# Patient Record
Sex: FEMALE | Race: WHITE | NOT HISPANIC OR LATINO | Employment: OTHER | URBAN - METROPOLITAN AREA
[De-identification: names, ages, dates, MRNs, and addresses within clinical notes are randomized per-mention and may not be internally consistent; named-entity substitution may affect disease eponyms.]

---

## 2017-01-04 ENCOUNTER — LAB (OUTPATIENT)
Dept: LAB | Facility: HOSPITAL | Age: 57
End: 2017-01-04

## 2017-01-04 DIAGNOSIS — M86.672 CHRONIC OSTEOMYELITIS OF HINDFOOT, LEFT (HCC): Primary | ICD-10-CM

## 2017-01-04 LAB
ALBUMIN SERPL-MCNC: 3.5 G/DL (ref 3.2–4.8)
ALBUMIN/GLOB SERPL: 1 G/DL (ref 1.5–2.5)
ALP SERPL-CCNC: 154 U/L (ref 25–100)
ALT SERPL W P-5'-P-CCNC: 23 U/L (ref 7–40)
ANION GAP SERPL CALCULATED.3IONS-SCNC: 9 MMOL/L (ref 3–11)
AST SERPL-CCNC: 27 U/L (ref 0–33)
BASOPHILS # BLD AUTO: 0.04 10*3/MM3 (ref 0–0.2)
BASOPHILS NFR BLD AUTO: 0.5 % (ref 0–1)
BILIRUB SERPL-MCNC: 0.3 MG/DL (ref 0.3–1.2)
BUN BLD-MCNC: 18 MG/DL (ref 9–23)
BUN/CREAT SERPL: 18 (ref 7–25)
CALCIUM SPEC-SCNC: 9.1 MG/DL (ref 8.7–10.4)
CHLORIDE SERPL-SCNC: 99 MMOL/L (ref 99–109)
CO2 SERPL-SCNC: 29 MMOL/L (ref 20–31)
CREAT BLD-MCNC: 1 MG/DL (ref 0.6–1.3)
DEPRECATED RDW RBC AUTO: 41.3 FL (ref 37–54)
EOSINOPHIL # BLD AUTO: 0.36 10*3/MM3 (ref 0.1–0.3)
EOSINOPHIL NFR BLD AUTO: 4.3 % (ref 0–3)
ERYTHROCYTE [DISTWIDTH] IN BLOOD BY AUTOMATED COUNT: 14.4 % (ref 11.3–14.5)
ERYTHROCYTE [SEDIMENTATION RATE] IN BLOOD: 78 MM/HR (ref 0–30)
GFR SERPL CREATININE-BSD FRML MDRD: 57 ML/MIN/1.73
GLOBULIN UR ELPH-MCNC: 3.5 GM/DL
GLUCOSE BLD-MCNC: 293 MG/DL (ref 70–100)
HCT VFR BLD AUTO: 27.1 % (ref 34.5–44)
HGB BLD-MCNC: 8.8 G/DL (ref 11.5–15.5)
IMM GRANULOCYTES # BLD: 0.03 10*3/MM3 (ref 0–0.03)
IMM GRANULOCYTES NFR BLD: 0.4 % (ref 0–0.6)
LYMPHOCYTES # BLD AUTO: 1.1 10*3/MM3 (ref 0.6–4.8)
LYMPHOCYTES NFR BLD AUTO: 13.1 % (ref 24–44)
MCH RBC QN AUTO: 25.8 PG (ref 27–31)
MCHC RBC AUTO-ENTMCNC: 32.5 G/DL (ref 32–36)
MCV RBC AUTO: 79.5 FL (ref 80–99)
MONOCYTES # BLD AUTO: 0.57 10*3/MM3 (ref 0–1)
MONOCYTES NFR BLD AUTO: 6.8 % (ref 0–12)
NEUTROPHILS # BLD AUTO: 6.28 10*3/MM3 (ref 1.5–8.3)
NEUTROPHILS NFR BLD AUTO: 74.9 % (ref 41–71)
PLATELET # BLD AUTO: 511 10*3/MM3 (ref 150–450)
PMV BLD AUTO: 9.1 FL (ref 6–12)
POTASSIUM BLD-SCNC: 4.8 MMOL/L (ref 3.5–5.5)
PROT SERPL-MCNC: 7 G/DL (ref 5.7–8.2)
RBC # BLD AUTO: 3.41 10*6/MM3 (ref 3.89–5.14)
SODIUM BLD-SCNC: 137 MMOL/L (ref 132–146)
VANCOMYCIN TROUGH SERPL-MCNC: 14.3 MCG/ML (ref 10–20)
WBC NRBC COR # BLD: 8.38 10*3/MM3 (ref 3.5–10.8)

## 2017-01-04 PROCEDURE — 36415 COLL VENOUS BLD VENIPUNCTURE: CPT

## 2017-01-04 PROCEDURE — 85652 RBC SED RATE AUTOMATED: CPT | Performed by: INTERNAL MEDICINE

## 2017-01-04 PROCEDURE — 86140 C-REACTIVE PROTEIN: CPT | Performed by: INTERNAL MEDICINE

## 2017-01-04 PROCEDURE — 85025 COMPLETE CBC W/AUTO DIFF WBC: CPT | Performed by: INTERNAL MEDICINE

## 2017-01-04 PROCEDURE — 80053 COMPREHEN METABOLIC PANEL: CPT | Performed by: INTERNAL MEDICINE

## 2017-01-04 PROCEDURE — 80202 ASSAY OF VANCOMYCIN: CPT | Performed by: INTERNAL MEDICINE

## 2017-01-05 LAB — CRP SERPL-MCNC: 28.3 MG/DL (ref 0–10)

## 2017-01-10 ENCOUNTER — LAB (OUTPATIENT)
Dept: LAB | Facility: HOSPITAL | Age: 57
End: 2017-01-10

## 2017-01-10 DIAGNOSIS — M86.672 CHRONIC OSTEOMYELITIS OF HINDFOOT, LEFT (HCC): Primary | ICD-10-CM

## 2017-01-10 LAB
ALBUMIN SERPL-MCNC: 3.3 G/DL (ref 3.2–4.8)
ALBUMIN/GLOB SERPL: 0.9 G/DL (ref 1.5–2.5)
ALP SERPL-CCNC: 158 U/L (ref 25–100)
ALT SERPL W P-5'-P-CCNC: 24 U/L (ref 7–40)
ANION GAP SERPL CALCULATED.3IONS-SCNC: 1 MMOL/L (ref 3–11)
AST SERPL-CCNC: 44 U/L (ref 0–33)
BASOPHILS # BLD AUTO: 0.02 10*3/MM3 (ref 0–0.2)
BASOPHILS NFR BLD AUTO: 0.2 % (ref 0–1)
BILIRUB SERPL-MCNC: 0.3 MG/DL (ref 0.3–1.2)
BUN BLD-MCNC: 18 MG/DL (ref 9–23)
BUN/CREAT SERPL: 18 (ref 7–25)
CALCIUM SPEC-SCNC: 9.2 MG/DL (ref 8.7–10.4)
CHLORIDE SERPL-SCNC: 101 MMOL/L (ref 99–109)
CO2 SERPL-SCNC: 34 MMOL/L (ref 20–31)
CREAT BLD-MCNC: 1 MG/DL (ref 0.6–1.3)
CRP SERPL-MCNC: 30.5 MG/DL (ref 0–10)
DEPRECATED RDW RBC AUTO: 41.9 FL (ref 37–54)
EOSINOPHIL # BLD AUTO: 0.23 10*3/MM3 (ref 0.1–0.3)
EOSINOPHIL NFR BLD AUTO: 2.7 % (ref 0–3)
ERYTHROCYTE [DISTWIDTH] IN BLOOD BY AUTOMATED COUNT: 14.7 % (ref 11.3–14.5)
ERYTHROCYTE [SEDIMENTATION RATE] IN BLOOD: >100 MM/HR (ref 0–30)
GFR SERPL CREATININE-BSD FRML MDRD: 57 ML/MIN/1.73
GLOBULIN UR ELPH-MCNC: 3.6 GM/DL
GLUCOSE BLD-MCNC: 172 MG/DL (ref 70–100)
HCT VFR BLD AUTO: 27.4 % (ref 34.5–44)
HGB BLD-MCNC: 8.9 G/DL (ref 11.5–15.5)
IMM GRANULOCYTES # BLD: 0.03 10*3/MM3 (ref 0–0.03)
IMM GRANULOCYTES NFR BLD: 0.3 % (ref 0–0.6)
LYMPHOCYTES # BLD AUTO: 1.22 10*3/MM3 (ref 0.6–4.8)
LYMPHOCYTES NFR BLD AUTO: 14.1 % (ref 24–44)
MCH RBC QN AUTO: 25.6 PG (ref 27–31)
MCHC RBC AUTO-ENTMCNC: 32.5 G/DL (ref 32–36)
MCV RBC AUTO: 79 FL (ref 80–99)
MONOCYTES # BLD AUTO: 0.68 10*3/MM3 (ref 0–1)
MONOCYTES NFR BLD AUTO: 7.9 % (ref 0–12)
NEUTROPHILS # BLD AUTO: 6.48 10*3/MM3 (ref 1.5–8.3)
NEUTROPHILS NFR BLD AUTO: 74.8 % (ref 41–71)
NRBC BLD MANUAL-RTO: 0 /100 WBC (ref 0–0)
PLATELET # BLD AUTO: 483 10*3/MM3 (ref 150–450)
PMV BLD AUTO: 9.3 FL (ref 6–12)
POTASSIUM BLD-SCNC: 4 MMOL/L (ref 3.5–5.5)
PROT SERPL-MCNC: 6.9 G/DL (ref 5.7–8.2)
RBC # BLD AUTO: 3.47 10*6/MM3 (ref 3.89–5.14)
SODIUM BLD-SCNC: 136 MMOL/L (ref 132–146)
VANCOMYCIN TROUGH SERPL-MCNC: 14.2 MCG/ML (ref 10–20)
WBC NRBC COR # BLD: 8.66 10*3/MM3 (ref 3.5–10.8)

## 2017-01-10 PROCEDURE — 85025 COMPLETE CBC W/AUTO DIFF WBC: CPT | Performed by: INTERNAL MEDICINE

## 2017-01-10 PROCEDURE — 36415 COLL VENOUS BLD VENIPUNCTURE: CPT

## 2017-01-10 PROCEDURE — 80053 COMPREHEN METABOLIC PANEL: CPT | Performed by: INTERNAL MEDICINE

## 2017-01-10 PROCEDURE — 85652 RBC SED RATE AUTOMATED: CPT | Performed by: INTERNAL MEDICINE

## 2017-01-10 PROCEDURE — 86140 C-REACTIVE PROTEIN: CPT | Performed by: INTERNAL MEDICINE

## 2017-01-10 PROCEDURE — 80202 ASSAY OF VANCOMYCIN: CPT | Performed by: INTERNAL MEDICINE

## 2017-01-18 ENCOUNTER — LAB (OUTPATIENT)
Dept: LAB | Facility: HOSPITAL | Age: 57
End: 2017-01-18

## 2017-01-18 DIAGNOSIS — M86.672 CHRONIC OSTEOMYELITIS OF HINDFOOT, LEFT (HCC): Primary | ICD-10-CM

## 2017-01-18 LAB
ALBUMIN SERPL-MCNC: 3.4 G/DL (ref 3.2–4.8)
ALBUMIN/GLOB SERPL: 0.9 G/DL (ref 1.5–2.5)
ALP SERPL-CCNC: 184 U/L (ref 25–100)
ALT SERPL W P-5'-P-CCNC: 32 U/L (ref 7–40)
ANION GAP SERPL CALCULATED.3IONS-SCNC: 7 MMOL/L (ref 3–11)
AST SERPL-CCNC: 51 U/L (ref 0–33)
BASOPHILS # BLD AUTO: 0.04 10*3/MM3 (ref 0–0.2)
BASOPHILS NFR BLD AUTO: 0.5 % (ref 0–1)
BILIRUB SERPL-MCNC: 0.3 MG/DL (ref 0.3–1.2)
BUN BLD-MCNC: 23 MG/DL (ref 9–23)
BUN/CREAT SERPL: 23 (ref 7–25)
CALCIUM SPEC-SCNC: 9.3 MG/DL (ref 8.7–10.4)
CHLORIDE SERPL-SCNC: 103 MMOL/L (ref 99–109)
CO2 SERPL-SCNC: 30 MMOL/L (ref 20–31)
CREAT BLD-MCNC: 1 MG/DL (ref 0.6–1.3)
CRP SERPL-MCNC: 11.1 MG/DL (ref 0–10)
DEPRECATED RDW RBC AUTO: 43.5 FL (ref 37–54)
EOSINOPHIL # BLD AUTO: 0.31 10*3/MM3 (ref 0.1–0.3)
EOSINOPHIL NFR BLD AUTO: 3.5 % (ref 0–3)
ERYTHROCYTE [DISTWIDTH] IN BLOOD BY AUTOMATED COUNT: 14.9 % (ref 11.3–14.5)
GFR SERPL CREATININE-BSD FRML MDRD: 57 ML/MIN/1.73
GLOBULIN UR ELPH-MCNC: 3.6 GM/DL
GLUCOSE BLD-MCNC: 110 MG/DL (ref 70–100)
HCT VFR BLD AUTO: 26.2 % (ref 34.5–44)
HGB BLD-MCNC: 8.3 G/DL (ref 11.5–15.5)
IMM GRANULOCYTES # BLD: 0.02 10*3/MM3 (ref 0–0.03)
IMM GRANULOCYTES NFR BLD: 0.2 % (ref 0–0.6)
LYMPHOCYTES # BLD AUTO: 1.53 10*3/MM3 (ref 0.6–4.8)
LYMPHOCYTES NFR BLD AUTO: 17.5 % (ref 24–44)
MCH RBC QN AUTO: 25.5 PG (ref 27–31)
MCHC RBC AUTO-ENTMCNC: 31.7 G/DL (ref 32–36)
MCV RBC AUTO: 80.4 FL (ref 80–99)
MONOCYTES # BLD AUTO: 0.59 10*3/MM3 (ref 0–1)
MONOCYTES NFR BLD AUTO: 6.8 % (ref 0–12)
NEUTROPHILS # BLD AUTO: 6.25 10*3/MM3 (ref 1.5–8.3)
NEUTROPHILS NFR BLD AUTO: 71.5 % (ref 41–71)
PLATELET # BLD AUTO: 448 10*3/MM3 (ref 150–450)
PMV BLD AUTO: 8.6 FL (ref 6–12)
POTASSIUM BLD-SCNC: 4.3 MMOL/L (ref 3.5–5.5)
PROT SERPL-MCNC: 7 G/DL (ref 5.7–8.2)
RBC # BLD AUTO: 3.26 10*6/MM3 (ref 3.89–5.14)
SODIUM BLD-SCNC: 140 MMOL/L (ref 132–146)
VANCOMYCIN TROUGH SERPL-MCNC: 16.6 MCG/ML (ref 10–20)
WBC NRBC COR # BLD: 8.74 10*3/MM3 (ref 3.5–10.8)

## 2017-01-18 PROCEDURE — 85025 COMPLETE CBC W/AUTO DIFF WBC: CPT | Performed by: INTERNAL MEDICINE

## 2017-01-18 PROCEDURE — 80202 ASSAY OF VANCOMYCIN: CPT | Performed by: INTERNAL MEDICINE

## 2017-01-18 PROCEDURE — 80053 COMPREHEN METABOLIC PANEL: CPT | Performed by: INTERNAL MEDICINE

## 2017-01-18 PROCEDURE — 36415 COLL VENOUS BLD VENIPUNCTURE: CPT

## 2017-01-18 PROCEDURE — 86140 C-REACTIVE PROTEIN: CPT | Performed by: INTERNAL MEDICINE

## 2017-01-25 ENCOUNTER — APPOINTMENT (OUTPATIENT)
Dept: LAB | Facility: HOSPITAL | Age: 57
End: 2017-01-25

## 2017-01-25 ENCOUNTER — TRANSCRIBE ORDERS (OUTPATIENT)
Dept: LAB | Facility: HOSPITAL | Age: 57
End: 2017-01-25

## 2017-01-25 DIAGNOSIS — M86.672 CHRONIC OSTEOMYELITIS OF HINDFOOT, LEFT (HCC): Primary | ICD-10-CM

## 2017-01-25 LAB
ALBUMIN SERPL-MCNC: 3.5 G/DL (ref 3.2–4.8)
ALBUMIN/GLOB SERPL: 0.9 G/DL (ref 1.5–2.5)
ALP SERPL-CCNC: 217 U/L (ref 25–100)
ALT SERPL W P-5'-P-CCNC: 44 U/L (ref 7–40)
ANION GAP SERPL CALCULATED.3IONS-SCNC: 5 MMOL/L (ref 3–11)
AST SERPL-CCNC: 92 U/L (ref 0–33)
BASOPHILS # BLD AUTO: 0.03 10*3/MM3 (ref 0–0.2)
BASOPHILS NFR BLD AUTO: 0.4 % (ref 0–1)
BILIRUB SERPL-MCNC: 0.3 MG/DL (ref 0.3–1.2)
BUN BLD-MCNC: 22 MG/DL (ref 9–23)
BUN/CREAT SERPL: 22 (ref 7–25)
CALCIUM SPEC-SCNC: 9.1 MG/DL (ref 8.7–10.4)
CHLORIDE SERPL-SCNC: 102 MMOL/L (ref 99–109)
CO2 SERPL-SCNC: 32 MMOL/L (ref 20–31)
CREAT BLD-MCNC: 1 MG/DL (ref 0.6–1.3)
CRP SERPL-MCNC: 11.5 MG/DL (ref 0–10)
DEPRECATED RDW RBC AUTO: 46.6 FL (ref 37–54)
EOSINOPHIL # BLD AUTO: 0.32 10*3/MM3 (ref 0.1–0.3)
EOSINOPHIL NFR BLD AUTO: 4.4 % (ref 0–3)
ERYTHROCYTE [DISTWIDTH] IN BLOOD BY AUTOMATED COUNT: 15.9 % (ref 11.3–14.5)
ERYTHROCYTE [SEDIMENTATION RATE] IN BLOOD: 50 MM/HR (ref 0–30)
GFR SERPL CREATININE-BSD FRML MDRD: 57 ML/MIN/1.73
GLOBULIN UR ELPH-MCNC: 3.7 GM/DL
GLUCOSE BLD-MCNC: 130 MG/DL (ref 70–100)
HCT VFR BLD AUTO: 27.5 % (ref 34.5–44)
HGB BLD-MCNC: 8.8 G/DL (ref 11.5–15.5)
IMM GRANULOCYTES # BLD: 0.02 10*3/MM3 (ref 0–0.03)
IMM GRANULOCYTES NFR BLD: 0.3 % (ref 0–0.6)
LYMPHOCYTES # BLD AUTO: 1.39 10*3/MM3 (ref 0.6–4.8)
LYMPHOCYTES NFR BLD AUTO: 18.9 % (ref 24–44)
MCH RBC QN AUTO: 26 PG (ref 27–31)
MCHC RBC AUTO-ENTMCNC: 32 G/DL (ref 32–36)
MCV RBC AUTO: 81.1 FL (ref 80–99)
MONOCYTES # BLD AUTO: 0.59 10*3/MM3 (ref 0–1)
MONOCYTES NFR BLD AUTO: 8 % (ref 0–12)
NEUTROPHILS # BLD AUTO: 5 10*3/MM3 (ref 1.5–8.3)
NEUTROPHILS NFR BLD AUTO: 68 % (ref 41–71)
PLATELET # BLD AUTO: 425 10*3/MM3 (ref 150–450)
PMV BLD AUTO: 9.1 FL (ref 6–12)
POTASSIUM BLD-SCNC: 4.5 MMOL/L (ref 3.5–5.5)
PROT SERPL-MCNC: 7.2 G/DL (ref 5.7–8.2)
RBC # BLD AUTO: 3.39 10*6/MM3 (ref 3.89–5.14)
SODIUM BLD-SCNC: 139 MMOL/L (ref 132–146)
VANCOMYCIN TROUGH SERPL-MCNC: 16.6 MCG/ML (ref 10–20)
WBC NRBC COR # BLD: 7.35 10*3/MM3 (ref 3.5–10.8)

## 2017-01-25 PROCEDURE — 85652 RBC SED RATE AUTOMATED: CPT | Performed by: INTERNAL MEDICINE

## 2017-01-25 PROCEDURE — 86140 C-REACTIVE PROTEIN: CPT | Performed by: INTERNAL MEDICINE

## 2017-01-25 PROCEDURE — 36415 COLL VENOUS BLD VENIPUNCTURE: CPT | Performed by: INTERNAL MEDICINE

## 2017-01-25 PROCEDURE — 80202 ASSAY OF VANCOMYCIN: CPT | Performed by: INTERNAL MEDICINE

## 2017-01-25 PROCEDURE — 80053 COMPREHEN METABOLIC PANEL: CPT | Performed by: INTERNAL MEDICINE

## 2017-01-25 PROCEDURE — 85025 COMPLETE CBC W/AUTO DIFF WBC: CPT | Performed by: INTERNAL MEDICINE

## 2018-02-19 ENCOUNTER — HOSPITAL ENCOUNTER (OUTPATIENT)
Age: 58
End: 2018-02-19
Payer: COMMERCIAL

## 2018-02-19 VITALS
RESPIRATION RATE: 20 BRPM | OXYGEN SATURATION: 98 % | HEART RATE: 94 BPM | SYSTOLIC BLOOD PRESSURE: 189 MMHG | DIASTOLIC BLOOD PRESSURE: 82 MMHG

## 2018-02-19 VITALS — BODY MASS INDEX: 36.5 KG/M2

## 2018-02-19 DIAGNOSIS — M96.1: Primary | ICD-10-CM

## 2018-02-19 PROCEDURE — 80361 OPIATES 1 OR MORE: CPT

## 2018-02-19 PROCEDURE — G0480 DRUG TEST DEF 1-7 CLASSES: HCPCS

## 2018-02-19 PROCEDURE — 80305 DRUG TEST PRSMV DIR OPT OBS: CPT

## 2018-02-19 PROCEDURE — 80365 DRUG SCREENING OXYCODONE: CPT

## 2018-02-19 PROCEDURE — 99212 OFFICE O/P EST SF 10 MIN: CPT

## 2018-03-29 NOTE — PC.PHONENOTE
pt contacted the office to notify staff that she was released from the ER in [place] this morning after being diagnosed with a kidney stone. states that the ER physician wrote her a prescription for Percocet

## 2018-04-24 ENCOUNTER — HOSPITAL ENCOUNTER (OUTPATIENT)
Age: 58
End: 2018-04-24
Payer: COMMERCIAL

## 2018-04-24 VITALS
DIASTOLIC BLOOD PRESSURE: 85 MMHG | HEART RATE: 87 BPM | SYSTOLIC BLOOD PRESSURE: 132 MMHG | RESPIRATION RATE: 18 BRPM | TEMPERATURE: 98 F | OXYGEN SATURATION: 98 %

## 2018-04-24 VITALS — BODY MASS INDEX: 30.2 KG/M2

## 2018-04-24 DIAGNOSIS — Z79.899: ICD-10-CM

## 2018-04-24 DIAGNOSIS — G89.4: Primary | ICD-10-CM

## 2018-04-24 DIAGNOSIS — M51.36: ICD-10-CM

## 2018-04-24 PROCEDURE — G0480 DRUG TEST DEF 1-7 CLASSES: HCPCS

## 2018-04-24 PROCEDURE — 80365 DRUG SCREENING OXYCODONE: CPT

## 2018-04-24 PROCEDURE — 99212 OFFICE O/P EST SF 10 MIN: CPT

## 2018-04-24 PROCEDURE — 80361 OPIATES 1 OR MORE: CPT

## 2018-04-24 PROCEDURE — 80305 DRUG TEST PRSMV DIR OPT OBS: CPT

## 2018-07-23 ENCOUNTER — HOSPITAL ENCOUNTER (OUTPATIENT)
Age: 58
End: 2018-07-23
Payer: COMMERCIAL

## 2018-07-23 VITALS
HEART RATE: 8 BPM | OXYGEN SATURATION: 98 % | RESPIRATION RATE: 18 BRPM | SYSTOLIC BLOOD PRESSURE: 149 MMHG | DIASTOLIC BLOOD PRESSURE: 82 MMHG

## 2018-07-23 VITALS — BODY MASS INDEX: 34.9 KG/M2

## 2018-07-23 DIAGNOSIS — M96.1: Primary | ICD-10-CM

## 2018-07-23 PROCEDURE — 99212 OFFICE O/P EST SF 10 MIN: CPT

## 2018-10-22 ENCOUNTER — HOSPITAL ENCOUNTER (OUTPATIENT)
Age: 58
End: 2018-10-22
Payer: COMMERCIAL

## 2018-10-22 VITALS
SYSTOLIC BLOOD PRESSURE: 157 MMHG | DIASTOLIC BLOOD PRESSURE: 76 MMHG | OXYGEN SATURATION: 98 % | HEART RATE: 83 BPM | RESPIRATION RATE: 18 BRPM

## 2018-10-22 VITALS — BODY MASS INDEX: 34.9 KG/M2

## 2018-10-22 DIAGNOSIS — Z89.612: ICD-10-CM

## 2018-10-22 DIAGNOSIS — M51.36: ICD-10-CM

## 2018-10-22 DIAGNOSIS — M96.1: Primary | ICD-10-CM

## 2018-10-22 PROCEDURE — 80365 DRUG SCREENING OXYCODONE: CPT

## 2018-10-22 PROCEDURE — 80361 OPIATES 1 OR MORE: CPT

## 2018-10-22 PROCEDURE — G0480 DRUG TEST DEF 1-7 CLASSES: HCPCS

## 2018-10-22 PROCEDURE — 99213 OFFICE O/P EST LOW 20 MIN: CPT

## 2018-10-22 PROCEDURE — 80305 DRUG TEST PRSMV DIR OPT OBS: CPT

## 2018-12-10 ENCOUNTER — HOSPITAL ENCOUNTER (OUTPATIENT)
Age: 58
End: 2018-12-10
Payer: COMMERCIAL

## 2018-12-10 VITALS
RESPIRATION RATE: 18 BRPM | SYSTOLIC BLOOD PRESSURE: 159 MMHG | DIASTOLIC BLOOD PRESSURE: 83 MMHG | OXYGEN SATURATION: 99 % | HEART RATE: 83 BPM

## 2018-12-10 VITALS — BODY MASS INDEX: 34.2 KG/M2

## 2018-12-10 DIAGNOSIS — M51.36: ICD-10-CM

## 2018-12-10 DIAGNOSIS — Z89.612: ICD-10-CM

## 2018-12-10 DIAGNOSIS — M25.519: ICD-10-CM

## 2018-12-10 DIAGNOSIS — M96.1: Primary | ICD-10-CM

## 2018-12-10 PROCEDURE — 99213 OFFICE O/P EST LOW 20 MIN: CPT

## 2019-01-24 ENCOUNTER — OFFICE (OUTPATIENT)
Dept: URBAN - METROPOLITAN AREA CLINIC 4 | Facility: CLINIC | Age: 59
End: 2019-01-24

## 2019-01-24 VITALS — DIASTOLIC BLOOD PRESSURE: 73 MMHG | WEIGHT: 220 LBS | SYSTOLIC BLOOD PRESSURE: 119 MMHG | HEIGHT: 68 IN

## 2019-01-24 DIAGNOSIS — B18.2 CHRONIC VIRAL HEPATITIS C: ICD-10-CM

## 2019-01-24 DIAGNOSIS — R16.0 HEPATOMEGALY, NOT ELSEWHERE CLASSIFIED: ICD-10-CM

## 2019-01-24 DIAGNOSIS — R74.8 ABNORMAL LEVELS OF OTHER SERUM ENZYMES: ICD-10-CM

## 2019-01-24 DIAGNOSIS — E83.110 HEREDITARY HEMOCHROMATOSIS: ICD-10-CM

## 2019-01-24 DIAGNOSIS — K83.1 OBSTRUCTION OF BILE DUCT: ICD-10-CM

## 2019-01-24 DIAGNOSIS — R10.11 RIGHT UPPER QUADRANT PAIN: ICD-10-CM

## 2019-01-24 PROCEDURE — 99204 OFFICE O/P NEW MOD 45 MIN: CPT | Performed by: INTERNAL MEDICINE

## 2019-02-06 ENCOUNTER — ON CAMPUS - OUTPATIENT (OUTPATIENT)
Dept: URBAN - METROPOLITAN AREA HOSPITAL 73 | Facility: HOSPITAL | Age: 59
End: 2019-02-06
Payer: COMMERCIAL

## 2019-02-06 DIAGNOSIS — R93.2 ABNORMAL FINDINGS ON DIAGNOSTIC IMAGING OF LIVER AND BILIARY: ICD-10-CM

## 2019-02-06 DIAGNOSIS — K83.8 OTHER SPECIFIED DISEASES OF BILIARY TRACT: ICD-10-CM

## 2019-02-06 DIAGNOSIS — K80.50 CALCULUS OF BILE DUCT WITHOUT CHOLANGITIS OR CHOLECYSTITIS W: ICD-10-CM

## 2019-02-06 DIAGNOSIS — R74.8 ABNORMAL LEVELS OF OTHER SERUM ENZYMES: ICD-10-CM

## 2019-02-06 DIAGNOSIS — R10.11 RIGHT UPPER QUADRANT PAIN: ICD-10-CM

## 2019-02-06 PROCEDURE — 43264 ERCP REMOVE DUCT CALCULI: CPT | Performed by: INTERNAL MEDICINE

## 2019-02-06 PROCEDURE — 43262 ENDO CHOLANGIOPANCREATOGRAPH: CPT | Performed by: INTERNAL MEDICINE

## 2019-02-13 ENCOUNTER — OFFICE (OUTPATIENT)
Dept: URBAN - METROPOLITAN AREA CLINIC 4 | Facility: CLINIC | Age: 59
End: 2019-02-13

## 2019-02-13 VITALS — WEIGHT: 220 LBS | HEIGHT: 68 IN

## 2019-02-13 DIAGNOSIS — R16.0 HEPATOMEGALY, NOT ELSEWHERE CLASSIFIED: ICD-10-CM

## 2019-02-13 DIAGNOSIS — B18.2 CHRONIC VIRAL HEPATITIS C: ICD-10-CM

## 2019-02-13 DIAGNOSIS — K59.00 CONSTIPATION, UNSPECIFIED: ICD-10-CM

## 2019-02-13 DIAGNOSIS — R19.4 CHANGE IN BOWEL HABIT: ICD-10-CM

## 2019-02-13 PROCEDURE — 99214 OFFICE O/P EST MOD 30 MIN: CPT | Performed by: NURSE PRACTITIONER

## 2019-03-25 ENCOUNTER — HOSPITAL ENCOUNTER (OUTPATIENT)
Age: 59
End: 2019-03-25
Payer: COMMERCIAL

## 2019-03-25 VITALS
RESPIRATION RATE: 18 BRPM | SYSTOLIC BLOOD PRESSURE: 166 MMHG | OXYGEN SATURATION: 98 % | DIASTOLIC BLOOD PRESSURE: 89 MMHG | HEART RATE: 86 BPM

## 2019-03-25 VITALS — BODY MASS INDEX: 35.7 KG/M2

## 2019-03-25 DIAGNOSIS — M96.1: ICD-10-CM

## 2019-03-25 DIAGNOSIS — Z79.899: ICD-10-CM

## 2019-03-25 DIAGNOSIS — M51.36: ICD-10-CM

## 2019-03-25 DIAGNOSIS — C22.8: Primary | ICD-10-CM

## 2019-03-25 PROCEDURE — 80365 DRUG SCREENING OXYCODONE: CPT

## 2019-03-25 PROCEDURE — 80305 DRUG TEST PRSMV DIR OPT OBS: CPT

## 2019-03-25 PROCEDURE — G0480 DRUG TEST DEF 1-7 CLASSES: HCPCS

## 2019-03-25 PROCEDURE — 80361 OPIATES 1 OR MORE: CPT

## 2019-03-25 PROCEDURE — 99213 OFFICE O/P EST LOW 20 MIN: CPT

## 2019-07-01 ENCOUNTER — HOSPITAL ENCOUNTER (OUTPATIENT)
Age: 59
End: 2019-07-01
Payer: COMMERCIAL

## 2019-07-01 VITALS
OXYGEN SATURATION: 99 % | RESPIRATION RATE: 18 BRPM | SYSTOLIC BLOOD PRESSURE: 140 MMHG | DIASTOLIC BLOOD PRESSURE: 72 MMHG | HEART RATE: 84 BPM

## 2019-07-01 VITALS — BODY MASS INDEX: 34.9 KG/M2

## 2019-07-01 DIAGNOSIS — Z89.612: ICD-10-CM

## 2019-07-01 DIAGNOSIS — M96.1: Primary | ICD-10-CM

## 2019-07-01 DIAGNOSIS — M51.36: ICD-10-CM

## 2019-07-01 DIAGNOSIS — C22.8: ICD-10-CM

## 2019-07-01 DIAGNOSIS — Z79.899: ICD-10-CM

## 2019-07-01 PROCEDURE — 80365 DRUG SCREENING OXYCODONE: CPT

## 2019-07-01 PROCEDURE — 99212 OFFICE O/P EST SF 10 MIN: CPT

## 2019-07-01 PROCEDURE — 80361 OPIATES 1 OR MORE: CPT

## 2019-07-01 PROCEDURE — G0480 DRUG TEST DEF 1-7 CLASSES: HCPCS

## 2019-07-01 PROCEDURE — 80305 DRUG TEST PRSMV DIR OPT OBS: CPT

## 2019-10-14 ENCOUNTER — HOSPITAL ENCOUNTER (OUTPATIENT)
Age: 59
End: 2019-10-14
Payer: COMMERCIAL

## 2019-10-14 VITALS
RESPIRATION RATE: 18 BRPM | SYSTOLIC BLOOD PRESSURE: 159 MMHG | DIASTOLIC BLOOD PRESSURE: 85 MMHG | HEART RATE: 83 BPM | OXYGEN SATURATION: 98 %

## 2019-10-14 VITALS — BODY MASS INDEX: 32.6 KG/M2

## 2019-10-14 DIAGNOSIS — Z89.612: ICD-10-CM

## 2019-10-14 DIAGNOSIS — M96.1: ICD-10-CM

## 2019-10-14 DIAGNOSIS — C22.8: Primary | ICD-10-CM

## 2019-10-14 DIAGNOSIS — M51.36: ICD-10-CM

## 2019-10-14 PROCEDURE — G0480 DRUG TEST DEF 1-7 CLASSES: HCPCS

## 2019-10-14 PROCEDURE — 80307 DRUG TEST PRSMV CHEM ANLYZR: CPT

## 2019-10-14 PROCEDURE — 36415 COLL VENOUS BLD VENIPUNCTURE: CPT

## 2019-10-14 PROCEDURE — 99212 OFFICE O/P EST SF 10 MIN: CPT

## 2019-10-14 PROCEDURE — 80356 HEROIN METABOLITE: CPT

## 2019-10-14 PROCEDURE — 80361 OPIATES 1 OR MORE: CPT

## 2019-10-28 ENCOUNTER — HOSPITAL ENCOUNTER (OUTPATIENT)
Age: 59
End: 2019-10-28
Payer: COMMERCIAL

## 2019-10-28 VITALS
RESPIRATION RATE: 18 BRPM | DIASTOLIC BLOOD PRESSURE: 79 MMHG | OXYGEN SATURATION: 98 % | HEART RATE: 91 BPM | SYSTOLIC BLOOD PRESSURE: 137 MMHG

## 2019-10-28 VITALS — BODY MASS INDEX: 32.6 KG/M2

## 2019-10-28 DIAGNOSIS — C22.9: Primary | ICD-10-CM

## 2019-10-28 DIAGNOSIS — M51.36: ICD-10-CM

## 2019-10-28 DIAGNOSIS — M96.1: ICD-10-CM

## 2019-10-28 DIAGNOSIS — Z89.619: ICD-10-CM

## 2019-10-28 PROCEDURE — 99212 OFFICE O/P EST SF 10 MIN: CPT

## 2019-11-11 ENCOUNTER — HOSPITAL ENCOUNTER (OUTPATIENT)
Age: 59
End: 2019-11-11
Payer: COMMERCIAL

## 2019-11-11 VITALS
SYSTOLIC BLOOD PRESSURE: 153 MMHG | DIASTOLIC BLOOD PRESSURE: 84 MMHG | RESPIRATION RATE: 18 BRPM | HEART RATE: 82 BPM | OXYGEN SATURATION: 98 %

## 2019-11-11 VITALS — BODY MASS INDEX: 21.2 KG/M2

## 2019-11-11 DIAGNOSIS — Z89.512: ICD-10-CM

## 2019-11-11 DIAGNOSIS — M96.1: ICD-10-CM

## 2019-11-11 DIAGNOSIS — C22.8: Primary | ICD-10-CM

## 2019-11-11 DIAGNOSIS — M51.36: ICD-10-CM

## 2019-11-11 PROCEDURE — 99212 OFFICE O/P EST SF 10 MIN: CPT

## 2020-01-20 ENCOUNTER — HOSPITAL ENCOUNTER (OUTPATIENT)
Age: 60
End: 2020-01-20
Payer: COMMERCIAL

## 2020-01-20 VITALS
HEART RATE: 81 BPM | SYSTOLIC BLOOD PRESSURE: 166 MMHG | OXYGEN SATURATION: 98 % | RESPIRATION RATE: 18 BRPM | DIASTOLIC BLOOD PRESSURE: 91 MMHG

## 2020-01-20 VITALS — BODY MASS INDEX: 32.2 KG/M2

## 2020-01-20 DIAGNOSIS — Z89.512: ICD-10-CM

## 2020-01-20 DIAGNOSIS — C22.9: ICD-10-CM

## 2020-01-20 DIAGNOSIS — M51.36: Primary | ICD-10-CM

## 2020-01-20 DIAGNOSIS — I10: ICD-10-CM

## 2020-01-20 DIAGNOSIS — M96.1: ICD-10-CM

## 2020-01-20 DIAGNOSIS — E11.9: ICD-10-CM

## 2020-01-20 PROCEDURE — 99212 OFFICE O/P EST SF 10 MIN: CPT

## 2020-04-13 ENCOUNTER — HOSPITAL ENCOUNTER (OUTPATIENT)
Age: 60
End: 2020-04-13
Payer: COMMERCIAL

## 2020-04-13 DIAGNOSIS — C22.9: ICD-10-CM

## 2020-04-13 DIAGNOSIS — M96.1: ICD-10-CM

## 2020-04-13 DIAGNOSIS — I10: ICD-10-CM

## 2020-04-13 DIAGNOSIS — E11.9: ICD-10-CM

## 2020-04-13 DIAGNOSIS — M51.36: Primary | ICD-10-CM

## 2020-04-13 DIAGNOSIS — Z89.512: ICD-10-CM

## 2020-04-13 PROCEDURE — G2012 BRIEF CHECK IN BY MD/QHP: HCPCS

## 2020-04-13 PROCEDURE — 99441: CPT

## 2020-06-09 ENCOUNTER — HOSPITAL ENCOUNTER (OUTPATIENT)
Age: 60
End: 2020-06-09
Payer: COMMERCIAL

## 2020-06-09 DIAGNOSIS — C22.9: ICD-10-CM

## 2020-06-09 DIAGNOSIS — E11.9: ICD-10-CM

## 2020-06-09 DIAGNOSIS — M96.1: ICD-10-CM

## 2020-06-09 DIAGNOSIS — M51.36: Primary | ICD-10-CM

## 2020-06-09 DIAGNOSIS — I10: ICD-10-CM

## 2020-06-09 DIAGNOSIS — D02.20: ICD-10-CM

## 2020-06-09 DIAGNOSIS — Z89.512: ICD-10-CM

## 2020-06-09 PROCEDURE — 99212 OFFICE O/P EST SF 10 MIN: CPT

## 2020-06-12 ENCOUNTER — HOSPITAL ENCOUNTER (OUTPATIENT)
Age: 60
End: 2020-06-12
Payer: COMMERCIAL

## 2020-06-12 VITALS
TEMPERATURE: 97.7 F | OXYGEN SATURATION: 97 % | RESPIRATION RATE: 18 BRPM | HEART RATE: 96 BPM | DIASTOLIC BLOOD PRESSURE: 91 MMHG | SYSTOLIC BLOOD PRESSURE: 165 MMHG

## 2020-06-12 VITALS — BODY MASS INDEX: 32.6 KG/M2

## 2020-06-12 DIAGNOSIS — M51.16: Primary | ICD-10-CM

## 2020-06-12 DIAGNOSIS — C22.9: ICD-10-CM

## 2020-06-12 DIAGNOSIS — D02.20: ICD-10-CM

## 2020-06-12 PROCEDURE — 99212 OFFICE O/P EST SF 10 MIN: CPT

## 2020-07-20 ENCOUNTER — HOSPITAL ENCOUNTER (OUTPATIENT)
Age: 60
End: 2020-07-20
Payer: COMMERCIAL

## 2020-07-20 VITALS
RESPIRATION RATE: 18 BRPM | OXYGEN SATURATION: 98 % | TEMPERATURE: 97.4 F | DIASTOLIC BLOOD PRESSURE: 88 MMHG | HEART RATE: 74 BPM | SYSTOLIC BLOOD PRESSURE: 142 MMHG

## 2020-07-20 VITALS — BODY MASS INDEX: 33.4 KG/M2

## 2020-07-20 DIAGNOSIS — M51.16: Primary | ICD-10-CM

## 2020-07-20 PROCEDURE — 99212 OFFICE O/P EST SF 10 MIN: CPT

## 2020-10-19 ENCOUNTER — HOSPITAL ENCOUNTER (OUTPATIENT)
Age: 60
End: 2020-10-19
Payer: COMMERCIAL

## 2020-10-19 VITALS
DIASTOLIC BLOOD PRESSURE: 85 MMHG | OXYGEN SATURATION: 98 % | SYSTOLIC BLOOD PRESSURE: 125 MMHG | RESPIRATION RATE: 18 BRPM | HEART RATE: 85 BPM

## 2020-10-19 VITALS — BODY MASS INDEX: 33.4 KG/M2

## 2020-10-19 DIAGNOSIS — C78.7: ICD-10-CM

## 2020-10-19 DIAGNOSIS — M51.16: Primary | ICD-10-CM

## 2020-10-19 DIAGNOSIS — C79.51: ICD-10-CM

## 2020-10-19 PROCEDURE — 99212 OFFICE O/P EST SF 10 MIN: CPT

## 2021-01-11 ENCOUNTER — HOSPITAL ENCOUNTER (OUTPATIENT)
Age: 61
End: 2021-01-11
Payer: COMMERCIAL

## 2021-01-11 VITALS
SYSTOLIC BLOOD PRESSURE: 122 MMHG | DIASTOLIC BLOOD PRESSURE: 74 MMHG | OXYGEN SATURATION: 98 % | HEART RATE: 74 BPM | RESPIRATION RATE: 18 BRPM

## 2021-01-11 VITALS — BODY MASS INDEX: 31.4 KG/M2

## 2021-01-11 DIAGNOSIS — C78.7: ICD-10-CM

## 2021-01-11 DIAGNOSIS — M51.16: Primary | ICD-10-CM

## 2021-01-11 DIAGNOSIS — C79.51: ICD-10-CM

## 2021-01-11 PROCEDURE — 99212 OFFICE O/P EST SF 10 MIN: CPT

## 2021-01-11 PROCEDURE — G0463 HOSPITAL OUTPT CLINIC VISIT: HCPCS

## 2021-03-11 ENCOUNTER — HOSPITAL ENCOUNTER (OUTPATIENT)
Age: 61
End: 2021-03-11
Payer: COMMERCIAL

## 2021-03-11 VITALS
SYSTOLIC BLOOD PRESSURE: 134 MMHG | RESPIRATION RATE: 18 BRPM | HEART RATE: 90 BPM | OXYGEN SATURATION: 98 % | DIASTOLIC BLOOD PRESSURE: 77 MMHG

## 2021-03-11 VITALS — BODY MASS INDEX: 30.9 KG/M2

## 2021-03-11 DIAGNOSIS — C22.7: ICD-10-CM

## 2021-03-11 DIAGNOSIS — C79.51: ICD-10-CM

## 2021-03-11 DIAGNOSIS — M51.16: Primary | ICD-10-CM

## 2021-03-11 PROCEDURE — G0463 HOSPITAL OUTPT CLINIC VISIT: HCPCS

## 2021-03-11 PROCEDURE — 99212 OFFICE O/P EST SF 10 MIN: CPT

## 2021-06-17 ENCOUNTER — HOSPITAL ENCOUNTER (OUTPATIENT)
Age: 61
End: 2021-06-17
Payer: COMMERCIAL

## 2021-06-17 VITALS
RESPIRATION RATE: 18 BRPM | HEART RATE: 87 BPM | DIASTOLIC BLOOD PRESSURE: 77 MMHG | SYSTOLIC BLOOD PRESSURE: 139 MMHG | OXYGEN SATURATION: 98 %

## 2021-06-17 VITALS — BODY MASS INDEX: 32.8 KG/M2

## 2021-06-17 DIAGNOSIS — C22.9: ICD-10-CM

## 2021-06-17 DIAGNOSIS — C79.51: ICD-10-CM

## 2021-06-17 DIAGNOSIS — M51.16: Primary | ICD-10-CM

## 2021-06-17 PROCEDURE — G0463 HOSPITAL OUTPT CLINIC VISIT: HCPCS

## 2021-06-17 PROCEDURE — 99212 OFFICE O/P EST SF 10 MIN: CPT

## 2021-08-12 ENCOUNTER — HOSPITAL ENCOUNTER (OUTPATIENT)
Age: 61
End: 2021-08-12
Payer: COMMERCIAL

## 2021-08-12 VITALS
HEART RATE: 85 BPM | OXYGEN SATURATION: 97 % | SYSTOLIC BLOOD PRESSURE: 143 MMHG | DIASTOLIC BLOOD PRESSURE: 81 MMHG | RESPIRATION RATE: 18 BRPM

## 2021-08-12 VITALS — BODY MASS INDEX: 30.1 KG/M2

## 2021-08-12 DIAGNOSIS — C22.9: ICD-10-CM

## 2021-08-12 DIAGNOSIS — M51.16: Primary | ICD-10-CM

## 2021-08-12 DIAGNOSIS — C79.51: ICD-10-CM

## 2021-08-12 PROCEDURE — 99212 OFFICE O/P EST SF 10 MIN: CPT

## 2021-08-12 PROCEDURE — G0463 HOSPITAL OUTPT CLINIC VISIT: HCPCS

## 2021-09-16 ENCOUNTER — HOSPITAL ENCOUNTER (OUTPATIENT)
Age: 61
End: 2021-09-16
Payer: COMMERCIAL

## 2021-09-16 DIAGNOSIS — M51.16: Primary | ICD-10-CM

## 2021-09-16 DIAGNOSIS — C79.51: ICD-10-CM

## 2021-09-16 PROCEDURE — 99212 OFFICE O/P EST SF 10 MIN: CPT

## 2021-09-16 PROCEDURE — G0463 HOSPITAL OUTPT CLINIC VISIT: HCPCS

## 2021-10-05 ENCOUNTER — HOSPITAL ENCOUNTER (INPATIENT)
Facility: HOSPITAL | Age: 61
LOS: 23 days | Discharge: NURSING, REHAB OR LONG-TERM CARE | End: 2021-10-28
Attending: INTERNAL MEDICINE | Admitting: INTERNAL MEDICINE

## 2021-10-05 ENCOUNTER — APPOINTMENT (OUTPATIENT)
Dept: OTHER | Facility: HOSPITAL | Age: 61
End: 2021-10-05

## 2021-10-05 ENCOUNTER — APPOINTMENT (OUTPATIENT)
Dept: GENERAL RADIOLOGY | Facility: HOSPITAL | Age: 61
End: 2021-10-05

## 2021-10-05 ENCOUNTER — APPOINTMENT (OUTPATIENT)
Dept: CT IMAGING | Facility: HOSPITAL | Age: 61
End: 2021-10-05

## 2021-10-05 DIAGNOSIS — F11.90 METHADONE USE: Chronic | ICD-10-CM

## 2021-10-05 DIAGNOSIS — R47.01 APHASIA: ICD-10-CM

## 2021-10-05 DIAGNOSIS — F80.1 EXPRESSIVE LANGUAGE IMPAIRMENT: Primary | ICD-10-CM

## 2021-10-05 DIAGNOSIS — I61.0 NONTRAUMATIC SUBCORTICAL HEMORRHAGE OF LEFT CEREBRAL HEMISPHERE (HCC): ICD-10-CM

## 2021-10-05 PROBLEM — I62.9 INTRACRANIAL HEMORRHAGE: Status: RESOLVED | Noted: 2021-10-05 | Resolved: 2021-10-05

## 2021-10-05 PROBLEM — J18.9 RIGHT LOWER LOBE PNEUMONIA: Status: ACTIVE | Noted: 2021-10-05

## 2021-10-05 PROBLEM — C79.51 HEPATOCELLULAR CARCINOMA METASTATIC TO BONE: Status: ACTIVE | Noted: 2021-10-05

## 2021-10-05 PROBLEM — C22.0 HEPATOCELLULAR CARCINOMA: Status: ACTIVE | Noted: 2021-10-05

## 2021-10-05 PROBLEM — K74.60 CIRRHOSIS OF LIVER: Chronic | Status: ACTIVE | Noted: 2021-10-05

## 2021-10-05 PROBLEM — I10 ESSENTIAL HYPERTENSION: Status: ACTIVE | Noted: 2020-12-09

## 2021-10-05 PROBLEM — I61.9 ICH (INTRACEREBRAL HEMORRHAGE): Status: ACTIVE | Noted: 2021-10-05

## 2021-10-05 PROBLEM — I62.9 INTRACRANIAL HEMORRHAGE: Status: ACTIVE | Noted: 2021-10-05

## 2021-10-05 PROBLEM — E83.119 HEMOCHROMATOSIS: Chronic | Status: ACTIVE | Noted: 2021-10-05

## 2021-10-05 PROBLEM — K74.60 CIRRHOSIS OF LIVER (HCC): Status: ACTIVE | Noted: 2021-10-05

## 2021-10-05 PROBLEM — E11.9 TYPE 2 DIABETES MELLITUS (HCC): Status: ACTIVE | Noted: 2019-07-09

## 2021-10-05 PROBLEM — C22.0 HEPATOCELLULAR CARCINOMA METASTATIC TO BONE: Status: ACTIVE | Noted: 2021-10-05

## 2021-10-05 PROBLEM — E83.119 HEMOCHROMATOSIS: Status: ACTIVE | Noted: 2021-10-05

## 2021-10-05 LAB
ALBUMIN SERPL-MCNC: 2.3 G/DL (ref 3.5–5.2)
ALBUMIN/GLOB SERPL: 0.6 G/DL
ALP SERPL-CCNC: 91 U/L (ref 39–117)
ALT SERPL W P-5'-P-CCNC: 17 U/L (ref 1–33)
ANION GAP SERPL CALCULATED.3IONS-SCNC: 13 MMOL/L (ref 5–15)
AST SERPL-CCNC: 23 U/L (ref 1–32)
BASOPHILS # BLD AUTO: 0.05 10*3/MM3 (ref 0–0.2)
BASOPHILS NFR BLD AUTO: 0.5 % (ref 0–1.5)
BILIRUB SERPL-MCNC: 0.2 MG/DL (ref 0–1.2)
BUN SERPL-MCNC: 62 MG/DL (ref 8–23)
BUN/CREAT SERPL: 13.2 (ref 7–25)
CALCIUM SPEC-SCNC: 7.9 MG/DL (ref 8.6–10.5)
CHLORIDE SERPL-SCNC: 107 MMOL/L (ref 98–107)
CO2 SERPL-SCNC: 17 MMOL/L (ref 22–29)
CREAT SERPL-MCNC: 4.69 MG/DL (ref 0.57–1)
D-LACTATE SERPL-SCNC: 0.6 MMOL/L (ref 0.5–2)
DEPRECATED RDW RBC AUTO: 37.2 FL (ref 37–54)
EOSINOPHIL # BLD AUTO: 0.16 10*3/MM3 (ref 0–0.4)
EOSINOPHIL NFR BLD AUTO: 1.7 % (ref 0.3–6.2)
ERYTHROCYTE [DISTWIDTH] IN BLOOD BY AUTOMATED COUNT: 12.2 % (ref 12.3–15.4)
FLUAV RNA RESP QL NAA+PROBE: NOT DETECTED
FLUBV RNA RESP QL NAA+PROBE: NOT DETECTED
GFR SERPL CREATININE-BSD FRML MDRD: 9 ML/MIN/1.73
GFR SERPL CREATININE-BSD FRML MDRD: ABNORMAL ML/MIN/{1.73_M2}
GLOBULIN UR ELPH-MCNC: 3.6 GM/DL
GLUCOSE BLDC GLUCOMTR-MCNC: 139 MG/DL (ref 70–130)
GLUCOSE BLDC GLUCOMTR-MCNC: 170 MG/DL (ref 70–130)
GLUCOSE SERPL-MCNC: 145 MG/DL (ref 65–99)
HCT VFR BLD AUTO: 34.8 % (ref 34–46.6)
HGB BLD-MCNC: 12.2 G/DL (ref 12–15.9)
IMM GRANULOCYTES # BLD AUTO: 0.05 10*3/MM3 (ref 0–0.05)
IMM GRANULOCYTES NFR BLD AUTO: 0.5 % (ref 0–0.5)
INR PPP: 1.07 (ref 0.85–1.16)
LYMPHOCYTES # BLD AUTO: 0.88 10*3/MM3 (ref 0.7–3.1)
LYMPHOCYTES NFR BLD AUTO: 9.1 % (ref 19.6–45.3)
MCH RBC QN AUTO: 29.7 PG (ref 26.6–33)
MCHC RBC AUTO-ENTMCNC: 35.1 G/DL (ref 31.5–35.7)
MCV RBC AUTO: 84.7 FL (ref 79–97)
MONOCYTES # BLD AUTO: 0.7 10*3/MM3 (ref 0.1–0.9)
MONOCYTES NFR BLD AUTO: 7.2 % (ref 5–12)
NEUTROPHILS NFR BLD AUTO: 7.83 10*3/MM3 (ref 1.7–7)
NEUTROPHILS NFR BLD AUTO: 81 % (ref 42.7–76)
NRBC BLD AUTO-RTO: 0 /100 WBC (ref 0–0.2)
PLATELET # BLD AUTO: 228 10*3/MM3 (ref 140–450)
PMV BLD AUTO: 9.5 FL (ref 6–12)
POTASSIUM SERPL-SCNC: 4.8 MMOL/L (ref 3.5–5.2)
PROCALCITONIN SERPL-MCNC: 0.65 NG/ML (ref 0–0.25)
PROT SERPL-MCNC: 5.9 G/DL (ref 6–8.5)
PROTHROMBIN TIME: 13.6 SECONDS (ref 11.4–14.4)
RBC # BLD AUTO: 4.11 10*6/MM3 (ref 3.77–5.28)
SARS-COV-2 RNA RESP QL NAA+PROBE: NOT DETECTED
SODIUM SERPL-SCNC: 137 MMOL/L (ref 136–145)
T4 FREE SERPL-MCNC: 1.02 NG/DL (ref 0.93–1.7)
TSH SERPL DL<=0.05 MIU/L-ACNC: 2.65 UIU/ML (ref 0.27–4.2)
WBC # BLD AUTO: 9.67 10*3/MM3 (ref 3.4–10.8)

## 2021-10-05 PROCEDURE — 70450 CT HEAD/BRAIN W/O DYE: CPT

## 2021-10-05 PROCEDURE — 84439 ASSAY OF FREE THYROXINE: CPT | Performed by: NURSE PRACTITIONER

## 2021-10-05 PROCEDURE — 84145 PROCALCITONIN (PCT): CPT | Performed by: NURSE PRACTITIONER

## 2021-10-05 PROCEDURE — 99223 1ST HOSP IP/OBS HIGH 75: CPT | Performed by: INTERNAL MEDICINE

## 2021-10-05 PROCEDURE — 99223 1ST HOSP IP/OBS HIGH 75: CPT

## 2021-10-05 PROCEDURE — 63710000001 INSULIN LISPRO (HUMAN) PER 5 UNITS: Performed by: NURSE PRACTITIONER

## 2021-10-05 PROCEDURE — 84443 ASSAY THYROID STIM HORMONE: CPT | Performed by: NURSE PRACTITIONER

## 2021-10-05 PROCEDURE — 82962 GLUCOSE BLOOD TEST: CPT

## 2021-10-05 PROCEDURE — 87636 SARSCOV2 & INF A&B AMP PRB: CPT | Performed by: NURSE PRACTITIONER

## 2021-10-05 PROCEDURE — 25010000002 AZITHROMYCIN PER 500 MG: Performed by: INTERNAL MEDICINE

## 2021-10-05 PROCEDURE — 85610 PROTHROMBIN TIME: CPT | Performed by: NURSE PRACTITIONER

## 2021-10-05 PROCEDURE — 71045 X-RAY EXAM CHEST 1 VIEW: CPT

## 2021-10-05 PROCEDURE — 25010000002 CEFTRIAXONE PER 250 MG: Performed by: INTERNAL MEDICINE

## 2021-10-05 PROCEDURE — 80053 COMPREHEN METABOLIC PANEL: CPT | Performed by: NURSE PRACTITIONER

## 2021-10-05 PROCEDURE — 85025 COMPLETE CBC W/AUTO DIFF WBC: CPT | Performed by: NURSE PRACTITIONER

## 2021-10-05 PROCEDURE — 99222 1ST HOSP IP/OBS MODERATE 55: CPT | Performed by: PHYSICIAN ASSISTANT

## 2021-10-05 PROCEDURE — 83605 ASSAY OF LACTIC ACID: CPT | Performed by: NURSE PRACTITIONER

## 2021-10-05 RX ORDER — SODIUM CHLORIDE 9 MG/ML
75 INJECTION, SOLUTION INTRAVENOUS CONTINUOUS
Status: DISCONTINUED | OUTPATIENT
Start: 2021-10-05 | End: 2021-10-06

## 2021-10-05 RX ORDER — SODIUM CHLORIDE 0.9 % (FLUSH) 0.9 %
10 SYRINGE (ML) INJECTION EVERY 12 HOURS SCHEDULED
Status: DISCONTINUED | OUTPATIENT
Start: 2021-10-05 | End: 2021-10-06

## 2021-10-05 RX ORDER — SODIUM CHLORIDE 0.9 % (FLUSH) 0.9 %
10 SYRINGE (ML) INJECTION AS NEEDED
Status: DISCONTINUED | OUTPATIENT
Start: 2021-10-05 | End: 2021-10-28 | Stop reason: HOSPADM

## 2021-10-05 RX ORDER — DEXTROSE MONOHYDRATE 25 G/50ML
25 INJECTION, SOLUTION INTRAVENOUS
Status: DISCONTINUED | OUTPATIENT
Start: 2021-10-05 | End: 2021-10-28 | Stop reason: HOSPADM

## 2021-10-05 RX ORDER — SODIUM CHLORIDE 0.9 % (FLUSH) 0.9 %
10 SYRINGE (ML) INJECTION AS NEEDED
Status: DISCONTINUED | OUTPATIENT
Start: 2021-10-05 | End: 2021-10-06

## 2021-10-05 RX ORDER — SODIUM CHLORIDE 0.9 % (FLUSH) 0.9 %
10 SYRINGE (ML) INJECTION EVERY 12 HOURS SCHEDULED
Status: DISCONTINUED | OUTPATIENT
Start: 2021-10-05 | End: 2021-10-28 | Stop reason: HOSPADM

## 2021-10-05 RX ORDER — NICOTINE POLACRILEX 4 MG
15 LOZENGE BUCCAL
Status: DISCONTINUED | OUTPATIENT
Start: 2021-10-05 | End: 2021-10-28 | Stop reason: HOSPADM

## 2021-10-05 RX ADMIN — SODIUM CHLORIDE 1 G: 900 INJECTION INTRAVENOUS at 18:17

## 2021-10-05 RX ADMIN — SODIUM CHLORIDE 12.5 MG/HR: 9 INJECTION, SOLUTION INTRAVENOUS at 20:06

## 2021-10-05 RX ADMIN — SODIUM CHLORIDE 5 MG/HR: 9 INJECTION, SOLUTION INTRAVENOUS at 16:38

## 2021-10-05 RX ADMIN — SODIUM CHLORIDE 75 ML/HR: 9 INJECTION, SOLUTION INTRAVENOUS at 18:14

## 2021-10-05 RX ADMIN — INSULIN LISPRO 2 UNITS: 100 INJECTION, SOLUTION INTRAVENOUS; SUBCUTANEOUS at 21:50

## 2021-10-05 RX ADMIN — AZITHROMYCIN 500 MG: 500 INJECTION, POWDER, LYOPHILIZED, FOR SOLUTION INTRAVENOUS at 18:41

## 2021-10-05 RX ADMIN — SODIUM CHLORIDE 12.5 MG/HR: 9 INJECTION, SOLUTION INTRAVENOUS at 22:46

## 2021-10-05 RX ADMIN — SODIUM CHLORIDE, PRESERVATIVE FREE 10 ML: 5 INJECTION INTRAVENOUS at 20:06

## 2021-10-06 ENCOUNTER — APPOINTMENT (OUTPATIENT)
Dept: CARDIOLOGY | Facility: HOSPITAL | Age: 61
End: 2021-10-06

## 2021-10-06 LAB
ANION GAP SERPL CALCULATED.3IONS-SCNC: 13 MMOL/L (ref 5–15)
BASOPHILS # BLD AUTO: 0.1 10*3/MM3 (ref 0–0.2)
BASOPHILS NFR BLD AUTO: 1 % (ref 0–1.5)
BH CV ECHO MEAS - AO MAX PG (FULL): 10 MMHG
BH CV ECHO MEAS - AO MAX PG: 16.4 MMHG
BH CV ECHO MEAS - AO MEAN PG (FULL): 6.1 MMHG
BH CV ECHO MEAS - AO MEAN PG: 9.6 MMHG
BH CV ECHO MEAS - AO ROOT AREA (BSA CORRECTED): 1.6
BH CV ECHO MEAS - AO ROOT AREA: 7.4 CM^2
BH CV ECHO MEAS - AO ROOT DIAM: 3.1 CM
BH CV ECHO MEAS - AO V2 MAX: 202.2 CM/SEC
BH CV ECHO MEAS - AO V2 MEAN: 144 CM/SEC
BH CV ECHO MEAS - AO V2 VTI: 37 CM
BH CV ECHO MEAS - AVA(I,A): 2.1 CM^2
BH CV ECHO MEAS - AVA(I,D): 2.1 CM^2
BH CV ECHO MEAS - AVA(V,A): 1.9 CM^2
BH CV ECHO MEAS - AVA(V,D): 1.9 CM^2
BH CV ECHO MEAS - BSA(HAYCOCK): 2 M^2
BH CV ECHO MEAS - BSA: 1.9 M^2
BH CV ECHO MEAS - BZI_BMI: 28.3 KILOGRAMS/M^2
BH CV ECHO MEAS - BZI_METRIC_HEIGHT: 170.2 CM
BH CV ECHO MEAS - BZI_METRIC_WEIGHT: 82.1 KG
BH CV ECHO MEAS - EDV(CUBED): 127.5 ML
BH CV ECHO MEAS - EDV(MOD-SP2): 114 ML
BH CV ECHO MEAS - EDV(MOD-SP4): 131 ML
BH CV ECHO MEAS - EDV(TEICH): 120.1 ML
BH CV ECHO MEAS - EF(CUBED): 69.1 %
BH CV ECHO MEAS - EF(MOD-BP): 60 %
BH CV ECHO MEAS - EF(MOD-SP2): 59.6 %
BH CV ECHO MEAS - EF(MOD-SP4): 58.8 %
BH CV ECHO MEAS - EF(TEICH): 60.4 %
BH CV ECHO MEAS - ESV(CUBED): 39.4 ML
BH CV ECHO MEAS - ESV(MOD-SP2): 46 ML
BH CV ECHO MEAS - ESV(MOD-SP4): 54 ML
BH CV ECHO MEAS - ESV(TEICH): 47.5 ML
BH CV ECHO MEAS - FS: 32.4 %
BH CV ECHO MEAS - IVS/LVPW: 0.9
BH CV ECHO MEAS - IVSD: 0.69 CM
BH CV ECHO MEAS - LA DIMENSION: 4.2 CM
BH CV ECHO MEAS - LA/AO: 1.4
BH CV ECHO MEAS - LAD MAJOR: 5.4 CM
BH CV ECHO MEAS - LV DIASTOLIC VOL/BSA (35-75): 67.6 ML/M^2
BH CV ECHO MEAS - LV MASS(C)D: 123.2 GRAMS
BH CV ECHO MEAS - LV MASS(C)DI: 63.6 GRAMS/M^2
BH CV ECHO MEAS - LV MAX PG: 6.4 MMHG
BH CV ECHO MEAS - LV MEAN PG: 3.5 MMHG
BH CV ECHO MEAS - LV SYSTOLIC VOL/BSA (12-30): 27.9 ML/M^2
BH CV ECHO MEAS - LV V1 MAX: 126.5 CM/SEC
BH CV ECHO MEAS - LV V1 MEAN: 86.9 CM/SEC
BH CV ECHO MEAS - LV V1 VTI: 24.7 CM
BH CV ECHO MEAS - LVIDD: 5 CM
BH CV ECHO MEAS - LVIDS: 3.4 CM
BH CV ECHO MEAS - LVLD AP2: 9.2 CM
BH CV ECHO MEAS - LVLD AP4: 8.8 CM
BH CV ECHO MEAS - LVLS AP2: 7.5 CM
BH CV ECHO MEAS - LVLS AP4: 7.5 CM
BH CV ECHO MEAS - LVOT AREA (M): 3.1 CM^2
BH CV ECHO MEAS - LVOT AREA: 3.1 CM^2
BH CV ECHO MEAS - LVOT DIAM: 2 CM
BH CV ECHO MEAS - LVPWD: 0.78 CM
BH CV ECHO MEAS - MV DEC SLOPE: 1031 CM/SEC^2
BH CV ECHO MEAS - MV MAX PG: 9.6 MMHG
BH CV ECHO MEAS - MV MEAN PG: 3.6 MMHG
BH CV ECHO MEAS - MV P1/2T MAX VEL: 161.5 CM/SEC
BH CV ECHO MEAS - MV P1/2T: 45.9 MSEC
BH CV ECHO MEAS - MV V2 MAX: 154.7 CM/SEC
BH CV ECHO MEAS - MV V2 MEAN: 84.5 CM/SEC
BH CV ECHO MEAS - MV V2 VTI: 30.4 CM
BH CV ECHO MEAS - MVA P1/2T LCG: 1.4 CM^2
BH CV ECHO MEAS - MVA(P1/2T): 4.8 CM^2
BH CV ECHO MEAS - MVA(VTI): 2.5 CM^2
BH CV ECHO MEAS - SI(AO): 142.1 ML/M^2
BH CV ECHO MEAS - SI(CUBED): 45.5 ML/M^2
BH CV ECHO MEAS - SI(LVOT): 39.2 ML/M^2
BH CV ECHO MEAS - SI(MOD-SP2): 35.1 ML/M^2
BH CV ECHO MEAS - SI(MOD-SP4): 39.7 ML/M^2
BH CV ECHO MEAS - SI(TEICH): 37.4 ML/M^2
BH CV ECHO MEAS - SV(AO): 275.4 ML
BH CV ECHO MEAS - SV(CUBED): 88.1 ML
BH CV ECHO MEAS - SV(LVOT): 75.9 ML
BH CV ECHO MEAS - SV(MOD-SP2): 68 ML
BH CV ECHO MEAS - SV(MOD-SP4): 77 ML
BH CV ECHO MEAS - SV(TEICH): 72.5 ML
BH CV ECHO MEAS - TAPSE (>1.6): 2.7 CM
BH CV XLRA - RV BASE: 4.1 CM
BH CV XLRA - RV LENGTH: 7.5 CM
BH CV XLRA - RV MID: 2.7 CM
BH CV XLRA - TDI S': 13.5 CM/SEC
BUN SERPL-MCNC: 61 MG/DL (ref 8–23)
BUN/CREAT SERPL: 12.4 (ref 7–25)
CALCIUM SPEC-SCNC: 7.9 MG/DL (ref 8.6–10.5)
CHLORIDE SERPL-SCNC: 107 MMOL/L (ref 98–107)
CHOLEST SERPL-MCNC: 218 MG/DL (ref 0–200)
CO2 SERPL-SCNC: 17 MMOL/L (ref 22–29)
CREAT SERPL-MCNC: 4.92 MG/DL (ref 0.57–1)
DEPRECATED RDW RBC AUTO: 37.7 FL (ref 37–54)
EOSINOPHIL # BLD AUTO: 0.23 10*3/MM3 (ref 0–0.4)
EOSINOPHIL NFR BLD AUTO: 2.3 % (ref 0.3–6.2)
ERYTHROCYTE [DISTWIDTH] IN BLOOD BY AUTOMATED COUNT: 12.2 % (ref 12.3–15.4)
GFR SERPL CREATININE-BSD FRML MDRD: 9 ML/MIN/1.73
GFR SERPL CREATININE-BSD FRML MDRD: ABNORMAL ML/MIN/{1.73_M2}
GLUCOSE BLDC GLUCOMTR-MCNC: 135 MG/DL (ref 70–130)
GLUCOSE BLDC GLUCOMTR-MCNC: 136 MG/DL (ref 70–130)
GLUCOSE BLDC GLUCOMTR-MCNC: 143 MG/DL (ref 70–130)
GLUCOSE BLDC GLUCOMTR-MCNC: 172 MG/DL (ref 70–130)
GLUCOSE BLDC GLUCOMTR-MCNC: 183 MG/DL (ref 70–130)
GLUCOSE SERPL-MCNC: 104 MG/DL (ref 65–99)
HBA1C MFR BLD: 5.3 % (ref 4.8–5.6)
HCT VFR BLD AUTO: 35.2 % (ref 34–46.6)
HDLC SERPL-MCNC: 50 MG/DL (ref 40–60)
HGB BLD-MCNC: 12.1 G/DL (ref 12–15.9)
IMM GRANULOCYTES # BLD AUTO: 0.05 10*3/MM3 (ref 0–0.05)
IMM GRANULOCYTES NFR BLD AUTO: 0.5 % (ref 0–0.5)
LDLC SERPL CALC-MCNC: 139 MG/DL (ref 0–100)
LDLC/HDLC SERPL: 2.71 {RATIO}
LEFT ATRIUM VOLUME INDEX: 37.1 ML/M^2
LEFT ATRIUM VOLUME: 72 ML
LYMPHOCYTES # BLD AUTO: 1.08 10*3/MM3 (ref 0.7–3.1)
LYMPHOCYTES NFR BLD AUTO: 10.9 % (ref 19.6–45.3)
MAXIMAL PREDICTED HEART RATE: 158 BPM
MCH RBC QN AUTO: 29.4 PG (ref 26.6–33)
MCHC RBC AUTO-ENTMCNC: 34.4 G/DL (ref 31.5–35.7)
MCV RBC AUTO: 85.6 FL (ref 79–97)
MONOCYTES # BLD AUTO: 0.71 10*3/MM3 (ref 0.1–0.9)
MONOCYTES NFR BLD AUTO: 7.2 % (ref 5–12)
NEUTROPHILS NFR BLD AUTO: 7.75 10*3/MM3 (ref 1.7–7)
NEUTROPHILS NFR BLD AUTO: 78.1 % (ref 42.7–76)
NRBC BLD AUTO-RTO: 0 /100 WBC (ref 0–0.2)
PLATELET # BLD AUTO: 294 10*3/MM3 (ref 140–450)
PMV BLD AUTO: 9.6 FL (ref 6–12)
POTASSIUM SERPL-SCNC: 4.6 MMOL/L (ref 3.5–5.2)
RBC # BLD AUTO: 4.11 10*6/MM3 (ref 3.77–5.28)
SODIUM SERPL-SCNC: 137 MMOL/L (ref 136–145)
STRESS TARGET HR: 134 BPM
TRIGL SERPL-MCNC: 162 MG/DL (ref 0–150)
VLDLC SERPL-MCNC: 29 MG/DL (ref 5–40)
WBC # BLD AUTO: 9.92 10*3/MM3 (ref 3.4–10.8)

## 2021-10-06 PROCEDURE — 25010000002 CEFTRIAXONE PER 250 MG: Performed by: INTERNAL MEDICINE

## 2021-10-06 PROCEDURE — 85025 COMPLETE CBC W/AUTO DIFF WBC: CPT | Performed by: NURSE PRACTITIONER

## 2021-10-06 PROCEDURE — 99233 SBSQ HOSP IP/OBS HIGH 50: CPT | Performed by: STUDENT IN AN ORGANIZED HEALTH CARE EDUCATION/TRAINING PROGRAM

## 2021-10-06 PROCEDURE — 93306 TTE W/DOPPLER COMPLETE: CPT | Performed by: INTERNAL MEDICINE

## 2021-10-06 PROCEDURE — 93306 TTE W/DOPPLER COMPLETE: CPT

## 2021-10-06 PROCEDURE — 80048 BASIC METABOLIC PNL TOTAL CA: CPT | Performed by: NURSE PRACTITIONER

## 2021-10-06 PROCEDURE — 80061 LIPID PANEL: CPT

## 2021-10-06 PROCEDURE — 82962 GLUCOSE BLOOD TEST: CPT

## 2021-10-06 PROCEDURE — 25010000002 ONDANSETRON PER 1 MG: Performed by: NURSE PRACTITIONER

## 2021-10-06 PROCEDURE — 25010000002 AZITHROMYCIN PER 500 MG: Performed by: INTERNAL MEDICINE

## 2021-10-06 PROCEDURE — 83036 HEMOGLOBIN GLYCOSYLATED A1C: CPT

## 2021-10-06 PROCEDURE — 99233 SBSQ HOSP IP/OBS HIGH 50: CPT | Performed by: INTERNAL MEDICINE

## 2021-10-06 PROCEDURE — 92523 SPEECH SOUND LANG COMPREHEN: CPT

## 2021-10-06 PROCEDURE — 63710000001 INSULIN LISPRO (HUMAN) PER 5 UNITS: Performed by: NURSE PRACTITIONER

## 2021-10-06 RX ORDER — ONDANSETRON 2 MG/ML
4 INJECTION INTRAMUSCULAR; INTRAVENOUS EVERY 6 HOURS PRN
Status: DISCONTINUED | OUTPATIENT
Start: 2021-10-06 | End: 2021-10-28 | Stop reason: HOSPADM

## 2021-10-06 RX ORDER — AMLODIPINE BESYLATE 10 MG/1
10 TABLET ORAL
Status: DISCONTINUED | OUTPATIENT
Start: 2021-10-06 | End: 2021-10-28 | Stop reason: HOSPADM

## 2021-10-06 RX ORDER — CLONIDINE HYDROCHLORIDE 0.1 MG/1
0.1 TABLET ORAL 2 TIMES DAILY PRN
COMMUNITY
End: 2021-10-28 | Stop reason: HOSPADM

## 2021-10-06 RX ORDER — HYDRALAZINE HYDROCHLORIDE 25 MG/1
25 TABLET, FILM COATED ORAL 3 TIMES DAILY
COMMUNITY
End: 2021-10-28 | Stop reason: HOSPADM

## 2021-10-06 RX ORDER — ERGOCALCIFEROL 1.25 MG/1
50000 CAPSULE ORAL WEEKLY
COMMUNITY

## 2021-10-06 RX ORDER — FUROSEMIDE 40 MG/1
40 TABLET ORAL DAILY
COMMUNITY
End: 2021-10-28 | Stop reason: HOSPADM

## 2021-10-06 RX ORDER — CARVEDILOL 12.5 MG/1
12.5 TABLET ORAL 2 TIMES DAILY WITH MEALS
Status: DISCONTINUED | OUTPATIENT
Start: 2021-10-06 | End: 2021-10-07

## 2021-10-06 RX ORDER — METOPROLOL TARTRATE 5 MG/5ML
5 INJECTION INTRAVENOUS ONCE
Status: COMPLETED | OUTPATIENT
Start: 2021-10-06 | End: 2021-10-06

## 2021-10-06 RX ORDER — INSULIN ASPART 100 [IU]/ML
50 INJECTION, SUSPENSION SUBCUTANEOUS 2 TIMES DAILY WITH MEALS
COMMUNITY
End: 2021-10-28 | Stop reason: HOSPADM

## 2021-10-06 RX ORDER — NIFEDIPINE 60 MG/1
60 TABLET, EXTENDED RELEASE ORAL DAILY
COMMUNITY
End: 2021-10-28 | Stop reason: HOSPADM

## 2021-10-06 RX ORDER — METHADONE HYDROCHLORIDE 10 MG/1
10 TABLET ORAL EVERY 8 HOURS PRN
Status: DISCONTINUED | OUTPATIENT
Start: 2021-10-06 | End: 2021-10-14

## 2021-10-06 RX ORDER — HYDRALAZINE HYDROCHLORIDE 25 MG/1
25 TABLET, FILM COATED ORAL EVERY 8 HOURS SCHEDULED
Status: DISCONTINUED | OUTPATIENT
Start: 2021-10-06 | End: 2021-10-07

## 2021-10-06 RX ORDER — ESTRADIOL 0.1 MG/G
1 CREAM VAGINAL 3 TIMES WEEKLY
COMMUNITY
End: 2021-10-28 | Stop reason: HOSPADM

## 2021-10-06 RX ORDER — METHADONE HYDROCHLORIDE 10 MG/1
10 TABLET ORAL EVERY 12 HOURS SCHEDULED
Status: DISCONTINUED | OUTPATIENT
Start: 2021-10-06 | End: 2021-10-28 | Stop reason: HOSPADM

## 2021-10-06 RX ORDER — OMEPRAZOLE 40 MG/1
40 CAPSULE, DELAYED RELEASE ORAL DAILY
COMMUNITY
End: 2021-10-28 | Stop reason: HOSPADM

## 2021-10-06 RX ADMIN — SODIUM CHLORIDE, PRESERVATIVE FREE 10 ML: 5 INJECTION INTRAVENOUS at 21:35

## 2021-10-06 RX ADMIN — SODIUM CHLORIDE 1 G: 900 INJECTION INTRAVENOUS at 17:07

## 2021-10-06 RX ADMIN — SODIUM CHLORIDE 15 MG/HR: 9 INJECTION, SOLUTION INTRAVENOUS at 12:06

## 2021-10-06 RX ADMIN — SODIUM CHLORIDE 15 MG/HR: 9 INJECTION, SOLUTION INTRAVENOUS at 17:05

## 2021-10-06 RX ADMIN — SODIUM CHLORIDE, PRESERVATIVE FREE 10 ML: 5 INJECTION INTRAVENOUS at 10:02

## 2021-10-06 RX ADMIN — METOPROLOL TARTRATE 5 MG: 5 INJECTION INTRAVENOUS at 03:41

## 2021-10-06 RX ADMIN — METHADONE HYDROCHLORIDE 10 MG: 10 TABLET ORAL at 17:06

## 2021-10-06 RX ADMIN — SODIUM CHLORIDE 15 MG/HR: 9 INJECTION, SOLUTION INTRAVENOUS at 02:28

## 2021-10-06 RX ADMIN — INSULIN LISPRO 2 UNITS: 100 INJECTION, SOLUTION INTRAVENOUS; SUBCUTANEOUS at 17:06

## 2021-10-06 RX ADMIN — AMLODIPINE BESYLATE 10 MG: 10 TABLET ORAL at 10:01

## 2021-10-06 RX ADMIN — SODIUM CHLORIDE 15 MG/HR: 9 INJECTION, SOLUTION INTRAVENOUS at 06:05

## 2021-10-06 RX ADMIN — SODIUM CHLORIDE 15 MG/HR: 9 INJECTION, SOLUTION INTRAVENOUS at 00:40

## 2021-10-06 RX ADMIN — HYDRALAZINE HYDROCHLORIDE 25 MG: 25 TABLET, FILM COATED ORAL at 14:15

## 2021-10-06 RX ADMIN — HYDRALAZINE HYDROCHLORIDE 25 MG: 25 TABLET, FILM COATED ORAL at 21:35

## 2021-10-06 RX ADMIN — SODIUM CHLORIDE 10 MG/HR: 9 INJECTION, SOLUTION INTRAVENOUS at 20:51

## 2021-10-06 RX ADMIN — SODIUM CHLORIDE 15 MG/HR: 9 INJECTION, SOLUTION INTRAVENOUS at 07:44

## 2021-10-06 RX ADMIN — METHADONE HYDROCHLORIDE 10 MG: 10 TABLET ORAL at 21:35

## 2021-10-06 RX ADMIN — CARVEDILOL 12.5 MG: 12.5 TABLET, FILM COATED ORAL at 17:07

## 2021-10-06 RX ADMIN — METOPROLOL TARTRATE 5 MG: 5 INJECTION INTRAVENOUS at 06:37

## 2021-10-06 RX ADMIN — SODIUM CHLORIDE 15 MG/HR: 9 INJECTION, SOLUTION INTRAVENOUS at 10:01

## 2021-10-06 RX ADMIN — INSULIN LISPRO 2 UNITS: 100 INJECTION, SOLUTION INTRAVENOUS; SUBCUTANEOUS at 08:32

## 2021-10-06 RX ADMIN — AZITHROMYCIN 500 MG: 500 INJECTION, POWDER, LYOPHILIZED, FOR SOLUTION INTRAVENOUS at 17:06

## 2021-10-06 RX ADMIN — SODIUM CHLORIDE 15 MG/HR: 9 INJECTION, SOLUTION INTRAVENOUS at 04:21

## 2021-10-06 RX ADMIN — SODIUM CHLORIDE 15 MG/HR: 9 INJECTION, SOLUTION INTRAVENOUS at 13:20

## 2021-10-06 RX ADMIN — METHADONE HYDROCHLORIDE 10 MG: 10 TABLET ORAL at 10:01

## 2021-10-06 RX ADMIN — SODIUM CHLORIDE 75 ML/HR: 9 INJECTION, SOLUTION INTRAVENOUS at 07:44

## 2021-10-06 RX ADMIN — ONDANSETRON 4 MG: 2 INJECTION INTRAMUSCULAR; INTRAVENOUS at 03:41

## 2021-10-07 LAB
ALBUMIN SERPL-MCNC: 2.3 G/DL (ref 3.5–5.2)
ALBUMIN/GLOB SERPL: 0.7 G/DL
ALP SERPL-CCNC: 94 U/L (ref 39–117)
ALT SERPL W P-5'-P-CCNC: 19 U/L (ref 1–33)
ANION GAP SERPL CALCULATED.3IONS-SCNC: 11 MMOL/L (ref 5–15)
AST SERPL-CCNC: 25 U/L (ref 1–32)
BASOPHILS # BLD AUTO: 0.07 10*3/MM3 (ref 0–0.2)
BASOPHILS NFR BLD AUTO: 0.8 % (ref 0–1.5)
BILIRUB SERPL-MCNC: 0.2 MG/DL (ref 0–1.2)
BUN SERPL-MCNC: 70 MG/DL (ref 8–23)
BUN/CREAT SERPL: 12.9 (ref 7–25)
CALCIUM SPEC-SCNC: 7.9 MG/DL (ref 8.6–10.5)
CHLORIDE SERPL-SCNC: 113 MMOL/L (ref 98–107)
CO2 SERPL-SCNC: 14 MMOL/L (ref 22–29)
CREAT SERPL-MCNC: 5.44 MG/DL (ref 0.57–1)
DEPRECATED RDW RBC AUTO: 40.8 FL (ref 37–54)
EOSINOPHIL # BLD AUTO: 0.13 10*3/MM3 (ref 0–0.4)
EOSINOPHIL NFR BLD AUTO: 1.5 % (ref 0.3–6.2)
ERYTHROCYTE [DISTWIDTH] IN BLOOD BY AUTOMATED COUNT: 12.6 % (ref 12.3–15.4)
GFR SERPL CREATININE-BSD FRML MDRD: 8 ML/MIN/1.73
GFR SERPL CREATININE-BSD FRML MDRD: ABNORMAL ML/MIN/{1.73_M2}
GLOBULIN UR ELPH-MCNC: 3.4 GM/DL
GLUCOSE BLDC GLUCOMTR-MCNC: 115 MG/DL (ref 70–130)
GLUCOSE BLDC GLUCOMTR-MCNC: 134 MG/DL (ref 70–130)
GLUCOSE BLDC GLUCOMTR-MCNC: 142 MG/DL (ref 70–130)
GLUCOSE BLDC GLUCOMTR-MCNC: 145 MG/DL (ref 70–130)
GLUCOSE BLDC GLUCOMTR-MCNC: 169 MG/DL (ref 70–130)
GLUCOSE SERPL-MCNC: 172 MG/DL (ref 65–99)
HCT VFR BLD AUTO: 33.7 % (ref 34–46.6)
HGB BLD-MCNC: 11.2 G/DL (ref 12–15.9)
IMM GRANULOCYTES # BLD AUTO: 0.03 10*3/MM3 (ref 0–0.05)
IMM GRANULOCYTES NFR BLD AUTO: 0.3 % (ref 0–0.5)
LYMPHOCYTES # BLD AUTO: 0.73 10*3/MM3 (ref 0.7–3.1)
LYMPHOCYTES NFR BLD AUTO: 8.5 % (ref 19.6–45.3)
MAGNESIUM SERPL-MCNC: 1.7 MG/DL (ref 1.6–2.4)
MCH RBC QN AUTO: 29.5 PG (ref 26.6–33)
MCHC RBC AUTO-ENTMCNC: 33.2 G/DL (ref 31.5–35.7)
MCV RBC AUTO: 88.7 FL (ref 79–97)
MONOCYTES # BLD AUTO: 0.52 10*3/MM3 (ref 0.1–0.9)
MONOCYTES NFR BLD AUTO: 6 % (ref 5–12)
NEUTROPHILS NFR BLD AUTO: 7.12 10*3/MM3 (ref 1.7–7)
NEUTROPHILS NFR BLD AUTO: 82.9 % (ref 42.7–76)
NRBC BLD AUTO-RTO: 0 /100 WBC (ref 0–0.2)
PHOSPHATE SERPL-MCNC: 5.9 MG/DL (ref 2.5–4.5)
PLATELET # BLD AUTO: 263 10*3/MM3 (ref 140–450)
PMV BLD AUTO: 9 FL (ref 6–12)
POTASSIUM SERPL-SCNC: 4.7 MMOL/L (ref 3.5–5.2)
PROT SERPL-MCNC: 5.7 G/DL (ref 6–8.5)
RBC # BLD AUTO: 3.8 10*6/MM3 (ref 3.77–5.28)
SODIUM SERPL-SCNC: 138 MMOL/L (ref 136–145)
WBC # BLD AUTO: 8.6 10*3/MM3 (ref 3.4–10.8)

## 2021-10-07 PROCEDURE — 97166 OT EVAL MOD COMPLEX 45 MIN: CPT

## 2021-10-07 PROCEDURE — 25010000002 CEFTRIAXONE PER 250 MG: Performed by: INTERNAL MEDICINE

## 2021-10-07 PROCEDURE — 84100 ASSAY OF PHOSPHORUS: CPT | Performed by: INTERNAL MEDICINE

## 2021-10-07 PROCEDURE — 99232 SBSQ HOSP IP/OBS MODERATE 35: CPT | Performed by: INTERNAL MEDICINE

## 2021-10-07 PROCEDURE — 83735 ASSAY OF MAGNESIUM: CPT | Performed by: INTERNAL MEDICINE

## 2021-10-07 PROCEDURE — 63710000001 INSULIN LISPRO (HUMAN) PER 5 UNITS: Performed by: NURSE PRACTITIONER

## 2021-10-07 PROCEDURE — 99233 SBSQ HOSP IP/OBS HIGH 50: CPT | Performed by: STUDENT IN AN ORGANIZED HEALTH CARE EDUCATION/TRAINING PROGRAM

## 2021-10-07 PROCEDURE — 25010000002 ONDANSETRON PER 1 MG: Performed by: NURSE PRACTITIONER

## 2021-10-07 PROCEDURE — 97162 PT EVAL MOD COMPLEX 30 MIN: CPT

## 2021-10-07 PROCEDURE — 85025 COMPLETE CBC W/AUTO DIFF WBC: CPT | Performed by: INTERNAL MEDICINE

## 2021-10-07 PROCEDURE — 80053 COMPREHEN METABOLIC PANEL: CPT | Performed by: INTERNAL MEDICINE

## 2021-10-07 PROCEDURE — 97535 SELF CARE MNGMENT TRAINING: CPT

## 2021-10-07 PROCEDURE — 82962 GLUCOSE BLOOD TEST: CPT

## 2021-10-07 PROCEDURE — 25010000002 AZITHROMYCIN PER 500 MG: Performed by: INTERNAL MEDICINE

## 2021-10-07 RX ORDER — SODIUM BICARBONATE 650 MG/1
1300 TABLET ORAL 2 TIMES DAILY
Status: DISCONTINUED | OUTPATIENT
Start: 2021-10-07 | End: 2021-10-08

## 2021-10-07 RX ORDER — ACETAMINOPHEN 325 MG/1
650 TABLET ORAL EVERY 6 HOURS PRN
Status: DISCONTINUED | OUTPATIENT
Start: 2021-10-07 | End: 2021-10-14

## 2021-10-07 RX ORDER — CARVEDILOL 12.5 MG/1
25 TABLET ORAL 2 TIMES DAILY WITH MEALS
Status: DISCONTINUED | OUTPATIENT
Start: 2021-10-07 | End: 2021-10-28 | Stop reason: HOSPADM

## 2021-10-07 RX ORDER — HYDRALAZINE HYDROCHLORIDE 50 MG/1
50 TABLET, FILM COATED ORAL EVERY 8 HOURS SCHEDULED
Status: DISCONTINUED | OUTPATIENT
Start: 2021-10-07 | End: 2021-10-10

## 2021-10-07 RX ADMIN — SODIUM CHLORIDE 10 MG/HR: 9 INJECTION, SOLUTION INTRAVENOUS at 09:29

## 2021-10-07 RX ADMIN — CARVEDILOL 12.5 MG: 12.5 TABLET, FILM COATED ORAL at 08:52

## 2021-10-07 RX ADMIN — SODIUM CHLORIDE 1 G: 900 INJECTION INTRAVENOUS at 18:15

## 2021-10-07 RX ADMIN — SODIUM BICARBONATE 650 MG TABLET 1300 MG: at 21:34

## 2021-10-07 RX ADMIN — METHADONE HYDROCHLORIDE 10 MG: 10 TABLET ORAL at 08:52

## 2021-10-07 RX ADMIN — SODIUM CHLORIDE, PRESERVATIVE FREE 10 ML: 5 INJECTION INTRAVENOUS at 08:52

## 2021-10-07 RX ADMIN — ACETAMINOPHEN 650 MG: 325 TABLET, FILM COATED ORAL at 10:13

## 2021-10-07 RX ADMIN — HYDRALAZINE HYDROCHLORIDE 50 MG: 50 TABLET, FILM COATED ORAL at 13:51

## 2021-10-07 RX ADMIN — ACETAMINOPHEN 650 MG: 325 TABLET, FILM COATED ORAL at 18:28

## 2021-10-07 RX ADMIN — INSULIN LISPRO 2 UNITS: 100 INJECTION, SOLUTION INTRAVENOUS; SUBCUTANEOUS at 11:25

## 2021-10-07 RX ADMIN — ONDANSETRON 4 MG: 2 INJECTION INTRAMUSCULAR; INTRAVENOUS at 08:49

## 2021-10-07 RX ADMIN — SODIUM BICARBONATE 650 MG TABLET 1300 MG: at 14:56

## 2021-10-07 RX ADMIN — AZITHROMYCIN 500 MG: 500 INJECTION, POWDER, LYOPHILIZED, FOR SOLUTION INTRAVENOUS at 21:32

## 2021-10-07 RX ADMIN — AMLODIPINE BESYLATE 10 MG: 10 TABLET ORAL at 08:52

## 2021-10-07 RX ADMIN — SODIUM CHLORIDE, PRESERVATIVE FREE 10 ML: 5 INJECTION INTRAVENOUS at 21:33

## 2021-10-07 RX ADMIN — HYDRALAZINE HYDROCHLORIDE 50 MG: 50 TABLET, FILM COATED ORAL at 21:33

## 2021-10-07 RX ADMIN — HYDRALAZINE HYDROCHLORIDE 25 MG: 25 TABLET, FILM COATED ORAL at 06:07

## 2021-10-07 RX ADMIN — METHADONE HYDROCHLORIDE 10 MG: 10 TABLET ORAL at 21:33

## 2021-10-07 RX ADMIN — SODIUM CHLORIDE 5 MG/HR: 9 INJECTION, SOLUTION INTRAVENOUS at 05:41

## 2021-10-07 RX ADMIN — ONDANSETRON 4 MG: 2 INJECTION INTRAMUSCULAR; INTRAVENOUS at 14:56

## 2021-10-07 RX ADMIN — CARVEDILOL 25 MG: 12.5 TABLET, FILM COATED ORAL at 18:15

## 2021-10-07 RX ADMIN — SODIUM CHLORIDE 5 MG/HR: 9 INJECTION, SOLUTION INTRAVENOUS at 00:29

## 2021-10-08 LAB
ALBUMIN SERPL-MCNC: 2.4 G/DL (ref 3.5–5.2)
ANION GAP SERPL CALCULATED.3IONS-SCNC: 16 MMOL/L (ref 5–15)
BASOPHILS # BLD AUTO: 0.06 10*3/MM3 (ref 0–0.2)
BASOPHILS NFR BLD AUTO: 0.6 % (ref 0–1.5)
BUN SERPL-MCNC: 73 MG/DL (ref 8–23)
BUN/CREAT SERPL: 13 (ref 7–25)
CALCIUM SPEC-SCNC: 8.3 MG/DL (ref 8.6–10.5)
CHLORIDE SERPL-SCNC: 110 MMOL/L (ref 98–107)
CO2 SERPL-SCNC: 13 MMOL/L (ref 22–29)
CREAT SERPL-MCNC: 5.6 MG/DL (ref 0.57–1)
DEPRECATED RDW RBC AUTO: 40 FL (ref 37–54)
EOSINOPHIL # BLD AUTO: 0.19 10*3/MM3 (ref 0–0.4)
EOSINOPHIL NFR BLD AUTO: 2 % (ref 0.3–6.2)
EOSINOPHIL SPEC QL MICRO: 0 % EOS/100 CELLS (ref 0–0)
ERYTHROCYTE [DISTWIDTH] IN BLOOD BY AUTOMATED COUNT: 12.4 % (ref 12.3–15.4)
GFR SERPL CREATININE-BSD FRML MDRD: 8 ML/MIN/1.73
GFR SERPL CREATININE-BSD FRML MDRD: ABNORMAL ML/MIN/{1.73_M2}
GLUCOSE BLDC GLUCOMTR-MCNC: 137 MG/DL (ref 70–130)
GLUCOSE BLDC GLUCOMTR-MCNC: 162 MG/DL (ref 70–130)
GLUCOSE BLDC GLUCOMTR-MCNC: 189 MG/DL (ref 70–130)
GLUCOSE SERPL-MCNC: 125 MG/DL (ref 65–99)
HCT VFR BLD AUTO: 36.1 % (ref 34–46.6)
HGB BLD-MCNC: 12.2 G/DL (ref 12–15.9)
IMM GRANULOCYTES # BLD AUTO: 0.07 10*3/MM3 (ref 0–0.05)
IMM GRANULOCYTES NFR BLD AUTO: 0.8 % (ref 0–0.5)
LYMPHOCYTES # BLD AUTO: 0.74 10*3/MM3 (ref 0.7–3.1)
LYMPHOCYTES NFR BLD AUTO: 8 % (ref 19.6–45.3)
MCH RBC QN AUTO: 29.6 PG (ref 26.6–33)
MCHC RBC AUTO-ENTMCNC: 33.8 G/DL (ref 31.5–35.7)
MCV RBC AUTO: 87.6 FL (ref 79–97)
MONOCYTES # BLD AUTO: 0.55 10*3/MM3 (ref 0.1–0.9)
MONOCYTES NFR BLD AUTO: 5.9 % (ref 5–12)
NEUTROPHILS NFR BLD AUTO: 7.66 10*3/MM3 (ref 1.7–7)
NEUTROPHILS NFR BLD AUTO: 82.7 % (ref 42.7–76)
NRBC BLD AUTO-RTO: 0 /100 WBC (ref 0–0.2)
PHOSPHATE SERPL-MCNC: 6.9 MG/DL (ref 2.5–4.5)
PLATELET # BLD AUTO: 266 10*3/MM3 (ref 140–450)
PMV BLD AUTO: 8.9 FL (ref 6–12)
POTASSIUM SERPL-SCNC: 4.5 MMOL/L (ref 3.5–5.2)
PROT UR-MCNC: 837 MG/DL
RBC # BLD AUTO: 4.12 10*6/MM3 (ref 3.77–5.28)
SODIUM SERPL-SCNC: 139 MMOL/L (ref 136–145)
SODIUM UR-SCNC: 45 MMOL/L
WBC # BLD AUTO: 9.27 10*3/MM3 (ref 3.4–10.8)

## 2021-10-08 PROCEDURE — 92507 TX SP LANG VOICE COMM INDIV: CPT

## 2021-10-08 PROCEDURE — 25010000002 ONDANSETRON PER 1 MG: Performed by: INTERNAL MEDICINE

## 2021-10-08 PROCEDURE — 63710000001 INSULIN LISPRO (HUMAN) PER 5 UNITS: Performed by: INTERNAL MEDICINE

## 2021-10-08 PROCEDURE — 84300 ASSAY OF URINE SODIUM: CPT | Performed by: FAMILY MEDICINE

## 2021-10-08 PROCEDURE — 84156 ASSAY OF PROTEIN URINE: CPT | Performed by: INTERNAL MEDICINE

## 2021-10-08 PROCEDURE — 85025 COMPLETE CBC W/AUTO DIFF WBC: CPT | Performed by: INTERNAL MEDICINE

## 2021-10-08 PROCEDURE — 63710000001 DIPHENHYDRAMINE PER 50 MG: Performed by: FAMILY MEDICINE

## 2021-10-08 PROCEDURE — 87205 SMEAR GRAM STAIN: CPT | Performed by: INTERNAL MEDICINE

## 2021-10-08 PROCEDURE — 82962 GLUCOSE BLOOD TEST: CPT

## 2021-10-08 PROCEDURE — 82570 ASSAY OF URINE CREATININE: CPT | Performed by: INTERNAL MEDICINE

## 2021-10-08 PROCEDURE — 80069 RENAL FUNCTION PANEL: CPT | Performed by: INTERNAL MEDICINE

## 2021-10-08 PROCEDURE — 25010000002 HYDRALAZINE PER 20 MG: Performed by: NURSE PRACTITIONER

## 2021-10-08 PROCEDURE — 99233 SBSQ HOSP IP/OBS HIGH 50: CPT | Performed by: FAMILY MEDICINE

## 2021-10-08 RX ORDER — CHOLECALCIFEROL (VITAMIN D3) 125 MCG
5 CAPSULE ORAL NIGHTLY
Status: DISCONTINUED | OUTPATIENT
Start: 2021-10-08 | End: 2021-10-28 | Stop reason: HOSPADM

## 2021-10-08 RX ORDER — HYDRALAZINE HYDROCHLORIDE 20 MG/ML
10 INJECTION INTRAMUSCULAR; INTRAVENOUS ONCE
Status: COMPLETED | OUTPATIENT
Start: 2021-10-08 | End: 2021-10-08

## 2021-10-08 RX ORDER — SODIUM CHLORIDE 9 MG/ML
100 INJECTION, SOLUTION INTRAVENOUS CONTINUOUS
Status: DISCONTINUED | OUTPATIENT
Start: 2021-10-08 | End: 2021-10-08

## 2021-10-08 RX ORDER — CEFUROXIME AXETIL 250 MG/1
500 TABLET ORAL EVERY 12 HOURS SCHEDULED
Status: DISCONTINUED | OUTPATIENT
Start: 2021-10-08 | End: 2021-10-08

## 2021-10-08 RX ORDER — CALCIUM ACETATE 667 MG/1
667 CAPSULE ORAL
Status: DISCONTINUED | OUTPATIENT
Start: 2021-10-08 | End: 2021-10-28 | Stop reason: HOSPADM

## 2021-10-08 RX ORDER — DIPHENHYDRAMINE HCL 25 MG
25 CAPSULE ORAL NIGHTLY
Status: DISCONTINUED | OUTPATIENT
Start: 2021-10-08 | End: 2021-10-28 | Stop reason: HOSPADM

## 2021-10-08 RX ORDER — CEFUROXIME AXETIL 250 MG/1
500 TABLET ORAL
Status: COMPLETED | OUTPATIENT
Start: 2021-10-08 | End: 2021-10-10

## 2021-10-08 RX ORDER — SODIUM BICARBONATE 650 MG/1
1300 TABLET ORAL 3 TIMES DAILY
Status: DISCONTINUED | OUTPATIENT
Start: 2021-10-08 | End: 2021-10-11

## 2021-10-08 RX ADMIN — SODIUM CHLORIDE, PRESERVATIVE FREE 10 ML: 5 INJECTION INTRAVENOUS at 20:27

## 2021-10-08 RX ADMIN — Medication 5 MG: at 20:27

## 2021-10-08 RX ADMIN — METHADONE HYDROCHLORIDE 10 MG: 10 TABLET ORAL at 20:27

## 2021-10-08 RX ADMIN — INSULIN LISPRO 2 UNITS: 100 INJECTION, SOLUTION INTRAVENOUS; SUBCUTANEOUS at 13:03

## 2021-10-08 RX ADMIN — AMLODIPINE BESYLATE 10 MG: 10 TABLET ORAL at 08:35

## 2021-10-08 RX ADMIN — SODIUM CHLORIDE, PRESERVATIVE FREE 10 ML: 5 INJECTION INTRAVENOUS at 08:36

## 2021-10-08 RX ADMIN — INSULIN LISPRO 2 UNITS: 100 INJECTION, SOLUTION INTRAVENOUS; SUBCUTANEOUS at 17:33

## 2021-10-08 RX ADMIN — SODIUM BICARBONATE 650 MG TABLET 1300 MG: at 20:35

## 2021-10-08 RX ADMIN — METHADONE HYDROCHLORIDE 10 MG: 10 TABLET ORAL at 04:34

## 2021-10-08 RX ADMIN — HYDRALAZINE HYDROCHLORIDE 10 MG: 20 INJECTION INTRAMUSCULAR; INTRAVENOUS at 03:37

## 2021-10-08 RX ADMIN — ONDANSETRON 4 MG: 2 INJECTION INTRAMUSCULAR; INTRAVENOUS at 20:27

## 2021-10-08 RX ADMIN — CEFUROXIME AXETIL 500 MG: 250 TABLET ORAL at 14:02

## 2021-10-08 RX ADMIN — CARVEDILOL 25 MG: 12.5 TABLET, FILM COATED ORAL at 17:33

## 2021-10-08 RX ADMIN — SODIUM BICARBONATE 650 MG TABLET 1300 MG: at 08:36

## 2021-10-08 RX ADMIN — CARVEDILOL 25 MG: 12.5 TABLET, FILM COATED ORAL at 08:36

## 2021-10-08 RX ADMIN — SODIUM BICARBONATE 650 MG TABLET 1300 MG: at 17:53

## 2021-10-08 RX ADMIN — METHADONE HYDROCHLORIDE 10 MG: 10 TABLET ORAL at 08:35

## 2021-10-08 RX ADMIN — HYDRALAZINE HYDROCHLORIDE 50 MG: 50 TABLET, FILM COATED ORAL at 05:59

## 2021-10-08 RX ADMIN — HYDRALAZINE HYDROCHLORIDE 50 MG: 50 TABLET, FILM COATED ORAL at 20:27

## 2021-10-08 RX ADMIN — HYDRALAZINE HYDROCHLORIDE 50 MG: 50 TABLET, FILM COATED ORAL at 14:02

## 2021-10-08 RX ADMIN — DIPHENHYDRAMINE HYDROCHLORIDE 25 MG: 25 CAPSULE ORAL at 20:27

## 2021-10-08 RX ADMIN — CALCIUM ACETATE 667 MG: 667 CAPSULE ORAL at 17:53

## 2021-10-09 ENCOUNTER — APPOINTMENT (OUTPATIENT)
Dept: ULTRASOUND IMAGING | Facility: HOSPITAL | Age: 61
End: 2021-10-09

## 2021-10-09 LAB
ALBUMIN SERPL-MCNC: 2.8 G/DL (ref 3.5–5.2)
ANION GAP SERPL CALCULATED.3IONS-SCNC: 17 MMOL/L (ref 5–15)
BASOPHILS # BLD AUTO: 0.08 10*3/MM3 (ref 0–0.2)
BASOPHILS NFR BLD AUTO: 0.7 % (ref 0–1.5)
BUN SERPL-MCNC: 76 MG/DL (ref 8–23)
BUN/CREAT SERPL: 12.8 (ref 7–25)
CALCIUM SPEC-SCNC: 8.4 MG/DL (ref 8.6–10.5)
CHLORIDE SERPL-SCNC: 105 MMOL/L (ref 98–107)
CK SERPL-CCNC: 87 U/L (ref 20–180)
CO2 SERPL-SCNC: 14 MMOL/L (ref 22–29)
CREAT SERPL-MCNC: 5.94 MG/DL (ref 0.57–1)
CREAT UR-MCNC: 52.3 MG/DL
DEPRECATED RDW RBC AUTO: 39.4 FL (ref 37–54)
EOSINOPHIL # BLD AUTO: 0.2 10*3/MM3 (ref 0–0.4)
EOSINOPHIL NFR BLD AUTO: 1.7 % (ref 0.3–6.2)
ERYTHROCYTE [DISTWIDTH] IN BLOOD BY AUTOMATED COUNT: 12.4 % (ref 12.3–15.4)
GFR SERPL CREATININE-BSD FRML MDRD: 7 ML/MIN/1.73
GFR SERPL CREATININE-BSD FRML MDRD: ABNORMAL ML/MIN/{1.73_M2}
GLUCOSE BLDC GLUCOMTR-MCNC: 146 MG/DL (ref 70–130)
GLUCOSE BLDC GLUCOMTR-MCNC: 152 MG/DL (ref 70–130)
GLUCOSE BLDC GLUCOMTR-MCNC: 157 MG/DL (ref 70–130)
GLUCOSE BLDC GLUCOMTR-MCNC: 200 MG/DL (ref 70–130)
GLUCOSE SERPL-MCNC: 145 MG/DL (ref 65–99)
HCT VFR BLD AUTO: 39.5 % (ref 34–46.6)
HGB BLD-MCNC: 13.4 G/DL (ref 12–15.9)
IMM GRANULOCYTES # BLD AUTO: 0.11 10*3/MM3 (ref 0–0.05)
IMM GRANULOCYTES NFR BLD AUTO: 1 % (ref 0–0.5)
LYMPHOCYTES # BLD AUTO: 0.92 10*3/MM3 (ref 0.7–3.1)
LYMPHOCYTES NFR BLD AUTO: 8 % (ref 19.6–45.3)
MAGNESIUM SERPL-MCNC: 1.9 MG/DL (ref 1.6–2.4)
MCH RBC QN AUTO: 29.5 PG (ref 26.6–33)
MCHC RBC AUTO-ENTMCNC: 33.9 G/DL (ref 31.5–35.7)
MCV RBC AUTO: 86.8 FL (ref 79–97)
MONOCYTES # BLD AUTO: 0.51 10*3/MM3 (ref 0.1–0.9)
MONOCYTES NFR BLD AUTO: 4.4 % (ref 5–12)
NEUTROPHILS NFR BLD AUTO: 84.2 % (ref 42.7–76)
NEUTROPHILS NFR BLD AUTO: 9.75 10*3/MM3 (ref 1.7–7)
NRBC BLD AUTO-RTO: 0 /100 WBC (ref 0–0.2)
PHOSPHATE SERPL-MCNC: 6.5 MG/DL (ref 2.5–4.5)
PLATELET # BLD AUTO: 334 10*3/MM3 (ref 140–450)
PMV BLD AUTO: 9.3 FL (ref 6–12)
POTASSIUM SERPL-SCNC: 4.7 MMOL/L (ref 3.5–5.2)
RBC # BLD AUTO: 4.55 10*6/MM3 (ref 3.77–5.28)
SODIUM SERPL-SCNC: 136 MMOL/L (ref 136–145)
WBC # BLD AUTO: 11.57 10*3/MM3 (ref 3.4–10.8)

## 2021-10-09 PROCEDURE — 25010000002 ONDANSETRON PER 1 MG: Performed by: INTERNAL MEDICINE

## 2021-10-09 PROCEDURE — 82962 GLUCOSE BLOOD TEST: CPT

## 2021-10-09 PROCEDURE — 63710000001 DIPHENHYDRAMINE PER 50 MG: Performed by: FAMILY MEDICINE

## 2021-10-09 PROCEDURE — 63710000001 INSULIN LISPRO (HUMAN) PER 5 UNITS: Performed by: INTERNAL MEDICINE

## 2021-10-09 PROCEDURE — 83735 ASSAY OF MAGNESIUM: CPT | Performed by: INTERNAL MEDICINE

## 2021-10-09 PROCEDURE — 85025 COMPLETE CBC W/AUTO DIFF WBC: CPT | Performed by: INTERNAL MEDICINE

## 2021-10-09 PROCEDURE — 99232 SBSQ HOSP IP/OBS MODERATE 35: CPT | Performed by: FAMILY MEDICINE

## 2021-10-09 PROCEDURE — 80069 RENAL FUNCTION PANEL: CPT | Performed by: INTERNAL MEDICINE

## 2021-10-09 PROCEDURE — 82550 ASSAY OF CK (CPK): CPT | Performed by: INTERNAL MEDICINE

## 2021-10-09 PROCEDURE — 76775 US EXAM ABDO BACK WALL LIM: CPT

## 2021-10-09 RX ORDER — LABETALOL HYDROCHLORIDE 5 MG/ML
20 INJECTION, SOLUTION INTRAVENOUS ONCE
Status: COMPLETED | OUTPATIENT
Start: 2021-10-10 | End: 2021-10-10

## 2021-10-09 RX ADMIN — INSULIN LISPRO 4 UNITS: 100 INJECTION, SOLUTION INTRAVENOUS; SUBCUTANEOUS at 21:21

## 2021-10-09 RX ADMIN — DIPHENHYDRAMINE HYDROCHLORIDE 25 MG: 25 CAPSULE ORAL at 21:17

## 2021-10-09 RX ADMIN — CALCIUM ACETATE 667 MG: 667 CAPSULE ORAL at 11:56

## 2021-10-09 RX ADMIN — HYDRALAZINE HYDROCHLORIDE 50 MG: 50 TABLET, FILM COATED ORAL at 21:17

## 2021-10-09 RX ADMIN — HYDRALAZINE HYDROCHLORIDE 50 MG: 50 TABLET, FILM COATED ORAL at 13:24

## 2021-10-09 RX ADMIN — SODIUM BICARBONATE 650 MG TABLET 1300 MG: at 15:51

## 2021-10-09 RX ADMIN — SODIUM BICARBONATE 650 MG TABLET 1300 MG: at 08:48

## 2021-10-09 RX ADMIN — CALCIUM ACETATE 667 MG: 667 CAPSULE ORAL at 08:48

## 2021-10-09 RX ADMIN — CALCIUM ACETATE 667 MG: 667 CAPSULE ORAL at 17:16

## 2021-10-09 RX ADMIN — HYDRALAZINE HYDROCHLORIDE 50 MG: 50 TABLET, FILM COATED ORAL at 05:53

## 2021-10-09 RX ADMIN — ACETAMINOPHEN 650 MG: 325 TABLET, FILM COATED ORAL at 21:21

## 2021-10-09 RX ADMIN — SODIUM CHLORIDE, PRESERVATIVE FREE 10 ML: 5 INJECTION INTRAVENOUS at 21:17

## 2021-10-09 RX ADMIN — METHADONE HYDROCHLORIDE 10 MG: 10 TABLET ORAL at 21:17

## 2021-10-09 RX ADMIN — CARVEDILOL 25 MG: 12.5 TABLET, FILM COATED ORAL at 17:16

## 2021-10-09 RX ADMIN — INSULIN LISPRO 2 UNITS: 100 INJECTION, SOLUTION INTRAVENOUS; SUBCUTANEOUS at 11:57

## 2021-10-09 RX ADMIN — AMLODIPINE BESYLATE 10 MG: 10 TABLET ORAL at 08:48

## 2021-10-09 RX ADMIN — SODIUM BICARBONATE 650 MG TABLET 1300 MG: at 21:17

## 2021-10-09 RX ADMIN — CEFUROXIME AXETIL 500 MG: 250 TABLET ORAL at 08:48

## 2021-10-09 RX ADMIN — METHADONE HYDROCHLORIDE 10 MG: 10 TABLET ORAL at 02:47

## 2021-10-09 RX ADMIN — ONDANSETRON 4 MG: 2 INJECTION INTRAMUSCULAR; INTRAVENOUS at 21:21

## 2021-10-09 RX ADMIN — Medication 5 MG: at 21:17

## 2021-10-09 RX ADMIN — SODIUM CHLORIDE, PRESERVATIVE FREE 10 ML: 5 INJECTION INTRAVENOUS at 08:49

## 2021-10-09 RX ADMIN — CARVEDILOL 25 MG: 12.5 TABLET, FILM COATED ORAL at 08:48

## 2021-10-09 RX ADMIN — METHADONE HYDROCHLORIDE 10 MG: 10 TABLET ORAL at 08:50

## 2021-10-10 ENCOUNTER — APPOINTMENT (OUTPATIENT)
Dept: GENERAL RADIOLOGY | Facility: HOSPITAL | Age: 61
End: 2021-10-10

## 2021-10-10 LAB
ALBUMIN SERPL-MCNC: 2.5 G/DL (ref 3.5–5.2)
ANION GAP SERPL CALCULATED.3IONS-SCNC: 15 MMOL/L (ref 5–15)
BASOPHILS # BLD AUTO: 0.03 10*3/MM3 (ref 0–0.2)
BASOPHILS NFR BLD AUTO: 0.3 % (ref 0–1.5)
BUN SERPL-MCNC: 79 MG/DL (ref 8–23)
BUN/CREAT SERPL: 13.1 (ref 7–25)
CALCIUM SPEC-SCNC: 7.8 MG/DL (ref 8.6–10.5)
CHLORIDE SERPL-SCNC: 105 MMOL/L (ref 98–107)
CO2 SERPL-SCNC: 15 MMOL/L (ref 22–29)
CREAT SERPL-MCNC: 6.01 MG/DL (ref 0.57–1)
DEPRECATED RDW RBC AUTO: 38.7 FL (ref 37–54)
EOSINOPHIL # BLD AUTO: 0.18 10*3/MM3 (ref 0–0.4)
EOSINOPHIL NFR BLD AUTO: 2.1 % (ref 0.3–6.2)
ERYTHROCYTE [DISTWIDTH] IN BLOOD BY AUTOMATED COUNT: 12.3 % (ref 12.3–15.4)
GFR SERPL CREATININE-BSD FRML MDRD: 7 ML/MIN/1.73
GFR SERPL CREATININE-BSD FRML MDRD: ABNORMAL ML/MIN/{1.73_M2}
GLUCOSE BLDC GLUCOMTR-MCNC: 138 MG/DL (ref 70–130)
GLUCOSE BLDC GLUCOMTR-MCNC: 148 MG/DL (ref 70–130)
GLUCOSE BLDC GLUCOMTR-MCNC: 164 MG/DL (ref 70–130)
GLUCOSE BLDC GLUCOMTR-MCNC: 253 MG/DL (ref 70–130)
GLUCOSE SERPL-MCNC: 159 MG/DL (ref 65–99)
HCT VFR BLD AUTO: 34.2 % (ref 34–46.6)
HGB BLD-MCNC: 11.7 G/DL (ref 12–15.9)
IMM GRANULOCYTES # BLD AUTO: 0.1 10*3/MM3 (ref 0–0.05)
IMM GRANULOCYTES NFR BLD AUTO: 1.2 % (ref 0–0.5)
INR PPP: 1.1 (ref 0.85–1.16)
LYMPHOCYTES # BLD AUTO: 0.89 10*3/MM3 (ref 0.7–3.1)
LYMPHOCYTES NFR BLD AUTO: 10.3 % (ref 19.6–45.3)
MCH RBC QN AUTO: 29.7 PG (ref 26.6–33)
MCHC RBC AUTO-ENTMCNC: 34.2 G/DL (ref 31.5–35.7)
MCV RBC AUTO: 86.8 FL (ref 79–97)
MONOCYTES # BLD AUTO: 0.41 10*3/MM3 (ref 0.1–0.9)
MONOCYTES NFR BLD AUTO: 4.7 % (ref 5–12)
NEUTROPHILS NFR BLD AUTO: 7.05 10*3/MM3 (ref 1.7–7)
NEUTROPHILS NFR BLD AUTO: 81.4 % (ref 42.7–76)
NRBC BLD AUTO-RTO: 0 /100 WBC (ref 0–0.2)
PHOSPHATE SERPL-MCNC: 6.6 MG/DL (ref 2.5–4.5)
PLATELET # BLD AUTO: 220 10*3/MM3 (ref 140–450)
PMV BLD AUTO: 9.3 FL (ref 6–12)
POTASSIUM SERPL-SCNC: 4.6 MMOL/L (ref 3.5–5.2)
PROTHROMBIN TIME: 13.9 SECONDS (ref 11.4–14.4)
RBC # BLD AUTO: 3.94 10*6/MM3 (ref 3.77–5.28)
SODIUM SERPL-SCNC: 135 MMOL/L (ref 136–145)
WBC # BLD AUTO: 8.66 10*3/MM3 (ref 3.4–10.8)

## 2021-10-10 PROCEDURE — 74018 RADEX ABDOMEN 1 VIEW: CPT

## 2021-10-10 PROCEDURE — 82962 GLUCOSE BLOOD TEST: CPT

## 2021-10-10 PROCEDURE — 25010000002 METOCLOPRAMIDE PER 10 MG: Performed by: FAMILY MEDICINE

## 2021-10-10 PROCEDURE — 63710000001 INSULIN LISPRO (HUMAN) PER 5 UNITS: Performed by: INTERNAL MEDICINE

## 2021-10-10 PROCEDURE — 80069 RENAL FUNCTION PANEL: CPT | Performed by: INTERNAL MEDICINE

## 2021-10-10 PROCEDURE — 63710000001 DIPHENHYDRAMINE PER 50 MG: Performed by: FAMILY MEDICINE

## 2021-10-10 PROCEDURE — 85025 COMPLETE CBC W/AUTO DIFF WBC: CPT | Performed by: INTERNAL MEDICINE

## 2021-10-10 PROCEDURE — 99232 SBSQ HOSP IP/OBS MODERATE 35: CPT | Performed by: FAMILY MEDICINE

## 2021-10-10 PROCEDURE — 85610 PROTHROMBIN TIME: CPT | Performed by: INTERNAL MEDICINE

## 2021-10-10 RX ORDER — SIMETHICONE 80 MG
80 TABLET,CHEWABLE ORAL 4 TIMES DAILY PRN
Status: DISCONTINUED | OUTPATIENT
Start: 2021-10-10 | End: 2021-10-28 | Stop reason: HOSPADM

## 2021-10-10 RX ORDER — METOCLOPRAMIDE HYDROCHLORIDE 5 MG/ML
10 INJECTION INTRAMUSCULAR; INTRAVENOUS EVERY 6 HOURS SCHEDULED
Status: DISPENSED | OUTPATIENT
Start: 2021-10-10 | End: 2021-10-11

## 2021-10-10 RX ORDER — DICYCLOMINE HYDROCHLORIDE 10 MG/1
10 CAPSULE ORAL 4 TIMES DAILY
Status: DISCONTINUED | OUTPATIENT
Start: 2021-10-10 | End: 2021-10-12

## 2021-10-10 RX ORDER — ALBUMIN (HUMAN) 12.5 G/50ML
12.5 SOLUTION INTRAVENOUS AS NEEDED
Status: CANCELLED | OUTPATIENT
Start: 2021-10-11 | End: 2021-10-11

## 2021-10-10 RX ORDER — HYDRALAZINE HYDROCHLORIDE 20 MG/ML
10 INJECTION INTRAMUSCULAR; INTRAVENOUS EVERY 6 HOURS PRN
Status: DISCONTINUED | OUTPATIENT
Start: 2021-10-10 | End: 2021-10-28 | Stop reason: HOSPADM

## 2021-10-10 RX ADMIN — METOCLOPRAMIDE 10 MG: 5 INJECTION, SOLUTION INTRAMUSCULAR; INTRAVENOUS at 17:29

## 2021-10-10 RX ADMIN — METHADONE HYDROCHLORIDE 10 MG: 10 TABLET ORAL at 06:52

## 2021-10-10 RX ADMIN — SODIUM CHLORIDE, PRESERVATIVE FREE 10 ML: 5 INJECTION INTRAVENOUS at 17:30

## 2021-10-10 RX ADMIN — CARVEDILOL 25 MG: 12.5 TABLET, FILM COATED ORAL at 06:45

## 2021-10-10 RX ADMIN — SODIUM BICARBONATE 650 MG TABLET 1300 MG: at 21:52

## 2021-10-10 RX ADMIN — SODIUM BICARBONATE 650 MG TABLET 1300 MG: at 08:16

## 2021-10-10 RX ADMIN — DIPHENHYDRAMINE HYDROCHLORIDE 25 MG: 25 CAPSULE ORAL at 21:54

## 2021-10-10 RX ADMIN — SIMETHICONE 80 MG: 80 TABLET, CHEWABLE ORAL at 19:06

## 2021-10-10 RX ADMIN — AMLODIPINE BESYLATE 10 MG: 10 TABLET ORAL at 08:16

## 2021-10-10 RX ADMIN — CALCIUM ACETATE 667 MG: 667 CAPSULE ORAL at 08:16

## 2021-10-10 RX ADMIN — DICYCLOMINE HYDROCHLORIDE 10 MG: 10 CAPSULE ORAL at 21:54

## 2021-10-10 RX ADMIN — DICYCLOMINE HYDROCHLORIDE 10 MG: 10 CAPSULE ORAL at 17:29

## 2021-10-10 RX ADMIN — CEFUROXIME AXETIL 500 MG: 250 TABLET ORAL at 08:16

## 2021-10-10 RX ADMIN — METHADONE HYDROCHLORIDE 10 MG: 10 TABLET ORAL at 16:30

## 2021-10-10 RX ADMIN — LABETALOL 20 MG/4 ML (5 MG/ML) INTRAVENOUS SYRINGE 20 MG: at 00:10

## 2021-10-10 RX ADMIN — INSULIN LISPRO 2 UNITS: 100 INJECTION, SOLUTION INTRAVENOUS; SUBCUTANEOUS at 21:58

## 2021-10-10 RX ADMIN — METHADONE HYDROCHLORIDE 10 MG: 10 TABLET ORAL at 21:58

## 2021-10-10 RX ADMIN — HYDRALAZINE HYDROCHLORIDE 75 MG: 25 TABLET ORAL at 21:54

## 2021-10-10 RX ADMIN — CALCIUM ACETATE 667 MG: 667 CAPSULE ORAL at 12:27

## 2021-10-10 RX ADMIN — HYDRALAZINE HYDROCHLORIDE 75 MG: 25 TABLET ORAL at 14:24

## 2021-10-10 RX ADMIN — SODIUM BICARBONATE 650 MG TABLET 1300 MG: at 16:30

## 2021-10-10 RX ADMIN — INSULIN LISPRO 6 UNITS: 100 INJECTION, SOLUTION INTRAVENOUS; SUBCUTANEOUS at 17:29

## 2021-10-10 RX ADMIN — SODIUM CHLORIDE, PRESERVATIVE FREE 10 ML: 5 INJECTION INTRAVENOUS at 21:54

## 2021-10-10 RX ADMIN — CALCIUM ACETATE 667 MG: 667 CAPSULE ORAL at 17:29

## 2021-10-10 RX ADMIN — CARVEDILOL 25 MG: 12.5 TABLET, FILM COATED ORAL at 17:29

## 2021-10-10 RX ADMIN — Medication 5 MG: at 21:54

## 2021-10-10 RX ADMIN — SODIUM CHLORIDE, PRESERVATIVE FREE 10 ML: 5 INJECTION INTRAVENOUS at 08:16

## 2021-10-10 RX ADMIN — HYDRALAZINE HYDROCHLORIDE 50 MG: 50 TABLET, FILM COATED ORAL at 05:29

## 2021-10-11 ENCOUNTER — APPOINTMENT (OUTPATIENT)
Dept: NEPHROLOGY | Facility: HOSPITAL | Age: 61
End: 2021-10-11

## 2021-10-11 ENCOUNTER — APPOINTMENT (OUTPATIENT)
Dept: INTERVENTIONAL RADIOLOGY/VASCULAR | Facility: HOSPITAL | Age: 61
End: 2021-10-11

## 2021-10-11 LAB
ALBUMIN SERPL-MCNC: 2.7 G/DL (ref 3.5–5.2)
ANION GAP SERPL CALCULATED.3IONS-SCNC: 15 MMOL/L (ref 5–15)
BASOPHILS # BLD AUTO: 0.04 10*3/MM3 (ref 0–0.2)
BASOPHILS NFR BLD AUTO: 0.4 % (ref 0–1.5)
BUN SERPL-MCNC: 80 MG/DL (ref 8–23)
BUN/CREAT SERPL: 13.3 (ref 7–25)
CALCIUM SPEC-SCNC: 8 MG/DL (ref 8.6–10.5)
CHLORIDE SERPL-SCNC: 102 MMOL/L (ref 98–107)
CO2 SERPL-SCNC: 16 MMOL/L (ref 22–29)
CREAT SERPL-MCNC: 6 MG/DL (ref 0.57–1)
DEPRECATED RDW RBC AUTO: 39.2 FL (ref 37–54)
EOSINOPHIL # BLD AUTO: 0.33 10*3/MM3 (ref 0–0.4)
EOSINOPHIL NFR BLD AUTO: 3 % (ref 0.3–6.2)
ERYTHROCYTE [DISTWIDTH] IN BLOOD BY AUTOMATED COUNT: 12.4 % (ref 12.3–15.4)
GFR SERPL CREATININE-BSD FRML MDRD: 7 ML/MIN/1.73
GFR SERPL CREATININE-BSD FRML MDRD: ABNORMAL ML/MIN/{1.73_M2}
GLUCOSE BLDC GLUCOMTR-MCNC: 125 MG/DL (ref 70–130)
GLUCOSE BLDC GLUCOMTR-MCNC: 126 MG/DL (ref 70–130)
GLUCOSE BLDC GLUCOMTR-MCNC: 140 MG/DL (ref 70–130)
GLUCOSE BLDC GLUCOMTR-MCNC: 174 MG/DL (ref 70–130)
GLUCOSE SERPL-MCNC: 132 MG/DL (ref 65–99)
HAV IGM SERPL QL IA: ABNORMAL
HBV CORE IGM SERPL QL IA: ABNORMAL
HBV SURFACE AG SERPL QL IA: ABNORMAL
HCT VFR BLD AUTO: 38.4 % (ref 34–46.6)
HCV AB SER DONR QL: REACTIVE
HGB BLD-MCNC: 13.1 G/DL (ref 12–15.9)
IMM GRANULOCYTES # BLD AUTO: 0.13 10*3/MM3 (ref 0–0.05)
IMM GRANULOCYTES NFR BLD AUTO: 1.2 % (ref 0–0.5)
LYMPHOCYTES # BLD AUTO: 1.16 10*3/MM3 (ref 0.7–3.1)
LYMPHOCYTES NFR BLD AUTO: 10.4 % (ref 19.6–45.3)
MCH RBC QN AUTO: 29.7 PG (ref 26.6–33)
MCHC RBC AUTO-ENTMCNC: 34.1 G/DL (ref 31.5–35.7)
MCV RBC AUTO: 87.1 FL (ref 79–97)
MONOCYTES # BLD AUTO: 0.56 10*3/MM3 (ref 0.1–0.9)
MONOCYTES NFR BLD AUTO: 5 % (ref 5–12)
NEUTROPHILS NFR BLD AUTO: 8.96 10*3/MM3 (ref 1.7–7)
NEUTROPHILS NFR BLD AUTO: 80 % (ref 42.7–76)
NRBC BLD AUTO-RTO: 0 /100 WBC (ref 0–0.2)
PHOSPHATE SERPL-MCNC: 6.1 MG/DL (ref 2.5–4.5)
PLATELET # BLD AUTO: 307 10*3/MM3 (ref 140–450)
PMV BLD AUTO: 9.4 FL (ref 6–12)
POTASSIUM SERPL-SCNC: 4.5 MMOL/L (ref 3.5–5.2)
RBC # BLD AUTO: 4.41 10*6/MM3 (ref 3.77–5.28)
SODIUM SERPL-SCNC: 133 MMOL/L (ref 136–145)
WBC # BLD AUTO: 11.18 10*3/MM3 (ref 3.4–10.8)

## 2021-10-11 PROCEDURE — 02HV33Z INSERTION OF INFUSION DEVICE INTO SUPERIOR VENA CAVA, PERCUTANEOUS APPROACH: ICD-10-PCS | Performed by: RADIOLOGY

## 2021-10-11 PROCEDURE — 82962 GLUCOSE BLOOD TEST: CPT

## 2021-10-11 PROCEDURE — C1894 INTRO/SHEATH, NON-LASER: HCPCS

## 2021-10-11 PROCEDURE — 80069 RENAL FUNCTION PANEL: CPT | Performed by: INTERNAL MEDICINE

## 2021-10-11 PROCEDURE — 36558 INSERT TUNNELED CV CATH: CPT | Performed by: INTERNAL MEDICINE

## 2021-10-11 PROCEDURE — 80074 ACUTE HEPATITIS PANEL: CPT | Performed by: INTERNAL MEDICINE

## 2021-10-11 PROCEDURE — 25010000002 HEPARIN (PORCINE) PER 1000 UNITS

## 2021-10-11 PROCEDURE — 92507 TX SP LANG VOICE COMM INDIV: CPT

## 2021-10-11 PROCEDURE — 99232 SBSQ HOSP IP/OBS MODERATE 35: CPT | Performed by: FAMILY MEDICINE

## 2021-10-11 PROCEDURE — 77001 FLUOROGUIDE FOR VEIN DEVICE: CPT

## 2021-10-11 PROCEDURE — 25010000002 METOCLOPRAMIDE PER 10 MG: Performed by: FAMILY MEDICINE

## 2021-10-11 PROCEDURE — 25010000002 MIDAZOLAM PER 1 MG: Performed by: RADIOLOGY

## 2021-10-11 PROCEDURE — 97530 THERAPEUTIC ACTIVITIES: CPT

## 2021-10-11 PROCEDURE — 5A1D70Z PERFORMANCE OF URINARY FILTRATION, INTERMITTENT, LESS THAN 6 HOURS PER DAY: ICD-10-PCS | Performed by: INTERNAL MEDICINE

## 2021-10-11 PROCEDURE — C1750 CATH, HEMODIALYSIS,LONG-TERM: HCPCS

## 2021-10-11 PROCEDURE — 0JH63XZ INSERTION OF TUNNELED VASCULAR ACCESS DEVICE INTO CHEST SUBCUTANEOUS TISSUE AND FASCIA, PERCUTANEOUS APPROACH: ICD-10-PCS | Performed by: RADIOLOGY

## 2021-10-11 PROCEDURE — 97110 THERAPEUTIC EXERCISES: CPT

## 2021-10-11 PROCEDURE — 63710000001 INSULIN LISPRO (HUMAN) PER 5 UNITS: Performed by: INTERNAL MEDICINE

## 2021-10-11 PROCEDURE — 63710000001 DIPHENHYDRAMINE PER 50 MG: Performed by: FAMILY MEDICINE

## 2021-10-11 PROCEDURE — 85025 COMPLETE CBC W/AUTO DIFF WBC: CPT | Performed by: INTERNAL MEDICINE

## 2021-10-11 PROCEDURE — 25010000002 FENTANYL CITRATE (PF) 50 MCG/ML SOLUTION: Performed by: RADIOLOGY

## 2021-10-11 PROCEDURE — 76937 US GUIDE VASCULAR ACCESS: CPT

## 2021-10-11 PROCEDURE — 99152 MOD SED SAME PHYS/QHP 5/>YRS: CPT

## 2021-10-11 RX ORDER — FOLIC ACID/VIT B COMPLEX AND C 0.8 MG
1 TABLET ORAL DAILY
Status: DISCONTINUED | OUTPATIENT
Start: 2021-10-11 | End: 2021-10-28 | Stop reason: HOSPADM

## 2021-10-11 RX ORDER — MIDAZOLAM HYDROCHLORIDE 1 MG/ML
INJECTION INTRAMUSCULAR; INTRAVENOUS
Status: DISPENSED
Start: 2021-10-11 | End: 2021-10-11

## 2021-10-11 RX ORDER — HEPARIN SODIUM 1000 [USP'U]/ML
1600 INJECTION, SOLUTION INTRAVENOUS; SUBCUTANEOUS AS NEEDED
Status: DISCONTINUED | OUTPATIENT
Start: 2021-10-11 | End: 2021-10-28 | Stop reason: HOSPADM

## 2021-10-11 RX ORDER — LIDOCAINE HYDROCHLORIDE 10 MG/ML
INJECTION, SOLUTION EPIDURAL; INFILTRATION; INTRACAUDAL; PERINEURAL
Status: COMPLETED
Start: 2021-10-11 | End: 2021-10-11

## 2021-10-11 RX ORDER — MIDAZOLAM HYDROCHLORIDE 1 MG/ML
INJECTION INTRAMUSCULAR; INTRAVENOUS
Status: COMPLETED | OUTPATIENT
Start: 2021-10-11 | End: 2021-10-11

## 2021-10-11 RX ORDER — HEPARIN SODIUM 1000 [USP'U]/ML
INJECTION, SOLUTION INTRAVENOUS; SUBCUTANEOUS
Status: COMPLETED
Start: 2021-10-11 | End: 2021-10-11

## 2021-10-11 RX ORDER — FENTANYL CITRATE 50 UG/ML
INJECTION, SOLUTION INTRAMUSCULAR; INTRAVENOUS
Status: COMPLETED | OUTPATIENT
Start: 2021-10-11 | End: 2021-10-11

## 2021-10-11 RX ORDER — FENTANYL CITRATE 50 UG/ML
INJECTION, SOLUTION INTRAMUSCULAR; INTRAVENOUS
Status: DISPENSED
Start: 2021-10-11 | End: 2021-10-11

## 2021-10-11 RX ADMIN — CALCIUM ACETATE 667 MG: 667 CAPSULE ORAL at 13:19

## 2021-10-11 RX ADMIN — HEPARIN SODIUM 3200 UNITS: 1000 INJECTION, SOLUTION INTRAVENOUS; SUBCUTANEOUS at 09:44

## 2021-10-11 RX ADMIN — CARVEDILOL 25 MG: 12.5 TABLET, FILM COATED ORAL at 18:06

## 2021-10-11 RX ADMIN — FENTANYL CITRATE 50 MCG: 0.05 INJECTION, SOLUTION INTRAMUSCULAR; INTRAVENOUS at 09:26

## 2021-10-11 RX ADMIN — INSULIN LISPRO 2 UNITS: 100 INJECTION, SOLUTION INTRAVENOUS; SUBCUTANEOUS at 18:06

## 2021-10-11 RX ADMIN — Medication 5 MG: at 21:19

## 2021-10-11 RX ADMIN — Medication 1 TABLET: at 13:20

## 2021-10-11 RX ADMIN — HYDRALAZINE HYDROCHLORIDE 75 MG: 25 TABLET ORAL at 06:50

## 2021-10-11 RX ADMIN — AMLODIPINE BESYLATE 10 MG: 10 TABLET ORAL at 13:19

## 2021-10-11 RX ADMIN — METHADONE HYDROCHLORIDE 10 MG: 10 TABLET ORAL at 13:19

## 2021-10-11 RX ADMIN — SODIUM CHLORIDE, PRESERVATIVE FREE 10 ML: 5 INJECTION INTRAVENOUS at 13:20

## 2021-10-11 RX ADMIN — MIDAZOLAM 2 MG: 1 INJECTION INTRAMUSCULAR; INTRAVENOUS at 09:26

## 2021-10-11 RX ADMIN — METOCLOPRAMIDE 10 MG: 5 INJECTION, SOLUTION INTRAMUSCULAR; INTRAVENOUS at 13:19

## 2021-10-11 RX ADMIN — DICYCLOMINE HYDROCHLORIDE 10 MG: 10 CAPSULE ORAL at 21:20

## 2021-10-11 RX ADMIN — METHADONE HYDROCHLORIDE 10 MG: 10 TABLET ORAL at 21:19

## 2021-10-11 RX ADMIN — HYDRALAZINE HYDROCHLORIDE 75 MG: 25 TABLET ORAL at 13:20

## 2021-10-11 RX ADMIN — HYDRALAZINE HYDROCHLORIDE 75 MG: 25 TABLET ORAL at 21:18

## 2021-10-11 RX ADMIN — DICYCLOMINE HYDROCHLORIDE 10 MG: 10 CAPSULE ORAL at 13:19

## 2021-10-11 RX ADMIN — CARVEDILOL 25 MG: 12.5 TABLET, FILM COATED ORAL at 13:19

## 2021-10-11 RX ADMIN — METOCLOPRAMIDE 10 MG: 5 INJECTION, SOLUTION INTRAMUSCULAR; INTRAVENOUS at 06:50

## 2021-10-11 RX ADMIN — LIDOCAINE HYDROCHLORIDE: 10 INJECTION, SOLUTION EPIDURAL; INFILTRATION; INTRACAUDAL; PERINEURAL at 09:45

## 2021-10-11 RX ADMIN — SODIUM CHLORIDE, PRESERVATIVE FREE 10 ML: 5 INJECTION INTRAVENOUS at 21:20

## 2021-10-11 RX ADMIN — DICYCLOMINE HYDROCHLORIDE 10 MG: 10 CAPSULE ORAL at 18:05

## 2021-10-11 RX ADMIN — CALCIUM ACETATE 667 MG: 667 CAPSULE ORAL at 18:06

## 2021-10-11 RX ADMIN — DIPHENHYDRAMINE HYDROCHLORIDE 25 MG: 25 CAPSULE ORAL at 21:18

## 2021-10-12 ENCOUNTER — APPOINTMENT (OUTPATIENT)
Dept: CARDIOLOGY | Facility: HOSPITAL | Age: 61
End: 2021-10-12

## 2021-10-12 ENCOUNTER — APPOINTMENT (OUTPATIENT)
Dept: NEPHROLOGY | Facility: HOSPITAL | Age: 61
End: 2021-10-12

## 2021-10-12 LAB
ALBUMIN SERPL-MCNC: 2.7 G/DL (ref 3.5–5.2)
ANION GAP SERPL CALCULATED.3IONS-SCNC: 14 MMOL/L (ref 5–15)
BASOPHILS # BLD AUTO: 0.03 10*3/MM3 (ref 0–0.2)
BASOPHILS NFR BLD AUTO: 0.3 % (ref 0–1.5)
BH CV VAS MEAS BASILIC ANTECUBITAL FOSSA LEFT: 0.16 CM
BH CV VAS MEAS BASILIC FOREARM LEFT - DIST: 0.07 CM
BH CV VAS MEAS BASILIC FOREARM LEFT - MID: 0.05 CM
BH CV VAS MEAS BASILIC FOREARM LEFT - PROX: 0.07 CM
BH CV VAS MEAS BASILIC UPPER ARM LEFT - DIST: 0.14 CM
BH CV VAS MEAS BASILIC UPPER ARM LEFT - MID: 0.23 CM
BH CV VAS MEAS BASILIC UPPER ARM LEFT - PROX: 0.24 CM
BH CV VAS MEAS CEPHALIC ANTECUBITAL FOSSA LEFT: 0.37 CM
BH CV VAS MEAS CEPHALIC FOREARM LEFT - DIST: 0.15 CM
BH CV VAS MEAS CEPHALIC FOREARM LEFT - MID: 0.18 CM
BH CV VAS MEAS CEPHALIC FOREARM LEFT - PROX: 0.3 CM
BH CV VAS MEAS CEPHALIC UPPER ARM LEFT - DIST: 0.25 CM
BH CV VAS MEAS CEPHALIC UPPER ARM LEFT - MID: 0.19 CM
BH CV VAS MEAS CEPHALIC UPPER ARM LEFT - PROX: 0.17 CM
BH CV VAS MEAS RADIAL UPPER ARM LEFT - DIST: 0.24 CM
BH CV VAS MEAS RADIAL UPPER ARM LEFT - MID: 0.29 CM
BH CV VAS MEAS RADIAL UPPER ARM LEFT - PROX: 0.3 CM
BUN SERPL-MCNC: 61 MG/DL (ref 8–23)
BUN/CREAT SERPL: 12.7 (ref 7–25)
CALCIUM SPEC-SCNC: 7.9 MG/DL (ref 8.6–10.5)
CHLORIDE SERPL-SCNC: 102 MMOL/L (ref 98–107)
CO2 SERPL-SCNC: 18 MMOL/L (ref 22–29)
CREAT SERPL-MCNC: 4.81 MG/DL (ref 0.57–1)
DEPRECATED RDW RBC AUTO: 38.3 FL (ref 37–54)
EOSINOPHIL # BLD AUTO: 0.11 10*3/MM3 (ref 0–0.4)
EOSINOPHIL NFR BLD AUTO: 1.1 % (ref 0.3–6.2)
ERYTHROCYTE [DISTWIDTH] IN BLOOD BY AUTOMATED COUNT: 12.4 % (ref 12.3–15.4)
GFR SERPL CREATININE-BSD FRML MDRD: 9 ML/MIN/1.73
GFR SERPL CREATININE-BSD FRML MDRD: ABNORMAL ML/MIN/{1.73_M2}
GLUCOSE BLDC GLUCOMTR-MCNC: 131 MG/DL (ref 70–130)
GLUCOSE BLDC GLUCOMTR-MCNC: 168 MG/DL (ref 70–130)
GLUCOSE BLDC GLUCOMTR-MCNC: 171 MG/DL (ref 70–130)
GLUCOSE BLDC GLUCOMTR-MCNC: 190 MG/DL (ref 70–130)
GLUCOSE SERPL-MCNC: 180 MG/DL (ref 65–99)
HCT VFR BLD AUTO: 35.1 % (ref 34–46.6)
HGB BLD-MCNC: 12.2 G/DL (ref 12–15.9)
IMM GRANULOCYTES # BLD AUTO: 0.09 10*3/MM3 (ref 0–0.05)
IMM GRANULOCYTES NFR BLD AUTO: 0.9 % (ref 0–0.5)
LYMPHOCYTES # BLD AUTO: 0.89 10*3/MM3 (ref 0.7–3.1)
LYMPHOCYTES NFR BLD AUTO: 9 % (ref 19.6–45.3)
MAXIMAL PREDICTED HEART RATE: 158 BPM
MCH RBC QN AUTO: 29.7 PG (ref 26.6–33)
MCHC RBC AUTO-ENTMCNC: 34.8 G/DL (ref 31.5–35.7)
MCV RBC AUTO: 85.4 FL (ref 79–97)
MONOCYTES # BLD AUTO: 0.55 10*3/MM3 (ref 0.1–0.9)
MONOCYTES NFR BLD AUTO: 5.6 % (ref 5–12)
NEUTROPHILS NFR BLD AUTO: 8.23 10*3/MM3 (ref 1.7–7)
NEUTROPHILS NFR BLD AUTO: 83.1 % (ref 42.7–76)
NRBC BLD AUTO-RTO: 0 /100 WBC (ref 0–0.2)
PHOSPHATE SERPL-MCNC: 4.9 MG/DL (ref 2.5–4.5)
PLATELET # BLD AUTO: 232 10*3/MM3 (ref 140–450)
PMV BLD AUTO: 9.4 FL (ref 6–12)
POTASSIUM SERPL-SCNC: 4.3 MMOL/L (ref 3.5–5.2)
QT INTERVAL: 396 MS
QTC INTERVAL: 508 MS
RBC # BLD AUTO: 4.11 10*6/MM3 (ref 3.77–5.28)
SODIUM SERPL-SCNC: 134 MMOL/L (ref 136–145)
STRESS TARGET HR: 134 BPM
UPPER ARTERIAL LEFT ARM BRACHIAL LENGTH: 0.41 CM
WBC # BLD AUTO: 9.9 10*3/MM3 (ref 3.4–10.8)

## 2021-10-12 PROCEDURE — 63710000001 INSULIN LISPRO (HUMAN) PER 5 UNITS: Performed by: INTERNAL MEDICINE

## 2021-10-12 PROCEDURE — 93986 DUP-SCAN HEMO COMPL UNI STD: CPT

## 2021-10-12 PROCEDURE — 63710000001 DIPHENHYDRAMINE PER 50 MG: Performed by: FAMILY MEDICINE

## 2021-10-12 PROCEDURE — 82962 GLUCOSE BLOOD TEST: CPT

## 2021-10-12 PROCEDURE — 25010000002 HYDRALAZINE PER 20 MG: Performed by: FAMILY MEDICINE

## 2021-10-12 PROCEDURE — 85025 COMPLETE CBC W/AUTO DIFF WBC: CPT | Performed by: INTERNAL MEDICINE

## 2021-10-12 PROCEDURE — 97530 THERAPEUTIC ACTIVITIES: CPT

## 2021-10-12 PROCEDURE — 93005 ELECTROCARDIOGRAM TRACING: CPT | Performed by: NURSE PRACTITIONER

## 2021-10-12 PROCEDURE — 93010 ELECTROCARDIOGRAM REPORT: CPT | Performed by: INTERNAL MEDICINE

## 2021-10-12 PROCEDURE — 99232 SBSQ HOSP IP/OBS MODERATE 35: CPT | Performed by: PHYSICIAN ASSISTANT

## 2021-10-12 PROCEDURE — 80069 RENAL FUNCTION PANEL: CPT | Performed by: INTERNAL MEDICINE

## 2021-10-12 PROCEDURE — 25010000002 HEPARIN (PORCINE) PER 1000 UNITS: Performed by: INTERNAL MEDICINE

## 2021-10-12 PROCEDURE — 93986 DUP-SCAN HEMO COMPL UNI STD: CPT | Performed by: INTERNAL MEDICINE

## 2021-10-12 PROCEDURE — 97110 THERAPEUTIC EXERCISES: CPT

## 2021-10-12 RX ORDER — LISINOPRIL 10 MG/1
10 TABLET ORAL
Status: DISCONTINUED | OUTPATIENT
Start: 2021-10-12 | End: 2021-10-28 | Stop reason: HOSPADM

## 2021-10-12 RX ORDER — ALBUMIN (HUMAN) 12.5 G/50ML
12.5 SOLUTION INTRAVENOUS AS NEEDED
Status: ACTIVE | OUTPATIENT
Start: 2021-10-12 | End: 2021-10-12

## 2021-10-12 RX ORDER — CLONIDINE HYDROCHLORIDE 0.1 MG/1
0.1 TABLET ORAL EVERY 6 HOURS PRN
Status: DISCONTINUED | OUTPATIENT
Start: 2021-10-12 | End: 2021-10-28 | Stop reason: HOSPADM

## 2021-10-12 RX ADMIN — SODIUM CHLORIDE, PRESERVATIVE FREE 10 ML: 5 INJECTION INTRAVENOUS at 08:00

## 2021-10-12 RX ADMIN — SODIUM CHLORIDE, PRESERVATIVE FREE 10 ML: 5 INJECTION INTRAVENOUS at 21:56

## 2021-10-12 RX ADMIN — SODIUM CHLORIDE 10 MG/HR: 9 INJECTION, SOLUTION INTRAVENOUS at 06:03

## 2021-10-12 RX ADMIN — CALCIUM ACETATE 667 MG: 667 CAPSULE ORAL at 17:52

## 2021-10-12 RX ADMIN — HEPARIN SODIUM 1600 UNITS: 1000 INJECTION INTRAVENOUS; SUBCUTANEOUS at 11:18

## 2021-10-12 RX ADMIN — METHADONE HYDROCHLORIDE 10 MG: 10 TABLET ORAL at 21:54

## 2021-10-12 RX ADMIN — METHADONE HYDROCHLORIDE 10 MG: 10 TABLET ORAL at 11:57

## 2021-10-12 RX ADMIN — DIPHENHYDRAMINE HYDROCHLORIDE 25 MG: 25 CAPSULE ORAL at 21:55

## 2021-10-12 RX ADMIN — Medication 5 MG: at 21:54

## 2021-10-12 RX ADMIN — HYDRALAZINE HYDROCHLORIDE 10 MG: 20 INJECTION INTRAMUSCULAR; INTRAVENOUS at 01:07

## 2021-10-12 RX ADMIN — CLONIDINE HYDROCHLORIDE 0.1 MG: 0.1 TABLET ORAL at 21:57

## 2021-10-12 RX ADMIN — CARVEDILOL 25 MG: 12.5 TABLET, FILM COATED ORAL at 17:52

## 2021-10-12 RX ADMIN — METHADONE HYDROCHLORIDE 10 MG: 10 TABLET ORAL at 06:01

## 2021-10-12 RX ADMIN — INSULIN LISPRO 2 UNITS: 100 INJECTION, SOLUTION INTRAVENOUS; SUBCUTANEOUS at 22:36

## 2021-10-12 RX ADMIN — CALCIUM ACETATE 667 MG: 667 CAPSULE ORAL at 11:57

## 2021-10-12 RX ADMIN — HYDRALAZINE HYDROCHLORIDE 75 MG: 25 TABLET ORAL at 14:46

## 2021-10-12 RX ADMIN — CARVEDILOL 25 MG: 12.5 TABLET, FILM COATED ORAL at 11:56

## 2021-10-12 RX ADMIN — INSULIN LISPRO 2 UNITS: 100 INJECTION, SOLUTION INTRAVENOUS; SUBCUTANEOUS at 17:52

## 2021-10-12 RX ADMIN — HYDRALAZINE HYDROCHLORIDE 75 MG: 25 TABLET ORAL at 21:54

## 2021-10-12 RX ADMIN — INSULIN LISPRO 2 UNITS: 100 INJECTION, SOLUTION INTRAVENOUS; SUBCUTANEOUS at 08:34

## 2021-10-12 RX ADMIN — AMLODIPINE BESYLATE 10 MG: 10 TABLET ORAL at 11:57

## 2021-10-12 RX ADMIN — LISINOPRIL 10 MG: 10 TABLET ORAL at 14:46

## 2021-10-12 RX ADMIN — Medication 1 TABLET: at 11:57

## 2021-10-12 RX ADMIN — SODIUM CHLORIDE 5 MG/HR: 9 INJECTION, SOLUTION INTRAVENOUS at 02:54

## 2021-10-12 RX ADMIN — DICYCLOMINE HYDROCHLORIDE 10 MG: 10 CAPSULE ORAL at 11:58

## 2021-10-12 RX ADMIN — HYDRALAZINE HYDROCHLORIDE 75 MG: 25 TABLET ORAL at 05:41

## 2021-10-13 LAB
ALBUMIN SERPL-MCNC: 2.6 G/DL (ref 3.5–5.2)
ANION GAP SERPL CALCULATED.3IONS-SCNC: 10 MMOL/L (ref 5–15)
BUN SERPL-MCNC: 40 MG/DL (ref 8–23)
BUN/CREAT SERPL: 10.5 (ref 7–25)
CALCIUM SPEC-SCNC: 8.1 MG/DL (ref 8.6–10.5)
CHLORIDE SERPL-SCNC: 107 MMOL/L (ref 98–107)
CO2 SERPL-SCNC: 20 MMOL/L (ref 22–29)
CREAT SERPL-MCNC: 3.8 MG/DL (ref 0.57–1)
GFR SERPL CREATININE-BSD FRML MDRD: 12 ML/MIN/1.73
GFR SERPL CREATININE-BSD FRML MDRD: ABNORMAL ML/MIN/{1.73_M2}
GLUCOSE BLDC GLUCOMTR-MCNC: 130 MG/DL (ref 70–130)
GLUCOSE BLDC GLUCOMTR-MCNC: 134 MG/DL (ref 70–130)
GLUCOSE BLDC GLUCOMTR-MCNC: 154 MG/DL (ref 70–130)
GLUCOSE BLDC GLUCOMTR-MCNC: 157 MG/DL (ref 70–130)
GLUCOSE SERPL-MCNC: 165 MG/DL (ref 65–99)
PHOSPHATE SERPL-MCNC: 4.4 MG/DL (ref 2.5–4.5)
POTASSIUM SERPL-SCNC: 4.1 MMOL/L (ref 3.5–5.2)
SODIUM SERPL-SCNC: 137 MMOL/L (ref 136–145)

## 2021-10-13 PROCEDURE — 92507 TX SP LANG VOICE COMM INDIV: CPT

## 2021-10-13 PROCEDURE — 97530 THERAPEUTIC ACTIVITIES: CPT

## 2021-10-13 PROCEDURE — 25010000002 HYDRALAZINE PER 20 MG: Performed by: FAMILY MEDICINE

## 2021-10-13 PROCEDURE — 82962 GLUCOSE BLOOD TEST: CPT

## 2021-10-13 PROCEDURE — 97110 THERAPEUTIC EXERCISES: CPT

## 2021-10-13 PROCEDURE — 97535 SELF CARE MNGMENT TRAINING: CPT

## 2021-10-13 PROCEDURE — 63710000001 INSULIN LISPRO (HUMAN) PER 5 UNITS: Performed by: INTERNAL MEDICINE

## 2021-10-13 PROCEDURE — 80069 RENAL FUNCTION PANEL: CPT | Performed by: INTERNAL MEDICINE

## 2021-10-13 PROCEDURE — 63710000001 DIPHENHYDRAMINE PER 50 MG: Performed by: FAMILY MEDICINE

## 2021-10-13 PROCEDURE — 99233 SBSQ HOSP IP/OBS HIGH 50: CPT | Performed by: PHYSICIAN ASSISTANT

## 2021-10-13 RX ORDER — ALBUMIN (HUMAN) 12.5 G/50ML
12.5 SOLUTION INTRAVENOUS AS NEEDED
Status: DISCONTINUED | OUTPATIENT
Start: 2021-10-14 | End: 2021-10-26

## 2021-10-13 RX ADMIN — INSULIN LISPRO 2 UNITS: 100 INJECTION, SOLUTION INTRAVENOUS; SUBCUTANEOUS at 08:37

## 2021-10-13 RX ADMIN — CALCIUM ACETATE 667 MG: 667 CAPSULE ORAL at 12:00

## 2021-10-13 RX ADMIN — HYDRALAZINE HYDROCHLORIDE 75 MG: 25 TABLET ORAL at 21:56

## 2021-10-13 RX ADMIN — HYDRALAZINE HYDROCHLORIDE 10 MG: 20 INJECTION INTRAMUSCULAR; INTRAVENOUS at 04:25

## 2021-10-13 RX ADMIN — SODIUM CHLORIDE, PRESERVATIVE FREE 10 ML: 5 INJECTION INTRAVENOUS at 21:57

## 2021-10-13 RX ADMIN — Medication 5 MG: at 21:56

## 2021-10-13 RX ADMIN — METHADONE HYDROCHLORIDE 10 MG: 10 TABLET ORAL at 08:36

## 2021-10-13 RX ADMIN — METHADONE HYDROCHLORIDE 10 MG: 10 TABLET ORAL at 21:57

## 2021-10-13 RX ADMIN — DIPHENHYDRAMINE HYDROCHLORIDE 25 MG: 25 CAPSULE ORAL at 21:56

## 2021-10-13 RX ADMIN — Medication 1 TABLET: at 08:36

## 2021-10-13 RX ADMIN — LISINOPRIL 10 MG: 10 TABLET ORAL at 08:36

## 2021-10-13 RX ADMIN — INSULIN LISPRO 2 UNITS: 100 INJECTION, SOLUTION INTRAVENOUS; SUBCUTANEOUS at 21:57

## 2021-10-13 RX ADMIN — CARVEDILOL 25 MG: 12.5 TABLET, FILM COATED ORAL at 17:48

## 2021-10-13 RX ADMIN — AMLODIPINE BESYLATE 10 MG: 10 TABLET ORAL at 08:36

## 2021-10-13 RX ADMIN — CARVEDILOL 25 MG: 12.5 TABLET, FILM COATED ORAL at 08:36

## 2021-10-13 RX ADMIN — CALCIUM ACETATE 667 MG: 667 CAPSULE ORAL at 09:41

## 2021-10-13 RX ADMIN — CALCIUM ACETATE 667 MG: 667 CAPSULE ORAL at 17:48

## 2021-10-13 RX ADMIN — HYDRALAZINE HYDROCHLORIDE 75 MG: 25 TABLET ORAL at 14:01

## 2021-10-13 RX ADMIN — CLONIDINE HYDROCHLORIDE 0.1 MG: 0.1 TABLET ORAL at 23:50

## 2021-10-13 RX ADMIN — SODIUM CHLORIDE, PRESERVATIVE FREE 10 ML: 5 INJECTION INTRAVENOUS at 08:36

## 2021-10-14 ENCOUNTER — APPOINTMENT (OUTPATIENT)
Dept: NEPHROLOGY | Facility: HOSPITAL | Age: 61
End: 2021-10-14

## 2021-10-14 LAB
ANION GAP SERPL CALCULATED.3IONS-SCNC: 10 MMOL/L (ref 5–15)
BUN SERPL-MCNC: 44 MG/DL (ref 8–23)
BUN/CREAT SERPL: 11 (ref 7–25)
CALCIUM SPEC-SCNC: 7.9 MG/DL (ref 8.6–10.5)
CHLORIDE SERPL-SCNC: 108 MMOL/L (ref 98–107)
CO2 SERPL-SCNC: 20 MMOL/L (ref 22–29)
CREAT SERPL-MCNC: 4 MG/DL (ref 0.57–1)
GFR SERPL CREATININE-BSD FRML MDRD: 11 ML/MIN/1.73
GFR SERPL CREATININE-BSD FRML MDRD: ABNORMAL ML/MIN/{1.73_M2}
GLUCOSE BLDC GLUCOMTR-MCNC: 100 MG/DL (ref 70–130)
GLUCOSE BLDC GLUCOMTR-MCNC: 121 MG/DL (ref 70–130)
GLUCOSE BLDC GLUCOMTR-MCNC: 198 MG/DL (ref 70–130)
GLUCOSE SERPL-MCNC: 118 MG/DL (ref 65–99)
POTASSIUM SERPL-SCNC: 4 MMOL/L (ref 3.5–5.2)
SODIUM SERPL-SCNC: 138 MMOL/L (ref 136–145)

## 2021-10-14 PROCEDURE — 97110 THERAPEUTIC EXERCISES: CPT

## 2021-10-14 PROCEDURE — 82962 GLUCOSE BLOOD TEST: CPT

## 2021-10-14 PROCEDURE — 63710000001 INSULIN LISPRO (HUMAN) PER 5 UNITS: Performed by: INTERNAL MEDICINE

## 2021-10-14 PROCEDURE — 80048 BASIC METABOLIC PNL TOTAL CA: CPT | Performed by: INTERNAL MEDICINE

## 2021-10-14 PROCEDURE — 25010000002 HEPARIN (PORCINE) PER 1000 UNITS: Performed by: INTERNAL MEDICINE

## 2021-10-14 PROCEDURE — 99231 SBSQ HOSP IP/OBS SF/LOW 25: CPT | Performed by: PHYSICIAN ASSISTANT

## 2021-10-14 PROCEDURE — 63710000001 DIPHENHYDRAMINE PER 50 MG: Performed by: FAMILY MEDICINE

## 2021-10-14 PROCEDURE — 25010000002 HYDRALAZINE PER 20 MG: Performed by: FAMILY MEDICINE

## 2021-10-14 PROCEDURE — 97530 THERAPEUTIC ACTIVITIES: CPT

## 2021-10-14 RX ORDER — OXYCODONE HYDROCHLORIDE 5 MG/1
5 TABLET ORAL EVERY 4 HOURS PRN
Status: DISCONTINUED | OUTPATIENT
Start: 2021-10-14 | End: 2021-10-20

## 2021-10-14 RX ORDER — LIDOCAINE 50 MG/G
1 PATCH TOPICAL EVERY 24 HOURS
Status: DISCONTINUED | OUTPATIENT
Start: 2021-10-15 | End: 2021-10-28 | Stop reason: HOSPADM

## 2021-10-14 RX ORDER — ACETAMINOPHEN 325 MG/1
650 TABLET ORAL EVERY 4 HOURS PRN
Status: DISCONTINUED | OUTPATIENT
Start: 2021-10-14 | End: 2021-10-28 | Stop reason: HOSPADM

## 2021-10-14 RX ADMIN — OXYCODONE 5 MG: 5 TABLET ORAL at 22:46

## 2021-10-14 RX ADMIN — AMLODIPINE BESYLATE 10 MG: 10 TABLET ORAL at 10:59

## 2021-10-14 RX ADMIN — CALCIUM ACETATE 667 MG: 667 CAPSULE ORAL at 14:19

## 2021-10-14 RX ADMIN — CARVEDILOL 25 MG: 12.5 TABLET, FILM COATED ORAL at 17:55

## 2021-10-14 RX ADMIN — CALCIUM ACETATE 667 MG: 667 CAPSULE ORAL at 10:59

## 2021-10-14 RX ADMIN — CALCIUM ACETATE 667 MG: 667 CAPSULE ORAL at 17:55

## 2021-10-14 RX ADMIN — HEPARIN SODIUM 1600 UNITS: 1000 INJECTION INTRAVENOUS; SUBCUTANEOUS at 10:30

## 2021-10-14 RX ADMIN — SODIUM CHLORIDE, PRESERVATIVE FREE 10 ML: 5 INJECTION INTRAVENOUS at 11:00

## 2021-10-14 RX ADMIN — LISINOPRIL 10 MG: 10 TABLET ORAL at 11:00

## 2021-10-14 RX ADMIN — SODIUM CHLORIDE, PRESERVATIVE FREE 10 ML: 5 INJECTION INTRAVENOUS at 22:50

## 2021-10-14 RX ADMIN — Medication 1 TABLET: at 10:59

## 2021-10-14 RX ADMIN — INSULIN LISPRO 2 UNITS: 100 INJECTION, SOLUTION INTRAVENOUS; SUBCUTANEOUS at 22:50

## 2021-10-14 RX ADMIN — CARVEDILOL 25 MG: 12.5 TABLET, FILM COATED ORAL at 10:59

## 2021-10-14 RX ADMIN — DIPHENHYDRAMINE HYDROCHLORIDE 25 MG: 25 CAPSULE ORAL at 22:46

## 2021-10-14 RX ADMIN — METHADONE HYDROCHLORIDE 10 MG: 10 TABLET ORAL at 22:46

## 2021-10-14 RX ADMIN — METHADONE HYDROCHLORIDE 10 MG: 10 TABLET ORAL at 02:04

## 2021-10-14 RX ADMIN — HYDRALAZINE HYDROCHLORIDE 75 MG: 25 TABLET ORAL at 22:46

## 2021-10-14 RX ADMIN — Medication 5 MG: at 22:46

## 2021-10-14 RX ADMIN — HYDRALAZINE HYDROCHLORIDE 75 MG: 25 TABLET ORAL at 14:19

## 2021-10-14 RX ADMIN — METHADONE HYDROCHLORIDE 10 MG: 10 TABLET ORAL at 10:59

## 2021-10-14 RX ADMIN — OXYCODONE 5 MG: 5 TABLET ORAL at 16:41

## 2021-10-14 RX ADMIN — HYDRALAZINE HYDROCHLORIDE 10 MG: 20 INJECTION INTRAMUSCULAR; INTRAVENOUS at 02:35

## 2021-10-15 LAB
GLUCOSE BLDC GLUCOMTR-MCNC: 112 MG/DL (ref 70–130)
GLUCOSE BLDC GLUCOMTR-MCNC: 165 MG/DL (ref 70–130)
GLUCOSE BLDC GLUCOMTR-MCNC: 182 MG/DL (ref 70–130)
GLUCOSE BLDC GLUCOMTR-MCNC: 193 MG/DL (ref 70–130)

## 2021-10-15 PROCEDURE — 63710000001 INSULIN LISPRO (HUMAN) PER 5 UNITS: Performed by: INTERNAL MEDICINE

## 2021-10-15 PROCEDURE — 82962 GLUCOSE BLOOD TEST: CPT

## 2021-10-15 PROCEDURE — 63710000001 DIPHENHYDRAMINE PER 50 MG: Performed by: FAMILY MEDICINE

## 2021-10-15 PROCEDURE — 97110 THERAPEUTIC EXERCISES: CPT

## 2021-10-15 PROCEDURE — 92507 TX SP LANG VOICE COMM INDIV: CPT

## 2021-10-15 PROCEDURE — 99231 SBSQ HOSP IP/OBS SF/LOW 25: CPT | Performed by: PHYSICIAN ASSISTANT

## 2021-10-15 PROCEDURE — 97530 THERAPEUTIC ACTIVITIES: CPT

## 2021-10-15 RX ORDER — ALBUMIN (HUMAN) 12.5 G/50ML
12.5 SOLUTION INTRAVENOUS AS NEEDED
Status: DISCONTINUED | OUTPATIENT
Start: 2021-10-16 | End: 2021-10-26

## 2021-10-15 RX ADMIN — INSULIN LISPRO 2 UNITS: 100 INJECTION, SOLUTION INTRAVENOUS; SUBCUTANEOUS at 17:32

## 2021-10-15 RX ADMIN — METHADONE HYDROCHLORIDE 10 MG: 10 TABLET ORAL at 21:11

## 2021-10-15 RX ADMIN — LIDOCAINE 1 PATCH: 50 PATCH CUTANEOUS at 17:32

## 2021-10-15 RX ADMIN — AMLODIPINE BESYLATE 10 MG: 10 TABLET ORAL at 09:26

## 2021-10-15 RX ADMIN — CALCIUM ACETATE 667 MG: 667 CAPSULE ORAL at 09:26

## 2021-10-15 RX ADMIN — SODIUM CHLORIDE, PRESERVATIVE FREE 10 ML: 5 INJECTION INTRAVENOUS at 21:12

## 2021-10-15 RX ADMIN — HYDRALAZINE HYDROCHLORIDE 75 MG: 25 TABLET ORAL at 05:13

## 2021-10-15 RX ADMIN — INSULIN LISPRO 2 UNITS: 100 INJECTION, SOLUTION INTRAVENOUS; SUBCUTANEOUS at 13:06

## 2021-10-15 RX ADMIN — HYDRALAZINE HYDROCHLORIDE 75 MG: 25 TABLET ORAL at 21:11

## 2021-10-15 RX ADMIN — CLONIDINE HYDROCHLORIDE 0.1 MG: 0.1 TABLET ORAL at 04:40

## 2021-10-15 RX ADMIN — METHADONE HYDROCHLORIDE 10 MG: 10 TABLET ORAL at 09:26

## 2021-10-15 RX ADMIN — Medication 1 TABLET: at 09:26

## 2021-10-15 RX ADMIN — CALCIUM ACETATE 667 MG: 667 CAPSULE ORAL at 17:32

## 2021-10-15 RX ADMIN — INSULIN LISPRO 2 UNITS: 100 INJECTION, SOLUTION INTRAVENOUS; SUBCUTANEOUS at 21:11

## 2021-10-15 RX ADMIN — Medication 5 MG: at 21:11

## 2021-10-15 RX ADMIN — SODIUM CHLORIDE, PRESERVATIVE FREE 10 ML: 5 INJECTION INTRAVENOUS at 09:26

## 2021-10-15 RX ADMIN — DIPHENHYDRAMINE HYDROCHLORIDE 25 MG: 25 CAPSULE ORAL at 21:11

## 2021-10-15 RX ADMIN — CALCIUM ACETATE 667 MG: 667 CAPSULE ORAL at 13:06

## 2021-10-15 RX ADMIN — LISINOPRIL 10 MG: 10 TABLET ORAL at 09:26

## 2021-10-15 RX ADMIN — HYDRALAZINE HYDROCHLORIDE 75 MG: 25 TABLET ORAL at 15:23

## 2021-10-15 RX ADMIN — CARVEDILOL 25 MG: 12.5 TABLET, FILM COATED ORAL at 17:32

## 2021-10-15 RX ADMIN — OXYCODONE 5 MG: 5 TABLET ORAL at 02:30

## 2021-10-15 RX ADMIN — CARVEDILOL 25 MG: 12.5 TABLET, FILM COATED ORAL at 09:26

## 2021-10-16 ENCOUNTER — ANESTHESIA (OUTPATIENT)
Dept: PERIOP | Facility: HOSPITAL | Age: 61
End: 2021-10-16

## 2021-10-16 ENCOUNTER — APPOINTMENT (OUTPATIENT)
Dept: NEPHROLOGY | Facility: HOSPITAL | Age: 61
End: 2021-10-16

## 2021-10-16 ENCOUNTER — ANESTHESIA EVENT CONVERTED (OUTPATIENT)
Dept: ANESTHESIOLOGY | Facility: HOSPITAL | Age: 61
End: 2021-10-16

## 2021-10-16 ENCOUNTER — ANESTHESIA EVENT (OUTPATIENT)
Dept: PERIOP | Facility: HOSPITAL | Age: 61
End: 2021-10-16

## 2021-10-16 LAB
ANION GAP SERPL CALCULATED.3IONS-SCNC: 9 MMOL/L (ref 5–15)
BUN SERPL-MCNC: 39 MG/DL (ref 8–23)
BUN/CREAT SERPL: 9.9 (ref 7–25)
CALCIUM SPEC-SCNC: 7.8 MG/DL (ref 8.6–10.5)
CHLORIDE SERPL-SCNC: 104 MMOL/L (ref 98–107)
CO2 SERPL-SCNC: 22 MMOL/L (ref 22–29)
CREAT SERPL-MCNC: 3.94 MG/DL (ref 0.57–1)
DEPRECATED RDW RBC AUTO: 40.2 FL (ref 37–54)
ERYTHROCYTE [DISTWIDTH] IN BLOOD BY AUTOMATED COUNT: 12.8 % (ref 12.3–15.4)
GFR SERPL CREATININE-BSD FRML MDRD: 12 ML/MIN/1.73
GFR SERPL CREATININE-BSD FRML MDRD: ABNORMAL ML/MIN/{1.73_M2}
GLUCOSE BLDC GLUCOMTR-MCNC: 146 MG/DL (ref 70–130)
GLUCOSE BLDC GLUCOMTR-MCNC: 162 MG/DL (ref 70–130)
GLUCOSE BLDC GLUCOMTR-MCNC: 193 MG/DL (ref 70–130)
GLUCOSE SERPL-MCNC: 122 MG/DL (ref 65–99)
HCT VFR BLD AUTO: 31.1 % (ref 34–46.6)
HGB BLD-MCNC: 10.6 G/DL (ref 12–15.9)
INR PPP: 1.09 (ref 0.85–1.16)
MCH RBC QN AUTO: 29.9 PG (ref 26.6–33)
MCHC RBC AUTO-ENTMCNC: 34.1 G/DL (ref 31.5–35.7)
MCV RBC AUTO: 87.9 FL (ref 79–97)
PLATELET # BLD AUTO: 139 10*3/MM3 (ref 140–450)
PMV BLD AUTO: 10.2 FL (ref 6–12)
POTASSIUM SERPL-SCNC: 4 MMOL/L (ref 3.5–5.2)
PROTHROMBIN TIME: 13.8 SECONDS (ref 11.4–14.4)
RBC # BLD AUTO: 3.54 10*6/MM3 (ref 3.77–5.28)
SODIUM SERPL-SCNC: 135 MMOL/L (ref 136–145)
WBC # BLD AUTO: 10.03 10*3/MM3 (ref 3.4–10.8)

## 2021-10-16 PROCEDURE — 25010000002 ROPIVACAINE PER 1 MG: Performed by: NURSE ANESTHETIST, CERTIFIED REGISTERED

## 2021-10-16 PROCEDURE — 63710000001 DIPHENHYDRAMINE PER 50 MG: Performed by: SURGERY

## 2021-10-16 PROCEDURE — 25010000002 VANCOMYCIN PER 500 MG: Performed by: SURGERY

## 2021-10-16 PROCEDURE — 99231 SBSQ HOSP IP/OBS SF/LOW 25: CPT | Performed by: HOSPITALIST

## 2021-10-16 PROCEDURE — 82962 GLUCOSE BLOOD TEST: CPT

## 2021-10-16 PROCEDURE — 63710000001 INSULIN LISPRO (HUMAN) PER 5 UNITS: Performed by: SURGERY

## 2021-10-16 PROCEDURE — 03180ZD BYPASS LEFT BRACHIAL ARTERY TO UPPER ARM VEIN, OPEN APPROACH: ICD-10-PCS | Performed by: SURGERY

## 2021-10-16 PROCEDURE — 25010000002 HEPARIN (PORCINE) PER 1000 UNITS: Performed by: INTERNAL MEDICINE

## 2021-10-16 PROCEDURE — 25010000002 HEPARIN (PORCINE) PER 1000 UNITS: Performed by: SURGERY

## 2021-10-16 PROCEDURE — 85027 COMPLETE CBC AUTOMATED: CPT | Performed by: SURGERY

## 2021-10-16 PROCEDURE — 25010000002 PROPOFOL 10 MG/ML EMULSION: Performed by: NURSE ANESTHETIST, CERTIFIED REGISTERED

## 2021-10-16 PROCEDURE — 85610 PROTHROMBIN TIME: CPT | Performed by: SURGERY

## 2021-10-16 PROCEDURE — 25010000002 HYDRALAZINE PER 20 MG: Performed by: FAMILY MEDICINE

## 2021-10-16 PROCEDURE — C1768 GRAFT, VASCULAR: HCPCS | Performed by: SURGERY

## 2021-10-16 PROCEDURE — 25010000002 DEXAMETHASONE SODIUM PHOSPHATE 10 MG/ML SOLUTION: Performed by: NURSE ANESTHETIST, CERTIFIED REGISTERED

## 2021-10-16 PROCEDURE — 80048 BASIC METABOLIC PNL TOTAL CA: CPT | Performed by: HOSPITALIST

## 2021-10-16 PROCEDURE — 25010000002 ONDANSETRON PER 1 MG: Performed by: INTERNAL MEDICINE

## 2021-10-16 DEVICE — GRFT VASC COLLGN 5MM 30CM: Type: IMPLANTABLE DEVICE | Site: ARM | Status: FUNCTIONAL

## 2021-10-16 RX ORDER — LABETALOL HYDROCHLORIDE 5 MG/ML
INJECTION, SOLUTION INTRAVENOUS
Status: DISPENSED
Start: 2021-10-16 | End: 2021-10-17

## 2021-10-16 RX ORDER — FAMOTIDINE 20 MG/1
20 TABLET, FILM COATED ORAL ONCE
Status: DISCONTINUED | OUTPATIENT
Start: 2021-10-16 | End: 2021-10-16 | Stop reason: HOSPADM

## 2021-10-16 RX ORDER — MAGNESIUM HYDROXIDE 1200 MG/15ML
LIQUID ORAL AS NEEDED
Status: DISCONTINUED | OUTPATIENT
Start: 2021-10-16 | End: 2021-10-16 | Stop reason: HOSPADM

## 2021-10-16 RX ORDER — PROPOFOL 10 MG/ML
VIAL (ML) INTRAVENOUS AS NEEDED
Status: DISCONTINUED | OUTPATIENT
Start: 2021-10-16 | End: 2021-10-16 | Stop reason: SURG

## 2021-10-16 RX ORDER — SODIUM CHLORIDE 0.9 % (FLUSH) 0.9 %
10 SYRINGE (ML) INJECTION EVERY 12 HOURS SCHEDULED
Status: DISCONTINUED | OUTPATIENT
Start: 2021-10-16 | End: 2021-10-16 | Stop reason: HOSPADM

## 2021-10-16 RX ORDER — SODIUM CHLORIDE, SODIUM LACTATE, POTASSIUM CHLORIDE, CALCIUM CHLORIDE 600; 310; 30; 20 MG/100ML; MG/100ML; MG/100ML; MG/100ML
9 INJECTION, SOLUTION INTRAVENOUS CONTINUOUS
Status: DISCONTINUED | OUTPATIENT
Start: 2021-10-16 | End: 2021-10-28 | Stop reason: HOSPADM

## 2021-10-16 RX ORDER — LIDOCAINE HYDROCHLORIDE 10 MG/ML
0.5 INJECTION, SOLUTION EPIDURAL; INFILTRATION; INTRACAUDAL; PERINEURAL ONCE AS NEEDED
Status: DISCONTINUED | OUTPATIENT
Start: 2021-10-16 | End: 2021-10-16 | Stop reason: HOSPADM

## 2021-10-16 RX ORDER — MIDAZOLAM HYDROCHLORIDE 1 MG/ML
1 INJECTION INTRAMUSCULAR; INTRAVENOUS
Status: DISCONTINUED | OUTPATIENT
Start: 2021-10-16 | End: 2021-10-16 | Stop reason: HOSPADM

## 2021-10-16 RX ORDER — SODIUM CHLORIDE 0.9 % (FLUSH) 0.9 %
10 SYRINGE (ML) INJECTION AS NEEDED
Status: DISCONTINUED | OUTPATIENT
Start: 2021-10-16 | End: 2021-10-16 | Stop reason: HOSPADM

## 2021-10-16 RX ORDER — ROPIVACAINE HYDROCHLORIDE 5 MG/ML
INJECTION, SOLUTION EPIDURAL; INFILTRATION; PERINEURAL
Status: COMPLETED | OUTPATIENT
Start: 2021-10-16 | End: 2021-10-16

## 2021-10-16 RX ORDER — LIDOCAINE HYDROCHLORIDE 10 MG/ML
INJECTION, SOLUTION EPIDURAL; INFILTRATION; INTRACAUDAL; PERINEURAL AS NEEDED
Status: DISCONTINUED | OUTPATIENT
Start: 2021-10-16 | End: 2021-10-16 | Stop reason: SURG

## 2021-10-16 RX ORDER — FAMOTIDINE 10 MG/ML
INJECTION, SOLUTION INTRAVENOUS
Status: DISPENSED
Start: 2021-10-16 | End: 2021-10-16

## 2021-10-16 RX ORDER — SODIUM CHLORIDE 9 MG/ML
9 INJECTION, SOLUTION INTRAVENOUS CONTINUOUS
Status: DISCONTINUED | OUTPATIENT
Start: 2021-10-16 | End: 2021-10-28 | Stop reason: HOSPADM

## 2021-10-16 RX ORDER — FAMOTIDINE 10 MG/ML
20 INJECTION, SOLUTION INTRAVENOUS ONCE
Status: COMPLETED | OUTPATIENT
Start: 2021-10-16 | End: 2021-10-16

## 2021-10-16 RX ORDER — FENTANYL CITRATE 50 UG/ML
INJECTION, SOLUTION INTRAMUSCULAR; INTRAVENOUS
Status: DISPENSED
Start: 2021-10-16 | End: 2021-10-16

## 2021-10-16 RX ORDER — DEXAMETHASONE SODIUM PHOSPHATE 10 MG/ML
INJECTION, SOLUTION INTRAMUSCULAR; INTRAVENOUS
Status: COMPLETED | OUTPATIENT
Start: 2021-10-16 | End: 2021-10-16

## 2021-10-16 RX ORDER — LABETALOL HYDROCHLORIDE 5 MG/ML
5 INJECTION, SOLUTION INTRAVENOUS
Status: DISCONTINUED | OUTPATIENT
Start: 2021-10-16 | End: 2021-10-16 | Stop reason: HOSPADM

## 2021-10-16 RX ADMIN — DEXAMETHASONE SODIUM PHOSPHATE 2 MG: 10 INJECTION, SOLUTION INTRAMUSCULAR; INTRAVENOUS at 11:34

## 2021-10-16 RX ADMIN — SODIUM CHLORIDE 9 ML/HR: 9 INJECTION, SOLUTION INTRAVENOUS at 11:23

## 2021-10-16 RX ADMIN — PROPOFOL 50 MG: 10 INJECTION, EMULSION INTRAVENOUS at 12:08

## 2021-10-16 RX ADMIN — METOPROLOL TARTRATE 3 MG: 5 INJECTION INTRAVENOUS at 13:38

## 2021-10-16 RX ADMIN — METHADONE HYDROCHLORIDE 10 MG: 10 TABLET ORAL at 21:53

## 2021-10-16 RX ADMIN — DIPHENHYDRAMINE HYDROCHLORIDE 25 MG: 25 CAPSULE ORAL at 21:53

## 2021-10-16 RX ADMIN — INSULIN LISPRO 2 UNITS: 100 INJECTION, SOLUTION INTRAVENOUS; SUBCUTANEOUS at 21:53

## 2021-10-16 RX ADMIN — FAMOTIDINE 20 MG: 10 INJECTION INTRAVENOUS at 11:35

## 2021-10-16 RX ADMIN — PROPOFOL 80 MCG/KG/MIN: 10 INJECTION, EMULSION INTRAVENOUS at 12:10

## 2021-10-16 RX ADMIN — OXYCODONE 5 MG: 5 TABLET ORAL at 03:15

## 2021-10-16 RX ADMIN — ROPIVACAINE HYDROCHLORIDE 25 ML: 5 INJECTION, SOLUTION EPIDURAL; INFILTRATION; PERINEURAL at 11:34

## 2021-10-16 RX ADMIN — INSULIN LISPRO 2 UNITS: 100 INJECTION, SOLUTION INTRAVENOUS; SUBCUTANEOUS at 17:50

## 2021-10-16 RX ADMIN — SODIUM CHLORIDE, PRESERVATIVE FREE 10 ML: 5 INJECTION INTRAVENOUS at 21:54

## 2021-10-16 RX ADMIN — LIDOCAINE 1 PATCH: 50 PATCH CUTANEOUS at 17:50

## 2021-10-16 RX ADMIN — ONDANSETRON 4 MG: 2 INJECTION INTRAMUSCULAR; INTRAVENOUS at 03:15

## 2021-10-16 RX ADMIN — HEPARIN SODIUM 1600 UNITS: 1000 INJECTION INTRAVENOUS; SUBCUTANEOUS at 10:54

## 2021-10-16 RX ADMIN — HYDRALAZINE HYDROCHLORIDE 10 MG: 20 INJECTION INTRAMUSCULAR; INTRAVENOUS at 01:28

## 2021-10-16 RX ADMIN — CALCIUM ACETATE 667 MG: 667 CAPSULE ORAL at 18:01

## 2021-10-16 RX ADMIN — CARVEDILOL 25 MG: 12.5 TABLET, FILM COATED ORAL at 17:50

## 2021-10-16 RX ADMIN — Medication 5 MG: at 21:53

## 2021-10-16 RX ADMIN — HYDRALAZINE HYDROCHLORIDE 75 MG: 25 TABLET ORAL at 05:02

## 2021-10-16 RX ADMIN — VANCOMYCIN HYDROCHLORIDE 750 MG: 750 INJECTION, SOLUTION INTRAVENOUS at 12:10

## 2021-10-16 RX ADMIN — LIDOCAINE HYDROCHLORIDE 30 MG: 10 INJECTION, SOLUTION EPIDURAL; INFILTRATION; INTRACAUDAL; PERINEURAL at 12:08

## 2021-10-16 RX ADMIN — HYDRALAZINE HYDROCHLORIDE 75 MG: 25 TABLET ORAL at 21:53

## 2021-10-16 RX ADMIN — PROPOFOL 100 MCG/KG/MIN: 10 INJECTION, EMULSION INTRAVENOUS at 13:35

## 2021-10-16 NOTE — ANESTHESIA PROCEDURE NOTES
Right Inner Wrist 20 G Peripheral IV    Patient location during procedure: OR  Line placed for Fluids/Medication Admin.  Performed By   CRNA: Leda Pereira CRNA  Preanesthetic Checklist  Completed: patient identified, IV checked, site marked, risks and benefits discussed, surgical consent, monitors and equipment checked, pre-op evaluation and timeout performed  Peripheral IV Prep   Patient position: supine   Prep: alcohol swabs  Patient monitoring: heart rate, cardiac monitor and continuous pulse ox  Peripheral IV Procedure   Laterality:right  Location:  Wrist (Inner Wrist)  Catheter size: 20 G         Post Assessment   Dressing Type: transparent.    IV Dressing/Site: clean, dry and intact  Additional Notes  Pre-existing Right AC PIV infiltrated. New PIV 20 G started to right inner wrist

## 2021-10-16 NOTE — ANESTHESIA POSTPROCEDURE EVALUATION
Patient: Jeaneth Keita    Procedure Summary     Date: 10/16/21 Room / Location:  [facility]  [facility]    Anesthesia Start: 1152 Anesthesia Stop: 1444    Procedure: LEFT UPPER EXTREMITY ARTERIOVENOUS FISTULA FORMATION (Left ) Diagnosis:     Surgeons: Johnny Rousseau MD Provider: Dago Gonzalez MD    Anesthesia Type: MAC, regional ASA Status: 3          Anesthesia Type: MAC, regional    Vitals  No vitals data found for the desired time range.          Post Anesthesia Care and Evaluation    Patient location during evaluation: PACU  Patient participation: complete - patient participated  Level of consciousness: awake and alert  Pain score: 0  Pain management: adequate  Airway patency: patent  Anesthetic complications: No anesthetic complications  PONV Status: none  Cardiovascular status: acceptable  Respiratory status: acceptable  Hydration status: acceptable

## 2021-10-16 NOTE — ANESTHESIA PREPROCEDURE EVALUATION
Anesthesia Evaluation     Patient summary reviewed and Nursing notes reviewed   NPO Solid Status: > 8 hours  NPO Liquid Status: > 8 hours           Airway   Mallampati: I  TM distance: >3 FB  Neck ROM: full  No difficulty expected  Dental      Pulmonary     breath sounds clear to auscultation  Cardiovascular   Exercise tolerance: good (4-7 METS)    Rhythm: regular  Rate: normal        Neuro/Psych  GI/Hepatic/Renal/Endo      Musculoskeletal     Abdominal    Substance History      OB/GYN          Other        ROS/Med Hx Other: Poor historian                  Anesthesia Plan    ASA 3     MAC and regional     intravenous induction

## 2021-10-16 NOTE — ANESTHESIA PROCEDURE NOTES
Left Supraclavicular SS block      Patient reassessed immediately prior to procedure    Patient location during procedure: pre-op  Reason for block: at surgeon's request and post-op pain management  Performed by  CRNA: Leda Pereira CRNA  Assisted by: Dago Gonzalez MD  Preanesthetic Checklist  Completed: patient identified, IV checked, site marked, risks and benefits discussed, surgical consent, monitors and equipment checked, pre-op evaluation and timeout performed  Prep:  Pt Position: supine  Sterile barriers:cap, gloves, mask and sterile barriers  Prep: ChloraPrep  Patient monitoring: blood pressure monitoring, continuous pulse oximetry and EKG  Procedure  Sedation:no  Performed under: local infiltration  Guidance:ultrasound guided  Images:still images obtained    Laterality:left  Block Type:supraclavicular  Injection Technique:single-shot  Needle Type:echogenic and short-bevel  Needle Gauge:20 G  Resistance on Injection: none    Medications Used: ropivacaine (NAROPIN) 0.5 % injection, 25 mL  dexamethasone sodium phosphate injection, 2 mg  Med admintered at 10/16/2021 11:34 AM      Post Assessment  Injection Assessment: negative aspiration for heme, no paresthesia on injection and incremental injection  Patient Tolerance:comfortable throughout block  Complications:no  Additional Notes  Procedure:                 The pt was placed in semifowlers position with a slight tilt of the thorax contralateral to the insertion site.  The Insertion Site was prepped and draped in sterile fashion.  The pt was anesthetized with  IV Sedation( see meds) and  skin and cutaneous tissue where infiltrated and anesthetized with 1% Lidocaine 3 mls via a 25g needle.  Utilizing ultrasound guidance, a BBraun  Contiplex needle was advanced in-plane in a lateral to medial direction.  Hydro dissection of tissue was achieved with Normal saline. Major vessels(supraclavicular artery) and the pleura where visualized as the brachial  plexus was approached at the level immediately adjacent and superior to the clavicle. LA spread was visualized and injection was made incrementally every 5 mls with aspiration. Injection pressure was normal or little, there was no intraneural injection, no vascular injection. Thank You.

## 2021-10-17 LAB
ALBUMIN SERPL-MCNC: 2.7 G/DL (ref 3.5–5.2)
ALBUMIN/GLOB SERPL: 0.9 G/DL
ALP SERPL-CCNC: 103 U/L (ref 39–117)
ALT SERPL W P-5'-P-CCNC: 24 U/L (ref 1–33)
ANION GAP SERPL CALCULATED.3IONS-SCNC: 12 MMOL/L (ref 5–15)
AST SERPL-CCNC: 25 U/L (ref 1–32)
BILIRUB SERPL-MCNC: 0.3 MG/DL (ref 0–1.2)
BUN SERPL-MCNC: 29 MG/DL (ref 8–23)
BUN/CREAT SERPL: 9.1 (ref 7–25)
CALCIUM SPEC-SCNC: 8.3 MG/DL (ref 8.6–10.5)
CHLORIDE SERPL-SCNC: 102 MMOL/L (ref 98–107)
CO2 SERPL-SCNC: 21 MMOL/L (ref 22–29)
CREAT SERPL-MCNC: 3.2 MG/DL (ref 0.57–1)
DEPRECATED RDW RBC AUTO: 39 FL (ref 37–54)
ERYTHROCYTE [DISTWIDTH] IN BLOOD BY AUTOMATED COUNT: 12.8 % (ref 12.3–15.4)
GFR SERPL CREATININE-BSD FRML MDRD: 15 ML/MIN/1.73
GLOBULIN UR ELPH-MCNC: 3 GM/DL
GLUCOSE BLDC GLUCOMTR-MCNC: 113 MG/DL (ref 70–130)
GLUCOSE BLDC GLUCOMTR-MCNC: 132 MG/DL (ref 70–130)
GLUCOSE BLDC GLUCOMTR-MCNC: 161 MG/DL (ref 70–130)
GLUCOSE BLDC GLUCOMTR-MCNC: 191 MG/DL (ref 70–130)
GLUCOSE SERPL-MCNC: 155 MG/DL (ref 65–99)
HCT VFR BLD AUTO: 29.5 % (ref 34–46.6)
HGB BLD-MCNC: 10.5 G/DL (ref 12–15.9)
MCH RBC QN AUTO: 30.3 PG (ref 26.6–33)
MCHC RBC AUTO-ENTMCNC: 35.6 G/DL (ref 31.5–35.7)
MCV RBC AUTO: 85 FL (ref 79–97)
PLATELET # BLD AUTO: 148 10*3/MM3 (ref 140–450)
PMV BLD AUTO: 10.1 FL (ref 6–12)
POTASSIUM SERPL-SCNC: 4 MMOL/L (ref 3.5–5.2)
PROT SERPL-MCNC: 5.7 G/DL (ref 6–8.5)
RBC # BLD AUTO: 3.47 10*6/MM3 (ref 3.77–5.28)
SODIUM SERPL-SCNC: 135 MMOL/L (ref 136–145)
WBC # BLD AUTO: 10.54 10*3/MM3 (ref 3.4–10.8)

## 2021-10-17 PROCEDURE — 82962 GLUCOSE BLOOD TEST: CPT

## 2021-10-17 PROCEDURE — 63710000001 DIPHENHYDRAMINE PER 50 MG: Performed by: SURGERY

## 2021-10-17 PROCEDURE — 80053 COMPREHEN METABOLIC PANEL: CPT | Performed by: SURGERY

## 2021-10-17 PROCEDURE — 85027 COMPLETE CBC AUTOMATED: CPT | Performed by: SURGERY

## 2021-10-17 PROCEDURE — 63710000001 INSULIN LISPRO (HUMAN) PER 5 UNITS: Performed by: SURGERY

## 2021-10-17 PROCEDURE — 99232 SBSQ HOSP IP/OBS MODERATE 35: CPT | Performed by: NURSE PRACTITIONER

## 2021-10-17 PROCEDURE — 97530 THERAPEUTIC ACTIVITIES: CPT

## 2021-10-17 PROCEDURE — 97110 THERAPEUTIC EXERCISES: CPT

## 2021-10-17 RX ORDER — BISACODYL 10 MG
10 SUPPOSITORY, RECTAL RECTAL DAILY PRN
Status: DISCONTINUED | OUTPATIENT
Start: 2021-10-17 | End: 2021-10-26

## 2021-10-17 RX ORDER — AMOXICILLIN 250 MG
2 CAPSULE ORAL 2 TIMES DAILY
Status: DISCONTINUED | OUTPATIENT
Start: 2021-10-17 | End: 2021-10-26

## 2021-10-17 RX ORDER — BISACODYL 5 MG/1
5 TABLET, DELAYED RELEASE ORAL DAILY PRN
Status: DISCONTINUED | OUTPATIENT
Start: 2021-10-17 | End: 2021-10-26

## 2021-10-17 RX ORDER — POLYETHYLENE GLYCOL 3350 17 G/17G
17 POWDER, FOR SOLUTION ORAL DAILY PRN
Status: DISCONTINUED | OUTPATIENT
Start: 2021-10-17 | End: 2021-10-26

## 2021-10-17 RX ADMIN — SODIUM CHLORIDE, PRESERVATIVE FREE 10 ML: 5 INJECTION INTRAVENOUS at 20:34

## 2021-10-17 RX ADMIN — LISINOPRIL 10 MG: 10 TABLET ORAL at 08:49

## 2021-10-17 RX ADMIN — CARVEDILOL 25 MG: 12.5 TABLET, FILM COATED ORAL at 18:04

## 2021-10-17 RX ADMIN — INSULIN LISPRO 2 UNITS: 100 INJECTION, SOLUTION INTRAVENOUS; SUBCUTANEOUS at 12:11

## 2021-10-17 RX ADMIN — DOCUSATE SODIUM 50 MG AND SENNOSIDES 8.6 MG 2 TABLET: 8.6; 5 TABLET, FILM COATED ORAL at 20:34

## 2021-10-17 RX ADMIN — OXYCODONE 5 MG: 5 TABLET ORAL at 20:40

## 2021-10-17 RX ADMIN — METHADONE HYDROCHLORIDE 10 MG: 10 TABLET ORAL at 20:34

## 2021-10-17 RX ADMIN — HYDRALAZINE HYDROCHLORIDE 75 MG: 25 TABLET ORAL at 13:50

## 2021-10-17 RX ADMIN — Medication 1 TABLET: at 08:49

## 2021-10-17 RX ADMIN — HYDRALAZINE HYDROCHLORIDE 75 MG: 25 TABLET ORAL at 21:19

## 2021-10-17 RX ADMIN — OXYCODONE 5 MG: 5 TABLET ORAL at 13:50

## 2021-10-17 RX ADMIN — CALCIUM ACETATE 667 MG: 667 CAPSULE ORAL at 18:04

## 2021-10-17 RX ADMIN — AMLODIPINE BESYLATE 10 MG: 10 TABLET ORAL at 08:49

## 2021-10-17 RX ADMIN — Medication 5 MG: at 20:34

## 2021-10-17 RX ADMIN — METHADONE HYDROCHLORIDE 10 MG: 10 TABLET ORAL at 08:49

## 2021-10-17 RX ADMIN — DIPHENHYDRAMINE HYDROCHLORIDE 25 MG: 25 CAPSULE ORAL at 20:34

## 2021-10-17 RX ADMIN — INSULIN LISPRO 2 UNITS: 100 INJECTION, SOLUTION INTRAVENOUS; SUBCUTANEOUS at 20:33

## 2021-10-17 RX ADMIN — SODIUM CHLORIDE, PRESERVATIVE FREE 10 ML: 5 INJECTION INTRAVENOUS at 08:50

## 2021-10-17 RX ADMIN — DOCUSATE SODIUM 50 MG AND SENNOSIDES 8.6 MG 2 TABLET: 8.6; 5 TABLET, FILM COATED ORAL at 09:12

## 2021-10-17 RX ADMIN — LIDOCAINE 1 PATCH: 50 PATCH CUTANEOUS at 18:04

## 2021-10-17 RX ADMIN — CALCIUM ACETATE 667 MG: 667 CAPSULE ORAL at 08:49

## 2021-10-17 RX ADMIN — CARVEDILOL 25 MG: 12.5 TABLET, FILM COATED ORAL at 08:49

## 2021-10-17 RX ADMIN — HYDRALAZINE HYDROCHLORIDE 75 MG: 25 TABLET ORAL at 05:23

## 2021-10-17 RX ADMIN — CALCIUM ACETATE 667 MG: 667 CAPSULE ORAL at 12:11

## 2021-10-18 LAB
ANION GAP SERPL CALCULATED.3IONS-SCNC: 13 MMOL/L (ref 5–15)
BASOPHILS # BLD AUTO: 0.05 10*3/MM3 (ref 0–0.2)
BASOPHILS NFR BLD AUTO: 0.5 % (ref 0–1.5)
BUN SERPL-MCNC: 39 MG/DL (ref 8–23)
BUN/CREAT SERPL: 10.2 (ref 7–25)
CALCIUM SPEC-SCNC: 8 MG/DL (ref 8.6–10.5)
CHLORIDE SERPL-SCNC: 101 MMOL/L (ref 98–107)
CO2 SERPL-SCNC: 19 MMOL/L (ref 22–29)
CREAT SERPL-MCNC: 3.84 MG/DL (ref 0.57–1)
DEPRECATED RDW RBC AUTO: 39.7 FL (ref 37–54)
EOSINOPHIL # BLD AUTO: 0.24 10*3/MM3 (ref 0–0.4)
EOSINOPHIL NFR BLD AUTO: 2.4 % (ref 0.3–6.2)
ERYTHROCYTE [DISTWIDTH] IN BLOOD BY AUTOMATED COUNT: 13 % (ref 12.3–15.4)
GFR SERPL CREATININE-BSD FRML MDRD: 12 ML/MIN/1.73
GFR SERPL CREATININE-BSD FRML MDRD: ABNORMAL ML/MIN/{1.73_M2}
GLUCOSE BLDC GLUCOMTR-MCNC: 105 MG/DL (ref 70–130)
GLUCOSE BLDC GLUCOMTR-MCNC: 148 MG/DL (ref 70–130)
GLUCOSE BLDC GLUCOMTR-MCNC: 185 MG/DL (ref 70–130)
GLUCOSE BLDC GLUCOMTR-MCNC: 199 MG/DL (ref 70–130)
GLUCOSE SERPL-MCNC: 99 MG/DL (ref 65–99)
HCT VFR BLD AUTO: 29.3 % (ref 34–46.6)
HGB BLD-MCNC: 10.4 G/DL (ref 12–15.9)
IMM GRANULOCYTES # BLD AUTO: 0.05 10*3/MM3 (ref 0–0.05)
IMM GRANULOCYTES NFR BLD AUTO: 0.5 % (ref 0–0.5)
LYMPHOCYTES # BLD AUTO: 1.2 10*3/MM3 (ref 0.7–3.1)
LYMPHOCYTES NFR BLD AUTO: 12 % (ref 19.6–45.3)
MCH RBC QN AUTO: 30.2 PG (ref 26.6–33)
MCHC RBC AUTO-ENTMCNC: 35.5 G/DL (ref 31.5–35.7)
MCV RBC AUTO: 85.2 FL (ref 79–97)
MONOCYTES # BLD AUTO: 1.09 10*3/MM3 (ref 0.1–0.9)
MONOCYTES NFR BLD AUTO: 10.9 % (ref 5–12)
NEUTROPHILS NFR BLD AUTO: 7.37 10*3/MM3 (ref 1.7–7)
NEUTROPHILS NFR BLD AUTO: 73.7 % (ref 42.7–76)
NRBC BLD AUTO-RTO: 0 /100 WBC (ref 0–0.2)
PLATELET # BLD AUTO: 138 10*3/MM3 (ref 140–450)
PMV BLD AUTO: 10 FL (ref 6–12)
POTASSIUM SERPL-SCNC: 4.1 MMOL/L (ref 3.5–5.2)
RBC # BLD AUTO: 3.44 10*6/MM3 (ref 3.77–5.28)
SODIUM SERPL-SCNC: 133 MMOL/L (ref 136–145)
WBC # BLD AUTO: 10 10*3/MM3 (ref 3.4–10.8)

## 2021-10-18 PROCEDURE — 82962 GLUCOSE BLOOD TEST: CPT

## 2021-10-18 PROCEDURE — 85025 COMPLETE CBC W/AUTO DIFF WBC: CPT | Performed by: NURSE PRACTITIONER

## 2021-10-18 PROCEDURE — 80048 BASIC METABOLIC PNL TOTAL CA: CPT | Performed by: NURSE PRACTITIONER

## 2021-10-18 PROCEDURE — 63710000001 INSULIN LISPRO (HUMAN) PER 5 UNITS: Performed by: SURGERY

## 2021-10-18 PROCEDURE — 97535 SELF CARE MNGMENT TRAINING: CPT

## 2021-10-18 PROCEDURE — 99232 SBSQ HOSP IP/OBS MODERATE 35: CPT | Performed by: NURSE PRACTITIONER

## 2021-10-18 PROCEDURE — 63710000001 DIPHENHYDRAMINE PER 50 MG: Performed by: SURGERY

## 2021-10-18 RX ORDER — BISACODYL 10 MG
10 SUPPOSITORY, RECTAL RECTAL ONCE
Status: DISCONTINUED | OUTPATIENT
Start: 2021-10-18 | End: 2021-10-26

## 2021-10-18 RX ADMIN — Medication 5 MG: at 21:33

## 2021-10-18 RX ADMIN — HYDRALAZINE HYDROCHLORIDE 75 MG: 25 TABLET ORAL at 05:41

## 2021-10-18 RX ADMIN — CALCIUM ACETATE 667 MG: 667 CAPSULE ORAL at 08:23

## 2021-10-18 RX ADMIN — CARVEDILOL 25 MG: 12.5 TABLET, FILM COATED ORAL at 08:23

## 2021-10-18 RX ADMIN — Medication 1 TABLET: at 08:23

## 2021-10-18 RX ADMIN — HYDRALAZINE HYDROCHLORIDE 75 MG: 25 TABLET ORAL at 14:35

## 2021-10-18 RX ADMIN — SODIUM CHLORIDE, PRESERVATIVE FREE 10 ML: 5 INJECTION INTRAVENOUS at 21:33

## 2021-10-18 RX ADMIN — DOCUSATE SODIUM 50 MG AND SENNOSIDES 8.6 MG 2 TABLET: 8.6; 5 TABLET, FILM COATED ORAL at 08:23

## 2021-10-18 RX ADMIN — METHADONE HYDROCHLORIDE 10 MG: 10 TABLET ORAL at 08:23

## 2021-10-18 RX ADMIN — BISACODYL 10 MG: 10 SUPPOSITORY RECTAL at 10:46

## 2021-10-18 RX ADMIN — INSULIN LISPRO 2 UNITS: 100 INJECTION, SOLUTION INTRAVENOUS; SUBCUTANEOUS at 12:27

## 2021-10-18 RX ADMIN — DIPHENHYDRAMINE HYDROCHLORIDE 25 MG: 25 CAPSULE ORAL at 21:33

## 2021-10-18 RX ADMIN — CALCIUM ACETATE 667 MG: 667 CAPSULE ORAL at 17:33

## 2021-10-18 RX ADMIN — INSULIN LISPRO 2 UNITS: 100 INJECTION, SOLUTION INTRAVENOUS; SUBCUTANEOUS at 17:42

## 2021-10-18 RX ADMIN — LISINOPRIL 10 MG: 10 TABLET ORAL at 08:23

## 2021-10-18 RX ADMIN — CALCIUM ACETATE 667 MG: 667 CAPSULE ORAL at 13:12

## 2021-10-18 RX ADMIN — HYDRALAZINE HYDROCHLORIDE 75 MG: 25 TABLET ORAL at 21:32

## 2021-10-18 RX ADMIN — AMLODIPINE BESYLATE 10 MG: 10 TABLET ORAL at 08:23

## 2021-10-18 RX ADMIN — METHADONE HYDROCHLORIDE 10 MG: 10 TABLET ORAL at 21:32

## 2021-10-18 RX ADMIN — SODIUM CHLORIDE, PRESERVATIVE FREE 10 ML: 5 INJECTION INTRAVENOUS at 08:24

## 2021-10-18 RX ADMIN — OXYCODONE 5 MG: 5 TABLET ORAL at 00:58

## 2021-10-18 RX ADMIN — CARVEDILOL 25 MG: 12.5 TABLET, FILM COATED ORAL at 17:33

## 2021-10-18 RX ADMIN — DOCUSATE SODIUM 50 MG AND SENNOSIDES 8.6 MG 2 TABLET: 8.6; 5 TABLET, FILM COATED ORAL at 21:32

## 2021-10-19 ENCOUNTER — APPOINTMENT (OUTPATIENT)
Dept: NEPHROLOGY | Facility: HOSPITAL | Age: 61
End: 2021-10-19

## 2021-10-19 LAB
ALBUMIN SERPL-MCNC: 2.6 G/DL (ref 3.5–5.2)
ANION GAP SERPL CALCULATED.3IONS-SCNC: 13 MMOL/L (ref 5–15)
BUN SERPL-MCNC: 53 MG/DL (ref 8–23)
BUN/CREAT SERPL: 11 (ref 7–25)
CALCIUM SPEC-SCNC: 7.6 MG/DL (ref 8.6–10.5)
CHLORIDE SERPL-SCNC: 103 MMOL/L (ref 98–107)
CO2 SERPL-SCNC: 19 MMOL/L (ref 22–29)
CREAT SERPL-MCNC: 4.8 MG/DL (ref 0.57–1)
GFR SERPL CREATININE-BSD FRML MDRD: 9 ML/MIN/1.73
GFR SERPL CREATININE-BSD FRML MDRD: ABNORMAL ML/MIN/{1.73_M2}
GLUCOSE BLDC GLUCOMTR-MCNC: 125 MG/DL (ref 70–130)
GLUCOSE BLDC GLUCOMTR-MCNC: 169 MG/DL (ref 70–130)
GLUCOSE BLDC GLUCOMTR-MCNC: 202 MG/DL (ref 70–130)
GLUCOSE BLDC GLUCOMTR-MCNC: 225 MG/DL (ref 70–130)
GLUCOSE SERPL-MCNC: 129 MG/DL (ref 65–99)
PHOSPHATE SERPL-MCNC: 4.2 MG/DL (ref 2.5–4.5)
POTASSIUM SERPL-SCNC: 4 MMOL/L (ref 3.5–5.2)
SODIUM SERPL-SCNC: 135 MMOL/L (ref 136–145)

## 2021-10-19 PROCEDURE — 92507 TX SP LANG VOICE COMM INDIV: CPT

## 2021-10-19 PROCEDURE — 25010000002 ONDANSETRON PER 1 MG: Performed by: SURGERY

## 2021-10-19 PROCEDURE — 80069 RENAL FUNCTION PANEL: CPT | Performed by: NURSE PRACTITIONER

## 2021-10-19 PROCEDURE — 63710000001 DIPHENHYDRAMINE PER 50 MG: Performed by: SURGERY

## 2021-10-19 PROCEDURE — 63710000001 INSULIN LISPRO (HUMAN) PER 5 UNITS: Performed by: SURGERY

## 2021-10-19 PROCEDURE — 82962 GLUCOSE BLOOD TEST: CPT

## 2021-10-19 PROCEDURE — 99232 SBSQ HOSP IP/OBS MODERATE 35: CPT | Performed by: FAMILY MEDICINE

## 2021-10-19 PROCEDURE — 25010000002 HEPARIN (PORCINE) PER 1000 UNITS: Performed by: SURGERY

## 2021-10-19 RX ADMIN — CARVEDILOL 25 MG: 12.5 TABLET, FILM COATED ORAL at 12:01

## 2021-10-19 RX ADMIN — METHADONE HYDROCHLORIDE 10 MG: 10 TABLET ORAL at 21:37

## 2021-10-19 RX ADMIN — CALCIUM ACETATE 667 MG: 667 CAPSULE ORAL at 18:34

## 2021-10-19 RX ADMIN — INSULIN LISPRO 2 UNITS: 100 INJECTION, SOLUTION INTRAVENOUS; SUBCUTANEOUS at 21:37

## 2021-10-19 RX ADMIN — Medication 5 MG: at 21:37

## 2021-10-19 RX ADMIN — HYDRALAZINE HYDROCHLORIDE 75 MG: 25 TABLET ORAL at 05:35

## 2021-10-19 RX ADMIN — OXYCODONE 5 MG: 5 TABLET ORAL at 18:34

## 2021-10-19 RX ADMIN — METHADONE HYDROCHLORIDE 10 MG: 10 TABLET ORAL at 11:35

## 2021-10-19 RX ADMIN — LISINOPRIL 10 MG: 10 TABLET ORAL at 12:01

## 2021-10-19 RX ADMIN — DOCUSATE SODIUM 50 MG AND SENNOSIDES 8.6 MG 2 TABLET: 8.6; 5 TABLET, FILM COATED ORAL at 11:34

## 2021-10-19 RX ADMIN — CARVEDILOL 25 MG: 12.5 TABLET, FILM COATED ORAL at 17:47

## 2021-10-19 RX ADMIN — INSULIN LISPRO 4 UNITS: 100 INJECTION, SOLUTION INTRAVENOUS; SUBCUTANEOUS at 17:47

## 2021-10-19 RX ADMIN — HEPARIN SODIUM 1600 UNITS: 1000 INJECTION INTRAVENOUS; SUBCUTANEOUS at 11:07

## 2021-10-19 RX ADMIN — LIDOCAINE 1 PATCH: 50 PATCH CUTANEOUS at 17:47

## 2021-10-19 RX ADMIN — SODIUM CHLORIDE, PRESERVATIVE FREE 10 ML: 5 INJECTION INTRAVENOUS at 12:02

## 2021-10-19 RX ADMIN — DIPHENHYDRAMINE HYDROCHLORIDE 25 MG: 25 CAPSULE ORAL at 21:37

## 2021-10-19 RX ADMIN — HYDRALAZINE HYDROCHLORIDE 75 MG: 25 TABLET ORAL at 15:06

## 2021-10-19 RX ADMIN — ONDANSETRON 4 MG: 2 INJECTION INTRAMUSCULAR; INTRAVENOUS at 12:32

## 2021-10-19 RX ADMIN — CALCIUM ACETATE 667 MG: 667 CAPSULE ORAL at 11:35

## 2021-10-19 RX ADMIN — Medication 1 TABLET: at 11:35

## 2021-10-19 RX ADMIN — DOCUSATE SODIUM 50 MG AND SENNOSIDES 8.6 MG 2 TABLET: 8.6; 5 TABLET, FILM COATED ORAL at 21:37

## 2021-10-19 RX ADMIN — HYDRALAZINE HYDROCHLORIDE 75 MG: 25 TABLET ORAL at 21:37

## 2021-10-19 RX ADMIN — SODIUM CHLORIDE, PRESERVATIVE FREE 10 ML: 5 INJECTION INTRAVENOUS at 21:38

## 2021-10-19 RX ADMIN — AMLODIPINE BESYLATE 10 MG: 10 TABLET ORAL at 12:01

## 2021-10-20 ENCOUNTER — APPOINTMENT (OUTPATIENT)
Dept: CT IMAGING | Facility: HOSPITAL | Age: 61
End: 2021-10-20

## 2021-10-20 LAB
GLUCOSE BLDC GLUCOMTR-MCNC: 115 MG/DL (ref 70–130)
GLUCOSE BLDC GLUCOMTR-MCNC: 147 MG/DL (ref 70–130)
GLUCOSE BLDC GLUCOMTR-MCNC: 169 MG/DL (ref 70–130)
GLUCOSE BLDC GLUCOMTR-MCNC: 222 MG/DL (ref 70–130)

## 2021-10-20 PROCEDURE — 82962 GLUCOSE BLOOD TEST: CPT

## 2021-10-20 PROCEDURE — 63710000001 DIPHENHYDRAMINE PER 50 MG: Performed by: SURGERY

## 2021-10-20 PROCEDURE — 97110 THERAPEUTIC EXERCISES: CPT

## 2021-10-20 PROCEDURE — 63710000001 INSULIN LISPRO (HUMAN) PER 5 UNITS: Performed by: SURGERY

## 2021-10-20 PROCEDURE — 99232 SBSQ HOSP IP/OBS MODERATE 35: CPT | Performed by: FAMILY MEDICINE

## 2021-10-20 PROCEDURE — 97535 SELF CARE MNGMENT TRAINING: CPT

## 2021-10-20 PROCEDURE — 73200 CT UPPER EXTREMITY W/O DYE: CPT

## 2021-10-20 PROCEDURE — 25010000002 CEFTRIAXONE PER 250 MG: Performed by: FAMILY MEDICINE

## 2021-10-20 RX ORDER — HYDRALAZINE HYDROCHLORIDE 50 MG/1
100 TABLET, FILM COATED ORAL EVERY 8 HOURS SCHEDULED
Status: DISCONTINUED | OUTPATIENT
Start: 2021-10-20 | End: 2021-10-28 | Stop reason: HOSPADM

## 2021-10-20 RX ORDER — HYDROCODONE BITARTRATE AND ACETAMINOPHEN 5; 325 MG/1; MG/1
1 TABLET ORAL EVERY 6 HOURS PRN
Status: DISCONTINUED | OUTPATIENT
Start: 2021-10-20 | End: 2021-10-20

## 2021-10-20 RX ORDER — HYDRALAZINE HYDROCHLORIDE 25 MG/1
25 TABLET, FILM COATED ORAL ONCE
Status: COMPLETED | OUTPATIENT
Start: 2021-10-20 | End: 2021-10-20

## 2021-10-20 RX ORDER — OXYCODONE HYDROCHLORIDE 5 MG/1
10 TABLET ORAL EVERY 4 HOURS PRN
Status: DISPENSED | OUTPATIENT
Start: 2021-10-20 | End: 2021-10-25

## 2021-10-20 RX ADMIN — METHADONE HYDROCHLORIDE 10 MG: 10 TABLET ORAL at 21:35

## 2021-10-20 RX ADMIN — Medication 5 MG: at 21:36

## 2021-10-20 RX ADMIN — LISINOPRIL 10 MG: 10 TABLET ORAL at 08:28

## 2021-10-20 RX ADMIN — INSULIN LISPRO 4 UNITS: 100 INJECTION, SOLUTION INTRAVENOUS; SUBCUTANEOUS at 13:53

## 2021-10-20 RX ADMIN — OXYCODONE 10 MG: 5 TABLET ORAL at 21:46

## 2021-10-20 RX ADMIN — CARVEDILOL 25 MG: 12.5 TABLET, FILM COATED ORAL at 08:28

## 2021-10-20 RX ADMIN — INSULIN LISPRO 2 UNITS: 100 INJECTION, SOLUTION INTRAVENOUS; SUBCUTANEOUS at 17:57

## 2021-10-20 RX ADMIN — HYDRALAZINE HYDROCHLORIDE 75 MG: 25 TABLET ORAL at 05:49

## 2021-10-20 RX ADMIN — DOCUSATE SODIUM 50 MG AND SENNOSIDES 8.6 MG 2 TABLET: 8.6; 5 TABLET, FILM COATED ORAL at 21:35

## 2021-10-20 RX ADMIN — HYDRALAZINE HYDROCHLORIDE 25 MG: 25 TABLET, FILM COATED ORAL at 11:14

## 2021-10-20 RX ADMIN — CALCIUM ACETATE 667 MG: 667 CAPSULE ORAL at 08:28

## 2021-10-20 RX ADMIN — CARVEDILOL 25 MG: 12.5 TABLET, FILM COATED ORAL at 17:57

## 2021-10-20 RX ADMIN — AMLODIPINE BESYLATE 10 MG: 10 TABLET ORAL at 08:28

## 2021-10-20 RX ADMIN — SODIUM CHLORIDE, PRESERVATIVE FREE 10 ML: 5 INJECTION INTRAVENOUS at 08:32

## 2021-10-20 RX ADMIN — CALCIUM ACETATE 667 MG: 667 CAPSULE ORAL at 13:54

## 2021-10-20 RX ADMIN — HYDRALAZINE HYDROCHLORIDE 100 MG: 50 TABLET, FILM COATED ORAL at 21:35

## 2021-10-20 RX ADMIN — SODIUM CHLORIDE, PRESERVATIVE FREE 10 ML: 5 INJECTION INTRAVENOUS at 21:36

## 2021-10-20 RX ADMIN — DIPHENHYDRAMINE HYDROCHLORIDE 25 MG: 25 CAPSULE ORAL at 21:35

## 2021-10-20 RX ADMIN — HYDRALAZINE HYDROCHLORIDE 100 MG: 50 TABLET, FILM COATED ORAL at 14:00

## 2021-10-20 RX ADMIN — METHADONE HYDROCHLORIDE 10 MG: 10 TABLET ORAL at 08:28

## 2021-10-20 RX ADMIN — CALCIUM ACETATE 667 MG: 667 CAPSULE ORAL at 17:57

## 2021-10-20 RX ADMIN — Medication 1 TABLET: at 08:28

## 2021-10-20 RX ADMIN — DOCUSATE SODIUM 50 MG AND SENNOSIDES 8.6 MG 2 TABLET: 8.6; 5 TABLET, FILM COATED ORAL at 08:28

## 2021-10-20 RX ADMIN — LIDOCAINE 1 PATCH: 50 PATCH CUTANEOUS at 17:57

## 2021-10-20 RX ADMIN — SODIUM CHLORIDE 1 G: 900 INJECTION INTRAVENOUS at 15:22

## 2021-10-21 ENCOUNTER — APPOINTMENT (OUTPATIENT)
Dept: NEPHROLOGY | Facility: HOSPITAL | Age: 61
End: 2021-10-21

## 2021-10-21 LAB
ANION GAP SERPL CALCULATED.3IONS-SCNC: 10 MMOL/L (ref 5–15)
BASOPHILS # BLD AUTO: 0.04 10*3/MM3 (ref 0–0.2)
BASOPHILS NFR BLD AUTO: 0.7 % (ref 0–1.5)
BUN SERPL-MCNC: 39 MG/DL (ref 8–23)
BUN/CREAT SERPL: 9.6 (ref 7–25)
CALCIUM SPEC-SCNC: 7.8 MG/DL (ref 8.6–10.5)
CHLORIDE SERPL-SCNC: 102 MMOL/L (ref 98–107)
CO2 SERPL-SCNC: 21 MMOL/L (ref 22–29)
CREAT SERPL-MCNC: 4.06 MG/DL (ref 0.57–1)
DEPRECATED RDW RBC AUTO: 39.8 FL (ref 37–54)
EOSINOPHIL # BLD AUTO: 0.33 10*3/MM3 (ref 0–0.4)
EOSINOPHIL NFR BLD AUTO: 5.4 % (ref 0.3–6.2)
ERYTHROCYTE [DISTWIDTH] IN BLOOD BY AUTOMATED COUNT: 12.8 % (ref 12.3–15.4)
GFR SERPL CREATININE-BSD FRML MDRD: 11 ML/MIN/1.73
GFR SERPL CREATININE-BSD FRML MDRD: ABNORMAL ML/MIN/{1.73_M2}
GLUCOSE BLDC GLUCOMTR-MCNC: 110 MG/DL (ref 70–130)
GLUCOSE BLDC GLUCOMTR-MCNC: 189 MG/DL (ref 70–130)
GLUCOSE BLDC GLUCOMTR-MCNC: 237 MG/DL (ref 70–130)
GLUCOSE SERPL-MCNC: 110 MG/DL (ref 65–99)
HCT VFR BLD AUTO: 29.2 % (ref 34–46.6)
HGB BLD-MCNC: 10.1 G/DL (ref 12–15.9)
IMM GRANULOCYTES # BLD AUTO: 0.05 10*3/MM3 (ref 0–0.05)
IMM GRANULOCYTES NFR BLD AUTO: 0.8 % (ref 0–0.5)
LYMPHOCYTES # BLD AUTO: 0.6 10*3/MM3 (ref 0.7–3.1)
LYMPHOCYTES NFR BLD AUTO: 9.8 % (ref 19.6–45.3)
MCH RBC QN AUTO: 30.1 PG (ref 26.6–33)
MCHC RBC AUTO-ENTMCNC: 34.6 G/DL (ref 31.5–35.7)
MCV RBC AUTO: 86.9 FL (ref 79–97)
MONOCYTES # BLD AUTO: 0.58 10*3/MM3 (ref 0.1–0.9)
MONOCYTES NFR BLD AUTO: 9.5 % (ref 5–12)
NEUTROPHILS NFR BLD AUTO: 4.53 10*3/MM3 (ref 1.7–7)
NEUTROPHILS NFR BLD AUTO: 73.8 % (ref 42.7–76)
NRBC BLD AUTO-RTO: 0 /100 WBC (ref 0–0.2)
PLATELET # BLD AUTO: 112 10*3/MM3 (ref 140–450)
PMV BLD AUTO: 9.9 FL (ref 6–12)
POTASSIUM SERPL-SCNC: 4 MMOL/L (ref 3.5–5.2)
RBC # BLD AUTO: 3.36 10*6/MM3 (ref 3.77–5.28)
SODIUM SERPL-SCNC: 133 MMOL/L (ref 136–145)
WBC # BLD AUTO: 6.13 10*3/MM3 (ref 3.4–10.8)

## 2021-10-21 PROCEDURE — 92507 TX SP LANG VOICE COMM INDIV: CPT

## 2021-10-21 PROCEDURE — 63710000001 DIPHENHYDRAMINE PER 50 MG: Performed by: SURGERY

## 2021-10-21 PROCEDURE — 63710000001 INSULIN LISPRO (HUMAN) PER 5 UNITS: Performed by: SURGERY

## 2021-10-21 PROCEDURE — 80048 BASIC METABOLIC PNL TOTAL CA: CPT | Performed by: FAMILY MEDICINE

## 2021-10-21 PROCEDURE — 82962 GLUCOSE BLOOD TEST: CPT

## 2021-10-21 PROCEDURE — 25010000002 CEFTRIAXONE PER 250 MG: Performed by: FAMILY MEDICINE

## 2021-10-21 PROCEDURE — 99232 SBSQ HOSP IP/OBS MODERATE 35: CPT | Performed by: FAMILY MEDICINE

## 2021-10-21 PROCEDURE — 85025 COMPLETE CBC W/AUTO DIFF WBC: CPT | Performed by: FAMILY MEDICINE

## 2021-10-21 PROCEDURE — 25010000002 HEPARIN (PORCINE) PER 1000 UNITS: Performed by: SURGERY

## 2021-10-21 RX ORDER — BISACODYL 10 MG
10 SUPPOSITORY, RECTAL RECTAL ONCE
Status: COMPLETED | OUTPATIENT
Start: 2021-10-21 | End: 2021-10-21

## 2021-10-21 RX ADMIN — AMLODIPINE BESYLATE 10 MG: 10 TABLET ORAL at 12:03

## 2021-10-21 RX ADMIN — INSULIN LISPRO 2 UNITS: 100 INJECTION, SOLUTION INTRAVENOUS; SUBCUTANEOUS at 21:18

## 2021-10-21 RX ADMIN — LISINOPRIL 10 MG: 10 TABLET ORAL at 12:03

## 2021-10-21 RX ADMIN — SODIUM CHLORIDE, PRESERVATIVE FREE 10 ML: 5 INJECTION INTRAVENOUS at 15:26

## 2021-10-21 RX ADMIN — BISACODYL 10 MG: 10 SUPPOSITORY RECTAL at 12:02

## 2021-10-21 RX ADMIN — LIDOCAINE 1 PATCH: 50 PATCH CUTANEOUS at 17:38

## 2021-10-21 RX ADMIN — INSULIN LISPRO 4 UNITS: 100 INJECTION, SOLUTION INTRAVENOUS; SUBCUTANEOUS at 17:43

## 2021-10-21 RX ADMIN — SODIUM CHLORIDE 1 G: 900 INJECTION INTRAVENOUS at 14:27

## 2021-10-21 RX ADMIN — CALCIUM ACETATE 667 MG: 667 CAPSULE ORAL at 12:02

## 2021-10-21 RX ADMIN — CARVEDILOL 25 MG: 12.5 TABLET, FILM COATED ORAL at 12:03

## 2021-10-21 RX ADMIN — DOCUSATE SODIUM 50 MG AND SENNOSIDES 8.6 MG 2 TABLET: 8.6; 5 TABLET, FILM COATED ORAL at 21:17

## 2021-10-21 RX ADMIN — CARVEDILOL 25 MG: 12.5 TABLET, FILM COATED ORAL at 17:43

## 2021-10-21 RX ADMIN — HYDRALAZINE HYDROCHLORIDE 100 MG: 50 TABLET, FILM COATED ORAL at 14:26

## 2021-10-21 RX ADMIN — Medication 5 MG: at 21:17

## 2021-10-21 RX ADMIN — Medication 1 TABLET: at 12:02

## 2021-10-21 RX ADMIN — SODIUM CHLORIDE, PRESERVATIVE FREE 10 ML: 5 INJECTION INTRAVENOUS at 21:18

## 2021-10-21 RX ADMIN — POLYETHYLENE GLYCOL 3350 17 G: 17 POWDER, FOR SOLUTION ORAL at 12:02

## 2021-10-21 RX ADMIN — CALCIUM ACETATE 667 MG: 667 CAPSULE ORAL at 17:43

## 2021-10-21 RX ADMIN — HYDRALAZINE HYDROCHLORIDE 100 MG: 50 TABLET, FILM COATED ORAL at 21:18

## 2021-10-21 RX ADMIN — HYDRALAZINE HYDROCHLORIDE 100 MG: 50 TABLET, FILM COATED ORAL at 05:51

## 2021-10-21 RX ADMIN — METHADONE HYDROCHLORIDE 10 MG: 10 TABLET ORAL at 12:03

## 2021-10-21 RX ADMIN — HEPARIN SODIUM 1600 UNITS: 1000 INJECTION INTRAVENOUS; SUBCUTANEOUS at 11:04

## 2021-10-21 RX ADMIN — OXYCODONE 10 MG: 5 TABLET ORAL at 02:41

## 2021-10-21 RX ADMIN — DOCUSATE SODIUM 50 MG AND SENNOSIDES 8.6 MG 2 TABLET: 8.6; 5 TABLET, FILM COATED ORAL at 12:02

## 2021-10-21 RX ADMIN — DIPHENHYDRAMINE HYDROCHLORIDE 25 MG: 25 CAPSULE ORAL at 21:17

## 2021-10-21 RX ADMIN — METHADONE HYDROCHLORIDE 10 MG: 10 TABLET ORAL at 21:17

## 2021-10-22 LAB
GLUCOSE BLDC GLUCOMTR-MCNC: 111 MG/DL (ref 70–130)
GLUCOSE BLDC GLUCOMTR-MCNC: 145 MG/DL (ref 70–130)
GLUCOSE BLDC GLUCOMTR-MCNC: 198 MG/DL (ref 70–130)
GLUCOSE BLDC GLUCOMTR-MCNC: 266 MG/DL (ref 70–130)

## 2021-10-22 PROCEDURE — 82962 GLUCOSE BLOOD TEST: CPT

## 2021-10-22 PROCEDURE — 92507 TX SP LANG VOICE COMM INDIV: CPT

## 2021-10-22 PROCEDURE — 99233 SBSQ HOSP IP/OBS HIGH 50: CPT | Performed by: FAMILY MEDICINE

## 2021-10-22 PROCEDURE — 25010000002 CEFTRIAXONE PER 250 MG: Performed by: FAMILY MEDICINE

## 2021-10-22 PROCEDURE — 97530 THERAPEUTIC ACTIVITIES: CPT

## 2021-10-22 PROCEDURE — 63710000001 INSULIN LISPRO (HUMAN) PER 5 UNITS: Performed by: SURGERY

## 2021-10-22 PROCEDURE — 63710000001 DIPHENHYDRAMINE PER 50 MG: Performed by: SURGERY

## 2021-10-22 PROCEDURE — 97535 SELF CARE MNGMENT TRAINING: CPT

## 2021-10-22 RX ADMIN — AMLODIPINE BESYLATE 10 MG: 10 TABLET ORAL at 08:09

## 2021-10-22 RX ADMIN — HYDRALAZINE HYDROCHLORIDE 100 MG: 50 TABLET, FILM COATED ORAL at 21:06

## 2021-10-22 RX ADMIN — CALCIUM ACETATE 667 MG: 667 CAPSULE ORAL at 08:09

## 2021-10-22 RX ADMIN — INSULIN LISPRO 2 UNITS: 100 INJECTION, SOLUTION INTRAVENOUS; SUBCUTANEOUS at 12:13

## 2021-10-22 RX ADMIN — Medication 1 TABLET: at 08:09

## 2021-10-22 RX ADMIN — Medication 5 MG: at 20:28

## 2021-10-22 RX ADMIN — INSULIN LISPRO 6 UNITS: 100 INJECTION, SOLUTION INTRAVENOUS; SUBCUTANEOUS at 20:35

## 2021-10-22 RX ADMIN — CARVEDILOL 25 MG: 12.5 TABLET, FILM COATED ORAL at 17:17

## 2021-10-22 RX ADMIN — SODIUM CHLORIDE, PRESERVATIVE FREE 10 ML: 5 INJECTION INTRAVENOUS at 08:09

## 2021-10-22 RX ADMIN — CALCIUM ACETATE 667 MG: 667 CAPSULE ORAL at 12:31

## 2021-10-22 RX ADMIN — DOCUSATE SODIUM 50 MG AND SENNOSIDES 8.6 MG 2 TABLET: 8.6; 5 TABLET, FILM COATED ORAL at 20:29

## 2021-10-22 RX ADMIN — METHADONE HYDROCHLORIDE 10 MG: 10 TABLET ORAL at 08:09

## 2021-10-22 RX ADMIN — LISINOPRIL 10 MG: 10 TABLET ORAL at 08:09

## 2021-10-22 RX ADMIN — HYDRALAZINE HYDROCHLORIDE 100 MG: 50 TABLET, FILM COATED ORAL at 14:51

## 2021-10-22 RX ADMIN — CARVEDILOL 25 MG: 12.5 TABLET, FILM COATED ORAL at 08:09

## 2021-10-22 RX ADMIN — DOCUSATE SODIUM 50 MG AND SENNOSIDES 8.6 MG 2 TABLET: 8.6; 5 TABLET, FILM COATED ORAL at 08:09

## 2021-10-22 RX ADMIN — DIPHENHYDRAMINE HYDROCHLORIDE 25 MG: 25 CAPSULE ORAL at 20:28

## 2021-10-22 RX ADMIN — SODIUM CHLORIDE, PRESERVATIVE FREE 10 ML: 5 INJECTION INTRAVENOUS at 20:29

## 2021-10-22 RX ADMIN — METHADONE HYDROCHLORIDE 10 MG: 10 TABLET ORAL at 20:28

## 2021-10-22 RX ADMIN — LIDOCAINE 1 PATCH: 50 PATCH CUTANEOUS at 17:16

## 2021-10-22 RX ADMIN — CALCIUM ACETATE 667 MG: 667 CAPSULE ORAL at 17:16

## 2021-10-22 RX ADMIN — SODIUM CHLORIDE 1 G: 900 INJECTION INTRAVENOUS at 14:50

## 2021-10-22 RX ADMIN — HYDRALAZINE HYDROCHLORIDE 100 MG: 50 TABLET, FILM COATED ORAL at 05:10

## 2021-10-22 RX ADMIN — ACETAMINOPHEN 650 MG: 325 TABLET, FILM COATED ORAL at 17:16

## 2021-10-23 ENCOUNTER — APPOINTMENT (OUTPATIENT)
Dept: NEPHROLOGY | Facility: HOSPITAL | Age: 61
End: 2021-10-23

## 2021-10-23 LAB
GLUCOSE BLDC GLUCOMTR-MCNC: 105 MG/DL (ref 70–130)
GLUCOSE BLDC GLUCOMTR-MCNC: 162 MG/DL (ref 70–130)
GLUCOSE BLDC GLUCOMTR-MCNC: 258 MG/DL (ref 70–130)
GLUCOSE BLDC GLUCOMTR-MCNC: 89 MG/DL (ref 70–130)

## 2021-10-23 PROCEDURE — 25010000002 CEFTRIAXONE PER 250 MG: Performed by: FAMILY MEDICINE

## 2021-10-23 PROCEDURE — 63710000001 INSULIN LISPRO (HUMAN) PER 5 UNITS: Performed by: SURGERY

## 2021-10-23 PROCEDURE — 25010000002 ONDANSETRON PER 1 MG: Performed by: SURGERY

## 2021-10-23 PROCEDURE — 82962 GLUCOSE BLOOD TEST: CPT

## 2021-10-23 PROCEDURE — 99233 SBSQ HOSP IP/OBS HIGH 50: CPT | Performed by: FAMILY MEDICINE

## 2021-10-23 PROCEDURE — 25010000002 HEPARIN (PORCINE) PER 1000 UNITS: Performed by: SURGERY

## 2021-10-23 PROCEDURE — 25010000002 HYDRALAZINE PER 20 MG: Performed by: SURGERY

## 2021-10-23 PROCEDURE — 63710000001 DIPHENHYDRAMINE PER 50 MG: Performed by: SURGERY

## 2021-10-23 RX ADMIN — CALCIUM ACETATE 667 MG: 667 CAPSULE ORAL at 09:22

## 2021-10-23 RX ADMIN — CALCIUM ACETATE 667 MG: 667 CAPSULE ORAL at 17:25

## 2021-10-23 RX ADMIN — CARVEDILOL 25 MG: 12.5 TABLET, FILM COATED ORAL at 17:25

## 2021-10-23 RX ADMIN — SODIUM CHLORIDE 1 G: 900 INJECTION INTRAVENOUS at 14:23

## 2021-10-23 RX ADMIN — LISINOPRIL 10 MG: 10 TABLET ORAL at 11:54

## 2021-10-23 RX ADMIN — Medication 1 TABLET: at 11:54

## 2021-10-23 RX ADMIN — DOCUSATE SODIUM 50 MG AND SENNOSIDES 8.6 MG 2 TABLET: 8.6; 5 TABLET, FILM COATED ORAL at 11:54

## 2021-10-23 RX ADMIN — HYDRALAZINE HYDROCHLORIDE 10 MG: 20 INJECTION INTRAMUSCULAR; INTRAVENOUS at 04:39

## 2021-10-23 RX ADMIN — OXYCODONE 10 MG: 5 TABLET ORAL at 17:31

## 2021-10-23 RX ADMIN — HYDRALAZINE HYDROCHLORIDE 100 MG: 50 TABLET, FILM COATED ORAL at 21:43

## 2021-10-23 RX ADMIN — DIPHENHYDRAMINE HYDROCHLORIDE 25 MG: 25 CAPSULE ORAL at 21:43

## 2021-10-23 RX ADMIN — OXYCODONE 10 MG: 5 TABLET ORAL at 00:14

## 2021-10-23 RX ADMIN — CALCIUM ACETATE 667 MG: 667 CAPSULE ORAL at 11:54

## 2021-10-23 RX ADMIN — LIDOCAINE 1 PATCH: 50 PATCH CUTANEOUS at 17:26

## 2021-10-23 RX ADMIN — SODIUM CHLORIDE, PRESERVATIVE FREE 10 ML: 5 INJECTION INTRAVENOUS at 21:50

## 2021-10-23 RX ADMIN — DOCUSATE SODIUM 50 MG AND SENNOSIDES 8.6 MG 2 TABLET: 8.6; 5 TABLET, FILM COATED ORAL at 21:43

## 2021-10-23 RX ADMIN — OXYCODONE 10 MG: 5 TABLET ORAL at 09:22

## 2021-10-23 RX ADMIN — POLYETHYLENE GLYCOL 3350 17 G: 17 POWDER, FOR SOLUTION ORAL at 11:54

## 2021-10-23 RX ADMIN — Medication 5 MG: at 21:43

## 2021-10-23 RX ADMIN — AMLODIPINE BESYLATE 10 MG: 10 TABLET ORAL at 11:54

## 2021-10-23 RX ADMIN — CARVEDILOL 25 MG: 12.5 TABLET, FILM COATED ORAL at 11:54

## 2021-10-23 RX ADMIN — ONDANSETRON 4 MG: 2 INJECTION INTRAMUSCULAR; INTRAVENOUS at 09:22

## 2021-10-23 RX ADMIN — INSULIN LISPRO 2 UNITS: 100 INJECTION, SOLUTION INTRAVENOUS; SUBCUTANEOUS at 12:38

## 2021-10-23 RX ADMIN — HEPARIN SODIUM 1600 UNITS: 1000 INJECTION INTRAVENOUS; SUBCUTANEOUS at 09:22

## 2021-10-23 RX ADMIN — METHADONE HYDROCHLORIDE 10 MG: 10 TABLET ORAL at 21:43

## 2021-10-23 RX ADMIN — SODIUM CHLORIDE, PRESERVATIVE FREE 10 ML: 5 INJECTION INTRAVENOUS at 11:55

## 2021-10-23 RX ADMIN — INSULIN LISPRO 6 UNITS: 100 INJECTION, SOLUTION INTRAVENOUS; SUBCUTANEOUS at 17:26

## 2021-10-23 RX ADMIN — METHADONE HYDROCHLORIDE 10 MG: 10 TABLET ORAL at 11:54

## 2021-10-23 RX ADMIN — BISACODYL 5 MG: 5 TABLET, COATED ORAL at 17:31

## 2021-10-24 LAB
GLUCOSE BLDC GLUCOMTR-MCNC: 111 MG/DL (ref 70–130)
GLUCOSE BLDC GLUCOMTR-MCNC: 145 MG/DL (ref 70–130)
GLUCOSE BLDC GLUCOMTR-MCNC: 204 MG/DL (ref 70–130)
GLUCOSE BLDC GLUCOMTR-MCNC: 226 MG/DL (ref 70–130)

## 2021-10-24 PROCEDURE — 99232 SBSQ HOSP IP/OBS MODERATE 35: CPT | Performed by: NURSE PRACTITIONER

## 2021-10-24 PROCEDURE — 63710000001 DIPHENHYDRAMINE PER 50 MG: Performed by: SURGERY

## 2021-10-24 PROCEDURE — 63710000001 INSULIN LISPRO (HUMAN) PER 5 UNITS: Performed by: SURGERY

## 2021-10-24 PROCEDURE — 82962 GLUCOSE BLOOD TEST: CPT

## 2021-10-24 PROCEDURE — 25010000002 CEFTRIAXONE PER 250 MG: Performed by: FAMILY MEDICINE

## 2021-10-24 RX ADMIN — SODIUM CHLORIDE, PRESERVATIVE FREE 10 ML: 5 INJECTION INTRAVENOUS at 09:24

## 2021-10-24 RX ADMIN — METHADONE HYDROCHLORIDE 10 MG: 10 TABLET ORAL at 09:05

## 2021-10-24 RX ADMIN — HYDRALAZINE HYDROCHLORIDE 100 MG: 50 TABLET, FILM COATED ORAL at 21:23

## 2021-10-24 RX ADMIN — DOCUSATE SODIUM 50 MG AND SENNOSIDES 8.6 MG 2 TABLET: 8.6; 5 TABLET, FILM COATED ORAL at 21:23

## 2021-10-24 RX ADMIN — SODIUM CHLORIDE 1 G: 900 INJECTION INTRAVENOUS at 15:36

## 2021-10-24 RX ADMIN — LISINOPRIL 10 MG: 10 TABLET ORAL at 09:05

## 2021-10-24 RX ADMIN — DIPHENHYDRAMINE HYDROCHLORIDE 25 MG: 25 CAPSULE ORAL at 21:23

## 2021-10-24 RX ADMIN — METHADONE HYDROCHLORIDE 10 MG: 10 TABLET ORAL at 21:25

## 2021-10-24 RX ADMIN — DOCUSATE SODIUM 50 MG AND SENNOSIDES 8.6 MG 2 TABLET: 8.6; 5 TABLET, FILM COATED ORAL at 09:05

## 2021-10-24 RX ADMIN — HYDRALAZINE HYDROCHLORIDE 100 MG: 50 TABLET, FILM COATED ORAL at 15:36

## 2021-10-24 RX ADMIN — Medication 5 MG: at 21:23

## 2021-10-24 RX ADMIN — Medication 1 TABLET: at 09:05

## 2021-10-24 RX ADMIN — CALCIUM ACETATE 667 MG: 667 CAPSULE ORAL at 09:05

## 2021-10-24 RX ADMIN — SODIUM CHLORIDE, PRESERVATIVE FREE 10 ML: 5 INJECTION INTRAVENOUS at 21:24

## 2021-10-24 RX ADMIN — AMLODIPINE BESYLATE 10 MG: 10 TABLET ORAL at 09:05

## 2021-10-24 RX ADMIN — HYDRALAZINE HYDROCHLORIDE 100 MG: 50 TABLET, FILM COATED ORAL at 05:11

## 2021-10-24 RX ADMIN — CARVEDILOL 25 MG: 12.5 TABLET, FILM COATED ORAL at 09:05

## 2021-10-24 RX ADMIN — INSULIN LISPRO 4 UNITS: 100 INJECTION, SOLUTION INTRAVENOUS; SUBCUTANEOUS at 12:50

## 2021-10-24 RX ADMIN — CALCIUM ACETATE 667 MG: 667 CAPSULE ORAL at 17:40

## 2021-10-24 RX ADMIN — CARVEDILOL 25 MG: 12.5 TABLET, FILM COATED ORAL at 17:40

## 2021-10-24 RX ADMIN — INSULIN LISPRO 4 UNITS: 100 INJECTION, SOLUTION INTRAVENOUS; SUBCUTANEOUS at 17:40

## 2021-10-24 RX ADMIN — CALCIUM ACETATE 667 MG: 667 CAPSULE ORAL at 12:50

## 2021-10-25 LAB
ANION GAP SERPL CALCULATED.3IONS-SCNC: 9 MMOL/L (ref 5–15)
BUN SERPL-MCNC: 46 MG/DL (ref 8–23)
BUN/CREAT SERPL: 9.1 (ref 7–25)
CALCIUM SPEC-SCNC: 7.6 MG/DL (ref 8.6–10.5)
CHLORIDE SERPL-SCNC: 102 MMOL/L (ref 98–107)
CO2 SERPL-SCNC: 21 MMOL/L (ref 22–29)
CREAT SERPL-MCNC: 5.05 MG/DL (ref 0.57–1)
DEPRECATED RDW RBC AUTO: 41 FL (ref 37–54)
ERYTHROCYTE [DISTWIDTH] IN BLOOD BY AUTOMATED COUNT: 12.8 % (ref 12.3–15.4)
GFR SERPL CREATININE-BSD FRML MDRD: 9 ML/MIN/1.73
GFR SERPL CREATININE-BSD FRML MDRD: ABNORMAL ML/MIN/{1.73_M2}
GLUCOSE BLDC GLUCOMTR-MCNC: 133 MG/DL (ref 70–130)
GLUCOSE BLDC GLUCOMTR-MCNC: 179 MG/DL (ref 70–130)
GLUCOSE BLDC GLUCOMTR-MCNC: 195 MG/DL (ref 70–130)
GLUCOSE BLDC GLUCOMTR-MCNC: 267 MG/DL (ref 70–130)
GLUCOSE SERPL-MCNC: 119 MG/DL (ref 65–99)
HCT VFR BLD AUTO: 26.9 % (ref 34–46.6)
HGB BLD-MCNC: 9.2 G/DL (ref 12–15.9)
MAGNESIUM SERPL-MCNC: 1.9 MG/DL (ref 1.6–2.4)
MCH RBC QN AUTO: 30.2 PG (ref 26.6–33)
MCHC RBC AUTO-ENTMCNC: 34.2 G/DL (ref 31.5–35.7)
MCV RBC AUTO: 88.2 FL (ref 79–97)
PLATELET # BLD AUTO: 129 10*3/MM3 (ref 140–450)
PMV BLD AUTO: 9.9 FL (ref 6–12)
POTASSIUM SERPL-SCNC: 4.4 MMOL/L (ref 3.5–5.2)
RBC # BLD AUTO: 3.05 10*6/MM3 (ref 3.77–5.28)
SODIUM SERPL-SCNC: 132 MMOL/L (ref 136–145)
WBC # BLD AUTO: 6.18 10*3/MM3 (ref 3.4–10.8)

## 2021-10-25 PROCEDURE — 97530 THERAPEUTIC ACTIVITIES: CPT

## 2021-10-25 PROCEDURE — 82962 GLUCOSE BLOOD TEST: CPT

## 2021-10-25 PROCEDURE — 63710000001 DIPHENHYDRAMINE PER 50 MG: Performed by: SURGERY

## 2021-10-25 PROCEDURE — 80048 BASIC METABOLIC PNL TOTAL CA: CPT | Performed by: NURSE PRACTITIONER

## 2021-10-25 PROCEDURE — 63710000001 INSULIN LISPRO (HUMAN) PER 5 UNITS: Performed by: SURGERY

## 2021-10-25 PROCEDURE — 92526 ORAL FUNCTION THERAPY: CPT

## 2021-10-25 PROCEDURE — 97110 THERAPEUTIC EXERCISES: CPT

## 2021-10-25 PROCEDURE — 85027 COMPLETE CBC AUTOMATED: CPT | Performed by: NURSE PRACTITIONER

## 2021-10-25 PROCEDURE — 97116 GAIT TRAINING THERAPY: CPT

## 2021-10-25 PROCEDURE — 25010000002 CEFTRIAXONE PER 250 MG: Performed by: FAMILY MEDICINE

## 2021-10-25 PROCEDURE — 92507 TX SP LANG VOICE COMM INDIV: CPT

## 2021-10-25 PROCEDURE — 99232 SBSQ HOSP IP/OBS MODERATE 35: CPT | Performed by: INTERNAL MEDICINE

## 2021-10-25 PROCEDURE — 83735 ASSAY OF MAGNESIUM: CPT | Performed by: NURSE PRACTITIONER

## 2021-10-25 PROCEDURE — 97535 SELF CARE MNGMENT TRAINING: CPT

## 2021-10-25 RX ADMIN — CARVEDILOL 25 MG: 12.5 TABLET, FILM COATED ORAL at 18:04

## 2021-10-25 RX ADMIN — AMLODIPINE BESYLATE 10 MG: 10 TABLET ORAL at 09:24

## 2021-10-25 RX ADMIN — CALCIUM ACETATE 667 MG: 667 CAPSULE ORAL at 09:24

## 2021-10-25 RX ADMIN — LISINOPRIL 10 MG: 10 TABLET ORAL at 09:24

## 2021-10-25 RX ADMIN — Medication 1 TABLET: at 09:24

## 2021-10-25 RX ADMIN — SODIUM CHLORIDE, PRESERVATIVE FREE 10 ML: 5 INJECTION INTRAVENOUS at 09:27

## 2021-10-25 RX ADMIN — SODIUM CHLORIDE 1 G: 900 INJECTION INTRAVENOUS at 14:21

## 2021-10-25 RX ADMIN — CARVEDILOL 25 MG: 12.5 TABLET, FILM COATED ORAL at 09:24

## 2021-10-25 RX ADMIN — DOCUSATE SODIUM 50 MG AND SENNOSIDES 8.6 MG 2 TABLET: 8.6; 5 TABLET, FILM COATED ORAL at 09:24

## 2021-10-25 RX ADMIN — BISACODYL 5 MG: 5 TABLET, COATED ORAL at 21:07

## 2021-10-25 RX ADMIN — Medication 5 MG: at 21:06

## 2021-10-25 RX ADMIN — HYDRALAZINE HYDROCHLORIDE 100 MG: 50 TABLET, FILM COATED ORAL at 14:21

## 2021-10-25 RX ADMIN — INSULIN LISPRO 2 UNITS: 100 INJECTION, SOLUTION INTRAVENOUS; SUBCUTANEOUS at 18:05

## 2021-10-25 RX ADMIN — DIPHENHYDRAMINE HYDROCHLORIDE 25 MG: 25 CAPSULE ORAL at 21:07

## 2021-10-25 RX ADMIN — HYDRALAZINE HYDROCHLORIDE 100 MG: 50 TABLET, FILM COATED ORAL at 21:07

## 2021-10-25 RX ADMIN — CALCIUM ACETATE 667 MG: 667 CAPSULE ORAL at 18:04

## 2021-10-25 RX ADMIN — METHADONE HYDROCHLORIDE 10 MG: 10 TABLET ORAL at 21:07

## 2021-10-25 RX ADMIN — DOCUSATE SODIUM 50 MG AND SENNOSIDES 8.6 MG 2 TABLET: 8.6; 5 TABLET, FILM COATED ORAL at 21:07

## 2021-10-25 RX ADMIN — INSULIN LISPRO 6 UNITS: 100 INJECTION, SOLUTION INTRAVENOUS; SUBCUTANEOUS at 12:00

## 2021-10-25 RX ADMIN — SODIUM CHLORIDE, PRESERVATIVE FREE 10 ML: 5 INJECTION INTRAVENOUS at 21:08

## 2021-10-25 RX ADMIN — METHADONE HYDROCHLORIDE 10 MG: 10 TABLET ORAL at 09:24

## 2021-10-25 RX ADMIN — HYDRALAZINE HYDROCHLORIDE 100 MG: 50 TABLET, FILM COATED ORAL at 05:36

## 2021-10-25 RX ADMIN — INSULIN LISPRO 2 UNITS: 100 INJECTION, SOLUTION INTRAVENOUS; SUBCUTANEOUS at 21:07

## 2021-10-25 RX ADMIN — CALCIUM ACETATE 667 MG: 667 CAPSULE ORAL at 12:00

## 2021-10-26 ENCOUNTER — APPOINTMENT (OUTPATIENT)
Dept: NEPHROLOGY | Facility: HOSPITAL | Age: 61
End: 2021-10-26

## 2021-10-26 LAB
GLUCOSE BLDC GLUCOMTR-MCNC: 118 MG/DL (ref 70–130)
GLUCOSE BLDC GLUCOMTR-MCNC: 129 MG/DL (ref 70–130)
GLUCOSE BLDC GLUCOMTR-MCNC: 193 MG/DL (ref 70–130)
GLUCOSE BLDC GLUCOMTR-MCNC: 292 MG/DL (ref 70–130)

## 2021-10-26 PROCEDURE — 92507 TX SP LANG VOICE COMM INDIV: CPT

## 2021-10-26 PROCEDURE — 63710000001 INSULIN LISPRO (HUMAN) PER 5 UNITS: Performed by: SURGERY

## 2021-10-26 PROCEDURE — 25010000002 ONDANSETRON PER 1 MG: Performed by: SURGERY

## 2021-10-26 PROCEDURE — 25010000002 HEPARIN (PORCINE) PER 1000 UNITS: Performed by: SURGERY

## 2021-10-26 PROCEDURE — 82962 GLUCOSE BLOOD TEST: CPT

## 2021-10-26 PROCEDURE — 25010000002 CEFTRIAXONE PER 250 MG: Performed by: FAMILY MEDICINE

## 2021-10-26 PROCEDURE — 63710000001 DIPHENHYDRAMINE PER 50 MG: Performed by: SURGERY

## 2021-10-26 PROCEDURE — 99232 SBSQ HOSP IP/OBS MODERATE 35: CPT | Performed by: INTERNAL MEDICINE

## 2021-10-26 RX ORDER — AMOXICILLIN 250 MG
2 CAPSULE ORAL 2 TIMES DAILY
Status: DISCONTINUED | OUTPATIENT
Start: 2021-10-26 | End: 2021-10-28 | Stop reason: HOSPADM

## 2021-10-26 RX ORDER — POLYETHYLENE GLYCOL 3350 17 G/17G
17 POWDER, FOR SOLUTION ORAL DAILY
Status: DISCONTINUED | OUTPATIENT
Start: 2021-10-26 | End: 2021-10-28 | Stop reason: HOSPADM

## 2021-10-26 RX ORDER — BISACODYL 5 MG/1
5 TABLET, DELAYED RELEASE ORAL DAILY PRN
Status: DISCONTINUED | OUTPATIENT
Start: 2021-10-26 | End: 2021-10-28 | Stop reason: HOSPADM

## 2021-10-26 RX ORDER — ALBUMIN (HUMAN) 12.5 G/50ML
12.5 SOLUTION INTRAVENOUS AS NEEDED
Status: ACTIVE | OUTPATIENT
Start: 2021-10-26 | End: 2021-10-26

## 2021-10-26 RX ORDER — BISACODYL 10 MG
10 SUPPOSITORY, RECTAL RECTAL DAILY PRN
Status: DISCONTINUED | OUTPATIENT
Start: 2021-10-26 | End: 2021-10-28 | Stop reason: HOSPADM

## 2021-10-26 RX ADMIN — INSULIN LISPRO 2 UNITS: 100 INJECTION, SOLUTION INTRAVENOUS; SUBCUTANEOUS at 21:02

## 2021-10-26 RX ADMIN — AMLODIPINE BESYLATE 10 MG: 10 TABLET ORAL at 11:26

## 2021-10-26 RX ADMIN — SODIUM CHLORIDE, PRESERVATIVE FREE 10 ML: 5 INJECTION INTRAVENOUS at 21:06

## 2021-10-26 RX ADMIN — CARVEDILOL 25 MG: 12.5 TABLET, FILM COATED ORAL at 17:46

## 2021-10-26 RX ADMIN — Medication 1 TABLET: at 11:25

## 2021-10-26 RX ADMIN — HYDRALAZINE HYDROCHLORIDE 100 MG: 50 TABLET, FILM COATED ORAL at 13:55

## 2021-10-26 RX ADMIN — HYDRALAZINE HYDROCHLORIDE 100 MG: 50 TABLET, FILM COATED ORAL at 11:25

## 2021-10-26 RX ADMIN — HYDRALAZINE HYDROCHLORIDE 100 MG: 50 TABLET, FILM COATED ORAL at 21:01

## 2021-10-26 RX ADMIN — SODIUM CHLORIDE, PRESERVATIVE FREE 10 ML: 5 INJECTION INTRAVENOUS at 11:27

## 2021-10-26 RX ADMIN — LIDOCAINE 1 PATCH: 50 PATCH CUTANEOUS at 17:46

## 2021-10-26 RX ADMIN — LISINOPRIL 10 MG: 10 TABLET ORAL at 11:26

## 2021-10-26 RX ADMIN — ONDANSETRON 4 MG: 2 INJECTION INTRAMUSCULAR; INTRAVENOUS at 23:41

## 2021-10-26 RX ADMIN — ONDANSETRON 4 MG: 2 INJECTION INTRAMUSCULAR; INTRAVENOUS at 15:57

## 2021-10-26 RX ADMIN — DOCUSATE SODIUM 50MG AND SENNOSIDES 8.6MG 2 TABLET: 8.6; 5 TABLET, FILM COATED ORAL at 21:01

## 2021-10-26 RX ADMIN — DIPHENHYDRAMINE HYDROCHLORIDE 25 MG: 25 CAPSULE ORAL at 21:02

## 2021-10-26 RX ADMIN — SODIUM CHLORIDE 1 G: 900 INJECTION INTRAVENOUS at 13:55

## 2021-10-26 RX ADMIN — CALCIUM ACETATE 667 MG: 667 CAPSULE ORAL at 11:26

## 2021-10-26 RX ADMIN — CARVEDILOL 25 MG: 12.5 TABLET, FILM COATED ORAL at 11:25

## 2021-10-26 RX ADMIN — CALCIUM ACETATE 667 MG: 667 CAPSULE ORAL at 17:45

## 2021-10-26 RX ADMIN — BISACODYL 10 MG: 10 SUPPOSITORY RECTAL at 16:09

## 2021-10-26 RX ADMIN — Medication 5 MG: at 21:01

## 2021-10-26 RX ADMIN — HEPARIN SODIUM 1600 UNITS: 1000 INJECTION INTRAVENOUS; SUBCUTANEOUS at 11:32

## 2021-10-26 RX ADMIN — INSULIN LISPRO 6 UNITS: 100 INJECTION, SOLUTION INTRAVENOUS; SUBCUTANEOUS at 13:51

## 2021-10-26 RX ADMIN — METHADONE HYDROCHLORIDE 10 MG: 10 TABLET ORAL at 11:26

## 2021-10-26 RX ADMIN — METHADONE HYDROCHLORIDE 10 MG: 10 TABLET ORAL at 21:01

## 2021-10-26 RX ADMIN — ACETAMINOPHEN 650 MG: 325 TABLET, FILM COATED ORAL at 11:28

## 2021-10-26 RX ADMIN — POLYETHYLENE GLYCOL 3350 17 G: 17 POWDER, FOR SOLUTION ORAL at 13:51

## 2021-10-27 LAB
GLUCOSE BLDC GLUCOMTR-MCNC: 123 MG/DL (ref 70–130)
GLUCOSE BLDC GLUCOMTR-MCNC: 138 MG/DL (ref 70–130)
GLUCOSE BLDC GLUCOMTR-MCNC: 176 MG/DL (ref 70–130)
GLUCOSE BLDC GLUCOMTR-MCNC: 230 MG/DL (ref 70–130)

## 2021-10-27 PROCEDURE — 82962 GLUCOSE BLOOD TEST: CPT

## 2021-10-27 PROCEDURE — 63710000001 DIPHENHYDRAMINE PER 50 MG: Performed by: SURGERY

## 2021-10-27 PROCEDURE — 92507 TX SP LANG VOICE COMM INDIV: CPT

## 2021-10-27 PROCEDURE — 97530 THERAPEUTIC ACTIVITIES: CPT

## 2021-10-27 PROCEDURE — 63710000001 INSULIN LISPRO (HUMAN) PER 5 UNITS: Performed by: SURGERY

## 2021-10-27 PROCEDURE — 99232 SBSQ HOSP IP/OBS MODERATE 35: CPT | Performed by: NURSE PRACTITIONER

## 2021-10-27 RX ORDER — ALBUMIN (HUMAN) 12.5 G/50ML
12.5 SOLUTION INTRAVENOUS AS NEEDED
Status: DISCONTINUED | OUTPATIENT
Start: 2021-10-28 | End: 2021-10-28 | Stop reason: HOSPADM

## 2021-10-27 RX ADMIN — LISINOPRIL 10 MG: 10 TABLET ORAL at 08:56

## 2021-10-27 RX ADMIN — INSULIN LISPRO 2 UNITS: 100 INJECTION, SOLUTION INTRAVENOUS; SUBCUTANEOUS at 13:27

## 2021-10-27 RX ADMIN — DOCUSATE SODIUM 50MG AND SENNOSIDES 8.6MG 2 TABLET: 8.6; 5 TABLET, FILM COATED ORAL at 21:13

## 2021-10-27 RX ADMIN — CALCIUM ACETATE 667 MG: 667 CAPSULE ORAL at 13:27

## 2021-10-27 RX ADMIN — INSULIN LISPRO 4 UNITS: 100 INJECTION, SOLUTION INTRAVENOUS; SUBCUTANEOUS at 21:46

## 2021-10-27 RX ADMIN — AMLODIPINE BESYLATE 10 MG: 10 TABLET ORAL at 08:56

## 2021-10-27 RX ADMIN — DOCUSATE SODIUM 50MG AND SENNOSIDES 8.6MG 2 TABLET: 8.6; 5 TABLET, FILM COATED ORAL at 08:56

## 2021-10-27 RX ADMIN — Medication 1 TABLET: at 08:56

## 2021-10-27 RX ADMIN — HYDRALAZINE HYDROCHLORIDE 100 MG: 50 TABLET, FILM COATED ORAL at 05:15

## 2021-10-27 RX ADMIN — CALCIUM ACETATE 667 MG: 667 CAPSULE ORAL at 18:08

## 2021-10-27 RX ADMIN — Medication 5 MG: at 21:13

## 2021-10-27 RX ADMIN — DIPHENHYDRAMINE HYDROCHLORIDE 25 MG: 25 CAPSULE ORAL at 21:13

## 2021-10-27 RX ADMIN — METHADONE HYDROCHLORIDE 10 MG: 10 TABLET ORAL at 08:56

## 2021-10-27 RX ADMIN — HYDRALAZINE HYDROCHLORIDE 100 MG: 50 TABLET, FILM COATED ORAL at 13:27

## 2021-10-27 RX ADMIN — HYDRALAZINE HYDROCHLORIDE 100 MG: 50 TABLET, FILM COATED ORAL at 21:14

## 2021-10-27 RX ADMIN — CARVEDILOL 25 MG: 12.5 TABLET, FILM COATED ORAL at 08:56

## 2021-10-27 RX ADMIN — SODIUM CHLORIDE, PRESERVATIVE FREE 10 ML: 5 INJECTION INTRAVENOUS at 13:27

## 2021-10-27 RX ADMIN — POLYETHYLENE GLYCOL 3350 17 G: 17 POWDER, FOR SOLUTION ORAL at 08:56

## 2021-10-27 RX ADMIN — METHADONE HYDROCHLORIDE 10 MG: 10 TABLET ORAL at 21:13

## 2021-10-27 RX ADMIN — LIDOCAINE 1 PATCH: 50 PATCH CUTANEOUS at 18:10

## 2021-10-27 RX ADMIN — SODIUM CHLORIDE, PRESERVATIVE FREE 10 ML: 5 INJECTION INTRAVENOUS at 21:14

## 2021-10-27 RX ADMIN — CALCIUM ACETATE 667 MG: 667 CAPSULE ORAL at 09:00

## 2021-10-28 ENCOUNTER — APPOINTMENT (OUTPATIENT)
Dept: NEPHROLOGY | Facility: HOSPITAL | Age: 61
End: 2021-10-28

## 2021-10-28 VITALS
SYSTOLIC BLOOD PRESSURE: 101 MMHG | DIASTOLIC BLOOD PRESSURE: 64 MMHG | OXYGEN SATURATION: 96 % | HEART RATE: 95 BPM | HEIGHT: 68 IN | TEMPERATURE: 98.6 F | BODY MASS INDEX: 23.27 KG/M2 | RESPIRATION RATE: 18 BRPM | WEIGHT: 153.5 LBS

## 2021-10-28 LAB — GLUCOSE BLDC GLUCOMTR-MCNC: 111 MG/DL (ref 70–130)

## 2021-10-28 PROCEDURE — 99239 HOSP IP/OBS DSCHRG MGMT >30: CPT | Performed by: NURSE PRACTITIONER

## 2021-10-28 PROCEDURE — 82962 GLUCOSE BLOOD TEST: CPT

## 2021-10-28 RX ORDER — AMLODIPINE BESYLATE 10 MG/1
10 TABLET ORAL
Start: 2021-10-28

## 2021-10-28 RX ORDER — CLONIDINE HYDROCHLORIDE 0.1 MG/1
0.1 TABLET ORAL EVERY 6 HOURS PRN
Start: 2021-10-28 | End: 2021-12-08

## 2021-10-28 RX ORDER — AMOXICILLIN 250 MG
2 CAPSULE ORAL 2 TIMES DAILY
Start: 2021-10-28 | End: 2022-05-25 | Stop reason: HOSPADM

## 2021-10-28 RX ORDER — CALCIUM ACETATE 667 MG/1
667 CAPSULE ORAL
Qty: 180 CAPSULE
Start: 2021-10-28

## 2021-10-28 RX ORDER — METHADONE HYDROCHLORIDE 10 MG/1
10 TABLET ORAL EVERY 12 HOURS SCHEDULED
Qty: 6 TABLET | Refills: 0 | Status: SHIPPED | OUTPATIENT
Start: 2021-10-28 | End: 2021-12-08

## 2021-10-28 RX ORDER — HYDRALAZINE HYDROCHLORIDE 100 MG/1
100 TABLET, FILM COATED ORAL EVERY 8 HOURS SCHEDULED
Start: 2021-10-28 | End: 2021-12-21 | Stop reason: HOSPADM

## 2021-10-28 RX ORDER — LIDOCAINE 50 MG/G
1 PATCH TOPICAL EVERY 24 HOURS
Start: 2021-10-28 | End: 2021-12-08

## 2021-10-28 RX ORDER — DIPHENHYDRAMINE HCL 25 MG
25 CAPSULE ORAL NIGHTLY
Start: 2021-10-28 | End: 2021-12-08

## 2021-10-28 RX ORDER — CHOLECALCIFEROL (VITAMIN D3) 125 MCG
5 CAPSULE ORAL NIGHTLY
Start: 2021-10-28 | End: 2021-12-08

## 2021-10-28 RX ORDER — POLYETHYLENE GLYCOL 3350 17 G/17G
17 POWDER, FOR SOLUTION ORAL DAILY
Start: 2021-10-28 | End: 2022-05-25 | Stop reason: HOSPADM

## 2021-10-28 RX ORDER — LISINOPRIL 10 MG/1
10 TABLET ORAL
Start: 2021-10-28 | End: 2021-12-08

## 2021-10-28 RX ORDER — FOLIC ACID/VIT B COMPLEX AND C 0.8 MG
1 TABLET ORAL DAILY
Qty: 30 TABLET
Start: 2021-10-28 | End: 2021-12-08

## 2021-10-28 RX ADMIN — Medication 1 TABLET: at 12:27

## 2021-10-28 RX ADMIN — POLYETHYLENE GLYCOL 3350 17 G: 17 POWDER, FOR SOLUTION ORAL at 12:25

## 2021-10-28 RX ADMIN — CARVEDILOL 25 MG: 12.5 TABLET, FILM COATED ORAL at 12:27

## 2021-10-28 RX ADMIN — AMLODIPINE BESYLATE 10 MG: 10 TABLET ORAL at 12:26

## 2021-10-28 RX ADMIN — CALCIUM ACETATE 667 MG: 667 CAPSULE ORAL at 12:26

## 2021-10-28 RX ADMIN — LISINOPRIL 10 MG: 10 TABLET ORAL at 12:27

## 2021-10-28 RX ADMIN — METHADONE HYDROCHLORIDE 10 MG: 10 TABLET ORAL at 12:26

## 2021-10-28 RX ADMIN — DOCUSATE SODIUM 50MG AND SENNOSIDES 8.6MG 2 TABLET: 8.6; 5 TABLET, FILM COATED ORAL at 12:26

## 2021-12-08 ENCOUNTER — HOSPITAL ENCOUNTER (INPATIENT)
Facility: HOSPITAL | Age: 61
LOS: 13 days | Discharge: HOME HEALTH | End: 2021-12-21
Attending: EMERGENCY MEDICINE | Admitting: INTERNAL MEDICINE

## 2021-12-08 ENCOUNTER — APPOINTMENT (OUTPATIENT)
Dept: CT IMAGING | Facility: HOSPITAL | Age: 61
End: 2021-12-08

## 2021-12-08 ENCOUNTER — APPOINTMENT (OUTPATIENT)
Dept: GENERAL RADIOLOGY | Facility: HOSPITAL | Age: 61
End: 2021-12-08

## 2021-12-08 DIAGNOSIS — J18.9 PNEUMONIA OF RIGHT LOWER LOBE DUE TO INFECTIOUS ORGANISM: ICD-10-CM

## 2021-12-08 DIAGNOSIS — E87.6 HYPOKALEMIA: ICD-10-CM

## 2021-12-08 DIAGNOSIS — K70.30 ALCOHOLIC CIRRHOSIS OF LIVER WITHOUT ASCITES (HCC): Chronic | ICD-10-CM

## 2021-12-08 DIAGNOSIS — E83.51 HYPOCALCEMIA: ICD-10-CM

## 2021-12-08 DIAGNOSIS — I10 ESSENTIAL HYPERTENSION: ICD-10-CM

## 2021-12-08 DIAGNOSIS — R41.0 DELIRIUM: ICD-10-CM

## 2021-12-08 DIAGNOSIS — N18.6 END STAGE RENAL DISEASE (HCC): ICD-10-CM

## 2021-12-08 DIAGNOSIS — Z89.512 S/P BKA (BELOW KNEE AMPUTATION), LEFT (HCC): ICD-10-CM

## 2021-12-08 DIAGNOSIS — I61.0 NONTRAUMATIC SUBCORTICAL HEMORRHAGE OF LEFT CEREBRAL HEMISPHERE (HCC): ICD-10-CM

## 2021-12-08 DIAGNOSIS — L97.519 ULCER OF FOOT, CHRONIC, RIGHT, WITH UNSPECIFIED SEVERITY (HCC): Chronic | ICD-10-CM

## 2021-12-08 DIAGNOSIS — R41.0 CONFUSION: Primary | ICD-10-CM

## 2021-12-08 PROBLEM — R41.82 AMS (ALTERED MENTAL STATUS): Status: ACTIVE | Noted: 2021-12-08

## 2021-12-08 PROBLEM — D64.9 NORMOCYTIC ANEMIA: Status: ACTIVE | Noted: 2021-12-08

## 2021-12-08 PROBLEM — K52.9 COLITIS: Status: ACTIVE | Noted: 2021-12-08

## 2021-12-08 LAB
ALBUMIN SERPL-MCNC: 3.6 G/DL (ref 3.5–5.2)
ALBUMIN/GLOB SERPL: 1.2 G/DL
ALP SERPL-CCNC: 90 U/L (ref 39–117)
ALT SERPL W P-5'-P-CCNC: 28 U/L (ref 1–33)
AMMONIA BLD-SCNC: 10 UMOL/L (ref 11–51)
AMPHET+METHAMPHET UR QL: NEGATIVE
AMPHETAMINES UR QL: NEGATIVE
ANION GAP SERPL CALCULATED.3IONS-SCNC: 12 MMOL/L (ref 5–15)
ARTERIAL PATENCY WRIST A: ABNORMAL
AST SERPL-CCNC: 31 U/L (ref 1–32)
ATMOSPHERIC PRESS: ABNORMAL MM[HG]
BACTERIA UR QL AUTO: NORMAL /HPF
BARBITURATES UR QL SCN: NEGATIVE
BASE EXCESS BLDA CALC-SCNC: 4.9 MMOL/L (ref 0–2)
BASOPHILS # BLD AUTO: 0.03 10*3/MM3 (ref 0–0.2)
BASOPHILS NFR BLD AUTO: 0.6 % (ref 0–1.5)
BDY SITE: ABNORMAL
BENZODIAZ UR QL SCN: NEGATIVE
BILIRUB SERPL-MCNC: 0.5 MG/DL (ref 0–1.2)
BILIRUB UR QL STRIP: NEGATIVE
BODY TEMPERATURE: 37 C
BUN SERPL-MCNC: 24 MG/DL (ref 8–23)
BUN/CREAT SERPL: 6.9 (ref 7–25)
BUPRENORPHINE SERPL-MCNC: NEGATIVE NG/ML
CALCIUM SPEC-SCNC: 8.4 MG/DL (ref 8.6–10.5)
CANNABINOIDS SERPL QL: NEGATIVE
CHLORIDE SERPL-SCNC: 101 MMOL/L (ref 98–107)
CLARITY UR: CLEAR
CO2 BLDA-SCNC: 27.5 MMOL/L (ref 22–33)
CO2 SERPL-SCNC: 28 MMOL/L (ref 22–29)
COCAINE UR QL: NEGATIVE
COHGB MFR BLD: 1.1 % (ref 0–2)
COLOR UR: YELLOW
CREAT SERPL-MCNC: 3.46 MG/DL (ref 0.57–1)
D-LACTATE SERPL-SCNC: 1.4 MMOL/L (ref 0.5–2)
DEPRECATED RDW RBC AUTO: 46.8 FL (ref 37–54)
EOSINOPHIL # BLD AUTO: 0.07 10*3/MM3 (ref 0–0.4)
EOSINOPHIL NFR BLD AUTO: 1.3 % (ref 0.3–6.2)
ERYTHROCYTE [DISTWIDTH] IN BLOOD BY AUTOMATED COUNT: 14.6 % (ref 12.3–15.4)
ETHANOL BLD-MCNC: <10 MG/DL (ref 0–10)
GFR SERPL CREATININE-BSD FRML MDRD: 13 ML/MIN/1.73
GFR SERPL CREATININE-BSD FRML MDRD: ABNORMAL ML/MIN/{1.73_M2}
GLOBULIN UR ELPH-MCNC: 2.9 GM/DL
GLUCOSE BLDC GLUCOMTR-MCNC: 136 MG/DL (ref 70–130)
GLUCOSE SERPL-MCNC: 246 MG/DL (ref 65–99)
GLUCOSE UR STRIP-MCNC: ABNORMAL MG/DL
HCO3 BLDA-SCNC: 26.7 MMOL/L (ref 20–26)
HCT VFR BLD AUTO: 28.7 % (ref 34–46.6)
HCT VFR BLD CALC: 28.7 % (ref 38–51)
HGB BLD-MCNC: 10.3 G/DL (ref 12–15.9)
HGB BLDA-MCNC: 9.4 G/DL (ref 14–18)
HGB UR QL STRIP.AUTO: NEGATIVE
HYALINE CASTS UR QL AUTO: NORMAL /LPF
IMM GRANULOCYTES # BLD AUTO: 0.02 10*3/MM3 (ref 0–0.05)
IMM GRANULOCYTES NFR BLD AUTO: 0.4 % (ref 0–0.5)
INHALED O2 CONCENTRATION: 21 %
KETONES UR QL STRIP: NEGATIVE
LEUKOCYTE ESTERASE UR QL STRIP.AUTO: NEGATIVE
LYMPHOCYTES # BLD AUTO: 1.25 10*3/MM3 (ref 0.7–3.1)
LYMPHOCYTES NFR BLD AUTO: 22.9 % (ref 19.6–45.3)
Lab: ABNORMAL
MAGNESIUM SERPL-MCNC: 1.9 MG/DL (ref 1.6–2.4)
MCH RBC QN AUTO: 32.6 PG (ref 26.6–33)
MCHC RBC AUTO-ENTMCNC: 35.9 G/DL (ref 31.5–35.7)
MCV RBC AUTO: 90.8 FL (ref 79–97)
METHADONE UR QL SCN: NEGATIVE
METHGB BLD QL: 0.2 % (ref 0–1.5)
MODALITY: ABNORMAL
MONOCYTES # BLD AUTO: 0.52 10*3/MM3 (ref 0.1–0.9)
MONOCYTES NFR BLD AUTO: 9.5 % (ref 5–12)
NEUTROPHILS NFR BLD AUTO: 3.56 10*3/MM3 (ref 1.7–7)
NEUTROPHILS NFR BLD AUTO: 65.3 % (ref 42.7–76)
NITRITE UR QL STRIP: NEGATIVE
NOTE: ABNORMAL
NOTIFIED BY: ABNORMAL
NOTIFIED WHO: ABNORMAL
NRBC BLD AUTO-RTO: 0 /100 WBC (ref 0–0.2)
OPIATES UR QL: NEGATIVE
OXYCODONE UR QL SCN: NEGATIVE
OXYHGB MFR BLDV: 97.7 % (ref 94–99)
PCO2 BLDA: 28.4 MM HG (ref 35–45)
PCO2 TEMP ADJ BLD: 28.4 MM HG (ref 35–45)
PCP UR QL SCN: NEGATIVE
PH BLDA: 7.58 PH UNITS (ref 7.35–7.45)
PH UR STRIP.AUTO: 8.5 [PH] (ref 5–8)
PH, TEMP CORRECTED: 7.58 PH UNITS
PLATELET # BLD AUTO: 158 10*3/MM3 (ref 140–450)
PMV BLD AUTO: 9.5 FL (ref 6–12)
PO2 BLDA: 92.6 MM HG (ref 83–108)
PO2 TEMP ADJ BLD: 92.6 MM HG (ref 83–108)
POTASSIUM SERPL-SCNC: 3.1 MMOL/L (ref 3.5–5.2)
PROCALCITONIN SERPL-MCNC: 0.13 NG/ML (ref 0–0.25)
PROPOXYPH UR QL: NEGATIVE
PROT SERPL-MCNC: 6.5 G/DL (ref 6–8.5)
PROT UR QL STRIP: ABNORMAL
RBC # BLD AUTO: 3.16 10*6/MM3 (ref 3.77–5.28)
RBC # UR STRIP: NORMAL /HPF
REF LAB TEST METHOD: NORMAL
SODIUM SERPL-SCNC: 141 MMOL/L (ref 136–145)
SP GR UR STRIP: 1.02 (ref 1–1.03)
SQUAMOUS #/AREA URNS HPF: NORMAL /HPF
TRICYCLICS UR QL SCN: NEGATIVE
TSH SERPL DL<=0.05 MIU/L-ACNC: 0.76 UIU/ML (ref 0.27–4.2)
UROBILINOGEN UR QL STRIP: ABNORMAL
VENTILATOR MODE: ABNORMAL
WBC # UR STRIP: NORMAL /HPF
WBC NRBC COR # BLD: 5.45 10*3/MM3 (ref 3.4–10.8)

## 2021-12-08 PROCEDURE — 80306 DRUG TEST PRSMV INSTRMNT: CPT | Performed by: EMERGENCY MEDICINE

## 2021-12-08 PROCEDURE — 36600 WITHDRAWAL OF ARTERIAL BLOOD: CPT

## 2021-12-08 PROCEDURE — 83735 ASSAY OF MAGNESIUM: CPT | Performed by: INTERNAL MEDICINE

## 2021-12-08 PROCEDURE — 82805 BLOOD GASES W/O2 SATURATION: CPT

## 2021-12-08 PROCEDURE — 81001 URINALYSIS AUTO W/SCOPE: CPT | Performed by: EMERGENCY MEDICINE

## 2021-12-08 PROCEDURE — 93005 ELECTROCARDIOGRAM TRACING: CPT | Performed by: INTERNAL MEDICINE

## 2021-12-08 PROCEDURE — 82375 ASSAY CARBOXYHB QUANT: CPT

## 2021-12-08 PROCEDURE — 80053 COMPREHEN METABOLIC PANEL: CPT | Performed by: EMERGENCY MEDICINE

## 2021-12-08 PROCEDURE — 82140 ASSAY OF AMMONIA: CPT | Performed by: EMERGENCY MEDICINE

## 2021-12-08 PROCEDURE — 84443 ASSAY THYROID STIM HORMONE: CPT | Performed by: INTERNAL MEDICINE

## 2021-12-08 PROCEDURE — 85025 COMPLETE CBC W/AUTO DIFF WBC: CPT | Performed by: EMERGENCY MEDICINE

## 2021-12-08 PROCEDURE — 25010000002 HEPARIN (PORCINE) PER 1000 UNITS: Performed by: INTERNAL MEDICINE

## 2021-12-08 PROCEDURE — 87899 AGENT NOS ASSAY W/OPTIC: CPT | Performed by: INTERNAL MEDICINE

## 2021-12-08 PROCEDURE — 82962 GLUCOSE BLOOD TEST: CPT

## 2021-12-08 PROCEDURE — 36415 COLL VENOUS BLD VENIPUNCTURE: CPT

## 2021-12-08 PROCEDURE — 71045 X-RAY EXAM CHEST 1 VIEW: CPT

## 2021-12-08 PROCEDURE — P9612 CATHETERIZE FOR URINE SPEC: HCPCS

## 2021-12-08 PROCEDURE — 25010000002 PIPERACILLIN SOD-TAZOBACTAM PER 1 G: Performed by: INTERNAL MEDICINE

## 2021-12-08 PROCEDURE — 83050 HGB METHEMOGLOBIN QUAN: CPT

## 2021-12-08 PROCEDURE — 94640 AIRWAY INHALATION TREATMENT: CPT

## 2021-12-08 PROCEDURE — 84145 PROCALCITONIN (PCT): CPT | Performed by: INTERNAL MEDICINE

## 2021-12-08 PROCEDURE — 87040 BLOOD CULTURE FOR BACTERIA: CPT | Performed by: EMERGENCY MEDICINE

## 2021-12-08 PROCEDURE — 0202U NFCT DS 22 TRGT SARS-COV-2: CPT | Performed by: INTERNAL MEDICINE

## 2021-12-08 PROCEDURE — 70450 CT HEAD/BRAIN W/O DYE: CPT

## 2021-12-08 PROCEDURE — 83605 ASSAY OF LACTIC ACID: CPT | Performed by: EMERGENCY MEDICINE

## 2021-12-08 PROCEDURE — 82077 ASSAY SPEC XCP UR&BREATH IA: CPT | Performed by: EMERGENCY MEDICINE

## 2021-12-08 PROCEDURE — 87641 MR-STAPH DNA AMP PROBE: CPT | Performed by: INTERNAL MEDICINE

## 2021-12-08 PROCEDURE — 25010000002 CEFTRIAXONE PER 250 MG: Performed by: EMERGENCY MEDICINE

## 2021-12-08 PROCEDURE — 99222 1ST HOSP IP/OBS MODERATE 55: CPT | Performed by: INTERNAL MEDICINE

## 2021-12-08 PROCEDURE — 74176 CT ABD & PELVIS W/O CONTRAST: CPT

## 2021-12-08 PROCEDURE — 99285 EMERGENCY DEPT VISIT HI MDM: CPT

## 2021-12-08 RX ORDER — HYDRALAZINE HYDROCHLORIDE 50 MG/1
100 TABLET, FILM COATED ORAL EVERY 8 HOURS SCHEDULED
Status: DISCONTINUED | OUTPATIENT
Start: 2021-12-08 | End: 2021-12-17

## 2021-12-08 RX ORDER — NICOTINE POLACRILEX 4 MG
15 LOZENGE BUCCAL
Status: DISCONTINUED | OUTPATIENT
Start: 2021-12-08 | End: 2021-12-21 | Stop reason: HOSPADM

## 2021-12-08 RX ORDER — ATORVASTATIN CALCIUM 40 MG/1
80 TABLET, FILM COATED ORAL DAILY
Status: DISCONTINUED | OUTPATIENT
Start: 2021-12-09 | End: 2021-12-21 | Stop reason: HOSPADM

## 2021-12-08 RX ORDER — IPRATROPIUM BROMIDE AND ALBUTEROL SULFATE 2.5; .5 MG/3ML; MG/3ML
3 SOLUTION RESPIRATORY (INHALATION) EVERY 4 HOURS PRN
Status: DISCONTINUED | OUTPATIENT
Start: 2021-12-08 | End: 2021-12-21 | Stop reason: HOSPADM

## 2021-12-08 RX ORDER — CALCIUM ACETATE 667 MG/1
667 CAPSULE ORAL
Status: DISCONTINUED | OUTPATIENT
Start: 2021-12-09 | End: 2021-12-21 | Stop reason: HOSPADM

## 2021-12-08 RX ORDER — ACETAMINOPHEN 650 MG/1
650 SUPPOSITORY RECTAL EVERY 4 HOURS PRN
Status: DISCONTINUED | OUTPATIENT
Start: 2021-12-08 | End: 2021-12-21 | Stop reason: HOSPADM

## 2021-12-08 RX ORDER — SODIUM CHLORIDE 9 MG/ML
250 INJECTION, SOLUTION INTRAVENOUS CONTINUOUS
Status: DISCONTINUED | OUTPATIENT
Start: 2021-12-08 | End: 2021-12-08

## 2021-12-08 RX ORDER — CARVEDILOL 12.5 MG/1
25 TABLET ORAL 2 TIMES DAILY WITH MEALS
Status: DISCONTINUED | OUTPATIENT
Start: 2021-12-08 | End: 2021-12-21 | Stop reason: HOSPADM

## 2021-12-08 RX ORDER — GARLIC EXTRACT 500 MG
1 CAPSULE ORAL DAILY
Status: ON HOLD | COMMUNITY
End: 2022-08-23

## 2021-12-08 RX ORDER — SODIUM CHLORIDE 0.9 % (FLUSH) 0.9 %
10 SYRINGE (ML) INJECTION AS NEEDED
Status: DISCONTINUED | OUTPATIENT
Start: 2021-12-08 | End: 2021-12-21 | Stop reason: HOSPADM

## 2021-12-08 RX ORDER — DEXTROSE MONOHYDRATE 25 G/50ML
25 INJECTION, SOLUTION INTRAVENOUS
Status: DISCONTINUED | OUTPATIENT
Start: 2021-12-08 | End: 2021-12-21 | Stop reason: HOSPADM

## 2021-12-08 RX ORDER — SODIUM CHLORIDE 0.9 % (FLUSH) 0.9 %
10 SYRINGE (ML) INJECTION EVERY 12 HOURS SCHEDULED
Status: DISCONTINUED | OUTPATIENT
Start: 2021-12-08 | End: 2021-12-21 | Stop reason: HOSPADM

## 2021-12-08 RX ORDER — AMLODIPINE BESYLATE 5 MG/1
10 TABLET ORAL
Status: DISCONTINUED | OUTPATIENT
Start: 2021-12-09 | End: 2021-12-19

## 2021-12-08 RX ORDER — ATORVASTATIN CALCIUM 80 MG/1
80 TABLET, FILM COATED ORAL NIGHTLY
COMMUNITY

## 2021-12-08 RX ORDER — IPRATROPIUM BROMIDE AND ALBUTEROL SULFATE 2.5; .5 MG/3ML; MG/3ML
3 SOLUTION RESPIRATORY (INHALATION)
Status: DISCONTINUED | OUTPATIENT
Start: 2021-12-08 | End: 2021-12-12

## 2021-12-08 RX ORDER — HEPARIN SODIUM 5000 [USP'U]/ML
5000 INJECTION, SOLUTION INTRAVENOUS; SUBCUTANEOUS EVERY 8 HOURS SCHEDULED
Status: DISCONTINUED | OUTPATIENT
Start: 2021-12-08 | End: 2021-12-21 | Stop reason: HOSPADM

## 2021-12-08 RX ORDER — ACETAMINOPHEN 160 MG/5ML
650 SOLUTION ORAL EVERY 4 HOURS PRN
Status: DISCONTINUED | OUTPATIENT
Start: 2021-12-08 | End: 2021-12-21 | Stop reason: HOSPADM

## 2021-12-08 RX ORDER — ACETAMINOPHEN 325 MG/1
650 TABLET ORAL EVERY 4 HOURS PRN
Status: DISCONTINUED | OUTPATIENT
Start: 2021-12-08 | End: 2021-12-21 | Stop reason: HOSPADM

## 2021-12-08 RX ADMIN — IPRATROPIUM BROMIDE AND ALBUTEROL SULFATE 3 ML: 2.5; .5 SOLUTION RESPIRATORY (INHALATION) at 21:22

## 2021-12-08 RX ADMIN — DOXYCYCLINE 100 MG: 100 INJECTION, POWDER, LYOPHILIZED, FOR SOLUTION INTRAVENOUS at 22:56

## 2021-12-08 RX ADMIN — TAZOBACTAM SODIUM AND PIPERACILLIN SODIUM 3.38 G: 375; 3 INJECTION, SOLUTION INTRAVENOUS at 22:07

## 2021-12-08 RX ADMIN — SODIUM CHLORIDE 250 ML/HR: 9 INJECTION, SOLUTION INTRAVENOUS at 17:38

## 2021-12-08 RX ADMIN — ACETAMINOPHEN 650 MG: 325 TABLET, FILM COATED ORAL at 22:08

## 2021-12-08 RX ADMIN — HEPARIN SODIUM 5000 UNITS: 5000 INJECTION INTRAVENOUS; SUBCUTANEOUS at 22:08

## 2021-12-08 RX ADMIN — SODIUM CHLORIDE 250 ML/HR: 9 INJECTION, SOLUTION INTRAVENOUS at 22:07

## 2021-12-08 RX ADMIN — CARVEDILOL 25 MG: 12.5 TABLET, FILM COATED ORAL at 22:08

## 2021-12-08 RX ADMIN — SODIUM CHLORIDE, PRESERVATIVE FREE 10 ML: 5 INJECTION INTRAVENOUS at 22:08

## 2021-12-08 RX ADMIN — HYDRALAZINE HYDROCHLORIDE 100 MG: 50 TABLET, FILM COATED ORAL at 22:08

## 2021-12-08 RX ADMIN — SODIUM CHLORIDE 1 G: 900 INJECTION INTRAVENOUS at 17:39

## 2021-12-08 NOTE — DISCHARGE INSTRUCTIONS
Continue take your medications as prescribed.    Drink plenty of fluids.    Return to the emergency department immediately for new or change concerns.    Follow-up with your PCP in the next week as well as with your care team at the [facility].    Please review the medications you are supposed to be taking according to prior physician recommendations. I have not changed your home medications during this visit. If your discharge instructions indicate that I have changed your home medications, this is not the case, and you should disregard. If you have any questions about the medication you should be taking at home, please call your physician.

## 2021-12-08 NOTE — ED PROVIDER NOTES
Subjective   This patient is a 61-year-old female currently residing at [facility] here in [facility].  She tells me today that her medical care is generally provided at the [facility].  She has a history of chronic hepatitis C, hypertension, hemochromatosis, fibromyalgia, left below the knee amputation, insulin-dependent diabetes and anxiety.  Evidently, she was seen at an outside hospital in [facility] and was plan for transfer to the [facility] but no beds were available she was diverted to [facility] in October.  I reviewed the discharge note from her stay here a couple of weeks ago.  Patient was discharged to nursing home and was sent here today because of some confusion.  Evidently the patient has been confused for the last day or so and was sent here for evaluation.  Patient denies any fevers, headache, chest pain or shortness of breath.  Denies any hemoptysis or hematemesis.  Denies any nausea vomiting or diarrhea.  She tells me she has chronic abdominal pain and that this is not new.  She denies any rash or lesions or change in medications.  She tells me she is at [facility] and tells me her name.  She is unsure of exactly what the date is.  She tells me she has an ex- and a daughter, which was confirmed by looking at the medical documentation provided to me upon arrival at the bedside.  She has no headache or vision changes and denies any other complaints.  In summary, we have a relatively complicated and unfortunate 61-year-old female sent from a local nursing home whose care has been at an outside hospital other than one admission a few weeks ago here at [facility] who comes in for some reported confusion from baseline over the last 24 hours.    Past medical history  Hemochromatosis, hepatitis C, left below the knee amputation, insulin-dependent diabetes type 2    Family history  Denies CVA          Review of Systems    Constitutional: Negative.  Negative for chills, fatigue, fever and unexpected weight change.   HENT: Negative for dental problem, ear pain, hearing loss and sinus pressure.    Eyes: Negative for pain and visual disturbance.   Respiratory: Negative for chest tightness and shortness of breath.    Cardiovascular: Negative for chest pain, palpitations and leg swelling.   Gastrointestinal: Positive for abdominal pain. Negative for blood in stool, diarrhea, nausea and vomiting.        Chronic per patient   Genitourinary: Negative for difficulty urinating, dysuria, frequency, hematuria and urgency.   Musculoskeletal: Negative for myalgias, neck pain and neck stiffness.   Neurological: Negative for seizures, syncope, speech difficulty, light-headedness and headaches.   Psychiatric/Behavioral: Positive for confusion.   All other systems reviewed and are negative.      Past Medical History:   Diagnosis Date   • Anxiety    • DDD (degenerative disc disease), lumbar    • Depression    • Diabetes mellitus (HCC)    • Fibromyalgia    • Hemochromatosis    • Hep B w/o coma, chronic, w/o delta (HCC)    • Hep C w/o coma, chronic (HCC)    • Hypertension    • Vertigo        Allergies   Allergen Reactions   • Sulfa Antibiotics        Past Surgical History:   Procedure Laterality Date   • ARTERIOVENOUS FISTULA/SHUNT SURGERY Left 10/16/2021    Procedure: UPPER EXTREMITY ARTERIOVENOUS FISTULA FORMATION LEFT;  Surgeon: Johnny Rousseau MD;  Location: [facility];  Service: Vascular;  Laterality: Left;   • BACK SURGERY     • BELOW KNEE LEG AMPUTATION     •  SECTION     • FOOT SURGERY     • KNEE SURGERY     • ROTATOR CUFF REPAIR     • SPINAL CORD STIMULATOR IMPLANT         History reviewed. No pertinent family history.    Social History     Socioeconomic History   • Marital status:    Tobacco Use   • Smoking status: Never Smoker   • Smokeless tobacco: Never Used   Vaping Use   • Vaping Use: Never used   Substance and  Sexual Activity   • Alcohol use: No   • Drug use: No   • Sexual activity: Never           Objective   Physical Exam  Vitals and nursing note reviewed.   Constitutional:       General: She is not in acute distress.     Appearance: She is normal weight. She is not toxic-appearing.   HENT:      Head: Normocephalic and atraumatic.      Right Ear: External ear normal.      Left Ear: External ear normal.      Nose: Nose normal.      Mouth/Throat:      Mouth: Mucous membranes are dry.      Pharynx: Oropharynx is clear.   Eyes:      General:         Right eye: No discharge.         Left eye: No discharge.      Extraocular Movements: Extraocular movements intact.      Conjunctiva/sclera: Conjunctivae normal.      Pupils: Pupils are equal, round, and reactive to light.   Cardiovascular:      Rate and Rhythm: Normal rate and regular rhythm.      Pulses: Normal pulses.   Pulmonary:      Effort: Pulmonary effort is normal.      Breath sounds: No wheezing or rhonchi.   Abdominal:      General: Abdomen is flat. There is no distension.      Tenderness: There is no abdominal tenderness. There is no right CVA tenderness, left CVA tenderness or rebound.      Hernia: No hernia is present.   Musculoskeletal:         General: No swelling, tenderness or signs of injury.      Cervical back: Normal range of motion.      Right lower leg: Edema present.      Comments: 1+ edema on right lower extremity.  Left lower extremity shows a left below the knee amputation.  No signs of infection at this time   Skin:     General: Skin is warm and dry.      Capillary Refill: Capillary refill takes less than 2 seconds.      Coloration: Skin is not jaundiced.      Findings: No bruising or lesion.   Neurological:      General: No focal deficit present.      Mental Status: She is alert.   Psychiatric:         Mood and Affect: Mood normal.         Behavior: Behavior normal.         Procedures           ED Course  ED Course as of 12/27/21 1608   Wed Dec 08,  2021   1311 Chest x-ray has been reviewed independently by me and also read by radiology.  Official read has been cut/pasted below for completeness.    IMPRESSION:  Mild increased markings identified in the right infrahilar  region stable and unchanged. Dialysis catheter placed on the right tip  in the SVC. [JENA]   1311 I had a good conversation with the patient and told her we would take care of her.  Unfortunately, the patient does not have any medical records here other than a recent admission.  It appears that the admission here was based on the fact that the [facility] was unable to accommodate the patient in the ICU at the sending hospital was forced to send her here to [facility].  Patient tells me here that all of her physicians are at [facility].  The patient is alert and oriented to place and name here.  I plan to discuss the course of care with her family/power of  to see where she is from a mental status point point compared to baseline.  I have ordered an ammonia level, CT of the head, and multiple lab studies.  I do not anticipate will find any abnormality in need of immediate intervention but certainly this is a possibility and I have discussed with the patient.  She is in stable condition here, afebrile at 98.4 with vital signs include a blood pressure 148/82, heart rate at triage 104, rechecked and found to be 92 at the bedside with oxygen saturations of 99% on room air. [JENA]   1312 I told the patient we would take great care of her that we will keep her updated as results come back.  I do anticipate ultimate discharge home. [JENA]   1353 Just discussed the case with the nursing staff.  They are in the room right now drawing labs.  We have already been able to get both chest x-ray and CT of the head completed and read.  Neither shows any acute disease process or new findings.  I have reviewed both independently as well. [JENA]   1543 I tempted to contact the  patient's daughter, Sandra as well as the patient's power of  Parviz but without effect.  I left messages for both.  I have paged the renal team and have that we can get the patient's dialysis coordinated this afternoon without bring the patient into the hospital, assuming we find out that her mental status is at baseline. [JENA]   1546 The patient is aware of her medical conditions, and I suspect that she may not be far off of baseline.  Her alcohol level is unremarkable.  Her ammonia level is unremarkable.  Her drug screen is negative. [JENA]   1546   No signs of infection.  White count normal. [JENA]   1546 Chest x-ray and CT of the head without any acute abnormality.  When I reexamined the patient and her abdomen was little tender in a CT of the abdomen and pelvis has been ordered accordingly.  Assuming this is unremarkable, which anticipate will be, I do believe outpatient follow-up to be a reasonable next step.  We have a little more work to do here including connecting with family to assess mental status at baseline compared to what we are seeing today, additional CT imaging, and discussion with the renal team.  It certainly possible the patient may need to come in for observation simply for dialysis. [JENA]   1632 We have paged nephrology to discuss potentially getting the patient dialyzed.  Still awaiting a call back. [JENA]   1646 I discussed the case with Dr. Austin (nephro) at 445pm ET. He requested that the hospitalist contact Dr. Palmer once the patient is admitted to set up dialysis.   [JENA]   1647 CT of the abdomen and pelvis has been completed and I have reviewed independently.  Results from the radiologist read have been cut/pasted below.    IMPRESSION:  1. Right lower lobe pneumonia.  2. Wall thickening of the sigmoid colon diffusely suggesting possibly a  mild colitis with no significant stranding.  3. Cystic structure seen in the becky hepatis adjacent to the IVC and  distal esophagus for which contrast  enhanced imaging can be performed  for further evaluation of this area. There are stones filling the  gallbladder. [JENA]   1647 I have added blood cultures and added Rocephin given the findings on the CT scan.  Have discussed with the patient.  Will page the hospitalist for admission and have already talked to nephrology about setting up dialysis which the patient will need. [JENA]   1650 Hospitalist paged for admission. [JENA]   1651 We never heard back from POA (Parviz) or daughter (Sandra) but will let admitting team know that attempts to contact were made. [JENA]   1653 Pt will be admitted to Dr. Magallanes's team [JENA]      ED Course User Index  [JENA] Peter Matthews MD      No results found for this or any previous visit (from the past 24 hour(s)).  Note: In addition to lab results from this visit, the labs listed above may include labs taken at another facility or during a different encounter within the last 24 hours. Please correlate lab times with ED admission and discharge times for further clarification of the services performed during this visit.    IR Tunneled Catheter   Final Result   Successful placement of right IJ PermCath.       This report was finalized on 12/21/2021 3:01 PM by Jourdan Jett MD.          MRI Brain Without Contrast   Final Result   Stable chronic and age-related changes of the brain   including evidence of prior left thalamic hemorrhage. No evidence of   acute ischemia, hemorrhage, mass or mass effect.           This report was finalized on 12/12/2021 8:26 AM by Victor Manuel Arvizu.          IR Removal Tunnel CV Cath Without Port   Final Result   Impression:                                                                    Successful removal of a right IJ route tunneled dialysis catheter as   described above.       Thank you for the opportunity to assist in the care of your patient.       This report was finalized on 12/10/2021 2:09 PM by Ba Hallman MD.          XR Chest 1 View   Final  Result   Slight retraction of the dialysis catheter tip now seen in   the proximal SVC. Slight improvement seen in the aeration of the right   perihilar region.       D:  12/10/2021   E:  12/10/2021       This report was finalized on 12/14/2021 11:37 AM by Dr. Yoanna Rodriguez MD.          CT Abdomen Pelvis Without Contrast   Final Result   1. Right lower lobe pneumonia.   2. Wall thickening of the sigmoid colon diffusely suggesting possibly a   mild colitis with no significant stranding.   3. Cystic structure seen in the becky hepatis adjacent to the IVC and   distal esophagus for which contrast enhanced imaging can be performed   for further evaluation of this area. There are stones filling the   gallbladder.       DICTATED:   12/08/2021   EDITED/lfs:   12/08/2021       This report was finalized on 12/10/2021 4:38 PM by Dr. Yoanna Rodriguez MD.          CT Head Without Contrast   Final Result   No CT evidence of acute intracranial abnormality. Chronic   changes seen within the brain.       D:  12/08/2021   E:  12/08/2021       This report was finalized on 12/10/2021 4:38 PM by Dr. Yoanna Rodriguez MD.          XR Chest 1 View   Final Result   Mild increased markings identified in the right infrahilar   region stable and unchanged. Dialysis catheter placed on the right with   tip in the SVC.       D:  12/08/2021   E:  12/08/2021       This report was finalized on 12/10/2021 4:37 PM by Dr. Yoanna Rodriguez MD.            Vitals:    12/21/21 0600 12/21/21 0700 12/21/21 0807 12/21/21 1010   BP:  109/72  101/60   BP Location:  Right arm  Right arm   Patient Position:  Lying  Lying   Pulse:  85 72 87   Resp:  16  18   Temp:  98.1 °F (36.7 °C)  98.5 °F (36.9 °C)   TempSrc:  Oral  Oral   SpO2:       Weight: 62.7 kg (138 lb 4.8 oz)      Height:         Medications   piperacillin-tazobactam (ZOSYN) 3.375 g in iso-osmotic dextrose 50 ml (premix) (3.375 g Intravenous New Bag 12/14/21 0200)   Morphine sulfate  (PF) injection 3 mg (3 mg Intravenous Given 12/15/21 2243)   albumin human 25 % IV SOLN 12.5 g (has no administration in time range)   traMADol (ULTRAM) tablet 50 mg (50 mg Oral Given 12/15/21 1719)   heparin (porcine) infusion  - ADS Override Pull (  Not Given 12/17/21 1111)   iopamidol (ISOVUE-300) 61 % injection  - ADS Override Pull (  Not Given 12/17/21 1101)   lidocaine 1% - EPINEPHrine 1:834377 (XYLOCAINE W/EPI) 1 %-1:046973 injection  - ADS Override Pull (has no administration in time range)   cefTRIAXone (ROCEPHIN) 1 g/100 mL 0.9% NS (MBP) (0 g Intravenous Stopped 12/8/21 1817)   piperacillin-tazobactam (ZOSYN) 3.375 g in iso-osmotic dextrose 50 ml (premix) (3.375 g Intravenous New Bag 12/8/21 2207)   doxycycline (MONODOX) capsule 100 mg (100 mg Oral Given 12/13/21 2001)   QUEtiapine (SEROquel) tablet 25 mg (25 mg Oral Given 12/12/21 2044)   QUEtiapine (SEROquel) tablet 25 mg (25 mg Oral Given 12/14/21 2032)   albumin human 25 % IV SOLN  - ADS Override Pull (  New Bag 12/13/21 1330)   albumin human 25 % IV SOLN 12.5 g (12.5 g Intravenous New Bag 12/14/21 2032)   lidocaine PF 1% (XYLOCAINE) 1 % injection  - ADS Override Pull (5 mL  Given 12/17/21 1111)   heparin (porcine) 1000 UNIT/ML injection  - ADS Override Pull (3,200 Units  Given 12/17/21 1111)   fentaNYL citrate (PF) (SUBLIMAZE) injection (25 mcg Intravenous Given 12/17/21 1050)   midazolam (VERSED) injection (0.5 mg Intravenous Given 12/17/21 1050)   HYDROcodone-acetaminophen (NORCO) 5-325 MG per tablet 1 tablet (1 tablet Oral Given 12/17/21 1442)   HYDROcodone-acetaminophen (NORCO) 5-325 MG per tablet 1 tablet (1 tablet Oral Given 12/18/21 0628)   haloperidol lactate (HALDOL) injection 0.5 mg (0.5 mg Intravenous Given 12/18/21 8127)   haloperidol lactate (HALDOL) injection 1 mg (1 mg Intravenous Given 12/18/21 9612)     ECG/EMG Results (last 24 hours)     ** No results found for the last 24 hours. **        ECG 12 Lead   Final Result   Test Reason :  baseline   Blood Pressure :   */*   mmHG   Vent. Rate : 102 BPM     Atrial Rate : 102 BPM      P-R Int : 150 ms          QRS Dur :  66 ms       QT Int : 374 ms       P-R-T Axes :  51  27  39 degrees      QTc Int : 487 ms      Sinus tachycardia   Otherwise normal ECG   When compared with ECG of 12-OCT-2021 12:03,   Nonspecific T wave abnormality no longer evident in Anterior leads   Confirmed by ANGELICA AVINA MD ([id number]) on 12/10/2021 1:36:53 PM      Referred By:            Confirmed By: ANGELIAC AVINA MD                                                       MDM    Final diagnoses:   Confusion   Pneumonia of right lower lobe due to infectious organism   Hypokalemia   Hypocalcemia   End stage renal disease (HCC)       ED Disposition  ED Disposition     ED Disposition Condition Comment    Decision to Admit  Level of Care: Telemetry [5]   Diagnosis: Pneumonia [641915]   Admitting Physician: HUNTER GARNER [[id number]]   Attending Physician: HUNTER GARNER [[id number]]   Bed Request Comments: 6A pending neg covid test            Armida Roman APRN  [address]   [facility] [address]  [phone]    Follow up on 1/11/2022  At 3:00pm    [facility]  [address]  [facility] [id number] [phone]        Joanna Wells DO  [address]  [facility] [address]  [phone]    Follow up  Your appointment is on Monday, December 27th at 8:15am    Provider, No Known  [facility]  [facility] [address]               Medication List      New Prescriptions    QUEtiapine 25 MG tablet  Commonly known as: SEROquel  Take 1 tablet by mouth At Night As Needed (insomnia).        Stop    hydrALAZINE 100 MG tablet  Commonly known as: APRESOLINE           Where to Get Your Medications      These medications were sent to [facility] - [place], [place] - [address] - [phone] [facility] [phone] FX  [address] [facility] [address]    Phone:  [phone]   · QUEtiapine 25 MG tablet          Peter Matthews MD  12/27/21 160       Peter Matthews MD  12/27/21 1609

## 2021-12-09 LAB
ANION GAP SERPL CALCULATED.3IONS-SCNC: 12 MMOL/L (ref 5–15)
B PARAPERT DNA SPEC QL NAA+PROBE: NOT DETECTED
B PERT DNA SPEC QL NAA+PROBE: NOT DETECTED
BASOPHILS # BLD AUTO: 0.03 10*3/MM3 (ref 0–0.2)
BASOPHILS NFR BLD AUTO: 0.6 % (ref 0–1.5)
BUN SERPL-MCNC: 24 MG/DL (ref 8–23)
BUN/CREAT SERPL: 6.4 (ref 7–25)
C PNEUM DNA NPH QL NAA+NON-PROBE: NOT DETECTED
CALCIUM SPEC-SCNC: 7.4 MG/DL (ref 8.6–10.5)
CHLORIDE SERPL-SCNC: 106 MMOL/L (ref 98–107)
CO2 SERPL-SCNC: 24 MMOL/L (ref 22–29)
CREAT SERPL-MCNC: 3.73 MG/DL (ref 0.57–1)
D-LACTATE SERPL-SCNC: 1.3 MMOL/L (ref 0.5–2)
DEPRECATED RDW RBC AUTO: 47.8 FL (ref 37–54)
EOSINOPHIL # BLD AUTO: 0.11 10*3/MM3 (ref 0–0.4)
EOSINOPHIL NFR BLD AUTO: 2.1 % (ref 0.3–6.2)
ERYTHROCYTE [DISTWIDTH] IN BLOOD BY AUTOMATED COUNT: 14.6 % (ref 12.3–15.4)
FLUAV SUBTYP SPEC NAA+PROBE: NOT DETECTED
FLUBV RNA ISLT QL NAA+PROBE: NOT DETECTED
GFR SERPL CREATININE-BSD FRML MDRD: 12 ML/MIN/1.73
GFR SERPL CREATININE-BSD FRML MDRD: ABNORMAL ML/MIN/{1.73_M2}
GLUCOSE BLDC GLUCOMTR-MCNC: 132 MG/DL (ref 70–130)
GLUCOSE BLDC GLUCOMTR-MCNC: 152 MG/DL (ref 70–130)
GLUCOSE BLDC GLUCOMTR-MCNC: 177 MG/DL (ref 70–130)
GLUCOSE BLDC GLUCOMTR-MCNC: 257 MG/DL (ref 70–130)
GLUCOSE SERPL-MCNC: 148 MG/DL (ref 65–99)
HADV DNA SPEC NAA+PROBE: NOT DETECTED
HBA1C MFR BLD: 5.1 % (ref 4.8–5.6)
HCOV 229E RNA SPEC QL NAA+PROBE: NOT DETECTED
HCOV HKU1 RNA SPEC QL NAA+PROBE: NOT DETECTED
HCOV NL63 RNA SPEC QL NAA+PROBE: NOT DETECTED
HCOV OC43 RNA SPEC QL NAA+PROBE: NOT DETECTED
HCT VFR BLD AUTO: 25.5 % (ref 34–46.6)
HGB BLD-MCNC: 8.8 G/DL (ref 12–15.9)
HMPV RNA NPH QL NAA+NON-PROBE: NOT DETECTED
HPIV1 RNA ISLT QL NAA+PROBE: NOT DETECTED
HPIV2 RNA SPEC QL NAA+PROBE: NOT DETECTED
HPIV3 RNA NPH QL NAA+PROBE: NOT DETECTED
HPIV4 P GENE NPH QL NAA+PROBE: NOT DETECTED
IMM GRANULOCYTES # BLD AUTO: 0.02 10*3/MM3 (ref 0–0.05)
IMM GRANULOCYTES NFR BLD AUTO: 0.4 % (ref 0–0.5)
L PNEUMO1 AG UR QL IA: NEGATIVE
LYMPHOCYTES # BLD AUTO: 0.43 10*3/MM3 (ref 0.7–3.1)
LYMPHOCYTES NFR BLD AUTO: 8.1 % (ref 19.6–45.3)
M PNEUMO IGG SER IA-ACNC: NOT DETECTED
MAGNESIUM SERPL-MCNC: 1.6 MG/DL (ref 1.6–2.4)
MCH RBC QN AUTO: 32.4 PG (ref 26.6–33)
MCHC RBC AUTO-ENTMCNC: 34.5 G/DL (ref 31.5–35.7)
MCV RBC AUTO: 93.8 FL (ref 79–97)
MONOCYTES # BLD AUTO: 0.29 10*3/MM3 (ref 0.1–0.9)
MONOCYTES NFR BLD AUTO: 5.4 % (ref 5–12)
MRSA DNA SPEC QL NAA+PROBE: NEGATIVE
NEUTROPHILS NFR BLD AUTO: 4.45 10*3/MM3 (ref 1.7–7)
NEUTROPHILS NFR BLD AUTO: 83.4 % (ref 42.7–76)
NRBC BLD AUTO-RTO: 0 /100 WBC (ref 0–0.2)
PLATELET # BLD AUTO: 115 10*3/MM3 (ref 140–450)
PMV BLD AUTO: 9.5 FL (ref 6–12)
POTASSIUM SERPL-SCNC: 3.2 MMOL/L (ref 3.5–5.2)
PROCALCITONIN SERPL-MCNC: 0.14 NG/ML (ref 0–0.25)
RBC # BLD AUTO: 2.72 10*6/MM3 (ref 3.77–5.28)
RHINOVIRUS RNA SPEC NAA+PROBE: NOT DETECTED
RSV RNA NPH QL NAA+NON-PROBE: NOT DETECTED
S PNEUM AG SPEC QL LA: NEGATIVE
SARS-COV-2 RNA NPH QL NAA+NON-PROBE: NOT DETECTED
SODIUM SERPL-SCNC: 142 MMOL/L (ref 136–145)
WBC NRBC COR # BLD: 5.33 10*3/MM3 (ref 3.4–10.8)

## 2021-12-09 PROCEDURE — 83735 ASSAY OF MAGNESIUM: CPT | Performed by: INTERNAL MEDICINE

## 2021-12-09 PROCEDURE — 83605 ASSAY OF LACTIC ACID: CPT | Performed by: INTERNAL MEDICINE

## 2021-12-09 PROCEDURE — 97530 THERAPEUTIC ACTIVITIES: CPT

## 2021-12-09 PROCEDURE — 25010000002 ONDANSETRON PER 1 MG: Performed by: INTERNAL MEDICINE

## 2021-12-09 PROCEDURE — 97535 SELF CARE MNGMENT TRAINING: CPT

## 2021-12-09 PROCEDURE — 80048 BASIC METABOLIC PNL TOTAL CA: CPT | Performed by: INTERNAL MEDICINE

## 2021-12-09 PROCEDURE — 82962 GLUCOSE BLOOD TEST: CPT

## 2021-12-09 PROCEDURE — 25010000002 MORPHINE PER 10 MG: Performed by: INTERNAL MEDICINE

## 2021-12-09 PROCEDURE — 85025 COMPLETE CBC W/AUTO DIFF WBC: CPT | Performed by: INTERNAL MEDICINE

## 2021-12-09 PROCEDURE — 63710000001 INSULIN LISPRO (HUMAN) PER 5 UNITS: Performed by: INTERNAL MEDICINE

## 2021-12-09 PROCEDURE — 94761 N-INVAS EAR/PLS OXIMETRY MLT: CPT

## 2021-12-09 PROCEDURE — 25010000002 HEPARIN (PORCINE) PER 1000 UNITS: Performed by: INTERNAL MEDICINE

## 2021-12-09 PROCEDURE — 25010000002 PIPERACILLIN SOD-TAZOBACTAM PER 1 G: Performed by: INTERNAL MEDICINE

## 2021-12-09 PROCEDURE — 94799 UNLISTED PULMONARY SVC/PX: CPT

## 2021-12-09 PROCEDURE — 97161 PT EVAL LOW COMPLEX 20 MIN: CPT

## 2021-12-09 PROCEDURE — 99233 SBSQ HOSP IP/OBS HIGH 50: CPT | Performed by: INTERNAL MEDICINE

## 2021-12-09 PROCEDURE — 97166 OT EVAL MOD COMPLEX 45 MIN: CPT

## 2021-12-09 PROCEDURE — 84145 PROCALCITONIN (PCT): CPT | Performed by: INTERNAL MEDICINE

## 2021-12-09 PROCEDURE — 83036 HEMOGLOBIN GLYCOSYLATED A1C: CPT | Performed by: INTERNAL MEDICINE

## 2021-12-09 RX ORDER — ONDANSETRON 2 MG/ML
4 INJECTION INTRAMUSCULAR; INTRAVENOUS EVERY 6 HOURS PRN
Status: DISCONTINUED | OUTPATIENT
Start: 2021-12-09 | End: 2021-12-21 | Stop reason: HOSPADM

## 2021-12-09 RX ORDER — NALOXONE HCL 0.4 MG/ML
0.4 VIAL (ML) INJECTION
Status: DISCONTINUED | OUTPATIENT
Start: 2021-12-09 | End: 2021-12-21 | Stop reason: HOSPADM

## 2021-12-09 RX ORDER — MORPHINE SULFATE 4 MG/ML
3 INJECTION, SOLUTION INTRAMUSCULAR; INTRAVENOUS
Status: DISPENSED | OUTPATIENT
Start: 2021-12-09 | End: 2021-12-16

## 2021-12-09 RX ADMIN — DOXYCYCLINE 100 MG: 100 INJECTION, POWDER, LYOPHILIZED, FOR SOLUTION INTRAVENOUS at 21:37

## 2021-12-09 RX ADMIN — HYDRALAZINE HYDROCHLORIDE 100 MG: 50 TABLET, FILM COATED ORAL at 21:30

## 2021-12-09 RX ADMIN — INSULIN LISPRO 3 UNITS: 100 INJECTION, SOLUTION INTRAVENOUS; SUBCUTANEOUS at 09:46

## 2021-12-09 RX ADMIN — SODIUM CHLORIDE, PRESERVATIVE FREE 10 ML: 5 INJECTION INTRAVENOUS at 09:48

## 2021-12-09 RX ADMIN — CALCIUM ACETATE 667 MG: 667 CAPSULE ORAL at 18:20

## 2021-12-09 RX ADMIN — HYDRALAZINE HYDROCHLORIDE 100 MG: 50 TABLET, FILM COATED ORAL at 05:46

## 2021-12-09 RX ADMIN — CARVEDILOL 25 MG: 12.5 TABLET, FILM COATED ORAL at 18:20

## 2021-12-09 RX ADMIN — HEPARIN SODIUM 5000 UNITS: 5000 INJECTION INTRAVENOUS; SUBCUTANEOUS at 05:46

## 2021-12-09 RX ADMIN — SODIUM CHLORIDE, PRESERVATIVE FREE 10 ML: 5 INJECTION INTRAVENOUS at 21:30

## 2021-12-09 RX ADMIN — IPRATROPIUM BROMIDE AND ALBUTEROL SULFATE 3 ML: 2.5; .5 SOLUTION RESPIRATORY (INHALATION) at 15:56

## 2021-12-09 RX ADMIN — TAZOBACTAM SODIUM AND PIPERACILLIN SODIUM 3.38 G: 375; 3 INJECTION, SOLUTION INTRAVENOUS at 09:48

## 2021-12-09 RX ADMIN — HEPARIN SODIUM 5000 UNITS: 5000 INJECTION INTRAVENOUS; SUBCUTANEOUS at 21:30

## 2021-12-09 RX ADMIN — CALCIUM ACETATE 667 MG: 667 CAPSULE ORAL at 09:47

## 2021-12-09 RX ADMIN — ONDANSETRON 4 MG: 2 INJECTION INTRAMUSCULAR; INTRAVENOUS at 03:06

## 2021-12-09 RX ADMIN — CARVEDILOL 25 MG: 12.5 TABLET, FILM COATED ORAL at 09:47

## 2021-12-09 RX ADMIN — AMLODIPINE BESYLATE 10 MG: 5 TABLET ORAL at 09:47

## 2021-12-09 RX ADMIN — CALCIUM ACETATE 667 MG: 667 CAPSULE ORAL at 14:33

## 2021-12-09 RX ADMIN — HEPARIN SODIUM 5000 UNITS: 5000 INJECTION INTRAVENOUS; SUBCUTANEOUS at 14:34

## 2021-12-09 RX ADMIN — MORPHINE SULFATE 3 MG: 4 INJECTION, SOLUTION INTRAMUSCULAR; INTRAVENOUS at 03:06

## 2021-12-09 RX ADMIN — HYDRALAZINE HYDROCHLORIDE 100 MG: 50 TABLET, FILM COATED ORAL at 14:33

## 2021-12-09 RX ADMIN — ATORVASTATIN CALCIUM 80 MG: 40 TABLET, FILM COATED ORAL at 09:47

## 2021-12-09 RX ADMIN — INSULIN LISPRO 4 UNITS: 100 INJECTION, SOLUTION INTRAVENOUS; SUBCUTANEOUS at 18:19

## 2021-12-09 NOTE — PLAN OF CARE
Goal Outcome Evaluation:  Plan of Care Reviewed With: patient           Outcome Summary: PT eval complete. Pt limited primarily by confusion and disorientation. Able to complete bed mobility and stand-pivot transfer with little direct assistance and few safety cues. PT recommends IPPT to address functional limitations and home with HH pending cognitive status.

## 2021-12-09 NOTE — PLAN OF CARE
Goal Outcome Evaluation:  Plan of Care Reviewed With: patient           Outcome Summary: OT evaluation complete.  Pt Ox1 and reports no pain.  Pt cga for bed mob and had some balance issues d/t not having RLE on floor.  When she adjusted her LLE on the bed - her balance improved.  She completed several STS indep and required min assist to t/f to bedside chair.  Recommend IPOT and HHOT pending progress with cognition.

## 2021-12-09 NOTE — NURSING NOTE
Woc consulted for: Right lower extremity    Wound/ Skin Assessment: Right lower extremity presents with no wounds, however there is a small bloodstained callus on the plantar surface underneath the fourth met head of the right foot.  Patient sock still on from home had drainage of the heel and no communication to the plantar surface does not look to be connected.  Woc assessed very closely to see if drainage was coming from the callus but it is just a callus.    Intervention performed: None    Recommendations: May paint with Betadine and let dry repeat daily    Head to toe Ax performed.    Additional skin issues: None noted    Skin interventions in place.    Pressure Injury Protocol (initiate for Lee Score of 18 or less):   *Maintain good skin care, keep dry, turn q 2 hr, keep heels elevated and offloaded with heel boots.    *Apply z-guard to sacrococcygeal area/ perineal area BID or daily and PRN per incontinent episodes.  *Follow C.A.R.E protocol if medical devices (Bipap, erickson, Ng tube, etc) are being used.     Specialty bed: No    Woc will sign off.    Please contact with questions or concerns.    Dragon disclaimer:  This note was entered via electronic voice recognition/ transcription prorgram. The electronic translation of spoken language may permit erroneous, or at times, nonsensical words or phrases to be inadvertently transcribed; Although I have reviewed the note for such errors, some may still exist.

## 2021-12-09 NOTE — CASE MANAGEMENT/SOCIAL WORK
Discharge Planning Assessment  [facility]     Patient Name: Jeaneth Keita  MRN: [id number]  Today's Date: 12/9/2021    Admit Date: 12/8/2021     Discharge Needs Assessment     Row Name 12/09/21 1513       Living Environment    Lives With facility resident    Name(s) of Who Lives With Patient [facility]    Current Living Arrangements home/apartment/condo    Primary Care Provided by other (see comments)  Facility staff    Provides Primary Care For no one, unable/limited ability to care for self    Family Caregiver if Needed other (see comments)  Ex  Parviz Rivera is POA and healthcare surrogate    Quality of Family Relationships supportive; involved; helpful    Able to Return to Prior Arrangements yes       Resource/Environmental Concerns    Resource/Environmental Concerns none    Transportation Concerns car, none       Transition Planning    Patient/Family Anticipates Transition to other (see comments)  [facility]    Transportation Anticipated health plan transportation       Discharge Needs Assessment    Readmission Within the Last 30 Days no previous admission in last 30 days    Equipment Currently Used at Home wheelchair; other (see comments)  Has a prosthetic leg, does not use it    Concerns to be Addressed discharge planning    Discharge Coordination/Progress Sees a provider at [facility], gets meds from [place]'s preferred pharmacy               Discharge Plan     Row Name 12/09/21 5172       Plan    Plan Return to [facility]    Patient/Family in Agreement with Plan other (see comments)    Plan Comments Spoke with patient's ex- Parviz over the phone. Parviz is patient's POA and healthcare surrogate. Patient lives at [facility], has a wheelchair. She has had 2 Covid shots. She has dialysis MWF at [facility]. Parviz reports patient had a brain bleed Oct 2, 2021 and has had some confusion since. Plan is to return to [place],  will need ambulance transport.    Final Discharge Disposition Code 01 - home or self-care              Continued Care and Services - Admitted Since 12/8/2021    Coordination has not been started for this encounter.     Selected Continued Care - Prior Encounters Includes selections from prior encounters from 9/9/2021 to 12/9/2021    Discharged on 10/28/2021 Admission date: 10/5/2021 - Discharge disposition: Skilled Nursing Facility (DC - External)    Destination     Service Provider Selected Services Address Phone Fax Patient Preferred    [facility]  Skilled Nursing [facility] -- -- --       Internal Comment last updated by Padmini Ruth, RN 10/27/2021 1339    10/27 referral called                     Dialysis/Infusion     Service Provider Selected Services Address Phone Fax Patient Preferred    [facility]  Dialysis [address] [id number] [phone] --       Internal Comment last updated by Padmini Ruth, RN 10/27/2021 1323    10/27 referral faxed                               Expected Discharge Date and Time     Expected Discharge Date Expected Discharge Time    Dec 12, 2021          Demographic Summary     Row Name 12/09/21 1511       General Information    Admission Type inpatient    Arrived From emergency department    Referral Source admission list    Reason for Consult discharge planning               Functional Status     Row Name 12/09/21 1512       Functional Status    Usual Activity Tolerance poor       Functional Status, IADL    Medications completely dependent    Meal Preparation completely dependent    Housekeeping completely dependent    Laundry completely dependent    Shopping completely dependent       Mental Status Summary    Mental Status Comments Has had confusion since Oct 2, 2021 after a brain bleed       Employment/    Employment Status retired    Employment/ Comments [employer]                Psychosocial    No documentation.                Abuse/Neglect    No documentation.                Legal    No documentation.                Substance Abuse    No documentation.                Patient Forms    No documentation.                   Julia Armstrong RN

## 2021-12-09 NOTE — PLAN OF CARE
Problem: Adult Inpatient Plan of Care  Goal: Plan of Care Review  Outcome: Ongoing, Progressing  Flowsheets (Taken 12/9/2021 0432)  Progress: no change  Plan of Care Reviewed With: patient  Outcome Summary:   a/o x 2   VSS, RA   no stick L arm   IV abx started   covid negative   denies diarrhea   instructed on proper use of call light and bed controls, watch call, don't fall video   pt acknowledged   complaints of nausea and lower abd pain, zofran and morphine prn ordered for symptoms control - given with little relief   will continue to monitor   Goal Outcome Evaluation:  Plan of Care Reviewed With: patient        Progress: no change  Outcome Summary: a/o x 2; VSS, RA; no stick L arm; IV abx started; covid negative; denies diarrhea; instructed on proper use of call light and bed controls, watch call, don't fall video; pt acknowledged; complaints of nausea and lower abd pain, zofran and morphine prn ordered for symptoms control - given with little relief; will continue to monitor

## 2021-12-09 NOTE — PROGRESS NOTES
"   LOS: 0 days    Patient Care Team:  Provider, No Known as PCP - General    Chief Complaint: Confusion    Subjective     Interval History:   Patient is a 61  Yr old female UNABLE TO PROVIDE ANY SIGNIFICANT H/O WITH PMH OF ESRD  APPARENTLY F/B [facility] HEP C, CIRRHOSIS, METASTATIC HEPATOCELLULAR CANCER WITH METS ( TX'ED WITH CHEMO AT  ), IDDM, HTN, & GERD ADMITTED FROM OSH  WITH CONFUSION, LIVE IN NH, HD ACCESS. PATIENT CONFUSED AND UNABLE TO PROVIDE ANY MEANINGFUL H/O. PER ED PHYSICIAN, DIALYSIS DONE TODAY. LABS SEEN, CXR NOTES SEEN.    Review of Systems:        Objective     Vital Sign Min/Max for last 24 hours  Temp  Min: 98.4 °F (36.9 °C)  Max: 98.4 °F (36.9 °C)   BP  Min: 148/83  Max: 166/97   Pulse  Min: 90  Max: 104   Resp  Min: 16  Max: 16   SpO2  Min: 96 %  Max: 100 %   No data recorded   Weight  Min: 72.6 kg (160 lb)  Max: 72.6 kg (160 lb)     Flowsheet Rows      First Filed Value   Admission Height 172.7 cm (68\") Documented at 12/08/2021 1240   Admission Weight 72.6 kg (160 lb) Documented at 12/08/2021 1240          No intake/output data recorded.  I/O last 3 completed shifts:  In: 100 [IV Piggyback:100]  Out: -     Physical Exam:      WBC WBC   Date Value Ref Range Status   12/08/2021 5.45 3.40 - 10.80 10*3/mm3 Final      HGB Hemoglobin   Date Value Ref Range Status   12/08/2021 10.3 (L) 12.0 - 15.9 g/dL Final      HCT Hematocrit   Date Value Ref Range Status   12/08/2021 28.7 (L) 34.0 - 46.6 % Final      Platlets No results found for: LABPLAT   MCV MCV   Date Value Ref Range Status   12/08/2021 90.8 79.0 - 97.0 fL Final          Sodium Sodium   Date Value Ref Range Status   12/08/2021 141 136 - 145 mmol/L Final      Potassium Potassium   Date Value Ref Range Status   12/08/2021 3.1 (L) 3.5 - 5.2 mmol/L Final     Comment:     Slight hemolysis detected by analyzer. Results may be affected.      Chloride Chloride   Date Value Ref Range Status   12/08/2021 101 98 - 107 mmol/L Final      CO2 CO2 "   Date Value Ref Range Status   12/08/2021 28.0 22.0 - 29.0 mmol/L Final      BUN BUN   Date Value Ref Range Status   12/08/2021 24 (H) 8 - 23 mg/dL Final      Creatinine Creatinine   Date Value Ref Range Status   12/08/2021 3.46 (H) 0.57 - 1.00 mg/dL Final      Calcium Calcium   Date Value Ref Range Status   12/08/2021 8.4 (L) 8.6 - 10.5 mg/dL Final      PO4 No results found for: CAPO4   Albumin Albumin   Date Value Ref Range Status   12/08/2021 3.60 3.50 - 5.20 g/dL Final      Magnesium No results found for: MG   Uric Acid No results found for: URICACID        Results Review:     I reviewed the patient's new clinical results.    doxycycline, 100 mg, Intravenous, Q12H  ipratropium-albuterol, 3 mL, Nebulization, 4x Daily - RT  piperacillin-tazobactam, 3.375 g, Intravenous, Once  [START ON 12/9/2021] piperacillin-tazobactam, 3.375 g, Intravenous, Q8H      sodium chloride, 250 mL/hr, Last Rate: 250 mL/hr (12/08/21 1563)        Medication Review:    Assessment/Plan       AMS (altered mental status)  ESRD: [facility]  Labs seen, ED physician notes seen, Discussed with Dr. Franc Magallanes.  No need for HD or UF tonight  Full consult in         Staci Bates MD  12/08/21  19:24 EST

## 2021-12-09 NOTE — H&P
[facility] Medicine Services  HISTORY AND PHYSICAL    Patient Name: Jeaneth Keita  : 1960  MRN: [id number]  Primary Care Physician: Provider, No Known  Date of admission: 2021      Subjective   Subjective     Chief Complaint:  AMS    HPI:  Jeaneth Keita is a 61 y.o. female with a PMH significant for ESRD on HD, reports of insulin-dependent diabetes mellitus type 2 with no active medications on home med list, chronic hepatitis C, familial hemochromatosis, cirrhosis, hypertension, left BKA, fibromyalgia who is currently residing at [facility] who presents to the ED due to altered mental status.  Patient is significantly confused, noncontributory to HPI.  Per EMR, patient had reports of confusion for the last day.  No reports of fever, headache, chest pain or shortness of breath.  EMR noted diarrhea, patient reports abdominal pain currently.  Patient was hospitalized at [facility] with discharge on 10/28/2021 where she was treated for intercerebral hemorrhage and right lower lobe pneumonia.  During that prior stay patient had not yet been initiated on hemodialysis.  Prior hospitalization last month, patient had primarily been seen at [facility].      COVID Details:    Symptoms:    [] NONE [] Fever []  Cough [] Shortness of breath [] Change in taste/smell      The patient qualifies to receive the vaccine, but they have not yet received it.      Review of Systems   Unable to perform ROS: Acuity of condition      All other systems reviewed and are negative.     Personal History     Past Medical History:   Diagnosis Date   • Anxiety    • DDD (degenerative disc disease), lumbar    • Depression    • Diabetes mellitus (HCC)    • Fibromyalgia    • Hemochromatosis    • Hep B w/o coma, chronic, w/o delta (HCC)    • Hep C w/o coma, chronic (HCC)    • Hypertension    • Vertigo        Past Surgical History:   Procedure Laterality Date   • ARTERIOVENOUS FISTULA/SHUNT SURGERY Left 10/16/2021     "Procedure: UPPER EXTREMITY ARTERIOVENOUS FISTULA FORMATION LEFT;  Surgeon: Johnny Rousseau MD;  Location: [facility];  Service: Vascular;  Laterality: Left;   • BACK SURGERY     • BELOW KNEE LEG AMPUTATION     •  SECTION     • FOOT SURGERY     • KNEE SURGERY     • ROTATOR CUFF REPAIR     • SPINAL CORD STIMULATOR IMPLANT         Family History:  family history is not on file. Otherwise pertinent FHx was reviewed and unremarkable.     Social History:  reports that she has never smoked. She has never used smokeless tobacco. She reports that she does not drink alcohol and does not use drugs.  Social History     Social History Narrative   • Not on file       Medications:  Available home medication information reviewed.  (Not in a hospital admission)      Allergies   Allergen Reactions   • Sulfa Antibiotics        Objective   Objective     Vital Signs:   Temp:  [98.4 °F (36.9 °C)] 98.4 °F (36.9 °C)  Heart Rate:  [] 103  Resp:  [16] 16  BP: (148-166)/(80-98) 166/97       Physical Exam   Constitutional: Awake, alert  Eyes: PERRLA, sclerae anicteric, no conjunctival injection  HENT: NCAT, mucous membranes moist  Neck: Supple, no thyromegaly, no lymphadenopathy, trachea midline  Respiratory: Clear to auscultation bilaterally, nonlabored respirations   Cardiovascular: RRR, no murmurs, rubs, or gallops, palpable pedal pulse on the right  Gastrointestinal: Positive bowel sounds, soft, nontender, nondistended  Musculoskeletal: Trace right LE edema, no clubbing or cyanosis to extremities.  Left BKA  Psychiatric: Appropriate affect, cooperative  Neurologic: Oriented to \"hospital\", not oriented to city, date, strength symmetric in all extremities, Cranial Nerves grossly intact to confrontation, speech clear  Skin: No rashes      Result Review:  I have personally reviewed the results from the time of this admission to 2021 19:46 EST and agree with these findings:  [x]  Laboratory  [x]  Microbiology  [x]  " Radiology  [x]  EKG/Telemetry   []  Cardiology/Vascular   []  Pathology  [x]  Old records  []  Other:        LAB RESULTS:      Lab 12/08/21  1714 12/08/21  1404   WBC  --  5.45   HEMOGLOBIN  --  10.3*   HEMATOCRIT  --  28.7*   PLATELETS  --  158   NEUTROS ABS  --  3.56   IMMATURE GRANS (ABS)  --  0.02   LYMPHS ABS  --  1.25   MONOS ABS  --  0.52   EOS ABS  --  0.07   MCV  --  90.8   LACTATE 1.4  --          Lab 12/08/21  1404   SODIUM 141   POTASSIUM 3.1*   CHLORIDE 101   CO2 28.0   ANION GAP 12.0   BUN 24*   CREATININE 3.46*   GLUCOSE 246*   CALCIUM 8.4*         Lab 12/08/21  1404   TOTAL PROTEIN 6.5   ALBUMIN 3.60   GLOBULIN 2.9   ALT (SGPT) 28   AST (SGOT) 31   BILIRUBIN 0.5   ALK PHOS 90                     UA    Urinalysis 12/8/21 12/8/21    1404 1404   Squamous Epithelial Cells, UA  0-2   Specific Gravity, UA 1.018    Ketones, UA Negative    Blood, UA Negative    Leukocytes, UA Negative    Nitrite, UA Negative    RBC, UA  0-2   WBC, UA  0-2   Bacteria, UA  None Seen             Microbiology Results (last 10 days)     ** No results found for the last 240 hours. **          CT Abdomen Pelvis Without Contrast    Result Date: 12/8/2021  EXAMINATION: CT ABDOMEN/PELVIS WO CONTRAST - 12/08/2021  INDICATION: R41.0-Disorientation, unspecified. Generalized abdominal pain.  TECHNIQUE: Multiple axial CT imaging is obtained of the abdomen and pelvis without the administration of intravenous contrast.  The radiation dose reduction device was turned on for each scan per the ALARA (As Low as Reasonably Achievable) protocol.  COMPARISON: None  FINDINGS: There is infiltrate in the right lower lobe suggesting pneumonia. Stones identified in the gallbladder. There is a cystic structure identified with thin-walled calcification seen in the region of the becky hepatis adjacent to the IVC and distal esophagus. This area measures 3.9 x 4.0 cm. Findings are uncertain and contrast enhanced imaging can be performed for further  evaluation. Pancreas in its visualized portions is unremarkable. Kidneys and adrenal glands are within normal limits. The abdominal portion of the gastrointestinal tract within normal limits. No free fluid or free air. No abnormal mass or fluid collections identified. Bony structures are unremarkable.  PELVIS: Some mild wall thickening identified of the distal descending colon and sigmoid colon suggesting possibly a mild colitis. No significant abnormal mass or fluid collection. Pelvic organs are unremarkable. The pelvic portions of the gastrointestinal tract are otherwise within normal limits. No pelvic adenopathy. No free fluid or free air.      Impression: 1. Right lower lobe pneumonia. 2. Wall thickening of the sigmoid colon diffusely suggesting possibly a mild colitis with no significant stranding. 3. Cystic structure seen in the becky hepatis adjacent to the IVC and distal esophagus for which contrast enhanced imaging can be performed for further evaluation of this area. There are stones filling the gallbladder.  DICTATED:   12/08/2021 EDITED/lfs:   12/08/2021      CT Head Without Contrast    Result Date: 12/8/2021  EXAMINATION: CT HEAD WO CONTRAST-12/08/2021:  INDICATION: AMS; R41.0-Disorientation, unspecified, mental status change.  TECHNIQUE: Multiple axial CT imaging was obtained of the head without the administration of intravenous contrast.  The radiation dose reduction device was turned on for each scan per the ALARA (As Low as Reasonably Achievable) protocol.  COMPARISON: NONE.  FINDINGS: The brain parenchyma is unremarkable. There is low-density areas seen in the periventricular and subcortical white matter suggesting chronic small vessel ischemic change. No hemorrhage or hydrocephalus. No mass, mass effect, or midline shift. The bony structures reveal no evidence of osseous abnormality. The visualized paranasal sinuses are clear. The mastoid air cells are patent.      Impression: No CT evidence of  acute intracranial abnormality. Chronic changes seen within the brain.  D:  12/08/2021 E:  12/08/2021       XR Chest 1 View    Result Date: 12/8/2021  EXAMINATION: XR CHEST 1 VW-12/08/2021:  INDICATION: AMS.  COMPARISON: 10/05/2021.  FINDINGS: Portable chest reveals ill-defined opacification seen in the right infrahilar region. The findings are stable when compared to the prior study. Degenerative changes seen within the spine. The left lung is clear.      Impression: Mild increased markings identified in the right infrahilar region stable and unchanged. Dialysis catheter placed on the right with tip in the SVC.  D:  12/08/2021 E:  12/08/2021          Results for orders placed during the hospital encounter of 10/05/21    Adult Transthoracic Echo Complete W/ Cont if Necessary Per Protocol    Interpretation Summary  · Left ventricular ejection fraction appears to be 61 - 65%. Left ventricular systolic function is normal.  · Left atrial volume is mildly increased.      Assessment/Plan   Assessment & Plan     Active Hospital Problems    Diagnosis  POA   • **Right lower lobe pneumonia [J18.9]  Yes   • AMS (altered mental status) [R41.82]  Yes   • Colitis [K52.9]  Unknown   • Normocytic anemia [D64.9]  Unknown   • Hypokalemia [E87.6]  Unknown   • Hepatocellular carcinoma metastatic to bone s/p RXT  [C79.51, C22.0]  Yes     · Osseous metastasis to right posterior 7th rib s/p radiation in 5/2020 and 4/2021       • Cirrhosis of liver (HCC) [K74.60]  Yes   • Hemochromatosis [E83.119]  Yes   • S/P left BKA [Z89.512]  Not Applicable   • Chronic Hep C  [B18.2]  Yes   • Chronic pain on Methadone [F11.90]  Yes   • Chronic ulcer of right foot [L97.519]  Yes   • ESRD (end stage renal disease) [N18.6]  Yes   • GERD (gastroesophageal reflux disease) [K21.9]  Yes   • Essential hypertension [I10]  Yes   • Type 2 diabetes mellitus (HCC) [E11.9]  Yes   Jeaneth Keita is a 61 y.o. female with a PMH significant for ESRD on HD, reports of  insulin-dependent diabetes mellitus type 2 with no active medications on home med list, chronic hepatitis C, familial hemochromatosis, cirrhosis, hypertension, left BKA, fibromyalgia who is currently residing at [facility] who presents to the ED due to altered mental status.    Altered mental status  Colitis  Pneumonia  -AMS likely due to acute infection  -Zosyn, doxycycline  -MRSA PCR screen pending, if positive will add vancomycin  -Blood culture, sputum culture, urine antigens pending  -Stool studies pending  -DuoNeb scheduled and as needed  -ABG pending  -UA negative  -Ammonia WNL  -CT head WNL  -If mentation does not improve after treating acute infection, would benefit from MRI with and without contrast given history of metastatic hepatocellular carcinoma if approved by nephrology  -Monitor on telemetry continuous pulse ox    ESRD, (?On HD)  Hypokalemia  -ED provider reports patient is on HD.  Patient denies but she is significantly confused  -Nephrology consult for a.m.  -Mild hyperkalemia, defer to nephrology for replacement    Type 2 diabetes mellitus  -Prior EMR reports insulin-dependent DM, no insulin on current home med list  -SSI for now    HTN  GERD  Status post left BKA  Chronic hep C  Metastatic hepatocellular carcinoma  Familial hemochromatosis  Chronic ulcer to right foot  -Continue home meds  -EMR reports patient was on chemotherapy in October, none actively listed  -Has previously received all of her care from [facility]    Chronic pain on methadone  -Methadone not on active med list  -UDS negative  -Richie reviewed with no active prescription      DVT prophylaxis: Heparin      CODE STATUS: Full code  Code Status and Medical Interventions:   Ordered at: 12/08/21 1940     Level Of Support Discussed With:    Patient     Code Status (Patient has no pulse and is not breathing):    CPR (Attempt to Resuscitate)     Medical Interventions (Patient has pulse or is breathing):    Full Support        Admission Status:  I believe this patient meets INPATIENT status due to her mental status with pneumonia and colitis requiring IV antibiotics and inpatient evaluation and treatment.  I feel patient’s risk for adverse outcomes and need for care warrant INPATIENT evaluation and I predict the patient’s care encounter to likely last beyond 2 midnights.    Gemini Alfredo,   12/08/21

## 2021-12-09 NOTE — CONSULTS
Referring Provider: Gemini Alfredo   Reason for Consultation: ESRD     Subjective     Chief complaint AMS    History of present illness:  This is 61 year old man with past medical hx of ESRD on HD [facility], HTN, DM, Chronic hepatitis C, Familial hemochromatosis and cirrhosis, left BKD  Presented to ED with AMS. Patient is confused and unable to give any history. Denies any CP , SOA, headache, nausea or vomiting. Nephrology service has sundar consulted for ESRD management. Last admission in 2021 discharged after getting treatment for intracerebral hemorrhage and PNA.     History  Past Medical History:   Diagnosis Date   • Anxiety    • DDD (degenerative disc disease), lumbar    • Depression    • Diabetes mellitus (HCC)    • Fibromyalgia    • Hemochromatosis    • Hep B w/o coma, chronic, w/o delta (HCC)    • Hep C w/o coma, chronic (HCC)    • Hypertension    • Vertigo    ,   Past Surgical History:   Procedure Laterality Date   • ARTERIOVENOUS FISTULA/SHUNT SURGERY Left 10/16/2021    Procedure: UPPER EXTREMITY ARTERIOVENOUS FISTULA FORMATION LEFT;  Surgeon: Johnny Rousseau MD;  Location: [facility];  Service: Vascular;  Laterality: Left;   • BACK SURGERY     • BELOW KNEE LEG AMPUTATION     •  SECTION     • FOOT SURGERY     • KNEE SURGERY     • ROTATOR CUFF REPAIR     • SPINAL CORD STIMULATOR IMPLANT     , History reviewed. No pertinent family history.,   Social History     Socioeconomic History   • Marital status:    Tobacco Use   • Smoking status: Never Smoker   • Smokeless tobacco: Never Used   Vaping Use   • Vaping Use: Never used   Substance and Sexual Activity   • Alcohol use: No   • Drug use: No   • Sexual activity: Never     E-cigarette/Vaping   • E-cigarette/Vaping Use Never User      E-cigarette/Vaping Substances     E-cigarette/Vaping Devices       ,   Medications Prior to Admission   Medication Sig Dispense Refill Last Dose   • amLODIPine (NORVASC) 10 MG tablet Take 1 tablet by  mouth Daily.      • atorvastatin (LIPITOR) 80 MG tablet Take 80 mg by mouth Daily.      • B Complex-C (B Complex-Vitamin C) capsule Take  by mouth Daily.      • calcium acetate (PHOS BINDER,) 667 MG capsule capsule Take 1 capsule by mouth 3 (Three) Times a Day With Meals. 180 capsule     • carvedilol (COREG) 25 MG tablet Take 25 mg by mouth 2 (two) times a day with meals.      • hydrALAZINE (APRESOLINE) 100 MG tablet Take 1 tablet by mouth Every 8 (Eight) Hours.      • polyethylene glycol (MIRALAX) 17 g packet Take 17 g by mouth Daily.      • sennosides-docusate (PERICOLACE) 8.6-50 MG per tablet Take 2 tablets by mouth 2 (Two) Times a Day.      • vitamin D (ERGOCALCIFEROL) 1.25 MG (41725 UT) capsule capsule Take 50,000 Units by mouth 1 (One) Time Per Week.      , Scheduled Meds:  amLODIPine, 10 mg, Oral, Q24H  atorvastatin, 80 mg, Oral, Daily  calcium acetate, 667 mg, Oral, TID With Meals  carvedilol, 25 mg, Oral, BID With Meals  doxycycline, 100 mg, Intravenous, Q12H  heparin (porcine), 5,000 Units, Subcutaneous, Q8H  hydrALAZINE, 100 mg, Oral, Q8H  insulin lispro, 0-7 Units, Subcutaneous, TID AC  ipratropium-albuterol, 3 mL, Nebulization, 4x Daily - RT  piperacillin-tazobactam, 3.375 g, Intravenous, Q12H  sodium chloride, 10 mL, Intravenous, Q12H    , Continuous Infusions:   , PRN Meds:  •  acetaminophen **OR** acetaminophen **OR** acetaminophen  •  dextrose  •  dextrose  •  glucagon (human recombinant)  •  ipratropium-albuterol  •  Morphine  •  naloxone  •  ondansetron  •  sodium chloride and Allergies:  Sulfa antibiotics    Review of Systems  Pertinent items are noted in HPI    Objective     Vital Signs  Temp:  [98.6 °F (37 °C)-99.3 °F (37.4 °C)] 98.9 °F (37.2 °C)  Heart Rate:  [] 100  Resp:  [16-18] 16  BP: (119-166)/(63-98) 135/72    No intake/output data recorded.  I/O last 3 completed shifts:  In: 100 [IV Piggyback:100]  Out: 550 [Urine:550]    Physical Exam:    General Appearance:    Alert,  cooperative, in no acute distress, confused   Head:    Normocephalic, without obvious abnormality, atraumatic   Eyes:            Conjunctivae and sclerae normal, no   icterus, no pallor, corneas clear, PERRLA           Neck:   Supple, trachea midline, no thyromegaly,  no JVD       Lungs:     Clear to auscultation,respirations regular, even and               unlabored    Heart:    Regular rhythm and normal rate, normal S1 and S2, no       murmur, no gallop, no rub, no click       Abdomen:     Normal bowel sounds,soft, non-tender, non-distended, no guarding, no rebound tenderness       Extremities:   Moves all extremities well, no edema, no cyanosis, no         redness   Pulses:   Pulses palpable and equal bilaterally           Neurologic:   Cranial nerves 2 - 12 grossly intact, no focal deficit, Confused.          Results Review:   I reviewed the patient's new clinical results.    WBC WBC   Date Value Ref Range Status   12/09/2021 5.33 3.40 - 10.80 10*3/mm3 Final   12/08/2021 5.45 3.40 - 10.80 10*3/mm3 Final      HGB Hemoglobin   Date Value Ref Range Status   12/09/2021 8.8 (L) 12.0 - 15.9 g/dL Final   12/08/2021 10.3 (L) 12.0 - 15.9 g/dL Final      HCT Hematocrit   Date Value Ref Range Status   12/09/2021 25.5 (L) 34.0 - 46.6 % Final   12/08/2021 28.7 (L) 34.0 - 46.6 % Final      Platlets No results found for: LABPLAT   MCV MCV   Date Value Ref Range Status   12/09/2021 93.8 79.0 - 97.0 fL Final   12/08/2021 90.8 79.0 - 97.0 fL Final          Sodium Sodium   Date Value Ref Range Status   12/09/2021 142 136 - 145 mmol/L Final   12/08/2021 141 136 - 145 mmol/L Final      Potassium Potassium   Date Value Ref Range Status   12/09/2021 3.2 (L) 3.5 - 5.2 mmol/L Final   12/08/2021 3.1 (L) 3.5 - 5.2 mmol/L Final     Comment:     Slight hemolysis detected by analyzer. Results may be affected.      Chloride Chloride   Date Value Ref Range Status   12/09/2021 106 98 - 107 mmol/L Final   12/08/2021 101 98 - 107 mmol/L Final       CO2 CO2   Date Value Ref Range Status   12/09/2021 24.0 22.0 - 29.0 mmol/L Final   12/08/2021 28.0 22.0 - 29.0 mmol/L Final      BUN BUN   Date Value Ref Range Status   12/09/2021 24 (H) 8 - 23 mg/dL Final   12/08/2021 24 (H) 8 - 23 mg/dL Final      Creatinine Creatinine   Date Value Ref Range Status   12/09/2021 3.73 (H) 0.57 - 1.00 mg/dL Final   12/08/2021 3.46 (H) 0.57 - 1.00 mg/dL Final      Calcium Calcium   Date Value Ref Range Status   12/09/2021 7.4 (L) 8.6 - 10.5 mg/dL Final   12/08/2021 8.4 (L) 8.6 - 10.5 mg/dL Final      PO4 No results found for: CAPO4   Albumin Albumin   Date Value Ref Range Status   12/08/2021 3.60 3.50 - 5.20 g/dL Final      Magnesium Magnesium   Date Value Ref Range Status   12/09/2021 1.6 1.6 - 2.4 mg/dL Final   12/08/2021 1.9 1.6 - 2.4 mg/dL Final      Uric Acid No results found for: URICACID         amLODIPine, 10 mg, Oral, Q24H  atorvastatin, 80 mg, Oral, Daily  calcium acetate, 667 mg, Oral, TID With Meals  carvedilol, 25 mg, Oral, BID With Meals  doxycycline, 100 mg, Intravenous, Q12H  heparin (porcine), 5,000 Units, Subcutaneous, Q8H  hydrALAZINE, 100 mg, Oral, Q8H  insulin lispro, 0-7 Units, Subcutaneous, TID AC  ipratropium-albuterol, 3 mL, Nebulization, 4x Daily - RT  piperacillin-tazobactam, 3.375 g, Intravenous, Q12H  sodium chloride, 10 mL, Intravenous, Q12H           Assessment/Plan       Right lower lobe pneumonia    Hepatocellular carcinoma metastatic to bone s/p RXT     Hemochromatosis    Cirrhosis of liver (HCC)    Chronic Hep C     ESRD (end stage renal disease)    Chronic ulcer of right foot    S/P left BKA    GERD (gastroesophageal reflux disease)    Chronic pain on Methadone    Essential hypertension    Type 2 diabetes mellitus (HCC)    AMS (altered mental status)    Colitis    Normocytic anemia    Hypokalemia      1- ESRD - [facility]   2- HTN   3- Mild hypokalemia   4- Anemia of chronic disease   5- Familial hemochromatosis   6- Hx of Metastatic  hepatocellular carcinoma     Plan:  - HD tomorrow, UF as tolerated.   - Renal diet.   - Transfuse for HGb less than 7.0     I discussed the patients findings and my recommendations with nursing staff    Panchito Austin MD  12/09/21

## 2021-12-09 NOTE — THERAPY EVALUATION
Patient Name: Jeaneth Keita  : 1960    MRN: [id number]                              Today's Date: 2021       Admit Date: 2021    Visit Dx:     ICD-10-CM ICD-9-CM   1. Confusion  R41.0 298.9   2. Pneumonia of right lower lobe due to infectious organism  J18.9 486   3. Hypokalemia  E87.6 276.8   4. Hypocalcemia  E83.51 275.41   5. End stage renal disease (HCC)  N18.6 585.6     Patient Active Problem List   Diagnosis   • ICH (intracerebral hemorrhage)   • Hepatocellular carcinoma metastatic to bone s/p RXT    • Hemochromatosis   • Cirrhosis of liver (HCC)   • Chronic Hep C    • ESRD (end stage renal disease)   • Chronic ulcer of right foot   • S/P left BKA   • GERD (gastroesophageal reflux disease)   • Chronic pain on Methadone   • Essential hypertension   • Type 2 diabetes mellitus (HCC)   • Right lower lobe pneumonia   • AMS (altered mental status)   • Colitis   • Normocytic anemia   • Hypokalemia     Past Medical History:   Diagnosis Date   • Anxiety    • DDD (degenerative disc disease), lumbar    • Depression    • Diabetes mellitus (HCC)    • Fibromyalgia    • Hemochromatosis    • Hep B w/o coma, chronic, w/o delta (HCC)    • Hep C w/o coma, chronic (HCC)    • Hypertension    • Vertigo      Past Surgical History:   Procedure Laterality Date   • ARTERIOVENOUS FISTULA/SHUNT SURGERY Left 10/16/2021    Procedure: UPPER EXTREMITY ARTERIOVENOUS FISTULA FORMATION LEFT;  Surgeon: Johnny Rousseau MD;  Location: [facility];  Service: Vascular;  Laterality: Left;   • BACK SURGERY     • BELOW KNEE LEG AMPUTATION     •  SECTION     • FOOT SURGERY     • KNEE SURGERY     • ROTATOR CUFF REPAIR     • SPINAL CORD STIMULATOR IMPLANT        General Information     Row Name 21          Physical Therapy Time and Intention    Document Type evaluation  -     Mode of Treatment physical therapy; individual therapy  -     Row Name 21          General Information    Patient  Profile Reviewed yes  -     Prior Level of Function independent:; all household mobility; community mobility; gait; w/c or scooter; transfer  questionable historian d/t confusion  -     Existing Precautions/Restrictions fall; other (see comments)  L BKA, remote  -     Barriers to Rehab cognitive status; medically complex  -     Row Name 12/09/21 0916          Home Main Entrance    Number of Stairs, Main Entrance none  -     Row Name 12/09/21 0916          Cognition    Orientation Status (Cognition) oriented to; person; disoriented to; place; situation; time; verbal cues/prompts needed for orientation  -     Row Name 12/09/21 0916          Safety Issues, Functional Mobility    Safety Issues Affecting Function (Mobility) friction/shear risk; insight into deficits/self-awareness; judgment; safety precaution awareness; safety precautions follow-through/compliance  -     Impairments Affecting Function (Mobility) balance; cognition; endurance/activity tolerance  -     Cognitive Impairments, Mobility Safety/Performance awareness, need for assistance; insight into deficits/self-awareness; judgment; safety precaution awareness; safety precaution follow-through  -     Comment, Safety Issues/Impairments (Mobility) mostly limited by confusion  -           User Key  (r) = Recorded By, (t) = Taken By, (c) = Cosigned By    Initials Name Provider Type     Mamie Gallegos PT Physical Therapist               Mobility     Row Name 12/09/21 1014          Bed Mobility    Bed Mobility scooting/bridging; supine-sit  -     Scooting/Bridging [place] (Bed Mobility) contact guard  -     Supine-Sit [place] (Bed Mobility) contact guard  -     Assistive Device (Bed Mobility) bed rails  -     Comment (Bed Mobility) VCs for sequencing  -     Row Name 12/09/21 1014          Transfers    Comment (Transfers) VCs for sequencing and HP. Able to reach for opposite handrail of recliner for stand pivot t/f.   -[facility] Name 12/09/21 1014          Bed-Chair Transfer    Bed-Chair [place] (Transfers) verbal cues; contact guard  -     Assistive Device (Bed-Chair Transfers) --  stand pivot, no AD  -[facility] Name 12/09/21 1014          Sit-Stand Transfer    Sit-Stand [place] (Transfers) contact guard  -[facility] Name 12/09/21 1014          Gait/Stairs (Locomotion)    Comment (Gait/Stairs) Deferred. Pt reports w/c use at baseline  -           User Key  (r) = Recorded By, (t) = Taken By, (c) = Cosigned By    Initials Name Provider Type     Mamie Gallegos PT Physical Therapist               Obj/Interventions     Row Name 12/09/21 1016          Range of Motion Comprehensive    General Range of Motion bilateral lower extremity ROM WFL  -[facility] Name 12/09/21 1016          Strength Comprehensive (MMT)    General Manual Muscle Testing (MMT) Assessment no strength deficits identified  -     Comment, General Manual Muscle Testing (MMT) Assessment RLE WFL. L residual limb WFL for knee and hip.  -[facility] Name 12/09/21 1016          Balance    Balance Assessment sitting static balance; sitting dynamic balance; standing static balance; standing dynamic balance  -     Static Sitting Balance WFL; unsupported; sitting, edge of bed  -     Dynamic Sitting Balance WFL; unsupported; sitting, edge of bed  -     Static Standing Balance mild impairment; supported; standing  -     Dynamic Standing Balance mild impairment; supported; standing  -[facility] Name 12/09/21 1016          Sensory Assessment (Somatosensory)    Sensory Assessment (Somatosensory) LE sensation intact  -           User Key  (r) = Recorded By, (t) = Taken By, (c) = Cosigned By    Initials Name Provider Type    Mamie Nick PT Physical Therapist               Goals/Plan     [facility] Name 12/09/21 1018          Bed Mobility Goal 1 (PT)    Activity/Assistive Device (Bed Mobility Goal 1, PT) bed mobility activities, all  -      [place] Level/Cues Needed (Bed Mobility Goal 1, PT) independent  -     Time Frame (Bed Mobility Goal 1, PT) long term goal (LTG)  -     Row Name 12/09/21 1018          Transfer Goal 1 (PT)    Activity/Assistive Device (Transfer Goal 1, PT) sit-to-stand/stand-to-sit; bed-to-chair/chair-to-bed; toilet  -     [place] Level/Cues Needed (Transfer Goal 1, PT) independent  -     Time Frame (Transfer Goal 1, PT) long term goal (LTG)  -           User Key  (r) = Recorded By, (t) = Taken By, (c) = Cosigned By    Initials Name Provider Type     Mamie Gallegos, PT Physical Therapist               Clinical Impression     Row Name 12/09/21 1016          Pain Scale: Numbers Pre/Post-Treatment    Pretreatment Pain Rating 0/10 - no pain  -     Posttreatment Pain Rating 0/10 - no pain  -     Row Name 12/09/21 1016          Plan of Care Review    Plan of Care Reviewed With patient  -     Outcome Summary PT eval complete. Pt limited primarily by confusion and disorientation. Able to complete bed mobility and stand-pivot transfer with little direct assistance and few safety cues. PT recommends IPPT to address functional limitations and home with HH pending cognitive status.  -     Row Name 12/09/21 1016          Therapy Assessment/Plan (PT)    Patient/Family Therapy Goals Statement (PT) go home  -     Rehab Potential (PT) good, to achieve stated therapy goals  -     Criteria for Skilled Interventions Met (PT) yes; meets criteria; skilled treatment is necessary  -     Row Name 12/09/21 1016          Vital Signs    Pre Systolic BP Rehab 123  -MC     Pre Treatment Diastolic BP 63  -MC     Post Systolic BP Rehab 145  -MC     Post Treatment Diastolic BP 89  -MC     Pretreatment Heart Rate (beats/min) 104  -MC     Posttreatment Heart Rate (beats/min) 107  -MC     Pre SpO2 (%) 98  -MC     O2 Delivery Pre Treatment room air  -     Post SpO2 (%) 98  -MC     O2 Delivery Post Treatment room air  -      Pre Patient Position Supine  -     Intra Patient Position Standing  -     Post Patient Position Sitting  -     Row Name 12/09/21 1016          Positioning and Restraints    Pre-Treatment Position in bed  -     Post Treatment Position chair  -     In Chair notified nsg; reclined; call light within reach; encouraged to call for assist; exit alarm on; waffle cushion; legs elevated  -           User Key  (r) = Recorded By, (t) = Taken By, (c) = Cosigned By    Initials Name Provider Type    Mamie Nick, PT Physical Therapist               Outcome Measures     Row Name 12/09/21 1019          How much help from another person do you currently need...    Turning from your back to your side while in flat bed without using bedrails? 4  -MC     Moving from lying on back to sitting on the side of a flat bed without bedrails? 4  -MC     Moving to and from a bed to a chair (including a wheelchair)? 3  -MC     Standing up from a chair using your arms (e.g., wheelchair, bedside chair)? 3  -MC     Climbing 3-5 steps with a railing? 1  -MC     To walk in hospital room? 1  -     AM-PAC 6 Clicks Score (PT) 16  -     Row Name 12/09/21 1019 12/09/21 0910       Functional Assessment    Outcome Measure Options AM-PAC 6 Clicks Basic Mobility (PT)  - AM-PAC 6 Clicks Daily Activity (OT)  -          User Key  (r) = Recorded By, (t) = Taken By, (c) = Cosigned By    Initials Name Provider Type    Mamie Nick, PT Physical Therapist    Radha Shepherd OT Occupational Therapist                             Physical Therapy Education                 Title: PT OT SLP Therapies (In Progress)     Topic: Physical Therapy (In Progress)     Point: Mobility training (In Progress)     Learning Progress Summary           Patient Acceptance, E, NR by  at 12/9/2021 0916    Comment: precautions for upright mobility                   Point: Home exercise program (In Progress)     Learning Progress Summary            Patient Acceptance, E, NR by  at 12/9/2021 0916    Comment: precautions for upright mobility                   Point: Body mechanics (In Progress)     Learning Progress Summary           Patient Acceptance, E, NR by  at 12/9/2021 0916    Comment: precautions for upright mobility                   Point: Precautions (In Progress)     Learning Progress Summary           Patient Acceptance, E, NR by  at 12/9/2021 0916    Comment: precautions for upright mobility                               User Key     Initials Effective Dates Name Provider Type Discipline     06/16/21 -  Mamie Gallegos PT Physical Therapist PT              PT Recommendation and Plan  Planned Therapy Interventions (PT): balance training, bed mobility training, home exercise program, patient/family education, strengthening, transfer training, wheelchair management/propulsion training  Plan of Care Reviewed With: patient  Outcome Summary: PT eval complete. Pt limited primarily by confusion and disorientation. Able to complete bed mobility and stand-pivot transfer with little direct assistance and few safety cues. PT recommends IPPT to address functional limitations and home with HH pending cognitive status.     Time Calculation:    PT Charges     Row Name 12/09/21 1020             Time Calculation    Start Time 0916  -      PT Received On 12/09/21  -      PT Goal Re-Cert Due Date 12/19/21  -              Timed Charges    21301 - PT Therapeutic Activity Minutes 10  -MC              Untimed Charges    PT Eval/Re-eval Minutes 40  -MC              Total Minutes    Timed Charges Total Minutes 10  -MC      Untimed Charges Total Minutes 40  -MC       Total Minutes 50  -MC            User Key  (r) = Recorded By, (t) = Taken By, (c) = Cosigned By    Initials Name Provider Type     Mamie Gallegos PT Physical Therapist              Therapy Charges for Today     Code Description Service Date Service Provider Modifiers Qty    60729752748  HC PT THERAPEUTIC ACT EA 15 MIN 12/9/2021 Mamie Gallegos, PT GP 1    82161371344 HC PT EVAL LOW COMPLEXITY 3 12/9/2021 Mamie Gallegos, PT GP 1          PT G-Codes  Outcome Measure Options: AM-PAC 6 Clicks Basic Mobility (PT)  AM-PAC 6 Clicks Score (PT): 16  AM-PAC 6 Clicks Score (OT): 18    Mamie Gallegos, PT  12/9/2021

## 2021-12-09 NOTE — PLAN OF CARE
Goal Outcome Evaluation:           Progress: improving  Outcome Summary: Pt awaiting HD. Pt requires assistance for transfer.

## 2021-12-09 NOTE — PROGRESS NOTES
"                    Clinical Nutrition       Patient Name: Jeaneth Keita  YOB: 1960  MRN: [id number]  Date of Encounter: 12/09/21 12:59 EST  Admission date: 12/8/2021      Reason for Visit   Identified at risk by screening criteria, MST score 2+, \"Unsure\" unintentional weight loss      EMR  Reviewed   Yes    Weight in EMR:    Last 15 Recorded Weights    Weight Weight (kg) Weight (lbs) Weight Method   12/8/2021 73.936 kg 163 lb Bed scale   12/8/2021 72.576 kg 160 lb Estimated   10/27/2021 69.627 kg 153 lb 8 oz Bed scale   10/26/2021 70.081 kg 154 lb 8 oz -   10/26/2021 70.081 kg 154 lb 8 oz Bed scale   10/25/2021 68.448 kg 150 lb 14.4 oz Bed scale    10/24/2021 75.388 kg 166 lb 3.2 oz Bed scale    10/19/2021 83.734 kg 184 lb 9.6 oz -   10/18/2021 84.188 kg 185 lb 9.6 oz Bed scale   10/17/2021 82.555 kg 182 lb Bed scale   10/15/2021 87.136 kg 192 lb 1.6 oz -   10/12/2021 84.369 kg 186 lb Bed scale   10/11/2021 84.278 kg 185 lb 12.8 oz Bed scale   10/9/2021 85.639 kg 188 lb 12.8 oz Bed scale   10/6/2021 82.1 kg 181 lb Bed scale        Reported/Observed/Food/Nutrition Related - Comments     Patient confused and not able to provide much information.  Able to reach patient  over the phone. He reports patient has some weight loss after admission in October. He is not sure of patient UBW, however he feels patient lost weight based on appearance.  Reports patient with good appetite, however she lost weight due to improve eating habits in hospital.  reports patient was over eating at home and not making the best choices.  Reports patient appetite has been good at the nursing home as well.  He feels her weight is stable now.  did not have any specific food prefs to communicate.     Of note patient was started on HD during hospital admission in October. Weight loss noted could be a reflection of fluid status.      Current Nutrition Prescription     Diet Regular; Cardiac, Consistent " Carbohydrate, Renal  No active supplement orders    Evaluation of Received Nutrient/Fluid Intake:  Insufficient data     Actions     Follow treatment progress, Care plan reviewed   1. Monitor intake and need for renal restriction.     Follow up per protocol.       Tanya Garcia RD,   Time Spent: 25min

## 2021-12-09 NOTE — THERAPY EVALUATION
Patient Name: Jeaneth Keita  : 1960    MRN: [id number]                              Today's Date: 2021       Admit Date: 2021    Visit Dx:     ICD-10-CM ICD-9-CM   1. Confusion  R41.0 298.9   2. Pneumonia of right lower lobe due to infectious organism  J18.9 486   3. Hypokalemia  E87.6 276.8   4. Hypocalcemia  E83.51 275.41   5. End stage renal disease (HCC)  N18.6 585.6     Patient Active Problem List   Diagnosis   • ICH (intracerebral hemorrhage)   • Hepatocellular carcinoma metastatic to bone s/p RXT    • Hemochromatosis   • Cirrhosis of liver (HCC)   • Chronic Hep C    • ESRD (end stage renal disease)   • Chronic ulcer of right foot   • S/P left BKA   • GERD (gastroesophageal reflux disease)   • Chronic pain on Methadone   • Essential hypertension   • Type 2 diabetes mellitus (HCC)   • Right lower lobe pneumonia   • AMS (altered mental status)   • Colitis   • Normocytic anemia   • Hypokalemia     Past Medical History:   Diagnosis Date   • Anxiety    • DDD (degenerative disc disease), lumbar    • Depression    • Diabetes mellitus (HCC)    • Fibromyalgia    • Hemochromatosis    • Hep B w/o coma, chronic, w/o delta (HCC)    • Hep C w/o coma, chronic (HCC)    • Hypertension    • Vertigo      Past Surgical History:   Procedure Laterality Date   • ARTERIOVENOUS FISTULA/SHUNT SURGERY Left 10/16/2021    Procedure: UPPER EXTREMITY ARTERIOVENOUS FISTULA FORMATION LEFT;  Surgeon: Johnny Rousseau MD;  Location: [facility];  Service: Vascular;  Laterality: Left;   • BACK SURGERY     • BELOW KNEE LEG AMPUTATION     •  SECTION     • FOOT SURGERY     • KNEE SURGERY     • ROTATOR CUFF REPAIR     • SPINAL CORD STIMULATOR IMPLANT        General Information     Row Name 21          OT Time and Intention    Document Type evaluation  -SW     Mode of Treatment occupational therapy  -SW     Row Name 21          General Information    Patient Profile Reviewed yes  -SW     Prior  Level of Function independent:; all household mobility; community mobility; ADL's; driving; shopping; home management  -     Existing Precautions/Restrictions fall; other (see comments)  Pt has a L BKA  -     Barriers to Rehab cognitive status; medically complex  -     Row Name 12/09/21 0910          Occupational Profile    Environmental Supports and Barriers (Occupational Profile) Pt has a walk in shower with a seat.  She uses a w/c.  -     Row Name 12/09/21 0910          Living Environment    Lives With child(yusuf), adult  -     Row Name 12/09/21 0910          Home Main Entrance    Number of Stairs, Main Entrance none  -     Row Name 12/09/21 0910          Stairs Within Home, Primary    Number of Stairs, Within Home, Primary none  -     Row Name 12/09/21 0910          Cognition    Orientation Status (Cognition) oriented to; person; disoriented to; place; situation; time; verbal cues/prompts needed for orientation  -     Row Name 12/09/21 0910          Safety Issues, Functional Mobility    Safety Issues Affecting Function (Mobility) friction/shear risk; insight into deficits/self-awareness; awareness of need for assistance; safety precaution awareness; safety precautions follow-through/compliance  -     Impairments Affecting Function (Mobility) balance; cognition; endurance/activity tolerance  -     Cognitive Impairments, Mobility Safety/Performance awareness, need for assistance; insight into deficits/self-awareness; safety precaution follow-through; safety precaution awareness  -           User Key  (r) = Recorded By, (t) = Taken By, (c) = Cosigned By    Initials Name Provider Type     Radha Mon OT Occupational Therapist                 Mobility/ADL's     Row Name 12/09/21 0910          Bed Mobility    Bed Mobility scooting/bridging; supine-sit  -SW     Scooting/Bridging [place] (Bed Mobility) contact guard  -SW     Supine-Sit [place] (Bed Mobility) contact guard  -      Assistive Device (Bed Mobility) bed rails  -     Row Name 12/09/21 0910          Transfers    Transfers bed-chair transfer; sit-stand transfer  -     Bed-Chair [place] (Transfers) minimum assist (75% patient effort)  -     Assistive Device (Bed-Chair Transfers) other (see comments)  stand pivot  -SW     Sit-Stand [place] (Transfers) independent  -[facility] Name 12/09/21 0910          Sit-Stand Transfer    Assistive Device (Sit-Stand Transfers) other (see comments)  no device  -[facility] Name 12/09/21 0910          Activities of Daily Living    BADL Assessment/Intervention grooming  -[facility] Name 12/09/21 0910          Grooming Assessment/Training    [place] Level (Grooming) grooming skills; wash face, hands; set up  -     Position (Grooming) edge of bed sitting  -           User Key  (r) = Recorded By, (t) = Taken By, (c) = Cosigned By    Initials Name Provider Type    Radha Shepherd OT Occupational Therapist               Obj/Interventions     [facility] Name 12/09/21 0910          Sensory Assessment (Somatosensory)    Sensory Assessment (Somatosensory) UE sensation intact  -[facility] Name 12/09/21 0910          Vision Assessment/Intervention    Visual Impairment/Limitations WFL  -[facility] Name 12/09/21 0910          Range of Motion Comprehensive    General Range of Motion bilateral upper extremity ROM WNL  -[facility] Name 12/09/21 0910          Strength Comprehensive (MMT)    General Manual Muscle Testing (MMT) Assessment no strength deficits identified  -     Comment, General Manual Muscle Testing (MMT) Assessment BUEs 5/5  -[facility] Name 12/09/21 0910          Balance    Balance Assessment sitting static balance; sitting dynamic balance; sit to stand dynamic balance; standing static balance  -     Static Sitting Balance WFL; unsupported; sitting, edge of bed; supported  -SW     Dynamic Sitting Balance mild impairment; supported; unsupported; sitting, edge of bed  -     Sit to  Stand Dynamic Balance WFL  -SW     Static Standing Balance mild impairment; supported; standing  -SW     Balance Interventions sitting; standing; sit to stand; supported; static; dynamic; minimal challenge  -SW           User Key  (r) = Recorded By, (t) = Taken By, (c) = Cosigned By    Initials Name Provider Type    Radha Shepherd OT Occupational Therapist               Goals/Plan     Row Name 12/09/21 0910          Bed Mobility Goal 1 (OT)    Activity/Assistive Device (Bed Mobility Goal 1, OT) bed mobility activities, all  -SW     [place] Level/Cues Needed (Bed Mobility Goal 1, OT) independent  -SW     Time Frame (Bed Mobility Goal 1, OT) long term goal (LTG); by discharge  -SW     Progress/Outcomes (Bed Mobility Goal 1, OT) goal ongoing  -     Row Name 12/09/21 0910          Transfer Goal 1 (OT)    Activity/Assistive Device (Transfer Goal 1, OT) transfers, all  -SW     [place] Level/Cues Needed (Transfer Goal 1, OT) independent  -SW     Time Frame (Transfer Goal 1, OT) long term goal (LTG); by discharge  -SW     Progress/Outcome (Transfer Goal 1, OT) goal ongoing  -     Row Name 12/09/21 0910          Dressing Goal 1 (OT)    Activity/Device (Dressing Goal 1, OT) lower body dressing  -SW     [place]/Cues Needed (Dressing Goal 1, OT) independent  -SW     Time Frame (Dressing Goal 1, OT) long term goal (LTG); by discharge  -SW     Progress/Outcome (Dressing Goal 1, OT) goal ongoing  -[facility] Name 12/09/21 0910          Toileting Goal 1 (OT)    Activity/Device (Toileting Goal 1, OT) toileting skills, all  -SW     [place] Level/Cues Needed (Toileting Goal 1, OT) independent  -SW     Time Frame (Toileting Goal 1, OT) long term goal (LTG); by discharge  -SW     Progress/Outcome (Toileting Goal 1, OT) goal ongoing  -     Row Name 12/09/21 0910          Therapy Assessment/Plan (OT)    Planned Therapy Interventions (OT) activity tolerance training; BADL retraining; adaptive equipment training;  functional balance retraining; transfer/mobility retraining; strengthening exercise; patient/caregiver education/training  -           User Key  (r) = Recorded By, (t) = Taken By, (c) = Cosigned By    Initials Name Provider Type    Radha Shepherd OT Occupational Therapist               Clinical Impression     [facility] Name 12/09/21 0910          Pain Assessment    Additional Documentation Pain Scale: Numbers Pre/Post-Treatment (Group)  -[facility] Name 12/09/21 0910          Pain Scale: Numbers Pre/Post-Treatment    Pretreatment Pain Rating 0/10 - no pain  -     Posttreatment Pain Rating 0/10 - no pain  -[facility] Name 12/09/21 0910          Plan of Care Review    Plan of Care Reviewed With patient  -     Outcome Summary OT evaluation complete.  Pt Ox1 and reports no pain.  Pt cga for bed mob and had some balance issues d/t not having RLE on floor.  When she adjusted her LLE on the bed - her balance improved.  She completed several STS indep and required min assist to t/f to bedside chair.  Recommend IPOT and HHOT pending progress with cognition.  -[facility] Name 12/09/21 0910          Therapy Assessment/Plan (OT)    Rehab Potential (OT) good, to achieve stated therapy goals  -     Criteria for Skilled Therapeutic Interventions Met (OT) yes; meets criteria; skilled treatment is necessary  -     Therapy Frequency (OT) daily  -[facility] Name 12/09/21 0910          Therapy Plan Review/Discharge Plan (OT)    Anticipated Discharge Disposition (OT) home with home health; home with assist  -[facility] Name 12/09/21 0910          Vital Signs    Pretreatment Heart Rate (beats/min) 145  -SW     Intratreatment Heart Rate (beats/min) 89  -SW     Pre SpO2 (%) 96  -SW     O2 Delivery Pre Treatment room air  -SW     O2 Delivery Intra Treatment room air  -SW     Post SpO2 (%) 98  -SW     O2 Delivery Post Treatment room air  -SW     Pre Patient Position Supine  -SW     Intra Patient Position Standing  -SW     Post Patient  Position Sitting  -     Row Name 12/09/21 0910          Positioning and Restraints    Pre-Treatment Position in bed  -     Post Treatment Position chair  -SW     In Chair notified nsg; reclined; sitting; call light within reach; encouraged to call for assist; exit alarm on; waffle cushion; legs elevated  -           User Key  (r) = Recorded By, (t) = Taken By, (c) = Cosigned By    Initials Name Provider Type    Radha Shepherd OT Occupational Therapist               Outcome Measures     Row Name 12/09/21 0910          How much help from another is currently needed...    Putting on and taking off regular lower body clothing? 3  -SW     Bathing (including washing, rinsing, and drying) 3  -SW     Toileting (which includes using toilet bed pan or urinal) 1  -SW     Putting on and taking off regular upper body clothing 3  -SW     Taking care of personal grooming (such as brushing teeth) 4  -SW     Eating meals 4  -SW     AM-PAC 6 Clicks Score (OT) 18  -SW     Row Name 12/09/21 0910          Functional Assessment    Outcome Measure Options AM-PAC 6 Clicks Daily Activity (OT)  -           User Key  (r) = Recorded By, (t) = Taken By, (c) = Cosigned By    Initials Name Provider Type    Radha Shepherd OT Occupational Therapist                Occupational Therapy Education                 Title: PT OT SLP Therapies (In Progress)     Topic: Occupational Therapy (In Progress)     Point: ADL training (In Progress)     Description:   Instruct learner(s) on proper safety adaptation and remediation techniques during self care or transfers.   Instruct in proper use of assistive devices.              Learning Progress Summary           Patient Acceptance, E, NR by YUMIKO at 12/9/2021 1007                   Point: Home exercise program (Not Started)     Description:   Instruct learner(s) on appropriate technique for monitoring, assisting and/or progressing therapeutic exercises/activities.              Learner Progress:  Not  documented in this visit.          Point: Precautions (In Progress)     Description:   Instruct learner(s) on prescribed precautions during self-care and functional transfers.              Learning Progress Summary           Patient Acceptance, E, NR by  at 12/9/2021 1007                   Point: Body mechanics (Not Started)     Description:   Instruct learner(s) on proper positioning and spine alignment during self-care, functional mobility activities and/or exercises.              Learner Progress:  Not documented in this visit.                      User Key     Initials Effective Dates Name Provider Type Discipline     06/16/21 -  Radha Mon OT Occupational Therapist OT              OT Recommendation and Plan  Planned Therapy Interventions (OT): activity tolerance training, BADL retraining, adaptive equipment training, functional balance retraining, transfer/mobility retraining, strengthening exercise, patient/caregiver education/training  Therapy Frequency (OT): daily  Plan of Care Review  Plan of Care Reviewed With: patient  Outcome Summary: OT evaluation complete.  Pt Ox1 and reports no pain.  Pt cga for bed mob and had some balance issues d/t not having RLE on floor.  When she adjusted her LLE on the bed - her balance improved.  She completed several STS indep and required min assist to t/f to bedside chair.  Recommend IPOT and HHOT pending progress with cognition.     Time Calculation:    Time Calculation- OT     Row Name 12/09/21 0910             Time Calculation- OT    OT Start Time 0910  -SW      OT Received On 12/09/21  -      OT Goal Re-Cert Due Date 12/19/21  -              Timed Charges    38573 - OT Self Care/Mgmt Minutes 10  -SW              Untimed Charges    OT Eval/Re-eval Minutes 40  -SW              Total Minutes    Timed Charges Total Minutes 10  -SW      Untimed Charges Total Minutes 40  -SW       Total Minutes 50  -SW            User Key  (r) = Recorded By, (t) = Taken By, (c) =  Cosigned By    Initials Name Provider Type     Radha Mon OT Occupational Therapist              Therapy Charges for Today     Code Description Service Date Service Provider Modifiers Qty    [id number] HC OT SELF CARE/MGMT/TRAIN EA 15 MIN 12/9/2021 Radha Mon OT GO 1    [id number]  OT EVAL MOD COMPLEXITY 3 12/9/2021 Radha Mon OT GO 1               Radha Mon OT  12/9/2021

## 2021-12-10 ENCOUNTER — APPOINTMENT (OUTPATIENT)
Dept: INTERVENTIONAL RADIOLOGY/VASCULAR | Facility: HOSPITAL | Age: 61
End: 2021-12-10

## 2021-12-10 ENCOUNTER — APPOINTMENT (OUTPATIENT)
Dept: MRI IMAGING | Facility: HOSPITAL | Age: 61
End: 2021-12-10

## 2021-12-10 ENCOUNTER — APPOINTMENT (OUTPATIENT)
Dept: GENERAL RADIOLOGY | Facility: HOSPITAL | Age: 61
End: 2021-12-10

## 2021-12-10 ENCOUNTER — APPOINTMENT (OUTPATIENT)
Dept: NEPHROLOGY | Facility: HOSPITAL | Age: 61
End: 2021-12-10

## 2021-12-10 LAB
ALBUMIN SERPL-MCNC: 3 G/DL (ref 3.5–5.2)
ALBUMIN/GLOB SERPL: 1.3 G/DL
ALP SERPL-CCNC: 64 U/L (ref 39–117)
ALT SERPL W P-5'-P-CCNC: 22 U/L (ref 1–33)
AMMONIA BLD-SCNC: 14 UMOL/L (ref 11–51)
ANION GAP SERPL CALCULATED.3IONS-SCNC: 10 MMOL/L (ref 5–15)
AST SERPL-CCNC: 27 U/L (ref 1–32)
BILIRUB SERPL-MCNC: 0.5 MG/DL (ref 0–1.2)
BUN SERPL-MCNC: 32 MG/DL (ref 8–23)
BUN/CREAT SERPL: 7.3 (ref 7–25)
CALCIUM SPEC-SCNC: 7.9 MG/DL (ref 8.6–10.5)
CHLORIDE SERPL-SCNC: 107 MMOL/L (ref 98–107)
CO2 SERPL-SCNC: 24 MMOL/L (ref 22–29)
CREAT SERPL-MCNC: 4.36 MG/DL (ref 0.57–1)
DEPRECATED RDW RBC AUTO: 48.1 FL (ref 37–54)
ERYTHROCYTE [DISTWIDTH] IN BLOOD BY AUTOMATED COUNT: 14.7 % (ref 12.3–15.4)
GFR SERPL CREATININE-BSD FRML MDRD: 10 ML/MIN/1.73
GFR SERPL CREATININE-BSD FRML MDRD: ABNORMAL ML/MIN/{1.73_M2}
GLOBULIN UR ELPH-MCNC: 2.3 GM/DL
GLUCOSE BLDC GLUCOMTR-MCNC: 108 MG/DL (ref 70–130)
GLUCOSE BLDC GLUCOMTR-MCNC: 142 MG/DL (ref 70–130)
GLUCOSE BLDC GLUCOMTR-MCNC: 172 MG/DL (ref 70–130)
GLUCOSE SERPL-MCNC: 117 MG/DL (ref 65–99)
HCT VFR BLD AUTO: 23.3 % (ref 34–46.6)
HGB BLD-MCNC: 8.2 G/DL (ref 12–15.9)
MCH RBC QN AUTO: 32.8 PG (ref 26.6–33)
MCHC RBC AUTO-ENTMCNC: 35.2 G/DL (ref 31.5–35.7)
MCV RBC AUTO: 93.2 FL (ref 79–97)
PLATELET # BLD AUTO: 117 10*3/MM3 (ref 140–450)
PMV BLD AUTO: 9.8 FL (ref 6–12)
POTASSIUM SERPL-SCNC: 3.5 MMOL/L (ref 3.5–5.2)
PROT SERPL-MCNC: 5.3 G/DL (ref 6–8.5)
QT INTERVAL: 374 MS
QTC INTERVAL: 487 MS
RBC # BLD AUTO: 2.5 10*6/MM3 (ref 3.77–5.28)
RPR SER QL: NORMAL
SODIUM SERPL-SCNC: 141 MMOL/L (ref 136–145)
VIT B12 BLD-MCNC: 239 PG/ML (ref 211–946)
WBC NRBC COR # BLD: 3.13 10*3/MM3 (ref 3.4–10.8)

## 2021-12-10 PROCEDURE — 25010000002 CYANOCOBALAMIN PER 1000 MCG: Performed by: INTERNAL MEDICINE

## 2021-12-10 PROCEDURE — 94799 UNLISTED PULMONARY SVC/PX: CPT

## 2021-12-10 PROCEDURE — 71045 X-RAY EXAM CHEST 1 VIEW: CPT

## 2021-12-10 PROCEDURE — 25010000002 HEPARIN (PORCINE) PER 1000 UNITS: Performed by: INTERNAL MEDICINE

## 2021-12-10 PROCEDURE — 99232 SBSQ HOSP IP/OBS MODERATE 35: CPT | Performed by: INTERNAL MEDICINE

## 2021-12-10 PROCEDURE — 82607 VITAMIN B-12: CPT | Performed by: INTERNAL MEDICINE

## 2021-12-10 PROCEDURE — 86592 SYPHILIS TEST NON-TREP QUAL: CPT | Performed by: INTERNAL MEDICINE

## 2021-12-10 PROCEDURE — 25010000002 PIPERACILLIN SOD-TAZOBACTAM PER 1 G: Performed by: INTERNAL MEDICINE

## 2021-12-10 PROCEDURE — 82140 ASSAY OF AMMONIA: CPT | Performed by: INTERNAL MEDICINE

## 2021-12-10 PROCEDURE — 82962 GLUCOSE BLOOD TEST: CPT

## 2021-12-10 PROCEDURE — 80053 COMPREHEN METABOLIC PANEL: CPT | Performed by: INTERNAL MEDICINE

## 2021-12-10 PROCEDURE — 0JPTXXZ REMOVAL OF TUNNELED VASCULAR ACCESS DEVICE FROM TRUNK SUBCUTANEOUS TISSUE AND FASCIA, EXTERNAL APPROACH: ICD-10-PCS | Performed by: RADIOLOGY

## 2021-12-10 PROCEDURE — 85027 COMPLETE CBC AUTOMATED: CPT | Performed by: INTERNAL MEDICINE

## 2021-12-10 PROCEDURE — 5A1D70Z PERFORMANCE OF URINARY FILTRATION, INTERMITTENT, LESS THAN 6 HOURS PER DAY: ICD-10-PCS | Performed by: INTERNAL MEDICINE

## 2021-12-10 RX ORDER — DOXYCYCLINE 100 MG/1
100 CAPSULE ORAL EVERY 12 HOURS SCHEDULED
Status: COMPLETED | OUTPATIENT
Start: 2021-12-10 | End: 2021-12-13

## 2021-12-10 RX ORDER — CYANOCOBALAMIN 1000 UG/ML
1000 INJECTION, SOLUTION INTRAMUSCULAR; SUBCUTANEOUS
Status: DISCONTINUED | OUTPATIENT
Start: 2021-12-10 | End: 2021-12-21 | Stop reason: HOSPADM

## 2021-12-10 RX ADMIN — HEPARIN SODIUM 5000 UNITS: 5000 INJECTION INTRAVENOUS; SUBCUTANEOUS at 23:00

## 2021-12-10 RX ADMIN — CYANOCOBALAMIN 1000 MCG: 1000 INJECTION, SOLUTION INTRAMUSCULAR; SUBCUTANEOUS at 23:00

## 2021-12-10 RX ADMIN — SODIUM CHLORIDE, PRESERVATIVE FREE 10 ML: 5 INJECTION INTRAVENOUS at 23:00

## 2021-12-10 RX ADMIN — DOXYCYCLINE 100 MG: 100 CAPSULE ORAL at 23:00

## 2021-12-10 RX ADMIN — TAZOBACTAM SODIUM AND PIPERACILLIN SODIUM 3.38 G: 375; 3 INJECTION, SOLUTION INTRAVENOUS at 00:42

## 2021-12-10 RX ADMIN — HYDRALAZINE HYDROCHLORIDE 100 MG: 50 TABLET, FILM COATED ORAL at 23:00

## 2021-12-10 RX ADMIN — IPRATROPIUM BROMIDE AND ALBUTEROL SULFATE 3 ML: 2.5; .5 SOLUTION RESPIRATORY (INHALATION) at 22:17

## 2021-12-10 NOTE — PLAN OF CARE
VSS, disoriented to time and situation during shift, pt attempted to get out of bed multiple times during shift, no c/o pain, no c/o SOB, no BM this shift, rested well this shift

## 2021-12-10 NOTE — SIGNIFICANT NOTE
"Pt in isolation room S429 for scheduled iHD. On iHD for ~45 mins when pt bed and [facility] dialysis machine alarmed.     Dialysis RN to bedside. Pt found to be have BLE off far side of bed, dialysis blood tubing wrapped around back of head, and right IJ tunneled dialysis access pulled out with cuff and ~1/2\" past cuff exposed.     Dialysis RN called for assistance and pt was assisted back in bed. Blood not returned. Dr. Austin notified and to bedside.     VSS. No signs of distress. Orders for STAT CXR.     Pt has left AVF. Pt unable to recall when or who placed AVF. Called pt's clinic, [facility], who reports no record of when or who placed the AVF. However, when pt first came to clinic in October 2021 left AVF was present. Left AVF has good thrill and appears mature.     Discussed with Dr. Austin, if CXR is stable attempt with iHD through left AVF and ask IR to removed tunneled.   "

## 2021-12-10 NOTE — PROCEDURES
Vascular Interventional Radiology  Procedure Note     Date: 12/10/21      Time: 14:07 EST      Pre-op Diagnosis: TDC DC.     Post-op Diagnosis: same     Procedure: Aseptic precautions.RIJ route tunneled HD catheter removed with cuff exposed already. Hemostasis. ASD.     Surgeon: Ba Hallman MD      Assistants: HALLE     Sedation: None     Estimated Blood Loss (EBL): min      IVF: NA     Findings: NA.     Specimens: none     Complications: None immediate     Disposition: IR recovery     Ba Hallman MD   Vascular Interventional Radiology

## 2021-12-10 NOTE — CASE MANAGEMENT/SOCIAL WORK
Continued Stay Note  [facility]     Patient Name: Jeaneth Keita  MRN: [id number]  Today's Date: 12/10/2021    Admit Date: 12/8/2021     Discharge Plan     Row Name 12/10/21 1502       Plan    Plan Update    Patient/Family in Agreement with Plan other (see comments)    Plan Comments I spoke with David at [facility] [phone]. He confirms the pt is from personal care at this facility. She has been there for a couple of weeks. They administer her meds and assist with bathing. States the pt is able to transfer herself to  the . They will need a report and DC summary faxed to [phone] at [place]. CM will f/u on Monday. Will need transportation arranged.    Final Discharge Disposition Code 01 - home or self-care               Discharge Codes    No documentation.               Expected Discharge Date and Time     Expected Discharge Date Expected Discharge Time    Dec 12, 2021             Radha Miranda RN

## 2021-12-10 NOTE — PROGRESS NOTES
[facility] Medicine Services  PROGRESS NOTE    Patient Name: Jeaneth Keita  : 1960  MRN: [id number]    Date of Admission: 2021  Primary Care Physician: Provider, No Known    Subjective   Subjective     CC:  AMS    HPI:  Patient confused.  Pulled tunneled cath partially out at HD.  Has an AVF that was able to use for her HD.  Saw patient while on HD.  She is without complaints.  States that she is breathing ok.  Denies abdominal pain. No further diarrhea per nursing.      ROS:  Gen- No fevers, chills  CV- No chest pain, palpitations  Resp- No cough, dyspnea  GI- No N/V/D, abd pain        Objective   Objective     Vital Signs:   Temp:  [97.8 °F (36.6 °C)-98.9 °F (37.2 °C)] 98.4 °F (36.9 °C)  Heart Rate:  [] 93  Resp:  [16-18] 16  BP: (112-144)/(64-90) 144/78     Physical Exam:  Constitutional: No acute distress, awake, alert, seen on HD  HENT: NCAT, mucous membranes moist  Respiratory: Clear to auscultation bilaterally, respiratory effort normal   Cardiovascular: RRR, no murmurs, rubs, or gallops  Gastrointestinal: Positive bowel sounds, soft, nontender, nondistended  Musculoskeletal: No bilateral ankle edema, left BKA  Psychiatric: Appropriate affect, cooperative  Neurologic: Oriented x 1 (person only), strength symmetric in all extremities, Cranial Nerves grossly intact to confrontation, speech clear  Skin: No rashes      Results Reviewed:  LAB RESULTS:      Lab 12/10/21  0818 21  0322 21  1714 21  1404   WBC 3.13* 5.33  --  5.45   HEMOGLOBIN 8.2* 8.8*  --  10.3*   HEMATOCRIT 23.3* 25.5*  --  28.7*   PLATELETS 117* 115*  --  158   NEUTROS ABS  --  4.45  --  3.56   IMMATURE GRANS (ABS)  --  0.02  --  0.02   LYMPHS ABS  --  0.43*  --  1.25   MONOS ABS  --  0.29  --  0.52   EOS ABS  --  0.11  --  0.07   MCV 93.2 93.8  --  90.8   PROCALCITONIN  --  0.14  --  0.13   LACTATE  --  1.3 1.4  --          Lab 12/10/21  0818 21  0322 21  1405    SODIUM 141 142 141   POTASSIUM 3.5 3.2* 3.1*   CHLORIDE 107 106 101   CO2 24.0 24.0 28.0   ANION GAP 10.0 12.0 12.0   BUN 32* 24* 24*   CREATININE 4.36* 3.73* 3.46*   GLUCOSE 117* 148* 246*   CALCIUM 7.9* 7.4* 8.4*   MAGNESIUM  --  1.6 1.9   HEMOGLOBIN A1C  --  5.10  --    TSH  --   --  0.759         Lab 12/10/21  0818 12/08/21  1404   TOTAL PROTEIN 5.3* 6.5   ALBUMIN 3.00* 3.60   GLOBULIN 2.3 2.9   ALT (SGPT) 22 28   AST (SGOT) 27 31   BILIRUBIN 0.5 0.5   ALK PHOS 64 90                     Lab 12/08/21  2105   PH, ARTERIAL 7.581*   PCO2, ARTERIAL 28.4*   PO2 ART 92.6   FIO2 21   HCO3 ART 26.7*   BASE EXCESS ART 4.9*   CARBOXYHEMOGLOBIN 1.1     Brief Urine Lab Results  (Last result in the past 365 days)      Color   Clarity   Blood   Leuk Est   Nitrite   Protein   CREAT   Urine HCG        12/08/21 1404 Yellow   Clear   Negative   Negative   Negative   >=300 mg/dL (3+)                 Microbiology Results Abnormal     Procedure Component Value - Date/Time    Blood Culture - Blood, Arm, Left [[id number]]  (Normal) Collected: 12/08/21 1720    Lab Status: Preliminary result Specimen: Blood from Arm, Left Updated: 12/09/21 1731     Blood Culture No growth at 24 hours    Blood Culture - Blood, Arm, Right [[id number]]  (Normal) Collected: 12/08/21 1700    Lab Status: Preliminary result Specimen: Blood from Arm, Right Updated: 12/09/21 1731     Blood Culture No growth at 24 hours    MRSA Screen, PCR (Inpatient) - Swab, Nares [[id number]]  (Normal) Collected: 12/08/21 2213    Lab Status: Final result Specimen: Swab from Nares Updated: 12/09/21 0749     MRSA PCR Negative    Narrative:      MRSA Negative    S. Pneumo Ag Urine or CSF - Urine, Urine, Clean Catch [[id number]]  (Normal) Collected: 12/08/21 1404    Lab Status: Final result Specimen: Urine, Clean Catch Updated: 12/09/21 0709     Strep Pneumo Ag Negative    Legionella Antigen, Urine - Urine, Urine, Clean Catch [[id number]]  (Normal) Collected: 12/08/21 1404    Lab  Status: Final result Specimen: Urine, Clean Catch Updated: 12/09/21 0706     LEGIONELLA ANTIGEN, URINE Negative    Respiratory Panel PCR w/COVID-19(SARS-CoV-2) RUBINA/MANDY/MARK/PAD/COR/MAD/HORACE In-House, NP Swab in UTM/VTM, 3-4 HR TAT - Swab, Nasopharynx [[id number]]  (Normal) Collected: 12/08/21 2213    Lab Status: Final result Specimen: Swab from Nasopharynx Updated: 12/09/21 0003     ADENOVIRUS, PCR Not Detected     Coronavirus 229E Not Detected     Coronavirus HKU1 Not Detected     Coronavirus NL63 Not Detected     Coronavirus OC43 Not Detected     COVID19 Not Detected     Human Metapneumovirus Not Detected     Human Rhinovirus/Enterovirus Not Detected     Influenza A PCR Not Detected     Influenza B PCR Not Detected     Parainfluenza Virus 1 Not Detected     Parainfluenza Virus 2 Not Detected     Parainfluenza Virus 3 Not Detected     Parainfluenza Virus 4 Not Detected     RSV, PCR Not Detected     Bordetella pertussis pcr Not Detected     Bordetella parapertussis PCR Not Detected     Chlamydophila pneumoniae PCR Not Detected     Mycoplasma pneumo by PCR Not Detected    Narrative:      In the setting of a positive respiratory panel with a viral infection PLUS a negative procalcitonin without other underlying concern for bacterial infection, consider observing off antibiotics or discontinuation of antibiotics and continue supportive care. If the respiratory panel is positive for atypical bacterial infection (Bordetella pertussis, Chlamydophila pneumoniae, or Mycoplasma pneumoniae), consider antibiotic de-escalation to target atypical bacterial infection.          CT Abdomen Pelvis Without Contrast    Result Date: 12/8/2021  EXAMINATION: CT ABDOMEN/PELVIS WO CONTRAST - 12/08/2021  INDICATION: R41.0-Disorientation, unspecified. Generalized abdominal pain.  TECHNIQUE: Multiple axial CT imaging is obtained of the abdomen and pelvis without the administration of intravenous contrast.  The radiation dose reduction device was  turned on for each scan per the ALARA (As Low as Reasonably Achievable) protocol.  COMPARISON: None  FINDINGS: There is infiltrate in the right lower lobe suggesting pneumonia. Stones identified in the gallbladder. There is a cystic structure identified with thin-walled calcification seen in the region of the becky hepatis adjacent to the IVC and distal esophagus. This area measures 3.9 x 4.0 cm. Findings are uncertain and contrast enhanced imaging can be performed for further evaluation. Pancreas in its visualized portions is unremarkable. Kidneys and adrenal glands are within normal limits. The abdominal portion of the gastrointestinal tract within normal limits. No free fluid or free air. No abnormal mass or fluid collections identified. Bony structures are unremarkable.  PELVIS: Some mild wall thickening identified of the distal descending colon and sigmoid colon suggesting possibly a mild colitis. No significant abnormal mass or fluid collection. Pelvic organs are unremarkable. The pelvic portions of the gastrointestinal tract are otherwise within normal limits. No pelvic adenopathy. No free fluid or free air.      Impression: 1. Right lower lobe pneumonia. 2. Wall thickening of the sigmoid colon diffusely suggesting possibly a mild colitis with no significant stranding. 3. Cystic structure seen in the becky hepatis adjacent to the IVC and distal esophagus for which contrast enhanced imaging can be performed for further evaluation of this area. There are stones filling the gallbladder.  DICTATED:   12/08/2021 EDITED/lfs:   12/08/2021      CT Head Without Contrast    Result Date: 12/8/2021  EXAMINATION: CT HEAD WO CONTRAST-12/08/2021:  INDICATION: AMS; R41.0-Disorientation, unspecified, mental status change.  TECHNIQUE: Multiple axial CT imaging was obtained of the head without the administration of intravenous contrast.  The radiation dose reduction device was turned on for each scan per the ALARA (As Low as  Reasonably Achievable) protocol.  COMPARISON: NONE.  FINDINGS: The brain parenchyma is unremarkable. There is low-density areas seen in the periventricular and subcortical white matter suggesting chronic small vessel ischemic change. No hemorrhage or hydrocephalus. No mass, mass effect, or midline shift. The bony structures reveal no evidence of osseous abnormality. The visualized paranasal sinuses are clear. The mastoid air cells are patent.      Impression: No CT evidence of acute intracranial abnormality. Chronic changes seen within the brain.  D:  12/08/2021 E:  12/08/2021       XR Chest 1 View    Result Date: 12/8/2021  EXAMINATION: XR CHEST 1 VW-12/08/2021:  INDICATION: AMS.  COMPARISON: 10/05/2021.  FINDINGS: Portable chest reveals ill-defined opacification seen in the right infrahilar region. The findings are stable when compared to the prior study. Degenerative changes seen within the spine. The left lung is clear.      Impression: Mild increased markings identified in the right infrahilar region stable and unchanged. Dialysis catheter placed on the right with tip in the SVC.  D:  12/08/2021 E:  12/08/2021          Results for orders placed during the hospital encounter of 10/05/21    Adult Transthoracic Echo Complete W/ Cont if Necessary Per Protocol    Interpretation Summary  · Left ventricular ejection fraction appears to be 61 - 65%. Left ventricular systolic function is normal.  · Left atrial volume is mildly increased.      I have reviewed the medications:  Scheduled Meds:amLODIPine, 10 mg, Oral, Q24H  atorvastatin, 80 mg, Oral, Daily  calcium acetate, 667 mg, Oral, TID With Meals  carvedilol, 25 mg, Oral, BID With Meals  doxycycline, 100 mg, Intravenous, Q12H  heparin (porcine), 5,000 Units, Subcutaneous, Q8H  hydrALAZINE, 100 mg, Oral, Q8H  insulin lispro, 0-7 Units, Subcutaneous, TID AC  ipratropium-albuterol, 3 mL, Nebulization, 4x Daily - RT  piperacillin-tazobactam, 3.375 g, Intravenous,  Q12H  sodium chloride, 10 mL, Intravenous, Q12H      Continuous Infusions:   PRN Meds:.•  acetaminophen **OR** acetaminophen **OR** acetaminophen  •  dextrose  •  dextrose  •  glucagon (human recombinant)  •  ipratropium-albuterol  •  Morphine  •  naloxone  •  ondansetron  •  sodium chloride    Assessment/Plan   Assessment & Plan     Active Hospital Problems    Diagnosis  POA   • **Right lower lobe pneumonia [J18.9]  Yes   • AMS (altered mental status) [R41.82]  Yes   • Colitis [K52.9]  Unknown   • Normocytic anemia [D64.9]  Unknown   • Hypokalemia [E87.6]  Unknown   • Hepatocellular carcinoma metastatic to bone s/p RXT  [C79.51, C22.0]  Yes   • Cirrhosis of liver (HCC) [K74.60]  Yes   • Hemochromatosis [E83.119]  Yes   • S/P left BKA [Z89.512]  Not Applicable   • Chronic Hep C  [B18.2]  Yes   • Chronic pain on Methadone [F11.90]  Yes   • Chronic ulcer of right foot [L97.519]  Yes   • ESRD (end stage renal disease) [N18.6]  Yes   • GERD (gastroesophageal reflux disease) [K21.9]  Yes   • Essential hypertension [I10]  Yes   • Type 2 diabetes mellitus (HCC) [E11.9]  Yes      Resolved Hospital Problems   No resolved problems to display.        Brief Hospital Course to date:  Jeaneth Keita is a 61 y.o. female with a PMH significant for ESRD on HD, reports of insulin-dependent diabetes mellitus type 2 with no active medications on home med list, chronic hepatitis C, familial hemochromatosis, cirrhosis, hypertension, left BKA, fibromyalgia who is currently residing at [facility] who presents to the ED due to altered mental status.     Altered mental status  Colitis  RLL Pneumonia  -AMS likely due to acute infection  -cont Zosyn, doxycycline  -MRSA PCR negative  -Blood culture no growth at 24 hours.  urine legionella and strep both negative  -Stool studies pending but no further diarrhea, d/c isolation  -DuoNeb scheduled and as needed  -ABG shows an alkalosis  -UA negative  -CT head WNL  --TSH wnl.   NH3 wnl.  B12  level low normal so will start replacement. RPR pending  --confusion worse, but has been without HD for a few days so hopefully HD today will help.  --Will order MRI brain given history of metastatic hepatocellular carcinoma, will order without contrast d/t ESRD      ESRD, (?On HD)  Hypokalemia  -nephrology following, HD today  -Mild hypokalemia, defer to nephrology for replacement with HD  --patient partially self pulled her tunneled cathetor out today. She had an AVF that used for HD and worked well so nephro asked IR to pull out tunneled cathetor today     Type 2 diabetes mellitus  -Prior EMR reports insulin-dependent DM, no insulin on current home med list  -SSI for now  --A1c 5.10 which is NOT c/w DM if not on any meds at home     HTN  GERD  Status post left BKA  Chronic hep C  Metastatic hepatocellular carcinoma  Familial hemochromatosis  Chronic ulcer to right foot  -Continue home meds  -EMR reports patient was on chemotherapy in October, none actively listed  -Has previously received all of her care from [facility]     Chronic pain on methadone  -Methadone not on active med list  -UDS negative  -Richie reviewed with no active prescription        DVT prophylaxis:  Medical and mechanical DVT prophylaxis orders are present. sub q heparin      AM-PAC 6 Clicks Score (PT): 16 (12/09/21 1019)    Disposition: I expect the patient to be discharged TBD.  [facility] Abraham    CODE STATUS:   Code Status and Medical Interventions:   Ordered at: 12/08/21 1940     Level Of Support Discussed With:    Patient     Code Status (Patient has no pulse and is not breathing):    CPR (Attempt to Resuscitate)     Medical Interventions (Patient has pulse or is breathing):    Full Support       Keon Marin MD  12/10/21

## 2021-12-10 NOTE — PROGRESS NOTES
"   LOS: 2 days    Patient Care Team:  Provider, No Known as PCP - General  ESRD     Subjective     Interval History:   No acute events overnight. No new complaints       Review of Systems:   AMS, NO CP OR SOA    Objective     Vital Sign Min/Max for last 24 hours  Temp  Min: 97.8 °F (36.6 °C)  Max: 98.9 °F (37.2 °C)   BP  Min: 112/64  Max: 151/80   Pulse  Min: 84  Max: 116   Resp  Min: 16  Max: 18   SpO2  Min: 94 %  Max: 96 %   No data recorded   No data recorded     Flowsheet Rows      First Filed Value   Admission Height 172.7 cm (68\") Documented at 12/08/2021 1240   Admission Weight 72.6 kg (160 lb) Documented at 12/08/2021 1240          No intake/output data recorded.  I/O last 3 completed shifts:  In: 240 [P.O.:240]  Out: 800 [Urine:800]    Physical Exam:    Gen: Alert, NAD   HENT: NC, AT, EOMI   NECK: Supple, no JVD, Trachea midline   LUNGS: CTA bilaterally, non labored respirtation   CVS: S1/S2 audible, RRR, no murmur   Abd: Soft, NT, ND, BS+   Ext: No pedal edema, no cyanosis   CNS: Alert, No focal deficit noted grossly  Psy: Cooperative  Skin: Warm, dry and intact      WBC WBC   Date Value Ref Range Status   12/10/2021 3.13 (L) 3.40 - 10.80 10*3/mm3 Final   12/09/2021 5.33 3.40 - 10.80 10*3/mm3 Final   12/08/2021 5.45 3.40 - 10.80 10*3/mm3 Final      HGB Hemoglobin   Date Value Ref Range Status   12/10/2021 8.2 (L) 12.0 - 15.9 g/dL Final   12/09/2021 8.8 (L) 12.0 - 15.9 g/dL Final   12/08/2021 10.3 (L) 12.0 - 15.9 g/dL Final      HCT Hematocrit   Date Value Ref Range Status   12/10/2021 23.3 (L) 34.0 - 46.6 % Final   12/09/2021 25.5 (L) 34.0 - 46.6 % Final   12/08/2021 28.7 (L) 34.0 - 46.6 % Final      Platlets No results found for: LABPLAT   MCV MCV   Date Value Ref Range Status   12/10/2021 93.2 79.0 - 97.0 fL Final   12/09/2021 93.8 79.0 - 97.0 fL Final   12/08/2021 90.8 79.0 - 97.0 fL Final          Sodium Sodium   Date Value Ref Range Status   12/10/2021 141 136 - 145 mmol/L Final   12/09/2021 142 136 - " 145 mmol/L Final   12/08/2021 141 136 - 145 mmol/L Final      Potassium Potassium   Date Value Ref Range Status   12/10/2021 3.5 3.5 - 5.2 mmol/L Final   12/09/2021 3.2 (L) 3.5 - 5.2 mmol/L Final   12/08/2021 3.1 (L) 3.5 - 5.2 mmol/L Final     Comment:     Slight hemolysis detected by analyzer. Results may be affected.      Chloride Chloride   Date Value Ref Range Status   12/10/2021 107 98 - 107 mmol/L Final   12/09/2021 106 98 - 107 mmol/L Final   12/08/2021 101 98 - 107 mmol/L Final      CO2 CO2   Date Value Ref Range Status   12/10/2021 24.0 22.0 - 29.0 mmol/L Final   12/09/2021 24.0 22.0 - 29.0 mmol/L Final   12/08/2021 28.0 22.0 - 29.0 mmol/L Final      BUN BUN   Date Value Ref Range Status   12/10/2021 32 (H) 8 - 23 mg/dL Final   12/09/2021 24 (H) 8 - 23 mg/dL Final   12/08/2021 24 (H) 8 - 23 mg/dL Final      Creatinine Creatinine   Date Value Ref Range Status   12/10/2021 4.36 (H) 0.57 - 1.00 mg/dL Final   12/09/2021 3.73 (H) 0.57 - 1.00 mg/dL Final   12/08/2021 3.46 (H) 0.57 - 1.00 mg/dL Final      Calcium Calcium   Date Value Ref Range Status   12/10/2021 7.9 (L) 8.6 - 10.5 mg/dL Final   12/09/2021 7.4 (L) 8.6 - 10.5 mg/dL Final   12/08/2021 8.4 (L) 8.6 - 10.5 mg/dL Final      PO4 No results found for: CAPO4   Albumin Albumin   Date Value Ref Range Status   12/10/2021 3.00 (L) 3.50 - 5.20 g/dL Final   12/08/2021 3.60 3.50 - 5.20 g/dL Final      Magnesium Magnesium   Date Value Ref Range Status   12/09/2021 1.6 1.6 - 2.4 mg/dL Final   12/08/2021 1.9 1.6 - 2.4 mg/dL Final      Uric Acid No results found for: URICACID        Results Review:     I reviewed the patient's new clinical results.    amLODIPine, 10 mg, Oral, Q24H  atorvastatin, 80 mg, Oral, Daily  calcium acetate, 667 mg, Oral, TID With Meals  carvedilol, 25 mg, Oral, BID With Meals  doxycycline, 100 mg, Intravenous, Q12H  heparin (porcine), 5,000 Units, Subcutaneous, Q8H  hydrALAZINE, 100 mg, Oral, Q8H  insulin lispro, 0-7 Units, Subcutaneous, TID  AC  ipratropium-albuterol, 3 mL, Nebulization, 4x Daily - RT  piperacillin-tazobactam, 3.375 g, Intravenous, Q12H  sodium chloride, 10 mL, Intravenous, Q12H           Medication Review:     Assessment/Plan       Right lower lobe pneumonia    Hepatocellular carcinoma metastatic to bone s/p RXT     Hemochromatosis    Cirrhosis of liver (HCC)    Chronic Hep C     ESRD (end stage renal disease)    Chronic ulcer of right foot    S/P left BKA    GERD (gastroesophageal reflux disease)    Chronic pain on Methadone    Essential hypertension    Type 2 diabetes mellitus (HCC)    AMS (altered mental status)    Colitis    Normocytic anemia    Hypokalemia      1- ESRD - MWF - [facility]   2- HTN   3- Mild hypokalemia   4- Anemia of chronic disease   5- Familial hemochromatosis   6- Hx of Metastatic hepatocellular carcinoma      Plan:  - Patient seen on dialysis, UF as tolerated.   - Patient accidentally pulled a bit of tunneled catheter. Using AVF today. If no issues, will get tunneled catheter out.   - Renal diet.   - Transfuse for HGb less than 7.0      I discussed the patients findings and my recommendations with nursing staff    Panchito Austin MD  12/10/21  10:07 EST

## 2021-12-11 LAB
GLUCOSE BLDC GLUCOMTR-MCNC: 129 MG/DL (ref 70–130)
GLUCOSE BLDC GLUCOMTR-MCNC: 201 MG/DL (ref 70–130)
GLUCOSE BLDC GLUCOMTR-MCNC: 247 MG/DL (ref 70–130)
GLUCOSE BLDC GLUCOMTR-MCNC: 269 MG/DL (ref 70–130)

## 2021-12-11 PROCEDURE — 25010000002 MORPHINE PER 10 MG: Performed by: INTERNAL MEDICINE

## 2021-12-11 PROCEDURE — 63710000001 INSULIN LISPRO (HUMAN) PER 5 UNITS: Performed by: INTERNAL MEDICINE

## 2021-12-11 PROCEDURE — 25010000002 HEPARIN (PORCINE) PER 1000 UNITS: Performed by: INTERNAL MEDICINE

## 2021-12-11 PROCEDURE — 82962 GLUCOSE BLOOD TEST: CPT

## 2021-12-11 PROCEDURE — 94760 N-INVAS EAR/PLS OXIMETRY 1: CPT

## 2021-12-11 PROCEDURE — 25010000002 PIPERACILLIN SOD-TAZOBACTAM PER 1 G: Performed by: INTERNAL MEDICINE

## 2021-12-11 PROCEDURE — 99232 SBSQ HOSP IP/OBS MODERATE 35: CPT | Performed by: INTERNAL MEDICINE

## 2021-12-11 PROCEDURE — 94799 UNLISTED PULMONARY SVC/PX: CPT

## 2021-12-11 RX ORDER — QUETIAPINE FUMARATE 25 MG/1
25 TABLET, FILM COATED ORAL NIGHTLY
Status: COMPLETED | OUTPATIENT
Start: 2021-12-11 | End: 2021-12-12

## 2021-12-11 RX ADMIN — INSULIN LISPRO 3 UNITS: 100 INJECTION, SOLUTION INTRAVENOUS; SUBCUTANEOUS at 11:38

## 2021-12-11 RX ADMIN — DOXYCYCLINE 100 MG: 100 CAPSULE ORAL at 09:04

## 2021-12-11 RX ADMIN — DOXYCYCLINE 100 MG: 100 CAPSULE ORAL at 21:56

## 2021-12-11 RX ADMIN — CALCIUM ACETATE 667 MG: 667 CAPSULE ORAL at 09:04

## 2021-12-11 RX ADMIN — ATORVASTATIN CALCIUM 80 MG: 40 TABLET, FILM COATED ORAL at 09:04

## 2021-12-11 RX ADMIN — INSULIN LISPRO 3 UNITS: 100 INJECTION, SOLUTION INTRAVENOUS; SUBCUTANEOUS at 17:56

## 2021-12-11 RX ADMIN — HEPARIN SODIUM 5000 UNITS: 5000 INJECTION INTRAVENOUS; SUBCUTANEOUS at 09:05

## 2021-12-11 RX ADMIN — CARVEDILOL 25 MG: 12.5 TABLET, FILM COATED ORAL at 09:04

## 2021-12-11 RX ADMIN — HYDRALAZINE HYDROCHLORIDE 100 MG: 50 TABLET, FILM COATED ORAL at 09:04

## 2021-12-11 RX ADMIN — QUETIAPINE FUMARATE 25 MG: 25 TABLET ORAL at 21:56

## 2021-12-11 RX ADMIN — IPRATROPIUM BROMIDE AND ALBUTEROL SULFATE 3 ML: 2.5; .5 SOLUTION RESPIRATORY (INHALATION) at 16:14

## 2021-12-11 RX ADMIN — HEPARIN SODIUM 5000 UNITS: 5000 INJECTION INTRAVENOUS; SUBCUTANEOUS at 17:56

## 2021-12-11 RX ADMIN — TAZOBACTAM SODIUM AND PIPERACILLIN SODIUM 3.38 G: 375; 3 INJECTION, SOLUTION INTRAVENOUS at 03:12

## 2021-12-11 RX ADMIN — IPRATROPIUM BROMIDE AND ALBUTEROL SULFATE 3 ML: 2.5; .5 SOLUTION RESPIRATORY (INHALATION) at 20:04

## 2021-12-11 RX ADMIN — MORPHINE SULFATE 3 MG: 4 INJECTION, SOLUTION INTRAMUSCULAR; INTRAVENOUS at 01:44

## 2021-12-11 RX ADMIN — CARVEDILOL 25 MG: 12.5 TABLET, FILM COATED ORAL at 17:55

## 2021-12-11 RX ADMIN — SODIUM CHLORIDE, PRESERVATIVE FREE 10 ML: 5 INJECTION INTRAVENOUS at 09:05

## 2021-12-11 RX ADMIN — CALCIUM ACETATE 667 MG: 667 CAPSULE ORAL at 11:37

## 2021-12-11 RX ADMIN — IPRATROPIUM BROMIDE AND ALBUTEROL SULFATE 3 ML: 2.5; .5 SOLUTION RESPIRATORY (INHALATION) at 08:41

## 2021-12-11 RX ADMIN — SODIUM CHLORIDE, PRESERVATIVE FREE 10 ML: 5 INJECTION INTRAVENOUS at 21:56

## 2021-12-11 RX ADMIN — AMLODIPINE BESYLATE 10 MG: 5 TABLET ORAL at 09:04

## 2021-12-11 RX ADMIN — IPRATROPIUM BROMIDE AND ALBUTEROL SULFATE 3 ML: 2.5; .5 SOLUTION RESPIRATORY (INHALATION) at 12:11

## 2021-12-11 RX ADMIN — MORPHINE SULFATE 3 MG: 4 INJECTION, SOLUTION INTRAMUSCULAR; INTRAVENOUS at 11:45

## 2021-12-11 NOTE — PROGRESS NOTES
[facility] Medicine Services  PROGRESS NOTE    Patient Name: Jeaneth Keita  : 1960  MRN: [id number]    Date of Admission: 2021  Primary Care Physician: Provider, No Known    Subjective   Subjective     CC: Follow-up AMS    HPI: No acute events overnight, patient does not sleep much, she remains confused this morning    ROS:  Gen- No fevers, chills  CV- No chest pain, palpitations  Resp- No cough, dyspnea  GI- No N/V/D, abd pain    All other systems reviewed and are negative    Objective   Objective     Vital Signs:   Temp:  [97.5 °F (36.4 °C)-98.6 °F (37 °C)] 97.5 °F (36.4 °C)  Heart Rate:  [] 92  Resp:  [16] 16  BP: (112-151)/(74-99) 133/74     Physical Exam:  Constitutional: No acute distress, awake, alert  HENT: NCAT, mucous membranes moist  Respiratory: Clear to auscultation bilaterally, respiratory effort normal   Cardiovascular: RRR, no murmurs, rubs, or gallops  Gastrointestinal: Positive bowel sounds, soft, nontender, nondistended  Musculoskeletal: No bilateral ankle edema, s/p left BKA  Psychiatric: Appropriate affect, cooperative  Neurologic: Easily confused nonfocal  Skin: No rashes      Results Reviewed:  LAB RESULTS:      Lab 12/10/21  0818 12/09/21  0322 12/08/21  1714 21  1404   WBC 3.13* 5.33  --  5.45   HEMOGLOBIN 8.2* 8.8*  --  10.3*   HEMATOCRIT 23.3* 25.5*  --  28.7*   PLATELETS 117* 115*  --  158   NEUTROS ABS  --  4.45  --  3.56   IMMATURE GRANS (ABS)  --  0.02  --  0.02   LYMPHS ABS  --  0.43*  --  1.25   MONOS ABS  --  0.29  --  0.52   EOS ABS  --  0.11  --  0.07   MCV 93.2 93.8  --  90.8   PROCALCITONIN  --  0.14  --  0.13   LACTATE  --  1.3 1.4  --          Lab 12/10/21  0818 12/09/21  0322 21  1404   SODIUM 141 142 141   POTASSIUM 3.5 3.2* 3.1*   CHLORIDE 107 106 101   CO2 24.0 24.0 28.0   ANION GAP 10.0 12.0 12.0   BUN 32* 24* 24*   CREATININE 4.36* 3.73* 3.46*   GLUCOSE 117* 148* 246*   CALCIUM 7.9* 7.4* 8.4*   MAGNESIUM  --   1.6 1.9   HEMOGLOBIN A1C  --  5.10  --    TSH  --   --  0.759         Lab 12/10/21  0818 12/08/21  1404   TOTAL PROTEIN 5.3* 6.5   ALBUMIN 3.00* 3.60   GLOBULIN 2.3 2.9   ALT (SGPT) 22 28   AST (SGOT) 27 31   BILIRUBIN 0.5 0.5   ALK PHOS 64 90                 Lab 12/10/21  0818   VITAMIN B 12 239         Lab 12/08/21  2105   PH, ARTERIAL 7.581*   PCO2, ARTERIAL 28.4*   PO2 ART 92.6   FIO2 21   HCO3 ART 26.7*   BASE EXCESS ART 4.9*   CARBOXYHEMOGLOBIN 1.1     Brief Urine Lab Results  (Last result in the past 365 days)      Color   Clarity   Blood   Leuk Est   Nitrite   Protein   CREAT   Urine HCG        12/08/21 1404 Yellow   Clear   Negative   Negative   Negative   >=300 mg/dL (3+)                 Microbiology Results Abnormal     Procedure Component Value - Date/Time    Blood Culture - Blood, Arm, Left [[id number]]  (Normal) Collected: 12/08/21 1720    Lab Status: Preliminary result Specimen: Blood from Arm, Left Updated: 12/10/21 1731     Blood Culture No growth at 2 days    Blood Culture - Blood, Arm, Right [[id number]]  (Normal) Collected: 12/08/21 1700    Lab Status: Preliminary result Specimen: Blood from Arm, Right Updated: 12/10/21 1731     Blood Culture No growth at 2 days    MRSA Screen, PCR (Inpatient) - Swab, Nares [[id number]]  (Normal) Collected: 12/08/21 2213    Lab Status: Final result Specimen: Swab from Nares Updated: 12/09/21 0749     MRSA PCR Negative    Narrative:      MRSA Negative    S. Pneumo Ag Urine or CSF - Urine, Urine, Clean Catch [[id number]]  (Normal) Collected: 12/08/21 1404    Lab Status: Final result Specimen: Urine, Clean Catch Updated: 12/09/21 0709     Strep Pneumo Ag Negative    Legionella Antigen, Urine - Urine, Urine, Clean Catch [[id number]]  (Normal) Collected: 12/08/21 1404    Lab Status: Final result Specimen: Urine, Clean Catch Updated: 12/09/21 0706     LEGIONELLA ANTIGEN, URINE Negative    Respiratory Panel PCR w/COVID-19(SARS-CoV-2) RUBINA/MANDY/MARK/PAD/COR/MAD/HORACE In-House, NP  "Swab in UTM/VTM, 3-4 HR TAT - Swab, Nasopharynx [[id number]]  (Normal) Collected: 12/08/21 2213    Lab Status: Final result Specimen: Swab from Nasopharynx Updated: 12/09/21 0003     ADENOVIRUS, PCR Not Detected     Coronavirus 229E Not Detected     Coronavirus HKU1 Not Detected     Coronavirus NL63 Not Detected     Coronavirus OC43 Not Detected     COVID19 Not Detected     Human Metapneumovirus Not Detected     Human Rhinovirus/Enterovirus Not Detected     Influenza A PCR Not Detected     Influenza B PCR Not Detected     Parainfluenza Virus 1 Not Detected     Parainfluenza Virus 2 Not Detected     Parainfluenza Virus 3 Not Detected     Parainfluenza Virus 4 Not Detected     RSV, PCR Not Detected     Bordetella pertussis pcr Not Detected     Bordetella parapertussis PCR Not Detected     Chlamydophila pneumoniae PCR Not Detected     Mycoplasma pneumo by PCR Not Detected    Narrative:      In the setting of a positive respiratory panel with a viral infection PLUS a negative procalcitonin without other underlying concern for bacterial infection, consider observing off antibiotics or discontinuation of antibiotics and continue supportive care. If the respiratory panel is positive for atypical bacterial infection (Bordetella pertussis, Chlamydophila pneumoniae, or Mycoplasma pneumoniae), consider antibiotic de-escalation to target atypical bacterial infection.          XR Chest 1 View    Result Date: 12/10/2021  EXAMINATION: XR CHEST 1 VW-  INDICATION: Right IJ tunneled dialysis access pulled out with ~1/2\" past the cuff exposed; R41.0-Disorientation, unspecified; J18.9-Pneumonia, unspecified organism; E87.6-Hypokalemia; E83.51-Hypocalcemia; N18.6-End stage renal disease.  COMPARISON: 12/08/2021.  FINDINGS: Portable chest reveals cardiac and mediastinal silhouettes within normal limits. Deep line catheter identified on the right with tip in the proximal SVC. The dialysis catheter has been slightly retracted in the " interval. There is improvement seen in the appearance of the right perihilar mass/infiltrate. Degenerative change is seen within the spine. No definite pleural effusion or pneumothorax.      Impression: Slight retraction of the dialysis catheter tip now seen in the proximal SVC. Slight improvement seen in the aeration of the right perihilar region.  D:  12/10/2021 E:  12/10/2021      IR Removal Tunnel CV Cath Without Port    Result Date: 12/10/2021  Procedure: Tunneled dialysis catheter removal.                                                                                                         History: Catheter no longer needed.                                            : Ba Hallman MD.                                                                            Modality: Not applicable.                                                                           No sedation.  Anesthesia: Lidocaine local infiltration.              Estimated blood loss:  < 5 cc.          Technique: A universal timeout was performed prior to starting the procedure.   The  used personal protective equipment and sterile gloves.  The catheter was exposed after removal of the dressing. The cuff was exposed. The retention suture was clipped. The catheter was removed in entirety. Hemostasis was achieved with manual compression.  An aseptic dressing was applied.  The patient was in stable condition.                   Complications: None immediate.                                                                  Impression: Impression:                                                              Successful removal of a right IJ route tunneled dialysis catheter as described above.  Thank you for the opportunity to assist in the care of your patient.  This report was finalized on 12/10/2021 2:09 PM by Ba Hallman MD.        Results for orders placed during the hospital encounter of 10/05/21    Adult Transthoracic  Echo Complete W/ Cont if Necessary Per Protocol    Interpretation Summary  · Left ventricular ejection fraction appears to be 61 - 65%. Left ventricular systolic function is normal.  · Left atrial volume is mildly increased.      I have reviewed the medications:  Scheduled Meds:amLODIPine, 10 mg, Oral, Q24H  atorvastatin, 80 mg, Oral, Daily  calcium acetate, 667 mg, Oral, TID With Meals  carvedilol, 25 mg, Oral, BID With Meals  cyanocobalamin, 1,000 mcg, Intramuscular, Q28 Days  doxycycline, 100 mg, Oral, Q12H  heparin (porcine), 5,000 Units, Subcutaneous, Q8H  hydrALAZINE, 100 mg, Oral, Q8H  insulin lispro, 0-7 Units, Subcutaneous, TID AC  ipratropium-albuterol, 3 mL, Nebulization, 4x Daily - RT  piperacillin-tazobactam, 3.375 g, Intravenous, Q12H  sodium chloride, 10 mL, Intravenous, Q12H      Continuous Infusions:   PRN Meds:.•  acetaminophen **OR** acetaminophen **OR** acetaminophen  •  dextrose  •  dextrose  •  glucagon (human recombinant)  •  ipratropium-albuterol  •  Morphine  •  naloxone  •  ondansetron  •  sodium chloride    Assessment/Plan   Assessment & Plan     Active Hospital Problems    Diagnosis  POA   • **Right lower lobe pneumonia [J18.9]  Yes   • AMS (altered mental status) [R41.82]  Yes   • Colitis [K52.9]  Unknown   • Normocytic anemia [D64.9]  Unknown   • Hypokalemia [E87.6]  Unknown   • Hepatocellular carcinoma metastatic to bone s/p RXT  [C79.51, C22.0]  Yes   • Cirrhosis of liver (HCC) [K74.60]  Yes   • Hemochromatosis [E83.119]  Yes   • S/P left BKA [Z89.512]  Not Applicable   • Chronic Hep C  [B18.2]  Yes   • Chronic pain on Methadone [F11.90]  Yes   • Chronic ulcer of right foot [L97.519]  Yes   • ESRD (end stage renal disease) [N18.6]  Yes   • GERD (gastroesophageal reflux disease) [K21.9]  Yes   • Essential hypertension [I10]  Yes   • Type 2 diabetes mellitus (HCC) [E11.9]  Yes      Resolved Hospital Problems   No resolved problems to display.        Brief Hospital Course to  date:  Jeaneth Keita is a 61 y.o. female with history of cirrhosis with HCC metastatic to bone s/p XRT, chronic hep C, chronic pain on methadone, ESRD on HD, familial hemochromatosis, hypertension, type 2 diabetes patient presented to the ED with AMS    Acute encephalopathy  Right lower lobe pneumonia  -Blood cultures NGTD, urinary antigens negative, CT head unrevealing.  - follow-up MRI head considering history of metastatic HCC  -TSH, ammonia, B12 are wnl  -Continue Zosyn and doxycycline for now  -We will give Seroquel 25 mg nightly for insomnia    ESRD on HD  -Renal following, patient with AV fistula in place, tunneled cath has been removed    Type 2 diabetes  -Unable to accurately assess control as patient is on HD, A1c 5.1%  -Continue SSI for now    Liver cirrhosis  HCC with metastasis to bone s/p XRT  Chronic hep C  Familial hemochromatosis  -Patient has previously had all of her care done at [facility]  -Continue home meds when appropriate    Chronic pain-previously on methadone, UDS is negative, and methadone not on active med list    DVT prophylaxis:  Medical and mechanical DVT prophylaxis orders are present.     All problems listed above are new to me as this is my first encounter with patient.    AM-PAC 6 Clicks Score (PT): 6 (12/10/21 1600)    Disposition: TBD    CODE STATUS:   Code Status and Medical Interventions:   Ordered at: 12/08/21 1940     Level Of Support Discussed With:    Patient     Code Status (Patient has no pulse and is not breathing):    CPR (Attempt to Resuscitate)     Medical Interventions (Patient has pulse or is breathing):    Full Support       Florencio Ryan MD  12/11/21

## 2021-12-11 NOTE — PLAN OF CARE
Goal Outcome Evaluation:  Pt complained of being stuck in bed but overall rested well overnight. Pt on room air, no voiced complaints at this time.

## 2021-12-11 NOTE — PLAN OF CARE
Goal Outcome Evaluation:  Plan of Care Reviewed With: patient        Progress: improving  Outcome Summary: Pt is doing well, more alert and oriented today

## 2021-12-12 ENCOUNTER — APPOINTMENT (OUTPATIENT)
Dept: MRI IMAGING | Facility: HOSPITAL | Age: 61
End: 2021-12-12

## 2021-12-12 LAB
ALBUMIN SERPL-MCNC: 2.9 G/DL (ref 3.5–5.2)
ANION GAP SERPL CALCULATED.3IONS-SCNC: 11 MMOL/L (ref 5–15)
BUN SERPL-MCNC: 41 MG/DL (ref 8–23)
BUN/CREAT SERPL: 9.4 (ref 7–25)
CALCIUM SPEC-SCNC: 8 MG/DL (ref 8.6–10.5)
CHLORIDE SERPL-SCNC: 110 MMOL/L (ref 98–107)
CO2 SERPL-SCNC: 20 MMOL/L (ref 22–29)
CREAT SERPL-MCNC: 4.35 MG/DL (ref 0.57–1)
GFR SERPL CREATININE-BSD FRML MDRD: 10 ML/MIN/1.73
GFR SERPL CREATININE-BSD FRML MDRD: ABNORMAL ML/MIN/{1.73_M2}
GLUCOSE BLDC GLUCOMTR-MCNC: 129 MG/DL (ref 70–130)
GLUCOSE BLDC GLUCOMTR-MCNC: 137 MG/DL (ref 70–130)
GLUCOSE BLDC GLUCOMTR-MCNC: 177 MG/DL (ref 70–130)
GLUCOSE BLDC GLUCOMTR-MCNC: 212 MG/DL (ref 70–130)
GLUCOSE SERPL-MCNC: 127 MG/DL (ref 65–99)
PHOSPHATE SERPL-MCNC: 4.8 MG/DL (ref 2.5–4.5)
POTASSIUM SERPL-SCNC: 4 MMOL/L (ref 3.5–5.2)
SODIUM SERPL-SCNC: 141 MMOL/L (ref 136–145)

## 2021-12-12 PROCEDURE — 94761 N-INVAS EAR/PLS OXIMETRY MLT: CPT

## 2021-12-12 PROCEDURE — 99232 SBSQ HOSP IP/OBS MODERATE 35: CPT | Performed by: INTERNAL MEDICINE

## 2021-12-12 PROCEDURE — 70551 MRI BRAIN STEM W/O DYE: CPT

## 2021-12-12 PROCEDURE — 80069 RENAL FUNCTION PANEL: CPT | Performed by: INTERNAL MEDICINE

## 2021-12-12 PROCEDURE — 25010000002 PIPERACILLIN SOD-TAZOBACTAM PER 1 G: Performed by: INTERNAL MEDICINE

## 2021-12-12 PROCEDURE — 63710000001 INSULIN LISPRO (HUMAN) PER 5 UNITS: Performed by: INTERNAL MEDICINE

## 2021-12-12 PROCEDURE — 82962 GLUCOSE BLOOD TEST: CPT

## 2021-12-12 PROCEDURE — 25010000002 HEPARIN (PORCINE) PER 1000 UNITS: Performed by: INTERNAL MEDICINE

## 2021-12-12 PROCEDURE — 94799 UNLISTED PULMONARY SVC/PX: CPT

## 2021-12-12 RX ADMIN — DOXYCYCLINE 100 MG: 100 CAPSULE ORAL at 09:03

## 2021-12-12 RX ADMIN — HEPARIN SODIUM 5000 UNITS: 5000 INJECTION INTRAVENOUS; SUBCUTANEOUS at 02:19

## 2021-12-12 RX ADMIN — CARVEDILOL 25 MG: 12.5 TABLET, FILM COATED ORAL at 17:19

## 2021-12-12 RX ADMIN — SODIUM CHLORIDE, PRESERVATIVE FREE 10 ML: 5 INJECTION INTRAVENOUS at 20:44

## 2021-12-12 RX ADMIN — TAZOBACTAM SODIUM AND PIPERACILLIN SODIUM 3.38 G: 375; 3 INJECTION, SOLUTION INTRAVENOUS at 02:19

## 2021-12-12 RX ADMIN — HYDRALAZINE HYDROCHLORIDE 100 MG: 50 TABLET, FILM COATED ORAL at 09:03

## 2021-12-12 RX ADMIN — SODIUM CHLORIDE, PRESERVATIVE FREE 10 ML: 5 INJECTION INTRAVENOUS at 09:04

## 2021-12-12 RX ADMIN — AMLODIPINE BESYLATE 10 MG: 5 TABLET ORAL at 09:04

## 2021-12-12 RX ADMIN — HEPARIN SODIUM 5000 UNITS: 5000 INJECTION INTRAVENOUS; SUBCUTANEOUS at 17:19

## 2021-12-12 RX ADMIN — TAZOBACTAM SODIUM AND PIPERACILLIN SODIUM 3.38 G: 375; 3 INJECTION, SOLUTION INTRAVENOUS at 14:01

## 2021-12-12 RX ADMIN — CALCIUM ACETATE 667 MG: 667 CAPSULE ORAL at 17:19

## 2021-12-12 RX ADMIN — INSULIN LISPRO 3 UNITS: 100 INJECTION, SOLUTION INTRAVENOUS; SUBCUTANEOUS at 11:37

## 2021-12-12 RX ADMIN — DOXYCYCLINE 100 MG: 100 CAPSULE ORAL at 20:44

## 2021-12-12 RX ADMIN — HYDRALAZINE HYDROCHLORIDE 100 MG: 50 TABLET, FILM COATED ORAL at 17:18

## 2021-12-12 RX ADMIN — CALCIUM ACETATE 667 MG: 667 CAPSULE ORAL at 09:03

## 2021-12-12 RX ADMIN — HEPARIN SODIUM 5000 UNITS: 5000 INJECTION INTRAVENOUS; SUBCUTANEOUS at 09:04

## 2021-12-12 RX ADMIN — IPRATROPIUM BROMIDE AND ALBUTEROL SULFATE 3 ML: 2.5; .5 SOLUTION RESPIRATORY (INHALATION) at 07:32

## 2021-12-12 RX ADMIN — CALCIUM ACETATE 667 MG: 667 CAPSULE ORAL at 11:37

## 2021-12-12 RX ADMIN — INSULIN LISPRO 2 UNITS: 100 INJECTION, SOLUTION INTRAVENOUS; SUBCUTANEOUS at 17:19

## 2021-12-12 RX ADMIN — QUETIAPINE FUMARATE 25 MG: 25 TABLET ORAL at 20:44

## 2021-12-12 RX ADMIN — ATORVASTATIN CALCIUM 80 MG: 40 TABLET, FILM COATED ORAL at 09:03

## 2021-12-12 RX ADMIN — CARVEDILOL 25 MG: 12.5 TABLET, FILM COATED ORAL at 09:04

## 2021-12-12 NOTE — PROGRESS NOTES
"   LOS: 4 days    Patient Care Team:  Provider, No Known as PCP - General  ESRD     Subjective     Interval History:   No acute events overnight. No new complaints       Review of Systems:   AMS, NO CP OR SOA    Objective     Vital Sign Min/Max for last 24 hours  Temp  Min: 97.9 °F (36.6 °C)  Max: 98.7 °F (37.1 °C)   BP  Min: 124/63  Max: 153/81   Pulse  Min: 83  Max: 97   Resp  Min: 16  Max: 18   SpO2  Min: 95 %  Max: 100 %   No data recorded   Weight  Min: 66 kg (145 lb 9.6 oz)  Max: 66 kg (145 lb 9.6 oz)     Flowsheet Rows      First Filed Value   Admission Height 172.7 cm (68\") Documented at 12/08/2021 1240   Admission Weight 72.6 kg (160 lb) Documented at 12/08/2021 1240          I/O this shift:  In: 400 [P.O.:400]  Out: -   I/O last 3 completed shifts:  In: 50 [IV Piggyback:50]  Out: -     Physical Exam:    Gen: Alert, NAD   HENT: NC, AT, EOMI   NECK: Supple, no JVD, Trachea midline   LUNGS: CTA bilaterally, non labored respirtation   CVS: S1/S2 audible, RRR, no murmur   Abd: Soft, NT, ND, BS+   Ext: No pedal edema, no cyanosis   CNS: Alert, No focal deficit noted grossly  Psy: Cooperative  Skin: Warm, dry and intact      WBC WBC   Date Value Ref Range Status   12/10/2021 3.13 (L) 3.40 - 10.80 10*3/mm3 Final      HGB Hemoglobin   Date Value Ref Range Status   12/10/2021 8.2 (L) 12.0 - 15.9 g/dL Final      HCT Hematocrit   Date Value Ref Range Status   12/10/2021 23.3 (L) 34.0 - 46.6 % Final      Platlets No results found for: LABPLAT   MCV MCV   Date Value Ref Range Status   12/10/2021 93.2 79.0 - 97.0 fL Final          Sodium Sodium   Date Value Ref Range Status   12/12/2021 141 136 - 145 mmol/L Final   12/10/2021 141 136 - 145 mmol/L Final      Potassium Potassium   Date Value Ref Range Status   12/12/2021 4.0 3.5 - 5.2 mmol/L Final   12/10/2021 3.5 3.5 - 5.2 mmol/L Final      Chloride Chloride   Date Value Ref Range Status   12/12/2021 110 (H) 98 - 107 mmol/L Final   12/10/2021 107 98 - 107 mmol/L Final    "   CO2 CO2   Date Value Ref Range Status   12/12/2021 20.0 (L) 22.0 - 29.0 mmol/L Final   12/10/2021 24.0 22.0 - 29.0 mmol/L Final      BUN BUN   Date Value Ref Range Status   12/12/2021 41 (H) 8 - 23 mg/dL Final   12/10/2021 32 (H) 8 - 23 mg/dL Final      Creatinine Creatinine   Date Value Ref Range Status   12/12/2021 4.35 (H) 0.57 - 1.00 mg/dL Final   12/10/2021 4.36 (H) 0.57 - 1.00 mg/dL Final      Calcium Calcium   Date Value Ref Range Status   12/12/2021 8.0 (L) 8.6 - 10.5 mg/dL Final   12/10/2021 7.9 (L) 8.6 - 10.5 mg/dL Final      PO4 No results found for: CAPO4   Albumin Albumin   Date Value Ref Range Status   12/12/2021 2.90 (L) 3.50 - 5.20 g/dL Final   12/10/2021 3.00 (L) 3.50 - 5.20 g/dL Final      Magnesium No results found for: MG   Uric Acid No results found for: URICACID        Results Review:     I reviewed the patient's new clinical results.    amLODIPine, 10 mg, Oral, Q24H  atorvastatin, 80 mg, Oral, Daily  calcium acetate, 667 mg, Oral, TID With Meals  carvedilol, 25 mg, Oral, BID With Meals  cyanocobalamin, 1,000 mcg, Intramuscular, Q28 Days  doxycycline, 100 mg, Oral, Q12H  heparin (porcine), 5,000 Units, Subcutaneous, Q8H  hydrALAZINE, 100 mg, Oral, Q8H  insulin lispro, 0-7 Units, Subcutaneous, TID AC  piperacillin-tazobactam, 3.375 g, Intravenous, Q12H  QUEtiapine, 25 mg, Oral, Nightly  sodium chloride, 10 mL, Intravenous, Q12H           Medication Review:     Assessment/Plan       Right lower lobe pneumonia    Hepatocellular carcinoma metastatic to bone s/p RXT     Hemochromatosis    Cirrhosis of liver (HCC)    Chronic Hep C     ESRD (end stage renal disease)    Chronic ulcer of right foot    S/P left BKA    GERD (gastroesophageal reflux disease)    Chronic pain on Methadone    Essential hypertension    Type 2 diabetes mellitus (HCC)    AMS (altered mental status)    Colitis    Normocytic anemia    Hypokalemia      1- ESRD - MWF - [facility]   2- HTN   3- Mild hypokalemia   4- Anemia of  chronic disease   5- Familial hemochromatosis   6- Hx of Metastatic hepatocellular carcinoma       Plan:  - HD tomorrow, UF as tolerated.   - Renal diet.   - Transfuse for HGb less than 7.0      I discussed the patients findings and my recommendations with nursing staff    Panchito Austin MD  12/12/21  10:21 EST

## 2021-12-12 NOTE — PROGRESS NOTES
[facility]  PROGRESS NOTE    Patient Name: Jeaneth Keita  : 1960  MRN: [id number]    Date of Admission: 2021  Primary Care Physician: Provider, No Known    Subjective   Subjective     CC: Follow-up AMS    HPI: No acute events overnight, patient that she slept okay, still some confusion but is much improved this morning.    ROS:  Gen- No fevers, chills  CV- No chest pain, palpitations  Resp- No cough, dyspnea  GI- No N/V/D, abd pain      Objective   Objective     Vital Signs:   Temp:  [98.4 °F (36.9 °C)-98.7 °F (37.1 °C)] 98.6 °F (37 °C)  Heart Rate:  [83-95] 83  Resp:  [16-20] 17  BP: (124-142)/(57-88) 134/75     Physical Exam:  Constitutional: No acute distress, awake, alert  HENT: NCAT, mucous membranes moist  Respiratory: Clear to auscultation bilaterally, respiratory effort normal   Cardiovascular: RRR, no murmurs, rubs, or gallops  Gastrointestinal: Positive bowel sounds, soft, nontender, nondistended  Musculoskeletal: No bilateral ankle edema, status post left BKA  Psychiatric: Appropriate affect, cooperative  Neurologic: Oriented x2, confused, nonfocal  Skin: No rashes      Results Reviewed:  LAB RESULTS:      Lab 12/10/21  0818 12/09/21  0322 12/08/21  1714 21  1404   WBC 3.13* 5.33  --  5.45   HEMOGLOBIN 8.2* 8.8*  --  10.3*   HEMATOCRIT 23.3* 25.5*  --  28.7*   PLATELETS 117* 115*  --  158   NEUTROS ABS  --  4.45  --  3.56   IMMATURE GRANS (ABS)  --  0.02  --  0.02   LYMPHS ABS  --  0.43*  --  1.25   MONOS ABS  --  0.29  --  0.52   EOS ABS  --  0.11  --  0.07   MCV 93.2 93.8  --  90.8   PROCALCITONIN  --  0.14  --  0.13   LACTATE  --  1.3 1.4  --          Lab 21  0344 12/10/21  0818 21  0322 21  1404   SODIUM 141 141 142 141   POTASSIUM 4.0 3.5 3.2* 3.1*   CHLORIDE 110* 107 106 101   CO2 20.0* 24.0 24.0 28.0   ANION GAP 11.0 10.0 12.0 12.0   BUN 41* 32* 24* 24*   CREATININE 4.35* 4.36* 3.73* 3.46*   GLUCOSE 127* 117* 148*  246*   CALCIUM 8.0* 7.9* 7.4* 8.4*   MAGNESIUM  --   --  1.6 1.9   PHOSPHORUS 4.8*  --   --   --    HEMOGLOBIN A1C  --   --  5.10  --    TSH  --   --   --  0.759         Lab 12/12/21  0344 12/10/21  0818 12/08/21  1404   TOTAL PROTEIN  --  5.3* 6.5   ALBUMIN 2.90* 3.00* 3.60   GLOBULIN  --  2.3 2.9   ALT (SGPT)  --  22 28   AST (SGOT)  --  27 31   BILIRUBIN  --  0.5 0.5   ALK PHOS  --  64 90                 Lab 12/10/21  0818   VITAMIN B 12 239         Lab 12/08/21  2105   PH, ARTERIAL 7.581*   PCO2, ARTERIAL 28.4*   PO2 ART 92.6   FIO2 21   HCO3 ART 26.7*   BASE EXCESS ART 4.9*   CARBOXYHEMOGLOBIN 1.1     Brief Urine Lab Results  (Last result in the past 365 days)      Color   Clarity   Blood   Leuk Est   Nitrite   Protein   CREAT   Urine HCG        12/08/21 1404 Yellow   Clear   Negative   Negative   Negative   >=300 mg/dL (3+)                 Microbiology Results Abnormal     Procedure Component Value - Date/Time    Blood Culture - Blood, Arm, Left [[id number]]  (Normal) Collected: 12/08/21 1720    Lab Status: Preliminary result Specimen: Blood from Arm, Left Updated: 12/11/21 1730     Blood Culture No growth at 3 days    Blood Culture - Blood, Arm, Right [[id number]]  (Normal) Collected: 12/08/21 1700    Lab Status: Preliminary result Specimen: Blood from Arm, Right Updated: 12/11/21 1730     Blood Culture No growth at 3 days    MRSA Screen, PCR (Inpatient) - Swab, Nares [[id number]]  (Normal) Collected: 12/08/21 2213    Lab Status: Final result Specimen: Swab from Nares Updated: 12/09/21 0749     MRSA PCR Negative    Narrative:      MRSA Negative    S. Pneumo Ag Urine or CSF - Urine, Urine, Clean Catch [[id number]]  (Normal) Collected: 12/08/21 1404    Lab Status: Final result Specimen: Urine, Clean Catch Updated: 12/09/21 0709     Strep Pneumo Ag Negative    Legionella Antigen, Urine - Urine, Urine, Clean Catch [[id number]]  (Normal) Collected: 12/08/21 1404    Lab Status: Final result Specimen: Urine, Clean Catch  "Updated: 12/09/21 0706     LEGIONELLA ANTIGEN, URINE Negative    Respiratory Panel PCR w/COVID-19(SARS-CoV-2) RUBINA/MANDY/MARK/PAD/COR/MAD/HORACE In-House, NP Swab in UTM/VTM, 3-4 HR TAT - Swab, Nasopharynx [[id number]]  (Normal) Collected: 12/08/21 2219    Lab Status: Final result Specimen: Swab from Nasopharynx Updated: 12/09/21 0003     ADENOVIRUS, PCR Not Detected     Coronavirus 229E Not Detected     Coronavirus HKU1 Not Detected     Coronavirus NL63 Not Detected     Coronavirus OC43 Not Detected     COVID19 Not Detected     Human Metapneumovirus Not Detected     Human Rhinovirus/Enterovirus Not Detected     Influenza A PCR Not Detected     Influenza B PCR Not Detected     Parainfluenza Virus 1 Not Detected     Parainfluenza Virus 2 Not Detected     Parainfluenza Virus 3 Not Detected     Parainfluenza Virus 4 Not Detected     RSV, PCR Not Detected     Bordetella pertussis pcr Not Detected     Bordetella parapertussis PCR Not Detected     Chlamydophila pneumoniae PCR Not Detected     Mycoplasma pneumo by PCR Not Detected    Narrative:      In the setting of a positive respiratory panel with a viral infection PLUS a negative procalcitonin without other underlying concern for bacterial infection, consider observing off antibiotics or discontinuation of antibiotics and continue supportive care. If the respiratory panel is positive for atypical bacterial infection (Bordetella pertussis, Chlamydophila pneumoniae, or Mycoplasma pneumoniae), consider antibiotic de-escalation to target atypical bacterial infection.          XR Chest 1 View    Result Date: 12/10/2021  EXAMINATION: XR CHEST 1 VW-  INDICATION: Right IJ tunneled dialysis access pulled out with ~1/2\" past the cuff exposed; R41.0-Disorientation, unspecified; J18.9-Pneumonia, unspecified organism; E87.6-Hypokalemia; E83.51-Hypocalcemia; N18.6-End stage renal disease.  COMPARISON: 12/08/2021.  FINDINGS: Portable chest reveals cardiac and mediastinal silhouettes within " normal limits. Deep line catheter identified on the right with tip in the proximal SVC. The dialysis catheter has been slightly retracted in the interval. There is improvement seen in the appearance of the right perihilar mass/infiltrate. Degenerative change is seen within the spine. No definite pleural effusion or pneumothorax.      Impression: Slight retraction of the dialysis catheter tip now seen in the proximal SVC. Slight improvement seen in the aeration of the right perihilar region.  D:  12/10/2021 E:  12/10/2021      IR Removal Tunnel CV Cath Without Port    Result Date: 12/10/2021  Procedure: Tunneled dialysis catheter removal.                                                                                                         History: Catheter no longer needed.                                            : Ba Hallman MD.                                                                            Modality: Not applicable.                                                                           No sedation.  Anesthesia: Lidocaine local infiltration.              Estimated blood loss:  < 5 cc.          Technique: A universal timeout was performed prior to starting the procedure.   The  used personal protective equipment and sterile gloves.  The catheter was exposed after removal of the dressing. The cuff was exposed. The retention suture was clipped. The catheter was removed in entirety. Hemostasis was achieved with manual compression.  An aseptic dressing was applied.  The patient was in stable condition.                   Complications: None immediate.                                                                  Impression: Impression:                                                              Successful removal of a right IJ route tunneled dialysis catheter as described above.  Thank you for the opportunity to assist in the care of your patient.  This report was  finalized on 12/10/2021 2:09 PM by Ba Hallman MD.        Results for orders placed during the hospital encounter of 10/05/21    Adult Transthoracic Echo Complete W/ Cont if Necessary Per Protocol    Interpretation Summary  · Left ventricular ejection fraction appears to be 61 - 65%. Left ventricular systolic function is normal.  · Left atrial volume is mildly increased.      I have reviewed the medications:  Scheduled Meds:amLODIPine, 10 mg, Oral, Q24H  atorvastatin, 80 mg, Oral, Daily  calcium acetate, 667 mg, Oral, TID With Meals  carvedilol, 25 mg, Oral, BID With Meals  cyanocobalamin, 1,000 mcg, Intramuscular, Q28 Days  doxycycline, 100 mg, Oral, Q12H  heparin (porcine), 5,000 Units, Subcutaneous, Q8H  hydrALAZINE, 100 mg, Oral, Q8H  insulin lispro, 0-7 Units, Subcutaneous, TID AC  ipratropium-albuterol, 3 mL, Nebulization, 4x Daily - RT  piperacillin-tazobactam, 3.375 g, Intravenous, Q12H  QUEtiapine, 25 mg, Oral, Nightly  sodium chloride, 10 mL, Intravenous, Q12H      Continuous Infusions:   PRN Meds:.•  acetaminophen **OR** acetaminophen **OR** acetaminophen  •  dextrose  •  dextrose  •  glucagon (human recombinant)  •  ipratropium-albuterol  •  Morphine  •  naloxone  •  ondansetron  •  sodium chloride    Assessment/Plan   Assessment & Plan     Active Hospital Problems    Diagnosis  POA   • **Right lower lobe pneumonia [J18.9]  Yes   • AMS (altered mental status) [R41.82]  Yes   • Colitis [K52.9]  Unknown   • Normocytic anemia [D64.9]  Unknown   • Hypokalemia [E87.6]  Unknown   • Hepatocellular carcinoma metastatic to bone s/p RXT  [C79.51, C22.0]  Yes   • Cirrhosis of liver (HCC) [K74.60]  Yes   • Hemochromatosis [E83.119]  Yes   • S/P left BKA [Z89.512]  Not Applicable   • Chronic Hep C  [B18.2]  Yes   • Chronic pain on Methadone [F11.90]  Yes   • Chronic ulcer of right foot [L97.519]  Yes   • ESRD (end stage renal disease) [N18.6]  Yes   • GERD (gastroesophageal reflux disease) [K21.9]  Yes   •  Essential hypertension [I10]  Yes   • Type 2 diabetes mellitus (HCC) [E11.9]  Yes      Resolved Hospital Problems   No resolved problems to display.        Brief Hospital Course to date:  Jeaneth Keita is a 61 y.o. female with history of cirrhosis with HCC metastatic to bone s/p XRT, chronic hep C, chronic pain on methadone, ESRD on HD, familial hemochromatosis, hypertension, type 2 diabetes patient presented to the ED with AMS     Acute encephalopathy  Right lower lobe pneumonia  -Blood cultures NGTD, urinary antigens negative, CT head unrevealing.  - follow-up MRI head, read pending considering history of metastatic HCC  -TSH, ammonia, B12 are wnl  -Continue Zosyn and doxycycline for now  -Continue Seroquel 25 mg nightly for insomnia     ESRD on HD  -Renal following, patient with AV fistula in place, tunneled cath has since been removed     Type 2 diabetes  -Unable to accurately assess control as patient is on HD, A1c 5.1%  -Continue SSI for now     Liver cirrhosis  HCC with metastasis to bone s/p XRT  Chronic hep C  Familial hemochromatosis  -Patient has previously had all of her care done at [facility]  -Continue home meds when appropriate     Chronic pain-previously on methadone, UDS is negative, and methadone not on active med list     DVT prophylaxis:  Medical and mechanical DVT prophylaxis orders are present.       AM-PAC 6 Clicks Score (PT): 6 (12/11/21 0800)    Disposition: TBD    CODE STATUS:   Code Status and Medical Interventions:   Ordered at: 12/08/21 1940     Level Of Support Discussed With:    Patient     Code Status (Patient has no pulse and is not breathing):    CPR (Attempt to Resuscitate)     Medical Interventions (Patient has pulse or is breathing):    Full Support       Florencio Ryan MD  12/12/21

## 2021-12-12 NOTE — PLAN OF CARE
Goal Outcome Evaluation:              Outcome Summary: patient is alert to self and  place, roomair, toelrating diet-tray set up, pills whole, NO sticks or BP on left arm due to fistula,  purewick on, incontinent, no c/o at this time,will cont to monitor

## 2021-12-12 NOTE — PLAN OF CARE
Goal Outcome Evaluation:  Plan of Care Reviewed With: patient           Outcome Summary: Patient did well overnight. She is oriented to self and place only. Patient slept well. MRI was completed last night.

## 2021-12-13 ENCOUNTER — APPOINTMENT (OUTPATIENT)
Dept: NEPHROLOGY | Facility: HOSPITAL | Age: 61
End: 2021-12-13

## 2021-12-13 LAB
BACTERIA SPEC AEROBE CULT: NORMAL
BACTERIA SPEC AEROBE CULT: NORMAL
GLUCOSE BLDC GLUCOMTR-MCNC: 139 MG/DL (ref 70–130)
GLUCOSE BLDC GLUCOMTR-MCNC: 164 MG/DL (ref 70–130)
GLUCOSE BLDC GLUCOMTR-MCNC: 206 MG/DL (ref 70–130)
GLUCOSE BLDC GLUCOMTR-MCNC: 210 MG/DL (ref 70–130)

## 2021-12-13 PROCEDURE — 82962 GLUCOSE BLOOD TEST: CPT

## 2021-12-13 PROCEDURE — P9047 ALBUMIN (HUMAN), 25%, 50ML: HCPCS

## 2021-12-13 PROCEDURE — 25010000002 PIPERACILLIN SOD-TAZOBACTAM PER 1 G: Performed by: INTERNAL MEDICINE

## 2021-12-13 PROCEDURE — 99232 SBSQ HOSP IP/OBS MODERATE 35: CPT | Performed by: INTERNAL MEDICINE

## 2021-12-13 PROCEDURE — 25010000002 HEPARIN (PORCINE) PER 1000 UNITS: Performed by: INTERNAL MEDICINE

## 2021-12-13 PROCEDURE — 25010000002 ALBUMIN HUMAN 25% PER 50 ML

## 2021-12-13 PROCEDURE — 63710000001 INSULIN LISPRO (HUMAN) PER 5 UNITS: Performed by: INTERNAL MEDICINE

## 2021-12-13 RX ORDER — QUETIAPINE FUMARATE 25 MG/1
25 TABLET, FILM COATED ORAL NIGHTLY
Status: COMPLETED | OUTPATIENT
Start: 2021-12-13 | End: 2021-12-14

## 2021-12-13 RX ORDER — ALBUMIN (HUMAN) 12.5 G/50ML
SOLUTION INTRAVENOUS
Status: COMPLETED
Start: 2021-12-13 | End: 2021-12-13

## 2021-12-13 RX ORDER — ALBUMIN (HUMAN) 12.5 G/50ML
12.5 SOLUTION INTRAVENOUS AS NEEDED
Status: ACTIVE | OUTPATIENT
Start: 2021-12-13 | End: 2021-12-13

## 2021-12-13 RX ADMIN — AMLODIPINE BESYLATE 10 MG: 5 TABLET ORAL at 08:38

## 2021-12-13 RX ADMIN — CALCIUM ACETATE 667 MG: 667 CAPSULE ORAL at 08:41

## 2021-12-13 RX ADMIN — DOXYCYCLINE 100 MG: 100 CAPSULE ORAL at 08:38

## 2021-12-13 RX ADMIN — DOXYCYCLINE 100 MG: 100 CAPSULE ORAL at 20:01

## 2021-12-13 RX ADMIN — HYDRALAZINE HYDROCHLORIDE 100 MG: 50 TABLET, FILM COATED ORAL at 16:51

## 2021-12-13 RX ADMIN — TAZOBACTAM SODIUM AND PIPERACILLIN SODIUM 3.38 G: 375; 3 INJECTION, SOLUTION INTRAVENOUS at 01:02

## 2021-12-13 RX ADMIN — TAZOBACTAM SODIUM AND PIPERACILLIN SODIUM 3.38 G: 375; 3 INJECTION, SOLUTION INTRAVENOUS at 16:53

## 2021-12-13 RX ADMIN — ACETAMINOPHEN 650 MG: 325 TABLET, FILM COATED ORAL at 01:01

## 2021-12-13 RX ADMIN — HEPARIN SODIUM 5000 UNITS: 5000 INJECTION INTRAVENOUS; SUBCUTANEOUS at 08:38

## 2021-12-13 RX ADMIN — QUETIAPINE FUMARATE 25 MG: 25 TABLET ORAL at 20:01

## 2021-12-13 RX ADMIN — SODIUM CHLORIDE, PRESERVATIVE FREE 10 ML: 5 INJECTION INTRAVENOUS at 08:41

## 2021-12-13 RX ADMIN — ALBUMIN HUMAN: 0.25 SOLUTION INTRAVENOUS at 13:30

## 2021-12-13 RX ADMIN — CARVEDILOL 25 MG: 12.5 TABLET, FILM COATED ORAL at 08:40

## 2021-12-13 RX ADMIN — ATORVASTATIN CALCIUM 80 MG: 40 TABLET, FILM COATED ORAL at 08:38

## 2021-12-13 RX ADMIN — CARVEDILOL 25 MG: 12.5 TABLET, FILM COATED ORAL at 17:00

## 2021-12-13 RX ADMIN — HYDRALAZINE HYDROCHLORIDE 100 MG: 50 TABLET, FILM COATED ORAL at 08:41

## 2021-12-13 RX ADMIN — CALCIUM ACETATE 667 MG: 667 CAPSULE ORAL at 17:00

## 2021-12-13 RX ADMIN — INSULIN LISPRO 2 UNITS: 100 INJECTION, SOLUTION INTRAVENOUS; SUBCUTANEOUS at 08:37

## 2021-12-13 RX ADMIN — HEPARIN SODIUM 5000 UNITS: 5000 INJECTION INTRAVENOUS; SUBCUTANEOUS at 16:51

## 2021-12-13 RX ADMIN — SODIUM CHLORIDE, PRESERVATIVE FREE 10 ML: 5 INJECTION INTRAVENOUS at 20:01

## 2021-12-13 RX ADMIN — HYDRALAZINE HYDROCHLORIDE 100 MG: 50 TABLET, FILM COATED ORAL at 01:01

## 2021-12-13 RX ADMIN — HEPARIN SODIUM 5000 UNITS: 5000 INJECTION INTRAVENOUS; SUBCUTANEOUS at 01:01

## 2021-12-13 NOTE — PROGRESS NOTES
[facility] Medicine Services  PROGRESS NOTE    Patient Name: Jeaneth Keita  : 1960  MRN: [id number]    Date of Admission: 2021  Primary Care Physician: Provider, No Known    Subjective   Subjective     CC: Follow-up AMS    HPI: No acute events overnight, patient states that she did not sleep much, remains confused    ROS:  Gen- No fevers, chills  CV- No chest pain, palpitations  Resp- No cough, dyspnea  GI- No N/V/D, abd pain     Objective   Objective     Vital Signs:   Temp:  [97.9 °F (36.6 °C)-99.3 °F (37.4 °C)] 98 °F (36.7 °C)  Heart Rate:  [] 95  Resp:  [16-18] 17  BP: (114-153)/(57-81) 127/71     Physical Exam:  Constitutional: No acute distress, awake, alert  HENT: NCAT, mucous membranes moist  Respiratory: Clear to auscultation bilaterally, respiratory effort normal   Cardiovascular: RRR, no murmurs, rubs, or gallops  Gastrointestinal: Positive bowel sounds, soft, nontender, nondistended  Musculoskeletal: No bilateral ankle edema, s/p left BKA  Psychiatric: Appropriate affect, cooperative  Neurologic: Nonfocal, confused  Skin: No rashes      Results Reviewed:  LAB RESULTS:      Lab 12/10/21  0821  1714 21  1404   WBC 3.13* 5.33  --  5.45   HEMOGLOBIN 8.2* 8.8*  --  10.3*   HEMATOCRIT 23.3* 25.5*  --  28.7*   PLATELETS 117* 115*  --  158   NEUTROS ABS  --  4.45  --  3.56   IMMATURE GRANS (ABS)  --  0.02  --  0.02   LYMPHS ABS  --  0.43*  --  1.25   MONOS ABS  --  0.29  --  0.52   EOS ABS  --  0.11  --  0.07   MCV 93.2 93.8  --  90.8   PROCALCITONIN  --  0.14  --  0.13   LACTATE  --  1.3 1.4  --          Lab 21  0344 12/10/21  0818 21  0322 21  1404   SODIUM 141 141 142 141   POTASSIUM 4.0 3.5 3.2* 3.1*   CHLORIDE 110* 107 106 101   CO2 20.0* 24.0 24.0 28.0   ANION GAP 11.0 10.0 12.0 12.0   BUN 41* 32* 24* 24*   CREATININE 4.35* 4.36* 3.73* 3.46*   GLUCOSE 127* 117* 148* 246*   CALCIUM 8.0* 7.9* 7.4* 8.4*    MAGNESIUM  --   --  1.6 1.9   PHOSPHORUS 4.8*  --   --   --    HEMOGLOBIN A1C  --   --  5.10  --    TSH  --   --   --  0.759         Lab 12/12/21  0344 12/10/21  0818 12/08/21  1404   TOTAL PROTEIN  --  5.3* 6.5   ALBUMIN 2.90* 3.00* 3.60   GLOBULIN  --  2.3 2.9   ALT (SGPT)  --  22 28   AST (SGOT)  --  27 31   BILIRUBIN  --  0.5 0.5   ALK PHOS  --  64 90                 Lab 12/10/21  0818   VITAMIN B 12 239         Lab 12/08/21  2105   PH, ARTERIAL 7.581*   PCO2, ARTERIAL 28.4*   PO2 ART 92.6   FIO2 21   HCO3 ART 26.7*   BASE EXCESS ART 4.9*   CARBOXYHEMOGLOBIN 1.1     Brief Urine Lab Results  (Last result in the past 365 days)      Color   Clarity   Blood   Leuk Est   Nitrite   Protein   CREAT   Urine HCG        12/08/21 1404 Yellow   Clear   Negative   Negative   Negative   >=300 mg/dL (3+)                 Microbiology Results Abnormal     Procedure Component Value - Date/Time    Blood Culture - Blood, Arm, Left [[id number]]  (Normal) Collected: 12/08/21 1720    Lab Status: Preliminary result Specimen: Blood from Arm, Left Updated: 12/12/21 1730     Blood Culture No growth at 4 days    Blood Culture - Blood, Arm, Right [[id number]]  (Normal) Collected: 12/08/21 1700    Lab Status: Preliminary result Specimen: Blood from Arm, Right Updated: 12/12/21 1730     Blood Culture No growth at 4 days    MRSA Screen, PCR (Inpatient) - Swab, Nares [[id number]]  (Normal) Collected: 12/08/21 2213    Lab Status: Final result Specimen: Swab from Nares Updated: 12/09/21 0749     MRSA PCR Negative    Narrative:      MRSA Negative    S. Pneumo Ag Urine or CSF - Urine, Urine, Clean Catch [[id number]]  (Normal) Collected: 12/08/21 1404    Lab Status: Final result Specimen: Urine, Clean Catch Updated: 12/09/21 0709     Strep Pneumo Ag Negative    Legionella Antigen, Urine - Urine, Urine, Clean Catch [[id number]]  (Normal) Collected: 12/08/21 1404    Lab Status: Final result Specimen: Urine, Clean Catch Updated: 12/09/21 0706      LEGIONELLA ANTIGEN, URINE Negative    Respiratory Panel PCR w/COVID-19(SARS-CoV-2) RUBINA/MANDY/MARK/PAD/COR/MAD/H[facility]ACE In-House, NP Swab in UTM/VTM, 3-4 HR TAT - Swab, Nasopharynx [[id number]]  (Normal) Collected: 12/08/21 2213    Lab Status: Final result Specimen: Swab from Nasopharynx Updated: 12/09/21 0003     ADENOVIRUS, PCR Not Detected     Coronavirus 229E Not Detected     Coronavirus HKU1 Not Detected     Coronavirus NL63 Not Detected     Coronavirus OC43 Not Detected     COVID19 Not Detected     Human Metapneumovirus Not Detected     Human Rhinovirus/Enterovirus Not Detected     Influenza A PCR Not Detected     Influenza B PCR Not Detected     Parainfluenza Virus 1 Not Detected     Parainfluenza Virus 2 Not Detected     Parainfluenza Virus 3 Not Detected     Parainfluenza Virus 4 Not Detected     RSV, PCR Not Detected     Bordetella pertussis pcr Not Detected     Bordetella parapertussis PCR Not Detected     Chlamydophila pneumoniae PCR Not Detected     Mycoplasma pneumo by PCR Not Detected    Narrative:      In the setting of a positive respiratory panel with a viral infection PLUS a negative procalcitonin without other underlying concern for bacterial infection, consider observing off antibiotics or discontinuation of antibiotics and continue supportive care. If the respiratory panel is positive for atypical bacterial infection (Bordetella pertussis, Chlamydophila pneumoniae, or Mycoplasma pneumoniae), consider antibiotic de-escalation to target atypical bacterial infection.          MRI Brain Without Contrast    Result Date: 12/12/2021  EXAMINATION: MRI BRAIN WO CONTRAST-  INDICATION: AMS with h/o hepatocellular carcinoma, ESRD so unable to give contrast; R41.0-Disorientation, unspecified; J18.9-Pneumonia, unspecified organism; E87.6-Hypokalemia; E83.51-Hypocalcemia; N18.6-End stage renal disease  TECHNIQUE: Multiplanar multisequence MRI of the brain performed without IV contrast  COMPARISON: Outside MRI 10/5/2021   FINDINGS: No acute infarct noted on diffusion weighted sequences. Midline structures are unremarkable and the craniocervical junction is satisfactory in appearance. Chronic and age-related changes are present with generalized volume loss and typical T2 hyperintense white matter sequela of chronic small vessel ischemia, in addition to evidence of prior left thalamic hemorrhage. There is otherwise no evidence of intracranial hemorrhage, mass or mass effect. The ventricles are unchanged in size and configuration. The orbits are unremarkable and the paranasal sinuses are grossly clear. Intracranial arterial flow voids are maintained.      Impression: Stable chronic and age-related changes of the brain including evidence of prior left thalamic hemorrhage. No evidence of acute ischemia, hemorrhage, mass or mass effect.   This report was finalized on 12/12/2021 8:26 AM by Victor Manuel Arvizu.        Results for orders placed during the hospital encounter of 10/05/21    Adult Transthoracic Echo Complete W/ Cont if Necessary Per Protocol    Interpretation Summary  · Left ventricular ejection fraction appears to be 61 - 65%. Left ventricular systolic function is normal.  · Left atrial volume is mildly increased.      I have reviewed the medications:  Scheduled Meds:amLODIPine, 10 mg, Oral, Q24H  atorvastatin, 80 mg, Oral, Daily  calcium acetate, 667 mg, Oral, TID With Meals  carvedilol, 25 mg, Oral, BID With Meals  cyanocobalamin, 1,000 mcg, Intramuscular, Q28 Days  doxycycline, 100 mg, Oral, Q12H  heparin (porcine), 5,000 Units, Subcutaneous, Q8H  hydrALAZINE, 100 mg, Oral, Q8H  insulin lispro, 0-7 Units, Subcutaneous, TID AC  piperacillin-tazobactam, 3.375 g, Intravenous, Q12H  sodium chloride, 10 mL, Intravenous, Q12H      Continuous Infusions:   PRN Meds:.•  acetaminophen **OR** acetaminophen **OR** acetaminophen  •  dextrose  •  dextrose  •  glucagon (human recombinant)  •  ipratropium-albuterol  •  Morphine  •   naloxone  •  ondansetron  •  sodium chloride    Assessment/Plan   Assessment & Plan     Active Hospital Problems    Diagnosis  POA   • **Right lower lobe pneumonia [J18.9]  Yes   • AMS (altered mental status) [R41.82]  Yes   • Colitis [K52.9]  Unknown   • Normocytic anemia [D64.9]  Unknown   • Hypokalemia [E87.6]  Unknown   • Hepatocellular carcinoma metastatic to bone s/p RXT  [C79.51, C22.0]  Yes   • Cirrhosis of liver (HCC) [K74.60]  Yes   • Hemochromatosis [E83.119]  Yes   • S/P left BKA [Z89.512]  Not Applicable   • Chronic Hep C  [B18.2]  Yes   • Chronic pain on Methadone [F11.90]  Yes   • Chronic ulcer of right foot [L97.519]  Yes   • ESRD (end stage renal disease) [N18.6]  Yes   • GERD (gastroesophageal reflux disease) [K21.9]  Yes   • Essential hypertension [I10]  Yes   • Type 2 diabetes mellitus (HCC) [E11.9]  Yes      Resolved Hospital Problems   No resolved problems to display.        Brief Hospital Course to date:  Jeaneth Keita is a 61 y.o. female with history of cirrhosis with HCC metastatic to bone s/p XRT, chronic hep C, chronic pain on methadone, ESRD on HD, familial hemochromatosis, hypertension, type 2 diabetes patient presented to the ED with AMS     Acute encephalopathy  Right lower lobe pneumonia  -Blood cultures NGTD, urinary antigens negative, CT head unrevealing.  - follow-up MRI head, read pending considering history of metastatic HCC  -TSH, ammonia, B12 are wnl  -Continue Zosyn and doxycycline for now  -Continue Seroquel 25 mg nightly for insomnia  -Suspect patient is currently close to her baseline mental status     ESRD on HD  -Renal following, patient with AV fistula in place, tunneled cath has since been removed     Type 2 diabetes  -Unable to accurately assess control as patient is on HD, A1c 5.1%  -Continue SSI for now     Liver cirrhosis  HCC with metastasis to bone s/p XRT  Chronic hep C  Familial hemochromatosis  -Patient has previously had all of her care done at  [facility]  -Continue home meds when appropriate     Chronic pain-previously on methadone, UDS is negative, and methadone not on active med list    DVT prophylaxis:  Medical and mechanical DVT prophylaxis orders are present.     AM-PAC 6 Clicks Score (PT): 14 (12/12/21 0900)    Disposition: TBD    CODE STATUS:   Code Status and Medical Interventions:   Ordered at: 12/08/21 1940     Level Of Support Discussed With:    Patient     Code Status (Patient has no pulse and is not breathing):    CPR (Attempt to Resuscitate)     Medical Interventions (Patient has pulse or is breathing):    Full Support       Florencio Ryan MD  12/13/21

## 2021-12-13 NOTE — PROGRESS NOTES
"   LOS: 5 days    Patient Care Team:  Provider, No Known as PCP - General  ESRD     Subjective     Interval History:   No acute events overnight. No new complaints       Review of Systems:   AMS, NO CP OR SOA    Objective     Vital Sign Min/Max for last 24 hours  Temp  Min: 97.9 °F (36.6 °C)  Max: 99.3 °F (37.4 °C)   BP  Min: 114/59  Max: 140/72   Pulse  Min: 87  Max: 100   Resp  Min: 16  Max: 18   SpO2  Min: 95 %  Max: 98 %   No data recorded   Weight  Min: 68.7 kg (151 lb 8 oz)  Max: 68.7 kg (151 lb 8 oz)     Flowsheet Rows      First Filed Value   Admission Height 172.7 cm (68\") Documented at 12/08/2021 1240   Admission Weight 72.6 kg (160 lb) Documented at 12/08/2021 1240          No intake/output data recorded.  I/O last 3 completed shifts:  In: 1252 [P.O.:1252]  Out: -     Physical Exam:    Gen: Alert, NAD   HENT: NC, AT, EOMI   NECK: Supple, no JVD, Trachea midline   LUNGS: CTA bilaterally, non labored respirtation   CVS: S1/S2 audible, RRR, no murmur   Abd: Soft, NT, ND, BS+   Ext: No pedal edema, no cyanosis   CNS: Alert, No focal deficit noted grossly  Psy: Cooperative  Skin: Warm, dry and intact      WBC No results found for: WBC   HGB No results found for: HGB   HCT No results found for: HCT   Platlets No results found for: LABPLAT   MCV No results found for: MCV       Sodium Sodium   Date Value Ref Range Status   12/12/2021 141 136 - 145 mmol/L Final      Potassium Potassium   Date Value Ref Range Status   12/12/2021 4.0 3.5 - 5.2 mmol/L Final      Chloride Chloride   Date Value Ref Range Status   12/12/2021 110 (H) 98 - 107 mmol/L Final      CO2 CO2   Date Value Ref Range Status   12/12/2021 20.0 (L) 22.0 - 29.0 mmol/L Final      BUN BUN   Date Value Ref Range Status   12/12/2021 41 (H) 8 - 23 mg/dL Final      Creatinine Creatinine   Date Value Ref Range Status   12/12/2021 4.35 (H) 0.57 - 1.00 mg/dL Final      Calcium Calcium   Date Value Ref Range Status   12/12/2021 8.0 (L) 8.6 - 10.5 mg/dL Final    "   PO4 No results found for: CAPO4   Albumin Albumin   Date Value Ref Range Status   12/12/2021 2.90 (L) 3.50 - 5.20 g/dL Final      Magnesium No results found for: MG   Uric Acid No results found for: URICACID        Results Review:     I reviewed the patient's new clinical results.    amLODIPine, 10 mg, Oral, Q24H  atorvastatin, 80 mg, Oral, Daily  calcium acetate, 667 mg, Oral, TID With Meals  carvedilol, 25 mg, Oral, BID With Meals  cyanocobalamin, 1,000 mcg, Intramuscular, Q28 Days  doxycycline, 100 mg, Oral, Q12H  heparin (porcine), 5,000 Units, Subcutaneous, Q8H  hydrALAZINE, 100 mg, Oral, Q8H  insulin lispro, 0-7 Units, Subcutaneous, TID AC  piperacillin-tazobactam, 3.375 g, Intravenous, Q12H  sodium chloride, 10 mL, Intravenous, Q12H           Medication Review:     Assessment/Plan       Right lower lobe pneumonia    Hepatocellular carcinoma metastatic to bone s/p RXT     Hemochromatosis    Cirrhosis of liver (HCC)    Chronic Hep C     ESRD (end stage renal disease)    Chronic ulcer of right foot    S/P left BKA    GERD (gastroesophageal reflux disease)    Chronic pain on Methadone    Essential hypertension    Type 2 diabetes mellitus (HCC)    AMS (altered mental status)    Colitis    Normocytic anemia    Hypokalemia      1- ESRD - MWF - [facility]   2- HTN   3- Mild hypokalemia   4- Anemia of chronic disease   5- Familial hemochromatosis   6- Hx of Metastatic hepatocellular carcinoma       Plan:  - Patient seen on dialysis, UF as tolerated.   - Renal diet.   - Transfuse for HGb less than 7.0      I discussed the patients findings and my recommendations with nursing staff    Panchito Austin MD  12/13/21  09:24 EST

## 2021-12-13 NOTE — NURSING NOTE
Daily Low Lee <=14   Lee score: 14  No new concerns noted per staff. Interventions in place and documented per protocol. Apply barrier cream daily/PRN. Keep patient dry and turn regularly. Elevate and offload heels.  Contact WOC for any concerns.  On waffle mattress, nurse denies need for specialty bed, patient can turn side to side well.

## 2021-12-13 NOTE — PLAN OF CARE
"Goal Outcome Evaluation:               VSS. EKG: NSR. Pt has been restless all night. Pt had 2 unmeasured UOP. Pt c/o \"pain all over\" and was given tylenol. Pt became less restless and was able to go to sleep. Will continue to monitor.         Problem: Adult Inpatient Plan of Care  Goal: Absence of Hospital-Acquired Illness or Injury  Intervention: Identify and Manage Fall Risk  Recent Flowsheet Documentation  Taken 12/13/2021 0400 by Ethan Kaufman RN  Safety Promotion/Fall Prevention:   activity supervised   assistive device/personal items within reach   clutter free environment maintained   fall prevention program maintained   gait belt   lighting adjusted   mobility aid in reach   nonskid shoes/slippers when out of bed   room organization consistent   safety round/check completed  Taken 12/13/2021 0200 by Ethan Kaufman RN  Safety Promotion/Fall Prevention:   activity supervised   assistive device/personal items within reach   clutter free environment maintained   fall prevention program maintained   gait belt   lighting adjusted   mobility aid in reach   nonskid shoes/slippers when out of bed   room organization consistent   safety round/check completed  Taken 12/13/2021 0000 by Ethan Kaufman RN  Safety Promotion/Fall Prevention:   activity supervised   assistive device/personal items within reach   clutter free environment maintained   fall prevention program maintained   gait belt   lighting adjusted   mobility aid in reach   nonskid shoes/slippers when out of bed   room organization consistent   safety round/check completed  Taken 12/12/2021 2200 by Ethan Kaufman RN  Safety Promotion/Fall Prevention:   activity supervised   assistive device/personal items within reach   clutter free environment maintained   fall prevention program maintained   gait belt   lighting adjusted   mobility aid in reach   nonskid shoes/slippers when out of bed   room organization consistent   safety round/check completed  Intervention: " Prevent Skin Injury  Recent Flowsheet Documentation  Taken 12/13/2021 0400 by Ethan Kaufman RN  Body Position: position changed independently  Taken 12/13/2021 0200 by Ethan Kaufman RN  Body Position: position changed independently  Taken 12/13/2021 0000 by Ethan Kaufman RN  Body Position: position changed independently  Taken 12/12/2021 2200 by Ethan Kaufman RN  Body Position: position changed independently  Intervention: Prevent and Manage VTE (venous thromboembolism) Risk  Recent Flowsheet Documentation  Taken 12/12/2021 2000 by Ethan Kaufman RN  VTE Prevention/Management:   bilateral   dorsiflexion/plantar flexion performed  Goal: Optimal Comfort and Wellbeing  Intervention: Provide Person-Centered Care  Recent Flowsheet Documentation  Taken 12/12/2021 2000 by Ethan Kaufman RN  Trust Relationship/Rapport: care explained     Problem: Fall Injury Risk  Goal: Absence of Fall and Fall-Related Injury  Intervention: Identify and Manage Contributors to Fall Injury Risk  Recent Flowsheet Documentation  Taken 12/13/2021 0400 by Ethan Kaufman RN  Medication Review/Management: medications reviewed  Self-Care Promotion: independence encouraged  Taken 12/13/2021 0200 by Ethan Kaufman RN  Medication Review/Management: medications reviewed  Self-Care Promotion: independence encouraged  Taken 12/13/2021 0000 by Ethan Kaufman RN  Medication Review/Management: medications reviewed  Taken 12/12/2021 2200 by Ethan Kaufman RN  Medication Review/Management: medications reviewed  Self-Care Promotion: independence encouraged  Intervention: Promote Injury-Free Environment  Recent Flowsheet Documentation  Taken 12/13/2021 0400 by Ethan Kaufman RN  Safety Promotion/Fall Prevention:   activity supervised   assistive device/personal items within reach   clutter free environment maintained   fall prevention program maintained   gait belt   lighting adjusted   mobility aid in reach   nonskid shoes/slippers when out of bed   room organization  consistent   safety round/check completed  Taken 12/13/2021 0200 by Ethan Kaufman RN  Safety Promotion/Fall Prevention:   activity supervised   assistive device/personal items within reach   clutter free environment maintained   fall prevention program maintained   gait belt   lighting adjusted   mobility aid in reach   nonskid shoes/slippers when out of bed   room organization consistent   safety round/check completed  Taken 12/13/2021 0000 by Ethan Kaufman RN  Safety Promotion/Fall Prevention:   activity supervised   assistive device/personal items within reach   clutter free environment maintained   fall prevention program maintained   gait belt   lighting adjusted   mobility aid in reach   nonskid shoes/slippers when out of bed   room organization consistent   safety round/check completed  Taken 12/12/2021 2200 by Ethan Kaufman RN  Safety Promotion/Fall Prevention:   activity supervised   assistive device/personal items within reach   clutter free environment maintained   fall prevention program maintained   gait belt   lighting adjusted   mobility aid in reach   nonskid shoes/slippers when out of bed   room organization consistent   safety round/check completed     Problem: Skin Injury Risk Increased  Goal: Skin Health and Integrity  Intervention: Optimize Skin Protection  Recent Flowsheet Documentation  Taken 12/13/2021 0400 by Ethan Kaufman RN  Head of Bed (HOB): HOB elevated  Taken 12/13/2021 0200 by Ethan Kaufman RN  Head of Bed (HOB): HOB elevated  Taken 12/13/2021 0000 by Ethan Kaufman RN  Head of Bed (HOB): HOB elevated  Taken 12/12/2021 2200 by Ethan Kaufman RN  Head of Bed (HOB): HOB elevated     Problem: Hypertension Comorbidity  Goal: Blood Pressure in Desired Range  Intervention: Maintain Hypertension-Management Strategies  Recent Flowsheet Documentation  Taken 12/13/2021 0400 by Ethan Kaufman RN  Medication Review/Management: medications reviewed  Taken 12/13/2021 0200 by Ethan Kaufman, RN  Medication  Review/Management: medications reviewed  Taken 12/13/2021 0000 by Ethan Kaufman RN  Medication Review/Management: medications reviewed  Taken 12/12/2021 2200 by Ethan Kaufman RN  Medication Review/Management: medications reviewed     Problem: Pain Chronic (Persistent) (Comorbidity Management)  Goal: Acceptable Pain Control and Functional Ability  Intervention: Develop Pain Management Plan  Recent Flowsheet Documentation  Taken 12/13/2021 0200 by Ethan Kaufman RN  Pain Management Interventions: see MAR  Taken 12/13/2021 0054 by Ethan Kaufman RN  Pain Management Interventions: see MAR  Intervention: Manage Persistent Pain  Recent Flowsheet Documentation  Taken 12/13/2021 0400 by Ethan Kaufman RN  Sleep/Rest Enhancement: relaxation techniques promoted  Medication Review/Management: medications reviewed  Taken 12/13/2021 0200 by Ethan Kaufman RN  Sleep/Rest Enhancement: relaxation techniques promoted  Medication Review/Management: medications reviewed  Taken 12/13/2021 0000 by Ethan Kaufman RN  Sleep/Rest Enhancement: relaxation techniques promoted  Medication Review/Management: medications reviewed  Taken 12/12/2021 2200 by Ethan Kaufman RN  Medication Review/Management: medications reviewed  Intervention: Optimize Psychosocial Wellbeing  Recent Flowsheet Documentation  Taken 12/12/2021 2000 by Ethan Kaufman RN  Diversional Activities: television  Family/Support System Care: self-care encouraged     Problem: Restraint, Nonbehavioral (Nonviolent)  Goal: Discontinuation Criteria Achieved  Intervention: Implement Least-restrictive Safety Strategies  Recent Flowsheet Documentation  Taken 12/12/2021 2000 by Ethan Kaufman RN  Diversional Activities: television  Goal: Personal Dignity and Safety Maintained  Intervention: Protect Dignity, Rights, and Personal Wellbeing  Recent Flowsheet Documentation  Taken 12/12/2021 2000 by Ethan Kaufman RN  Trust Relationship/Rapport: care explained  Intervention: Protect Skin and Joint  Integrity  Recent Flowsheet Documentation  Taken 12/13/2021 0400 by Ethan Kaufman RN  Body Position: position changed independently  Taken 12/13/2021 0200 by Ethan Kaufman RN  Body Position: position changed independently  Taken 12/13/2021 0000 by Ethan Kaufman RN  Body Position: position changed independently  Taken 12/12/2021 2200 by Ethan Kaufman RN  Body Position: position changed independently

## 2021-12-13 NOTE — CASE MANAGEMENT/SOCIAL WORK
Continued Stay Note  [facility]     Patient Name: Jeaneth Keita  MRN: [id number]  Today's Date: 12/13/2021    Admit Date: 12/8/2021     Discharge Plan     Row Name 12/13/21 1312       Plan    Plan Ongoing    Patient/Family in Agreement with Plan yes    Plan Comments I met with pt at the bedside. She knew her name but was confused about anything else. Pt lives at [facility]. Pt was discussed in [facility], Dr. Ryan asked CM to find out what pt's baseline cognition is. I spoke with David, RN, at [facility]. He states prior to last Tuesday pt was alert and oriented times 3. He states that pt is an assist times 1 for all ADL's except showering, then she requires more assistance. Pt is a LBKA. David states that pt wheels herself around the facility in her wheelchair independently. Pt has HD today. PT/OT are recommending HH. I asked David/[facility] if that had therapy available he states they have in house therapy and they use [facility] for HH. Current plan is for pt to return to [facility], per [facility] pt's POA and healthcare surrogate. No needs identified. I updated Dr. Ryan and pt's RN on information from [facility]. CM will continue to follow.    Final Discharge Disposition Code 30 - still a patient               Discharge Codes    No documentation.               Expected Discharge Date and Time     Expected Discharge Date Expected Discharge Time    Dec 12, 2021             Rosetta Owens, RN

## 2021-12-13 NOTE — PLAN OF CARE
Problem: Adult Inpatient Plan of Care  Goal: Plan of Care Review  Outcome: Ongoing, Progressing  Flowsheets  Taken 12/13/2021 1837 by Kerry Roach RN  Outcome Summary: Pt had dialysis today and 2.5 liters taken off.  Taken 12/12/2021 0449 by Linda Christianson RN  Plan of Care Reviewed With: patient  Taken 12/11/2021 1819 by Epifanio Hayes RN  Progress: improving  Goal: Patient-Specific Goal (Individualized)  Outcome: Ongoing, Progressing  Goal: Absence of Hospital-Acquired Illness or Injury  Outcome: Ongoing, Progressing  Intervention: Identify and Manage Fall Risk  Flowsheets  Taken 12/13/2021 1800  Safety Promotion/Fall Prevention:   activity supervised   assistive device/personal items within reach   clutter free environment maintained   fall prevention program maintained   nonskid shoes/slippers when out of bed   safety round/check completed  Taken 12/13/2021 1640  Safety Promotion/Fall Prevention: (pt returned to floor)   activity supervised   assistive device/personal items within reach   clutter free environment maintained   fall prevention program maintained   nonskid shoes/slippers when out of bed   safety round/check completed   other (see comments)  Taken 12/13/2021 1400  Safety Promotion/Fall Prevention: patient off unit  Taken 12/13/2021 1200  Safety Promotion/Fall Prevention: patient off unit  Taken 12/13/2021 1155  Safety Promotion/Fall Prevention: patient off unit  Taken 12/13/2021 1000  Safety Promotion/Fall Prevention:   activity supervised   assistive device/personal items within reach   clutter free environment maintained   fall prevention program maintained   nonskid shoes/slippers when out of bed   safety round/check completed  Taken 12/13/2021 0820  Safety Promotion/Fall Prevention:   activity supervised   assistive device/personal items within reach   clutter free environment maintained   fall prevention program maintained   nonskid shoes/slippers when out of  bed  Intervention: Prevent Skin Injury  Flowsheets  Taken 12/13/2021 1800  Body Position: position changed independently  Skin Protection:   adhesive use limited   incontinence pads utilized  Taken 12/13/2021 1640  Body Position: weight shift assistance provided  Skin Protection:   adhesive use limited   incontinence pads utilized  Taken 12/13/2021 1000  Body Position:   weight shift assistance provided   position changed independently  Skin Protection:   adhesive use limited   incontinence pads utilized  Taken 12/13/2021 0820  Body Position:   weight shift assistance provided   position changed independently  Skin Protection:   adhesive use limited   incontinence pads utilized  Intervention: Prevent and Manage VTE (venous thromboembolism) Risk  Flowsheets (Taken 12/13/2021 0820)  VTE Prevention/Management:   right   dorsiflexion/plantar flexion performed  Intervention: Prevent Infection  Flowsheets  Taken 12/13/2021 1800  Infection Prevention:   rest/sleep promoted   single patient room provided   visitors restricted/screened  Taken 12/13/2021 1640  Infection Prevention:   single patient room provided   rest/sleep promoted   visitors restricted/screened  Taken 12/13/2021 1000  Infection Prevention:   rest/sleep promoted   single patient room provided   visitors restricted/screened  Taken 12/13/2021 0820  Infection Prevention:   rest/sleep promoted   single patient room provided   visitors restricted/screened  Goal: Optimal Comfort and Wellbeing  Outcome: Ongoing, Progressing  Intervention: Provide Person-Centered Care  Flowsheets (Taken 12/13/2021 0820)  Trust Relationship/Rapport:   care explained   questions answered   reassurance provided   thoughts/feelings acknowledged  Goal: Readiness for Transition of Care  Outcome: Ongoing, Progressing  Intervention: Mutually Develop Transition Plan  Flowsheets  Taken 12/9/2021 1513 by Julia Armstrong RN  Discharge Coordination/Progress: Sees a provider at [facility],  gets meds from [facility] preferred pharmacy  Equipment Currently Used at Home: (Has a prosthetic leg, does not use it)   wheelchair   other (see comments)  Transportation Anticipated: health plan transportation  Transportation Concerns: car, none  Concerns to be Addressed: discharge planning  Readmission Within the Last 30 Days: no previous admission in last 30 days  Patient/Family Anticipates Transition to: ([facility]) other (see comments)  Taken 12/8/2021 2115 by Dago Pichardo RN  Patient/Family Anticipated Services at Transition:    Goal Outcome Evaluation:              Outcome Summary: Pt had dialysis today and 2.5 liters taken off.

## 2021-12-14 LAB
GLUCOSE BLDC GLUCOMTR-MCNC: 152 MG/DL (ref 70–130)
GLUCOSE BLDC GLUCOMTR-MCNC: 161 MG/DL (ref 70–130)
GLUCOSE BLDC GLUCOMTR-MCNC: 186 MG/DL (ref 70–130)
GLUCOSE BLDC GLUCOMTR-MCNC: 235 MG/DL (ref 70–130)

## 2021-12-14 PROCEDURE — 97530 THERAPEUTIC ACTIVITIES: CPT

## 2021-12-14 PROCEDURE — 97110 THERAPEUTIC EXERCISES: CPT

## 2021-12-14 PROCEDURE — 25010000002 ALBUMIN HUMAN 25% PER 50 ML: Performed by: INTERNAL MEDICINE

## 2021-12-14 PROCEDURE — 25010000002 HEPARIN (PORCINE) PER 1000 UNITS: Performed by: INTERNAL MEDICINE

## 2021-12-14 PROCEDURE — 25010000002 PIPERACILLIN SOD-TAZOBACTAM PER 1 G: Performed by: INTERNAL MEDICINE

## 2021-12-14 PROCEDURE — 99232 SBSQ HOSP IP/OBS MODERATE 35: CPT | Performed by: INTERNAL MEDICINE

## 2021-12-14 PROCEDURE — P9047 ALBUMIN (HUMAN), 25%, 50ML: HCPCS | Performed by: INTERNAL MEDICINE

## 2021-12-14 PROCEDURE — 63710000001 INSULIN LISPRO (HUMAN) PER 5 UNITS: Performed by: INTERNAL MEDICINE

## 2021-12-14 PROCEDURE — 82962 GLUCOSE BLOOD TEST: CPT

## 2021-12-14 RX ORDER — ALBUMIN (HUMAN) 12.5 G/50ML
12.5 SOLUTION INTRAVENOUS AS NEEDED
Status: DISCONTINUED | OUTPATIENT
Start: 2021-12-15 | End: 2021-12-21 | Stop reason: HOSPADM

## 2021-12-14 RX ORDER — ALBUMIN (HUMAN) 12.5 G/50ML
12.5 SOLUTION INTRAVENOUS 2 TIMES DAILY
Status: COMPLETED | OUTPATIENT
Start: 2021-12-14 | End: 2021-12-14

## 2021-12-14 RX ADMIN — AMLODIPINE BESYLATE 10 MG: 5 TABLET ORAL at 08:00

## 2021-12-14 RX ADMIN — CARVEDILOL 25 MG: 12.5 TABLET, FILM COATED ORAL at 17:38

## 2021-12-14 RX ADMIN — HEPARIN SODIUM 5000 UNITS: 5000 INJECTION INTRAVENOUS; SUBCUTANEOUS at 08:00

## 2021-12-14 RX ADMIN — ALBUMIN HUMAN 12.5 G: 0.25 SOLUTION INTRAVENOUS at 20:32

## 2021-12-14 RX ADMIN — QUETIAPINE FUMARATE 25 MG: 25 TABLET ORAL at 20:32

## 2021-12-14 RX ADMIN — HEPARIN SODIUM 5000 UNITS: 5000 INJECTION INTRAVENOUS; SUBCUTANEOUS at 17:35

## 2021-12-14 RX ADMIN — HEPARIN SODIUM 5000 UNITS: 5000 INJECTION INTRAVENOUS; SUBCUTANEOUS at 01:59

## 2021-12-14 RX ADMIN — CALCIUM ACETATE 667 MG: 667 CAPSULE ORAL at 17:38

## 2021-12-14 RX ADMIN — CALCIUM ACETATE 667 MG: 667 CAPSULE ORAL at 12:32

## 2021-12-14 RX ADMIN — ALBUMIN HUMAN 12.5 G: 0.25 SOLUTION INTRAVENOUS at 14:49

## 2021-12-14 RX ADMIN — ATORVASTATIN CALCIUM 80 MG: 40 TABLET, FILM COATED ORAL at 08:00

## 2021-12-14 RX ADMIN — SODIUM CHLORIDE, PRESERVATIVE FREE 10 ML: 5 INJECTION INTRAVENOUS at 08:00

## 2021-12-14 RX ADMIN — CARVEDILOL 25 MG: 12.5 TABLET, FILM COATED ORAL at 07:57

## 2021-12-14 RX ADMIN — HYDRALAZINE HYDROCHLORIDE 100 MG: 50 TABLET, FILM COATED ORAL at 17:35

## 2021-12-14 RX ADMIN — HYDRALAZINE HYDROCHLORIDE 100 MG: 50 TABLET, FILM COATED ORAL at 08:00

## 2021-12-14 RX ADMIN — SODIUM CHLORIDE, PRESERVATIVE FREE 10 ML: 5 INJECTION INTRAVENOUS at 20:32

## 2021-12-14 RX ADMIN — INSULIN LISPRO 2 UNITS: 100 INJECTION, SOLUTION INTRAVENOUS; SUBCUTANEOUS at 17:35

## 2021-12-14 RX ADMIN — TAZOBACTAM SODIUM AND PIPERACILLIN SODIUM 3.38 G: 375; 3 INJECTION, SOLUTION INTRAVENOUS at 02:00

## 2021-12-14 RX ADMIN — HYDRALAZINE HYDROCHLORIDE 100 MG: 50 TABLET, FILM COATED ORAL at 02:00

## 2021-12-14 RX ADMIN — INSULIN LISPRO 3 UNITS: 100 INJECTION, SOLUTION INTRAVENOUS; SUBCUTANEOUS at 12:32

## 2021-12-14 RX ADMIN — CALCIUM ACETATE 667 MG: 667 CAPSULE ORAL at 07:57

## 2021-12-14 RX ADMIN — INSULIN LISPRO 2 UNITS: 100 INJECTION, SOLUTION INTRAVENOUS; SUBCUTANEOUS at 07:58

## 2021-12-14 NOTE — PLAN OF CARE
Problem: Adult Inpatient Plan of Care  Goal: Absence of Hospital-Acquired Illness or Injury  Intervention: Identify and Manage Fall Risk  Recent Flowsheet Documentation  Taken 12/14/2021 0400 by Padmini Berman RN  Safety Promotion/Fall Prevention:   activity supervised   assistive device/personal items within reach   clutter free environment maintained   fall prevention program maintained   nonskid shoes/slippers when out of bed   safety round/check completed   toileting scheduled  Taken 12/14/2021 0200 by Padmini Berman RN  Safety Promotion/Fall Prevention:   activity supervised   assistive device/personal items within reach   clutter free environment maintained   fall prevention program maintained   nonskid shoes/slippers when out of bed   safety round/check completed   toileting scheduled  Taken 12/14/2021 0000 by Padmini Berman RN  Safety Promotion/Fall Prevention:   assistive device/personal items within reach   activity supervised   clutter free environment maintained   fall prevention program maintained   nonskid shoes/slippers when out of bed   safety round/check completed   toileting scheduled  Taken 12/13/2021 2200 by Padmini Berman RN  Safety Promotion/Fall Prevention:   activity supervised   assistive device/personal items within reach   clutter free environment maintained   fall prevention program maintained   nonskid shoes/slippers when out of bed   safety round/check completed   toileting scheduled  Taken 12/13/2021 2000 by Padmini Berman RN  Safety Promotion/Fall Prevention:   activity supervised   assistive device/personal items within reach   clutter free environment maintained   fall prevention program maintained   nonskid shoes/slippers when out of bed   safety round/check completed   toileting scheduled  Intervention: Prevent Skin Injury  Recent Flowsheet Documentation  Taken 12/14/2021 0400 by Padmini Berman RN  Body Position: position changed independently  Skin Protection:   adhesive use  limited   incontinence pads utilized   tubing/devices free from skin contact  Taken 12/14/2021 0200 by Padmini Berman RN  Body Position: position changed independently  Skin Protection:   adhesive use limited   incontinence pads utilized   tubing/devices free from skin contact  Taken 12/14/2021 0000 by Padmini Berman RN  Body Position: position changed independently  Skin Protection:   adhesive use limited   incontinence pads utilized   tubing/devices free from skin contact  Taken 12/13/2021 2200 by Padmini Berman RN  Body Position: position changed independently  Skin Protection:   adhesive use limited   incontinence pads utilized   tubing/devices free from skin contact  Taken 12/13/2021 2000 by Padmini Berman RN  Body Position: position changed independently  Skin Protection:   adhesive use limited   incontinence pads utilized   tubing/devices free from skin contact  Intervention: Prevent and Manage VTE (venous thromboembolism) Risk  Recent Flowsheet Documentation  Taken 12/13/2021 2000 by Padmini Berman RN  VTE Prevention/Management:   bilateral   dorsiflexion/plantar flexion performed   bleeding risk factor(s) identified  Intervention: Prevent Infection  Recent Flowsheet Documentation  Taken 12/14/2021 0400 by Padmini Berman RN  Infection Prevention: environmental surveillance performed  Taken 12/14/2021 0200 by Padmini Berman RN  Infection Prevention: environmental surveillance performed  Taken 12/14/2021 0000 by Padmini Berman RN  Infection Prevention: environmental surveillance performed  Taken 12/13/2021 2200 by Padmini Berman RN  Infection Prevention: environmental surveillance performed  Taken 12/13/2021 2000 by Padmini Berman RN  Infection Prevention: environmental surveillance performed  Goal: Optimal Comfort and Wellbeing  Intervention: Provide Person-Centered Care  Recent Flowsheet Documentation  Taken 12/13/2021 2000 by Padmini Berman RN  Trust Relationship/Rapport:   care explained   choices  provided   thoughts/feelings acknowledged     Problem: Fall Injury Risk  Goal: Absence of Fall and Fall-Related Injury  Intervention: Identify and Manage Contributors to Fall Injury Risk  Recent Flowsheet Documentation  Taken 12/14/2021 0400 by Padmini Berman RN  Medication Review/Management: medications reviewed  Taken 12/14/2021 0200 by Padmini Berman RN  Medication Review/Management: medications reviewed  Taken 12/13/2021 2200 by Padmini Berman RN  Medication Review/Management: medications reviewed  Taken 12/13/2021 2000 by Padmini Berman RN  Medication Review/Management: medications reviewed  Intervention: Promote Injury-Free Environment  Recent Flowsheet Documentation  Taken 12/14/2021 0400 by Padmini Berman RN  Safety Promotion/Fall Prevention:   activity supervised   assistive device/personal items within reach   clutter free environment maintained   fall prevention program maintained   nonskid shoes/slippers when out of bed   safety round/check completed   toileting scheduled  Taken 12/14/2021 0200 by Padmini Berman RN  Safety Promotion/Fall Prevention:   activity supervised   assistive device/personal items within reach   clutter free environment maintained   fall prevention program maintained   nonskid shoes/slippers when out of bed   safety round/check completed   toileting scheduled  Taken 12/14/2021 0000 by Padmini Berman RN  Safety Promotion/Fall Prevention:   assistive device/personal items within reach   activity supervised   clutter free environment maintained   fall prevention program maintained   nonskid shoes/slippers when out of bed   safety round/check completed   toileting scheduled  Taken 12/13/2021 2200 by Padmini Berman RN  Safety Promotion/Fall Prevention:   activity supervised   assistive device/personal items within reach   clutter free environment maintained   fall prevention program maintained   nonskid shoes/slippers when out of bed   safety round/check completed   toileting  scheduled  Taken 12/13/2021 2000 by Padmini Berman RN  Safety Promotion/Fall Prevention:   activity supervised   assistive device/personal items within reach   clutter free environment maintained   fall prevention program maintained   nonskid shoes/slippers when out of bed   safety round/check completed   toileting scheduled     Problem: Skin Injury Risk Increased  Goal: Skin Health and Integrity  Intervention: Optimize Skin Protection  Recent Flowsheet Documentation  Taken 12/14/2021 0400 by Padmini Berman RN  Pressure Reduction Techniques:   frequent weight shift encouraged   weight shift assistance provided  [facility]  Pressure Reduction Devices:   pressure-redistributing mattress utilized   positioning supports utilized  Skin Protection:   adhesive use limited   incontinence pads utilized   tubing/devices free from skin contact  Taken 12/14/2021 0200 by Padmini Berman RN  Pressure Reduction Techniques:   frequent weight shift encouraged   weight shift assistance provided  Head of Bed ([facility]): [facility] elevated  Pressure Reduction Devices:   pressure-redistributing mattress utilized   positioning supports utilized  Skin Protection:   adhesive use limited   incontinence pads utilized   tubing/devices free from skin contact  Taken 12/14/2021 0000 by Padmini Berman RN  Pressure Reduction Techniques:   frequent weight shift encouraged   weight shift assistance provided  Head of Bed ([facility]): [facility] elevated  Pressure Reduction Devices:   pressure-redistributing mattress utilized   positioning supports utilized  Skin Protection:   adhesive use limited   incontinence pads utilized   tubing/devices free from skin contact  Taken 12/13/2021 2200 by Padmini Berman RN  Pressure Reduction Techniques:   frequent weight shift encouraged   weight shift assistance provided  Head of Bed ([facility]): [facility] elevated  Pressure Reduction Devices:   pressure-redistributing mattress utilized   positioning supports utilized  Skin  Protection:   adhesive use limited   incontinence pads utilized   tubing/devices free from skin contact  Taken 12/13/2021 2000 by Padmini Berman RN  Pressure Reduction Techniques:   frequent weight shift encouraged   weight shift assistance provided  Head of Bed ([facility]): [facility] elevated  Pressure Reduction Devices:   pressure-redistributing mattress utilized   positioning supports utilized  Skin Protection:   adhesive use limited   incontinence pads utilized   tubing/devices free from skin contact     Problem: Diabetes Comorbidity  Goal: Blood Glucose Level Within Desired Range  Intervention: Maintain Glycemic Control  Recent Flowsheet Documentation  Taken 12/13/2021 2000 by Padmini Berman RN  Glycemic Management: blood glucose monitoring     Problem: Hypertension Comorbidity  Goal: Blood Pressure in Desired Range  Intervention: Maintain Hypertension-Management Strategies  Recent Flowsheet Documentation  Taken 12/14/2021 0400 by Padmini Berman RN  Medication Review/Management: medications reviewed  Taken 12/14/2021 0200 by Padmini Berman RN  Medication Review/Management: medications reviewed  Taken 12/13/2021 2200 by Padmini Berman RN  Medication Review/Management: medications reviewed  Taken 12/13/2021 2000 by Padmini Berman RN  Medication Review/Management: medications reviewed     Problem: Pain Chronic (Persistent) (Comorbidity Management)  Goal: Acceptable Pain Control and Functional Ability  Intervention: Manage Persistent Pain  Recent Flowsheet Documentation  Taken 12/14/2021 0400 by Padmini Berman RN  Medication Review/Management: medications reviewed  Taken 12/14/2021 0200 by Padmini Berman RN  Medication Review/Management: medications reviewed  Taken 12/13/2021 2200 by Padmini Berman RN  Medication Review/Management: medications reviewed  Taken 12/13/2021 2000 by Padmini Berman RN  Medication Review/Management: medications reviewed  Intervention: Optimize Psychosocial Wellbeing  Recent Flowsheet  Documentation  Taken 12/13/2021 2000 by Padmini Berman RN  Diversional Activities: television  Family/Support System Care:   support provided   self-care encouraged     Problem: Restraint, Nonbehavioral (Nonviolent)  Goal: Discontinuation Criteria Achieved  Intervention: Implement Least-restrictive Safety Strategies  Recent Flowsheet Documentation  Taken 12/13/2021 2000 by Padmini Berman RN  Diversional Activities: television  Goal: Personal Dignity and Safety Maintained  Intervention: Protect Dignity, Rights, and Personal Wellbeing  Recent Flowsheet Documentation  Taken 12/13/2021 2000 by Padmini Berman RN  Trust Relationship/Rapport:   care explained   choices provided   thoughts/feelings acknowledged  Intervention: Protect Skin and Joint Integrity  Recent Flowsheet Documentation  Taken 12/14/2021 0400 by Padmini Berman RN  Body Position: position changed independently  Taken 12/14/2021 0200 by Padmini Berman RN  Body Position: position changed independently  Taken 12/14/2021 0000 by Padmini Berman RN  Body Position: position changed independently  Taken 12/13/2021 2200 by Padmini Berman RN  Body Position: position changed independently  Taken 12/13/2021 2000 by Padmini Berman RN  Body Position: position changed independently   Goal Outcome Evaluation:

## 2021-12-14 NOTE — THERAPY TREATMENT NOTE
Patient Name: Jeaneth Keita  : 1960    MRN: [id number]                              Today's Date: 2021       Admit Date: 2021    Visit Dx:     ICD-10-CM ICD-9-CM   1. Confusion  R41.0 298.9   2. Pneumonia of right lower lobe due to infectious organism  J18.9 486   3. Hypokalemia  E87.6 276.8   4. Hypocalcemia  E83.51 275.41   5. End stage renal disease (HCC)  N18.6 585.6     Patient Active Problem List   Diagnosis   • ICH (intracerebral hemorrhage)   • Hepatocellular carcinoma metastatic to bone s/p RXT    • Hemochromatosis   • Cirrhosis of liver (HCC)   • Chronic Hep C    • ESRD (end stage renal disease)   • Chronic ulcer of right foot   • S/P left BKA   • GERD (gastroesophageal reflux disease)   • Chronic pain on Methadone   • Essential hypertension   • Type 2 diabetes mellitus (HCC)   • Right lower lobe pneumonia   • AMS (altered mental status)   • Colitis   • Normocytic anemia   • Hypokalemia     Past Medical History:   Diagnosis Date   • Anxiety    • DDD (degenerative disc disease), lumbar    • Depression    • Diabetes mellitus (HCC)    • Fibromyalgia    • Hemochromatosis    • Hep B w/o coma, chronic, w/o delta (HCC)    • Hep C w/o coma, chronic (HCC)    • Hypertension    • Vertigo      Past Surgical History:   Procedure Laterality Date   • ARTERIOVENOUS FISTULA/SHUNT SURGERY Left 10/16/2021    Procedure: UPPER EXTREMITY ARTERIOVENOUS FISTULA FORMATION LEFT;  Surgeon: Johnny Rousseau MD;  Location: [facility];  Service: Vascular;  Laterality: Left;   • BACK SURGERY     • BELOW KNEE LEG AMPUTATION     •  SECTION     • FOOT SURGERY     • KNEE SURGERY     • ROTATOR CUFF REPAIR     • SPINAL CORD STIMULATOR IMPLANT        General Information     Row Name 21          Physical Therapy Time and Intention    Document Type therapy note (daily note)  -MP     Mode of Treatment physical therapy  -MP     Row Name 21          General Information    Existing  Precautions/Restrictions fall; other (see comments)  remote L BKA  -MP     Row Name 12/14/21 0913          Cognition    Orientation Status (Cognition) oriented to; person; place; verbal cues/prompts needed for orientation; time  -MP     Row Name 12/14/21 0913          Safety Issues, Functional Mobility    Safety Issues Affecting Function (Mobility) insight into deficits/self-awareness; safety precautions follow-through/compliance; judgment; safety precaution awareness; sequencing abilities  -MP     Impairments Affecting Function (Mobility) balance; cognition; endurance/activity tolerance  -MP           User Key  (r) = Recorded By, (t) = Taken By, (c) = Cosigned By    Initials Name Provider Type    MP Basilio Farr PT Physical Therapist               Mobility     Row Name 12/14/21 0913          Bed Mobility    Bed Mobility supine-sit  -MP     Supine-Sit [place] (Bed Mobility) contact guard  -MP     Assistive Device (Bed Mobility) bed rails  -MP     Comment (Bed Mobility) VC's for sequencing  -MP     Row Name 12/14/21 0913          Transfers    Comment (Transfers) VC's for sequencing and awareness.  -MP     Row Name 12/14/21 0913          Bed-Chair Transfer    Bed-Chair [place] (Transfers) verbal cues; contact guard  -MP     Assistive Device (Bed-Chair Transfers) walker, front-wheeled  -MP     Row Name 12/14/21 0913          Sit-Stand Transfer    Sit-Stand [place] (Transfers) contact guard  -MP     Assistive Device (Sit-Stand Transfers) walker, front-wheeled  -MP     Row Name 12/14/21 0913          Gait/Stairs (Locomotion)    [place] Level (Gait) contact guard; 1 person assist  -MP     Assistive Device (Gait) walker, front-wheeled  -MP     Distance in Feet (Gait) 3  -MP     Comment (Gait/Stairs) Patient performed hop-to gait pattern to ambulate 3' from bed to chair with CGA, RW. Per chart review, pt uses w/c at baseline.  -MP           User Key  (r) = Recorded By, (t) = Taken By, (c) = Cosigned  By    Initials Name Provider Type    Basilio White PT Physical Therapist               Obj/Interventions     Row Name 12/14/21 0918          Motor Skills    Therapeutic Exercise hip; knee; ankle  -     Row Name 12/14/21 0918          Hip (Therapeutic Exercise)    Hip (Therapeutic Exercise) strengthening exercise  -     Hip Strengthening (Therapeutic Exercise) bilateral; aBduction; aDduction; marching while seated; sitting; 10 repetitions  -     Row Name 12/14/21 0918          Knee (Therapeutic Exercise)    Knee (Therapeutic Exercise) strengthening exercise  -MP     Knee Strengthening (Therapeutic Exercise) bilateral; LAQ (long arc quad); sitting; 10 repetitions  -     Row Name 12/14/21 0918          Ankle (Therapeutic Exercise)    Ankle (Therapeutic Exercise) AROM (active range of motion)  -     Ankle AROM (Therapeutic Exercise) right; dorsiflexion; plantarflexion; 10 repetitions  -[facility] Name 12/14/21 0918          Balance    Balance Assessment sitting static balance; sitting dynamic balance; standing static balance; standing dynamic balance  -MP     Static Sitting Balance WFL; unsupported; sitting in chair; sitting, edge of bed  -MP     Dynamic Sitting Balance WFL; unsupported; sitting in chair; sitting, edge of bed  -MP     Static Standing Balance mild impairment; supported; standing  -MP     Dynamic Standing Balance mild impairment; supported; standing  -MP           User Key  (r) = Recorded By, (t) = Taken By, (c) = Cosigned By    Initials Name Provider Type    Basilio White PT Physical Therapist               Goals/Plan    No documentation.                Clinical Impression     Row Name 12/14/21 0919          Pain    Additional Documentation Pain Scale: Numbers Pre/Post-Treatment (Group)  -[facility] Name 12/14/21 0919          Pain Scale: Numbers Pre/Post-Treatment    Pretreatment Pain Rating 0/10 - no pain  -MP     Posttreatment Pain Rating 0/10 - no pain  -MP     Row Name 12/14/21  0919          Plan of Care Review    Plan of Care Reviewed With patient  -MP     Progress improving  -MP     Outcome Summary Patient performed bed mobility with CGA and was able to ambulate 3' from bed to chair with RW, CGA but required VC's for sequencing. Patient gave good effort with seated ther ex. Patient would continue to benefit from skilled PT to promote increased functional independence.  -MP     Row Name 12/14/21 0919          Vital Signs    Pre Systolic BP Rehab 142  -MP     Pre Treatment Diastolic BP 44  -MP     Post Systolic BP Rehab 114  -MP     Post Treatment Diastolic BP 67  -MP     Pretreatment Heart Rate (beats/min) 96  -MP     Posttreatment Heart Rate (beats/min) 100  -MP     Pre SpO2 (%) 99  -MP     O2 Delivery Pre Treatment room air  -MP     O2 Delivery Intra Treatment room air  -MP     Post SpO2 (%) 98  -MP     O2 Delivery Post Treatment room air  -MP     Pre Patient Position Supine  -MP     Intra Patient Position Standing  -MP     Post Patient Position Sitting  -MP     Row Name 12/14/21 0919          Positioning and Restraints    Pre-Treatment Position in bed  -MP     Post Treatment Position chair  -MP     In Chair notified nsg; reclined; call light within reach; encouraged to call for assist; exit alarm on; waffle cushion; legs elevated  -MP           User Key  (r) = Recorded By, (t) = Taken By, (c) = Cosigned By    Initials Name Provider Type    Basilio White PT Physical Therapist               Outcome Measures     Row Name 12/14/21 0922          How much help from another person do you currently need...    Turning from your back to your side while in flat bed without using bedrails? 4  -MP     Moving from lying on back to sitting on the side of a flat bed without bedrails? 3  -MP     Moving to and from a bed to a chair (including a wheelchair)? 3  -MP     Standing up from a chair using your arms (e.g., wheelchair, bedside chair)? 3  -MP     Climbing 3-5 steps with a railing? 1  -MP      To walk in hospital room? 2  -MP     AM-PAC 6 Clicks Score (PT) 16  -MP     Row Name 12/14/21 0922          Functional Assessment    Outcome Measure Options AM-PAC 6 Clicks Basic Mobility (PT)  -           User Key  (r) = Recorded By, (t) = Taken By, (c) = Cosigned By    Initials Name Provider Type    MP Basilio Farr, PT Physical Therapist                             Physical Therapy Education                 Title: PT OT SLP Therapies (In Progress)     Topic: Physical Therapy (In Progress)     Point: Mobility training (In Progress)     Learning Progress Summary           Patient Acceptance, E,D, NR by  at 12/14/2021 0923    Acceptance, E, NR by  at 12/9/2021 0916    Comment: precautions for upright mobility                   Point: Home exercise program (In Progress)     Learning Progress Summary           Patient Acceptance, E,D, NR by  at 12/14/2021 0923    Acceptance, E, NR by  at 12/9/2021 0916    Comment: precautions for upright mobility                   Point: Body mechanics (In Progress)     Learning Progress Summary           Patient Acceptance, E,D, NR by  at 12/14/2021 0923    Acceptance, E, NR by  at 12/9/2021 0916    Comment: precautions for upright mobility                   Point: Precautions (In Progress)     Learning Progress Summary           Patient Acceptance, E,D, NR by  at 12/14/2021 0923    Acceptance, E, NR by  at 12/9/2021 0916    Comment: precautions for upright mobility                               User Key     Initials Effective Dates Name Provider Type Discipline     06/16/21 -  Mamie Gallegos PT Physical Therapist PT     06/16/21 -  Basilio Farr PT Physical Therapist PT              PT Recommendation and Plan     Plan of Care Reviewed With: patient  Progress: improving  Outcome Summary: Patient performed bed mobility with CGA and was able to ambulate 3' from bed to chair with RW, CGA but required VC's for sequencing. Patient gave good effort with seated  ther ex. Patient would continue to benefit from skilled PT to promote increased functional independence.     Time Calculation:    PT Charges     Row Name 12/14/21 0923             Time Calculation    Start Time 0830  -MP      PT Received On 12/14/21  -MP      PT Goal Re-Cert Due Date 12/19/21  -MP              Time Calculation- PT    Total Timed Code Minutes- PT 23 minute(s)  -MP              Timed Charges    13821 - PT Therapeutic Exercise Minutes 10  -MP      31702 - PT Therapeutic Activity Minutes 13  -MP              Total Minutes    Timed Charges Total Minutes 23  -MP       Total Minutes 23  -MP            User Key  (r) = Recorded By, (t) = Taken By, (c) = Cosigned By    Initials Name Provider Type    MP Basilio Farr PT Physical Therapist              Therapy Charges for Today     Code Description Service Date Service Provider Modifiers Qty    18868822526 HC PT THER PROC EA 15 MIN 12/14/2021 Basilio Farr, PT GP 1    38169912076 HC PT THERAPEUTIC ACT EA 15 MIN 12/14/2021 Basilio Farr, PT GP 1          PT G-Codes  Outcome Measure Options: AM-PAC 6 Clicks Basic Mobility (PT)  AM-PAC 6 Clicks Score (PT): 16  AM-PAC 6 Clicks Score (OT): 18    Basilio Farr PT  12/14/2021

## 2021-12-14 NOTE — PROGRESS NOTES
"   LOS: 6 days    Patient Care Team:  Provider, No Known as PCP - General  ESRD     Subjective     Interval History:   No acute events overnight. No new complaints       Review of Systems:   AMS, NO CP OR SOA    Objective     Vital Sign Min/Max for last 24 hours  Temp  Min: 97.2 °F (36.2 °C)  Max: 98.7 °F (37.1 °C)   BP  Min: 95/51  Max: 159/83   Pulse  Min: 88  Max: 100   Resp  Min: 16  Max: 19   SpO2  Min: 96 %  Max: 100 %   No data recorded   No data recorded     Flowsheet Rows      First Filed Value   Admission Height 172.7 cm (68\") Documented at 12/08/2021 1240   Admission Weight 72.6 kg (160 lb) Documented at 12/08/2021 1240          No intake/output data recorded.  I/O last 3 completed shifts:  In: 1148 [P.O.:838; I.V.:310]  Out: 3170 [Urine:400; Other:2770]    Physical Exam:    Gen: Alert, NAD   HENT: NC, AT, EOMI   NECK: Supple, no JVD, Trachea midline   LUNGS: CTA bilaterally, non labored respirtation   CVS: S1/S2 audible, RRR, no murmur   Abd: Soft, NT, ND, BS+   Ext: No pedal edema, no cyanosis   CNS: Alert, No focal deficit noted grossly  Psy: Cooperative  Skin: Warm, dry and intact      WBC No results found for: WBC   HGB No results found for: HGB   HCT No results found for: HCT   Platlets No results found for: LABPLAT   MCV No results found for: MCV       Sodium Sodium   Date Value Ref Range Status   12/12/2021 141 136 - 145 mmol/L Final      Potassium Potassium   Date Value Ref Range Status   12/12/2021 4.0 3.5 - 5.2 mmol/L Final      Chloride Chloride   Date Value Ref Range Status   12/12/2021 110 (H) 98 - 107 mmol/L Final      CO2 CO2   Date Value Ref Range Status   12/12/2021 20.0 (L) 22.0 - 29.0 mmol/L Final      BUN BUN   Date Value Ref Range Status   12/12/2021 41 (H) 8 - 23 mg/dL Final      Creatinine Creatinine   Date Value Ref Range Status   12/12/2021 4.35 (H) 0.57 - 1.00 mg/dL Final      Calcium Calcium   Date Value Ref Range Status   12/12/2021 8.0 (L) 8.6 - 10.5 mg/dL Final      PO4 No " results found for: CAPO4   Albumin Albumin   Date Value Ref Range Status   12/12/2021 2.90 (L) 3.50 - 5.20 g/dL Final      Magnesium No results found for: MG   Uric Acid No results found for: URICACID        Results Review:     I reviewed the patient's new clinical results.    amLODIPine, 10 mg, Oral, Q24H  atorvastatin, 80 mg, Oral, Daily  calcium acetate, 667 mg, Oral, TID With Meals  carvedilol, 25 mg, Oral, BID With Meals  cyanocobalamin, 1,000 mcg, Intramuscular, Q28 Days  heparin (porcine), 5,000 Units, Subcutaneous, Q8H  hydrALAZINE, 100 mg, Oral, Q8H  insulin lispro, 0-7 Units, Subcutaneous, TID AC  piperacillin-tazobactam, 3.375 g, Intravenous, Q12H  QUEtiapine, 25 mg, Oral, Nightly  sodium chloride, 10 mL, Intravenous, Q12H           Medication Review:     Assessment/Plan       Right lower lobe pneumonia    Hepatocellular carcinoma metastatic to bone s/p RXT     Hemochromatosis    Cirrhosis of liver (HCC)    Chronic Hep C     ESRD (end stage renal disease)    Chronic ulcer of right foot    S/P left BKA    GERD (gastroesophageal reflux disease)    Chronic pain on Methadone    Essential hypertension    Type 2 diabetes mellitus (HCC)    AMS (altered mental status)    Colitis    Normocytic anemia    Hypokalemia      1- ESRD - MWF - [facility]   2- HTN   3- Mild hypokalemia   4- Anemia of chronic disease   5- Familial hemochromatosis   6- Hx of Metastatic hepatocellular carcinoma       Plan:  - HD per schedule, UF as tolerated.   - Renal diet.    - Transfuse for Hgb less than 7.0      I discussed the patients findings and my recommendations with nursing staff    Panchito Austin MD  12/14/21  09:42 EST

## 2021-12-14 NOTE — PLAN OF CARE
Goal Outcome Evaluation:  Plan of Care Reviewed With: patient        Progress: improving  Outcome Summary: Patient performed bed mobility with CGA and was able to ambulate 3' from bed to chair with RW, CGA but required VC's for sequencing. Patient gave good effort with seated ther ex. Patient would continue to benefit from skilled PT to promote increased functional independence.

## 2021-12-14 NOTE — THERAPY TREATMENT NOTE
Patient Name: Jeaneth Keita  : 1960    MRN: [id number]                              Today's Date: 2021       Admit Date: 2021    Visit Dx:     ICD-10-CM ICD-9-CM   1. Confusion  R41.0 298.9   2. Pneumonia of right lower lobe due to infectious organism  J18.9 486   3. Hypokalemia  E87.6 276.8   4. Hypocalcemia  E83.51 275.41   5. End stage renal disease (HCC)  N18.6 585.6     Patient Active Problem List   Diagnosis   • ICH (intracerebral hemorrhage)   • Hepatocellular carcinoma metastatic to bone s/p RXT    • Hemochromatosis   • Cirrhosis of liver (HCC)   • Chronic Hep C    • ESRD (end stage renal disease)   • Chronic ulcer of right foot   • S/P left BKA   • GERD (gastroesophageal reflux disease)   • Chronic pain on Methadone   • Essential hypertension   • Type 2 diabetes mellitus (HCC)   • Right lower lobe pneumonia   • AMS (altered mental status)   • Colitis   • Normocytic anemia   • Hypokalemia     Past Medical History:   Diagnosis Date   • Anxiety    • DDD (degenerative disc disease), lumbar    • Depression    • Diabetes mellitus (HCC)    • Fibromyalgia    • Hemochromatosis    • Hep B w/o coma, chronic, w/o delta (HCC)    • Hep C w/o coma, chronic (HCC)    • Hypertension    • Vertigo      Past Surgical History:   Procedure Laterality Date   • ARTERIOVENOUS FISTULA/SHUNT SURGERY Left 10/16/2021    Procedure: UPPER EXTREMITY ARTERIOVENOUS FISTULA FORMATION LEFT;  Surgeon: Johnny Rousseau MD;  Location: [facility];  Service: Vascular;  Laterality: Left;   • BACK SURGERY     • BELOW KNEE LEG AMPUTATION     •  SECTION     • FOOT SURGERY     • KNEE SURGERY     • ROTATOR CUFF REPAIR     • SPINAL CORD STIMULATOR IMPLANT        General Information     Row Name 21 1158          OT Time and Intention    Document Type therapy note (daily note)  -CHENCHO     Mode of Treatment occupational therapy  -CHENCHO     Row Name 21 1158          General Information    Patient Profile Reviewed yes   -CHENCHO     Existing Precautions/Restrictions fall; other (see comments)  remote L BKA  -CHENCHO     Barriers to Rehab cognitive status  -CHENCHO     Row Name 12/14/21 1158          Cognition    Orientation Status (Cognition) oriented to; person; place; verbal cues/prompts needed for orientation; time  -CHENCHO     Row Name 12/14/21 1158          Safety Issues, Functional Mobility    Safety Issues Affecting Function (Mobility) insight into deficits/self-awareness; judgment; problem-solving; safety precaution awareness; safety precautions follow-through/compliance  -CHENCHO     Impairments Affecting Function (Mobility) balance; cognition; endurance/activity tolerance; motor planning  -CHENCHO     Cognitive Impairments, Mobility Safety/Performance awareness, need for assistance; insight into deficits/self-awareness; judgment; problem-solving/reasoning; safety precaution awareness; safety precaution follow-through  -CHENCHO           User Key  (r) = Recorded By, (t) = Taken By, (c) = Cosigned By    Initials Name Provider Type    Mamie Carrillo OT Occupational Therapist                 Mobility/ADL's     Row Name 12/14/21 1150          Bed Mobility    Comment (Bed Mobility) UIC  -     Row Name 12/14/21 1159          Grooming Assessment/Training    [place] Level (Grooming) wash face, hands; set up; hair care, combing/brushing; maximum assist (25% patient effort)  -CHENCHO     Position (Grooming) unsupported sitting  -           User Key  (r) = Recorded By, (t) = Taken By, (c) = Cosigned By    Initials Name Provider Type    Mamie Carrillo OT Occupational Therapist               Obj/Interventions     Row Name 12/14/21 1203          Shoulder (Therapeutic Exercise)    Shoulder (Therapeutic Exercise) strengthening exercise  -     Shoulder Strengthening (Therapeutic Exercise) left; right; flexion; horizontal aBduction/aDduction; resisted diagonal exercise; sitting; resistance band; yellow; 10 repetitions  -     Row Name 12/14/21 1207           Elbow/Forearm (Therapeutic Exercise)    Elbow/Forearm (Therapeutic Exercise) strengthening exercise  -     Elbow/Forearm Strengthening (Therapeutic Exercise) left; flexion; extension; sitting; resistance band; yellow; 10 repetitions  -     Row Name 12/14/21 1205          Therapeutic Exercise    Therapeutic Exercise shoulder; elbow/forearm  -           User Key  (r) = Recorded By, (t) = Taken By, (c) = Cosigned By    Initials Name Provider Type    Mamie Carrillo OT Occupational Therapist               Goals/Plan    No documentation.                Clinical Impression     Row Name 12/14/21 1200          Pain Assessment    Additional Documentation Pain Scale: Numbers Pre/Post-Treatment (Group)  -     Row Name 12/14/21 1200          Pain Scale: Numbers Pre/Post-Treatment    Pretreatment Pain Rating 0/10 - no pain  -     Posttreatment Pain Rating 0/10 - no pain  -     Row Name 12/14/21 1200          Plan of Care Review    Plan of Care Reviewed With patient  -CHENCHO     Progress improving  -     Outcome Summary Pt tolerated ~20min. activity in unsupported sitting, HARDY for face/hand washing, assist to comb out hair and place in braid. Pt gave good effort for BUE strengthening using theraband x10 reps. Continue to progress per OT POC.  -     Row Name 12/14/21 1200          Vital Signs    O2 Delivery Pre Treatment room air  -CHENCHO     O2 Delivery Intra Treatment room air  -CHENCHO     O2 Delivery Post Treatment room air  -CHENCHO     Pre Patient Position Sitting  -     Intra Patient Position Sitting  -CHENCHO     Post Patient Position Sitting  -     Row Name 12/14/21 1200          Positioning and Restraints    Pre-Treatment Position sitting in chair/recliner  -CHENCHO     Post Treatment Position chair  -CHENCHO     In Chair notified nsg; reclined; call light within reach; encouraged to call for assist; exit alarm on; waffle cushion; legs elevated  -           User Key  (r) = Recorded By, (t) = Taken By, (c) = Cosigned By    Initials  Name Provider Type    Mamie Carrillo OT Occupational Therapist               Outcome Measures     Row Name 12/14/21 1206          How much help from another is currently needed...    Putting on and taking off regular lower body clothing? 3  -CHENCHO     Bathing (including washing, rinsing, and drying) 3  -CHENCHO     Toileting (which includes using toilet bed pan or urinal) 2  -CHENCHO     Putting on and taking off regular upper body clothing 3  -CHENCHO     Taking care of personal grooming (such as brushing teeth) 3  -CHENCHO     Eating meals 4  -CHENCHO     AM-PAC 6 Clicks Score (OT) 18  -CHENCHO     Row Name 12/14/21 0922 12/14/21 0750       How much help from another person do you currently need...    Turning from your back to your side while in flat bed without using bedrails? 4  -MP 4  -MK    Moving from lying on back to sitting on the side of a flat bed without bedrails? 3  -MP 3  -MK    Moving to and from a bed to a chair (including a wheelchair)? 3  -MP 3  -MK    Standing up from a chair using your arms (e.g., wheelchair, bedside chair)? 3  -MP 3  -MK    Climbing 3-5 steps with a railing? 1  -MP 1  -MK    To walk in hospital room? 2  -MP 2  -MK    AM-PAC 6 Clicks Score (PT) 16  -MP 16  -MK    Row Name 12/14/21 1206 12/14/21 0922       Functional Assessment    Outcome Measure Options AM-PAC 6 Clicks Daily Activity (OT)  -CHENCHO AM-PAC 6 Clicks Basic Mobility (PT)  -MP          User Key  (r) = Recorded By, (t) = Taken By, (c) = Cosigned By    Initials Name Provider Type    Kerry Owen RN Registered Nurse    Basilio White, PT Physical Therapist    Mamie Carrillo OT Occupational Therapist                Occupational Therapy Education                 Title: PT OT SLP Therapies (In Progress)     Topic: Occupational Therapy (In Progress)     Point: ADL training (Done)     Description:   Instruct learner(s) on proper safety adaptation and remediation techniques during self care or transfers.   Instruct in proper use of assistive  devices.              Learning Progress Summary           Patient Acceptance, E,TB,D, DU,NR by CHENCHO at 12/14/2021 1207    Comment: UE HEP    Acceptance, E, NR by YUMIKO at 12/9/2021 1007                   Point: Home exercise program (Done)     Description:   Instruct learner(s) on appropriate technique for monitoring, assisting and/or progressing therapeutic exercises/activities.              Learning Progress Summary           Patient Acceptance, E,TB,D, DU,NR by CHENCHO at 12/14/2021 1207    Comment: UE HEP                   Point: Precautions (In Progress)     Description:   Instruct learner(s) on prescribed precautions during self-care and functional transfers.              Learning Progress Summary           Patient Acceptance, E, NR by YUMIKO at 12/9/2021 1007                   Point: Body mechanics (Not Started)     Description:   Instruct learner(s) on proper positioning and spine alignment during self-care, functional mobility activities and/or exercises.              Learner Progress:  Not documented in this visit.                      User Key     Initials Effective Dates Name Provider Type Discipline     06/16/21 -  Radha Mon, OT Occupational Therapist OT     06/16/21 -  Mamie Arriaga OT Occupational Therapist OT              OT Recommendation and Plan     Plan of Care Review  Plan of Care Reviewed With: patient  Progress: improving  Outcome Summary: Pt tolerated ~20min. activity in unsupported sitting, HARDY for face/hand washing, assist to comb out hair and place in braid. Pt gave good effort for BUE strengthening using theraband x10 reps. Continue to progress per OT POC.     Time Calculation:    Time Calculation- OT     Row Name 12/14/21 1889             Time Calculation- OT    OT Start Time 1135  -CHENCHO      OT Received On 12/14/21  -CHENCHO              Timed Charges    44514 - OT Therapeutic Exercise Minutes 25  -CHENCHO              Total Minutes    Timed Charges Total Minutes 25  -CHENCHO       Total Minutes 25  -CHENCHO             User Key  (r) = Recorded By, (t) = Taken By, (c) = Cosigned By    Initials Name Provider Type    Mamie Carrillo OT Occupational Therapist              Therapy Charges for Today     Code Description Service Date Service Provider Modifiers Qty    [id number]  OT THER PROC EA 15 MIN 12/14/2021 Mamie Arriaga OT GO 2               Mamie Arriaga OT  12/14/2021

## 2021-12-14 NOTE — CASE MANAGEMENT/SOCIAL WORK
Continued Stay Note  [facility]     Patient Name: Jeaneth Keita  MRN: [id number]  Today's Date: 12/14/2021    Admit Date: 12/8/2021     Discharge Plan     Row Name 12/14/21 1520       Plan    Plan [facility] [place]    Patient/Family in Agreement with Plan other (see comments)    Plan Comments I left a message for the pts ex spouse and POA Parviz to call me to discuss return to [facility].    Final Discharge Disposition Code 01 - home or self-care               Discharge Codes    No documentation.               Expected Discharge Date and Time     Expected Discharge Date Expected Discharge Time    Dec 15, 2021             Radha Miranda RN

## 2021-12-14 NOTE — PROGRESS NOTES
[facility] Medicine Services  PROGRESS NOTE    Patient Name: Jeaneth Keita  : 1960  MRN: [id number]    Date of Admission: 2021  Primary Care Physician: Provider, No Known    Subjective   Subjective     CC: Follow-up AMS    HPI: No acute events overnight, patient did sleep well, this morning she is awake alert but still have some confusion.    ROS:  Gen- No fevers, chills  CV- No chest pain, palpitations  Resp- No cough, dyspnea  GI- No N/V/D, abd pain    All other systems reviewed and are negative    Objective   Objective     Vital Signs:   Temp:  [97.2 °F (36.2 °C)-98.7 °F (37.1 °C)] 98.6 °F (37 °C)  Heart Rate:  [] 97  Resp:  [16-19] 16  BP: ()/(51-83) 142/74     Physical Exam:  Constitutional: No acute distress, sleepy, seated in chair  HENT: NCAT, mucous membranes moist  Respiratory: Clear to auscultation bilaterally, respiratory effort normal   Cardiovascular: RRR, no murmurs, rubs, or gallops  Gastrointestinal: Positive bowel sounds, soft, nontender, nondistended  Musculoskeletal: No bilateral ankle edema  Psychiatric: Appropriate affect, cooperative  Neurologic: Nonfocal, confused  Skin: No rashes      Results Reviewed:  LAB RESULTS:      Lab 12/10/21  0818 12/09/21  0322 12/08/21  1714 21  1404   WBC 3.13* 5.33  --  5.45   HEMOGLOBIN 8.2* 8.8*  --  10.3*   HEMATOCRIT 23.3* 25.5*  --  28.7*   PLATELETS 117* 115*  --  158   NEUTROS ABS  --  4.45  --  3.56   IMMATURE GRANS (ABS)  --  0.02  --  0.02   LYMPHS ABS  --  0.43*  --  1.25   MONOS ABS  --  0.29  --  0.52   EOS ABS  --  0.11  --  0.07   MCV 93.2 93.8  --  90.8   PROCALCITONIN  --  0.14  --  0.13   LACTATE  --  1.3 1.4  --          Lab 21  0344 12/10/21  0818 21  0322 21  1404   SODIUM 141 141 142 141   POTASSIUM 4.0 3.5 3.2* 3.1*   CHLORIDE 110* 107 106 101   CO2 20.0* 24.0 24.0 28.0   ANION GAP 11.0 10.0 12.0 12.0   BUN 41* 32* 24* 24*   CREATININE 4.35* 4.36* 3.73* 3.46*    GLUCOSE 127* 117* 148* 246*   CALCIUM 8.0* 7.9* 7.4* 8.4*   MAGNESIUM  --   --  1.6 1.9   PHOSPHORUS 4.8*  --   --   --    HEMOGLOBIN A1C  --   --  5.10  --    TSH  --   --   --  0.759         Lab 12/12/21  0344 12/10/21  0818 12/08/21  1404   TOTAL PROTEIN  --  5.3* 6.5   ALBUMIN 2.90* 3.00* 3.60   GLOBULIN  --  2.3 2.9   ALT (SGPT)  --  22 28   AST (SGOT)  --  27 31   BILIRUBIN  --  0.5 0.5   ALK PHOS  --  64 90                 Lab 12/10/21  0818   VITAMIN B 12 239         Lab 12/08/21  2105   PH, ARTERIAL 7.581*   PCO2, ARTERIAL 28.4*   PO2 ART 92.6   FIO2 21   HCO3 ART 26.7*   BASE EXCESS ART 4.9*   CARBOXYHEMOGLOBIN 1.1     Brief Urine Lab Results  (Last result in the past 365 days)      Color   Clarity   Blood   Leuk Est   Nitrite   Protein   CREAT   Urine HCG        12/08/21 1404 Yellow   Clear   Negative   Negative   Negative   >=300 mg/dL (3+)                 Microbiology Results Abnormal     Procedure Component Value - Date/Time    Blood Culture - Blood, Arm, Right [[id number]]  (Normal) Collected: 12/08/21 1700    Lab Status: Final result Specimen: Blood from Arm, Right Updated: 12/13/21 1731     Blood Culture No growth at 5 days    Blood Culture - Blood, Arm, Left [[id number]]  (Normal) Collected: 12/08/21 1720    Lab Status: Final result Specimen: Blood from Arm, Left Updated: 12/13/21 1731     Blood Culture No growth at 5 days    MRSA Screen, PCR (Inpatient) - Swab, Nares [[id number]]  (Normal) Collected: 12/08/21 2213    Lab Status: Final result Specimen: Swab from Nares Updated: 12/09/21 0749     MRSA PCR Negative    Narrative:      MRSA Negative    S. Pneumo Ag Urine or CSF - Urine, Urine, Clean Catch [[id number]]  (Normal) Collected: 12/08/21 1404    Lab Status: Final result Specimen: Urine, Clean Catch Updated: 12/09/21 0709     Strep Pneumo Ag Negative    Legionella Antigen, Urine - Urine, Urine, Clean Catch [[id number]]  (Normal) Collected: 12/08/21 1404    Lab Status: Final result Specimen: Urine,  Clean Catch Updated: 12/09/21 0706     LEGIONELLA ANTIGEN, URINE Negative    Respiratory Panel PCR w/COVID-19(SARS-CoV-2) RUBINA/MANDY/MARK/PAD/COR/MAD/HORACE In-House, NP Swab in UTM/VTM, 3-4 HR TAT - Swab, Nasopharynx [[id number]]  (Normal) Collected: 12/08/21 2213    Lab Status: Final result Specimen: Swab from Nasopharynx Updated: 12/09/21 0003     ADENOVIRUS, PCR Not Detected     Coronavirus 229E Not Detected     Coronavirus HKU1 Not Detected     Coronavirus NL63 Not Detected     Coronavirus OC43 Not Detected     COVID19 Not Detected     Human Metapneumovirus Not Detected     Human Rhinovirus/Enterovirus Not Detected     Influenza A PCR Not Detected     Influenza B PCR Not Detected     Parainfluenza Virus 1 Not Detected     Parainfluenza Virus 2 Not Detected     Parainfluenza Virus 3 Not Detected     Parainfluenza Virus 4 Not Detected     RSV, PCR Not Detected     Bordetella pertussis pcr Not Detected     Bordetella parapertussis PCR Not Detected     Chlamydophila pneumoniae PCR Not Detected     Mycoplasma pneumo by PCR Not Detected    Narrative:      In the setting of a positive respiratory panel with a viral infection PLUS a negative procalcitonin without other underlying concern for bacterial infection, consider observing off antibiotics or discontinuation of antibiotics and continue supportive care. If the respiratory panel is positive for atypical bacterial infection (Bordetella pertussis, Chlamydophila pneumoniae, or Mycoplasma pneumoniae), consider antibiotic de-escalation to target atypical bacterial infection.          No radiology results from the last 24 hrs    Results for orders placed during the hospital encounter of 10/05/21    Adult Transthoracic Echo Complete W/ Cont if Necessary Per Protocol    Interpretation Summary  · Left ventricular ejection fraction appears to be 61 - 65%. Left ventricular systolic function is normal.  · Left atrial volume is mildly increased.      I have reviewed the  medications:  Scheduled Meds:amLODIPine, 10 mg, Oral, Q24H  atorvastatin, 80 mg, Oral, Daily  calcium acetate, 667 mg, Oral, TID With Meals  carvedilol, 25 mg, Oral, BID With Meals  cyanocobalamin, 1,000 mcg, Intramuscular, Q28 Days  heparin (porcine), 5,000 Units, Subcutaneous, Q8H  hydrALAZINE, 100 mg, Oral, Q8H  insulin lispro, 0-7 Units, Subcutaneous, TID AC  piperacillin-tazobactam, 3.375 g, Intravenous, Q12H  QUEtiapine, 25 mg, Oral, Nightly  sodium chloride, 10 mL, Intravenous, Q12H      Continuous Infusions:   PRN Meds:.•  acetaminophen **OR** acetaminophen **OR** acetaminophen  •  dextrose  •  dextrose  •  glucagon (human recombinant)  •  ipratropium-albuterol  •  Morphine  •  naloxone  •  ondansetron  •  sodium chloride    Assessment/Plan   Assessment & Plan     Active Hospital Problems    Diagnosis  POA   • **Right lower lobe pneumonia [J18.9]  Yes   • AMS (altered mental status) [R41.82]  Yes   • Colitis [K52.9]  Unknown   • Normocytic anemia [D64.9]  Unknown   • Hypokalemia [E87.6]  Unknown   • Hepatocellular carcinoma metastatic to bone s/p RXT  [C79.51, C22.0]  Yes   • Cirrhosis of liver (HCC) [K74.60]  Yes   • Hemochromatosis [E83.119]  Yes   • S/P left BKA [Z89.512]  Not Applicable   • Chronic Hep C  [B18.2]  Yes   • Chronic pain on Methadone [F11.90]  Yes   • Chronic ulcer of right foot [L97.519]  Yes   • ESRD (end stage renal disease) [N18.6]  Yes   • GERD (gastroesophageal reflux disease) [K21.9]  Yes   • Essential hypertension [I10]  Yes   • Type 2 diabetes mellitus (HCC) [E11.9]  Yes      Resolved Hospital Problems   No resolved problems to display.        Brief Hospital Course to date:  Jeaneth Keita is a 61 y.o. female with history of cirrhosis with HCC metastatic to bone s/p XRT, chronic hep C, chronic pain on methadone, ESRD on HD, familial hemochromatosis, hypertension, type 2 diabetes patient presented to the ED with AMS     Acute encephalopathy  Right lower lobe pneumonia  -Blood  cultures NGTD, urinary antigens negative, CT head unrevealing.  -MRI head with no acute processes, does show evidence of prior thalamic hemorrhage  -TSH, ammonia, B12 are all wnl  -Continue Zosyn and doxycycline for now, plan to treat for a total of 7 days  -Continue Seroquel 25 mg nightly for insomnia  -Per report patient was alert oriented x3 prior to admission    ESRD on HD  -Renal following, patient with AV fistula in place, tunneled cath has since been removed     Type 2 diabetes  -Unable to accurately assess control as patient is on HD, A1c 5.1%  -Continue SSI for now     Liver cirrhosis  HCC with metastasis to bone s/p XRT  Chronic hep C  Familial hemochromatosis  -Patient has previously had all of her care done at [facility]  -Continue home meds when appropriate     Chronic pain-previously on methadone, UDS is negative, and methadone not on active med list     DVT prophylaxis:  Medical and mechanical DVT prophylaxis orders are present.     AM-PAC 6 Clicks Score (PT): 16 (12/14/21 0986)    Disposition: TBD    CODE STATUS:   Code Status and Medical Interventions:   Ordered at: 12/08/21 1940     Level Of Support Discussed With:    Patient     Code Status (Patient has no pulse and is not breathing):    CPR (Attempt to Resuscitate)     Medical Interventions (Patient has pulse or is breathing):    Full Support       Florencio Ryan MD  12/14/21

## 2021-12-14 NOTE — PLAN OF CARE
Goal Outcome Evaluation:  Plan of Care Reviewed With: patient        Progress: improving  Outcome Summary: Pt tolerated ~20min. activity in unsupported sitting, HARDY for face/hand washing, assist to comb out hair and place in braid. Pt gave good effort for BUE strengthening using theraband x10 reps. Continue to progress per OT POC.

## 2021-12-15 ENCOUNTER — APPOINTMENT (OUTPATIENT)
Dept: NEPHROLOGY | Facility: HOSPITAL | Age: 61
End: 2021-12-15

## 2021-12-15 LAB
GLUCOSE BLDC GLUCOMTR-MCNC: 160 MG/DL (ref 70–130)
GLUCOSE BLDC GLUCOMTR-MCNC: 182 MG/DL (ref 70–130)
GLUCOSE BLDC GLUCOMTR-MCNC: 187 MG/DL (ref 70–130)
GLUCOSE BLDC GLUCOMTR-MCNC: 195 MG/DL (ref 70–130)

## 2021-12-15 PROCEDURE — 82962 GLUCOSE BLOOD TEST: CPT

## 2021-12-15 PROCEDURE — 25010000002 HEPARIN (PORCINE) PER 1000 UNITS: Performed by: INTERNAL MEDICINE

## 2021-12-15 PROCEDURE — 25010000002 MORPHINE PER 10 MG: Performed by: INTERNAL MEDICINE

## 2021-12-15 PROCEDURE — 63710000001 INSULIN LISPRO (HUMAN) PER 5 UNITS: Performed by: INTERNAL MEDICINE

## 2021-12-15 PROCEDURE — 99232 SBSQ HOSP IP/OBS MODERATE 35: CPT | Performed by: INTERNAL MEDICINE

## 2021-12-15 RX ORDER — HYDROXYZINE HYDROCHLORIDE 25 MG/1
25 TABLET, FILM COATED ORAL 3 TIMES DAILY PRN
Status: DISCONTINUED | OUTPATIENT
Start: 2021-12-15 | End: 2021-12-21 | Stop reason: HOSPADM

## 2021-12-15 RX ORDER — TRAMADOL HYDROCHLORIDE 50 MG/1
50 TABLET ORAL EVERY 12 HOURS PRN
Status: DISPENSED | OUTPATIENT
Start: 2021-12-15 | End: 2021-12-16

## 2021-12-15 RX ADMIN — HEPARIN SODIUM 5000 UNITS: 5000 INJECTION INTRAVENOUS; SUBCUTANEOUS at 12:25

## 2021-12-15 RX ADMIN — CALCIUM ACETATE 667 MG: 667 CAPSULE ORAL at 17:19

## 2021-12-15 RX ADMIN — MORPHINE SULFATE 3 MG: 4 INJECTION, SOLUTION INTRAMUSCULAR; INTRAVENOUS at 22:43

## 2021-12-15 RX ADMIN — CALCIUM ACETATE 667 MG: 667 CAPSULE ORAL at 12:24

## 2021-12-15 RX ADMIN — HEPARIN SODIUM 5000 UNITS: 5000 INJECTION INTRAVENOUS; SUBCUTANEOUS at 21:17

## 2021-12-15 RX ADMIN — SODIUM CHLORIDE, PRESERVATIVE FREE 10 ML: 5 INJECTION INTRAVENOUS at 21:17

## 2021-12-15 RX ADMIN — MORPHINE SULFATE 3 MG: 4 INJECTION, SOLUTION INTRAMUSCULAR; INTRAVENOUS at 15:15

## 2021-12-15 RX ADMIN — HYDROXYZINE HYDROCHLORIDE 25 MG: 25 TABLET, FILM COATED ORAL at 17:19

## 2021-12-15 RX ADMIN — INSULIN LISPRO 2 UNITS: 100 INJECTION, SOLUTION INTRAVENOUS; SUBCUTANEOUS at 12:25

## 2021-12-15 RX ADMIN — ATORVASTATIN CALCIUM 80 MG: 40 TABLET, FILM COATED ORAL at 12:24

## 2021-12-15 RX ADMIN — INSULIN LISPRO 3 UNITS: 100 INJECTION, SOLUTION INTRAVENOUS; SUBCUTANEOUS at 17:19

## 2021-12-15 RX ADMIN — MORPHINE SULFATE 3 MG: 4 INJECTION, SOLUTION INTRAMUSCULAR; INTRAVENOUS at 18:54

## 2021-12-15 RX ADMIN — HEPARIN SODIUM 5000 UNITS: 5000 INJECTION INTRAVENOUS; SUBCUTANEOUS at 01:57

## 2021-12-15 RX ADMIN — AMLODIPINE BESYLATE 10 MG: 5 TABLET ORAL at 12:24

## 2021-12-15 RX ADMIN — CARVEDILOL 25 MG: 12.5 TABLET, FILM COATED ORAL at 17:19

## 2021-12-15 RX ADMIN — HYDRALAZINE HYDROCHLORIDE 100 MG: 50 TABLET, FILM COATED ORAL at 17:19

## 2021-12-15 RX ADMIN — TRAMADOL HYDROCHLORIDE 50 MG: 50 TABLET, COATED ORAL at 17:19

## 2021-12-15 NOTE — PROGRESS NOTES
[facility] Medicine Services  PROGRESS NOTE    Patient Name: Jeaneth Keita  : 1960  MRN: [id number]    Date of Admission: 2021  Primary Care Physician: Provider, No Known    Subjective   Subjective     CC: Follow-up AMS, pneumonia      HPI:No acute events overnight, patient states that she is doing ok, has no new complain.    ROS:  Gen- No fevers, chills  CV- No chest pain, palpitations  Resp- No cough, dyspnea  GI- No N/V/D, abd pain       Objective   Objective     Vital Signs:   Temp:  [98 °F (36.7 °C)-98.7 °F (37.1 °C)] 98.5 °F (36.9 °C)  Heart Rate:  [] 106  Resp:  [16-18] 16  BP: (106-143)/(60-84) 120/84     Physical Exam:  Constitutional: No acute distress, awake, alert  HENT: NCAT, mucous membranes moist  Respiratory: Clear to auscultation bilaterally, respiratory effort normal   Cardiovascular: RRR, no murmurs, rubs, or gallops  Gastrointestinal: Positive bowel sounds, soft, nontender, nondistended  Musculoskeletal: No bilateral ankle edema  Psychiatric: Appropriate affect, cooperative  Neurologic: Nonfocal  Skin: No rashes      Results Reviewed:  LAB RESULTS:      Lab 12/10/21  0821  1714 21  1404   WBC 3.13* 5.33  --  5.45   HEMOGLOBIN 8.2* 8.8*  --  10.3*   HEMATOCRIT 23.3* 25.5*  --  28.7*   PLATELETS 117* 115*  --  158   NEUTROS ABS  --  4.45  --  3.56   IMMATURE GRANS (ABS)  --  0.02  --  0.02   LYMPHS ABS  --  0.43*  --  1.25   MONOS ABS  --  0.29  --  0.52   EOS ABS  --  0.11  --  0.07   MCV 93.2 93.8  --  90.8   PROCALCITONIN  --  0.14  --  0.13   LACTATE  --  1.3 1.4  --          Lab 21  0344 12/10/21  0818 21  0322 12/08/21  1404   SODIUM 141 141 142 141   POTASSIUM 4.0 3.5 3.2* 3.1*   CHLORIDE 110* 107 106 101   CO2 20.0* 24.0 24.0 28.0   ANION GAP 11.0 10.0 12.0 12.0   BUN 41* 32* 24* 24*   CREATININE 4.35* 4.36* 3.73* 3.46*   GLUCOSE 127* 117* 148* 246*   CALCIUM 8.0* 7.9* 7.4* 8.4*   MAGNESIUM  --   --   1.6 1.9   PHOSPHORUS 4.8*  --   --   --    HEMOGLOBIN A1C  --   --  5.10  --    TSH  --   --   --  0.759         Lab 12/12/21  0344 12/10/21  0818 12/08/21  1404   TOTAL PROTEIN  --  5.3* 6.5   ALBUMIN 2.90* 3.00* 3.60   GLOBULIN  --  2.3 2.9   ALT (SGPT)  --  22 28   AST (SGOT)  --  27 31   BILIRUBIN  --  0.5 0.5   ALK PHOS  --  64 90                 Lab 12/10/21  0818   VITAMIN B 12 239         Lab 12/08/21  2105   PH, ARTERIAL 7.581*   PCO2, ARTERIAL 28.4*   PO2 ART 92.6   FIO2 21   HCO3 ART 26.7*   BASE EXCESS ART 4.9*   CARBOXYHEMOGLOBIN 1.1     Brief Urine Lab Results  (Last result in the past 365 days)      Color   Clarity   Blood   Leuk Est   Nitrite   Protein   CREAT   Urine HCG        12/08/21 1404 Yellow   Clear   Negative   Negative   Negative   >=300 mg/dL (3+)                 Microbiology Results Abnormal     Procedure Component Value - Date/Time    Blood Culture - Blood, Arm, Right [[id number]]  (Normal) Collected: 12/08/21 1700    Lab Status: Final result Specimen: Blood from Arm, Right Updated: 12/13/21 1731     Blood Culture No growth at 5 days    Blood Culture - Blood, Arm, Left [[id number]]  (Normal) Collected: 12/08/21 1720    Lab Status: Final result Specimen: Blood from Arm, Left Updated: 12/13/21 1731     Blood Culture No growth at 5 days    MRSA Screen, PCR (Inpatient) - Swab, Nares [[id number]]  (Normal) Collected: 12/08/21 2213    Lab Status: Final result Specimen: Swab from Nares Updated: 12/09/21 0749     MRSA PCR Negative    Narrative:      MRSA Negative    S. Pneumo Ag Urine or CSF - Urine, Urine, Clean Catch [[id number]]  (Normal) Collected: 12/08/21 1404    Lab Status: Final result Specimen: Urine, Clean Catch Updated: 12/09/21 0709     Strep Pneumo Ag Negative    Legionella Antigen, Urine - Urine, Urine, Clean Catch [[id number]]  (Normal) Collected: 12/08/21 1404    Lab Status: Final result Specimen: Urine, Clean Catch Updated: 12/09/21 0706     LEGIONELLA ANTIGEN, URINE Negative     Respiratory Panel PCR w/COVID-19(SARS-CoV-2) RUBINA/MANDY/MARK/PAD/COR/MAD/HORACE In-House, NP Swab in UTM/VTM, 3-4 HR TAT - Swab, Nasopharynx [[id number]]  (Normal) Collected: 12/08/21 2211    Lab Status: Final result Specimen: Swab from Nasopharynx Updated: 12/09/21 0003     ADENOVIRUS, PCR Not Detected     Coronavirus 229E Not Detected     Coronavirus HKU1 Not Detected     Coronavirus NL63 Not Detected     Coronavirus OC43 Not Detected     COVID19 Not Detected     Human Metapneumovirus Not Detected     Human Rhinovirus/Enterovirus Not Detected     Influenza A PCR Not Detected     Influenza B PCR Not Detected     Parainfluenza Virus 1 Not Detected     Parainfluenza Virus 2 Not Detected     Parainfluenza Virus 3 Not Detected     Parainfluenza Virus 4 Not Detected     RSV, PCR Not Detected     Bordetella pertussis pcr Not Detected     Bordetella parapertussis PCR Not Detected     Chlamydophila pneumoniae PCR Not Detected     Mycoplasma pneumo by PCR Not Detected    Narrative:      In the setting of a positive respiratory panel with a viral infection PLUS a negative procalcitonin without other underlying concern for bacterial infection, consider observing off antibiotics or discontinuation of antibiotics and continue supportive care. If the respiratory panel is positive for atypical bacterial infection (Bordetella pertussis, Chlamydophila pneumoniae, or Mycoplasma pneumoniae), consider antibiotic de-escalation to target atypical bacterial infection.          No radiology results from the last 24 hrs    Results for orders placed during the hospital encounter of 10/05/21    Adult Transthoracic Echo Complete W/ Cont if Necessary Per Protocol    Interpretation Summary  · Left ventricular ejection fraction appears to be 61 - 65%. Left ventricular systolic function is normal.  · Left atrial volume is mildly increased.      I have reviewed the medications:  Scheduled Meds:amLODIPine, 10 mg, Oral, Q24H  atorvastatin, 80 mg, Oral,  Daily  calcium acetate, 667 mg, Oral, TID With Meals  carvedilol, 25 mg, Oral, BID With Meals  cyanocobalamin, 1,000 mcg, Intramuscular, Q28 Days  heparin (porcine), 5,000 Units, Subcutaneous, Q8H  hydrALAZINE, 100 mg, Oral, Q8H  insulin lispro, 0-7 Units, Subcutaneous, TID AC  sodium chloride, 10 mL, Intravenous, Q12H      Continuous Infusions:   PRN Meds:.•  acetaminophen **OR** acetaminophen **OR** acetaminophen  •  albumin human  •  dextrose  •  dextrose  •  glucagon (human recombinant)  •  ipratropium-albuterol  •  Morphine  •  naloxone  •  ondansetron  •  sodium chloride    Assessment/Plan   Assessment & Plan     Active Hospital Problems    Diagnosis  POA   • **Right lower lobe pneumonia [J18.9]  Yes   • AMS (altered mental status) [R41.82]  Yes   • Colitis [K52.9]  Unknown   • Normocytic anemia [D64.9]  Unknown   • Hypokalemia [E87.6]  Unknown   • Hepatocellular carcinoma metastatic to bone s/p RXT  [C79.51, C22.0]  Yes   • Cirrhosis of liver (HCC) [K74.60]  Yes   • Hemochromatosis [E83.119]  Yes   • S/P left BKA [Z89.512]  Not Applicable   • Chronic Hep C  [B18.2]  Yes   • Chronic pain on Methadone [F11.90]  Yes   • Chronic ulcer of right foot [L97.519]  Yes   • ESRD (end stage renal disease) [N18.6]  Yes   • GERD (gastroesophageal reflux disease) [K21.9]  Yes   • Essential hypertension [I10]  Yes   • Type 2 diabetes mellitus (HCC) [E11.9]  Yes      Resolved Hospital Problems   No resolved problems to display.        Brief Hospital Course to date:  Jeaneth Keita is a 61 y.o. female with history of cirrhosis with HCC metastatic to bone s/p XRT, chronic hep C, chronic pain on methadone, ESRD on HD, familial hemochromatosis, hypertension, type 2 diabetes patient presented to the ED with AMS     Acute encephalopathy  Right lower lobe pneumonia  -Blood cultures NGTD, urinary antigens negative, CT head unrevealing.  -MRI head with no acute processes, does show evidence of prior thalamic hemorrhage  -TSH,  ammonia, B12 are all wnl  -She is s/p 7-day course of Zosyn and doxycycline.    -Continue Seroquel 25 mg nightly for insomnia  -Suspect patient is currently at her baseline mental status, however Per report patient was alert oriented x3 prior to admission, seems to clarify this     ESRD on HD  -Renal following, s/p HD today, patient with AV fistula in place, tunneled cath has since been removed     Type 2 diabetes  -Unable to accurately assess control as patient is on HD, A1c 5.1%  -Continue SSI for now     Liver cirrhosis  HCC with metastasis to bone s/p XRT  Chronic hep C  Familial hemochromatosis  -Patient has previously had all of her care done at [facility]  -Continue home meds when appropriate     Chronic pain-previously on methadone, UDS is negative, and methadone not on active med list     DVT prophylaxis:  Medical and mechanical DVT prophylaxis orders are present.     AM-PAC 6 Clicks Score (PT): 16 (12/15/21 0150)    Disposition: TBD, anticipate discharge back to [facility] in a.m. once transportation is arranged.  CM following    CODE STATUS:   Code Status and Medical Interventions:   Ordered at: 12/08/21 1940     Level Of Support Discussed With:    Patient     Code Status (Patient has no pulse and is not breathing):    CPR (Attempt to Resuscitate)     Medical Interventions (Patient has pulse or is breathing):    Full Support       Florencio Ryan MD  12/15/21

## 2021-12-15 NOTE — CASE MANAGEMENT/SOCIAL WORK
Continued Stay Note  [facility]     Patient Name: Jeaneth Keita  MRN: [id number]  Today's Date: 12/15/2021    Admit Date: 12/8/2021     Discharge Plan     Row Name 12/15/21 1509       Plan    Plan Personal Care with TX vs HH    Patient/Family in Agreement with Plan yes    Plan Comments I spoke with the pts ex spouse Parviz by phone. He feels the pt is at her baseline mentally. He states she has periods of confusion especially with orientation questions at times. I have spoken with Cherelle OSULLIVAN at [facility] [phone] and faxed the TX notes to her. She will call and speak with the pts nurse here to determine if the pt is doing what she needs to do to return to the facility. [facility] ambulance can transport back on Friday 12- at 1545. I spoke with Viat at [facility] (the pts outpt HD clinic) and she is aware of the planned DC Friday and start back with outpt HD there on Monday 12-. I will fax the DC summary [phone] [phone].               Discharge Codes    No documentation.               Expected Discharge Date and Time     Expected Discharge Date Expected Discharge Time    Dec 15, 2021             Radha Miranda RN

## 2021-12-15 NOTE — PLAN OF CARE
HD in progress.   Problem: Device-Related Complication Risk (Hemodialysis)  Goal: Safe, Effective Therapy Delivery  Outcome: Ongoing, Progressing  Intervention: Optimize Device Care and Function  Recent Flowsheet Documentation  Taken 12/15/2021 0800 by Tori Kowalski RN  Medication Review/Management: medications reviewed     Problem: Hemodynamic Instability (Hemodialysis)  Goal: Vital Signs Remain in Desired Range  Outcome: Ongoing, Progressing     Problem: Infection (Hemodialysis)  Goal: Absence of Infection Signs and Symptoms  Outcome: Ongoing, Progressing

## 2021-12-15 NOTE — DISCHARGE PLACEMENT REQUEST
"Jeaneth Tatum (61 y.o. Female)   To James Sanchez  From Radha Miranda [phone]              Date of Birth Social Security Number Address Home Phone MRN    1960  [address] [facility] [phone] [id number]    Synagogue Marital Status             Christianity        Admission Date Admission Type Admitting Provider Attending Provider Department, Room/Bed    21 Emergency Florencio Ryan MD Opii, [place], MD [facility] 5F, S517/    Discharge Date Discharge Disposition Discharge Destination                         Attending Provider: Florencio Ryan MD    Allergies: Sulfa Antibiotics    Isolation: None   Infection: None   Code Status: CPR   Advance Care Planning Activity    Ht: 172.7 cm (68\")   Wt: 65.9 kg (145 lb 4.8 oz)    Admission Cmt: None   Principal Problem: Right lower lobe pneumonia [J18.9]                 Active Insurance as of 2021     Primary Coverage     Payor Plan Insurance Group Employer/Plan Group    [id number]     Payor Plan Address Payor Plan Phone Number Payor Plan Fax Number Effective Dates    [address]   2005 - None Entered    [place] [address]       Subscriber Name Subscriber Birth Date Member ID       JEANETH TATUM 1960 [id number]                 Emergency Contacts      (Rel.) Home Phone Work Phone Mobile Phone    Parviz Rivera (Spouse) [phone] -- [phone]    chester Madsen (Brother) [phone] -- [phone]    Luzmaria Prescott (Brother) -- -- --    Glendy Rivera (Sister) -- -- --               History & Physical      Gemini Alfredo DO at 21              [facility] Medicine Services  HISTORY AND PHYSICAL    Patient Name: Jeaneth Tatum  : 1960  MRN: [id number]  Primary Care Physician: Provider, No Known  Date of admission: 2021      Subjective   Subjective     Chief Complaint:  AMS    HPI:  Jeaneth Tatum is a 61 y.o. " female with a PMH significant for ESRD on HD, reports of insulin-dependent diabetes mellitus type 2 with no active medications on home med list, chronic hepatitis C, familial hemochromatosis, cirrhosis, hypertension, left BKA, fibromyalgia who is currently residing at [facility] who presents to the ED due to altered mental status.  Patient is significantly confused, noncontributory to HPI.  Per EMR, patient had reports of confusion for the last day.  No reports of fever, headache, chest pain or shortness of breath.  EMR noted diarrhea, patient reports abdominal pain currently.  Patient was hospitalized at [facility] with discharge on 10/28/2021 where she was treated for intercerebral hemorrhage and right lower lobe pneumonia.  During that prior stay patient had not yet been initiated on hemodialysis.  Prior hospitalization last month, patient had primarily been seen at [facility].      COVID Details:    Symptoms:    [] NONE [] Fever []  Cough [] Shortness of breath [] Change in taste/smell      The patient qualifies to receive the vaccine, but they have not yet received it.      Review of Systems   Unable to perform ROS: Acuity of condition      All other systems reviewed and are negative.     Personal History     Past Medical History:   Diagnosis Date   • Anxiety    • DDD (degenerative disc disease), lumbar    • Depression    • Diabetes mellitus (HCC)    • Fibromyalgia    • Hemochromatosis    • Hep B w/o coma, chronic, w/o delta (HCC)    • Hep C w/o coma, chronic (HCC)    • Hypertension    • Vertigo        Past Surgical History:   Procedure Laterality Date   • ARTERIOVENOUS FISTULA/SHUNT SURGERY Left 10/16/2021    Procedure: UPPER EXTREMITY ARTERIOVENOUS FISTULA FORMATION LEFT;  Surgeon: Johnny Rousseau MD;  Location: [facility];  Service: Vascular;  Laterality: Left;   • BACK SURGERY     • BELOW KNEE LEG AMPUTATION     •  SECTION     • FOOT SURGERY     • KNEE SURGERY     • ROTATOR CUFF REPAIR     • SPINAL  "CORD STIMULATOR IMPLANT         Family History:  family history is not on file. Otherwise pertinent FHx was reviewed and unremarkable.     Social History:  reports that she has never smoked. She has never used smokeless tobacco. She reports that she does not drink alcohol and does not use drugs.  Social History     Social History Narrative   • Not on file       Medications:  Available home medication information reviewed.  (Not in a hospital admission)      Allergies   Allergen Reactions   • Sulfa Antibiotics        Objective   Objective     Vital Signs:   Temp:  [98.4 °F (36.9 °C)] 98.4 °F (36.9 °C)  Heart Rate:  [] 103  Resp:  [16] 16  BP: (148-166)/(80-98) 166/97       Physical Exam   Constitutional: Awake, alert  Eyes: PERRLA, sclerae anicteric, no conjunctival injection  HENT: NCAT, mucous membranes moist  Neck: Supple, no thyromegaly, no lymphadenopathy, trachea midline  Respiratory: Clear to auscultation bilaterally, nonlabored respirations   Cardiovascular: RRR, no murmurs, rubs, or gallops, palpable pedal pulse on the right  Gastrointestinal: Positive bowel sounds, soft, nontender, nondistended  Musculoskeletal: Trace right LE edema, no clubbing or cyanosis to extremities.  Left BKA  Psychiatric: Appropriate affect, cooperative  Neurologic: Oriented to \"hospital\", not oriented to city, date, strength symmetric in all extremities, Cranial Nerves grossly intact to confrontation, speech clear  Skin: No rashes      Result Review:  I have personally reviewed the results from the time of this admission to 12/8/2021 19:46 EST and agree with these findings:  [x]  Laboratory  [x]  Microbiology  [x]  Radiology  [x]  EKG/Telemetry   []  Cardiology/Vascular   []  Pathology  [x]  Old records  []  Other:        LAB RESULTS:      Lab 12/08/21  1714 12/08/21  1404   WBC  --  5.45   HEMOGLOBIN  --  10.3*   HEMATOCRIT  --  28.7*   PLATELETS  --  158   NEUTROS ABS  --  3.56   IMMATURE GRANS (ABS)  --  0.02   LYMPHS ABS  " --  1.25   MONOS ABS  --  0.52   EOS ABS  --  0.07   MCV  --  90.8   LACTATE 1.4  --          Lab 12/08/21  1404   SODIUM 141   POTASSIUM 3.1*   CHLORIDE 101   CO2 28.0   ANION GAP 12.0   BUN 24*   CREATININE 3.46*   GLUCOSE 246*   CALCIUM 8.4*         Lab 12/08/21  1404   TOTAL PROTEIN 6.5   ALBUMIN 3.60   GLOBULIN 2.9   ALT (SGPT) 28   AST (SGOT) 31   BILIRUBIN 0.5   ALK PHOS 90                     UA    Urinalysis 12/8/21 12/8/21    1404 1404   Squamous Epithelial Cells, UA  0-2   Specific Gravity, UA 1.018    Ketones, UA Negative    Blood, UA Negative    Leukocytes, UA Negative    Nitrite, UA Negative    RBC, UA  0-2   WBC, UA  0-2   Bacteria, UA  None Seen             Microbiology Results (last 10 days)     ** No results found for the last 240 hours. **          CT Abdomen Pelvis Without Contrast    Result Date: 12/8/2021  EXAMINATION: CT ABDOMEN/PELVIS WO CONTRAST - 12/08/2021  INDICATION: R41.0-Disorientation, unspecified. Generalized abdominal pain.  TECHNIQUE: Multiple axial CT imaging is obtained of the abdomen and pelvis without the administration of intravenous contrast.  The radiation dose reduction device was turned on for each scan per the ALARA (As Low as Reasonably Achievable) protocol.  COMPARISON: None  FINDINGS: There is infiltrate in the right lower lobe suggesting pneumonia. Stones identified in the gallbladder. There is a cystic structure identified with thin-walled calcification seen in the region of the becky hepatis adjacent to the IVC and distal esophagus. This area measures 3.9 x 4.0 cm. Findings are uncertain and contrast enhanced imaging can be performed for further evaluation. Pancreas in its visualized portions is unremarkable. Kidneys and adrenal glands are within normal limits. The abdominal portion of the gastrointestinal tract within normal limits. No free fluid or free air. No abnormal mass or fluid collections identified. Bony structures are unremarkable.  PELVIS: Some mild wall  thickening identified of the distal descending colon and sigmoid colon suggesting possibly a mild colitis. No significant abnormal mass or fluid collection. Pelvic organs are unremarkable. The pelvic portions of the gastrointestinal tract are otherwise within normal limits. No pelvic adenopathy. No free fluid or free air.      Impression: 1. Right lower lobe pneumonia. 2. Wall thickening of the sigmoid colon diffusely suggesting possibly a mild colitis with no significant stranding. 3. Cystic structure seen in the becky hepatis adjacent to the IVC and distal esophagus for which contrast enhanced imaging can be performed for further evaluation of this area. There are stones filling the gallbladder.  DICTATED:   12/08/2021 EDITED/lfs:   12/08/2021      CT Head Without Contrast    Result Date: 12/8/2021  EXAMINATION: CT HEAD WO CONTRAST-12/08/2021:  INDICATION: AMS; R41.0-Disorientation, unspecified, mental status change.  TECHNIQUE: Multiple axial CT imaging was obtained of the head without the administration of intravenous contrast.  The radiation dose reduction device was turned on for each scan per the ALARA (As Low as Reasonably Achievable) protocol.  COMPARISON: NONE.  FINDINGS: The brain parenchyma is unremarkable. There is low-density areas seen in the periventricular and subcortical white matter suggesting chronic small vessel ischemic change. No hemorrhage or hydrocephalus. No mass, mass effect, or midline shift. The bony structures reveal no evidence of osseous abnormality. The visualized paranasal sinuses are clear. The mastoid air cells are patent.      Impression: No CT evidence of acute intracranial abnormality. Chronic changes seen within the brain.  D:  12/08/2021 E:  12/08/2021       XR Chest 1 View    Result Date: 12/8/2021  EXAMINATION: XR CHEST 1 VW-12/08/2021:  INDICATION: AMS.  COMPARISON: 10/05/2021.  FINDINGS: Portable chest reveals ill-defined opacification seen in the right infrahilar region.  The findings are stable when compared to the prior study. Degenerative changes seen within the spine. The left lung is clear.      Impression: Mild increased markings identified in the right infrahilar region stable and unchanged. Dialysis catheter placed on the right with tip in the SVC.  D:  12/08/2021 E:  12/08/2021          Results for orders placed during the hospital encounter of 10/05/21    Adult Transthoracic Echo Complete W/ Cont if Necessary Per Protocol    Interpretation Summary  · Left ventricular ejection fraction appears to be 61 - 65%. Left ventricular systolic function is normal.  · Left atrial volume is mildly increased.      Assessment/Plan   Assessment & Plan     Active Hospital Problems    Diagnosis  POA   • **Right lower lobe pneumonia [J18.9]  Yes   • AMS (altered mental status) [R41.82]  Yes   • Colitis [K52.9]  Unknown   • Normocytic anemia [D64.9]  Unknown   • Hypokalemia [E87.6]  Unknown   • Hepatocellular carcinoma metastatic to bone s/p RXT  [C79.51, C22.0]  Yes     · Osseous metastasis to right posterior 7th rib s/p radiation in 5/2020 and 4/2021       • Cirrhosis of liver (HCC) [K74.60]  Yes   • Hemochromatosis [E83.119]  Yes   • S/P left BKA [Z89.512]  Not Applicable   • Chronic Hep C  [B18.2]  Yes   • Chronic pain on Methadone [F11.90]  Yes   • Chronic ulcer of right foot [L97.519]  Yes   • ESRD (end stage renal disease) [N18.6]  Yes   • GERD (gastroesophageal reflux disease) [K21.9]  Yes   • Essential hypertension [I10]  Yes   • Type 2 diabetes mellitus (HCC) [E11.9]  Yes   Jeaneth Keita is a 61 y.o. female with a PMH significant for ESRD on HD, reports of insulin-dependent diabetes mellitus type 2 with no active medications on home med list, chronic hepatitis C, familial hemochromatosis, cirrhosis, hypertension, left BKA, fibromyalgia who is currently residing at [facility] who presents to the ED due to altered mental status.    Altered mental  status  Colitis  Pneumonia  -AMS likely due to acute infection  -Zosyn, doxycycline  -MRSA PCR screen pending, if positive will add vancomycin  -Blood culture, sputum culture, urine antigens pending  -Stool studies pending  -DuoNeb scheduled and as needed  -ABG pending  -UA negative  -Ammonia WNL  -CT head WNL  -If mentation does not improve after treating acute infection, would benefit from MRI with and without contrast given history of metastatic hepatocellular carcinoma if approved by nephrology  -Monitor on telemetry continuous pulse ox    ESRD, (?On HD)  Hypokalemia  -ED provider reports patient is on HD.  Patient denies but she is significantly confused  -Nephrology consult for a.m.  -Mild hyperkalemia, defer to nephrology for replacement    Type 2 diabetes mellitus  -Prior EMR reports insulin-dependent DM, no insulin on current home med list  -SSI for now    HTN  GERD  Status post left BKA  Chronic hep C  Metastatic hepatocellular carcinoma  Familial hemochromatosis  Chronic ulcer to right foot  -Continue home meds  -EMR reports patient was on chemotherapy in October, none actively listed  -Has previously received all of her care from [facility]    Chronic pain on methadone  -Methadone not on active med list  -UDS negative  -Richie reviewed with no active prescription      DVT prophylaxis: Heparin      CODE STATUS: Full code  Code Status and Medical Interventions:   Ordered at: 12/08/21 1940     Level Of Support Discussed With:    Patient     Code Status (Patient has no pulse and is not breathing):    CPR (Attempt to Resuscitate)     Medical Interventions (Patient has pulse or is breathing):    Full Support       Admission Status:  I believe this patient meets INPATIENT status due to her mental status with pneumonia and colitis requiring IV antibiotics and inpatient evaluation and treatment.  I feel patient’s risk for adverse outcomes and need for care warrant INPATIENT evaluation and I predict the patient’s  care encounter to likely last beyond 2 midnights.    Gemini Alfredo DO  12/08/21      Electronically signed by Gemini Alfredo DO at 12/09/21 0020         Current Facility-Administered Medications   Medication Dose Route Frequency Provider Last Rate Last Admin   • acetaminophen (TYLENOL) tablet 650 mg  650 mg Oral Q4H PRN Gemini Alfredo, DO   650 mg at 12/13/21 0101    Or   • acetaminophen (TYLENOL) 160 MG/5ML solution 650 mg  650 mg Oral Q4H PRN Gemini Alfredo, DO        Or   • acetaminophen (TYLENOL) suppository 650 mg  650 mg Rectal Q4H PRN Gemini Alfredo, DO       • albumin human 25 % IV SOLN 12.5 g  12.5 g Intravenous PRN Panchito Austin MD       • amLODIPine (NORVASC) tablet 10 mg  10 mg Oral Q24H Gemini Alfredo, DO   10 mg at 12/14/21 0800   • atorvastatin (LIPITOR) tablet 80 mg  80 mg Oral Daily Gemini Alfredo, DO   80 mg at 12/14/21 0800   • calcium acetate (PHOS BINDER)) capsule 667 mg  667 mg Oral TID With Meals Gemini Alfredo, DO   667 mg at 12/14/21 1738   • carvedilol (COREG) tablet 25 mg  25 mg Oral BID With Meals Gemini Alfredo, DO   25 mg at 12/14/21 1738   • cyanocobalamin injection 1,000 mcg  1,000 mcg Intramuscular Q28 Days Keon Marin MD   1,000 mcg at 12/10/21 2300   • dextrose (D50W) (25 g/50 mL) IV injection 25 g  25 g Intravenous Q15 Min PRN Gemini Alfredo G, DO       • dextrose (GLUTOSE) oral gel 15 g  15 g Oral Q15 Min PRN Gemini Alfredo, DO       • glucagon (human recombinant) (GLUCAGEN DIAGNOSTIC) injection 1 mg  1 mg Subcutaneous Q15 Min PRN Gemini Alfredo G, DO       • heparin (porcine) 5000 UNIT/ML injection 5,000 Units  5,000 Units Subcutaneous Q8H Gemini Alfredo, DO   5,000 Units at 12/15/21 0157   • hydrALAZINE (APRESOLINE) tablet 100 mg  100 mg Oral Q8H Gemini Alfredo, DO   100 mg at 12/14/21 1735   • insulin lispro (humaLOG) injection 0-7 Units  0-7 Units Subcutaneous TID AC Gemini Alfredo DO   2 Units at 12/14/21 1735   • ipratropium-albuterol (DUO-NEB)  "nebulizer solution 3 mL  3 mL Nebulization Q4H PRN Sayda, Gemini G, DO       • Morphine sulfate (PF) injection 3 mg  3 mg Intravenous Q3H PRN Sayda, Gemini G, DO   3 mg at 12/11/21 1145   • naloxone (NARCAN) injection 0.4 mg  0.4 mg Intravenous Q5 Min PRN Sayda, Gemini G, DO       • ondansetron (ZOFRAN) injection 4 mg  4 mg Intravenous Q6H PRN Sayda, Gemini G, DO   4 mg at 12/09/21 0306   • sodium chloride 0.9 % flush 10 mL  10 mL Intravenous Q12H Sayda, Gemini G, DO   10 mL at 12/14/21 2032   • sodium chloride 0.9 % flush 10 mL  10 mL Intravenous PRN Sayda, Gemini G, DO            Physician Progress Notes (most recent note)      Panchito Austin MD at 12/15/21 1102             LOS: 7 days    Patient Care Team:  Provider, No Known as PCP - General  ESRD     Subjective     Interval History:   No acute events overnight. No new complaints       Review of Systems:   AMS, NO CP OR SOA    Objective     Vital Sign Min/Max for last 24 hours  Temp  Min: 98 °F (36.7 °C)  Max: 98.7 °F (37.1 °C)   BP  Min: 100/53  Max: 143/77   Pulse  Min: 91  Max: 103   Resp  Min: 16  Max: 18   No data recorded   No data recorded   Weight  Min: 65.9 kg (145 lb 4.8 oz)  Max: 65.9 kg (145 lb 4.8 oz)     Flowsheet Rows      First Filed Value   Admission Height 172.7 cm (68\") Documented at 12/08/2021 1240   Admission Weight 72.6 kg (160 lb) Documented at 12/08/2021 1240          No intake/output data recorded.  I/O last 3 completed shifts:  In: 360 [P.O.:360]  Out: -     Physical Exam:    Gen: Alert, NAD   HENT: NC, AT, EOMI   NECK: Supple, no JVD, Trachea midline   LUNGS: CTA bilaterally, non labored respirtation   CVS: S1/S2 audible, RRR, no murmur   Abd: Soft, NT, ND, BS+   Ext: No pedal edema, no cyanosis   CNS: Alert, No focal deficit noted grossly  Psy: Cooperative  Skin: Warm, dry and intact      WBC No results found for: WBC   HGB No results found for: HGB   HCT No results found for: HCT   Platlets No results found for: LABPLAT   MCV " No results found for: MCV       Sodium No results found for: NA   Potassium No results found for: K   Chloride No results found for: CL   CO2 No results found for: CO2   BUN No results found for: BUN   Creatinine No results found for: CREATININE   Calcium No results found for: CALCIUM   PO4 No results found for: CAPO4   Albumin No results found for: ALBUMIN   Magnesium No results found for: MG   Uric Acid No results found for: URICACID        Results Review:     I reviewed the patient's new clinical results.    amLODIPine, 10 mg, Oral, Q24H  atorvastatin, 80 mg, Oral, Daily  calcium acetate, 667 mg, Oral, TID With Meals  carvedilol, 25 mg, Oral, BID With Meals  cyanocobalamin, 1,000 mcg, Intramuscular, Q28 Days  heparin (porcine), 5,000 Units, Subcutaneous, Q8H  hydrALAZINE, 100 mg, Oral, Q8H  insulin lispro, 0-7 Units, Subcutaneous, TID AC  sodium chloride, 10 mL, Intravenous, Q12H           Medication Review:     Assessment/Plan       Right lower lobe pneumonia    Hepatocellular carcinoma metastatic to bone s/p RXT     Hemochromatosis    Cirrhosis of liver (HCC)    Chronic Hep C     ESRD (end stage renal disease)    Chronic ulcer of right foot    S/P left BKA    GERD (gastroesophageal reflux disease)    Chronic pain on Methadone    Essential hypertension    Type 2 diabetes mellitus (HCC)    AMS (altered mental status)    Colitis    Normocytic anemia    Hypokalemia      1- ESRD - MWF - [facility]   2- HTN   3- Mild hypokalemia   4- Anemia of chronic disease   5- Familial hemochromatosis   6- Hx of Metastatic hepatocellular carcinoma       Plan:  - Patient seen on dialysis, UF as tolerated.   - Renal diet.    - Transfuse for Hgb less than 7.0   - Access infiltrated so dialysis cut short to 3 hrs. Tunneled catheter removed on 12/10 as she pulled it accidentally.      I discussed the patients findings and my recommendations with nursing staff    Panchito Austin MD  12/15/21  11:02 EST        Electronically signed  by Panchito Austin MD at 12/15/21 1106          Physical Therapy Notes (most recent note)      Basilio Farr PT at 21 1940  Version 1 of 1         Patient Name: Jeaneth Keita  : 1960    MRN: [id number]                              Today's Date: 2021       Admit Date: 2021    Visit Dx:     ICD-10-CM ICD-9-CM   1. Confusion  R41.0 298.9   2. Pneumonia of right lower lobe due to infectious organism  J18.9 486   3. Hypokalemia  E87.6 276.8   4. Hypocalcemia  E83.51 275.41   5. End stage renal disease (HCC)  N18.6 585.6     Patient Active Problem List   Diagnosis   • ICH (intracerebral hemorrhage)   • Hepatocellular carcinoma metastatic to bone s/p RXT    • Hemochromatosis   • Cirrhosis of liver (HCC)   • Chronic Hep C    • ESRD (end stage renal disease)   • Chronic ulcer of right foot   • S/P left BKA   • GERD (gastroesophageal reflux disease)   • Chronic pain on Methadone   • Essential hypertension   • Type 2 diabetes mellitus (HCC)   • Right lower lobe pneumonia   • AMS (altered mental status)   • Colitis   • Normocytic anemia   • Hypokalemia     Past Medical History:   Diagnosis Date   • Anxiety    • DDD (degenerative disc disease), lumbar    • Depression    • Diabetes mellitus (HCC)    • Fibromyalgia    • Hemochromatosis    • Hep B w/o coma, chronic, w/o delta (HCC)    • Hep C w/o coma, chronic (HCC)    • Hypertension    • Vertigo      Past Surgical History:   Procedure Laterality Date   • ARTERIOVENOUS FISTULA/SHUNT SURGERY Left 10/16/2021    Procedure: UPPER EXTREMITY ARTERIOVENOUS FISTULA FORMATION LEFT;  Surgeon: Johnny Rousseau MD;  Location: [facility];  Service: Vascular;  Laterality: Left;   • BACK SURGERY     • BELOW KNEE LEG AMPUTATION     •  SECTION     • FOOT SURGERY     • KNEE SURGERY     • ROTATOR CUFF REPAIR     • SPINAL CORD STIMULATOR IMPLANT        General Information     Row Name 21 [id number]          Physical Therapy Time and Intention     Document Type therapy note (daily note)  -MP     Mode of Treatment physical therapy  -MP     Row Name 12/14/21 0913          General Information    Existing Precautions/Restrictions fall; other (see comments)  remote L BKA  -MP     Row Name 12/14/21 0913          Cognition    Orientation Status (Cognition) oriented to; person; place; verbal cues/prompts needed for orientation; time  -MP     Row Name 12/14/21 0913          Safety Issues, Functional Mobility    Safety Issues Affecting Function (Mobility) insight into deficits/self-awareness; safety precautions follow-through/compliance; judgment; safety precaution awareness; sequencing abilities  -MP     Impairments Affecting Function (Mobility) balance; cognition; endurance/activity tolerance  -MP           User Key  (r) = Recorded By, (t) = Taken By, (c) = Cosigned By    Initials Name Provider Type    MP Basilio Farr PT Physical Therapist               Mobility     Row Name 12/14/21 0913          Bed Mobility    Bed Mobility supine-sit  -MP     Supine-Sit Momence (Bed Mobility) contact guard  -MP     Assistive Device (Bed Mobility) bed rails  -MP     Comment (Bed Mobility) VC's for sequencing  -MP     Row Name 12/14/21 0913          Transfers    Comment (Transfers) VC's for sequencing and awareness.  -MP     Row Name 12/14/21 0913          Bed-Chair Transfer    Bed-Chair Momence (Transfers) verbal cues; contact guard  -MP     Assistive Device (Bed-Chair Transfers) walker, front-wheeled  -MP     Row Name 12/14/21 0913          Sit-Stand Transfer    Sit-Stand Momence (Transfers) contact guard  -MP     Assistive Device (Sit-Stand Transfers) walker, front-wheeled  -MP     Row Name 12/14/21 0913          Gait/Stairs (Locomotion)    Momence Level (Gait) contact guard; 1 person assist  -MP     Assistive Device (Gait) walker, front-wheeled  -MP     Distance in Feet (Gait) 3  -MP     Comment (Gait/Stairs) Patient performed hop-to gait pattern to  ambulate 3' from bed to chair with CGA, RW. Per chart review, pt uses w/c at baseline.  -MP           User Key  (r) = Recorded By, (t) = Taken By, (c) = Cosigned By    Initials Name Provider Type    Basilio White PT Physical Therapist               Obj/Interventions     Row Name 12/14/21 0918          Motor Skills    Therapeutic Exercise hip; knee; ankle  -MP     Row Name 12/14/21 0918          Hip (Therapeutic Exercise)    Hip (Therapeutic Exercise) strengthening exercise  -MP     Hip Strengthening (Therapeutic Exercise) bilateral; aBduction; aDduction; marching while seated; sitting; 10 repetitions  -MP     Row Name 12/14/21 0918          Knee (Therapeutic Exercise)    Knee (Therapeutic Exercise) strengthening exercise  -MP     Knee Strengthening (Therapeutic Exercise) bilateral; LAQ (long arc quad); sitting; 10 repetitions  -MP     Row Name 12/14/21 0918          Ankle (Therapeutic Exercise)    Ankle (Therapeutic Exercise) AROM (active range of motion)  -MP     Ankle AROM (Therapeutic Exercise) right; dorsiflexion; plantarflexion; 10 repetitions  -MP     Row Name 12/14/21 0918          Balance    Balance Assessment sitting static balance; sitting dynamic balance; standing static balance; standing dynamic balance  -MP     Static Sitting Balance WFL; unsupported; sitting in chair; sitting, edge of bed  -MP     Dynamic Sitting Balance WFL; unsupported; sitting in chair; sitting, edge of bed  -MP     Static Standing Balance mild impairment; supported; standing  -MP     Dynamic Standing Balance mild impairment; supported; standing  -MP           User Key  (r) = Recorded By, (t) = Taken By, (c) = Cosigned By    Initials Name Provider Type    Basilio White PT Physical Therapist               Goals/Plan    No documentation.                Clinical Impression     Row Name 12/14/21 0919          Pain    Additional Documentation Pain Scale: Numbers Pre/Post-Treatment (Group)  -MP     Row Name 12/14/21 0919           Pain Scale: Numbers Pre/Post-Treatment    Pretreatment Pain Rating 0/10 - no pain  -MP     Posttreatment Pain Rating 0/10 - no pain  -MP     Row Name 12/14/21 0919          Plan of Care Review    Plan of Care Reviewed With patient  -MP     Progress improving  -MP     Outcome Summary Patient performed bed mobility with CGA and was able to ambulate 3' from bed to chair with RW, CGA but required VC's for sequencing. Patient gave good effort with seated ther ex. Patient would continue to benefit from skilled PT to promote increased functional independence.  -MP     Row Name 12/14/21 0919          Vital Signs    Pre Systolic BP Rehab 142  -MP     Pre Treatment Diastolic BP 44  -MP     Post Systolic BP Rehab 114  -MP     Post Treatment Diastolic BP 67  -MP     Pretreatment Heart Rate (beats/min) 96  -MP     Posttreatment Heart Rate (beats/min) 100  -MP     Pre SpO2 (%) 99  -MP     O2 Delivery Pre Treatment room air  -MP     O2 Delivery Intra Treatment room air  -MP     Post SpO2 (%) 98  -MP     O2 Delivery Post Treatment room air  -MP     Pre Patient Position Supine  -MP     Intra Patient Position Standing  -MP     Post Patient Position Sitting  -MP     Row Name 12/14/21 0919          Positioning and Restraints    Pre-Treatment Position in bed  -MP     Post Treatment Position chair  -MP     In Chair notified nsg; reclined; call light within reach; encouraged to call for assist; exit alarm on; waffle cushion; legs elevated  -MP           User Key  (r) = Recorded By, (t) = Taken By, (c) = Cosigned By    Initials Name Provider Type    Basilio White, PT Physical Therapist               Outcome Measures     Row Name 12/14/21 0922          How much help from another person do you currently need...    Turning from your back to your side while in flat bed without using bedrails? 4  -MP     Moving from lying on back to sitting on the side of a flat bed without bedrails? 3  -MP     Moving to and from a bed to a chair  (including a wheelchair)? 3  -MP     Standing up from a chair using your arms (e.g., wheelchair, bedside chair)? 3  -MP     Climbing 3-5 steps with a railing? 1  -MP     To walk in hospital room? 2  -MP     AM-PAC 6 Clicks Score (PT) 16  -MP     Row Name 12/14/21 0922          Functional Assessment    Outcome Measure Options AM-PAC 6 Clicks Basic Mobility (PT)  -MP           User Key  (r) = Recorded By, (t) = Taken By, (c) = Cosigned By    Initials Name Provider Type    MP Basilio Farr, PT Physical Therapist                             Physical Therapy Education                 Title: PT OT SLP Therapies (In Progress)     Topic: Physical Therapy (In Progress)     Point: Mobility training (In Progress)     Learning Progress Summary           Patient Acceptance, E,D, NR by  at 12/14/2021 0923    Acceptance, E, NR by  at 12/9/2021 0916    Comment: precautions for upright mobility                   Point: Home exercise program (In Progress)     Learning Progress Summary           Patient Acceptance, E,D, NR by  at 12/14/2021 0923    Acceptance, E, NR by  at 12/9/2021 0916    Comment: precautions for upright mobility                   Point: Body mechanics (In Progress)     Learning Progress Summary           Patient Acceptance, E,D, NR by  at 12/14/2021 0923    Acceptance, E, NR by  at 12/9/2021 0916    Comment: precautions for upright mobility                   Point: Precautions (In Progress)     Learning Progress Summary           Patient Acceptance, E,D, NR by  at 12/14/2021 0923    Acceptance, E, NR by  at 12/9/2021 0916    Comment: precautions for upright mobility                               User Key     Initials Effective Dates Name Provider Type Discipline     06/16/21 -  Mamie Gallegos PT Physical Therapist PT     06/16/21 -  Basilio Farr PT Physical Therapist PT              PT Recommendation and Plan     Plan of Care Reviewed With: patient  Progress: improving  Outcome Summary:  Patient performed bed mobility with CGA and was able to ambulate 3' from bed to chair with RW, CGA but required VC's for sequencing. Patient gave good effort with seated ther ex. Patient would continue to benefit from skilled PT to promote increased functional independence.     Time Calculation:    PT Charges     Row Name 21 0923             Time Calculation    Start Time 0830  -MP      PT Received On 21  -MP      PT Goal Re-Cert Due Date 21  -MP              Time Calculation- PT    Total Timed Code Minutes- PT 23 minute(s)  -MP              Timed Charges    30546 - PT Therapeutic Exercise Minutes 10  -MP      66051 - PT Therapeutic Activity Minutes 13  -MP              Total Minutes    Timed Charges Total Minutes 23  -MP       Total Minutes 23  -MP            User Key  (r) = Recorded By, (t) = Taken By, (c) = Cosigned By    Initials Name Provider Type    Basilio White PT Physical Therapist              Therapy Charges for Today     Code Description Service Date Service Provider Modifiers Qty    93737533535 HC PT THER PROC EA 15 MIN 2021 Basilio Farr, PT GP 1    66909302361 HC PT THERAPEUTIC ACT EA 15 MIN 2021 Basilio Farr, PT GP 1          PT G-Codes  Outcome Measure Options: AM-PAC 6 Clicks Basic Mobility (PT)  AM-PAC 6 Clicks Score (PT): 16  AM-PAC 6 Clicks Score (OT): 18    Basilio Farr PT  2021      Electronically signed by Basilio Farr PT at 21 0924          Occupational Therapy Notes (most recent note)      Mamie Arriaga, OT at 21 1135          Patient Name: Jeaneth Keita  : 1960    MRN: [id number]                              Today's Date: 2021       Admit Date: 2021    Visit Dx:     ICD-10-CM ICD-9-CM   1. Confusion  R41.0 298.9   2. Pneumonia of right lower lobe due to infectious organism  J18.9 486   3. Hypokalemia  E87.6 276.8   4. Hypocalcemia  E83.51 275.41   5. End stage renal disease (HCC)  N18.6 585.6     Patient  Active Problem List   Diagnosis   • ICH (intracerebral hemorrhage)   • Hepatocellular carcinoma metastatic to bone s/p RXT    • Hemochromatosis   • Cirrhosis of liver (HCC)   • Chronic Hep C    • ESRD (end stage renal disease)   • Chronic ulcer of right foot   • S/P left BKA   • GERD (gastroesophageal reflux disease)   • Chronic pain on Methadone   • Essential hypertension   • Type 2 diabetes mellitus (HCC)   • Right lower lobe pneumonia   • AMS (altered mental status)   • Colitis   • Normocytic anemia   • Hypokalemia     Past Medical History:   Diagnosis Date   • Anxiety    • DDD (degenerative disc disease), lumbar    • Depression    • Diabetes mellitus (HCC)    • Fibromyalgia    • Hemochromatosis    • Hep B w/o coma, chronic, w/o delta (HCC)    • Hep C w/o coma, chronic (HCC)    • Hypertension    • Vertigo      Past Surgical History:   Procedure Laterality Date   • ARTERIOVENOUS FISTULA/SHUNT SURGERY Left 10/16/2021    Procedure: UPPER EXTREMITY ARTERIOVENOUS FISTULA FORMATION LEFT;  Surgeon: Johnny Rousseau MD;  Location: [facility];  Service: Vascular;  Laterality: Left;   • BACK SURGERY     • BELOW KNEE LEG AMPUTATION     •  SECTION     • FOOT SURGERY     • KNEE SURGERY     • ROTATOR CUFF REPAIR     • SPINAL CORD STIMULATOR IMPLANT        General Information     Row Name 21 1158          OT Time and Intention    Document Type therapy note (daily note)  -CHENCHO     Mode of Treatment occupational therapy  -     Row Name 21 1150          General Information    Patient Profile Reviewed yes  -CHENCHO     Existing Precautions/Restrictions fall; other (see comments)  remote L BKA  -CHENCHO     Barriers to Rehab cognitive status  -     Row Name 21 1158          Cognition    Orientation Status (Cognition) oriented to; person; place; verbal cues/prompts needed for orientation; time  -     Row Name 21 1159          Safety Issues, Functional Mobility    Safety Issues Affecting Function  (Mobility) insight into deficits/self-awareness; judgment; problem-solving; safety precaution awareness; safety precautions follow-through/compliance  -CHENCHO     Impairments Affecting Function (Mobility) balance; cognition; endurance/activity tolerance; motor planning  -CHENCHO     Cognitive Impairments, Mobility Safety/Performance awareness, need for assistance; insight into deficits/self-awareness; judgment; problem-solving/reasoning; safety precaution awareness; safety precaution follow-through  -CHENCHO           User Key  (r) = Recorded By, (t) = Taken By, (c) = Cosigned By    Initials Name Provider Type    Mamie Carrillo OT Occupational Therapist                 Mobility/ADL's     Row Name 12/14/21 1159          Bed Mobility    Comment (Bed Mobility) UIC  -CHENCHO     Row Name 12/14/21 1159          Grooming Assessment/Training    [place] Level (Grooming) wash face, hands; set up; hair care, combing/brushing; maximum assist (25% patient effort)  -CHENCHO     Position (Grooming) unsupported sitting  -CHENCHO           User Key  (r) = Recorded By, (t) = Taken By, (c) = Cosigned By    Initials Name Provider Type    Mamie Carrillo OT Occupational Therapist               Obj/Interventions     Row Name 12/14/21 1205          Shoulder (Therapeutic Exercise)    Shoulder (Therapeutic Exercise) strengthening exercise  -     Shoulder Strengthening (Therapeutic Exercise) left; right; flexion; horizontal aBduction/aDduction; resisted diagonal exercise; sitting; resistance band; yellow; 10 repetitions  -     Row Name 12/14/21 1205          Elbow/Forearm (Therapeutic Exercise)    Elbow/Forearm (Therapeutic Exercise) strengthening exercise  -CHENCHO     Elbow/Forearm Strengthening (Therapeutic Exercise) left; flexion; extension; sitting; resistance band; yellow; 10 repetitions  -     Row Name 12/14/21 1205          Therapeutic Exercise    Therapeutic Exercise shoulder; elbow/forearm  -CHENCHO           User Key  (r) = Recorded By, (t) = Taken By,  (c) = Cosigned By    Initials Name Provider Type    Mamie Carrillo, GILDARDO Occupational Therapist               Goals/Plan    No documentation.                Clinical Impression     Row Name 12/14/21 1200          Pain Assessment    Additional Documentation Pain Scale: Numbers Pre/Post-Treatment (Group)  -CHENCHO     Row Name 12/14/21 1200          Pain Scale: Numbers Pre/Post-Treatment    Pretreatment Pain Rating 0/10 - no pain  -CHENCHO     Posttreatment Pain Rating 0/10 - no pain  -CHENCHO     Row Name 12/14/21 1200          Plan of Care Review    Plan of Care Reviewed With patient  -CHENCHO     Progress improving  -CHENCHO     Outcome Summary Pt tolerated ~20min. activity in unsupported sitting, HARDY for face/hand washing, assist to comb out hair and place in braid. Pt gave good effort for BUE strengthening using theraband x10 reps. Continue to progress per OT POC.  -CHENCHO     Row Name 12/14/21 1200          Vital Signs    O2 Delivery Pre Treatment room air  -CHENCHO     O2 Delivery Intra Treatment room air  -CHENCHO     O2 Delivery Post Treatment room air  -CHENCHO     Pre Patient Position Sitting  -CHENCHO     Intra Patient Position Sitting  -CHENCHO     Post Patient Position Sitting  -CHENCHO     Row Name 12/14/21 1200          Positioning and Restraints    Pre-Treatment Position sitting in chair/recliner  -CHENCHO     Post Treatment Position chair  -CHENCHO     In Chair notified nsg; reclined; call light within reach; encouraged to call for assist; exit alarm on; waffle cushion; legs elevated  -CHENCHO           User Key  (r) = Recorded By, (t) = Taken By, (c) = Cosigned By    Initials Name Provider Type    Mamie Carrillo OT Occupational Therapist               Outcome Measures     Row Name 12/14/21 1206          How much help from another is currently needed...    Putting on and taking off regular lower body clothing? 3  -CHENCHO     Bathing (including washing, rinsing, and drying) 3  -CHENCHO     Toileting (which includes using toilet bed pan or urinal) 2  -CHENCHO     Putting on and taking  off regular upper body clothing 3  -CHENCHO     Taking care of personal grooming (such as brushing teeth) 3  -CHENCHO     Eating meals 4  -CHENCHO     AM-PAC 6 Clicks Score (OT) 18  -CHENCHO     Row Name 12/14/21 0922 12/14/21 0750       How much help from another person do you currently need...    Turning from your back to your side while in flat bed without using bedrails? 4  -MP 4  -MK    Moving from lying on back to sitting on the side of a flat bed without bedrails? 3  -MP 3  -MK    Moving to and from a bed to a chair (including a wheelchair)? 3  -MP 3  -MK    Standing up from a chair using your arms (e.g., wheelchair, bedside chair)? 3  -MP 3  -MK    Climbing 3-5 steps with a railing? 1  -MP 1  -MK    To walk in hospital room? 2  -MP 2  -MK    AM-PAC 6 Clicks Score (PT) 16  -MP 16  -MK    Row Name 12/14/21 1206 12/14/21 0922       Functional Assessment    Outcome Measure Options AM-PAC 6 Clicks Daily Activity (OT)  -CHENCHO AM-PAC 6 Clicks Basic Mobility (PT)  -MP          User Key  (r) = Recorded By, (t) = Taken By, (c) = Cosigned By    Initials Name Provider Type    Kerry Owen, RN Registered Nurse    Basilio White, PT Physical Therapist    Mamie Carrillo, OT Occupational Therapist                Occupational Therapy Education                 Title: PT OT SLP Therapies (In Progress)     Topic: Occupational Therapy (In Progress)     Point: ADL training (Done)     Description:   Instruct learner(s) on proper safety adaptation and remediation techniques during self care or transfers.   Instruct in proper use of assistive devices.              Learning Progress Summary           Patient Acceptance, E,TB,D, DU,NR by CHENCHO at 12/14/2021 1207    Comment: UE HEP    Acceptance, E, NR by YUMIKO at 12/9/2021 1007                   Point: Home exercise program (Done)     Description:   Instruct learner(s) on appropriate technique for monitoring, assisting and/or progressing therapeutic exercises/activities.              Learning Progress  Summary           Patient Acceptance, E,TB,D, DU,NR by CHENCHO at 12/14/2021 1207    Comment: UE HEP                   Point: Precautions (In Progress)     Description:   Instruct learner(s) on prescribed precautions during self-care and functional transfers.              Learning Progress Summary           Patient Acceptance, E, NR by YUMIKO at 12/9/2021 1007                   Point: Body mechanics (Not Started)     Description:   Instruct learner(s) on proper positioning and spine alignment during self-care, functional mobility activities and/or exercises.              Learner Progress:  Not documented in this visit.                      User Key     Initials Effective Dates Name Provider Type Discipline     06/16/21 -  Radha Mon OT Occupational Therapist OT    CHENCHO 06/16/21 -  Mamie Arriaga OT Occupational Therapist OT              OT Recommendation and Plan     Plan of Care Review  Plan of Care Reviewed With: patient  Progress: improving  Outcome Summary: Pt tolerated ~20min. activity in unsupported sitting, HARDY for face/hand washing, assist to comb out hair and place in braid. Pt gave good effort for BUE strengthening using theraband x10 reps. Continue to progress per OT POC.     Time Calculation:    Time Calculation- OT     Row Name 12/14/21 1135             Time Calculation- OT    OT Start Time 1135  -CHENCHO      OT Received On 12/14/21  -CHENCHO              Timed Charges    64531 - OT Therapeutic Exercise Minutes 25  -CHENCHO              Total Minutes    Timed Charges Total Minutes 25  -CHENCHO       Total Minutes 25  -CHENCHO            User Key  (r) = Recorded By, (t) = Taken By, (c) = Cosigned By    Initials Name Provider Type    Mamie Carrillo OT Occupational Therapist              Therapy Charges for Today     Code Description Service Date Service Provider Modifiers Qty    96177328081 HC OT THER PROC EA 15 MIN 12/14/2021 Mamie Arriaga OT GO 2               Mamie Arriaga OT  12/14/2021    Electronically signed by Mamie Arriaga  OT at 12/14/21 8082

## 2021-12-15 NOTE — PLAN OF CARE
Goal Outcome Evaluation: Patient without any falls this shift and continues to use call light when needing assistance. Call light within reach  Problem: Fall Injury Risk  Goal: Absence of Fall and Fall-Related Injury  Outcome: Ongoing, Progressing  Intervention: Identify and Manage Contributors to Fall Injury Risk  Recent Flowsheet Documentation  Taken 12/15/2021 1200 by Pito Rogers, RN  Self-Care Promotion: independence encouraged  Intervention: Promote Injury-Free Environment  Recent Flowsheet Documentation  Taken 12/15/2021 1200 by Pito Rogers, RN  Safety Promotion/Fall Prevention:   activity supervised   fall prevention program maintained   clutter free environment maintained   safety round/check completed

## 2021-12-15 NOTE — DISCHARGE PLACEMENT REQUEST
"Jeaneth Tatum (61 y.o. Female)   To [facility]  From Radha Miranda [phone]            Date of Birth Social Security Number Address Home Phone MRN    1960  [address] [facility] [address] [phone] [id number]    Catholic Marital Status             Quaker        Admission Date Admission Type Admitting Provider Attending Provider Department, Room/Bed    21 Emergency Florencio Ryan MD Opii, [place], MD [facility] 5F, S517/    Discharge Date Discharge Disposition Discharge Destination                         Attending Provider: Florencio Ryan MD    Allergies: Sulfa Antibiotics    Isolation: None   Infection: None   Code Status: CPR   Advance Care Planning Activity    Ht: 172.7 cm (68\")   Wt: 65.9 kg (145 lb 4.8 oz)    Admission Cmt: None   Principal Problem: Right lower lobe pneumonia [J18.9]                 Active Insurance as of 2021     Primary Coverage     Payor Plan Insurance Group Employer/Plan Group    [facility] [id number]     Payor Plan Address Payor Plan Phone Number Payor Plan Fax Number Effective Dates    [address]   2005 - None Entered    [place] [address]       Subscriber Name Subscriber Birth Date Member ID       JEANETH TATUM 1960 [id number]                 Emergency Contacts      (Rel.) Home Phone Work Phone Mobile Phone    Santosalka Parviz (Spouse) [phone] -- [phone]    chester Madsen (Brother) [phone] -- [phone]    Luzmaria Prescott (Brother) -- -- --    Glendy Rivera (Sister) -- -- --               History & Physical      Gemini Alfredo DO at 19              [facility] Medicine Services  HISTORY AND PHYSICAL    Patient Name: Jeaneth Tatum  : 1960  MRN: [id number]  Primary Care Physician: Provider, No Known  Date of admission: 2021      Subjective   Subjective     Chief Complaint:  AMS    HPI:  Jeaneth Tatum is a 61 y.o. " female with a PMH significant for ESRD on HD, reports of insulin-dependent diabetes mellitus type 2 with no active medications on home med list, chronic hepatitis C, familial hemochromatosis, cirrhosis, hypertension, left BKA, fibromyalgia who is currently residing at [facility] who presents to the ED due to altered mental status.  Patient is significantly confused, noncontributory to HPI.  Per EMR, patient had reports of confusion for the last day.  No reports of fever, headache, chest pain or shortness of breath.  EMR noted diarrhea, patient reports abdominal pain currently.  Patient was hospitalized at [facility] with discharge on 10/28/2021 where she was treated for intercerebral hemorrhage and right lower lobe pneumonia.  During that prior stay patient had not yet been initiated on hemodialysis.  Prior hospitalization last month, patient had primarily been seen at [facility].      COVID Details:    Symptoms:    [] NONE [] Fever []  Cough [] Shortness of breath [] Change in taste/smell      The patient qualifies to receive the vaccine, but they have not yet received it.      Review of Systems   Unable to perform ROS: Acuity of condition      All other systems reviewed and are negative.     Personal History     Past Medical History:   Diagnosis Date   • Anxiety    • DDD (degenerative disc disease), lumbar    • Depression    • Diabetes mellitus (HCC)    • Fibromyalgia    • Hemochromatosis    • Hep B w/o coma, chronic, w/o delta (HCC)    • Hep C w/o coma, chronic (HCC)    • Hypertension    • Vertigo        Past Surgical History:   Procedure Laterality Date   • ARTERIOVENOUS FISTULA/SHUNT SURGERY Left 10/16/2021    Procedure: UPPER EXTREMITY ARTERIOVENOUS FISTULA FORMATION LEFT;  Surgeon: Johnny Rousseau MD;  Location: [facility];  Service: Vascular;  Laterality: Left;   • BACK SURGERY     • BELOW KNEE LEG AMPUTATION     •  SECTION     • FOOT SURGERY     • KNEE SURGERY     • ROTATOR CUFF REPAIR     • SPINAL  "CORD STIMULATOR IMPLANT         Family History:  family history is not on file. Otherwise pertinent FHx was reviewed and unremarkable.     Social History:  reports that she has never smoked. She has never used smokeless tobacco. She reports that she does not drink alcohol and does not use drugs.  Social History     Social History Narrative   • Not on file       Medications:  Available home medication information reviewed.  (Not in a hospital admission)      Allergies   Allergen Reactions   • Sulfa Antibiotics        Objective   Objective     Vital Signs:   Temp:  [98.4 °F (36.9 °C)] 98.4 °F (36.9 °C)  Heart Rate:  [] 103  Resp:  [16] 16  BP: (148-166)/(80-98) 166/97       Physical Exam   Constitutional: Awake, alert  Eyes: PERRLA, sclerae anicteric, no conjunctival injection  HENT: NCAT, mucous membranes moist  Neck: Supple, no thyromegaly, no lymphadenopathy, trachea midline  Respiratory: Clear to auscultation bilaterally, nonlabored respirations   Cardiovascular: RRR, no murmurs, rubs, or gallops, palpable pedal pulse on the right  Gastrointestinal: Positive bowel sounds, soft, nontender, nondistended  Musculoskeletal: Trace right LE edema, no clubbing or cyanosis to extremities.  Left BKA  Psychiatric: Appropriate affect, cooperative  Neurologic: Oriented to \"hospital\", not oriented to city, date, strength symmetric in all extremities, Cranial Nerves grossly intact to confrontation, speech clear  Skin: No rashes      Result Review:  I have personally reviewed the results from the time of this admission to 12/8/2021 19:46 EST and agree with these findings:  [x]  Laboratory  [x]  Microbiology  [x]  Radiology  [x]  EKG/Telemetry   []  Cardiology/Vascular   []  Pathology  [x]  Old records  []  Other:        LAB RESULTS:      Lab 12/08/21  1714 12/08/21  1404   WBC  --  5.45   HEMOGLOBIN  --  10.3*   HEMATOCRIT  --  28.7*   PLATELETS  --  158   NEUTROS ABS  --  3.56   IMMATURE GRANS (ABS)  --  0.02   LYMPHS ABS  " --  1.25   MONOS ABS  --  0.52   EOS ABS  --  0.07   MCV  --  90.8   LACTATE 1.4  --          Lab 12/08/21  1404   SODIUM 141   POTASSIUM 3.1*   CHLORIDE 101   CO2 28.0   ANION GAP 12.0   BUN 24*   CREATININE 3.46*   GLUCOSE 246*   CALCIUM 8.4*         Lab 12/08/21  1404   TOTAL PROTEIN 6.5   ALBUMIN 3.60   GLOBULIN 2.9   ALT (SGPT) 28   AST (SGOT) 31   BILIRUBIN 0.5   ALK PHOS 90                     UA    Urinalysis 12/8/21 12/8/21    1404 1404   Squamous Epithelial Cells, UA  0-2   Specific Gravity, UA 1.018    Ketones, UA Negative    Blood, UA Negative    Leukocytes, UA Negative    Nitrite, UA Negative    RBC, UA  0-2   WBC, UA  0-2   Bacteria, UA  None Seen             Microbiology Results (last 10 days)     ** No results found for the last 240 hours. **          CT Abdomen Pelvis Without Contrast    Result Date: 12/8/2021  EXAMINATION: CT ABDOMEN/PELVIS WO CONTRAST - 12/08/2021  INDICATION: R41.0-Disorientation, unspecified. Generalized abdominal pain.  TECHNIQUE: Multiple axial CT imaging is obtained of the abdomen and pelvis without the administration of intravenous contrast.  The radiation dose reduction device was turned on for each scan per the ALARA (As Low as Reasonably Achievable) protocol.  COMPARISON: None  FINDINGS: There is infiltrate in the right lower lobe suggesting pneumonia. Stones identified in the gallbladder. There is a cystic structure identified with thin-walled calcification seen in the region of the becky hepatis adjacent to the IVC and distal esophagus. This area measures 3.9 x 4.0 cm. Findings are uncertain and contrast enhanced imaging can be performed for further evaluation. Pancreas in its visualized portions is unremarkable. Kidneys and adrenal glands are within normal limits. The abdominal portion of the gastrointestinal tract within normal limits. No free fluid or free air. No abnormal mass or fluid collections identified. Bony structures are unremarkable.  PELVIS: Some mild wall  thickening identified of the distal descending colon and sigmoid colon suggesting possibly a mild colitis. No significant abnormal mass or fluid collection. Pelvic organs are unremarkable. The pelvic portions of the gastrointestinal tract are otherwise within normal limits. No pelvic adenopathy. No free fluid or free air.      Impression: 1. Right lower lobe pneumonia. 2. Wall thickening of the sigmoid colon diffusely suggesting possibly a mild colitis with no significant stranding. 3. Cystic structure seen in the becky hepatis adjacent to the IVC and distal esophagus for which contrast enhanced imaging can be performed for further evaluation of this area. There are stones filling the gallbladder.  DICTATED:   12/08/2021 EDITED/lfs:   12/08/2021      CT Head Without Contrast    Result Date: 12/8/2021  EXAMINATION: CT HEAD WO CONTRAST-12/08/2021:  INDICATION: AMS; R41.0-Disorientation, unspecified, mental status change.  TECHNIQUE: Multiple axial CT imaging was obtained of the head without the administration of intravenous contrast.  The radiation dose reduction device was turned on for each scan per the ALARA (As Low as Reasonably Achievable) protocol.  COMPARISON: NONE.  FINDINGS: The brain parenchyma is unremarkable. There is low-density areas seen in the periventricular and subcortical white matter suggesting chronic small vessel ischemic change. No hemorrhage or hydrocephalus. No mass, mass effect, or midline shift. The bony structures reveal no evidence of osseous abnormality. The visualized paranasal sinuses are clear. The mastoid air cells are patent.      Impression: No CT evidence of acute intracranial abnormality. Chronic changes seen within the brain.  D:  12/08/2021 E:  12/08/2021       XR Chest 1 View    Result Date: 12/8/2021  EXAMINATION: XR CHEST 1 VW-12/08/2021:  INDICATION: AMS.  COMPARISON: 10/05/2021.  FINDINGS: Portable chest reveals ill-defined opacification seen in the right infrahilar region.  The findings are stable when compared to the prior study. Degenerative changes seen within the spine. The left lung is clear.      Impression: Mild increased markings identified in the right infrahilar region stable and unchanged. Dialysis catheter placed on the right with tip in the SVC.  D:  12/08/2021 E:  12/08/2021          Results for orders placed during the hospital encounter of 10/05/21    Adult Transthoracic Echo Complete W/ Cont if Necessary Per Protocol    Interpretation Summary  · Left ventricular ejection fraction appears to be 61 - 65%. Left ventricular systolic function is normal.  · Left atrial volume is mildly increased.      Assessment/Plan   Assessment & Plan     Active Hospital Problems    Diagnosis  POA   • **Right lower lobe pneumonia [J18.9]  Yes   • AMS (altered mental status) [R41.82]  Yes   • Colitis [K52.9]  Unknown   • Normocytic anemia [D64.9]  Unknown   • Hypokalemia [E87.6]  Unknown   • Hepatocellular carcinoma metastatic to bone s/p RXT  [C79.51, C22.0]  Yes     · Osseous metastasis to right posterior 7th rib s/p radiation in 5/2020 and 4/2021       • Cirrhosis of liver (HCC) [K74.60]  Yes   • Hemochromatosis [E83.119]  Yes   • S/P left BKA [Z89.512]  Not Applicable   • Chronic Hep C  [B18.2]  Yes   • Chronic pain on Methadone [F11.90]  Yes   • Chronic ulcer of right foot [L97.519]  Yes   • ESRD (end stage renal disease) [N18.6]  Yes   • GERD (gastroesophageal reflux disease) [K21.9]  Yes   • Essential hypertension [I10]  Yes   • Type 2 diabetes mellitus (HCC) [E11.9]  Yes   Jeaneth Keita is a 61 y.o. female with a PMH significant for ESRD on HD, reports of insulin-dependent diabetes mellitus type 2 with no active medications on home med list, chronic hepatitis C, familial hemochromatosis, cirrhosis, hypertension, left BKA, fibromyalgia who is currently residing at [facility] who presents to the ED due to altered mental status.    Altered mental  status  Colitis  Pneumonia  -AMS likely due to acute infection  -Zosyn, doxycycline  -MRSA PCR screen pending, if positive will add vancomycin  -Blood culture, sputum culture, urine antigens pending  -Stool studies pending  -DuoNeb scheduled and as needed  -ABG pending  -UA negative  -Ammonia WNL  -CT head WNL  -If mentation does not improve after treating acute infection, would benefit from MRI with and without contrast given history of metastatic hepatocellular carcinoma if approved by nephrology  -Monitor on telemetry continuous pulse ox    ESRD, (?On HD)  Hypokalemia  -ED provider reports patient is on HD.  Patient denies but she is significantly confused  -Nephrology consult for a.m.  -Mild hyperkalemia, defer to nephrology for replacement    Type 2 diabetes mellitus  -Prior EMR reports insulin-dependent DM, no insulin on current home med list  -SSI for now    HTN  GERD  Status post left BKA  Chronic hep C  Metastatic hepatocellular carcinoma  Familial hemochromatosis  Chronic ulcer to right foot  -Continue home meds  -EMR reports patient was on chemotherapy in October, none actively listed  -Has previously received all of her care from [facility]    Chronic pain on methadone  -Methadone not on active med list  -UDS negative  -Richie reviewed with no active prescription      DVT prophylaxis: Heparin      CODE STATUS: Full code  Code Status and Medical Interventions:   Ordered at: 12/08/21 1940     Level Of Support Discussed With:    Patient     Code Status (Patient has no pulse and is not breathing):    CPR (Attempt to Resuscitate)     Medical Interventions (Patient has pulse or is breathing):    Full Support       Admission Status:  I believe this patient meets INPATIENT status due to her mental status with pneumonia and colitis requiring IV antibiotics and inpatient evaluation and treatment.  I feel patient’s risk for adverse outcomes and need for care warrant INPATIENT evaluation and I predict the patient’s  care encounter to likely last beyond 2 midnights.    Gemini Alfredo DO  12/08/21      Electronically signed by Gemini Alfredo DO at 12/09/21 0020

## 2021-12-15 NOTE — PROGRESS NOTES
"   LOS: 7 days    Patient Care Team:  Provider, No Known as PCP - General  ESRD     Subjective     Interval History:   No acute events overnight. No new complaints       Review of Systems:   AMS, NO CP OR SOA    Objective     Vital Sign Min/Max for last 24 hours  Temp  Min: 98 °F (36.7 °C)  Max: 98.7 °F (37.1 °C)   BP  Min: 100/53  Max: 143/77   Pulse  Min: 91  Max: 103   Resp  Min: 16  Max: 18   No data recorded   No data recorded   Weight  Min: 65.9 kg (145 lb 4.8 oz)  Max: 65.9 kg (145 lb 4.8 oz)     Flowsheet Rows      First Filed Value   Admission Height 172.7 cm (68\") Documented at 12/08/2021 1240   Admission Weight 72.6 kg (160 lb) Documented at 12/08/2021 1240          No intake/output data recorded.  I/O last 3 completed shifts:  In: 360 [P.O.:360]  Out: -     Physical Exam:    Gen: Alert, NAD   HENT: NC, AT, EOMI   NECK: Supple, no JVD, Trachea midline   LUNGS: CTA bilaterally, non labored respirtation   CVS: S1/S2 audible, RRR, no murmur   Abd: Soft, NT, ND, BS+   Ext: No pedal edema, no cyanosis   CNS: Alert, No focal deficit noted grossly  Psy: Cooperative  Skin: Warm, dry and intact      WBC No results found for: WBC   HGB No results found for: HGB   HCT No results found for: HCT   Platlets No results found for: LABPLAT   MCV No results found for: MCV       Sodium No results found for: NA   Potassium No results found for: K   Chloride No results found for: CL   CO2 No results found for: CO2   BUN No results found for: BUN   Creatinine No results found for: CREATININE   Calcium No results found for: CALCIUM   PO4 No results found for: CAPO4   Albumin No results found for: ALBUMIN   Magnesium No results found for: MG   Uric Acid No results found for: URICACID        Results Review:     I reviewed the patient's new clinical results.    amLODIPine, 10 mg, Oral, Q24H  atorvastatin, 80 mg, Oral, Daily  calcium acetate, 667 mg, Oral, TID With Meals  carvedilol, 25 mg, Oral, BID With Meals  cyanocobalamin, " 1,000 mcg, Intramuscular, Q28 Days  heparin (porcine), 5,000 Units, Subcutaneous, Q8H  hydrALAZINE, 100 mg, Oral, Q8H  insulin lispro, 0-7 Units, Subcutaneous, TID AC  sodium chloride, 10 mL, Intravenous, Q12H           Medication Review:     Assessment/Plan       Right lower lobe pneumonia    Hepatocellular carcinoma metastatic to bone s/p RXT     Hemochromatosis    Cirrhosis of liver (HCC)    Chronic Hep C     ESRD (end stage renal disease)    Chronic ulcer of right foot    S/P left BKA    GERD (gastroesophageal reflux disease)    Chronic pain on Methadone    Essential hypertension    Type 2 diabetes mellitus (HCC)    AMS (altered mental status)    Colitis    Normocytic anemia    Hypokalemia      1- ESRD - [facility]WF - [facility]   2- HTN   3- Mild hypokalemia   4- Anemia of chronic disease   5- Familial hemochromatosis   6- Hx of Metastatic hepatocellular carcinoma       Plan:  - Patient seen on dialysis, UF as tolerated.   - Renal diet.    - Transfuse for Hgb less than 7.0   - Access infiltrated so dialysis cut short to 3 hrs. Tunneled catheter removed on 12/10 as she pulled it accidentally.      I discussed the patients findings and my recommendations with nursing staff    Panchito Austin MD  12/15/21  11:02 EST

## 2021-12-16 LAB
FLUAV RNA RESP QL NAA+PROBE: NOT DETECTED
FLUBV RNA RESP QL NAA+PROBE: NOT DETECTED
GLUCOSE BLDC GLUCOMTR-MCNC: 168 MG/DL (ref 70–130)
GLUCOSE BLDC GLUCOMTR-MCNC: 182 MG/DL (ref 70–130)
GLUCOSE BLDC GLUCOMTR-MCNC: 196 MG/DL (ref 70–130)
GLUCOSE BLDC GLUCOMTR-MCNC: 215 MG/DL (ref 70–130)
SARS-COV-2 RNA RESP QL NAA+PROBE: NOT DETECTED

## 2021-12-16 PROCEDURE — 82962 GLUCOSE BLOOD TEST: CPT

## 2021-12-16 PROCEDURE — 63710000001 INSULIN LISPRO (HUMAN) PER 5 UNITS: Performed by: INTERNAL MEDICINE

## 2021-12-16 PROCEDURE — 99232 SBSQ HOSP IP/OBS MODERATE 35: CPT | Performed by: INTERNAL MEDICINE

## 2021-12-16 PROCEDURE — 87636 SARSCOV2 & INF A&B AMP PRB: CPT | Performed by: INTERNAL MEDICINE

## 2021-12-16 PROCEDURE — 25010000002 HEPARIN (PORCINE) PER 1000 UNITS: Performed by: INTERNAL MEDICINE

## 2021-12-16 RX ORDER — SENNA PLUS 8.6 MG/1
2 TABLET ORAL NIGHTLY
Status: DISCONTINUED | OUTPATIENT
Start: 2021-12-16 | End: 2021-12-21 | Stop reason: HOSPADM

## 2021-12-16 RX ORDER — POLYETHYLENE GLYCOL 3350 17 G/17G
17 POWDER, FOR SOLUTION ORAL DAILY
Status: DISCONTINUED | OUTPATIENT
Start: 2021-12-16 | End: 2021-12-21 | Stop reason: HOSPADM

## 2021-12-16 RX ADMIN — HEPARIN SODIUM 5000 UNITS: 5000 INJECTION INTRAVENOUS; SUBCUTANEOUS at 22:30

## 2021-12-16 RX ADMIN — CALCIUM ACETATE 667 MG: 667 CAPSULE ORAL at 09:52

## 2021-12-16 RX ADMIN — AMLODIPINE BESYLATE 10 MG: 5 TABLET ORAL at 09:53

## 2021-12-16 RX ADMIN — CALCIUM ACETATE 667 MG: 667 CAPSULE ORAL at 12:23

## 2021-12-16 RX ADMIN — INSULIN LISPRO 2 UNITS: 100 INJECTION, SOLUTION INTRAVENOUS; SUBCUTANEOUS at 17:03

## 2021-12-16 RX ADMIN — SENNOSIDES 2 TABLET: 8.6 TABLET, FILM COATED ORAL at 20:29

## 2021-12-16 RX ADMIN — HEPARIN SODIUM 5000 UNITS: 5000 INJECTION INTRAVENOUS; SUBCUTANEOUS at 15:23

## 2021-12-16 RX ADMIN — ATORVASTATIN CALCIUM 80 MG: 40 TABLET, FILM COATED ORAL at 09:52

## 2021-12-16 RX ADMIN — POLYETHYLENE GLYCOL 3350 17 G: 17 POWDER, FOR SOLUTION ORAL at 12:23

## 2021-12-16 RX ADMIN — CALCIUM ACETATE 667 MG: 667 CAPSULE ORAL at 17:03

## 2021-12-16 RX ADMIN — INSULIN LISPRO 2 UNITS: 100 INJECTION, SOLUTION INTRAVENOUS; SUBCUTANEOUS at 12:23

## 2021-12-16 RX ADMIN — SODIUM CHLORIDE, PRESERVATIVE FREE 10 ML: 5 INJECTION INTRAVENOUS at 20:29

## 2021-12-16 RX ADMIN — HEPARIN SODIUM 5000 UNITS: 5000 INJECTION INTRAVENOUS; SUBCUTANEOUS at 06:27

## 2021-12-16 RX ADMIN — CARVEDILOL 25 MG: 12.5 TABLET, FILM COATED ORAL at 17:04

## 2021-12-16 RX ADMIN — ACETAMINOPHEN 650 MG: 325 TABLET, FILM COATED ORAL at 01:40

## 2021-12-16 RX ADMIN — SENNOSIDES 2 TABLET: 8.6 TABLET, FILM COATED ORAL at 12:23

## 2021-12-16 RX ADMIN — HYDROXYZINE HYDROCHLORIDE 25 MG: 25 TABLET, FILM COATED ORAL at 20:30

## 2021-12-16 RX ADMIN — INSULIN LISPRO 2 UNITS: 100 INJECTION, SOLUTION INTRAVENOUS; SUBCUTANEOUS at 09:51

## 2021-12-16 RX ADMIN — SODIUM CHLORIDE, PRESERVATIVE FREE 10 ML: 5 INJECTION INTRAVENOUS at 09:54

## 2021-12-16 NOTE — CASE MANAGEMENT/SOCIAL WORK
Continued Stay Note  [facility]     Patient Name: Jeaneth Keita  MRN: [id number]  Today's Date: 12/16/2021    Admit Date: 12/8/2021     Discharge Plan     Row Name 12/16/21 9537       Plan    Plan [facility]    Patient/Family in Agreement with Plan other (see comments)    Plan Comments I left a VM message x 2 with Cherelle OSULLIVAN at the facility [phone]. I have not heard back from her to confirm the pt is back to the baseline she needs to be at to return. [facility] ambulance is arranged for 12- at 1545. I have asked [facility] HD to dialyze early am if possible. I will f/u.    Final Discharge Disposition Code 01 - home or self-care               Discharge Codes    No documentation.               Expected Discharge Date and Time     Expected Discharge Date Expected Discharge Time    Dec 17, 2021             Radha Miranda RN

## 2021-12-16 NOTE — PROGRESS NOTES
"   LOS: 8 days    Patient Care Team:  Provider, No Known as PCP - General  ESRD     Subjective     Interval History:   No new complaints.   No labs today.     Review of Systems:   No chest pain, no shortness of breath.     Objective     Vital Sign Min/Max for last 24 hours  Temp  Min: 98.2 °F (36.8 °C)  Max: 98.8 °F (37.1 °C)   BP  Min: 80/53  Max: 119/80   Pulse  Min: 88  Max: 107   Resp  Min: 18  Max: 20   No data recorded   No data recorded   Weight  Min: 66.2 kg (146 lb)  Max: 66.2 kg (146 lb)     Flowsheet Rows      First Filed Value   Admission Height 172.7 cm (68\") Documented at 12/08/2021 1240   Admission Weight 72.6 kg (160 lb) Documented at 12/08/2021 1240          No intake/output data recorded.  I/O last 3 completed shifts:  In: -   Out: 2090 [Urine:300; Other:1790]    Physical Exam:    Gen: Alert, NAD   HENT: NC, AT, EOMI   NECK: Supple, no JVD, Trachea midline   LUNGS: CTA bilaterally, non labored respirtation   CVS: S1/S2 audible, RRR, no murmur   Abd: Soft, NT, ND, BS+   Ext: No pedal edema, no cyanosis, Left BKA.   CNS: Alert, No focal deficit noted grossly  Psy: Cooperative  Skin: Warm, dry and intact  Access: :Left forearm AVF      WBC No results found for: WBC   HGB No results found for: HGB   HCT No results found for: HCT   Platlets No results found for: LABPLAT   MCV No results found for: MCV       Sodium No results found for: NA   Potassium No results found for: K   Chloride No results found for: CL   CO2 No results found for: CO2   BUN No results found for: BUN   Creatinine No results found for: CREATININE   Calcium No results found for: CALCIUM   PO4 No results found for: CAPO4   Albumin No results found for: ALBUMIN   Magnesium No results found for: MG   Uric Acid No results found for: URICACID        Results Review:     I reviewed the patient's new clinical results.    amLODIPine, 10 mg, Oral, Q24H  atorvastatin, 80 mg, Oral, Daily  calcium acetate, 667 mg, Oral, TID With " Meals  carvedilol, 25 mg, Oral, BID With Meals  cyanocobalamin, 1,000 mcg, Intramuscular, Q28 Days  heparin (porcine), 5,000 Units, Subcutaneous, Q8H  hydrALAZINE, 100 mg, Oral, Q8H  insulin lispro, 0-7 Units, Subcutaneous, TID AC  polyethylene glycol, 17 g, Oral, Daily  senna, 2 tablet, Oral, Nightly  sodium chloride, 10 mL, Intravenous, Q12H           Medication Review:     Assessment/Plan       Right lower lobe pneumonia    Hepatocellular carcinoma metastatic to bone s/p RXT     Hemochromatosis    Cirrhosis of liver (HCC)    Chronic Hep C     ESRD (end stage renal disease)    Chronic ulcer of right foot    S/P left BKA    GERD (gastroesophageal reflux disease)    Chronic pain on Methadone    Essential hypertension    Type 2 diabetes mellitus (HCC)    AMS (altered mental status)    Colitis    Normocytic anemia    Hypokalemia      1- ESRD - [facility]WF - [facility]   2- HTN   3- Mild hypokalemia   4- Anemia of chronic disease   5- Familial hemochromatosis   6- Hx of Metastatic hepatocellular carcinoma       Plan:  - HD tomorrow as per schedule.   - Renal diet.    - Transfuse for Hgb less than 7.0   - Tunneled catheter removed on 12/10 as she pulled it accidentally.      I discussed the patients findings and my recommendations with nursing staff    Deyvi Walker MD  12/16/21  12:44 EST

## 2021-12-16 NOTE — PLAN OF CARE
Goal Outcome Evaluation:  Plan of Care Reviewed With: patient        Progress: no change  Outcome Summary: VSS and UOP adequate. Patient was oriented at the start of the shift, but became more confused as the night went on. Slept on and off throughout the night. Patient needs two assist to get up safely. Will continue to monitor.

## 2021-12-16 NOTE — PLAN OF CARE
Goal Outcome Evaluation:           Progress: no change  Outcome Summary: VSS, 02 room air. Patient disoriented to place, time and situation. Oriented to self. LUE HD fistula infiltrated 12/15, site is still swollen. Miralax and Stool softener given- no BM documented since 12/9. Plan for patient to go to dialysis tomorrow. See CM for placement plans for tomorrow. Coreg and hydralazine held this am d/t low b/p. B/P much better this afternoon- Coreg given and hydralazine held.

## 2021-12-16 NOTE — PROGRESS NOTES
[facility] Medicine Services  PROGRESS NOTE    Patient Name: Jeaneth Keita  : 1960  MRN: [id number]    Date of Admission: 2021  Primary Care Physician: Provider, No Known    Subjective   Subjective     CC: Follow-up AMS    HPI:No acute events overnight, patient states that she is doing ok,has no new complaints. Was hypotensive overnight, much improved this morning.    ROS:  Gen- No fevers, chills  CV- No chest pain, palpitations  Resp- No cough, dyspnea  GI- No N/V/D, abd pain    All other systems reviewed and are negative    Objective   Objective     Vital Signs:   Temp:  [98.2 °F (36.8 °C)-98.8 °F (37.1 °C)] 98.5 °F (36.9 °C)  Heart Rate:  [] 88  Resp:  [16-20] 18  BP: ()/(53-84) 80/53     Physical Exam:  Constitutional: No acute distress, awake, alert  HENT: NCAT, mucous membranes moist  Respiratory: Clear to auscultation bilaterally, respiratory effort normal   Cardiovascular: RRR, no murmurs, rubs, or gallops  Gastrointestinal: Positive bowel sounds, soft, nontender, nondistended  Musculoskeletal: No bilateral ankle edema  Psychiatric: Appropriate affect, cooperative  Neurologic: Nonfocal, confused  Skin: No rashes    Results Reviewed:  LAB RESULTS:      Lab 12/10/21  0818   WBC 3.13*   HEMOGLOBIN 8.2*   HEMATOCRIT 23.3*   PLATELETS 117*   MCV 93.2         Lab 21  0344 12/10/21  0818   SODIUM 141 141   POTASSIUM 4.0 3.5   CHLORIDE 110* 107   CO2 20.0* 24.0   ANION GAP 11.0 10.0   BUN 41* 32*   CREATININE 4.35* 4.36*   GLUCOSE 127* 117*   CALCIUM 8.0* 7.9*   PHOSPHORUS 4.8*  --          Lab 21  0344 12/10/21  08   TOTAL PROTEIN  --  5.3*   ALBUMIN 2.90* 3.00*   GLOBULIN  --  2.3   ALT (SGPT)  --  22   AST (SGOT)  --  27   BILIRUBIN  --  0.5   ALK PHOS  --  64                 Lab 12/10/21  0818   VITAMIN B 12 239         Brief Urine Lab Results  (Last result in the past 365 days)      Color   Clarity   Blood   Leuk Est   Nitrite   Protein    CREAT   Urine HCG        12/08/21 1404 Yellow   Clear   Negative   Negative   Negative   >=300 mg/dL (3+)                 Microbiology Results Abnormal     Procedure Component Value - Date/Time    Blood Culture - Blood, Arm, Right [[id number]]  (Normal) Collected: 12/08/21 1700    Lab Status: Final result Specimen: Blood from Arm, Right Updated: 12/13/21 1731     Blood Culture No growth at 5 days    Blood Culture - Blood, Arm, Left [[id number]]  (Normal) Collected: 12/08/21 1720    Lab Status: Final result Specimen: Blood from Arm, Left Updated: 12/13/21 1731     Blood Culture No growth at 5 days    MRSA Screen, PCR (Inpatient) - Swab, Nares [[id number]]  (Normal) Collected: 12/08/21 2213    Lab Status: Final result Specimen: Swab from Nares Updated: 12/09/21 0749     MRSA PCR Negative    Narrative:      MRSA Negative    S. Pneumo Ag Urine or CSF - Urine, Urine, Clean Catch [[id number]]  (Normal) Collected: 12/08/21 1404    Lab Status: Final result Specimen: Urine, Clean Catch Updated: 12/09/21 0709     Strep Pneumo Ag Negative    Legionella Antigen, Urine - Urine, Urine, Clean Catch [[id number]]  (Normal) Collected: 12/08/21 1404    Lab Status: Final result Specimen: Urine, Clean Catch Updated: 12/09/21 0706     LEGIONELLA ANTIGEN, URINE Negative    Respiratory Panel PCR w/COVID-19(SARS-CoV-2) RUBINA/MANDY/MARK/PAD/COR/MAD/HORACE In-House, NP Swab in UTM/VTM, 3-4 HR TAT - Swab, Nasopharynx [[id number]]  (Normal) Collected: 12/08/21 2213    Lab Status: Final result Specimen: Swab from Nasopharynx Updated: 12/09/21 0003     ADENOVIRUS, PCR Not Detected     Coronavirus 229E Not Detected     Coronavirus HKU1 Not Detected     Coronavirus NL63 Not Detected     Coronavirus OC43 Not Detected     COVID19 Not Detected     Human Metapneumovirus Not Detected     Human Rhinovirus/Enterovirus Not Detected     Influenza A PCR Not Detected     Influenza B PCR Not Detected     Parainfluenza Virus 1 Not Detected     Parainfluenza Virus 2 Not  Detected     Parainfluenza Virus 3 Not Detected     Parainfluenza Virus 4 Not Detected     RSV, PCR Not Detected     Bordetella pertussis pcr Not Detected     Bordetella parapertussis PCR Not Detected     Chlamydophila pneumoniae PCR Not Detected     Mycoplasma pneumo by PCR Not Detected    Narrative:      In the setting of a positive respiratory panel with a viral infection PLUS a negative procalcitonin without other underlying concern for bacterial infection, consider observing off antibiotics or discontinuation of antibiotics and continue supportive care. If the respiratory panel is positive for atypical bacterial infection (Bordetella pertussis, Chlamydophila pneumoniae, or Mycoplasma pneumoniae), consider antibiotic de-escalation to target atypical bacterial infection.          No radiology results from the last 24 hrs    Results for orders placed during the hospital encounter of 10/05/21    Adult Transthoracic Echo Complete W/ Cont if Necessary Per Protocol    Interpretation Summary  · Left ventricular ejection fraction appears to be 61 - 65%. Left ventricular systolic function is normal.  · Left atrial volume is mildly increased.      I have reviewed the medications:  Scheduled Meds:amLODIPine, 10 mg, Oral, Q24H  atorvastatin, 80 mg, Oral, Daily  calcium acetate, 667 mg, Oral, TID With Meals  carvedilol, 25 mg, Oral, BID With Meals  cyanocobalamin, 1,000 mcg, Intramuscular, Q28 Days  heparin (porcine), 5,000 Units, Subcutaneous, Q8H  hydrALAZINE, 100 mg, Oral, Q8H  insulin lispro, 0-7 Units, Subcutaneous, TID AC  sodium chloride, 10 mL, Intravenous, Q12H      Continuous Infusions:   PRN Meds:.•  acetaminophen **OR** acetaminophen **OR** acetaminophen  •  albumin human  •  dextrose  •  dextrose  •  glucagon (human recombinant)  •  hydrOXYzine  •  ipratropium-albuterol  •  naloxone  •  ondansetron  •  sodium chloride  •  traMADol    Assessment/Plan   Assessment & Plan     Active Hospital Problems    Diagnosis   POA   • **Right lower lobe pneumonia [J18.9]  Yes   • AMS (altered mental status) [R41.82]  Yes   • Colitis [K52.9]  Unknown   • Normocytic anemia [D64.9]  Unknown   • Hypokalemia [E87.6]  Unknown   • Hepatocellular carcinoma metastatic to bone s/p RXT  [C79.51, C22.0]  Yes   • Cirrhosis of liver (HCC) [K74.60]  Yes   • Hemochromatosis [E83.119]  Yes   • S/P left BKA [Z89.512]  Not Applicable   • Chronic Hep C  [B18.2]  Yes   • Chronic pain on Methadone [F11.90]  Yes   • Chronic ulcer of right foot [L97.519]  Yes   • ESRD (end stage renal disease) [N18.6]  Yes   • GERD (gastroesophageal reflux disease) [K21.9]  Yes   • Essential hypertension [I10]  Yes   • Type 2 diabetes mellitus (HCC) [E11.9]  Yes      Resolved Hospital Problems   No resolved problems to display.        Brief Hospital Course to date:  Jeaneth Keita is a 61 y.o. female with history of cirrhosis with HCC metastatic to bone s/p XRT, chronic hep C, chronic pain on methadone, ESRD on HD, familial hemochromatosis, hypertension, type 2 diabetes patient presented to the ED with AMS     Acute encephalopathy  Right lower lobe pneumonia  -Blood cultures NGTD, urinary antigens negative, CT head unrevealing.  -MRI head with no acute processes, does show evidence of prior thalamic hemorrhage  -TSH, ammonia, B12 are all wnl  -She is s/p 7-day course of Zosyn and doxycycline.    -Continue Seroquel 25 mg nightly for insomnia  -Patient is currently baseline mental status which she is having some mild confusion.     ESRD on HD  -Renal following, s/p HD today, patient with AV fistula in place, tunneled cath has since been removed     Hypertension  -BP previously well controlled, was hypotensive overnight, currently much improved.   - We will hold home antihypertensives today (coreg, hydralazine and amlodipine)    Type 2 diabetes  -Unable to accurately assess control as patient is on HD, A1c 5.1%  -Continue SSI for now     Liver cirrhosis  HCC with metastasis to  bone s/p XRT  Chronic hep C  Familial hemochromatosis  -Patient has previously had all of her care done at [facility]  -Continue home meds when appropriate     Chronic pain-previously on methadone, UDS is negative, and methadone not on active med list     DVT prophylaxis:  Medical and mechanical DVT prophylaxis orders are present.      AM-PAC 6 Clicks Score (PT): 16 (12/15/21 0740)    DVT prophylaxis:  Medical and mechanical DVT prophylaxis orders are present.       AM-PAC 6 Clicks Score (PT): 16 (12/15/21 0740)    Disposition: Anticipate discharge back to [facility] 12/17 at 1545    CODE STATUS:   Code Status and Medical Interventions:   Ordered at: 12/08/21 1940     Level Of Support Discussed With:    Patient     Code Status (Patient has no pulse and is not breathing):    CPR (Attempt to Resuscitate)     Medical Interventions (Patient has pulse or is breathing):    Full Support       Florencio Ryan MD  12/16/21

## 2021-12-16 NOTE — CASE MANAGEMENT/SOCIAL WORK
Continued Stay Note  [facility]     Patient Name: Jeaneth Keita  MRN: [id number]  Today's Date: 12/16/2021    Admit Date: 12/8/2021     Discharge Plan     Row Name 12/16/21 1501       Plan    Plan Comments I spoke with Cherelle OSULLIVAN at [facility]. She will do an onsite visit tomorrow around 1100 to determine if the pt can return to  there. Will need a covid test. Confirmed pharmacy in [place] is the facility pharmacy. I will f/u in the am. I left a VM on the pts spouse phone to update him.    Final Discharge Disposition Code 01 - home or self-care    Row Name 12/16/21 1437       Plan    Plan [facility] Personal Care    Patient/Family in Agreement with Plan other (see comments)    Plan Comments I left a VM message x 2 with Cherelle SHI at the facility [phone]. I have not heard back from her to confirm the pt is back to the baseline she needs to be at to return. [facility] ambulance is arranged for 12- at 1545. I have asked [facility] HD to dialyze early am if possible. I will f/u.    Final Discharge Disposition Code 01 - home or self-care               Discharge Codes    No documentation.               Expected Discharge Date and Time     Expected Discharge Date Expected Discharge Time    Dec 17, 2021             Radha Miranda RN

## 2021-12-17 ENCOUNTER — APPOINTMENT (OUTPATIENT)
Dept: INTERVENTIONAL RADIOLOGY/VASCULAR | Facility: HOSPITAL | Age: 61
End: 2021-12-17

## 2021-12-17 ENCOUNTER — APPOINTMENT (OUTPATIENT)
Dept: NEPHROLOGY | Facility: HOSPITAL | Age: 61
End: 2021-12-17

## 2021-12-17 LAB
ALBUMIN SERPL-MCNC: 3.5 G/DL (ref 3.5–5.2)
ALBUMIN/GLOB SERPL: 1.4 G/DL
ALP SERPL-CCNC: 87 U/L (ref 39–117)
ALT SERPL W P-5'-P-CCNC: 41 U/L (ref 1–33)
ANION GAP SERPL CALCULATED.3IONS-SCNC: 14 MMOL/L (ref 5–15)
AST SERPL-CCNC: 42 U/L (ref 1–32)
BILIRUB SERPL-MCNC: 0.3 MG/DL (ref 0–1.2)
BUN SERPL-MCNC: 65 MG/DL (ref 8–23)
BUN/CREAT SERPL: 12.1 (ref 7–25)
CALCIUM SPEC-SCNC: 8.9 MG/DL (ref 8.6–10.5)
CHLORIDE SERPL-SCNC: 99 MMOL/L (ref 98–107)
CO2 SERPL-SCNC: 24 MMOL/L (ref 22–29)
CREAT SERPL-MCNC: 5.36 MG/DL (ref 0.57–1)
GFR SERPL CREATININE-BSD FRML MDRD: 8 ML/MIN/1.73
GFR SERPL CREATININE-BSD FRML MDRD: ABNORMAL ML/MIN/{1.73_M2}
GLOBULIN UR ELPH-MCNC: 2.5 GM/DL
GLUCOSE BLDC GLUCOMTR-MCNC: 129 MG/DL (ref 70–130)
GLUCOSE BLDC GLUCOMTR-MCNC: 151 MG/DL (ref 70–130)
GLUCOSE BLDC GLUCOMTR-MCNC: 158 MG/DL (ref 70–130)
GLUCOSE SERPL-MCNC: 126 MG/DL (ref 65–99)
INR PPP: 1.17 (ref 0.85–1.16)
PLATELET # BLD AUTO: 171 10*3/MM3 (ref 140–450)
POTASSIUM SERPL-SCNC: 4.8 MMOL/L (ref 3.5–5.2)
PROT SERPL-MCNC: 6 G/DL (ref 6–8.5)
PROTHROMBIN TIME: 14.6 SECONDS (ref 11.4–14.4)
SODIUM SERPL-SCNC: 137 MMOL/L (ref 136–145)

## 2021-12-17 PROCEDURE — 77001 FLUOROGUIDE FOR VEIN DEVICE: CPT

## 2021-12-17 PROCEDURE — 80053 COMPREHEN METABOLIC PANEL: CPT | Performed by: INTERNAL MEDICINE

## 2021-12-17 PROCEDURE — 85610 PROTHROMBIN TIME: CPT | Performed by: RADIOLOGY

## 2021-12-17 PROCEDURE — 25010000002 HEPARIN (PORCINE) PER 1000 UNITS: Performed by: INTERNAL MEDICINE

## 2021-12-17 PROCEDURE — 25010000002 FENTANYL CITRATE (PF) 50 MCG/ML SOLUTION: Performed by: RADIOLOGY

## 2021-12-17 PROCEDURE — 99232 SBSQ HOSP IP/OBS MODERATE 35: CPT | Performed by: NURSE PRACTITIONER

## 2021-12-17 PROCEDURE — 36558 INSERT TUNNELED CV CATH: CPT | Performed by: INTERNAL MEDICINE

## 2021-12-17 PROCEDURE — C1894 INTRO/SHEATH, NON-LASER: HCPCS

## 2021-12-17 PROCEDURE — B5181ZA FLUOROSCOPY OF SUPERIOR VENA CAVA USING LOW OSMOLAR CONTRAST, GUIDANCE: ICD-10-PCS | Performed by: RADIOLOGY

## 2021-12-17 PROCEDURE — 0JH63XZ INSERTION OF TUNNELED VASCULAR ACCESS DEVICE INTO CHEST SUBCUTANEOUS TISSUE AND FASCIA, PERCUTANEOUS APPROACH: ICD-10-PCS | Performed by: RADIOLOGY

## 2021-12-17 PROCEDURE — 25010000002 MIDAZOLAM PER 1 MG: Performed by: RADIOLOGY

## 2021-12-17 PROCEDURE — 76937 US GUIDE VASCULAR ACCESS: CPT

## 2021-12-17 PROCEDURE — 85049 AUTOMATED PLATELET COUNT: CPT | Performed by: RADIOLOGY

## 2021-12-17 PROCEDURE — C1750 CATH, HEMODIALYSIS,LONG-TERM: HCPCS

## 2021-12-17 PROCEDURE — 02HV33Z INSERTION OF INFUSION DEVICE INTO SUPERIOR VENA CAVA, PERCUTANEOUS APPROACH: ICD-10-PCS | Performed by: RADIOLOGY

## 2021-12-17 PROCEDURE — 82962 GLUCOSE BLOOD TEST: CPT

## 2021-12-17 PROCEDURE — 25010000002 HEPARIN (PORCINE) PER 1000 UNITS

## 2021-12-17 RX ORDER — HEPARIN SODIUM 1000 [USP'U]/ML
INJECTION, SOLUTION INTRAVENOUS; SUBCUTANEOUS
Status: COMPLETED
Start: 2021-12-17 | End: 2021-12-17

## 2021-12-17 RX ORDER — HYDROCODONE BITARTRATE AND ACETAMINOPHEN 5; 325 MG/1; MG/1
1 TABLET ORAL EVERY 6 HOURS PRN
Status: DISCONTINUED | OUTPATIENT
Start: 2021-12-17 | End: 2021-12-17

## 2021-12-17 RX ORDER — ALBUMIN (HUMAN) 12.5 G/50ML
SOLUTION INTRAVENOUS
Status: DISCONTINUED
Start: 2021-12-17 | End: 2021-12-21 | Stop reason: HOSPADM

## 2021-12-17 RX ORDER — HYDRALAZINE HYDROCHLORIDE 50 MG/1
50 TABLET, FILM COATED ORAL EVERY 8 HOURS SCHEDULED
Status: DISCONTINUED | OUTPATIENT
Start: 2021-12-18 | End: 2021-12-18

## 2021-12-17 RX ORDER — MIDAZOLAM HYDROCHLORIDE 1 MG/ML
INJECTION INTRAMUSCULAR; INTRAVENOUS
Status: DISPENSED
Start: 2021-12-17 | End: 2021-12-17

## 2021-12-17 RX ORDER — FENTANYL CITRATE 50 UG/ML
INJECTION, SOLUTION INTRAMUSCULAR; INTRAVENOUS
Status: COMPLETED | OUTPATIENT
Start: 2021-12-17 | End: 2021-12-17

## 2021-12-17 RX ORDER — QUETIAPINE FUMARATE 25 MG/1
25 TABLET, FILM COATED ORAL NIGHTLY PRN
Status: DISCONTINUED | OUTPATIENT
Start: 2021-12-17 | End: 2021-12-21 | Stop reason: HOSPADM

## 2021-12-17 RX ORDER — MIDAZOLAM HYDROCHLORIDE 1 MG/ML
INJECTION INTRAMUSCULAR; INTRAVENOUS
Status: COMPLETED | OUTPATIENT
Start: 2021-12-17 | End: 2021-12-17

## 2021-12-17 RX ORDER — FENTANYL CITRATE 50 UG/ML
INJECTION, SOLUTION INTRAMUSCULAR; INTRAVENOUS
Status: DISPENSED
Start: 2021-12-17 | End: 2021-12-17

## 2021-12-17 RX ORDER — HYDROCODONE BITARTRATE AND ACETAMINOPHEN 5; 325 MG/1; MG/1
1 TABLET ORAL ONCE AS NEEDED
Status: COMPLETED | OUTPATIENT
Start: 2021-12-17 | End: 2021-12-17

## 2021-12-17 RX ORDER — HYDROCODONE BITARTRATE AND ACETAMINOPHEN 5; 325 MG/1; MG/1
1 TABLET ORAL EVERY 6 HOURS PRN
Status: COMPLETED | OUTPATIENT
Start: 2021-12-17 | End: 2021-12-18

## 2021-12-17 RX ORDER — LIDOCAINE HYDROCHLORIDE 10 MG/ML
INJECTION, SOLUTION EPIDURAL; INFILTRATION; INTRACAUDAL; PERINEURAL
Status: COMPLETED
Start: 2021-12-17 | End: 2021-12-17

## 2021-12-17 RX ORDER — HEPARIN SODIUM 1000 [USP'U]/ML
1600 INJECTION, SOLUTION INTRAVENOUS; SUBCUTANEOUS AS NEEDED
Status: DISCONTINUED | OUTPATIENT
Start: 2021-12-17 | End: 2021-12-21 | Stop reason: HOSPADM

## 2021-12-17 RX ORDER — LIDOCAINE HYDROCHLORIDE AND EPINEPHRINE 10; 10 MG/ML; UG/ML
INJECTION, SOLUTION INFILTRATION; PERINEURAL
Status: DISPENSED
Start: 2021-12-17 | End: 2021-12-17

## 2021-12-17 RX ADMIN — SENNOSIDES 2 TABLET: 8.6 TABLET, FILM COATED ORAL at 21:14

## 2021-12-17 RX ADMIN — ACETAMINOPHEN 650 MG: 325 TABLET, FILM COATED ORAL at 13:00

## 2021-12-17 RX ADMIN — CALCIUM ACETATE 667 MG: 667 CAPSULE ORAL at 16:59

## 2021-12-17 RX ADMIN — SODIUM CHLORIDE, PRESERVATIVE FREE 10 ML: 5 INJECTION INTRAVENOUS at 21:15

## 2021-12-17 RX ADMIN — HYDROCODONE BITARTRATE AND ACETAMINOPHEN 1 TABLET: 5; 325 TABLET ORAL at 17:03

## 2021-12-17 RX ADMIN — ACETAMINOPHEN 650 MG: 325 TABLET, FILM COATED ORAL at 21:14

## 2021-12-17 RX ADMIN — HEPARIN SODIUM 3200 UNITS: 1000 INJECTION, SOLUTION INTRAVENOUS; SUBCUTANEOUS at 11:11

## 2021-12-17 RX ADMIN — POLYETHYLENE GLYCOL 3350 17 G: 17 POWDER, FOR SOLUTION ORAL at 16:59

## 2021-12-17 RX ADMIN — HYDROCODONE BITARTRATE AND ACETAMINOPHEN 1 TABLET: 5; 325 TABLET ORAL at 23:48

## 2021-12-17 RX ADMIN — ATORVASTATIN CALCIUM 80 MG: 40 TABLET, FILM COATED ORAL at 08:46

## 2021-12-17 RX ADMIN — FENTANYL CITRATE 25 MCG: 50 INJECTION, SOLUTION INTRAMUSCULAR; INTRAVENOUS at 10:50

## 2021-12-17 RX ADMIN — HYDRALAZINE HYDROCHLORIDE 100 MG: 50 TABLET, FILM COATED ORAL at 08:46

## 2021-12-17 RX ADMIN — LIDOCAINE HYDROCHLORIDE 5 ML: 10 INJECTION, SOLUTION EPIDURAL; INFILTRATION; INTRACAUDAL; PERINEURAL at 11:11

## 2021-12-17 RX ADMIN — QUETIAPINE FUMARATE 25 MG: 25 TABLET ORAL at 23:48

## 2021-12-17 RX ADMIN — SODIUM CHLORIDE, PRESERVATIVE FREE 10 ML: 5 INJECTION INTRAVENOUS at 08:47

## 2021-12-17 RX ADMIN — AMLODIPINE BESYLATE 10 MG: 5 TABLET ORAL at 08:46

## 2021-12-17 RX ADMIN — HEPARIN SODIUM 5000 UNITS: 5000 INJECTION INTRAVENOUS; SUBCUTANEOUS at 21:14

## 2021-12-17 RX ADMIN — MIDAZOLAM 0.5 MG: 1 INJECTION INTRAMUSCULAR; INTRAVENOUS at 10:50

## 2021-12-17 RX ADMIN — HYDROCODONE BITARTRATE AND ACETAMINOPHEN 1 TABLET: 5; 325 TABLET ORAL at 14:42

## 2021-12-17 RX ADMIN — CARVEDILOL 25 MG: 12.5 TABLET, FILM COATED ORAL at 08:46

## 2021-12-17 RX ADMIN — MIDAZOLAM 0.5 MG: 1 INJECTION INTRAMUSCULAR; INTRAVENOUS at 10:44

## 2021-12-17 RX ADMIN — CALCIUM ACETATE 667 MG: 667 CAPSULE ORAL at 08:46

## 2021-12-17 RX ADMIN — FENTANYL CITRATE 25 MCG: 50 INJECTION, SOLUTION INTRAMUSCULAR; INTRAVENOUS at 10:44

## 2021-12-17 RX ADMIN — HYDRALAZINE HYDROCHLORIDE 100 MG: 50 TABLET, FILM COATED ORAL at 01:45

## 2021-12-17 RX ADMIN — HYDROXYZINE HYDROCHLORIDE 25 MG: 25 TABLET, FILM COATED ORAL at 21:14

## 2021-12-17 RX ADMIN — HEPARIN SODIUM 5000 UNITS: 5000 INJECTION INTRAVENOUS; SUBCUTANEOUS at 05:36

## 2021-12-17 NOTE — PROGRESS NOTES
[facility] Medicine Services  PROGRESS NOTE    Patient Name: Jeaneth Keita  : 1960  MRN: [id number]    Date of Admission: 2021  Primary Care Physician: Provider, No Known    Subjective   Subjective     CC: Follow-up AMS    HPI:  Patient in dialysis, complaining of generalized pain. She is disappointed she is unable to return to [facility] today. She received a right IJ tunnel dialysis catheter today.       ROS:  Gen- No fevers, chills  CV- No chest pain, palpitations  Resp- No cough, dyspnea  GI- No N/V/D, abd pain  MS- (+) generalized pain  All other systems reviewed and are negative    Objective   Objective     Vital Signs:   Temp:  [98.4 °F (36.9 °C)-99.1 °F (37.3 °C)] 98.4 °F (36.9 °C)  Heart Rate:  [] 86  Resp:  [16-20] 16  BP: ()/(54-81) 85/56     Physical Exam:  Constitutional: Awake, alert, mod distress due to generalized pain  Eyes: PERRLA, sclerae anicteric, no conjunctival injection  HENT: NCAT, mucous membranes moist  Neck: Supple, no thyromegaly, no lymphadenopathy, trachea midline  Respiratory: Clear to auscultation bilaterally, nonlabored respirations   Cardiovascular: RRR, no murmurs, rubs, or gallops  Gastrointestinal: Positive bowel sounds, soft, nontender, nondistended  Musculoskeletal: LBKA  Psychiatric: Appropriate affect, cooperative  Neurologic: No obvious neuro focal deficit, confused, speech clear  Skin: No rashes.     Results Reviewed:  LAB RESULTS:      Lab 21  0930   PLATELETS 171   PROTIME 14.6*         Lab 21  0344   SODIUM 137 141   POTASSIUM 4.8 4.0   CHLORIDE 99 110*   CO2 24.0 20.0*   ANION GAP 14.0 11.0   BUN 65* 41*   CREATININE 5.36* 4.35*   GLUCOSE 126* 127*   CALCIUM 8.9 8.0*   PHOSPHORUS  --  4.8*         Lab 21  0317 21  0344   TOTAL PROTEIN 6.0  --    ALBUMIN 3.50 2.90*   GLOBULIN 2.5  --    ALT (SGPT) 41*  --    AST (SGOT) 42*  --    BILIRUBIN 0.3  --    ALK PHOS 87  --           Lab 12/17/21  0930   PROTIME 14.6*   INR 1.17*                 Brief Urine Lab Results  (Last result in the past 365 days)      Color   Clarity   Blood   Leuk Est   Nitrite   Protein   CREAT   Urine HCG        12/08/21 1404 Yellow   Clear   Negative   Negative   Negative   >=300 mg/dL (3+)                 Microbiology Results Abnormal     Procedure Component Value - Date/Time    COVID PRE-OP / PRE-PROCEDURE SCREENING ORDER (NO ISOLATION) - Swab, Nasopharynx [[id number]]  (Normal) Collected: 12/16/21 2039    Lab Status: Final result Specimen: Swab from Nasopharynx Updated: 12/16/21 2114    Narrative:      The following orders were created for panel order COVID PRE-OP / PRE-PROCEDURE SCREENING ORDER (NO ISOLATION) - Swab, Nasopharynx.  Procedure                               Abnormality         Status                     ---------                               -----------         ------                     COVID-19 and FLU A/B PCR...[[id number]]  Normal              Final result                 Please view results for these tests on the individual orders.    COVID-19 and FLU A/B PCR - Swab, Nasopharynx [[id number]]  (Normal) Collected: 12/16/21 2039    Lab Status: Final result Specimen: Swab from Nasopharynx Updated: 12/16/21 2114     COVID19 Not Detected     Influenza A PCR Not Detected     Influenza B PCR Not Detected    Narrative:      Fact sheet for providers: https://www.fda.gov/media/760442/download    Fact sheet for patients: https://www.fda.gov/media/040440/download    Test performed by PCR.    Blood Culture - Blood, Arm, Right [[id number]]  (Normal) Collected: 12/08/21 1700    Lab Status: Final result Specimen: Blood from Arm, Right Updated: 12/13/21 1731     Blood Culture No growth at 5 days    Blood Culture - Blood, Arm, Left [[id number]]  (Normal) Collected: 12/08/21 1720    Lab Status: Final result Specimen: Blood from Arm, Left Updated: 12/13/21 1731     Blood Culture No growth at 5 days    MRSA Screen, PCR  (Inpatient) - Swab, Nares [[id number]]  (Normal) Collected: 12/08/21 2213    Lab Status: Final result Specimen: Swab from Nares Updated: 12/09/21 0749     MRSA PCR Negative    Narrative:      MRSA Negative    S. Pneumo Ag Urine or CSF - Urine, Urine, Clean Catch [[id number]]  (Normal) Collected: 12/08/21 1404    Lab Status: Final result Specimen: Urine, Clean Catch Updated: 12/09/21 0709     Strep Pneumo Ag Negative    Legionella Antigen, Urine - Urine, Urine, Clean Catch [[id number]]  (Normal) Collected: 12/08/21 1404    Lab Status: Final result Specimen: Urine, Clean Catch Updated: 12/09/21 0706     LEGIONELLA ANTIGEN, URINE Negative    Respiratory Panel PCR w/COVID-19(SARS-CoV-2) RUBINA/MANDY/MARK/PAD/COR/MAD/HORACE In-House, NP Swab in UTM/VTM, 3-4 HR TAT - Swab, Nasopharynx [[id number]]  (Normal) Collected: 12/08/21 2213    Lab Status: Final result Specimen: Swab from Nasopharynx Updated: 12/09/21 0003     ADENOVIRUS, PCR Not Detected     Coronavirus 229E Not Detected     Coronavirus HKU1 Not Detected     Coronavirus NL63 Not Detected     Coronavirus OC43 Not Detected     COVID19 Not Detected     Human Metapneumovirus Not Detected     Human Rhinovirus/Enterovirus Not Detected     Influenza A PCR Not Detected     Influenza B PCR Not Detected     Parainfluenza Virus 1 Not Detected     Parainfluenza Virus 2 Not Detected     Parainfluenza Virus 3 Not Detected     Parainfluenza Virus 4 Not Detected     RSV, PCR Not Detected     Bordetella pertussis pcr Not Detected     Bordetella parapertussis PCR Not Detected     Chlamydophila pneumoniae PCR Not Detected     Mycoplasma pneumo by PCR Not Detected    Narrative:      In the setting of a positive respiratory panel with a viral infection PLUS a negative procalcitonin without other underlying concern for bacterial infection, consider observing off antibiotics or discontinuation of antibiotics and continue supportive care. If the respiratory panel is positive for atypical bacterial  infection (Bordetella pertussis, Chlamydophila pneumoniae, or Mycoplasma pneumoniae), consider antibiotic de-escalation to target atypical bacterial infection.          No radiology results from the last 24 hrs    Results for orders placed during the hospital encounter of 10/05/21    Adult Transthoracic Echo Complete W/ Cont if Necessary Per Protocol    Interpretation Summary  · Left ventricular ejection fraction appears to be 61 - 65%. Left ventricular systolic function is normal.  · Left atrial volume is mildly increased.      I have reviewed the medications:  Scheduled Meds:amLODIPine, 10 mg, Oral, Q24H  atorvastatin, 80 mg, Oral, Daily  calcium acetate, 667 mg, Oral, TID With Meals  carvedilol, 25 mg, Oral, BID With Meals  cyanocobalamin, 1,000 mcg, Intramuscular, Q28 Days  heparin (porcine), , ,   heparin (porcine), 5,000 Units, Subcutaneous, Q8H  heparin (porcine), , ,   hydrALAZINE, 100 mg, Oral, Q8H  insulin lispro, 0-7 Units, Subcutaneous, TID AC  iopamidol, , ,   lidocaine 1% - EPINEPHrine 1:[id number], , ,   lidocaine PF 1%, , ,   polyethylene glycol, 17 g, Oral, Daily  senna, 2 tablet, Oral, Nightly  sodium chloride, 10 mL, Intravenous, Q12H      Continuous Infusions:   PRN Meds:.•  acetaminophen **OR** acetaminophen **OR** acetaminophen  •  albumin human  •  dextrose  •  dextrose  •  fentaNYL citrate (PF)  •  glucagon (human recombinant)  •  hydrOXYzine  •  ipratropium-albuterol  •  midazolam  •  naloxone  •  ondansetron  •  sodium chloride    Assessment/Plan   Assessment & Plan     Active Hospital Problems    Diagnosis  POA   • **Right lower lobe pneumonia [J18.9]  Yes   • AMS (altered mental status) [R41.82]  Yes   • Colitis [K52.9]  Unknown   • Normocytic anemia [D64.9]  Unknown   • Hypokalemia [E87.6]  Unknown   • Hepatocellular carcinoma metastatic to bone s/p RXT  [C79.51, C22.0]  Yes   • Cirrhosis of liver (HCC) [K74.60]  Yes   • Hemochromatosis [E83.119]  Yes   • S/P left BKA [Z89.512]  Not  Applicable   • Chronic Hep C  [B18.2]  Yes   • Chronic pain on Methadone [F11.90]  Yes   • Chronic ulcer of right foot [L97.519]  Yes   • ESRD (end stage renal disease) [N18.6]  Yes   • GERD (gastroesophageal reflux disease) [K21.9]  Yes   • Essential hypertension [I10]  Yes   • Type 2 diabetes mellitus (HCC) [E11.9]  Yes      Resolved Hospital Problems   No resolved problems to display.     Brief Hospital Course to date:  Jeaneth Keita is a 61 y.o. female with history of cirrhosis with HCC metastatic to bone s/p XRT, chronic hep C, chronic pain on methadone, ESRD on HD, familial hemochromatosis, hypertension, type 2 diabetes patient presented to the ED with AMS     Acute encephalopathy  Right lower lobe pneumonia  -Blood cultures NGTD, urinary antigens negative, CT head unrevealing.  -MRI head with no acute processes, does show evidence of prior thalamic hemorrhage  -TSH, ammonia, B12 are all wnl  -She is s/p 7-day course of Zosyn and doxycycline.    -Continue Seroquel 25 mg nightly for insomnia  -Patient is currently baseline mental status which she is having some mild confusion.     ESRD on HD  -Renal following, s/p HD today, patient with AV fistula in place, tunneled cath has since been removed.  -12/17 new tunneled cath placed to the right IJ.      Hypertension  -  Has been hypotensive at time, requiring BP meds to be held.     Type 2 diabetes  -Unable to accurately assess control as patient is on HD, A1c 5.1%  -Continue SSI for now     Liver cirrhosis  HCC with metastasis to bone s/p XRT  Chronic hep C  Familial hemochromatosis  -Patient has previously had all of her care done at [facility]  -Continue home meds when appropriate     Chronic pain  -previously on methadone, UDS is negative, and methadone not on active med list     DVT prophylaxis:  Medical and mechanical DVT prophylaxis orders are present.      AM-PAC 6 Clicks Score (PT): 16 (12/15/21 5952)    DVT prophylaxis:  Medical and mechanical DVT  prophylaxis orders are present.       AM-PAC 6 Clicks Score (PT): 18 (12/16/21 0815)    Disposition: Anticipate discharge back to [facility] Monday evening    CODE STATUS:   Code Status and Medical Interventions:   Ordered at: 12/08/21 1940     Level Of Support Discussed With:    Patient     Code Status (Patient has no pulse and is not breathing):    CPR (Attempt to Resuscitate)     Medical Interventions (Patient has pulse or is breathing):    Full Support       MAYELIN Ayon  12/17/21

## 2021-12-17 NOTE — PROGRESS NOTES
"   LOS: 9 days    Patient Care Team:  Provider, No Known as PCP - General  ESRD     Subjective     Interval History:   New tunnel catheter placed today as AV fistula infiltrated.    Review of Systems:   Patient denies shortness of breath, chest pain, dysuria, hematuria, nausea, vomiting.      Objective     Vital Sign Min/Max for last 24 hours  Temp  Min: 96.2 °F (35.7 °C)  Max: 99.1 °F (37.3 °C)   BP  Min: 85/56  Max: 134/81   Pulse  Min: 85  Max: 100   Resp  Min: 16  Max: 20   SpO2  Min: 91 %  Max: 100 %   No data recorded   Weight  Min: 65.6 kg (144 lb 11.2 oz)  Max: 65.6 kg (144 lb 11.2 oz)     Flowsheet Rows      First Filed Value   Admission Height 172.7 cm (68\") Documented at 12/08/2021 1240   Admission Weight 72.6 kg (160 lb) Documented at 12/08/2021 1240          I/O this shift:  In: -   Out: 2530 [Other:2530]  I/O last 3 completed shifts:  In: -   Out: 300 [Urine:300]    Physical Exam:  General Appearance: Alert, oriented, no obvious distress.  Eyes: PER, EOMI.  Neck: Supple no JVD.  Lungs: Clear auscultation, no rales rhonchi's, equal chest movement, nonlabored.  Heart: No gallop, murmur, rub, RRR.  Abdomen: Soft, nontender, positive bowel sounds, no organomegaly.  Extremities: No edema, no cyanosis.  Neuro: No focal deficit, moving all extremities, alert oriented X 3  New tunnel catheter right IJ, right side of chest  Access: :Left forearm AVF      WBC No results found for: WBC   HGB No results found for: HGB   HCT No results found for: HCT   Platlets No results found for: LABPLAT   MCV No results found for: MCV       Sodium Sodium   Date Value Ref Range Status   12/17/2021 137 136 - 145 mmol/L Final      Potassium Potassium   Date Value Ref Range Status   12/17/2021 4.8 3.5 - 5.2 mmol/L Final      Chloride Chloride   Date Value Ref Range Status   12/17/2021 99 98 - 107 mmol/L Final      CO2 CO2   Date Value Ref Range Status   12/17/2021 24.0 22.0 - 29.0 mmol/L Final      BUN BUN   Date Value Ref Range " Status   12/17/2021 65 (H) 8 - 23 mg/dL Final      Creatinine Creatinine   Date Value Ref Range Status   12/17/2021 5.36 (H) 0.57 - 1.00 mg/dL Final      Calcium Calcium   Date Value Ref Range Status   12/17/2021 8.9 8.6 - 10.5 mg/dL Final      PO4 No results found for: CAPO4   Albumin Albumin   Date Value Ref Range Status   12/17/2021 3.50 3.50 - 5.20 g/dL Final      Magnesium No results found for: MG   Uric Acid No results found for: URICACID        Results Review:     I reviewed the patient's new clinical results.    albumin human, , ,   albumin human, , ,   amLODIPine, 10 mg, Oral, Q24H  atorvastatin, 80 mg, Oral, Daily  calcium acetate, 667 mg, Oral, TID With Meals  carvedilol, 25 mg, Oral, BID With Meals  cyanocobalamin, 1,000 mcg, Intramuscular, Q28 Days  heparin (porcine), 5,000 Units, Subcutaneous, Q8H  heparin (porcine), , ,   hydrALAZINE, 100 mg, Oral, Q8H  insulin lispro, 0-7 Units, Subcutaneous, TID AC  iopamidol, , ,   lidocaine 1% - EPINEPHrine [id number], , ,   polyethylene glycol, 17 g, Oral, Daily  senna, 2 tablet, Oral, Nightly  sodium chloride, 10 mL, Intravenous, Q12H           Medication Review:     Assessment/Plan       Right lower lobe pneumonia    Hepatocellular carcinoma metastatic to bone s/p RXT     Hemochromatosis    Cirrhosis of liver (HCC)    Chronic Hep C     ESRD (end stage renal disease)    Chronic ulcer of right foot    S/P left BKA    GERD (gastroesophageal reflux disease)    Chronic pain on Methadone    Essential hypertension    Type 2 diabetes mellitus (HCC)    AMS (altered mental status)    Colitis    Normocytic anemia    Hypokalemia      1- ESRD - MWF - [facility]   2- HTN   3- Mild hypokalemia   4- Anemia of chronic disease   5- Familial hemochromatosis   6- Hx of Metastatic hepatocellular carcinoma       Plan:  -New tunnel cath replaced as AV fistula infiltrated.  Dialysis done today.  - Renal diet.    - Transfuse for Hgb less than 7.0   - Tunneled catheter removed on 12/10 as  she pulled it accidentally.      I discussed the patients findings and my recommendations with nursing staff    Staci Bates MD  12/17/21  16:36 EST

## 2021-12-17 NOTE — NURSING NOTE
Patient placed on cardiac and ETCo2 monitors, vitals stable. Consents verified. Patient placed in position with images completed and reviewed by physician. Timeout completed. Medications given as ordered. A right IJ tunnel dialysis catheter 14.5 F was placed, patient tolerated well. Report called to RN. Total medications given: Versed 1 mg; Fentanyl 50 mcg. Total sedation time 16 minutes. (1044 to 1100).

## 2021-12-17 NOTE — CASE MANAGEMENT/SOCIAL WORK
Continued Stay Note  [facility]     Patient Name: Jeaneth Keita  MRN: [id number]  Today's Date: 12/17/2021    Admit Date: 12/8/2021     Discharge Plan     Row Name 12/17/21 1421       Plan    Plan Personal Care at [place]    Patient/Family in Agreement with Plan yes    Plan Comments I had to cancel transport back to [facility] today as the pt had a problem with her dialysis access and had a late HD start today. Felisa from the facility is not able to do an onsite eval until Monday 12-. I have [facility] ambulance to transport back on Tuesday 12- at 1315 if approved to return to her personal care facility (no transport avaiable Monday). I updated the pts spouse. I will f/u on Monday.    Final Discharge Disposition Code 01 - home or self-care               Discharge Codes    No documentation.               Expected Discharge Date and Time     Expected Discharge Date Expected Discharge Time    Dec 17, 2021             Radha Miranda RN

## 2021-12-17 NOTE — PLAN OF CARE
VSS on RA, NSR per tele.  Transportation scheduled for tomorrow afternoon pending evaluation from facilities DON.  No complaints this shift.  Will continue to monitor.     Problem: Adult Inpatient Plan of Care  Goal: Plan of Care Review  Outcome: Ongoing, Progressing  Goal: Patient-Specific Goal (Individualized)  Outcome: Ongoing, Progressing  Goal: Absence of Hospital-Acquired Illness or Injury  Outcome: Ongoing, Progressing  Intervention: Identify and Manage Fall Risk  Recent Flowsheet Documentation  Taken 12/17/2021 0400 by Safia Archer, RN  Safety Promotion/Fall Prevention:   activity supervised   assistive device/personal items within reach   clutter free environment maintained   fall prevention program maintained   nonskid shoes/slippers when out of bed   room organization consistent   safety round/check completed  Taken 12/17/2021 0200 by Safia Archer, RN  Safety Promotion/Fall Prevention:   activity supervised   assistive device/personal items within reach   clutter free environment maintained   fall prevention program maintained   nonskid shoes/slippers when out of bed   room organization consistent   safety round/check completed  Taken 12/17/2021 0000 by Safia Archer, RN  Safety Promotion/Fall Prevention:   activity supervised   assistive device/personal items within reach   clutter free environment maintained   safety round/check completed   room organization consistent   nonskid shoes/slippers when out of bed  Taken 12/16/2021 2200 by Safia Archer, RN  Safety Promotion/Fall Prevention:   activity supervised   assistive device/personal items within reach   clutter free environment maintained   fall prevention program maintained   nonskid shoes/slippers when out of bed   room organization consistent   safety round/check completed  Taken 12/16/2021 2000 by Safia Archer, RN  Safety Promotion/Fall Prevention:   activity supervised   assistive device/personal items within reach   clutter free  environment maintained   fall prevention program maintained   nonskid shoes/slippers when out of bed   room organization consistent   safety round/check completed  Intervention: Prevent Skin Injury  Recent Flowsheet Documentation  Taken 12/17/2021 0400 by Safia Archer RN  Body Position: position changed independently  Skin Protection:   adhesive use limited   incontinence pads utilized   tubing/devices free from skin contact   skin-to-device areas padded  Taken 12/17/2021 0200 by Safia Archer RN  Body Position: position changed independently  Skin Protection:   adhesive use limited   incontinence pads utilized   tubing/devices free from skin contact   skin-to-device areas padded  Taken 12/17/2021 0000 by Safia Archer RN  Body Position: position changed independently  Skin Protection:   adhesive use limited   incontinence pads utilized   tubing/devices free from skin contact   skin-to-device areas padded  Taken 12/16/2021 2200 by Safia Archer RN  Body Position: position changed independently  Skin Protection:   adhesive use limited   incontinence pads utilized   tubing/devices free from skin contact   skin-to-device areas padded  Taken 12/16/2021 2000 by Safia Archer RN  Body Position: position changed independently  Skin Protection:   adhesive use limited   incontinence pads utilized   tubing/devices free from skin contact   skin-to-device areas padded  Intervention: Prevent Infection  Recent Flowsheet Documentation  Taken 12/17/2021 0400 by Safia Archer RN  Infection Prevention:   environmental surveillance performed   visitors restricted/screened   single patient room provided   rest/sleep promoted   personal protective equipment utilized  Taken 12/17/2021 0200 by Safia Archer RN  Infection Prevention:   environmental surveillance performed   visitors restricted/screened   single patient room provided   rest/sleep promoted   personal protective equipment utilized  Taken 12/17/2021 0000  by Safia Archer RN  Infection Prevention:   environmental surveillance performed   visitors restricted/screened   single patient room provided   rest/sleep promoted   personal protective equipment utilized  Taken 12/16/2021 2200 by Safia Archer RN  Infection Prevention:   environmental surveillance performed   visitors restricted/screened   single patient room provided   rest/sleep promoted   personal protective equipment utilized  Taken 12/16/2021 2000 by Safia Archer RN  Infection Prevention:   environmental surveillance performed   visitors restricted/screened   personal protective equipment utilized   single patient room provided   rest/sleep promoted  Goal: Optimal Comfort and Wellbeing  Outcome: Ongoing, Progressing  Intervention: Provide Person-Centered Care  Recent Flowsheet Documentation  Taken 12/16/2021 2000 by Safia Archer RN  Trust Relationship/Rapport:   care explained   choices provided  Goal: Readiness for Transition of Care  Outcome: Ongoing, Progressing     Problem: Fall Injury Risk  Goal: Absence of Fall and Fall-Related Injury  Outcome: Ongoing, Progressing  Intervention: Identify and Manage Contributors to Fall Injury Risk  Recent Flowsheet Documentation  Taken 12/17/2021 0400 by Safia Archer RN  Medication Review/Management: medications reviewed  Taken 12/17/2021 0200 by Safia Archer RN  Medication Review/Management: medications reviewed  Taken 12/17/2021 0000 by Safia Archer RN  Medication Review/Management: medications reviewed  Taken 12/16/2021 2200 by Safia Archer RN  Medication Review/Management: medications reviewed  Taken 12/16/2021 2000 by Safia Archer RN  Medication Review/Management: medications reviewed  Intervention: Promote Injury-Free Environment  Recent Flowsheet Documentation  Taken 12/17/2021 0400 by Safia Archer, RN  Safety Promotion/Fall Prevention:   activity supervised   assistive device/personal items within reach   clutter free  environment maintained   fall prevention program maintained   nonskid shoes/slippers when out of bed   room organization consistent   safety round/check completed  Taken 12/17/2021 0200 by Safia Archer RN  Safety Promotion/Fall Prevention:   activity supervised   assistive device/personal items within reach   clutter free environment maintained   fall prevention program maintained   nonskid shoes/slippers when out of bed   room organization consistent   safety round/check completed  Taken 12/17/2021 0000 by Safia Archer RN  Safety Promotion/Fall Prevention:   activity supervised   assistive device/personal items within reach   clutter free environment maintained   safety round/check completed   room organization consistent   nonskid shoes/slippers when out of bed  Taken 12/16/2021 2200 by Safia Archer RN  Safety Promotion/Fall Prevention:   activity supervised   assistive device/personal items within reach   clutter free environment maintained   fall prevention program maintained   nonskid shoes/slippers when out of bed   room organization consistent   safety round/check completed  Taken 12/16/2021 2000 by Safia Archer RN  Safety Promotion/Fall Prevention:   activity supervised   assistive device/personal items within reach   clutter free environment maintained   fall prevention program maintained   nonskid shoes/slippers when out of bed   room organization consistent   safety round/check completed     Problem: Skin Injury Risk Increased  Goal: Skin Health and Integrity  Outcome: Ongoing, Progressing  Intervention: Optimize Skin Protection  Recent Flowsheet Documentation  Taken 12/17/2021 0400 by Safia Archer RN  Pressure Reduction Techniques:   frequent weight shift encouraged   heels elevated off bed   pressure points protected  Head of Bed (HOB): HOB at 20-30 degrees  Pressure Reduction Devices:   pressure-redistributing mattress utilized   positioning supports utilized   heel offloading  device utilized  Skin Protection:   adhesive use limited   incontinence pads utilized   tubing/devices free from skin contact   skin-to-device areas padded  Taken 12/17/2021 0200 by Safia Archer RN  Pressure Reduction Techniques:   frequent weight shift encouraged   heels elevated off bed   pressure points protected  Head of Bed (HOB): HOB at 15 degrees  Pressure Reduction Devices:   pressure-redistributing mattress utilized   positioning supports utilized   heel offloading device utilized  Skin Protection:   adhesive use limited   incontinence pads utilized   tubing/devices free from skin contact   skin-to-device areas padded  Taken 12/17/2021 0000 by Safia Archer RN  Pressure Reduction Techniques:   frequent weight shift encouraged   heels elevated off bed   pressure points protected  Head of Bed (HOB): HOB at 20-30 degrees  Pressure Reduction Devices:   pressure-redistributing mattress utilized   positioning supports utilized   heel offloading device utilized  Skin Protection:   adhesive use limited   incontinence pads utilized   tubing/devices free from skin contact   skin-to-device areas padded  Taken 12/16/2021 2200 by Safia Archer RN  Pressure Reduction Techniques:   frequent weight shift encouraged   heels elevated off bed   pressure points protected  Head of Bed (HOB): HOB at 20-30 degrees  Pressure Reduction Devices:   pressure-redistributing mattress utilized   heel offloading device utilized   positioning supports utilized  Skin Protection:   adhesive use limited   incontinence pads utilized   tubing/devices free from skin contact   skin-to-device areas padded  Taken 12/16/2021 2000 by Safia Archer RN  Pressure Reduction Techniques:   frequent weight shift encouraged   heels elevated off bed   pressure points protected  Head of Bed (HOB): HOB at 20-30 degrees  Pressure Reduction Devices:   pressure-redistributing mattress utilized   positioning supports utilized   heel offloading device  utilized  Skin Protection:   adhesive use limited   incontinence pads utilized   tubing/devices free from skin contact   skin-to-device areas padded     Problem: Diabetes Comorbidity  Goal: Blood Glucose Level Within Desired Range  Outcome: Ongoing, Progressing     Problem: Hypertension Comorbidity  Goal: Blood Pressure in Desired Range  Outcome: Ongoing, Progressing  Intervention: Maintain Hypertension-Management Strategies  Recent Flowsheet Documentation  Taken 12/17/2021 0400 by Safia Archer RN  Medication Review/Management: medications reviewed  Taken 12/17/2021 0200 by Safia Archer RN  Medication Review/Management: medications reviewed  Taken 12/17/2021 0000 by Safia Archer RN  Medication Review/Management: medications reviewed  Taken 12/16/2021 2200 by Safia Archer RN  Medication Review/Management: medications reviewed  Taken 12/16/2021 2000 by Safia Archer RN  Medication Review/Management: medications reviewed     Problem: Pain Chronic (Persistent) (Comorbidity Management)  Goal: Acceptable Pain Control and Functional Ability  Outcome: Ongoing, Progressing  Intervention: Manage Persistent Pain  Recent Flowsheet Documentation  Taken 12/17/2021 0400 by Safia Archer RN  Medication Review/Management: medications reviewed  Taken 12/17/2021 0200 by Safia Archer RN  Medication Review/Management: medications reviewed  Taken 12/17/2021 0000 by Safia Archer RN  Medication Review/Management: medications reviewed  Taken 12/16/2021 2200 by Safia Archer RN  Medication Review/Management: medications reviewed  Taken 12/16/2021 2000 by Safia Archer RN  Bowel Elimination Promotion: adequate fluid intake promoted  Medication Review/Management: medications reviewed  Intervention: Optimize Psychosocial Wellbeing  Recent Flowsheet Documentation  Taken 12/16/2021 2000 by Safia Archer RN  Supportive Measures: active listening utilized  Diversional Activities: television     Problem:  Restraint, Nonbehavioral (Nonviolent)  Goal: Discontinuation Criteria Achieved  Outcome: Ongoing, Progressing  Intervention: Implement Least-restrictive Safety Strategies  Recent Flowsheet Documentation  Taken 12/16/2021 2000 by Safia Archer RN  Diversional Activities: television  Goal: Personal Dignity and Safety Maintained  Outcome: Ongoing, Progressing  Intervention: Protect Dignity, Rights, and Personal Wellbeing  Recent Flowsheet Documentation  Taken 12/16/2021 2000 by Safia Archer RN  Trust Relationship/Rapport:   care explained   choices provided  Intervention: Protect Skin and Joint Integrity  Recent Flowsheet Documentation  Taken 12/17/2021 0400 by Safia Archer RN  Body Position: position changed independently  Taken 12/17/2021 0200 by Safia Archer RN  Body Position: position changed independently  Taken 12/17/2021 0000 by Safia Archer RN  Body Position: position changed independently  Taken 12/16/2021 2200 by Safia Archer RN  Body Position: position changed independently  Taken 12/16/2021 2000 by Safia Archer RN  Body Position: position changed independently     Problem: Device-Related Complication Risk (Hemodialysis)  Goal: Safe, Effective Therapy Delivery  Outcome: Ongoing, Progressing  Intervention: Optimize Device Care and Function  Recent Flowsheet Documentation  Taken 12/17/2021 0400 by Safia Archer RN  Medication Review/Management: medications reviewed  Taken 12/17/2021 0200 by Safia Archer RN  Medication Review/Management: medications reviewed  Taken 12/17/2021 0000 by Safia Archer RN  Medication Review/Management: medications reviewed  Taken 12/16/2021 2200 by Safia Archer RN  Medication Review/Management: medications reviewed  Taken 12/16/2021 2000 by Safia Archer RN  Medication Review/Management: medications reviewed     Problem: Hemodynamic Instability (Hemodialysis)  Goal: Vital Signs Remain in Desired Range  Outcome: Ongoing, Progressing      Problem: Infection (Hemodialysis)  Goal: Absence of Infection Signs and Symptoms  Outcome: Ongoing, Progressing  Intervention: Prevent or Manage Infection  Recent Flowsheet Documentation  Taken 12/17/2021 0400 by Safia Archer RN  Infection Prevention:   environmental surveillance performed   visitors restricted/screened   single patient room provided   rest/sleep promoted   personal protective equipment utilized  Taken 12/17/2021 0200 by Safia Archer RN  Infection Prevention:   environmental surveillance performed   visitors restricted/screened   single patient room provided   rest/sleep promoted   personal protective equipment utilized  Taken 12/17/2021 0000 by Safia Archer RN  Infection Prevention:   environmental surveillance performed   visitors restricted/screened   single patient room provided   rest/sleep promoted   personal protective equipment utilized  Taken 12/16/2021 2200 by Safia Archer RN  Infection Prevention:   environmental surveillance performed   visitors restricted/screened   single patient room provided   rest/sleep promoted   personal protective equipment utilized  Taken 12/16/2021 2000 by Safia Archer, RN  Infection Prevention:   environmental surveillance performed   visitors restricted/screened   personal protective equipment utilized   single patient room provided   rest/sleep promoted

## 2021-12-18 LAB
ANION GAP SERPL CALCULATED.3IONS-SCNC: 13 MMOL/L (ref 5–15)
BASOPHILS # BLD AUTO: 0.05 10*3/MM3 (ref 0–0.2)
BASOPHILS NFR BLD AUTO: 0.9 % (ref 0–1.5)
BUN SERPL-MCNC: 38 MG/DL (ref 8–23)
BUN/CREAT SERPL: 10.2 (ref 7–25)
CALCIUM SPEC-SCNC: 9.5 MG/DL (ref 8.6–10.5)
CHLORIDE SERPL-SCNC: 101 MMOL/L (ref 98–107)
CO2 SERPL-SCNC: 23 MMOL/L (ref 22–29)
CREAT SERPL-MCNC: 3.73 MG/DL (ref 0.57–1)
DEPRECATED RDW RBC AUTO: 50.7 FL (ref 37–54)
EOSINOPHIL # BLD AUTO: 0.13 10*3/MM3 (ref 0–0.4)
EOSINOPHIL NFR BLD AUTO: 2.3 % (ref 0.3–6.2)
ERYTHROCYTE [DISTWIDTH] IN BLOOD BY AUTOMATED COUNT: 15.1 % (ref 12.3–15.4)
GFR SERPL CREATININE-BSD FRML MDRD: 12 ML/MIN/1.73
GFR SERPL CREATININE-BSD FRML MDRD: ABNORMAL ML/MIN/{1.73_M2}
GLUCOSE BLDC GLUCOMTR-MCNC: 143 MG/DL (ref 70–130)
GLUCOSE BLDC GLUCOMTR-MCNC: 156 MG/DL (ref 70–130)
GLUCOSE BLDC GLUCOMTR-MCNC: 200 MG/DL (ref 70–130)
GLUCOSE BLDC GLUCOMTR-MCNC: 216 MG/DL (ref 70–130)
GLUCOSE SERPL-MCNC: 99 MG/DL (ref 65–99)
HCT VFR BLD AUTO: 23.3 % (ref 34–46.6)
HGB BLD-MCNC: 8.2 G/DL (ref 12–15.9)
IMM GRANULOCYTES # BLD AUTO: 0.02 10*3/MM3 (ref 0–0.05)
IMM GRANULOCYTES NFR BLD AUTO: 0.4 % (ref 0–0.5)
LYMPHOCYTES # BLD AUTO: 1.71 10*3/MM3 (ref 0.7–3.1)
LYMPHOCYTES NFR BLD AUTO: 30.4 % (ref 19.6–45.3)
MCH RBC QN AUTO: 32.8 PG (ref 26.6–33)
MCHC RBC AUTO-ENTMCNC: 35.2 G/DL (ref 31.5–35.7)
MCV RBC AUTO: 93.2 FL (ref 79–97)
MONOCYTES # BLD AUTO: 0.42 10*3/MM3 (ref 0.1–0.9)
MONOCYTES NFR BLD AUTO: 7.5 % (ref 5–12)
NEUTROPHILS NFR BLD AUTO: 3.3 10*3/MM3 (ref 1.7–7)
NEUTROPHILS NFR BLD AUTO: 58.5 % (ref 42.7–76)
NRBC BLD AUTO-RTO: 0 /100 WBC (ref 0–0.2)
PLATELET # BLD AUTO: 176 10*3/MM3 (ref 140–450)
PMV BLD AUTO: 10.7 FL (ref 6–12)
POTASSIUM SERPL-SCNC: 4.6 MMOL/L (ref 3.5–5.2)
RBC # BLD AUTO: 2.5 10*6/MM3 (ref 3.77–5.28)
SODIUM SERPL-SCNC: 137 MMOL/L (ref 136–145)
WBC NRBC COR # BLD: 5.63 10*3/MM3 (ref 3.4–10.8)

## 2021-12-18 PROCEDURE — 80048 BASIC METABOLIC PNL TOTAL CA: CPT | Performed by: NURSE PRACTITIONER

## 2021-12-18 PROCEDURE — 97530 THERAPEUTIC ACTIVITIES: CPT

## 2021-12-18 PROCEDURE — 25010000002 HEPARIN (PORCINE) PER 1000 UNITS: Performed by: INTERNAL MEDICINE

## 2021-12-18 PROCEDURE — 63710000001 INSULIN LISPRO (HUMAN) PER 5 UNITS: Performed by: INTERNAL MEDICINE

## 2021-12-18 PROCEDURE — 97110 THERAPEUTIC EXERCISES: CPT

## 2021-12-18 PROCEDURE — 25010000002 HALOPERIDOL LACTATE PER 5 MG: Performed by: NURSE PRACTITIONER

## 2021-12-18 PROCEDURE — 85025 COMPLETE CBC W/AUTO DIFF WBC: CPT | Performed by: NURSE PRACTITIONER

## 2021-12-18 PROCEDURE — 97535 SELF CARE MNGMENT TRAINING: CPT

## 2021-12-18 PROCEDURE — 99232 SBSQ HOSP IP/OBS MODERATE 35: CPT | Performed by: NURSE PRACTITIONER

## 2021-12-18 PROCEDURE — 82962 GLUCOSE BLOOD TEST: CPT

## 2021-12-18 RX ORDER — HYDRALAZINE HYDROCHLORIDE 25 MG/1
25 TABLET, FILM COATED ORAL EVERY 8 HOURS SCHEDULED
Status: DISCONTINUED | OUTPATIENT
Start: 2021-12-18 | End: 2021-12-19

## 2021-12-18 RX ORDER — HALOPERIDOL 5 MG/ML
1 INJECTION INTRAMUSCULAR ONCE
Status: COMPLETED | OUTPATIENT
Start: 2021-12-18 | End: 2021-12-18

## 2021-12-18 RX ORDER — HALOPERIDOL 5 MG/ML
0.5 INJECTION INTRAMUSCULAR ONCE
Status: COMPLETED | OUTPATIENT
Start: 2021-12-18 | End: 2021-12-18

## 2021-12-18 RX ADMIN — SENNOSIDES 2 TABLET: 8.6 TABLET, FILM COATED ORAL at 23:43

## 2021-12-18 RX ADMIN — CARVEDILOL 25 MG: 12.5 TABLET, FILM COATED ORAL at 10:19

## 2021-12-18 RX ADMIN — HYDROXYZINE HYDROCHLORIDE 25 MG: 25 TABLET, FILM COATED ORAL at 23:43

## 2021-12-18 RX ADMIN — POLYETHYLENE GLYCOL 3350 17 G: 17 POWDER, FOR SOLUTION ORAL at 10:19

## 2021-12-18 RX ADMIN — HYDRALAZINE HYDROCHLORIDE 50 MG: 50 TABLET, FILM COATED ORAL at 10:19

## 2021-12-18 RX ADMIN — HALOPERIDOL LACTATE 0.5 MG: 5 INJECTION, SOLUTION INTRAMUSCULAR at 02:28

## 2021-12-18 RX ADMIN — ACETAMINOPHEN 650 MG: 325 TABLET, FILM COATED ORAL at 10:19

## 2021-12-18 RX ADMIN — SODIUM CHLORIDE, PRESERVATIVE FREE 10 ML: 5 INJECTION INTRAVENOUS at 10:20

## 2021-12-18 RX ADMIN — ATORVASTATIN CALCIUM 80 MG: 40 TABLET, FILM COATED ORAL at 10:19

## 2021-12-18 RX ADMIN — HALOPERIDOL LACTATE 1 MG: 5 INJECTION, SOLUTION INTRAMUSCULAR at 03:59

## 2021-12-18 RX ADMIN — HYDROXYZINE HYDROCHLORIDE 25 MG: 25 TABLET, FILM COATED ORAL at 06:28

## 2021-12-18 RX ADMIN — HEPARIN SODIUM 5000 UNITS: 5000 INJECTION INTRAVENOUS; SUBCUTANEOUS at 05:13

## 2021-12-18 RX ADMIN — AMLODIPINE BESYLATE 10 MG: 5 TABLET ORAL at 10:19

## 2021-12-18 RX ADMIN — QUETIAPINE FUMARATE 25 MG: 25 TABLET ORAL at 23:43

## 2021-12-18 RX ADMIN — CALCIUM ACETATE 667 MG: 667 CAPSULE ORAL at 16:49

## 2021-12-18 RX ADMIN — SODIUM CHLORIDE, PRESERVATIVE FREE 10 ML: 5 INJECTION INTRAVENOUS at 23:43

## 2021-12-18 RX ADMIN — INSULIN LISPRO 3 UNITS: 100 INJECTION, SOLUTION INTRAVENOUS; SUBCUTANEOUS at 16:49

## 2021-12-18 RX ADMIN — HYDROCODONE BITARTRATE AND ACETAMINOPHEN 1 TABLET: 5; 325 TABLET ORAL at 06:28

## 2021-12-18 RX ADMIN — HEPARIN SODIUM 5000 UNITS: 5000 INJECTION INTRAVENOUS; SUBCUTANEOUS at 23:43

## 2021-12-18 RX ADMIN — CALCIUM ACETATE 667 MG: 667 CAPSULE ORAL at 10:19

## 2021-12-18 NOTE — PROGRESS NOTES
"   LOS: 10 days    Patient Care Team:  Provider, No Known as PCP - General  ESRD     Subjective     Interval History:   New tunnel catheter in place.  AV fistula nonfunctional    Review of Systems:   Patient denies shortness of breath, chest pain, dysuria, hematuria, nausea, vomiting.      Objective     Vital Sign Min/Max for last 24 hours  Temp  Min: 96.2 °F (35.7 °C)  Max: 98.6 °F (37 °C)   BP  Min: 91/54  Max: 113/63   Pulse  Min: 85  Max: 95   Resp  Min: 16  Max: 20   SpO2  Min: 95 %  Max: 98 %   No data recorded   No data recorded     Flowsheet Rows      First Filed Value   Admission Height 172.7 cm (68\") Documented at 12/08/2021 1240   Admission Weight 72.6 kg (160 lb) Documented at 12/08/2021 1240          No intake/output data recorded.  I/O last 3 completed shifts:  In: -   Out: 2530 [Other:2530]    Physical Exam:  General Appearance: Alert oriented x3 awake no obvious distress  Eyes: PER, EOMI.  Neck: Supple no JVD.  Lungs: Clear auscultation, no rales rhonchi's, equal chest movement, nonlabored.  Heart: No gallop, murmur, rub, RRR.  Abdomen: Soft, nontender, positive bowel sounds, no organomegaly.  Extremities: No edema, no cyanosis.  Neuro: No focal deficit, moving all extremities, alert oriented X 3  New tunnel catheter right IJ, right side of chest  Access: :Left forearm AVF nonfunctional      WBC WBC   Date Value Ref Range Status   12/18/2021 5.63 3.40 - 10.80 10*3/mm3 Final      HGB Hemoglobin   Date Value Ref Range Status   12/18/2021 8.2 (L) 12.0 - 15.9 g/dL Final      HCT Hematocrit   Date Value Ref Range Status   12/18/2021 23.3 (L) 34.0 - 46.6 % Final      Platlets No results found for: LABPLAT   MCV MCV   Date Value Ref Range Status   12/18/2021 93.2 79.0 - 97.0 fL Final          Sodium Sodium   Date Value Ref Range Status   12/18/2021 137 136 - 145 mmol/L Final   12/17/2021 137 136 - 145 mmol/L Final      Potassium Potassium   Date Value Ref Range Status   12/18/2021 4.6 3.5 - 5.2 mmol/L Final "   12/17/2021 4.8 3.5 - 5.2 mmol/L Final      Chloride Chloride   Date Value Ref Range Status   12/18/2021 101 98 - 107 mmol/L Final   12/17/2021 99 98 - 107 mmol/L Final      CO2 CO2   Date Value Ref Range Status   12/18/2021 23.0 22.0 - 29.0 mmol/L Final   12/17/2021 24.0 22.0 - 29.0 mmol/L Final      BUN BUN   Date Value Ref Range Status   12/18/2021 38 (H) 8 - 23 mg/dL Final   12/17/2021 65 (H) 8 - 23 mg/dL Final      Creatinine Creatinine   Date Value Ref Range Status   12/18/2021 3.73 (H) 0.57 - 1.00 mg/dL Final   12/17/2021 5.36 (H) 0.57 - 1.00 mg/dL Final      Calcium Calcium   Date Value Ref Range Status   12/18/2021 9.5 8.6 - 10.5 mg/dL Final   12/17/2021 8.9 8.6 - 10.5 mg/dL Final      PO4 No results found for: CAPO4   Albumin Albumin   Date Value Ref Range Status   12/17/2021 3.50 3.50 - 5.20 g/dL Final      Magnesium No results found for: MG   Uric Acid No results found for: URICACID        Results Review:     I reviewed the patient's new clinical results.    albumin human, , ,   albumin human, , ,   amLODIPine, 10 mg, Oral, Q24H  atorvastatin, 80 mg, Oral, Daily  calcium acetate, 667 mg, Oral, TID With Meals  carvedilol, 25 mg, Oral, BID With Meals  cyanocobalamin, 1,000 mcg, Intramuscular, Q28 Days  heparin (porcine), 5,000 Units, Subcutaneous, Q8H  hydrALAZINE, 50 mg, Oral, Q8H  insulin lispro, 0-7 Units, Subcutaneous, TID AC  polyethylene glycol, 17 g, Oral, Daily  senna, 2 tablet, Oral, Nightly  sodium chloride, 10 mL, Intravenous, Q12H           Medication Review:     Assessment/Plan       Right lower lobe pneumonia    Hepatocellular carcinoma metastatic to bone s/p RXT     Hemochromatosis    Cirrhosis of liver (HCC)    Chronic Hep C     ESRD (end stage renal disease)    Chronic ulcer of right foot    S/P left BKA    GERD (gastroesophageal reflux disease)    Chronic pain on Methadone    Essential hypertension    Type 2 diabetes mellitus (HCC)    AMS (altered mental status)    Colitis    Normocytic  anemia    Hypokalemia      1- ESRD - MWF - [facility]   2- HTN   3- Mild hypokalemia   4- Anemia of chronic disease   5- Familial hemochromatosis   6- Hx of Metastatic hepatocellular carcinoma       Plan:  -AV fistula nonfunctional.  New tunnel catheter was placed yesterday.  Next dialysis on Monday.  - Renal diet.    - Transfuse for Hgb less than 7.0       I discussed the patients findings and my recommendations with nursing staff    Staci Bates MD  12/18/21  11:56 EST

## 2021-12-18 NOTE — THERAPY TREATMENT NOTE
Patient Name: Jeaneth Keita  : 1960    MRN: [id number]                              Today's Date: 2021       Admit Date: 2021    Visit Dx:     ICD-10-CM ICD-9-CM   1. Confusion  R41.0 298.9   2. Pneumonia of right lower lobe due to infectious organism  J18.9 486   3. Hypokalemia  E87.6 276.8   4. Hypocalcemia  E83.51 275.41   5. End stage renal disease (HCC)  N18.6 585.6     Patient Active Problem List   Diagnosis   • ICH (intracerebral hemorrhage)   • Hepatocellular carcinoma metastatic to bone s/p RXT    • Hemochromatosis   • Cirrhosis of liver (HCC)   • Chronic Hep C    • ESRD (end stage renal disease)   • Chronic ulcer of right foot   • S/P left BKA   • GERD (gastroesophageal reflux disease)   • Chronic pain on Methadone   • Essential hypertension   • Type 2 diabetes mellitus (HCC)   • Right lower lobe pneumonia   • AMS (altered mental status)   • Colitis   • Normocytic anemia   • Hypokalemia     Past Medical History:   Diagnosis Date   • Anxiety    • DDD (degenerative disc disease), lumbar    • Depression    • Diabetes mellitus (HCC)    • Fibromyalgia    • Hemochromatosis    • Hep B w/o coma, chronic, w/o delta (HCC)    • Hep C w/o coma, chronic (HCC)    • Hypertension    • Vertigo      Past Surgical History:   Procedure Laterality Date   • ARTERIOVENOUS FISTULA/SHUNT SURGERY Left 10/16/2021    Procedure: UPPER EXTREMITY ARTERIOVENOUS FISTULA FORMATION LEFT;  Surgeon: Johnny Rousseau MD;  Location: [facility];  Service: Vascular;  Laterality: Left;   • BACK SURGERY     • BELOW KNEE LEG AMPUTATION     •  SECTION     • FOOT SURGERY     • KNEE SURGERY     • ROTATOR CUFF REPAIR     • SPINAL CORD STIMULATOR IMPLANT        General Information     Row Name 21 1545          Physical Therapy Time and Intention    Document Type therapy note (daily note)  -KG     Mode of Treatment physical therapy  -KG     Row Name 21 1549          General Information    Patient Profile  Reviewed yes  -KG     Existing Precautions/Restrictions fall  remote L BKA  -KG     Row Name 12/18/21 1545          Cognition    Orientation Status (Cognition) oriented to; person; verbal cues/prompts needed for orientation; time; place  -KG     Row Name 12/18/21 1545          Safety Issues, Functional Mobility    Impairments Affecting Function (Mobility) balance; endurance/activity tolerance; cognition  -KG           User Key  (r) = Recorded By, (t) = Taken By, (c) = Cosigned By    Initials Name Provider Type    Yoanna Clay Physical Therapist               Mobility     Row Name 12/18/21 1546          Bed Mobility    Bed Mobility supine-sit  -KG     Scooting/Bridging [place] (Bed Mobility) contact guard  -KG     Supine-Sit [place] (Bed Mobility) contact guard  -KG     Assistive Device (Bed Mobility) bed rails  -KG     Comment (Bed Mobility) cues sequencing  -KG     Row Name 12/18/21 1546          Transfers    Comment (Transfers) cues safe HP, mod-A to achieve full stand, pt became very dizzy upon standing and needed to sit, BP taken in sitting and dropped from 109/92 to 79/52.  Attempted to perform seated exercises to help improve orthostatics but pt still very dizzy and needed to lie down.  After 1 min lying, BP returned to 105/64  -KG     Row Name 12/18/21 1546          Bed-Chair Transfer    Bed-Chair [place] (Transfers) unable to assess  -KG     Row Name 12/18/21 1546          Sit-Stand Transfer    Sit-Stand [place] (Transfers) moderate assist (50% patient effort)  -KG     Assistive Device (Sit-Stand Transfers) walker, front-wheeled  -KG           User Key  (r) = Recorded By, (t) = Taken By, (c) = Cosigned By    Initials Name Provider Type    Yoanna Clay Physical Therapist               Obj/Interventions     Row Name 12/18/21 1925          Hip (Therapeutic Exercise)    Hip Strengthening (Therapeutic Exercise) bilateral; aBduction; aDduction; 10 repetitions; flexion; extension   -KG     Row Name 12/18/21 1552          Knee (Therapeutic Exercise)    Knee Strengthening (Therapeutic Exercise) bilateral; LAQ (long arc quad); heel slides; 10 repetitions; 2 sets  -KG     Row Name 12/18/21 1553          Balance    Static Sitting Balance WFL  -KG     Dynamic Sitting Balance WFL  -KG     Static Standing Balance mild impairment  -KG     Dynamic Standing Balance moderate impairment; supported; standing  -KG     Balance Interventions standing; sit to stand; weight shifting activity  -KG           User Key  (r) = Recorded By, (t) = Taken By, (c) = Cosigned By    Initials Name Provider Type    GUERRERO Yoanna Alvarado Physical Therapist               Goals/Plan    No documentation.                Clinical Impression     [facility] Name 12/18/21 1559          Pain    Additional Documentation Pain Scale: FACES Pre/Post-Treatment (Group)  -KG     Row Name 12/18/21 0678          Pain Scale: FACES Pre/Post-Treatment    Pain: FACES Scale, Pretreatment 2-->hurts little bit  -KG     Posttreatment Pain Rating 2-->hurts little bit  -KG     Pain Location - Side Left  -KG     Pain Location - Orientation lateral  -KG     Pain Location flank  -KG     Row Name 12/18/21 8666          Plan of Care Review    Plan of Care Reviewed With patient  -KG     Progress no change  -KG     Outcome Summary Pt with increased difficulty standing today, mod-A to achieve full stand, pt became very dizzy upon standing and needed to sit, BP taken in sitting and dropped from 109/92 to 79/52. Attempted to perform seated exercises to help improve orthostatics but pt still very dizzy and needed to lie down. After 1 min lying, BP returned to 105/64.  Nursing notified.  Pt may need extra assist if returning to [place] or to consider snf.  -KG     Row Name 12/18/21 1558          Vital Signs    Pre Systolic BP Rehab 109  -KG     Pre Treatment Diastolic BP 92  -KG     Intra Systolic BP Rehab 79  -KG     Intra Treatment Diastolic BP 52  -KG     Post  Systolic BP Rehab 105  -KG     Post Treatment Diastolic BP 64  -KG     Row Name 12/18/21 1554          Positioning and Restraints    Pre-Treatment Position in bed  -KG     Post Treatment Position bed  -KG     In Bed notified nsg; fowlers; call light within reach; encouraged to call for assist; exit alarm on  -KG           User Key  (r) = Recorded By, (t) = Taken By, (c) = Cosigned By    Initials Name Provider Type    Yoanna Clay Physical Therapist               Outcome Measures     Row Name 12/18/21 1557 12/18/21 0800       How much help from another person do you currently need...    Turning from your back to your side while in flat bed without using bedrails? 4  -KG 4  -ST    Moving from lying on back to sitting on the side of a flat bed without bedrails? 4  -KG 4  -ST    Moving to and from a bed to a chair (including a wheelchair)? 2  -KG 3  -ST    Standing up from a chair using your arms (e.g., wheelchair, bedside chair)? 2  -KG 3  -ST    Climbing 3-5 steps with a railing? 1  -KG 2  -ST    To walk in hospital room? 2  -KG 2  -ST    AM-PAC 6 Clicks Score (PT) 15  -KG 18  -ST    Row Name 12/18/21 1557 12/18/21 1144       Functional Assessment    Outcome Measure Options AM-PAC 6 Clicks Basic Mobility (PT)  -KG AM-PAC 6 Clicks Daily Activity (OT)  -CHENCHO          User Key  (r) = Recorded By, (t) = Taken By, (c) = Cosigned By    Initials Name Provider Type    Abby Batista OT Occupational Therapist    Yoanna Clay Physical Therapist    Lizeth Ma RN Registered Nurse                             Physical Therapy Education                 Title: PT OT SLP Therapies (In Progress)     Topic: Physical Therapy (Done)     Point: Mobility training (Done)     Learning Progress Summary           Patient Acceptance, E,TB, VU by KG at 12/18/2021 1558    Acceptance, E,D, NR by MARISSA at 12/14/2021 0923    Acceptance, E, NR by RHYS at 12/9/2021 0916    Comment: precautions for upright mobility                    Point: Home exercise program (Done)     Learning Progress Summary           Patient Acceptance, E,TB, VU by  at 12/18/2021 1558    Acceptance, E,D, NR by  at 12/14/2021 0923    Acceptance, E, NR by  at 12/9/2021 0916    Comment: precautions for upright mobility                   Point: Body mechanics (Done)     Learning Progress Summary           Patient Acceptance, E,TB, VU by  at 12/18/2021 1558    Acceptance, E,D, NR by  at 12/14/2021 0923    Acceptance, E, NR by  at 12/9/2021 0916    Comment: precautions for upright mobility                   Point: Precautions (Done)     Learning Progress Summary           Patient Acceptance, E,TB, VU by  at 12/18/2021 1558    Acceptance, E,D, NR by  at 12/14/2021 0923    Acceptance, E, NR by  at 12/9/2021 0916    Comment: precautions for upright mobility                               User Key     Initials Effective Dates Name Provider Type Discipline     06/16/21 -  Mamie Gallegos PT Physical Therapist PT     06/16/21 -  Yoanna Alvarado Physical Therapist PT     06/16/21 -  Basilio Farr, PT Physical Therapist PT              PT Recommendation and Plan     Plan of Care Reviewed With: patient  Progress: no change  Outcome Summary: Pt with increased difficulty standing today, mod-A to achieve full stand, pt became very dizzy upon standing and needed to sit, BP taken in sitting and dropped from 109/92 to 79/52. Attempted to perform seated exercises to help improve orthostatics but pt still very dizzy and needed to lie down. After 1 min lying, BP returned to 105/64.  Nursing notified.  Pt may need extra assist if returning to [place] or to consider snf.     Time Calculation:    PT Charges     Row Name 12/18/21 1559             Time Calculation    Start Time 1510  -KG      PT Received On 12/18/21  -KG      PT Goal Re-Cert Due Date 12/19/21  -KG              Time Calculation- PT    Total Timed Code Minutes- PT 30 minute(s)  -KG               Timed Charges    27109 - PT Therapeutic Exercise Minutes 10  -KG      91959 - PT Therapeutic Activity Minutes 20  -KG              Total Minutes    Timed Charges Total Minutes 30  -KG       Total Minutes 30  -KG            User Key  (r) = Recorded By, (t) = Taken By, (c) = Cosigned By    Initials Name Provider Type    Yoanna Clay Physical Therapist              Therapy Charges for Today     Code Description Service Date Service Provider Modifiers Qty    71984733606 HC PT THER PROC EA 15 MIN 12/18/2021 Yoanna Alvarado GP 1    70550617523 HC PT THERAPEUTIC ACT EA 15 MIN 12/18/2021 Yoanna Alvarado GP 1          PT G-Codes  Outcome Measure Options: AM-PAC 6 Clicks Basic Mobility (PT)  AM-PAC 6 Clicks Score (PT): 15  AM-PAC 6 Clicks Score (OT): 20    Yoanna Alvarado  12/18/2021

## 2021-12-18 NOTE — THERAPY PROGRESS REPORT/RE-CERT
Patient Name: Jeaneth Keita  : 1960    MRN: [id number]                              Today's Date: 2021       Admit Date: 2021    Visit Dx:     ICD-10-CM ICD-9-CM   1. Confusion  R41.0 298.9   2. Pneumonia of right lower lobe due to infectious organism  J18.9 486   3. Hypokalemia  E87.6 276.8   4. Hypocalcemia  E83.51 275.41   5. End stage renal disease (HCC)  N18.6 585.6     Patient Active Problem List   Diagnosis   • ICH (intracerebral hemorrhage)   • Hepatocellular carcinoma metastatic to bone s/p RXT    • Hemochromatosis   • Cirrhosis of liver (HCC)   • Chronic Hep C    • ESRD (end stage renal disease)   • Chronic ulcer of right foot   • S/P left BKA   • GERD (gastroesophageal reflux disease)   • Chronic pain on Methadone   • Essential hypertension   • Type 2 diabetes mellitus (HCC)   • Right lower lobe pneumonia   • AMS (altered mental status)   • Colitis   • Normocytic anemia   • Hypokalemia     Past Medical History:   Diagnosis Date   • Anxiety    • DDD (degenerative disc disease), lumbar    • Depression    • Diabetes mellitus (HCC)    • Fibromyalgia    • Hemochromatosis    • Hep B w/o coma, chronic, w/o delta (HCC)    • Hep C w/o coma, chronic (HCC)    • Hypertension    • Vertigo      Past Surgical History:   Procedure Laterality Date   • ARTERIOVENOUS FISTULA/SHUNT SURGERY Left 10/16/2021    Procedure: UPPER EXTREMITY ARTERIOVENOUS FISTULA FORMATION LEFT;  Surgeon: Johnny Rousseau MD;  Location: [facility];  Service: Vascular;  Laterality: Left;   • BACK SURGERY     • BELOW KNEE LEG AMPUTATION     •  SECTION     • FOOT SURGERY     • KNEE SURGERY     • ROTATOR CUFF REPAIR     • SPINAL CORD STIMULATOR IMPLANT        General Information     Row Name 21 1133          OT Time and Intention    Document Type progress note/recertification  -CHENCHO     Mode of Treatment individual therapy; occupational therapy  -CHENCHO     Row Name 21 1133          General Information     Patient Profile Reviewed yes  -CHENCHO     Existing Precautions/Restrictions fall  remote L BKA  -CHENCHO     Row Name 12/18/21 1133          Cognition    Orientation Status (Cognition) oriented to; person; place; time; verbal cues/prompts needed for orientation  pt. knew type of place, but not name of place  -CHENCHO     Row Name 12/18/21 1133          Safety Issues, Functional Mobility    Safety Issues Affecting Function (Mobility) insight into deficits/self-awareness; safety precaution awareness; safety precautions follow-through/compliance  -     Impairments Affecting Function (Mobility) balance; endurance/activity tolerance; cognition  -           User Key  (r) = Recorded By, (t) = Taken By, (c) = Cosigned By    Initials Name Provider Type    CHENCHO Abby Rivera OT Occupational Therapist                 Mobility/ADL's     Row Name 12/18/21 1134          Bed Mobility    Comment (Bed Mobility) [facility]IC on arrival  -     Row Name 12/18/21 1134          Transfers    Transfers sit-stand transfer  -     Comment (Transfers) pt. with cues for hand placement stood with CGA and placed knee of LLE in chair to help support with LBD, but then unsteady and returned to sit, min A second stand and min A to remain standing to work on LBD.  -CHENCHO     Sit-Stand [place] (Transfers) minimum assist (75% patient effort); verbal cues  -     Row Name 12/18/21 1134          Sit-Stand Transfer    Assistive Device (Sit-Stand Transfers) walker, front-wheeled  -     Row Name 12/18/21 1134          Activities of Daily Living    BADL Assessment/Intervention lower body dressing  -     Row Name 12/18/21 1134          Grooming Assessment/Training    [place] Level (Grooming) hair care, combing/brushing; set up; independent  -     Position (Grooming) supported sitting  -     Row Name 12/18/21 1134          Lower Body Dressing Assessment/Training    [place] Level (Lower Body Dressing) don; socks; independent; pants/bottoms; minimum  assist (75% patient effort)  -CHENCHO     Position (Lower Body Dressing) supported standing; supported sitting  -CHENCHO     Comment (Lower Body Dressing) pt. was able to trevor undergarment up to hips on own, but then needed min A to maintain balance even with cues to use LLE in recliner to help balance.  -CHENCHO           User Key  (r) = Recorded By, (t) = Taken By, (c) = Cosigned By    Initials Name Provider Type    CHENCHO Abby Rivera, OT Occupational Therapist               Obj/Interventions     Row Name 12/18/21 1137          Shoulder (Therapeutic Exercise)    Shoulder (Therapeutic Exercise) AROM (active range of motion)  -     Shoulder AROM (Therapeutic Exercise) bilateral; flexion; extension; aBduction; external rotation; horizontal aBduction/aDduction; other (see comments); sitting  15 reps with rest between each due to fatigue.  -CHENCHO     Row Name 12/18/21 1137          Elbow/Forearm (Therapeutic Exercise)    Elbow/Forearm (Therapeutic Exercise) strengthening exercise  -CHENCHO     Elbow/Forearm Strengthening (Therapeutic Exercise) bilateral; flexion; extension; sitting; other (see comments)  15 reps manual resistance  -CHENCHO     Row Name 12/18/21 1137          Motor Skills    Motor Skills functional endurance  -CHENCHO     Functional Endurance fair+ with rest periods needed with UE TE  -CHENCHO     Row Name 12/18/21 1137          Balance    Static Sitting Balance WFL; unsupported; sitting in chair  -CHENCHO     Dynamic Sitting Balance WFL; unsupported; sitting in chair  -CHENCHO     Static Standing Balance mild impairment; supported; standing  -CHENCHO     Dynamic Standing Balance mild impairment; supported; standing  -CHENCHO     Balance Interventions occupation based/functional task; sit to stand  -CHENCHO     Row Name 12/18/21 1137          Therapeutic Exercise    Therapeutic Exercise shoulder; elbow/forearm  15 reps gluteal set with knee press to support LBD  -CHENCHO           User Key  (r) = Recorded By, (t) = Taken By, (c) = Cosigned By    Initials Name Provider  Type    Abby Batista, OT Occupational Therapist               Goals/Plan     Row Name 12/18/21 1144          Bed Mobility Goal 1 (OT)    Progress/Outcomes (Bed Mobility Goal 1, OT) goal ongoing  -CHENCHO     Row Name 12/18/21 1144          Transfer Goal 1 (OT)    Progress/Outcome (Transfer Goal 1, OT) goal ongoing  -CHENCHO     Row Name 12/18/21 1144          Dressing Goal 1 (OT)    Progress/Outcome (Dressing Goal 1, OT) goal partially met  -CHENCHO     Row Name 12/18/21 1144          Toileting Goal 1 (OT)    Progress/Outcome (Toileting Goal 1, OT) goal ongoing  -CHENCHO           User Key  (r) = Recorded By, (t) = Taken By, (c) = Cosigned By    Initials Name Provider Type    Abby Batista OT Occupational Therapist               Clinical Impression     Row Name 12/18/21 1139          Pain Scale: Numbers Pre/Post-Treatment    Pretreatment Pain Rating 7/10  -CHENCHO     Posttreatment Pain Rating 7/10  -CHENCHO     Pain Location - Side Bilateral  -CHENCHO     Pain Location - Orientation lower  -CHENCHO     Pain Location extremity  -CHENCHO     Pain Intervention(s) Medication (See MAR); Ambulation/increased activity  -CHENCHO     Row Name 12/18/21 1139          Plan of Care Review    Plan of Care Reviewed With patient  -CHENCHO     Progress improving  -CHENCHO     Outcome Summary Pt. was able to progress to setup brushing hair today and was able to trevor sock independently. Pt. needed min A to balance when pulling up undergarment.  Pt. educated how to rest LLE in chair to help balance while pulling up undergarment. Good participation BUE TE.  Goals updated as needed. Continue OT POC. Recommend SNF unless has some assist at home.  -CHENCHO     Row Name 12/18/21 1139          Therapy Assessment/Plan (OT)    Therapy Frequency (OT) daily  -CHENCHO     Row Name 12/18/21 1139          Therapy Plan Review/Discharge Plan (OT)    Anticipated Discharge Disposition (OT) skilled nursing facility  -CHENCHO     Row Name 12/18/21 1139          Vital Signs    Pre Systolic BP Rehab 113  -CHENCHO     Pre  Treatment Diastolic BP 91  -CHENCHO     Pretreatment Heart Rate (beats/min) 92  -CHENCHO     Posttreatment Heart Rate (beats/min) 91  -CHENCHO     O2 Delivery Pre Treatment room air  -CHENCHO     O2 Delivery Intra Treatment room air  -CHENCHO     O2 Delivery Post Treatment room air  -CHENCHO     Pre Patient Position Sitting  -CHENCHO     Intra Patient Position Standing  -CHENCHO     Post Patient Position Sitting  -CHENCHO     Row Name 12/18/21 1139          Positioning and Restraints    Pre-Treatment Position sitting in chair/recliner  -CHENCHO     Post Treatment Position chair  -CHENCHO     In Chair reclined; call light within reach; encouraged to call for assist; exit alarm on; waffle cushion; legs elevated  -CHENCHO           User Key  (r) = Recorded By, (t) = Taken By, (c) = Cosigned By    Initials Name Provider Type    Abby Batista OT Occupational Therapist               Outcome Measures     Row Name 12/18/21 1144          How much help from another is currently needed...    Putting on and taking off regular lower body clothing? 3  -CHENCHO     Bathing (including washing, rinsing, and drying) 3  -CHENCHO     Toileting (which includes using toilet bed pan or urinal) 3  -CHENCHO     Putting on and taking off regular upper body clothing 3  -CHENCHO     Taking care of personal grooming (such as brushing teeth) 4  -CHENCHO     Eating meals 4  -CHENCHO     AM-PAC 6 Clicks Score (OT) 20  -CHENCHO     Row Name 12/18/21 1144          Functional Assessment    Outcome Measure Options AM-PAC 6 Clicks Daily Activity (OT)  -CHENCHO           User Key  (r) = Recorded By, (t) = Taken By, (c) = Cosigned By    Initials Name Provider Type    Abby Batista OT Occupational Therapist                Occupational Therapy Education                 Title: PT OT SLP Therapies (In Progress)     Topic: Occupational Therapy (In Progress)     Point: ADL training (Done)     Description:   Instruct learner(s) on proper safety adaptation and remediation techniques during self care or transfers.   Instruct in proper use of  assistive devices.              Learning Progress Summary           Patient Acceptance, E,D, VU,NR by  at 12/18/2021 1145    Comment: UE TE, placement LLE in chair to assist with balance with dressing, name of place    Acceptance, E,TB,D, DU,NR by [facility] at 12/14/2021 1207    Comment: UE HEP    Acceptance, E, NR by  at 12/9/2021 1007                   Point: Home exercise program (Done)     Description:   Instruct learner(s) on appropriate technique for monitoring, assisting and/or progressing therapeutic exercises/activities.              Learning Progress Summary           Patient Acceptance, E,D, VU,NR by  at 12/18/2021 1145    Comment: UE TE, placement LLE in chair to assist with balance with dressing, name of place    Acceptance, E,TB,D, DU,NR by [facility] at 12/14/2021 1207    Comment: UE HEP                   Point: Precautions (Done)     Description:   Instruct learner(s) on prescribed precautions during self-care and functional transfers.              Learning Progress Summary           Patient Acceptance, E,D, VU,NR by  at 12/18/2021 1145    Comment: UE TE, placement LLE in chair to assist with balance with dressing, name of place    Acceptance, E, NR by  at 12/9/2021 1007                   Point: Body mechanics (Not Started)     Description:   Instruct learner(s) on proper positioning and spine alignment during self-care, functional mobility activities and/or exercises.              Learner Progress:  Not documented in this visit.                      User Key     Initials Effective Dates Name Provider Type Discipline     06/16/21 -  Abby Rivera OT Occupational Therapist OT     06/16/21 -  Radha Mon OT Occupational Therapist OT    [facility] 06/16/21 -  Mamie Arriaga OT Occupational Therapist OT              OT Recommendation and Plan  Therapy Frequency (OT): daily  Plan of Care Review  Plan of Care Reviewed With: patient  Progress: improving  Outcome Summary: Pt. was able to progress to setup  brushing hair today and was able to trveor sock independently. Pt. needed min A to balance when pulling up undergarment.  Pt. educated how to rest LLE in chair to help balance while pulling up undergarment. Good participation BUE TE.  Goals updated as needed. Continue OT POC. Recommend SNF unless has some assist at home.     Time Calculation:    Time Calculation- OT     Row Name 12/18/21 1146             Time Calculation- OT    OT Start Time 1111  -CHENCHO      OT Received On 12/18/21  -CHENCHO      OT Goal Re-Cert Due Date 12/28/21  -CHENCHO              Timed Charges    24271 - OT Therapeutic Exercise Minutes 8  -CHENCHO      60636 - OT Therapeutic Activity Minutes 4  -CHENCHO      56839 - OT Self Care/Mgmt Minutes 8  -CHENCHO              Total Minutes    Timed Charges Total Minutes 20  -CHENCHO       Total Minutes 20  -CHENCHO            User Key  (r) = Recorded By, (t) = Taken By, (c) = Cosigned By    Initials Name Provider Type    Abby Batista OT Occupational Therapist              Therapy Charges for Today     Code Description Service Date Service Provider Modifiers Qty    [id number] HC OT SELF CARE/MGMT/TRAIN EA 15 MIN 12/18/2021 Abby Rivera OT GO 1               Abby Rivera OT  12/18/2021

## 2021-12-18 NOTE — PLAN OF CARE
Goal Outcome Evaluation:  Plan of Care Reviewed With: patient        Progress: no change  Outcome Summary: Pt with increased difficulty standing today, mod-A to achieve full stand, pt became very dizzy upon standing and needed to sit, BP taken in sitting and dropped from 109/92 to 79/52. Attempted to perform seated exercises to help improve orthostatics but pt still very dizzy and needed to lie down. After 1 min lying, BP returned to 105/64.  Nursing notified.  Pt may need extra assist if returning to [place] or to consider snf.

## 2021-12-18 NOTE — PLAN OF CARE
VSS on RA.  NSR per tele.  Pt awake most of the night complaining of pain all over but unable to describe pain.  PRN's utilized but ineffective.  Pt frequently taking off tele leads, pulse ox, and attempted to pull out tunneled cath.  Provider notified- 1x order for haldol ordered.  Pt calmed down for approximately 45 mins then began pulling at lines again and attempting to get out of bed.  Provider notified again- another order for haldol placed.  Will continue to monitor.     Problem: Adult Inpatient Plan of Care  Goal: Plan of Care Review  Outcome: Ongoing, Not Progressing  Goal: Patient-Specific Goal (Individualized)  Outcome: Ongoing, Not Progressing  Goal: Absence of Hospital-Acquired Illness or Injury  Outcome: Ongoing, Not Progressing  Intervention: Identify and Manage Fall Risk  Recent Flowsheet Documentation  Taken 12/18/2021 0236 by Safia Archer, RN  Safety Promotion/Fall Prevention:   activity supervised   assistive device/personal items within reach   clutter free environment maintained   safety round/check completed   room organization consistent   nonskid shoes/slippers when out of bed   fall prevention program maintained  Taken 12/18/2021 0000 by Safia Archer, RN  Safety Promotion/Fall Prevention:   activity supervised   assistive device/personal items within reach   clutter free environment maintained   fall prevention program maintained   nonskid shoes/slippers when out of bed   room organization consistent   safety round/check completed  Taken 12/17/2021 2200 by Safia Archer, RN  Safety Promotion/Fall Prevention:   activity supervised   assistive device/personal items within reach   clutter free environment maintained   fall prevention program maintained   nonskid shoes/slippers when out of bed   safety round/check completed   room organization consistent  Taken 12/17/2021 2000 by Safia Archer, RN  Safety Promotion/Fall Prevention:   activity supervised   assistive device/personal  items within reach   clutter free environment maintained   fall prevention program maintained   nonskid shoes/slippers when out of bed   room organization consistent   safety round/check completed  Intervention: Prevent Skin Injury  Recent Flowsheet Documentation  Taken 12/18/2021 0236 by Safia Archer RN  Body Position: position changed independently  Skin Protection:   adhesive use limited   incontinence pads utilized   tubing/devices free from skin contact   skin-to-device areas padded  Taken 12/18/2021 0000 by Safia Archer RN  Body Position: position changed independently  Skin Protection:   adhesive use limited   incontinence pads utilized   tubing/devices free from skin contact   skin-to-device areas padded   electrode sites changed   pulse oximeter probe site changed  Taken 12/17/2021 2200 by Safia Archer RN  Body Position: position changed independently  Skin Protection:   adhesive use limited   incontinence pads utilized   tubing/devices free from skin contact   skin-to-device areas padded  Taken 12/17/2021 2000 by Safia Archer RN  Body Position: position changed independently  Skin Protection:   adhesive use limited   incontinence pads utilized   tubing/devices free from skin contact   skin-to-device areas padded  Intervention: Prevent Infection  Recent Flowsheet Documentation  Taken 12/18/2021 0236 by Safia Archer RN  Infection Prevention:   environmental surveillance performed   visitors restricted/screened   single patient room provided   rest/sleep promoted   personal protective equipment utilized  Taken 12/18/2021 0000 by Safia Archer RN  Infection Prevention:   environmental surveillance performed   visitors restricted/screened   single patient room provided   rest/sleep promoted   personal protective equipment utilized  Taken 12/17/2021 2200 by Safia Archer RN  Infection Prevention:   environmental surveillance performed   visitors restricted/screened   single patient room  provided   rest/sleep promoted   personal protective equipment utilized  Taken 12/17/2021 2000 by Safia Archer, RN  Infection Prevention:   environmental surveillance performed   visitors restricted/screened   single patient room provided   rest/sleep promoted   personal protective equipment utilized  Goal: Optimal Comfort and Wellbeing  Outcome: Ongoing, Not Progressing  Intervention: Provide Person-Centered Care  Recent Flowsheet Documentation  Taken 12/17/2021 2000 by Safia Archer RN  Trust Relationship/Rapport:   care explained   choices provided  Goal: Readiness for Transition of Care  Outcome: Ongoing, Not Progressing     Problem: Fall Injury Risk  Goal: Absence of Fall and Fall-Related Injury  Outcome: Ongoing, Not Progressing  Intervention: Identify and Manage Contributors to Fall Injury Risk  Recent Flowsheet Documentation  Taken 12/18/2021 0236 by Safia Archer RN  Medication Review/Management: medications reviewed  Taken 12/18/2021 0000 by Safia Archer RN  Medication Review/Management: medications reviewed  Taken 12/17/2021 2200 by Safia Archer RN  Medication Review/Management: medications reviewed  Taken 12/17/2021 2000 by Safia Archer RN  Medication Review/Management: medications reviewed  Intervention: Promote Injury-Free Environment  Recent Flowsheet Documentation  Taken 12/18/2021 0236 by Safia Archer, RN  Safety Promotion/Fall Prevention:   activity supervised   assistive device/personal items within reach   clutter free environment maintained   safety round/check completed   room organization consistent   nonskid shoes/slippers when out of bed   fall prevention program maintained  Taken 12/18/2021 0000 by Safia Archer, RN  Safety Promotion/Fall Prevention:   activity supervised   assistive device/personal items within reach   clutter free environment maintained   fall prevention program maintained   nonskid shoes/slippers when out of bed   room organization consistent    safety round/check completed  Taken 12/17/2021 2200 by Safia Archer RN  Safety Promotion/Fall Prevention:   activity supervised   assistive device/personal items within reach   clutter free environment maintained   fall prevention program maintained   nonskid shoes/slippers when out of bed   safety round/check completed   room organization consistent  Taken 12/17/2021 2000 by Safia Archer RN  Safety Promotion/Fall Prevention:   activity supervised   assistive device/personal items within reach   clutter free environment maintained   fall prevention program maintained   nonskid shoes/slippers when out of bed   room organization consistent   safety round/check completed     Problem: Skin Injury Risk Increased  Goal: Skin Health and Integrity  Outcome: Ongoing, Not Progressing  Intervention: Optimize Skin Protection  Recent Flowsheet Documentation  Taken 12/18/2021 0236 by Safia Archer RN  Pressure Reduction Techniques:   frequent weight shift encouraged   heels elevated off bed   pressure points protected  Head of Bed (HOB): HOB at 15 degrees  Pressure Reduction Devices:   pressure-redistributing mattress utilized   positioning supports utilized   heel offloading device utilized  Skin Protection:   adhesive use limited   incontinence pads utilized   tubing/devices free from skin contact   skin-to-device areas padded  Taken 12/18/2021 0000 by Safia Archer RN  Pressure Reduction Techniques:   frequent weight shift encouraged   heels elevated off bed   pressure points protected  Head of Bed (HOB): HOB at 20-30 degrees  Pressure Reduction Devices:   pressure-redistributing mattress utilized   positioning supports utilized   heel offloading device utilized  Skin Protection:   adhesive use limited   incontinence pads utilized   tubing/devices free from skin contact   skin-to-device areas padded   electrode sites changed   pulse oximeter probe site changed  Taken 12/17/2021 2200 by Safia Archer  RN  Pressure Reduction Techniques:   frequent weight shift encouraged   heels elevated off bed   pressure points protected  Head of Bed (HOB): HOB at 20-30 degrees  Pressure Reduction Devices:   pressure-redistributing mattress utilized   positioning supports utilized   heel offloading device utilized  Skin Protection:   adhesive use limited   incontinence pads utilized   tubing/devices free from skin contact   skin-to-device areas padded  Taken 12/17/2021 2000 by Safia Archer RN  Pressure Reduction Techniques:   frequent weight shift encouraged   heels elevated off bed   pressure points protected  Head of Bed (HOB): HOB at 20-30 degrees  Pressure Reduction Devices:   pressure-redistributing mattress utilized   positioning supports utilized   heel offloading device utilized  Skin Protection:   adhesive use limited   incontinence pads utilized   tubing/devices free from skin contact   skin-to-device areas padded     Problem: Diabetes Comorbidity  Goal: Blood Glucose Level Within Desired Range  Outcome: Ongoing, Not Progressing  Intervention: Maintain Glycemic Control  Recent Flowsheet Documentation  Taken 12/17/2021 2000 by Safia Archer RN  Glycemic Management: blood glucose monitoring     Problem: Hypertension Comorbidity  Goal: Blood Pressure in Desired Range  Outcome: Ongoing, Not Progressing  Intervention: Maintain Hypertension-Management Strategies  Recent Flowsheet Documentation  Taken 12/18/2021 0236 by Safia Archer, RN  Medication Review/Management: medications reviewed  Taken 12/18/2021 0000 by Safia Archer RN  Medication Review/Management: medications reviewed  Taken 12/17/2021 2200 by Safia Archer RN  Medication Review/Management: medications reviewed  Taken 12/17/2021 2000 by Safia Archer RN  Medication Review/Management: medications reviewed     Problem: Pain Chronic (Persistent) (Comorbidity Management)  Goal: Acceptable Pain Control and Functional Ability  Outcome: Ongoing, Not  Progressing  Intervention: Develop Pain Management Plan  Recent Flowsheet Documentation  Taken 12/17/2021 2000 by Safia Archer RN  Pain Management Interventions: see MAR  Intervention: Manage Persistent Pain  Recent Flowsheet Documentation  Taken 12/18/2021 0236 by Safia Archer RN  Medication Review/Management: medications reviewed  Taken 12/18/2021 0000 by Safia Archer RN  Medication Review/Management: medications reviewed  Taken 12/17/2021 2200 by Safia Archer RN  Medication Review/Management: medications reviewed  Taken 12/17/2021 2000 by Safia Archer RN  Medication Review/Management: medications reviewed  Intervention: Optimize Psychosocial Wellbeing  Recent Flowsheet Documentation  Taken 12/17/2021 2000 by Safia Archer RN  Supportive Measures: active listening utilized  Diversional Activities: television     Problem: Restraint, Nonbehavioral (Nonviolent)  Goal: Discontinuation Criteria Achieved  Outcome: Ongoing, Not Progressing  Intervention: Implement Least-restrictive Safety Strategies  Recent Flowsheet Documentation  Taken 12/17/2021 2000 by Safia Archer RN  Diversional Activities: television  Goal: Personal Dignity and Safety Maintained  Outcome: Ongoing, Not Progressing  Intervention: Protect Dignity, Rights, and Personal Wellbeing  Recent Flowsheet Documentation  Taken 12/17/2021 2000 by Safia Archer RN  Trust Relationship/Rapport:   care explained   choices provided  Intervention: Protect Skin and Joint Integrity  Recent Flowsheet Documentation  Taken 12/18/2021 0236 by Safia Archer RN  Body Position: position changed independently  Taken 12/18/2021 0000 by Safia Archer RN  Body Position: position changed independently  Taken 12/17/2021 2200 by Safia Archer RN  Body Position: position changed independently  Taken 12/17/2021 2000 by Safia Archer RN  Body Position: position changed independently     Problem: Device-Related Complication Risk  (Hemodialysis)  Goal: Safe, Effective Therapy Delivery  Outcome: Ongoing, Not Progressing  Intervention: Optimize Device Care and Function  Recent Flowsheet Documentation  Taken 12/18/2021 0236 by Safia Archer RN  Medication Review/Management: medications reviewed  Taken 12/18/2021 0000 by Safia Archer RN  Medication Review/Management: medications reviewed  Taken 12/17/2021 2200 by Safia Archer RN  Medication Review/Management: medications reviewed  Taken 12/17/2021 2000 by Safia Archer RN  Medication Review/Management: medications reviewed     Problem: Hemodynamic Instability (Hemodialysis)  Goal: Vital Signs Remain in Desired Range  Outcome: Ongoing, Not Progressing     Problem: Infection (Hemodialysis)  Goal: Absence of Infection Signs and Symptoms  Outcome: Ongoing, Not Progressing  Intervention: Prevent or Manage Infection  Recent Flowsheet Documentation  Taken 12/18/2021 0236 by Safia Archer RN  Infection Prevention:   environmental surveillance performed   visitors restricted/screened   single patient room provided   rest/sleep promoted   personal protective equipment utilized  Taken 12/18/2021 0000 by Safia Archer RN  Infection Prevention:   environmental surveillance performed   visitors restricted/screened   single patient room provided   rest/sleep promoted   personal protective equipment utilized  Taken 12/17/2021 2200 by Safia Archer RN  Infection Prevention:   environmental surveillance performed   visitors restricted/screened   single patient room provided   rest/sleep promoted   personal protective equipment utilized  Taken 12/17/2021 2000 by Safia Archer RN  Infection Prevention:   environmental surveillance performed   visitors restricted/screened   single patient room provided   rest/sleep promoted   personal protective equipment utilized

## 2021-12-18 NOTE — PROGRESS NOTES
[facility] Medicine Services  PROGRESS NOTE    Patient Name: Jeaneth Keita  : 1960  MRN: [id number]    Date of Admission: 2021  Primary Care Physician: Provider, No Known    Subjective   Subjective     CC: Follow-up AMS    HPI:  Patient sitting up in cart at work with occupational therapy.  Patient thought she was going back to see her today; however, patient will be returning possibly Monday.  I instructed her that she has to be reevaluated by the facility and that transportation has to be available.  Patient denies pain today.    ROS:  Gen- No fevers, chills  CV- No chest pain, palpitations  Resp- No cough, dyspnea  GI- No N/V/D, abd pain  All other systems reviewed and are negative    Objective   Objective     Vital Signs:   Temp:  [96.2 °F (35.7 °C)-98.6 °F (37 °C)] 98.2 °F (36.8 °C)  Heart Rate:  [85-95] 90  Resp:  [16-20] 18  BP: ()/(46-71) 113/63     Physical Exam:  Constitutional: Awake, alert, chronically ill appearing  Eyes: sclerae anicteric, no conjunctival injection  HENT: NCAT, mucous membranes moist  Neck: Supple, no thyromegaly, no lymphadenopathy, trachea midline  Respiratory: Clear to auscultation bilaterally, nonlabored respirations.  RA 96%  Cardiovascular: RRR, no murmurs, rubs, or gallops.  HR 90  Gastrointestinal: Positive bowel sounds, soft, nontender, nondistended  Musculoskeletal: LBKA  Psychiatric: Flat affect, cooperative  Neurologic: No obvious neuro focal deficit, speech clear  Skin: No rashes    Results Reviewed:  LAB RESULTS:      Lab 21  0231 21  0930   WBC 5.63  --    HEMOGLOBIN 8.2*  --    HEMATOCRIT 23.3*  --    PLATELETS 176 171   NEUTROS ABS 3.30  --    IMMATURE GRANS (ABS) 0.02  --    LYMPHS ABS 1.71  --    MONOS ABS 0.42  --    EOS ABS 0.13  --    MCV 93.2  --    PROTIME  --  14.6*         Lab 21  0231 21  0317 21  0344   SODIUM 137 137 141   POTASSIUM 4.6 4.8 4.0   CHLORIDE 101 99 110*   CO2 23.0  24.0 20.0*   ANION GAP 13.0 14.0 11.0   BUN 38* 65* 41*   CREATININE 3.73* 5.36* 4.35*   GLUCOSE 99 126* 127*   CALCIUM 9.5 8.9 8.0*   PHOSPHORUS  --   --  4.8*         Lab 12/17/21  0317 12/12/21  0344   TOTAL PROTEIN 6.0  --    ALBUMIN 3.50 2.90*   GLOBULIN 2.5  --    ALT (SGPT) 41*  --    AST (SGOT) 42*  --    BILIRUBIN 0.3  --    ALK PHOS 87  --          Lab 12/17/21  0930   PROTIME 14.6*   INR 1.17*                 Brief Urine Lab Results  (Last result in the past 365 days)      Color   Clarity   Blood   Leuk Est   Nitrite   Protein   CREAT   Urine HCG        12/08/21 1404 Yellow   Clear   Negative   Negative   Negative   >=300 mg/dL (3+)                 Microbiology Results Abnormal     Procedure Component Value - Date/Time    COVID PRE-OP / PRE-PROCEDURE SCREENING ORDER (NO ISOLATION) - Swab, Nasopharynx [[id number]]  (Normal) Collected: 12/16/21 2039    Lab Status: Final result Specimen: Swab from Nasopharynx Updated: 12/16/21 2114    Narrative:      The following orders were created for panel order COVID PRE-OP / PRE-PROCEDURE SCREENING ORDER (NO ISOLATION) - Swab, Nasopharynx.  Procedure                               Abnormality         Status                     ---------                               -----------         ------                     COVID-19 and FLU A/B PCR...[[id number]]  Normal              Final result                 Please view results for these tests on the individual orders.    COVID-19 and FLU A/B PCR - Swab, Nasopharynx [[id number]]  (Normal) Collected: 12/16/21 2039    Lab Status: Final result Specimen: Swab from Nasopharynx Updated: 12/16/21 2114     COVID19 Not Detected     Influenza A PCR Not Detected     Influenza B PCR Not Detected    Narrative:      Fact sheet for providers: https://www.fda.gov/media/954436/download    Fact sheet for patients: https://www.fda.gov/media/637172/download    Test performed by PCR.    Blood Culture - Blood, Arm, Right [[id number]]  (Normal)  Collected: 12/08/21 1700    Lab Status: Final result Specimen: Blood from Arm, Right Updated: 12/13/21 1731     Blood Culture No growth at 5 days    Blood Culture - Blood, Arm, Left [[id number]]  (Normal) Collected: 12/08/21 1720    Lab Status: Final result Specimen: Blood from Arm, Left Updated: 12/13/21 1731     Blood Culture No growth at 5 days    MRSA Screen, PCR (Inpatient) - Swab, Nares [[id number]]  (Normal) Collected: 12/08/21 2213    Lab Status: Final result Specimen: Swab from Nares Updated: 12/09/21 0749     MRSA PCR Negative    Narrative:      MRSA Negative    S. Pneumo Ag Urine or CSF - Urine, Urine, Clean Catch [[id number]]  (Normal) Collected: 12/08/21 1404    Lab Status: Final result Specimen: Urine, Clean Catch Updated: 12/09/21 0709     Strep Pneumo Ag Negative    Legionella Antigen, Urine - Urine, Urine, Clean Catch [[id number]]  (Normal) Collected: 12/08/21 1404    Lab Status: Final result Specimen: Urine, Clean Catch Updated: 12/09/21 0706     LEGIONELLA ANTIGEN, URINE Negative    Respiratory Panel PCR w/COVID-19(SARS-CoV-2) RUBINA/MANDY/MARK/PAD/COR/MAD/HORACE In-House, NP Swab in UTM/VTM, 3-4 HR TAT - Swab, Nasopharynx [[id number]]  (Normal) Collected: 12/08/21 2213    Lab Status: Final result Specimen: Swab from Nasopharynx Updated: 12/09/21 0003     ADENOVIRUS, PCR Not Detected     Coronavirus 229E Not Detected     Coronavirus HKU1 Not Detected     Coronavirus NL63 Not Detected     Coronavirus OC43 Not Detected     COVID19 Not Detected     Human Metapneumovirus Not Detected     Human Rhinovirus/Enterovirus Not Detected     Influenza A PCR Not Detected     Influenza B PCR Not Detected     Parainfluenza Virus 1 Not Detected     Parainfluenza Virus 2 Not Detected     Parainfluenza Virus 3 Not Detected     Parainfluenza Virus 4 Not Detected     RSV, PCR Not Detected     Bordetella pertussis pcr Not Detected     Bordetella parapertussis PCR Not Detected     Chlamydophila pneumoniae PCR Not Detected      Mycoplasma pneumo by PCR Not Detected    Narrative:      In the setting of a positive respiratory panel with a viral infection PLUS a negative procalcitonin without other underlying concern for bacterial infection, consider observing off antibiotics or discontinuation of antibiotics and continue supportive care. If the respiratory panel is positive for atypical bacterial infection (Bordetella pertussis, Chlamydophila pneumoniae, or Mycoplasma pneumoniae), consider antibiotic de-escalation to target atypical bacterial infection.          No radiology results from the last 24 hrs    Results for orders placed during the hospital encounter of 10/05/21    Adult Transthoracic Echo Complete W/ Cont if Necessary Per Protocol    Interpretation Summary  · Left ventricular ejection fraction appears to be 61 - 65%. Left ventricular systolic function is normal.  · Left atrial volume is mildly increased.      I have reviewed the medications:  Scheduled Meds:albumin human, , ,   albumin human, , ,   amLODIPine, 10 mg, Oral, Q24H  atorvastatin, 80 mg, Oral, Daily  calcium acetate, 667 mg, Oral, TID With Meals  carvedilol, 25 mg, Oral, BID With Meals  cyanocobalamin, 1,000 mcg, Intramuscular, Q28 Days  heparin (porcine), 5,000 Units, Subcutaneous, Q8H  hydrALAZINE, 50 mg, Oral, Q8H  insulin lispro, 0-7 Units, Subcutaneous, TID AC  polyethylene glycol, 17 g, Oral, Daily  senna, 2 tablet, Oral, Nightly  sodium chloride, 10 mL, Intravenous, Q12H      Continuous Infusions:   PRN Meds:.•  acetaminophen **OR** acetaminophen **OR** acetaminophen  •  albumin human  •  dextrose  •  dextrose  •  glucagon (human recombinant)  •  heparin (porcine)  •  hydrOXYzine  •  ipratropium-albuterol  •  naloxone  •  ondansetron  •  QUEtiapine  •  sodium chloride    Assessment/Plan   Assessment & Plan     Active Hospital Problems    Diagnosis  POA   • **Right lower lobe pneumonia [J18.9]  Yes   • AMS (altered mental status) [R41.82]  Yes   • Colitis  [K52.9]  Unknown   • Normocytic anemia [D64.9]  Unknown   • Hypokalemia [E87.6]  Unknown   • Hepatocellular carcinoma metastatic to bone s/p RXT  [C79.51, C22.0]  Yes   • Cirrhosis of liver (HCC) [K74.60]  Yes   • Hemochromatosis [E83.119]  Yes   • S/P left BKA [Z89.512]  Not Applicable   • Chronic Hep C  [B18.2]  Yes   • Chronic pain on Methadone [F11.90]  Yes   • Chronic ulcer of right foot [L97.519]  Yes   • ESRD (end stage renal disease) [N18.6]  Yes   • GERD (gastroesophageal reflux disease) [K21.9]  Yes   • Essential hypertension [I10]  Yes   • Type 2 diabetes mellitus (HCC) [E11.9]  Yes      Resolved Hospital Problems   No resolved problems to display.     Brief Hospital Course to date:  Jeaneth Keita is a 61 y.o. female with history of cirrhosis with HCC metastatic to bone s/p XRT, chronic hep C, chronic pain on methadone, ESRD on HD, familial hemochromatosis, hypertension, type 2 diabetes patient presented to the ED with AMS     Acute encephalopathy  Right lower lobe pneumonia  -Blood cultures NGTD, urinary antigens negative, CT head unrevealing.  -MRI head with no acute processes, does show evidence of prior thalamic hemorrhage  -TSH, ammonia, B12 are all wnl  -She is s/p 7-day course of Zosyn and doxycycline.    -Continue Seroquel 25 mg nightly for insomnia  -Patient is currently baseline mental status     ESRD on HD  -Renal following, s/p HD today, patient with AV fistula in place, tunneled cath has since been removed.  -12/17 new tunneled cath placed to the right IJ.      Hypertension  -  Has been hypotensive at times, requiring BP meds to be held during these times.     Type 2 diabetes  -Unable to accurately assess control as patient is on HD, A1c 5.1%  -Continue SSI for now     Liver cirrhosis  HCC with metastasis to bone s/p XRT  Chronic hep C  Familial hemochromatosis  -Patient has previously had all of her care done at [facility]  -Continue home meds when appropriate     Chronic pain  -previously on  methadone, UDS is negative, and methadone not on active med list     DVT prophylaxis:  Medical and mechanical DVT prophylaxis orders are present.      AM-PAC 6 Clicks Score (PT): 16 (12/15/21 0740)    DVT prophylaxis:  Medical and mechanical DVT prophylaxis orders are present.       AM-PAC 6 Clicks Score (PT): 18 (12/17/21 0846)    Disposition: Anticipate discharge back to [facility] Monday evening    CODE STATUS:   Code Status and Medical Interventions:   Ordered at: 12/08/21 1940     Level Of Support Discussed With:    Patient     Code Status (Patient has no pulse and is not breathing):    CPR (Attempt to Resuscitate)     Medical Interventions (Patient has pulse or is breathing):    Full Support       MAYELIN Ayon  12/18/21

## 2021-12-18 NOTE — PLAN OF CARE
Goal Outcome Evaluation:  Plan of Care Reviewed With: patient        Progress: improving  Outcome Summary: Pt. was able to progress to setup brushing hair today and was able to trevor sock independently. Pt. needed min A to balance when pulling up undergarment.  Pt. educated how to rest LLE in chair to help balance while pulling up undergarment. Good participation RUBÉN MONDRAGON.  Goals updated as needed. Continue OT POC. Recommend SNF unless has some assist at home.

## 2021-12-18 NOTE — PLAN OF CARE
Goal Outcome Evaluation:  Plan of Care Reviewed With: patient        Progress: no change  Outcome Summary: pt less confused this shift. pt assisted to BSC multiple times through out the shift. No BM at this time. pt states she had one 2 days ago. NSR on monitor. Dr. James notified of drop in BP with PT. BP meds held this evening. otherwise VSS.

## 2021-12-19 LAB
ANION GAP SERPL CALCULATED.3IONS-SCNC: 16 MMOL/L (ref 5–15)
BASOPHILS # BLD AUTO: 0.03 10*3/MM3 (ref 0–0.2)
BASOPHILS NFR BLD AUTO: 0.5 % (ref 0–1.5)
BUN SERPL-MCNC: 55 MG/DL (ref 8–23)
BUN/CREAT SERPL: 10.9 (ref 7–25)
CALCIUM SPEC-SCNC: 9.3 MG/DL (ref 8.6–10.5)
CHLORIDE SERPL-SCNC: 101 MMOL/L (ref 98–107)
CLUMPED PLATELETS: PRESENT
CO2 SERPL-SCNC: 19 MMOL/L (ref 22–29)
CREAT SERPL-MCNC: 5.03 MG/DL (ref 0.57–1)
DEPRECATED RDW RBC AUTO: 53.1 FL (ref 37–54)
EOSINOPHIL # BLD AUTO: 0.2 10*3/MM3 (ref 0–0.4)
EOSINOPHIL NFR BLD AUTO: 3.3 % (ref 0.3–6.2)
ERYTHROCYTE [DISTWIDTH] IN BLOOD BY AUTOMATED COUNT: 15.4 % (ref 12.3–15.4)
GFR SERPL CREATININE-BSD FRML MDRD: 9 ML/MIN/1.73
GFR SERPL CREATININE-BSD FRML MDRD: ABNORMAL ML/MIN/{1.73_M2}
GLUCOSE BLDC GLUCOMTR-MCNC: 129 MG/DL (ref 70–130)
GLUCOSE BLDC GLUCOMTR-MCNC: 134 MG/DL (ref 70–130)
GLUCOSE BLDC GLUCOMTR-MCNC: 158 MG/DL (ref 70–130)
GLUCOSE BLDC GLUCOMTR-MCNC: 203 MG/DL (ref 70–130)
GLUCOSE SERPL-MCNC: 119 MG/DL (ref 65–99)
HCT VFR BLD AUTO: 24.5 % (ref 34–46.6)
HGB BLD-MCNC: 8.3 G/DL (ref 12–15.9)
IMM GRANULOCYTES # BLD AUTO: 0.02 10*3/MM3 (ref 0–0.05)
IMM GRANULOCYTES NFR BLD AUTO: 0.3 % (ref 0–0.5)
LYMPHOCYTES # BLD AUTO: 1.82 10*3/MM3 (ref 0.7–3.1)
LYMPHOCYTES NFR BLD AUTO: 30.1 % (ref 19.6–45.3)
MCH RBC QN AUTO: 32.4 PG (ref 26.6–33)
MCHC RBC AUTO-ENTMCNC: 33.9 G/DL (ref 31.5–35.7)
MCV RBC AUTO: 95.7 FL (ref 79–97)
MONOCYTES # BLD AUTO: 0.45 10*3/MM3 (ref 0.1–0.9)
MONOCYTES NFR BLD AUTO: 7.5 % (ref 5–12)
NEUTROPHILS NFR BLD AUTO: 3.52 10*3/MM3 (ref 1.7–7)
NEUTROPHILS NFR BLD AUTO: 58.3 % (ref 42.7–76)
NRBC BLD AUTO-RTO: 0 /100 WBC (ref 0–0.2)
PLATELET # BLD AUTO: 156 10*3/MM3 (ref 140–450)
PMV BLD AUTO: 10.8 FL (ref 6–12)
POTASSIUM SERPL-SCNC: 5 MMOL/L (ref 3.5–5.2)
RBC # BLD AUTO: 2.56 10*6/MM3 (ref 3.77–5.28)
RBC MORPH BLD: NORMAL
SODIUM SERPL-SCNC: 136 MMOL/L (ref 136–145)
WBC MORPH BLD: NORMAL
WBC NRBC COR # BLD: 6.04 10*3/MM3 (ref 3.4–10.8)

## 2021-12-19 PROCEDURE — 82962 GLUCOSE BLOOD TEST: CPT

## 2021-12-19 PROCEDURE — 25010000002 HEPARIN (PORCINE) PER 1000 UNITS: Performed by: INTERNAL MEDICINE

## 2021-12-19 PROCEDURE — 99232 SBSQ HOSP IP/OBS MODERATE 35: CPT | Performed by: INTERNAL MEDICINE

## 2021-12-19 PROCEDURE — 63710000001 INSULIN LISPRO (HUMAN) PER 5 UNITS: Performed by: INTERNAL MEDICINE

## 2021-12-19 PROCEDURE — 85025 COMPLETE CBC W/AUTO DIFF WBC: CPT | Performed by: NURSE PRACTITIONER

## 2021-12-19 PROCEDURE — 85007 BL SMEAR W/DIFF WBC COUNT: CPT | Performed by: NURSE PRACTITIONER

## 2021-12-19 PROCEDURE — 80048 BASIC METABOLIC PNL TOTAL CA: CPT | Performed by: NURSE PRACTITIONER

## 2021-12-19 RX ORDER — AMLODIPINE BESYLATE 5 MG/1
5 TABLET ORAL
Status: DISCONTINUED | OUTPATIENT
Start: 2021-12-19 | End: 2021-12-21 | Stop reason: HOSPADM

## 2021-12-19 RX ORDER — ALBUMIN (HUMAN) 12.5 G/50ML
25 SOLUTION INTRAVENOUS AS NEEDED
Status: CANCELLED | OUTPATIENT
Start: 2021-12-20 | End: 2021-12-20

## 2021-12-19 RX ADMIN — AMLODIPINE BESYLATE 5 MG: 5 TABLET ORAL at 09:07

## 2021-12-19 RX ADMIN — CALCIUM ACETATE 667 MG: 667 CAPSULE ORAL at 11:58

## 2021-12-19 RX ADMIN — INSULIN LISPRO 2 UNITS: 100 INJECTION, SOLUTION INTRAVENOUS; SUBCUTANEOUS at 18:06

## 2021-12-19 RX ADMIN — INSULIN LISPRO 3 UNITS: 100 INJECTION, SOLUTION INTRAVENOUS; SUBCUTANEOUS at 11:58

## 2021-12-19 RX ADMIN — HEPARIN SODIUM 5000 UNITS: 5000 INJECTION INTRAVENOUS; SUBCUTANEOUS at 22:37

## 2021-12-19 RX ADMIN — CARVEDILOL 25 MG: 12.5 TABLET, FILM COATED ORAL at 18:06

## 2021-12-19 RX ADMIN — CALCIUM ACETATE 667 MG: 667 CAPSULE ORAL at 09:07

## 2021-12-19 RX ADMIN — CARVEDILOL 25 MG: 12.5 TABLET, FILM COATED ORAL at 09:07

## 2021-12-19 RX ADMIN — POLYETHYLENE GLYCOL 3350 17 G: 17 POWDER, FOR SOLUTION ORAL at 09:06

## 2021-12-19 RX ADMIN — HYDROXYZINE HYDROCHLORIDE 25 MG: 25 TABLET, FILM COATED ORAL at 20:27

## 2021-12-19 RX ADMIN — QUETIAPINE FUMARATE 25 MG: 25 TABLET ORAL at 20:27

## 2021-12-19 RX ADMIN — HEPARIN SODIUM 5000 UNITS: 5000 INJECTION INTRAVENOUS; SUBCUTANEOUS at 14:37

## 2021-12-19 RX ADMIN — ATORVASTATIN CALCIUM 80 MG: 40 TABLET, FILM COATED ORAL at 09:07

## 2021-12-19 RX ADMIN — SODIUM CHLORIDE, PRESERVATIVE FREE 10 ML: 5 INJECTION INTRAVENOUS at 09:07

## 2021-12-19 RX ADMIN — HYDRALAZINE HYDROCHLORIDE 25 MG: 25 TABLET ORAL at 00:23

## 2021-12-19 RX ADMIN — SENNOSIDES 2 TABLET: 8.6 TABLET, FILM COATED ORAL at 20:27

## 2021-12-19 RX ADMIN — SODIUM CHLORIDE, PRESERVATIVE FREE 10 ML: 5 INJECTION INTRAVENOUS at 20:28

## 2021-12-19 RX ADMIN — HEPARIN SODIUM 5000 UNITS: 5000 INJECTION INTRAVENOUS; SUBCUTANEOUS at 05:41

## 2021-12-19 RX ADMIN — ACETAMINOPHEN 650 MG: 325 TABLET, FILM COATED ORAL at 20:27

## 2021-12-19 RX ADMIN — CALCIUM ACETATE 667 MG: 667 CAPSULE ORAL at 18:06

## 2021-12-19 NOTE — PROGRESS NOTES
"   LOS: 11 days    Patient Care Team:  Provider, No Known as PCP - General  ESRD     Subjective     Interval History:   No complaints, no acute events overnight.   Sitting having her no lunch  Review of Systems:   Pt denies chest pain, SOB, nausea/vomiting, diarrhea.       Objective     Vital Sign Min/Max for last 24 hours  Temp  Min: 97.9 °F (36.6 °C)  Max: 98.8 °F (37.1 °C)   BP  Min: 79/52  Max: 117/78   Pulse  Min: 85  Max: 94   Resp  Min: 18  Max: 20   SpO2  Min: 96 %  Max: 97 %   No data recorded   Weight  Min: 64.1 kg (141 lb 6.4 oz)  Max: 64.1 kg (141 lb 6.4 oz)     Flowsheet Rows      First Filed Value   Admission Height 172.7 cm (68\") Documented at 12/08/2021 1240   Admission Weight 72.6 kg (160 lb) Documented at 12/08/2021 1240          No intake/output data recorded.  I/O last 3 completed shifts:  In: -   Out: 100 [Urine:100]    Physical Exam:  General Appearance: Alert oriented x3 awake no obvious distress  Eyes: PER, EOMI.  Neck: Supple no JVD.  Lungs: Clear auscultation, no rales rhonchi's, equal chest movement, nonlabored.  Heart: No gallop, murmur, rub, RRR.  Abdomen: Soft, nontender, positive bowel sounds x 4.   Extremities: No edema, no cyanosis.  Neuro: No focal deficit, moving all extremities, alert oriented X 3  New tunnel catheter right IJ, right side of chest  Access: :Left forearm AVF nonfunctional      WBC WBC   Date Value Ref Range Status   12/19/2021 6.04 3.40 - 10.80 10*3/mm3 Final   12/18/2021 5.63 3.40 - 10.80 10*3/mm3 Final      HGB Hemoglobin   Date Value Ref Range Status   12/19/2021 8.3 (L) 12.0 - 15.9 g/dL Final   12/18/2021 8.2 (L) 12.0 - 15.9 g/dL Final      HCT Hematocrit   Date Value Ref Range Status   12/19/2021 24.5 (L) 34.0 - 46.6 % Final   12/18/2021 23.3 (L) 34.0 - 46.6 % Final      Platlets No results found for: LABPLAT   MCV MCV   Date Value Ref Range Status   12/19/2021 95.7 79.0 - 97.0 fL Final   12/18/2021 93.2 79.0 - 97.0 fL Final          Sodium Sodium   Date Value " Ref Range Status   12/19/2021 136 136 - 145 mmol/L Final   12/18/2021 137 136 - 145 mmol/L Final   12/17/2021 137 136 - 145 mmol/L Final      Potassium Potassium   Date Value Ref Range Status   12/19/2021 5.0 3.5 - 5.2 mmol/L Final     Comment:     Slight hemolysis detected by analyzer. Results may be affected.   12/18/2021 4.6 3.5 - 5.2 mmol/L Final   12/17/2021 4.8 3.5 - 5.2 mmol/L Final      Chloride Chloride   Date Value Ref Range Status   12/19/2021 101 98 - 107 mmol/L Final   12/18/2021 101 98 - 107 mmol/L Final   12/17/2021 99 98 - 107 mmol/L Final      CO2 CO2   Date Value Ref Range Status   12/19/2021 19.0 (L) 22.0 - 29.0 mmol/L Final   12/18/2021 23.0 22.0 - 29.0 mmol/L Final   12/17/2021 24.0 22.0 - 29.0 mmol/L Final      BUN BUN   Date Value Ref Range Status   12/19/2021 55 (H) 8 - 23 mg/dL Final   12/18/2021 38 (H) 8 - 23 mg/dL Final   12/17/2021 65 (H) 8 - 23 mg/dL Final      Creatinine Creatinine   Date Value Ref Range Status   12/19/2021 5.03 (H) 0.57 - 1.00 mg/dL Final   12/18/2021 3.73 (H) 0.57 - 1.00 mg/dL Final   12/17/2021 5.36 (H) 0.57 - 1.00 mg/dL Final      Calcium Calcium   Date Value Ref Range Status   12/19/2021 9.3 8.6 - 10.5 mg/dL Final   12/18/2021 9.5 8.6 - 10.5 mg/dL Final   12/17/2021 8.9 8.6 - 10.5 mg/dL Final      PO4 No results found for: CAPO4   Albumin Albumin   Date Value Ref Range Status   12/17/2021 3.50 3.50 - 5.20 g/dL Final      Magnesium No results found for: MG   Uric Acid No results found for: URICACID        Results Review:     I reviewed the patient's new clinical results.    albumin human, , ,   albumin human, , ,   amLODIPine, 5 mg, Oral, Q24H  atorvastatin, 80 mg, Oral, Daily  calcium acetate, 667 mg, Oral, TID With Meals  carvedilol, 25 mg, Oral, BID With Meals  cyanocobalamin, 1,000 mcg, Intramuscular, Q28 Days  heparin (porcine), 5,000 Units, Subcutaneous, Q8H  insulin lispro, 0-7 Units, Subcutaneous, TID AC  polyethylene glycol, 17 g, Oral, Daily  senna, 2  tablet, Oral, Nightly  sodium chloride, 10 mL, Intravenous, Q12H           Medication Review:     Assessment/Plan       Right lower lobe pneumonia    Hepatocellular carcinoma metastatic to bone s/p RXT     Hemochromatosis    Cirrhosis of liver (HCC)    Chronic Hep C     ESRD (end stage renal disease)    Chronic ulcer of right foot    S/P left BKA    GERD (gastroesophageal reflux disease)    Chronic pain on Methadone    Essential hypertension    Type 2 diabetes mellitus (HCC)    AMS (altered mental status)    Colitis    Normocytic anemia    Hypokalemia      1- ESRD - MWF - [facility]   2- HTN- BP on the low end of normal, may need to liberalize BP meds.   3- Mild hypokalemia- resolved.   4- Anemia of chronic disease: stable   5- Familial hemochromatosis   6- Hx of Metastatic hepatocellular carcinoma    7- Metabolic Acidosis: correct with HD.      Plan:  -AV fistula nonfunctional.  +THC in place. Plan for HD tomorrow. Orders placed.  - Renal diet.    - Transfuse for Hgb less than 7.0    Agree with the above plan.  Home okay with nephrology    BARI Briceño  12/19/21  09:06 EST

## 2021-12-19 NOTE — PLAN OF CARE
VSS on RA, NSR per tele.  Uneventful shift.  Pt slept most of the night.  Arouses to voice but sleeping in between care.  Will continue to monitor.     Problem: Adult Inpatient Plan of Care  Goal: Plan of Care Review  Outcome: Ongoing, Progressing  Goal: Patient-Specific Goal (Individualized)  Outcome: Ongoing, Progressing  Goal: Absence of Hospital-Acquired Illness or Injury  Outcome: Ongoing, Progressing  Intervention: Identify and Manage Fall Risk  Recent Flowsheet Documentation  Taken 12/19/2021 0407 by Safia Archer, RN  Safety Promotion/Fall Prevention:   activity supervised   assistive device/personal items within reach   clutter free environment maintained   safety round/check completed   room organization consistent   nonskid shoes/slippers when out of bed   fall prevention program maintained  Taken 12/19/2021 0300 by Safia Archer, RN  Safety Promotion/Fall Prevention:   activity supervised   assistive device/personal items within reach   clutter free environment maintained   fall prevention program maintained   nonskid shoes/slippers when out of bed   room organization consistent   safety round/check completed  Taken 12/19/2021 0000 by Safia Archer, RN  Safety Promotion/Fall Prevention:   activity supervised   assistive device/personal items within reach   clutter free environment maintained   fall prevention program maintained   nonskid shoes/slippers when out of bed   room organization consistent   safety round/check completed  Taken 12/18/2021 2200 by Safia Archer, RN  Safety Promotion/Fall Prevention:   activity supervised   assistive device/personal items within reach   clutter free environment maintained   safety round/check completed   room organization consistent   nonskid shoes/slippers when out of bed   fall prevention program maintained  Taken 12/18/2021 2000 by Safia Archer, RN  Safety Promotion/Fall Prevention:   activity supervised   assistive device/personal items within  reach   clutter free environment maintained   fall prevention program maintained   nonskid shoes/slippers when out of bed   room organization consistent   safety round/check completed  Intervention: Prevent Skin Injury  Recent Flowsheet Documentation  Taken 12/19/2021 0407 by Safia Archer RN  Body Position: position changed independently  Skin Protection:   adhesive use limited   incontinence pads utilized   tubing/devices free from skin contact   skin-to-device areas padded  Taken 12/19/2021 0300 by Safia Archer RN  Body Position: position changed independently  Taken 12/19/2021 0200 by Safia Archer RN  Skin Protection:   adhesive use limited   incontinence pads utilized   tubing/devices free from skin contact   skin-to-device areas padded  Taken 12/19/2021 0000 by Safia Archer RN  Body Position: position changed independently  Skin Protection:   adhesive use limited   incontinence pads utilized   tubing/devices free from skin contact   skin-to-device areas padded  Taken 12/18/2021 2200 by Safia Archer RN  Body Position: position changed independently  Skin Protection:   adhesive use limited   incontinence pads utilized   tubing/devices free from skin contact   skin-to-device areas padded  Taken 12/18/2021 2000 by Safia Archer RN  Body Position: position changed independently  Skin Protection:   adhesive use limited   incontinence pads utilized   tubing/devices free from skin contact   skin-to-device areas padded  Intervention: Prevent Infection  Recent Flowsheet Documentation  Taken 12/19/2021 0407 by Safia Arhcer RN  Infection Prevention:   environmental surveillance performed   visitors restricted/screened   single patient room provided   personal protective equipment utilized   rest/sleep promoted  Taken 12/19/2021 0300 by Safia Archer RN  Infection Prevention:   environmental surveillance performed   visitors restricted/screened   single patient room provided   rest/sleep  promoted   personal protective equipment utilized  Taken 12/19/2021 0000 by Safia Archer RN  Infection Prevention:   environmental surveillance performed   visitors restricted/screened   single patient room provided   rest/sleep promoted   personal protective equipment utilized  Taken 12/18/2021 2200 by Safia Archer RN  Infection Prevention:   environmental surveillance performed   visitors restricted/screened   personal protective equipment utilized   rest/sleep promoted   single patient room provided  Taken 12/18/2021 2000 by Safia Archer RN  Infection Prevention:   environmental surveillance performed   visitors restricted/screened   single patient room provided   rest/sleep promoted   personal protective equipment utilized  Goal: Optimal Comfort and Wellbeing  Outcome: Ongoing, Progressing  Intervention: Provide Person-Centered Care  Recent Flowsheet Documentation  Taken 12/18/2021 2000 by Safia Archer RN  Trust Relationship/Rapport:   care explained   choices provided  Goal: Readiness for Transition of Care  Outcome: Ongoing, Progressing     Problem: Fall Injury Risk  Goal: Absence of Fall and Fall-Related Injury  Outcome: Ongoing, Progressing  Intervention: Identify and Manage Contributors to Fall Injury Risk  Recent Flowsheet Documentation  Taken 12/19/2021 0407 by Safia Archer RN  Medication Review/Management: medications reviewed  Taken 12/19/2021 0300 by Safia Archer RN  Medication Review/Management: medications reviewed  Taken 12/19/2021 0000 by Safia Archer RN  Medication Review/Management: medications reviewed  Taken 12/18/2021 2200 by Safia Archer RN  Medication Review/Management: medications reviewed  Taken 12/18/2021 2000 by Safia Archer RN  Medication Review/Management: medications reviewed  Intervention: Promote Injury-Free Environment  Recent Flowsheet Documentation  Taken 12/19/2021 0407 by Safia Archer, RN  Safety Promotion/Fall Prevention:   activity  supervised   assistive device/personal items within reach   clutter free environment maintained   safety round/check completed   room organization consistent   nonskid shoes/slippers when out of bed   fall prevention program maintained  Taken 12/19/2021 0300 by Safia Archer RN  Safety Promotion/Fall Prevention:   activity supervised   assistive device/personal items within reach   clutter free environment maintained   fall prevention program maintained   nonskid shoes/slippers when out of bed   room organization consistent   safety round/check completed  Taken 12/19/2021 0000 by Safia Archer RN  Safety Promotion/Fall Prevention:   activity supervised   assistive device/personal items within reach   clutter free environment maintained   fall prevention program maintained   nonskid shoes/slippers when out of bed   room organization consistent   safety round/check completed  Taken 12/18/2021 2200 by Safia Archer RN  Safety Promotion/Fall Prevention:   activity supervised   assistive device/personal items within reach   clutter free environment maintained   safety round/check completed   room organization consistent   nonskid shoes/slippers when out of bed   fall prevention program maintained  Taken 12/18/2021 2000 by Safia Archer RN  Safety Promotion/Fall Prevention:   activity supervised   assistive device/personal items within reach   clutter free environment maintained   fall prevention program maintained   nonskid shoes/slippers when out of bed   room organization consistent   safety round/check completed     Problem: Skin Injury Risk Increased  Goal: Skin Health and Integrity  Outcome: Ongoing, Progressing  Intervention: Optimize Skin Protection  Recent Flowsheet Documentation  Taken 12/19/2021 0407 by Safia Archer RN  Pressure Reduction Techniques:   frequent weight shift encouraged   heels elevated off bed   pressure points protected  Head of Bed (HOB): HOB at 15 degrees  Pressure Reduction  Devices:   pressure-redistributing mattress utilized   positioning supports utilized   heel offloading device utilized  Skin Protection:   adhesive use limited   incontinence pads utilized   tubing/devices free from skin contact   skin-to-device areas padded  Taken 12/19/2021 0300 by Safia Archer RN  Head of Bed (HOB): HOB at 15 degrees  Taken 12/19/2021 0200 by Safia Archer RN  Pressure Reduction Techniques:   frequent weight shift encouraged   heels elevated off bed   pressure points protected  Pressure Reduction Devices:   pressure-redistributing mattress utilized   positioning supports utilized   heel offloading device utilized  Skin Protection:   adhesive use limited   incontinence pads utilized   tubing/devices free from skin contact   skin-to-device areas padded  Taken 12/19/2021 0000 by Safia Archer RN  Pressure Reduction Techniques:   frequent weight shift encouraged   heels elevated off bed   pressure points protected  Head of Bed (HOB): HOB at 15 degrees  Pressure Reduction Devices:   pressure-redistributing mattress utilized   positioning supports utilized   heel offloading device utilized  Skin Protection:   adhesive use limited   incontinence pads utilized   tubing/devices free from skin contact   skin-to-device areas padded  Taken 12/18/2021 2200 by Safia Archer RN  Pressure Reduction Techniques:   frequent weight shift encouraged   heels elevated off bed   pressure points protected  Head of Bed (HOB): HOB at 15 degrees  Pressure Reduction Devices:   pressure-redistributing mattress utilized   positioning supports utilized   heel offloading device utilized  Skin Protection:   adhesive use limited   incontinence pads utilized   tubing/devices free from skin contact   skin-to-device areas padded  Taken 12/18/2021 2000 by Safia Archer RN  Pressure Reduction Techniques:   frequent weight shift encouraged   heels elevated off bed   pressure points protected  Head of Bed (HOB): HOB at  20-30 degrees  Pressure Reduction Devices:   pressure-redistributing mattress utilized   positioning supports utilized   heel offloading device utilized  Skin Protection:   adhesive use limited   incontinence pads utilized   tubing/devices free from skin contact   skin-to-device areas padded     Problem: Diabetes Comorbidity  Goal: Blood Glucose Level Within Desired Range  Outcome: Ongoing, Progressing     Problem: Hypertension Comorbidity  Goal: Blood Pressure in Desired Range  Outcome: Ongoing, Progressing  Intervention: Maintain Hypertension-Management Strategies  Recent Flowsheet Documentation  Taken 12/19/2021 0407 by Safia Archer RN  Medication Review/Management: medications reviewed  Taken 12/19/2021 0300 by Safia Archer RN  Medication Review/Management: medications reviewed  Taken 12/19/2021 0000 by Safia Archer RN  Medication Review/Management: medications reviewed  Taken 12/18/2021 2200 by Safia Archer RN  Medication Review/Management: medications reviewed  Taken 12/18/2021 2000 by Safia Archer RN  Medication Review/Management: medications reviewed     Problem: Pain Chronic (Persistent) (Comorbidity Management)  Goal: Acceptable Pain Control and Functional Ability  Outcome: Ongoing, Progressing  Intervention: Manage Persistent Pain  Recent Flowsheet Documentation  Taken 12/19/2021 0407 by Safia Archer RN  Medication Review/Management: medications reviewed  Taken 12/19/2021 0300 by Safia Archer RN  Medication Review/Management: medications reviewed  Taken 12/19/2021 0000 by Safia Archer RN  Medication Review/Management: medications reviewed  Taken 12/18/2021 2200 by Safia Archer RN  Medication Review/Management: medications reviewed  Taken 12/18/2021 2000 by Safia Archer RN  Medication Review/Management: medications reviewed  Intervention: Optimize Psychosocial Wellbeing  Recent Flowsheet Documentation  Taken 12/18/2021 2000 by Safia Archer RN  Supportive  Measures: active listening utilized  Diversional Activities: television     Problem: Restraint, Nonbehavioral (Nonviolent)  Goal: Discontinuation Criteria Achieved  Outcome: Ongoing, Progressing  Intervention: Implement Least-restrictive Safety Strategies  Recent Flowsheet Documentation  Taken 12/18/2021 2000 by Safia Archer RN  Diversional Activities: television  Goal: Personal Dignity and Safety Maintained  Outcome: Ongoing, Progressing  Intervention: Protect Dignity, Rights, and Personal Wellbeing  Recent Flowsheet Documentation  Taken 12/18/2021 2000 by Safia Archer RN  Trust Relationship/Rapport:   care explained   choices provided  Intervention: Protect Skin and Joint Integrity  Recent Flowsheet Documentation  Taken 12/19/2021 0407 by Safia Archer RN  Body Position: position changed independently  Taken 12/19/2021 0300 by Safia Archer RN  Body Position: position changed independently  Taken 12/19/2021 0000 by Safia Archer RN  Body Position: position changed independently  Taken 12/18/2021 2200 by Safia Archer RN  Body Position: position changed independently  Taken 12/18/2021 2000 by Safia Archer RN  Body Position: position changed independently     Problem: Device-Related Complication Risk (Hemodialysis)  Goal: Safe, Effective Therapy Delivery  Outcome: Ongoing, Progressing  Intervention: Optimize Device Care and Function  Recent Flowsheet Documentation  Taken 12/19/2021 0407 by Safia Archer RN  Medication Review/Management: medications reviewed  Taken 12/19/2021 0300 by Safia Archer RN  Medication Review/Management: medications reviewed  Taken 12/19/2021 0000 by Safia Archer RN  Medication Review/Management: medications reviewed  Taken 12/18/2021 2200 by Safia Archer RN  Medication Review/Management: medications reviewed  Taken 12/18/2021 2000 by Safia Archer RN  Medication Review/Management: medications reviewed     Problem: Hemodynamic Instability  (Hemodialysis)  Goal: Vital Signs Remain in Desired Range  Outcome: Ongoing, Progressing     Problem: Infection (Hemodialysis)  Goal: Absence of Infection Signs and Symptoms  Outcome: Ongoing, Progressing  Intervention: Prevent or Manage Infection  Recent Flowsheet Documentation  Taken 12/19/2021 0407 by Safia Archer RN  Infection Prevention:   environmental surveillance performed   visitors restricted/screened   single patient room provided   personal protective equipment utilized   rest/sleep promoted  Taken 12/19/2021 0300 by Safia Archer RN  Infection Prevention:   environmental surveillance performed   visitors restricted/screened   single patient room provided   rest/sleep promoted   personal protective equipment utilized  Taken 12/19/2021 0000 by Safia Archer RN  Infection Prevention:   environmental surveillance performed   visitors restricted/screened   single patient room provided   rest/sleep promoted   personal protective equipment utilized  Taken 12/18/2021 2200 by Safia Archer RN  Infection Prevention:   environmental surveillance performed   visitors restricted/screened   personal protective equipment utilized   rest/sleep promoted   single patient room provided  Taken 12/18/2021 2000 by Safia Archer, RN  Infection Prevention:   environmental surveillance performed   visitors restricted/screened   single patient room provided   rest/sleep promoted   personal protective equipment utilized

## 2021-12-19 NOTE — PLAN OF CARE
Goal Outcome Evaluation:  Plan of Care Reviewed With: patient        Progress: no change  Outcome Summary: pt presents with increased confusion this shift. NSR on monitor. BP improved this shift after adjustment to medications.

## 2021-12-19 NOTE — PROGRESS NOTES
[facility] Medicine Services  PROGRESS NOTE    Patient Name: Jeaneth Keita  : 1960  MRN: [id number]    Date of Admission: 2021  Primary Care Physician: Provider, No Known    Subjective   Subjective     CC:  Follow-up hypotension    HPI:  No complaints this morning, board.  Wants to go back to [facility].  BP better this a.m.    ROS:  Gen- No fevers, chills  CV- No chest pain, palpitations  Resp- No cough, dyspnea  GI- No N/V/D, abd pain    Objective   Objective     Vital Signs:   Temp:  [97.9 °F (36.6 °C)-98.8 °F (37.1 °C)] 97.9 °F (36.6 °C)  Heart Rate:  [85-94] 85  Resp:  [18-20] 18  BP: ()/(52-78) 100/59     Physical Exam:  Constitutional: Awake, alert, chronically ill-appearing elderly female lying in bed  HENT: NCAT, mucous membranes moist  Respiratory: Clear to auscultation bilaterally, respiratory effort normal   Cardiovascular: RRR, no murmurs, rubs, or gallops, palpable radial pulses  Gastrointestinal: Positive bowel sounds, soft, nontender, nondistended  Musculoskeletal: Diminished muscle mass  Psychiatric: Appropriate affect, cooperative  Neurologic: Speech clear    Results Reviewed:  LAB RESULTS:      Lab 21  0345 21  0231 21  0930   WBC 6.04 5.63  --    HEMOGLOBIN 8.3* 8.2*  --    HEMATOCRIT 24.5* 23.3*  --    PLATELETS 156 176 171   NEUTROS ABS 3.52 3.30  --    IMMATURE GRANS (ABS) 0.02 0.02  --    LYMPHS ABS 1.82 1.71  --    MONOS ABS 0.45 0.42  --    EOS ABS 0.20 0.13  --    MCV 95.7 93.2  --    PROTIME  --   --  14.6*         Lab 21  0540 21  0231 21  0317   SODIUM 136 137 137   POTASSIUM 5.0 4.6 4.8   CHLORIDE 101 101 99   CO2 19.0* 23.0 24.0   ANION GAP 16.0* 13.0 14.0   BUN 55* 38* 65*   CREATININE 5.03* 3.73* 5.36*   GLUCOSE 119* 99 126*   CALCIUM 9.3 9.5 8.9         Lab 21  0317   TOTAL PROTEIN 6.0   ALBUMIN 3.50   GLOBULIN 2.5   ALT (SGPT) 41*   AST (SGOT) 42*   BILIRUBIN 0.3   ALK PHOS 87         Lab  12/17/21  0930   PROTIME 14.6*   INR 1.17*                 Brief Urine Lab Results  (Last result in the past 365 days)      Color   Clarity   Blood   Leuk Est   Nitrite   Protein   CREAT   Urine HCG        12/08/21 1404 Yellow   Clear   Negative   Negative   Negative   >=300 mg/dL (3+)                 Microbiology Results Abnormal     Procedure Component Value - Date/Time    COVID PRE-OP / PRE-PROCEDURE SCREENING ORDER (NO ISOLATION) - Swab, Nasopharynx [[id number]]  (Normal) Collected: 12/16/21 2039    Lab Status: Final result Specimen: Swab from Nasopharynx Updated: 12/16/21 2114    Narrative:      The following orders were created for panel order COVID PRE-OP / PRE-PROCEDURE SCREENING ORDER (NO ISOLATION) - Swab, Nasopharynx.  Procedure                               Abnormality         Status                     ---------                               -----------         ------                     COVID-19 and FLU A/B PCR...[[id number]]  Normal              Final result                 Please view results for these tests on the individual orders.    COVID-19 and FLU A/B PCR - Swab, Nasopharynx [[id number]]  (Normal) Collected: 12/16/21 2039    Lab Status: Final result Specimen: Swab from Nasopharynx Updated: 12/16/21 2114     COVID19 Not Detected     Influenza A PCR Not Detected     Influenza B PCR Not Detected    Narrative:      Fact sheet for providers: https://www.fda.gov/media/691043/download    Fact sheet for patients: https://www.fda.gov/media/694431/download    Test performed by PCR.    Blood Culture - Blood, Arm, Right [[id number]]  (Normal) Collected: 12/08/21 1700    Lab Status: Final result Specimen: Blood from Arm, Right Updated: 12/13/21 1731     Blood Culture No growth at 5 days    Blood Culture - Blood, Arm, Left [[id number]]  (Normal) Collected: 12/08/21 1720    Lab Status: Final result Specimen: Blood from Arm, Left Updated: 12/13/21 1731     Blood Culture No growth at 5 days    MRSA Screen, PCR  (Inpatient) - Swab, Nares [[id number]]  (Normal) Collected: 12/08/21 2213    Lab Status: Final result Specimen: Swab from Nares Updated: 12/09/21 0749     MRSA PCR Negative    Narrative:      MRSA Negative    S. Pneumo Ag Urine or CSF - Urine, Urine, Clean Catch [[id number]]  (Normal) Collected: 12/08/21 1404    Lab Status: Final result Specimen: Urine, Clean Catch Updated: 12/09/21 0709     Strep Pneumo Ag Negative    Legionella Antigen, Urine - Urine, Urine, Clean Catch [[id number]]  (Normal) Collected: 12/08/21 1404    Lab Status: Final result Specimen: Urine, Clean Catch Updated: 12/09/21 0706     LEGIONELLA ANTIGEN, URINE Negative    Respiratory Panel PCR w/COVID-19(SARS-CoV-2) RUBINA/MANDY/MARK/PAD/COR/MAD/HORACE In-House, NP Swab in UTM/VTM, 3-4 HR TAT - Swab, Nasopharynx [[id number]]  (Normal) Collected: 12/08/21 2213    Lab Status: Final result Specimen: Swab from Nasopharynx Updated: 12/09/21 0003     ADENOVIRUS, PCR Not Detected     Coronavirus 229E Not Detected     Coronavirus HKU1 Not Detected     Coronavirus NL63 Not Detected     Coronavirus OC43 Not Detected     COVID19 Not Detected     Human Metapneumovirus Not Detected     Human Rhinovirus/Enterovirus Not Detected     Influenza A PCR Not Detected     Influenza B PCR Not Detected     Parainfluenza Virus 1 Not Detected     Parainfluenza Virus 2 Not Detected     Parainfluenza Virus 3 Not Detected     Parainfluenza Virus 4 Not Detected     RSV, PCR Not Detected     Bordetella pertussis pcr Not Detected     Bordetella parapertussis PCR Not Detected     Chlamydophila pneumoniae PCR Not Detected     Mycoplasma pneumo by PCR Not Detected    Narrative:      In the setting of a positive respiratory panel with a viral infection PLUS a negative procalcitonin without other underlying concern for bacterial infection, consider observing off antibiotics or discontinuation of antibiotics and continue supportive care. If the respiratory panel is positive for atypical bacterial  infection (Bordetella pertussis, Chlamydophila pneumoniae, or Mycoplasma pneumoniae), consider antibiotic de-escalation to target atypical bacterial infection.          No radiology results from the last 24 hrs    Results for orders placed during the hospital encounter of 10/05/21    Adult Transthoracic Echo Complete W/ Cont if Necessary Per Protocol    Interpretation Summary  · Left ventricular ejection fraction appears to be 61 - 65%. Left ventricular systolic function is normal.  · Left atrial volume is mildly increased.      I have reviewed the medications:  Scheduled Meds:albumin human, , ,   albumin human, , ,   amLODIPine, 5 mg, Oral, Q24H  atorvastatin, 80 mg, Oral, Daily  calcium acetate, 667 mg, Oral, TID With Meals  carvedilol, 25 mg, Oral, BID With Meals  cyanocobalamin, 1,000 mcg, Intramuscular, Q28 Days  heparin (porcine), 5,000 Units, Subcutaneous, Q8H  insulin lispro, 0-7 Units, Subcutaneous, TID AC  polyethylene glycol, 17 g, Oral, Daily  senna, 2 tablet, Oral, Nightly  sodium chloride, 10 mL, Intravenous, Q12H      Continuous Infusions:   PRN Meds:.•  acetaminophen **OR** acetaminophen **OR** acetaminophen  •  albumin human  •  dextrose  •  dextrose  •  glucagon (human recombinant)  •  heparin (porcine)  •  hydrOXYzine  •  ipratropium-albuterol  •  naloxone  •  ondansetron  •  QUEtiapine  •  sodium chloride    Assessment/Plan   Assessment & Plan     Active Hospital Problems    Diagnosis  POA   • **Right lower lobe pneumonia [J18.9]  Yes   • AMS (altered mental status) [R41.82]  Yes   • Colitis [K52.9]  Unknown   • Normocytic anemia [D64.9]  Unknown   • Hypokalemia [E87.6]  Unknown   • Hepatocellular carcinoma metastatic to bone s/p RXT  [C79.51, C22.0]  Yes   • Cirrhosis of liver (HCC) [K74.60]  Yes   • Hemochromatosis [E83.119]  Yes   • S/P left BKA [Z89.512]  Not Applicable   • Chronic Hep C  [B18.2]  Yes   • Chronic pain on Methadone [F11.90]  Yes   • Chronic ulcer of right foot [L97.519]  Yes    • ESRD (end stage renal disease) [N18.6]  Yes   • GERD (gastroesophageal reflux disease) [K21.9]  Yes   • Essential hypertension [I10]  Yes   • Type 2 diabetes mellitus (HCC) [E11.9]  Yes      Resolved Hospital Problems   No resolved problems to display.        Brief Hospital Course to date:  Jeaneth Keita is a 61 y.o. female with Hx of cirrhosis and HCC metastatic to the bone s/p XRT, hepatitis C, chronic pain on methadone, ESRD on HD, who was brought to the ED for evaluation of alteration in her mental status; MR imaging demonstrated prior thalamic bleed (known event October 2021) but no acute process, she was treated empirically for RT lower lobe pneumonia now s/p 7 days of Zosyn/doxycycline and has returned to her approximate baseline mental status; she was planned to return to her facility however her AV fistula became nonfunctional requiring replacement of tunneled HD cath which is delayed transportation, she has now awaiting return to her facility    Assessment/plan    Personal care facility resident  Chronic debility  -Ready for discharge, awaiting onsite evaluation and transportation back to her facility    ESRD on HD  Nonfunctional AV fistula  -Nephrology following for MWF dialysis  -AV fistula was nonfunctional, new RT IJ tunneled HD catheter placed 12/17/21    RT lower lobe pneumonia, s/p antibiotics  Acute metabolic encephalopathy, improved  -MRI head without acute process  -S/p x7 days Zosyn/x5 days doxycycline  -Continue Seroquel at bedtime for insomnia    Hypotension with chronic hypertension  -Continue amlodipine, carvedilol; hydralazine has been discontinued    DM type 2, A1c 5.1%, without long-term use of insulin  -Continue Accu-Cheks with SSI    Chronic hepatitis C with cirrhosis of the liver  HCC with bony metastasis s/p XRT  Familial hemochromatosis  -Receives [facility] at [facility]  -Home meds    Chronic pain syndrome  -By report previously on methadone; per PDMP last fill was a 30-day supply  9/27/2021    DVT prophylaxis:  Medical and mechanical DVT prophylaxis orders are present.       AM-PAC 6 Clicks Score (PT): 15 (12/18/21 4247)    Disposition: She is medically ready for discharge    CODE STATUS:   Code Status and Medical Interventions:   Ordered at: 12/08/21 1940     Level Of Support Discussed With:    Patient     Code Status (Patient has no pulse and is not breathing):    CPR (Attempt to Resuscitate)     Medical Interventions (Patient has pulse or is breathing):    Full Support       Jesus James, DO  12/19/21

## 2021-12-20 ENCOUNTER — APPOINTMENT (OUTPATIENT)
Dept: NEPHROLOGY | Facility: HOSPITAL | Age: 61
End: 2021-12-20

## 2021-12-20 LAB
ANION GAP SERPL CALCULATED.3IONS-SCNC: 17 MMOL/L (ref 5–15)
BUN SERPL-MCNC: 75 MG/DL (ref 8–23)
BUN/CREAT SERPL: 12 (ref 7–25)
CALCIUM SPEC-SCNC: 9.5 MG/DL (ref 8.6–10.5)
CHLORIDE SERPL-SCNC: 100 MMOL/L (ref 98–107)
CO2 SERPL-SCNC: 20 MMOL/L (ref 22–29)
CREAT SERPL-MCNC: 6.23 MG/DL (ref 0.57–1)
GFR SERPL CREATININE-BSD FRML MDRD: 7 ML/MIN/1.73
GFR SERPL CREATININE-BSD FRML MDRD: ABNORMAL ML/MIN/{1.73_M2}
GLUCOSE BLDC GLUCOMTR-MCNC: 139 MG/DL (ref 70–130)
GLUCOSE BLDC GLUCOMTR-MCNC: 148 MG/DL (ref 70–130)
GLUCOSE BLDC GLUCOMTR-MCNC: 220 MG/DL (ref 70–130)
GLUCOSE SERPL-MCNC: 147 MG/DL (ref 65–99)
POTASSIUM SERPL-SCNC: 5 MMOL/L (ref 3.5–5.2)
SODIUM SERPL-SCNC: 137 MMOL/L (ref 136–145)

## 2021-12-20 PROCEDURE — 99231 SBSQ HOSP IP/OBS SF/LOW 25: CPT | Performed by: INTERNAL MEDICINE

## 2021-12-20 PROCEDURE — 25010000002 HEPARIN (PORCINE) PER 1000 UNITS: Performed by: INTERNAL MEDICINE

## 2021-12-20 PROCEDURE — 63710000001 INSULIN LISPRO (HUMAN) PER 5 UNITS: Performed by: INTERNAL MEDICINE

## 2021-12-20 PROCEDURE — 80048 BASIC METABOLIC PNL TOTAL CA: CPT | Performed by: STUDENT IN AN ORGANIZED HEALTH CARE EDUCATION/TRAINING PROGRAM

## 2021-12-20 PROCEDURE — 82962 GLUCOSE BLOOD TEST: CPT

## 2021-12-20 RX ORDER — ALBUMIN (HUMAN) 12.5 G/50ML
SOLUTION INTRAVENOUS
Status: DISCONTINUED
Start: 2021-12-20 | End: 2021-12-21 | Stop reason: HOSPADM

## 2021-12-20 RX ADMIN — CALCIUM ACETATE 667 MG: 667 CAPSULE ORAL at 14:04

## 2021-12-20 RX ADMIN — CALCIUM ACETATE 667 MG: 667 CAPSULE ORAL at 17:16

## 2021-12-20 RX ADMIN — CARVEDILOL 25 MG: 12.5 TABLET, FILM COATED ORAL at 17:17

## 2021-12-20 RX ADMIN — SODIUM CHLORIDE, PRESERVATIVE FREE 10 ML: 5 INJECTION INTRAVENOUS at 20:49

## 2021-12-20 RX ADMIN — HYDROXYZINE HYDROCHLORIDE 25 MG: 25 TABLET, FILM COATED ORAL at 20:49

## 2021-12-20 RX ADMIN — ACETAMINOPHEN 650 MG: 325 TABLET, FILM COATED ORAL at 00:56

## 2021-12-20 RX ADMIN — AMLODIPINE BESYLATE 5 MG: 5 TABLET ORAL at 14:05

## 2021-12-20 RX ADMIN — INSULIN LISPRO 3 UNITS: 100 INJECTION, SOLUTION INTRAVENOUS; SUBCUTANEOUS at 17:16

## 2021-12-20 RX ADMIN — HEPARIN SODIUM 1600 UNITS: 1000 INJECTION INTRAVENOUS; SUBCUTANEOUS at 12:23

## 2021-12-20 RX ADMIN — ACETAMINOPHEN 650 MG: 325 TABLET, FILM COATED ORAL at 20:48

## 2021-12-20 RX ADMIN — SENNOSIDES 2 TABLET: 8.6 TABLET, FILM COATED ORAL at 20:48

## 2021-12-20 RX ADMIN — HEPARIN SODIUM 5000 UNITS: 5000 INJECTION INTRAVENOUS; SUBCUTANEOUS at 05:49

## 2021-12-20 RX ADMIN — QUETIAPINE FUMARATE 25 MG: 25 TABLET ORAL at 20:49

## 2021-12-20 RX ADMIN — HEPARIN SODIUM 5000 UNITS: 5000 INJECTION INTRAVENOUS; SUBCUTANEOUS at 20:56

## 2021-12-20 RX ADMIN — HEPARIN SODIUM 5000 UNITS: 5000 INJECTION INTRAVENOUS; SUBCUTANEOUS at 14:05

## 2021-12-20 RX ADMIN — ATORVASTATIN CALCIUM 80 MG: 40 TABLET, FILM COATED ORAL at 14:05

## 2021-12-20 NOTE — CASE MANAGEMENT/SOCIAL WORK
Continued Stay Note  [facility]     Patient Name: Jeaneth Keita  MRN: [id number]  Today's Date: 12/20/2021    Admit Date: 12/8/2021     Discharge Plan     Row Name 12/20/21 5993       Plan    Plan [facility]    Patient/Family in Agreement with Plan other (see comments)    Plan Comments Cherelle from [facility] will visit onsite today. If approved to return to [facility], [facility] ambulance will transport on 12- at 1315. If she is not approved to return will need to work on SNF rehab. Goes to outpt HD at [facility]. I updated her spouse. I will f/u in the am.    Final Discharge Disposition Code 01 - home or self-care               Discharge Codes    No documentation.               Expected Discharge Date and Time     Expected Discharge Date Expected Discharge Time    Dec 21, 2021             Radha Miranda RN

## 2021-12-20 NOTE — PROGRESS NOTES
"   LOS: 12 days    Patient Care Team:  Provider, No Known as PCP - General  ESRD     Subjective     Interval History:   No new events no added distress.  Patient seen on dialysis.  Review of Systems:   Denies any nausea vomiting chest pain shortness of breath.      Objective     Vital Sign Min/Max for last 24 hours  Temp  Min: 97.7 °F (36.5 °C)  Max: 98 °F (36.7 °C)   BP  Min: 107/60  Max: 126/77   Pulse  Min: 81  Max: 91   Resp  Min: 16  Max: 18   SpO2  Min: 97 %  Max: 97 %   No data recorded   Weight  Min: 63.2 kg (139 lb 4.8 oz)  Max: 63.2 kg (139 lb 4.8 oz)     Flowsheet Rows      First Filed Value   Admission Height 172.7 cm (68\") Documented at 12/08/2021 1240   Admission Weight 72.6 kg (160 lb) Documented at 12/08/2021 1240          No intake/output data recorded.  I/O last 3 completed shifts:  In: -   Out: 100 [Urine:100]    Physical Exam:  General Appearance:  female alert oriented x3.  Eyes: PER, EOMI.  Neck: Supple no JVD.  Lungs: No rales or rhonchi chest clear., equal chest movement, nonlabored.  Heart: No gallop, murmur, rub, RRR.  Abdomen: Soft, nontender, positive bowel sounds x 4.   Extremities: No edema, no cyanosis.  Neuro: No focal deficit, moving all extremities, alert oriented X 3  New tunnel catheter right IJ, right side of chest  Access: :Left forearm AVF nonfunctional      WBC WBC   Date Value Ref Range Status   12/19/2021 6.04 3.40 - 10.80 10*3/mm3 Final   12/18/2021 5.63 3.40 - 10.80 10*3/mm3 Final      HGB Hemoglobin   Date Value Ref Range Status   12/19/2021 8.3 (L) 12.0 - 15.9 g/dL Final   12/18/2021 8.2 (L) 12.0 - 15.9 g/dL Final      HCT Hematocrit   Date Value Ref Range Status   12/19/2021 24.5 (L) 34.0 - 46.6 % Final   12/18/2021 23.3 (L) 34.0 - 46.6 % Final      Platlets No results found for: LABPLAT   MCV MCV   Date Value Ref Range Status   12/19/2021 95.7 79.0 - 97.0 fL Final   12/18/2021 93.2 79.0 - 97.0 fL Final          Sodium Sodium   Date Value Ref Range Status "   12/20/2021 137 136 - 145 mmol/L Final   12/19/2021 136 136 - 145 mmol/L Final   12/18/2021 137 136 - 145 mmol/L Final      Potassium Potassium   Date Value Ref Range Status   12/20/2021 5.0 3.5 - 5.2 mmol/L Final   12/19/2021 5.0 3.5 - 5.2 mmol/L Final     Comment:     Slight hemolysis detected by analyzer. Results may be affected.   12/18/2021 4.6 3.5 - 5.2 mmol/L Final      Chloride Chloride   Date Value Ref Range Status   12/20/2021 100 98 - 107 mmol/L Final   12/19/2021 101 98 - 107 mmol/L Final   12/18/2021 101 98 - 107 mmol/L Final      CO2 CO2   Date Value Ref Range Status   12/20/2021 20.0 (L) 22.0 - 29.0 mmol/L Final   12/19/2021 19.0 (L) 22.0 - 29.0 mmol/L Final   12/18/2021 23.0 22.0 - 29.0 mmol/L Final      BUN BUN   Date Value Ref Range Status   12/20/2021 75 (H) 8 - 23 mg/dL Final   12/19/2021 55 (H) 8 - 23 mg/dL Final   12/18/2021 38 (H) 8 - 23 mg/dL Final      Creatinine Creatinine   Date Value Ref Range Status   12/20/2021 6.23 (H) 0.57 - 1.00 mg/dL Final   12/19/2021 5.03 (H) 0.57 - 1.00 mg/dL Final   12/18/2021 3.73 (H) 0.57 - 1.00 mg/dL Final      Calcium Calcium   Date Value Ref Range Status   12/20/2021 9.5 8.6 - 10.5 mg/dL Final   12/19/2021 9.3 8.6 - 10.5 mg/dL Final   12/18/2021 9.5 8.6 - 10.5 mg/dL Final      PO4 No results found for: CAPO4   Albumin No results found for: ALBUMIN   Magnesium No results found for: MG   Uric Acid No results found for: URICACID        Results Review:     I reviewed the patient's new clinical results.    albumin human, , ,   albumin human, , ,   amLODIPine, 5 mg, Oral, Q24H  atorvastatin, 80 mg, Oral, Daily  calcium acetate, 667 mg, Oral, TID With Meals  carvedilol, 25 mg, Oral, BID With Meals  cyanocobalamin, 1,000 mcg, Intramuscular, Q28 Days  heparin (porcine), 5,000 Units, Subcutaneous, Q8H  insulin lispro, 0-7 Units, Subcutaneous, TID AC  polyethylene glycol, 17 g, Oral, Daily  senna, 2 tablet, Oral, Nightly  sodium chloride, 10 mL, Intravenous,  Q12H           Medication Review:     Assessment/Plan       Right lower lobe pneumonia    Hepatocellular carcinoma metastatic to bone s/p RXT     Hemochromatosis    Cirrhosis of liver (HCC)    Chronic Hep C     ESRD (end stage renal disease)    Chronic ulcer of right foot    S/P left BKA    GERD (gastroesophageal reflux disease)    Chronic pain on Methadone    Essential hypertension    Type 2 diabetes mellitus (HCC)    AMS (altered mental status)    Colitis    Normocytic anemia    Hypokalemia      1- ESRD - MWF - [facility] [place] [place]   2- HTN- BP on the low end of normal, may need to liberalize BP meds.   3- Mild hypokalemia- resolved.   4- Anemia of chronic disease: stable   5- Familial hemochromatosis   6- Hx of Metastatic hepatocellular carcinoma    7- Metabolic Acidosis: correct with HD.      Plan:  Dialysis in progress.  -AV fistula nonfunctional.  +THC in place.    - Renal diet.    - Transfuse for Hgb less than 7.0    Agree with the above plan.  Home okay with nephrology    Staci Bates MD  12/20/21  08:27 EST

## 2021-12-20 NOTE — PLAN OF CARE
VSS on RA, NSR per tele.  Pt pulled out her IV and threw it in the floor- new IV placed.  Pt educated on not pulling out lines.  Dialysis scheduled for this morning.  Will continue to monitor.     Problem: Adult Inpatient Plan of Care  Goal: Plan of Care Review  Outcome: Ongoing, Progressing  Goal: Patient-Specific Goal (Individualized)  Outcome: Ongoing, Progressing  Goal: Absence of Hospital-Acquired Illness or Injury  Outcome: Ongoing, Progressing  Intervention: Identify and Manage Fall Risk  Recent Flowsheet Documentation  Taken 12/20/2021 0056 by Safia Archer RN  Safety Promotion/Fall Prevention:   activity supervised   assistive device/personal items within reach   clutter free environment maintained   safety round/check completed   room organization consistent   nonskid shoes/slippers when out of bed   fall prevention program maintained  Taken 12/19/2021 2200 by Safia Archer RN  Safety Promotion/Fall Prevention:   activity supervised   assistive device/personal items within reach   clutter free environment maintained   safety round/check completed   room organization consistent   nonskid shoes/slippers when out of bed   fall prevention program maintained  Taken 12/19/2021 2000 by Safia Archer RN  Safety Promotion/Fall Prevention:   activity supervised   assistive device/personal items within reach   clutter free environment maintained   safety round/check completed   room organization consistent   nonskid shoes/slippers when out of bed   fall prevention program maintained  Intervention: Prevent Skin Injury  Recent Flowsheet Documentation  Taken 12/20/2021 0056 by Safia Archer RN  Body Position: position changed independently  Skin Protection:   adhesive use limited   incontinence pads utilized   tubing/devices free from skin contact   skin-to-device areas padded  Taken 12/19/2021 2200 by Safia Archer RN  Body Position: position changed independently  Skin Protection:   adhesive use  limited   incontinence pads utilized   tubing/devices free from skin contact   skin sealant/moisture barrier applied  Taken 12/19/2021 2000 by Safia Archer RN  Body Position: position changed independently  Skin Protection:   adhesive use limited   tubing/devices free from skin contact   skin-to-device areas padded  Intervention: Prevent Infection  Recent Flowsheet Documentation  Taken 12/20/2021 0056 by Safia Archer RN  Infection Prevention:   environmental surveillance performed   visitors restricted/screened   single patient room provided   rest/sleep promoted   personal protective equipment utilized  Taken 12/19/2021 2000 by Safia Archer RN  Infection Prevention:   environmental surveillance performed   visitors restricted/screened   single patient room provided   rest/sleep promoted   personal protective equipment utilized  Goal: Optimal Comfort and Wellbeing  Outcome: Ongoing, Progressing  Intervention: Provide Person-Centered Care  Recent Flowsheet Documentation  Taken 12/19/2021 2000 by Safia Archer RN  Trust Relationship/Rapport:   care explained   choices provided  Goal: Readiness for Transition of Care  Outcome: Ongoing, Progressing     Problem: Fall Injury Risk  Goal: Absence of Fall and Fall-Related Injury  Outcome: Ongoing, Progressing  Intervention: Identify and Manage Contributors to Fall Injury Risk  Recent Flowsheet Documentation  Taken 12/20/2021 0056 by Safia Archer RN  Medication Review/Management: medications reviewed  Taken 12/19/2021 2200 by Safia Archer RN  Medication Review/Management: medications reviewed  Taken 12/19/2021 2000 by Safia Archer RN  Medication Review/Management: medications reviewed  Intervention: Promote Injury-Free Environment  Recent Flowsheet Documentation  Taken 12/20/2021 0056 by Safia Archer, RN  Safety Promotion/Fall Prevention:   activity supervised   assistive device/personal items within reach   clutter free environment maintained    safety round/check completed   room organization consistent   nonskid shoes/slippers when out of bed   fall prevention program maintained  Taken 12/19/2021 2200 by Safia Archer RN  Safety Promotion/Fall Prevention:   activity supervised   assistive device/personal items within reach   clutter free environment maintained   safety round/check completed   room organization consistent   nonskid shoes/slippers when out of bed   fall prevention program maintained  Taken 12/19/2021 2000 by Safia Archer RN  Safety Promotion/Fall Prevention:   activity supervised   assistive device/personal items within reach   clutter free environment maintained   safety round/check completed   room organization consistent   nonskid shoes/slippers when out of bed   fall prevention program maintained     Problem: Skin Injury Risk Increased  Goal: Skin Health and Integrity  Outcome: Ongoing, Progressing  Intervention: Optimize Skin Protection  Recent Flowsheet Documentation  Taken 12/20/2021 0056 by Safia Archer RN  Pressure Reduction Techniques:   frequent weight shift encouraged   heels elevated off bed   pressure points protected  Pressure Reduction Devices:   pressure-redistributing mattress utilized   positioning supports utilized   heel offloading device utilized  Skin Protection:   adhesive use limited   incontinence pads utilized   tubing/devices free from skin contact   skin-to-device areas padded  Taken 12/19/2021 2200 by Safia Archer RN  Pressure Reduction Techniques:   frequent weight shift encouraged   heels elevated off bed   pressure points protected  Head of Bed (HOB): HOB at 15 degrees  Pressure Reduction Devices:   pressure-redistributing mattress utilized   positioning supports utilized   heel offloading device utilized  Skin Protection:   adhesive use limited   incontinence pads utilized   tubing/devices free from skin contact   skin sealant/moisture barrier applied  Taken 12/19/2021 2000 by Gianni  DONNY Baig  Pressure Reduction Techniques:   frequent weight shift encouraged   heels elevated off bed   pressure points protected  Head of Bed (HOB): HOB at 20-30 degrees  Pressure Reduction Devices:   pressure-redistributing mattress utilized   positioning supports utilized   heel offloading device utilized  Skin Protection:   adhesive use limited   tubing/devices free from skin contact   skin-to-device areas padded     Problem: Diabetes Comorbidity  Goal: Blood Glucose Level Within Desired Range  Outcome: Ongoing, Progressing  Intervention: Maintain Glycemic Control  Recent Flowsheet Documentation  Taken 12/19/2021 2000 by Safia Archer RN  Glycemic Management: blood glucose monitoring     Problem: Hypertension Comorbidity  Goal: Blood Pressure in Desired Range  Outcome: Ongoing, Progressing  Intervention: Maintain Hypertension-Management Strategies  Recent Flowsheet Documentation  Taken 12/20/2021 0056 by Safia Archer RN  Medication Review/Management: medications reviewed  Taken 12/19/2021 2200 by Safia Archer RN  Medication Review/Management: medications reviewed  Taken 12/19/2021 2000 by Safia Archer RN  Medication Review/Management: medications reviewed     Problem: Pain Chronic (Persistent) (Comorbidity Management)  Goal: Acceptable Pain Control and Functional Ability  Outcome: Ongoing, Progressing  Intervention: Develop Pain Management Plan  Recent Flowsheet Documentation  Taken 12/20/2021 0056 by Safia Archer RN  Pain Management Interventions: see MAR  Taken 12/19/2021 2000 by Safia Archer RN  Pain Management Interventions: see MAR  Intervention: Manage Persistent Pain  Recent Flowsheet Documentation  Taken 12/20/2021 0056 by Safia Archer RN  Medication Review/Management: medications reviewed  Taken 12/19/2021 2200 by Safia Arcehr RN  Medication Review/Management: medications reviewed  Taken 12/19/2021 2000 by Safia Archer RN  Medication Review/Management: medications  reviewed  Intervention: Optimize Psychosocial Wellbeing  Recent Flowsheet Documentation  Taken 12/19/2021 2000 by Safia Archer RN  Supportive Measures: active listening utilized  Diversional Activities: television     Problem: Restraint, Nonbehavioral (Nonviolent)  Goal: Discontinuation Criteria Achieved  Outcome: Ongoing, Progressing  Intervention: Implement Least-restrictive Safety Strategies  Recent Flowsheet Documentation  Taken 12/19/2021 2000 by Safia Archer RN  Diversional Activities: television  Goal: Personal Dignity and Safety Maintained  Outcome: Ongoing, Progressing  Intervention: Protect Dignity, Rights, and Personal Wellbeing  Recent Flowsheet Documentation  Taken 12/19/2021 2000 by Safia Archer RN  Trust Relationship/Rapport:   care explained   choices provided  Intervention: Protect Skin and Joint Integrity  Recent Flowsheet Documentation  Taken 12/20/2021 0056 by Safia Archer RN  Body Position: position changed independently  Taken 12/19/2021 2200 by Safia Archer RN  Body Position: position changed independently  Taken 12/19/2021 2000 by Safia Archer RN  Body Position: position changed independently     Problem: Device-Related Complication Risk (Hemodialysis)  Goal: Safe, Effective Therapy Delivery  Outcome: Ongoing, Progressing  Intervention: Optimize Device Care and Function  Recent Flowsheet Documentation  Taken 12/20/2021 0056 by Safia Archer RN  Medication Review/Management: medications reviewed  Taken 12/19/2021 2200 by Safia Archer RN  Medication Review/Management: medications reviewed  Taken 12/19/2021 2000 by Safia Archer RN  Medication Review/Management: medications reviewed     Problem: Hemodynamic Instability (Hemodialysis)  Goal: Vital Signs Remain in Desired Range  Outcome: Ongoing, Progressing     Problem: Infection (Hemodialysis)  Goal: Absence of Infection Signs and Symptoms  Outcome: Ongoing, Progressing  Intervention: Prevent or Manage  Infection  Recent Flowsheet Documentation  Taken 12/20/2021 0056 by Safia Archer, RN  Infection Prevention:   environmental surveillance performed   visitors restricted/screened   single patient room provided   rest/sleep promoted   personal protective equipment utilized  Taken 12/19/2021 2000 by Safia rAcher, RN  Infection Prevention:   environmental surveillance performed   visitors restricted/screened   single patient room provided   rest/sleep promoted   personal protective equipment utilized

## 2021-12-20 NOTE — PLAN OF CARE
Goal Outcome Evaluation:  Plan of Care Reviewed With: patient        Progress: improving   Dialysis this AM. 2.1L off. Pt has been doing well since returning to the floor. Pt is confused, but this is baseline. Pt was evaluated by James Sanchez this evening and seems like she will be able to return tomorrow.    Problem: Fall Injury Risk  Goal: Absence of Fall and Fall-Related Injury  Outcome: Ongoing, Progressing  Intervention: Identify and Manage Contributors to Fall Injury Risk  Recent Flowsheet Documentation  Taken 12/20/2021 1410 by Madiha Rinaldi RN  Medication Review/Management: medications reviewed  Taken 12/20/2021 1245 by Madiha Rinaldi RN  Medication Review/Management: medications reviewed  Intervention: Promote Injury-Free Environment  Recent Flowsheet Documentation  Taken 12/20/2021 1625 by Madiha Rinaldi RN  Safety Promotion/Fall Prevention:   activity supervised   assistive device/personal items within reach   clutter free environment maintained   fall prevention program maintained   lighting adjusted   muscle strengthening facilitated   nonskid shoes/slippers when out of bed   room organization consistent   safety round/check completed  Taken 12/20/2021 1410 by Madiha Rinaldi RN  Safety Promotion/Fall Prevention:   activity supervised   assistive device/personal items within reach   clutter free environment maintained   fall prevention program maintained   lighting adjusted   nonskid shoes/slippers when out of bed   muscle strengthening facilitated   room organization consistent   safety round/check completed  Taken 12/20/2021 1245 by Madiha Rinaldi RN  Safety Promotion/Fall Prevention:   assistive device/personal items within reach   activity supervised   clutter free environment maintained   fall prevention program maintained   lighting adjusted   muscle strengthening facilitated   nonskid shoes/slippers when out of bed   room organization consistent   safety round/check completed      Problem: Skin Injury Risk Increased  Goal: Skin Health and Integrity  Outcome: Ongoing, Progressing  Intervention: Optimize Skin Protection  Recent Flowsheet Documentation  Taken 12/20/2021 1625 by Madiha Rinaldi RN  Pressure Reduction Techniques: frequent weight shift encouraged  Head of Bed (HOB): HOB at 20-30 degrees  Pressure Reduction Devices: pressure-redistributing mattress utilized  Skin Protection:   adhesive use limited   incontinence pads utilized  Taken 12/20/2021 1410 by Madiha Rinaldi RN  Pressure Reduction Techniques: frequent weight shift encouraged  Head of Bed (HOB): HOB at 20-30 degrees  Pressure Reduction Devices: pressure-redistributing mattress utilized  Skin Protection: adhesive use limited  Taken 12/20/2021 1245 by Madiha Rinaldi RN  Pressure Reduction Techniques: frequent weight shift encouraged  Head of Bed (HOB): HOB at 45 degrees  Pressure Reduction Devices: pressure-redistributing mattress utilized  Skin Protection: adhesive use limited

## 2021-12-20 NOTE — PROGRESS NOTES
[facility] Medicine Services  PROGRESS NOTE    Patient Name: Jeaneth Keita  : 1960  MRN: [id number]    Date of Admission: 2021  Primary Care Physician: Provider, No Known    Subjective   Subjective     CC:  I am cold    HPI:  Seen during dialysis, states that she feels cold.  No other acute complaints.  Continues to await placement/return to her facility    ROS:  Gen- No fevers, chills  CV- No chest pain, palpitations  Resp- No cough, dyspnea  GI- No N/V/D, abd pain    Objective   Objective     Vital Signs:   Temp:  [97.7 °F (36.5 °C)-98 °F (36.7 °C)] 97.9 °F (36.6 °C)  Heart Rate:  [81-91] 90  Resp:  [16-18] 18  BP: (107-126)/(60-80) 113/63     Physical Exam:  Constitutional: Awake, alert, chronically ill-appearing elderly female lying in bed receiving dialysis  HENT: NCAT, mucous membranes moist  Respiratory: Clear to auscultation bilaterally, respiratory effort normal   Cardiovascular: RRR, no murmurs, rubs, or gallops, palpable radial pulses  Gastrointestinal: Positive bowel sounds, soft, nontender, nondistended  Musculoskeletal: Diminished muscle mass  Psychiatric: Appropriate affect, cooperative  Neurologic: Speech clear    Results Reviewed:  LAB RESULTS:      Lab 21  0345 21  0231 21  0930   WBC 6.04 5.63  --    HEMOGLOBIN 8.3* 8.2*  --    HEMATOCRIT 24.5* 23.3*  --    PLATELETS 156 176 171   NEUTROS ABS 3.52 3.30  --    IMMATURE GRANS (ABS) 0.02 0.02  --    LYMPHS ABS 1.82 1.71  --    MONOS ABS 0.45 0.42  --    EOS ABS 0.20 0.13  --    MCV 95.7 93.2  --    PROTIME  --   --  14.6*         Lab 21  0323 21  0540 21  0231 21  0317   SODIUM 137 136 137 137   POTASSIUM 5.0 5.0 4.6 4.8   CHLORIDE 100 101 101 99   CO2 20.0* 19.0* 23.0 24.0   ANION GAP 17.0* 16.0* 13.0 14.0   BUN 75* 55* 38* 65*   CREATININE 6.23* 5.03* 3.73* 5.36*   GLUCOSE 147* 119* 99 126*   CALCIUM 9.5 9.3 9.5 8.9         Lab 21  0317   TOTAL PROTEIN 6.0    ALBUMIN 3.50   GLOBULIN 2.5   ALT (SGPT) 41*   AST (SGOT) 42*   BILIRUBIN 0.3   ALK PHOS 87         Lab 12/17/21  0930   PROTIME 14.6*   INR 1.17*                 Brief Urine Lab Results  (Last result in the past 365 days)      Color   Clarity   Blood   Leuk Est   Nitrite   Protein   CREAT   Urine HCG        12/08/21 1404 Yellow   Clear   Negative   Negative   Negative   >=300 mg/dL (3+)                 Microbiology Results Abnormal     Procedure Component Value - Date/Time    COVID PRE-OP / PRE-PROCEDURE SCREENING ORDER (NO ISOLATION) - Swab, Nasopharynx [[id number]]  (Normal) Collected: 12/16/21 2039    Lab Status: Final result Specimen: Swab from Nasopharynx Updated: 12/16/21 2114    Narrative:      The following orders were created for panel order COVID PRE-OP / PRE-PROCEDURE SCREENING ORDER (NO ISOLATION) - Swab, Nasopharynx.  Procedure                               Abnormality         Status                     ---------                               -----------         ------                     COVID-19 and FLU A/B PCR...[[id number]]  Normal              Final result                 Please view results for these tests on the individual orders.    COVID-19 and FLU A/B PCR - Swab, Nasopharynx [[id number]]  (Normal) Collected: 12/16/21 2039    Lab Status: Final result Specimen: Swab from Nasopharynx Updated: 12/16/21 2114     COVID19 Not Detected     Influenza A PCR Not Detected     Influenza B PCR Not Detected    Narrative:      Fact sheet for providers: https://www.fda.gov/media/999383/download    Fact sheet for patients: https://www.fda.gov/media/899893/download    Test performed by PCR.    Blood Culture - Blood, Arm, Right [[id number]]  (Normal) Collected: 12/08/21 1700    Lab Status: Final result Specimen: Blood from Arm, Right Updated: 12/13/21 1731     Blood Culture No growth at 5 days    Blood Culture - Blood, Arm, Left [[id number]]  (Normal) Collected: 12/08/21 1720    Lab Status: Final result Specimen:  Blood from Arm, Left Updated: 12/13/21 1731     Blood Culture No growth at 5 days    MRSA Screen, PCR (Inpatient) - Swab, Nares [[id number]]  (Normal) Collected: 12/08/21 2213    Lab Status: Final result Specimen: Swab from Nares Updated: 12/09/21 0749     MRSA PCR Negative    Narrative:      MRSA Negative    S. Pneumo Ag Urine or CSF - Urine, Urine, Clean Catch [[id number]]  (Normal) Collected: 12/08/21 1404    Lab Status: Final result Specimen: Urine, Clean Catch Updated: 12/09/21 0709     Strep Pneumo Ag Negative    Legionella Antigen, Urine - Urine, Urine, Clean Catch [[id number]]  (Normal) Collected: 12/08/21 1404    Lab Status: Final result Specimen: Urine, Clean Catch Updated: 12/09/21 0706     LEGIONELLA ANTIGEN, URINE Negative    Respiratory Panel PCR w/COVID-19(SARS-CoV-2) RUBINA/MANDY/MARK/PAD/COR/MAD/HORACE In-House, NP Swab in UTM/VTM, 3-4 HR TAT - Swab, Nasopharynx [[id number]]  (Normal) Collected: 12/08/21 2213    Lab Status: Final result Specimen: Swab from Nasopharynx Updated: 12/09/21 0003     ADENOVIRUS, PCR Not Detected     Coronavirus 229E Not Detected     Coronavirus HKU1 Not Detected     Coronavirus NL63 Not Detected     Coronavirus OC43 Not Detected     COVID19 Not Detected     Human Metapneumovirus Not Detected     Human Rhinovirus/Enterovirus Not Detected     Influenza A PCR Not Detected     Influenza B PCR Not Detected     Parainfluenza Virus 1 Not Detected     Parainfluenza Virus 2 Not Detected     Parainfluenza Virus 3 Not Detected     Parainfluenza Virus 4 Not Detected     RSV, PCR Not Detected     Bordetella pertussis pcr Not Detected     Bordetella parapertussis PCR Not Detected     Chlamydophila pneumoniae PCR Not Detected     Mycoplasma pneumo by PCR Not Detected    Narrative:      In the setting of a positive respiratory panel with a viral infection PLUS a negative procalcitonin without other underlying concern for bacterial infection, consider observing off antibiotics or discontinuation of  antibiotics and continue supportive care. If the respiratory panel is positive for atypical bacterial infection (Bordetella pertussis, Chlamydophila pneumoniae, or Mycoplasma pneumoniae), consider antibiotic de-escalation to target atypical bacterial infection.          No radiology results from the last 24 hrs    Results for orders placed during the hospital encounter of 10/05/21    Adult Transthoracic Echo Complete W/ Cont if Necessary Per Protocol    Interpretation Summary  · Left ventricular ejection fraction appears to be 61 - 65%. Left ventricular systolic function is normal.  · Left atrial volume is mildly increased.      I have reviewed the medications:  Scheduled Meds:albumin human, , ,   albumin human, , ,   amLODIPine, 5 mg, Oral, Q24H  atorvastatin, 80 mg, Oral, Daily  calcium acetate, 667 mg, Oral, TID With Meals  carvedilol, 25 mg, Oral, BID With Meals  cyanocobalamin, 1,000 mcg, Intramuscular, Q28 Days  heparin (porcine), 5,000 Units, Subcutaneous, Q8H  insulin lispro, 0-7 Units, Subcutaneous, TID AC  polyethylene glycol, 17 g, Oral, Daily  senna, 2 tablet, Oral, Nightly  sodium chloride, 10 mL, Intravenous, Q12H      Continuous Infusions:   PRN Meds:.•  acetaminophen **OR** acetaminophen **OR** acetaminophen  •  albumin human  •  dextrose  •  dextrose  •  glucagon (human recombinant)  •  heparin (porcine)  •  hydrOXYzine  •  ipratropium-albuterol  •  naloxone  •  ondansetron  •  QUEtiapine  •  sodium chloride    Assessment/Plan   Assessment & Plan     Active Hospital Problems    Diagnosis  POA   • **Right lower lobe pneumonia [J18.9]  Yes   • AMS (altered mental status) [R41.82]  Yes   • Colitis [K52.9]  Unknown   • Normocytic anemia [D64.9]  Unknown   • Hypokalemia [E87.6]  Unknown   • Hepatocellular carcinoma metastatic to bone s/p RXT  [C79.51, C22.0]  Yes   • Cirrhosis of liver (HCC) [K74.60]  Yes   • Hemochromatosis [E83.119]  Yes   • S/P left BKA [Z89.512]  Not Applicable   • Chronic Hep C   [B18.2]  Yes   • Chronic pain on Methadone [F11.90]  Yes   • Chronic ulcer of right foot [L97.519]  Yes   • ESRD (end stage renal disease) [N18.6]  Yes   • GERD (gastroesophageal reflux disease) [K21.9]  Yes   • Essential hypertension [I10]  Yes   • Type 2 diabetes mellitus (HCC) [E11.9]  Yes      Resolved Hospital Problems   No resolved problems to display.        Brief Hospital Course to date:  Jeaneth Keita is a 61 y.o. female with Hx of cirrhosis and HCC metastatic to the bone s/p XRT, hepatitis C, chronic pain on methadone, ESRD on HD, who was brought to the ED for evaluation of alteration in her mental status; MR imaging demonstrated prior thalamic bleed (known event October 2021) but no acute process, she was treated empirically for RT lower lobe pneumonia now s/p 7 days of Zosyn/doxycycline and has returned to her approximate baseline mental status; she was planned to return to her facility however her AV fistula became nonfunctional requiring replacement of tunneled HD cath which is delayed transportation, she has now awaiting return to her facility    Assessment/plan    Personal care facility resident  Chronic debility  -Ready for discharge, awaiting onsite evaluation and transportation back to her facility    ESRD on HD  Nonfunctional AV fistula  -Nephrology following for MWF dialysis  -AV fistula was nonfunctional, new RT IJ tunneled HD catheter placed 12/17/21  -Receiving dialysis today 12/20    RT lower lobe pneumonia, s/p antibiotics  Acute metabolic encephalopathy, improved  -MRI head without acute process  -S/p x7 days Zosyn/x5 days doxycycline  -Continue Seroquel at bedtime for insomnia    Hypotension with chronic hypertension  -Continue amlodipine, carvedilol; hydralazine has been discontinued    DM type 2, A1c 5.1%, without long-term use of insulin  -Continue Accu-Cheks with SSI    Chronic hepatitis C with cirrhosis of the liver  HCC with bony metastasis s/p XRT  Familial  hemochromatosis  -Receives [facility] care at [facility]    Chronic pain syndrome  -By report previously on methadone; per PDMP last fill was a 30-day supply 9/27/2021    DVT prophylaxis:  Medical and mechanical DVT prophylaxis orders are present.       AM-PAC 6 Clicks Score (PT): 14 (12/19/21 0800)    Disposition: She is medically ready for discharge    CODE STATUS:   Code Status and Medical Interventions:   Ordered at: 12/08/21 1940     Level Of Support Discussed With:    Patient     Code Status (Patient has no pulse and is not breathing):    CPR (Attempt to Resuscitate)     Medical Interventions (Patient has pulse or is breathing):    Full Support       Jesus James, DO  12/20/21

## 2021-12-21 VITALS
HEART RATE: 87 BPM | DIASTOLIC BLOOD PRESSURE: 60 MMHG | TEMPERATURE: 98.5 F | RESPIRATION RATE: 18 BRPM | HEIGHT: 68 IN | OXYGEN SATURATION: 96 % | WEIGHT: 138.3 LBS | SYSTOLIC BLOOD PRESSURE: 101 MMHG | BODY MASS INDEX: 20.96 KG/M2

## 2021-12-21 LAB
GLUCOSE BLDC GLUCOMTR-MCNC: 173 MG/DL (ref 70–130)
GLUCOSE BLDC GLUCOMTR-MCNC: 299 MG/DL (ref 70–130)
SARS-COV-2 RDRP RESP QL NAA+PROBE: NORMAL

## 2021-12-21 PROCEDURE — 82962 GLUCOSE BLOOD TEST: CPT

## 2021-12-21 PROCEDURE — 87635 SARS-COV-2 COVID-19 AMP PRB: CPT | Performed by: INTERNAL MEDICINE

## 2021-12-21 PROCEDURE — 63710000001 INSULIN LISPRO (HUMAN) PER 5 UNITS: Performed by: INTERNAL MEDICINE

## 2021-12-21 PROCEDURE — 25010000002 HEPARIN (PORCINE) PER 1000 UNITS: Performed by: INTERNAL MEDICINE

## 2021-12-21 PROCEDURE — 99238 HOSP IP/OBS DSCHRG MGMT 30/<: CPT | Performed by: INTERNAL MEDICINE

## 2021-12-21 RX ORDER — QUETIAPINE FUMARATE 25 MG/1
25 TABLET, FILM COATED ORAL NIGHTLY PRN
Qty: 15 TABLET | Refills: 0 | Status: SHIPPED | OUTPATIENT
Start: 2021-12-21 | End: 2022-08-04 | Stop reason: HOSPADM

## 2021-12-21 RX ADMIN — SODIUM CHLORIDE, PRESERVATIVE FREE 10 ML: 5 INJECTION INTRAVENOUS at 08:07

## 2021-12-21 RX ADMIN — INSULIN LISPRO 4 UNITS: 100 INJECTION, SOLUTION INTRAVENOUS; SUBCUTANEOUS at 11:00

## 2021-12-21 RX ADMIN — CALCIUM ACETATE 667 MG: 667 CAPSULE ORAL at 12:17

## 2021-12-21 RX ADMIN — CALCIUM ACETATE 667 MG: 667 CAPSULE ORAL at 08:07

## 2021-12-21 RX ADMIN — CARVEDILOL 25 MG: 12.5 TABLET, FILM COATED ORAL at 08:07

## 2021-12-21 RX ADMIN — INSULIN LISPRO 2 UNITS: 100 INJECTION, SOLUTION INTRAVENOUS; SUBCUTANEOUS at 08:07

## 2021-12-21 RX ADMIN — HEPARIN SODIUM 5000 UNITS: 5000 INJECTION INTRAVENOUS; SUBCUTANEOUS at 06:12

## 2021-12-21 RX ADMIN — AMLODIPINE BESYLATE 5 MG: 5 TABLET ORAL at 08:07

## 2021-12-21 RX ADMIN — POLYETHYLENE GLYCOL 3350 17 G: 17 POWDER, FOR SOLUTION ORAL at 08:07

## 2021-12-21 RX ADMIN — ATORVASTATIN CALCIUM 80 MG: 40 TABLET, FILM COATED ORAL at 08:07

## 2021-12-21 NOTE — DISCHARGE PLACEMENT REQUEST
Jeaneth Keita (61 y.o. Female)   To [facility]   From Radha Miranda [phone]    Lives at [facility] in [address] [address]  Phone:  [phone]  Fax:  [phone] Date: Dec 21, 2021      Ambulatory Referral to Home Health     Patient:  Jeaneth Keita MRN:  [id number]   [address] [place] [address] :  1960  SSN:    Phone: [phone] Sex:  F      INSURANCE PAYOR PLAN GROUP # SUBSCRIBER ID   Primary:    [employer]OSS [id number]      Referring Provider Information:  ALYCE GARAY Phone: [phone] Fax: [phone]      Referral Information:   # Visits:  999 Referral Type: Home Health [42]   Urgency:  Routine Referral Reason: Specialty Services Required   Start Date: Dec 21, 2021 End Date:  To be determined by Insurer   Diagnosis: Confusion (R41.0 [ICD-10-CM] 298.9 [ICD-9-CM])  Pneumonia of right lower lobe due to infectious organism (J18.9 [ICD-10-CM] 486 [ICD-9-CM])  Hypokalemia (E87.6 [ICD-10-CM] 276.8 [ICD-9-CM])  Hypocalcemia (E83.51 [ICD-10-CM] 275.41 [ICD-9-CM])  End stage renal disease (HCC) (N18.6 [ICD-10-CM] 585.6 [ICD-9-CM])  Delirium (R41.0 [ICD-10-CM] 780.09 [ICD-9-CM])  Nontraumatic subcortical hemorrhage of left cerebral hemisphere (HCC) (I61.0 [ICD-10-CM] 431 [ICD-9-CM])  Alcoholic cirrhosis of liver without ascites (HCC) (K70.30 [ICD-10-CM] 571.2 [ICD-9-CM])  Ulcer of foot, chronic, right, with unspecified severity (HCC) (L97.519 [ICD-10-CM] 707.15 [ICD-9-CM])  S/P BKA (below knee amputation), left (HCC) (Z89.512 [ICD-10-CM] V49.75 [ICD-9-CM])  Essential hypertension (I10 [ICD-10-CM] 401.9 [ICD-9-CM])      Refer to Dept:   Refer to Provider:   Refer to Facility:       Face to Face Visit Date: 2021  Follow-up provider for Plan of Care? I treated the patient in an acute care facility and will not continue treatment after discharge.  Follow-up provider: [facility]  KEREN [[id number]]  Reason/Clinical Findings: sp hospial stay  Describe mobility limitations that make leaving home difficult: weakness, pna, esrd  Nursing/Therapeutic Services Requested: Physical Therapy  Nursing/Therapeutic Services Requested: Occupational Therapy  PT orders: Therapeutic exercise  PT orders: Gait Training  PT orders: Transfer training  PT orders: Strengthening  Weight Bearing Status: As Tolerated  Occupational orders: Activities of daily living  Occupational orders: Energy conservation  Occupational orders: Strengthening  Occupational orders: Cognition  Frequency: 1 Week 1     This document serves as a request of services and does not constitute Insurance authorization or approval of services.  To determine eligibility, please contact the members Insurance carrier to verify and review coverage.     If you have medical questions regarding this request for services. Please contact [address] at [phone] during normal business hours.        Verbal Order Mode: Telephone with readback   Authorizing Provider: Jesus James DO  Authorizing Provider's NPI: [id number]     Order Entered By: Radha Miranda RN 12/21/2021 10:08 AM                      Date of Birth Social Security Number Address Home Phone MRN    1960  [address] [facility] [address] [phone] [id number]    Zoroastrian Marital Status             Protestant        Admission Date Admission Type Admitting Provider Attending Provider Department, Room/Bed    12/8/21 Emergency Jesus James DO Shields, Daniel Alan, DO [facility] 5F, S517/1    Discharge Date Discharge Disposition Discharge Destination           Skilled Nursing Facility (DC - External)              Attending Provider: Jesus James DO    Allergies: Sulfa Antibiotics    Isolation: None   Infection: COVID Screen (preop/placement) (12/21/21)   Code Status: CPR   Advance Care Planning Activity    Ht:  "172.7 cm (68\")   Wt: 62.7 kg (138 lb 4.8 oz)    Admission Cmt: None   Principal Problem: Right lower lobe pneumonia [J18.9]                 Active Insurance as of 2021     Primary Coverage     Payor Plan Insurance Group Employer/Plan Group    [employer] BLUE CROSS [place] [id number]     Payor Plan Address Payor Plan Phone Number Payor Plan Fax Number Effective Dates    [address]   2005 - None Entered    [place] [address]       Subscriber Name Subscriber Birth Date Member ID       JEANETH TATUM 1960 [id number]                 Emergency Contacts      (Rel.) Home Phone Work Phone Mobile Phone    MiguelParviz (Spouse) [phone] -- [phone]    chester Madsen (Brother) [phone] -- [phone]    Luzmaria Prescott (Brother) -- -- --    Glendy Rivera (Sister) -- -- --                 Discharge Summary      Jesus James DO at [address] [place] [address] Medicine Services  DISCHARGE SUMMARY    Patient Name: Jeaneth Tatum  : 1960  MRN: [id number]    Date of Admission: 2021 12:37 PM  Date of Discharge: 2021  Primary Care Physician: Provider, No Known    Consults     Date and Time Order Name Status Description    2021  6:03 PM Inpatient Nephrology Consult Completed           Hospital Course     Presenting Problem:   AMS (altered mental status) [R41.82]  Pneumonia [J18.9]    Active Hospital Problems    Diagnosis  POA   • **Right lower lobe pneumonia [J18.9]  Yes   • AMS (altered mental status) [R41.82]  Yes   • Colitis [K52.9]  Unknown   • Normocytic anemia [D64.9]  Unknown   • Hypokalemia [E87.6]  Unknown   • Hepatocellular carcinoma metastatic to bone s/p RXT  [C79.51, C22.0]  Yes   • Cirrhosis of liver (HCC) [K74.60]  Yes   • Hemochromatosis [E83.119]  Yes   • S/P left BKA [Z89.512]  Not Applicable   • Chronic Hep C  [B18.2]  Yes   • Chronic pain on Methadone [F11.90]  Yes   • Chronic ulcer of right foot [L97.519]  Yes "   • ESRD (end stage renal disease) [N18.6]  Yes   • GERD (gastroesophageal reflux disease) [K21.9]  Yes   • Essential hypertension [I10]  Yes   • Type 2 diabetes mellitus (HCC) [E11.9]  Yes      Resolved Hospital Problems   No resolved problems to display.          Hospital Course:  Jeaneth Keita is a 61 y.o. female with cirrhosis and HCC metastatic to bone s/p XRT, hepatitis C, chronic pain on methadone, ESRD on HD, who was brought to the ED for evaluation of alteration in her mental status.  MR imaging of the brain demonstrated thalamic bleed which is a known event from October 2021 without other acute process.  She was treated empirically with antibiotics for a right lower lobe pneumonia and has completed 7 days of Zosyn and 5 days of doxycycline.  She is at her baseline mental status.  Nephrology has followed to continue her hemodialysis.  Of note her AV fistula was nonfunctioning requiring placement of tunneled catheter which was done the day she was initially planned to leave.  Her blood pressure has been low prompting discontinuation of her oral hydralazine and new stabilization of her BP.  Due to missing her ambulance time from delayed dialysis she is remained hospitalized for an additional 4+ days awaiting rescheduling of transportation.    ESRD on HD  Nonfunctional AV fistula  -Nephrology has followed for MWF dialysis  -AV fistula was nonfunctional, new RT IJ tunneled HD catheter placed 12/17/21  -Most recent dialysis 12/20/21     RT lower lobe pneumonia, s/p antibiotics  Acute metabolic encephalopathy, improved  -MRI head without acute process  -S/p x7 days Zosyn/x5 days doxycycline  -Continue Seroquel at bedtime for insomnia     Hypotension with chronic hypertension  -Continue amlodipine, carvedilol; hydralazine has been discontinued     DM type 2, A1c 5.1%, without long-term use of insulin     Chronic hepatitis C with cirrhosis of the liver  HCC with bony metastasis s/p XRT  Familial  hemochromatosis  -[facility]     Chronic pain syndrome  -By report previously on methadone; per PDMP last fill was a 30-day supply 9/27/2021      Discharge Follow Up Recommendations for outpatient labs/diagnostics:  PCP 1-2 weeks  Continue dialysis    Day of Discharge     HPI:   No complaints, no events overnight, cleared to return to [facility] today.    Review of Systems  Gen- No fevers, chills  CV- No chest pain, palpitations  Resp- No cough, dyspnea  GI- No N/V/D, abd pain    Vital Signs:   Temp:  [97.5 °F (36.4 °C)-98.6 °F (37 °C)] 98.1 °F (36.7 °C)  Heart Rate:  [72-95] 72  Resp:  [16-17] 16  BP: ()/(56-83) 109/72     Physical Exam:  Constitutional: Awake, alert, chronically ill-appearing elderly female sitting up in bed  HENT: NCAT, mucous membranes moist  Respiratory: Clear to auscultation bilaterally, respiratory effort normal   Cardiovascular: RRR, no murmurs, rubs, or gallops, palpable radial pulses  Gastrointestinal: Positive bowel sounds, soft, nontender, nondistended  Musculoskeletal: Diminished muscle mass; tunneled HD catheter in RT chest wall  Psychiatric: Appropriate affect, cooperative  Neurologic: Speech clear    Pertinent  and/or Most Recent Results     LAB RESULTS:      Lab 12/19/21  0345 12/18/21  0231 12/17/21  0930   WBC 6.04 5.63  --    HEMOGLOBIN 8.3* 8.2*  --    HEMATOCRIT 24.5* 23.3*  --    PLATELETS 156 176 171   NEUTROS ABS 3.52 3.30  --    IMMATURE GRANS (ABS) 0.02 0.02  --    LYMPHS ABS 1.82 1.71  --    MONOS ABS 0.45 0.42  --    EOS ABS 0.20 0.13  --    MCV 95.7 93.2  --    PROTIME  --   --  14.6*         Lab 12/20/21  0323 12/19/21  0540 12/18/21  0231 12/17/21  0317   SODIUM 137 136 137 137   POTASSIUM 5.0 5.0 4.6 4.8   CHLORIDE 100 101 101 99   CO2 20.0* 19.0* 23.0 24.0   ANION GAP 17.0* 16.0* 13.0 14.0   BUN 75* 55* 38* 65*   CREATININE 6.23* 5.03* 3.73* 5.36*   GLUCOSE 147* 119* 99 126*   CALCIUM 9.5 9.3 9.5 8.9         Lab 12/17/21  0314    TOTAL PROTEIN 6.0   ALBUMIN 3.50   GLOBULIN 2.5   ALT (SGPT) 41*   AST (SGOT) 42*   BILIRUBIN 0.3   ALK PHOS 87         Lab 12/17/21  0930   PROTIME 14.6*   INR 1.17*                 Brief Urine Lab Results  (Last result in the past 365 days)      Color   Clarity   Blood   Leuk Est   Nitrite   Protein   CREAT   Urine HCG        12/08/21 1404 Yellow   Clear   Negative   Negative   Negative   >=300 mg/dL (3+)               Microbiology Results (last 10 days)     Procedure Component Value - Date/Time    COVID PRE-OP / PRE-PROCEDURE SCREENING ORDER (NO ISOLATION) - Swab, Nasopharynx [[id number]]  (Normal) Collected: 12/16/21 2039    Lab Status: Final result Specimen: Swab from Nasopharynx Updated: 12/16/21 2114    Narrative:      The following orders were created for panel order COVID PRE-OP / PRE-PROCEDURE SCREENING ORDER (NO ISOLATION) - Swab, Nasopharynx.  Procedure                               Abnormality         Status                     ---------                               -----------         ------                     COVID-19 and FLU A/B PCR...[[id number]]  Normal              Final result                 Please view results for these tests on the individual orders.    COVID-19 and FLU A/B PCR - Swab, Nasopharynx [[id number]]  (Normal) Collected: 12/16/21 2039    Lab Status: Final result Specimen: Swab from Nasopharynx Updated: 12/16/21 2114     COVID19 Not Detected     Influenza A PCR Not Detected     Influenza B PCR Not Detected    Narrative:      Fact sheet for providers: https://www.fda.gov/media/513973/download    Fact sheet for patients: https://www.fda.gov/media/233768/download    Test performed by PCR.          MRI Brain Without Contrast    Result Date: 12/12/2021  EXAMINATION: MRI BRAIN WO CONTRAST-  INDICATION: AMS with h/o hepatocellular carcinoma, ESRD so unable to give contrast; R41.0-Disorientation, unspecified; J18.9-Pneumonia, unspecified organism; E87.6-Hypokalemia; E83.51-Hypocalcemia;  N18.6-End stage renal disease  TECHNIQUE: Multiplanar multisequence MRI of the brain performed without IV contrast  COMPARISON: Outside MRI 10/5/2021  FINDINGS: No acute infarct noted on diffusion weighted sequences. Midline structures are unremarkable and the craniocervical junction is satisfactory in appearance. Chronic and age-related changes are present with generalized volume loss and typical T2 hyperintense white matter sequela of chronic small vessel ischemia, in addition to evidence of prior left thalamic hemorrhage. There is otherwise no evidence of intracranial hemorrhage, mass or mass effect. The ventricles are unchanged in size and configuration. The orbits are unremarkable and the paranasal sinuses are grossly clear. Intracranial arterial flow voids are maintained.      Stable chronic and age-related changes of the brain including evidence of prior left thalamic hemorrhage. No evidence of acute ischemia, hemorrhage, mass or mass effect.   This report was finalized on 12/12/2021 8:26 AM by Victor Manuel Arvizu.      IR Removal Tunnel CV Cath Without Port    Result Date: 12/10/2021  Procedure: Tunneled dialysis catheter removal.                                                                                                         History: Catheter no longer needed.                                            : Ba Hallman MD.                                                                            Modality: Not applicable.                                                                           No sedation.  Anesthesia: Lidocaine local infiltration.              Estimated blood loss:  < 5 cc.          Technique: A universal timeout was performed prior to starting the procedure.   The  used personal protective equipment and sterile gloves.  The catheter was exposed after removal of the dressing. The cuff was exposed. The retention suture was clipped. The catheter was removed in  entirety. Hemostasis was achieved with manual compression.  An aseptic dressing was applied.  The patient was in stable condition.                   Complications: None immediate.                                                                  Impression:                                                              Successful removal of a right IJ route tunneled dialysis catheter as described above.  Thank you for the opportunity to assist in the care of your patient.  This report was finalized on 12/10/2021 2:09 PM by Ba Hallman MD.        Results for orders placed during the hospital encounter of 10/05/21    Duplex Initial Vein Mapping Hemodialysis Access Unilateral Left or Right CAR    Interpretation Summary  · The left cephalic vein is patent and of adequate size in the upper arm.  · The left cephalic vein is patent and of adequate size in the forearm.      Results for orders placed during the hospital encounter of 10/05/21    Duplex Initial Vein Mapping Hemodialysis Access Unilateral Left or Right CAR    Interpretation Summary  · The left cephalic vein is patent and of adequate size in the upper arm.  · The left cephalic vein is patent and of adequate size in the forearm.      Results for orders placed during the hospital encounter of 10/05/21    Adult Transthoracic Echo Complete W/ Cont if Necessary Per Protocol    Interpretation Summary  · Left ventricular ejection fraction appears to be 61 - 65%. Left ventricular systolic function is normal.  · Left atrial volume is mildly increased.      Discharge Details        Discharge Medications      New Medications      Instructions Start Date   QUEtiapine 25 MG tablet  Commonly known as: SEROquel   25 mg, Oral, Nightly PRN         Continue These Medications      Instructions Start Date   amLODIPine 10 MG tablet  Commonly known as: NORVASC   10 mg, Oral, Every 24 Hours Scheduled      atorvastatin 80 MG tablet  Commonly known as: LIPITOR   80 mg, Oral, Daily       B Complex-Vitamin C capsule   Oral, Daily      calcium acetate 667 MG capsule capsule  Commonly known as: PHOS BINDER)   667 mg, Oral, 3 Times Daily With Meals      carvedilol 25 MG tablet  Commonly known as: COREG   25 mg, Oral, 2 Times Daily With Meals      polyethylene glycol 17 g packet  Commonly known as: MIRALAX   17 g, Oral, Daily      sennosides-docusate 8.6-50 MG per tablet  Commonly known as: PERICOLACE   2 tablets, Oral, 2 Times Daily      vitamin D 1.25 MG (34980 UT) capsule capsule  Commonly known as: ERGOCALCIFEROL   50,000 Units, Oral, Weekly         Stop These Medications    hydrALAZINE 100 MG tablet  Commonly known as: APRESOLINE            Allergies   Allergen Reactions   • Sulfa Antibiotics          Discharge Disposition:  Skilled Nursing Facility (DC - External)    Diet:  Hospital:  Diet Order   Procedures   • Diet Regular; Cardiac, Consistent Carbohydrate, Renal       Activity:  Activity Instructions    Activity as tolerated              CODE STATUS:    Code Status and Medical Interventions:   Ordered at: 12/08/21 1940     Level Of Support Discussed With:    Patient     Code Status (Patient has no pulse and is not breathing):    CPR (Attempt to Resuscitate)     Medical Interventions (Patient has pulse or is breathing):    Full Support       No future appointments.    Additional Instructions for the Follow-ups that You Need to Schedule     Discharge Follow-up with PCP   As directed       Currently Documented PCP:    Provider, No Known    PCP Phone Number:    None     Follow Up Details: 1-2 weeks                     Jesus James DO  12/21/21      Time Spent on Discharge:  I spent  28  minutes on this discharge activity which included: face-to-face encounter with the patient, reviewing the data in the system, coordination of the care with the nursing staff as well as consultants, documentation, and entering orders.            Electronically signed by Jesus James DO at 12/21/21  1000

## 2021-12-21 NOTE — PLAN OF CARE
VSS on RA, NSR per tele.  Pt rested comfortably throughout the night.  No complaints this shift.  Expected discharge to [facility] later today.  Will continue to monitor.     Problem: Adult Inpatient Plan of Care  Goal: Plan of Care Review  Outcome: Ongoing, Progressing  Goal: Patient-Specific Goal (Individualized)  Outcome: Ongoing, Progressing  Goal: Absence of Hospital-Acquired Illness or Injury  Outcome: Ongoing, Progressing  Intervention: Identify and Manage Fall Risk  Recent Flowsheet Documentation  Taken 12/21/2021 0400 by Safia Archer, RN  Safety Promotion/Fall Prevention:   activity supervised   assistive device/personal items within reach   clutter free environment maintained   safety round/check completed   room organization consistent   nonskid shoes/slippers when out of bed   fall prevention program maintained  Taken 12/21/2021 0200 by Safia Archer, RN  Safety Promotion/Fall Prevention:   activity supervised   assistive device/personal items within reach   clutter free environment maintained   safety round/check completed   room organization consistent   nonskid shoes/slippers when out of bed   fall prevention program maintained  Taken 12/21/2021 0000 by Safia Archer, RN  Safety Promotion/Fall Prevention:   activity supervised   assistive device/personal items within reach   clutter free environment maintained   safety round/check completed   room organization consistent   nonskid shoes/slippers when out of bed   fall prevention program maintained  Taken 12/20/2021 2200 by Safia Archer, RN  Safety Promotion/Fall Prevention:   activity supervised   assistive device/personal items within reach   clutter free environment maintained   safety round/check completed   room organization consistent   nonskid shoes/slippers when out of bed   fall prevention program maintained  Taken 12/20/2021 2000 by Safia Archer, RN  Safety Promotion/Fall Prevention:   activity supervised   assistive  device/personal items within reach   clutter free environment maintained   fall prevention program maintained   nonskid shoes/slippers when out of bed   room organization consistent   safety round/check completed  Intervention: Prevent Skin Injury  Recent Flowsheet Documentation  Taken 12/21/2021 0400 by Safia Archer RN  Body Position: position changed independently  Skin Protection:   adhesive use limited   incontinence pads utilized   tubing/devices free from skin contact   skin-to-device areas padded  Taken 12/21/2021 0200 by Safia Archer RN  Body Position: position changed independently  Skin Protection:   adhesive use limited   incontinence pads utilized   tubing/devices free from skin contact   skin-to-device areas padded  Taken 12/21/2021 0000 by Safia Archer RN  Body Position: position changed independently  Skin Protection:   adhesive use limited   incontinence pads utilized   tubing/devices free from skin contact   skin-to-device areas padded  Taken 12/20/2021 2200 by Safia Archer RN  Body Position: position changed independently  Skin Protection:   adhesive use limited   incontinence pads utilized   tubing/devices free from skin contact   skin-to-device areas padded  Taken 12/20/2021 2000 by Safia Archer RN  Body Position: position changed independently  Skin Protection:   adhesive use limited   tubing/devices free from skin contact   skin-to-device areas padded   skin sealant/moisture barrier applied   incontinence pads utilized  Intervention: Prevent Infection  Recent Flowsheet Documentation  Taken 12/21/2021 0400 by Safia Archer RN  Infection Prevention:   environmental surveillance performed   visitors restricted/screened   single patient room provided   rest/sleep promoted   personal protective equipment utilized  Taken 12/21/2021 0200 by Safia Archer RN  Infection Prevention:   environmental surveillance performed   visitors restricted/screened   single patient room  provided   rest/sleep promoted   personal protective equipment utilized  Taken 12/21/2021 0000 by Safia Archer RN  Infection Prevention:   environmental surveillance performed   visitors restricted/screened   single patient room provided   rest/sleep promoted   personal protective equipment utilized  Taken 12/20/2021 2200 by Safia Archer RN  Infection Prevention:   environmental surveillance performed   visitors restricted/screened   single patient room provided   rest/sleep promoted   personal protective equipment utilized  Taken 12/20/2021 2000 by Safia Archer RN  Infection Prevention:   environmental surveillance performed   visitors restricted/screened   single patient room provided   rest/sleep promoted   personal protective equipment utilized  Goal: Optimal Comfort and Wellbeing  Outcome: Ongoing, Progressing  Intervention: Provide Person-Centered Care  Recent Flowsheet Documentation  Taken 12/20/2021 2000 by Safia Archer RN  Trust Relationship/Rapport:   care explained   choices provided  Goal: Readiness for Transition of Care  Outcome: Ongoing, Progressing     Problem: Fall Injury Risk  Goal: Absence of Fall and Fall-Related Injury  Outcome: Ongoing, Progressing  Intervention: Identify and Manage Contributors to Fall Injury Risk  Recent Flowsheet Documentation  Taken 12/21/2021 0400 by Safia Archer RN  Medication Review/Management: medications reviewed  Taken 12/21/2021 0200 by Safia Archer RN  Medication Review/Management: medications reviewed  Taken 12/21/2021 0000 by Safia Archer RN  Medication Review/Management: medications reviewed  Taken 12/20/2021 2200 by Safia Archer RN  Medication Review/Management: medications reviewed  Taken 12/20/2021 2000 by Safia Archer RN  Medication Review/Management: medications reviewed  Intervention: Promote Injury-Free Environment  Recent Flowsheet Documentation  Taken 12/21/2021 0400 by Safia Archer RN  Safety Promotion/Fall  Prevention:   activity supervised   assistive device/personal items within reach   clutter free environment maintained   safety round/check completed   room organization consistent   nonskid shoes/slippers when out of bed   fall prevention program maintained  Taken 12/21/2021 0200 by Safia Archer RN  Safety Promotion/Fall Prevention:   activity supervised   assistive device/personal items within reach   clutter free environment maintained   safety round/check completed   room organization consistent   nonskid shoes/slippers when out of bed   fall prevention program maintained  Taken 12/21/2021 0000 by Safia Archer RN  Safety Promotion/Fall Prevention:   activity supervised   assistive device/personal items within reach   clutter free environment maintained   safety round/check completed   room organization consistent   nonskid shoes/slippers when out of bed   fall prevention program maintained  Taken 12/20/2021 2200 by Safia Archer RN  Safety Promotion/Fall Prevention:   activity supervised   assistive device/personal items within reach   clutter free environment maintained   safety round/check completed   room organization consistent   nonskid shoes/slippers when out of bed   fall prevention program maintained  Taken 12/20/2021 2000 by Safia Archer RN  Safety Promotion/Fall Prevention:   activity supervised   assistive device/personal items within reach   clutter free environment maintained   fall prevention program maintained   nonskid shoes/slippers when out of bed   room organization consistent   safety round/check completed     Problem: Skin Injury Risk Increased  Goal: Skin Health and Integrity  Outcome: Ongoing, Progressing  Intervention: Optimize Skin Protection  Recent Flowsheet Documentation  Taken 12/21/2021 0400 by Safia Archer, RN  Pressure Reduction Techniques:   frequent weight shift encouraged   heels elevated off bed   pressure points protected  Head of Bed (HOB): HOB at 20  degrees  Pressure Reduction Devices:   pressure-redistributing mattress utilized   positioning supports utilized   heel offloading device utilized  Skin Protection:   adhesive use limited   incontinence pads utilized   tubing/devices free from skin contact   skin-to-device areas padded  Taken 12/21/2021 0200 by Safia Archer RN  Pressure Reduction Techniques:   frequent weight shift encouraged   heels elevated off bed   pressure points protected  Head of Bed (HOB): HOB at 15 degrees  Pressure Reduction Devices:   pressure-redistributing mattress utilized   positioning supports utilized   heel offloading device utilized  Skin Protection:   adhesive use limited   incontinence pads utilized   tubing/devices free from skin contact   skin-to-device areas padded  Taken 12/21/2021 0000 by Safia Archer RN  Pressure Reduction Techniques:   frequent weight shift encouraged   heels elevated off bed   pressure points protected  Head of Bed (HOB): HOB at 20-30 degrees  Pressure Reduction Devices:   pressure-redistributing mattress utilized   positioning supports utilized   heel offloading device utilized  Skin Protection:   adhesive use limited   incontinence pads utilized   tubing/devices free from skin contact   skin-to-device areas padded  Taken 12/20/2021 2200 by Safia Archer RN  Pressure Reduction Techniques:   frequent weight shift encouraged   heels elevated off bed   pressure points protected  Head of Bed (HOB): HOB at 20-30 degrees  Pressure Reduction Devices:   pressure-redistributing mattress utilized   positioning supports utilized   heel offloading device utilized  Skin Protection:   adhesive use limited   incontinence pads utilized   tubing/devices free from skin contact   skin-to-device areas padded  Taken 12/20/2021 2000 by Safia Archer RN  Pressure Reduction Techniques:   frequent weight shift encouraged   heels elevated off bed   pressure points protected   weight shift assistance provided  Head  of Bed (HOB): HOB at 20-30 degrees  Pressure Reduction Devices:   pressure-redistributing mattress utilized   positioning supports utilized   heel offloading device utilized  Skin Protection:   adhesive use limited   tubing/devices free from skin contact   skin-to-device areas padded   skin sealant/moisture barrier applied   incontinence pads utilized     Problem: Diabetes Comorbidity  Goal: Blood Glucose Level Within Desired Range  Outcome: Ongoing, Progressing     Problem: Hypertension Comorbidity  Goal: Blood Pressure in Desired Range  Outcome: Ongoing, Progressing  Intervention: Maintain Hypertension-Management Strategies  Recent Flowsheet Documentation  Taken 12/21/2021 0400 by Safia Archer RN  Medication Review/Management: medications reviewed  Taken 12/21/2021 0200 by Safia Archer RN  Medication Review/Management: medications reviewed  Taken 12/21/2021 0000 by Safia Archer RN  Medication Review/Management: medications reviewed  Taken 12/20/2021 2200 by Safia Archer RN  Medication Review/Management: medications reviewed  Taken 12/20/2021 2000 by Safia Archer RN  Medication Review/Management: medications reviewed     Problem: Pain Chronic (Persistent) (Comorbidity Management)  Goal: Acceptable Pain Control and Functional Ability  Outcome: Ongoing, Progressing  Intervention: Manage Persistent Pain  Recent Flowsheet Documentation  Taken 12/21/2021 0400 by Safia Archer RN  Medication Review/Management: medications reviewed  Taken 12/21/2021 0200 by Safia Archer RN  Medication Review/Management: medications reviewed  Taken 12/21/2021 0000 by Safia Archer RN  Medication Review/Management: medications reviewed  Taken 12/20/2021 2200 by Safia Archer, RN  Medication Review/Management: medications reviewed  Taken 12/20/2021 2000 by Safia Archer RN  Medication Review/Management: medications reviewed  Intervention: Optimize Psychosocial Wellbeing  Recent Flowsheet  Documentation  Taken 12/20/2021 2000 by Safia Archer RN  Supportive Measures: active listening utilized  Diversional Activities: television     Problem: Restraint, Nonbehavioral (Nonviolent)  Goal: Discontinuation Criteria Achieved  Outcome: Ongoing, Progressing  Intervention: Implement Least-restrictive Safety Strategies  Recent Flowsheet Documentation  Taken 12/20/2021 2000 by Safia Archer RN  Diversional Activities: television  Goal: Personal Dignity and Safety Maintained  Outcome: Ongoing, Progressing  Intervention: Protect Dignity, Rights, and Personal Wellbeing  Recent Flowsheet Documentation  Taken 12/20/2021 2000 by Safia Archer RN  Trust Relationship/Rapport:   care explained   choices provided  Intervention: Protect Skin and Joint Integrity  Recent Flowsheet Documentation  Taken 12/21/2021 0400 by Safia Archer RN  Body Position: position changed independently  Taken 12/21/2021 0200 by Safia Archer RN  Body Position: position changed independently  Taken 12/21/2021 0000 by Safia Archer RN  Body Position: position changed independently  Taken 12/20/2021 2200 by Safia Archer RN  Body Position: position changed independently  Taken 12/20/2021 2000 by Safia Archer RN  Body Position: position changed independently     Problem: Device-Related Complication Risk (Hemodialysis)  Goal: Safe, Effective Therapy Delivery  Outcome: Ongoing, Progressing  Intervention: Optimize Device Care and Function  Recent Flowsheet Documentation  Taken 12/21/2021 0400 by Safia Archer RN  Medication Review/Management: medications reviewed  Taken 12/21/2021 0200 by Safia Archer RN  Medication Review/Management: medications reviewed  Taken 12/21/2021 0000 by Safia Archer RN  Medication Review/Management: medications reviewed  Taken 12/20/2021 2200 by Safia Archer RN  Medication Review/Management: medications reviewed  Taken 12/20/2021 2000 by Safia Archer, DONNY  Medication  Review/Management: medications reviewed     Problem: Hemodynamic Instability (Hemodialysis)  Goal: Vital Signs Remain in Desired Range  Outcome: Ongoing, Progressing     Problem: Infection (Hemodialysis)  Goal: Absence of Infection Signs and Symptoms  Outcome: Ongoing, Progressing  Intervention: Prevent or Manage Infection  Recent Flowsheet Documentation  Taken 12/21/2021 0400 by Safia Archer RN  Infection Prevention:   environmental surveillance performed   visitors restricted/screened   single patient room provided   rest/sleep promoted   personal protective equipment utilized  Taken 12/21/2021 0200 by Safia Archer RN  Infection Prevention:   environmental surveillance performed   visitors restricted/screened   single patient room provided   rest/sleep promoted   personal protective equipment utilized  Taken 12/21/2021 0000 by Safia Archer RN  Infection Prevention:   environmental surveillance performed   visitors restricted/screened   single patient room provided   rest/sleep promoted   personal protective equipment utilized  Taken 12/20/2021 2200 by Safia Archer RN  Infection Prevention:   environmental surveillance performed   visitors restricted/screened   single patient room provided   rest/sleep promoted   personal protective equipment utilized  Taken 12/20/2021 2000 by Safia Archer RN  Infection Prevention:   environmental surveillance performed   visitors restricted/screened   single patient room provided   rest/sleep promoted   personal protective equipment utilized

## 2021-12-21 NOTE — CASE MANAGEMENT/SOCIAL WORK
Case Management Discharge Note      Final Note: I spoke with Cherelle at [facility] [phone]. She has approved for the pt to return to [facility] today. Please call a report to the nurse at [phone]. [facility] ambulance to transport at 1315 today. I spoke with her outpt HD clinic yesterday and they are aware of the planned hospital DC today. I was unable to leave a message today as the clinic is closed. I have faxed the DC summary to [facility] and [facility]. Cherelle at [facility] will arrange [facility] transport to outpt HD for tomorrow to resume the pts normal outpt schedule. I called the HH referral to Juany at [facility] and faxed the referral to them at [phone]. They have accepted the referral but may not be able to see the pt until after PCP f/u.. I left a VM on the pts spouse phone about the DC to the facility today.Meds will need to be escribed to the pharmacy in [facility].         Selected Continued Care - Admitted Since 12/8/2021     Destination    No services have been selected for the patient.              Durable Medical Equipment    No services have been selected for the patient.              Dialysis/Infusion Coordination complete.    Service Provider Selected Services Address Phone Fax Patient Preferred    [facility]  Dialysis [address], [facility] [id number] [phone] --          Home Medical Care Coordination complete.    Service Provider Selected Services Address Phone Fax Patient Preferred    [facility]  Home Health Services [address] [address] [phone] --          Therapy    No services have been selected for the patient.              Community Resources    No services have been selected for the patient.              Community & DME    No services have been selected for the patient.                Selected Continued Care - Prior Encounters Includes selections from prior encounters from 9/9/2021  to 12/21/2021    Discharged on 10/28/2021 Admission date: 10/5/2021 - Discharge disposition: Skilled Nursing Facility (DC - External)    Destination     Service Provider Selected Services Address Phone Fax Patient Preferred    [facility]  Skilled Nursing [facility] -- -- --       Internal Comment last updated by Padmini Ruth, RN 10/27/2021 1339    10/27 referral called                     Dialysis/Infusion     Service Provider Selected Services Address Phone Fax Patient Preferred    [facility]  Dialysis [address] [id number] [phone] --       Internal Comment last updated by Padmini Ruth, RN 10/27/2021 1323    10/27 referral faxed                                    Final Discharge Disposition Code: 06 - home with home health care

## 2021-12-21 NOTE — PROGRESS NOTES
"   LOS: 13 days    Patient Care Team:  Provider, No Known as PCP - General  ESRD     Subjective     Interval History:   No new events no added distress patient may be going to rehab center today  Review of Systems:   Denies any nausea vomiting chest pain shortness of breath.      Objective     Vital Sign Min/Max for last 24 hours  Temp  Min: 97.5 °F (36.4 °C)  Max: 98.6 °F (37 °C)   BP  Min: 95/63  Max: 126/83   Pulse  Min: 72  Max: 95   Resp  Min: 16  Max: 18   SpO2  Min: 95 %  Max: 96 %   No data recorded   Weight  Min: 62.7 kg (138 lb 4.8 oz)  Max: 62.7 kg (138 lb 4.8 oz)     Flowsheet Rows      First Filed Value   Admission Height 172.7 cm (68\") Documented at 12/08/2021 1240   Admission Weight 72.6 kg (160 lb) Documented at 12/08/2021 1240          I/O this shift:  In: 118 [P.O.:118]  Out: -   I/O last 3 completed shifts:  In: 400 [I.V.:400]  Out: 2520 [Other:2520]    Physical Exam:  General Appearance: Awake alert oriented x3 no obvious distress.  Eyes: PER, EOMI.  Neck: Supple no JVD.  Lungs: No rales or rhonchi chest clear., equal chest movement, nonlabored.  Heart: No gallop, murmur, rub, RRR.  Abdomen: Soft, nontender, positive bowel sounds x 4.   Extremities: No edema, no cyanosis.  Neuro: No focal deficit, moving all extremities, alert oriented X 3  New tunnel catheter right IJ, right side of chest  Access: :Left forearm AVF nonfunctional      WBC WBC   Date Value Ref Range Status   12/19/2021 6.04 3.40 - 10.80 10*3/mm3 Final      HGB Hemoglobin   Date Value Ref Range Status   12/19/2021 8.3 (L) 12.0 - 15.9 g/dL Final      HCT Hematocrit   Date Value Ref Range Status   12/19/2021 24.5 (L) 34.0 - 46.6 % Final      Platlets No results found for: LABPLAT   MCV MCV   Date Value Ref Range Status   12/19/2021 95.7 79.0 - 97.0 fL Final          Sodium Sodium   Date Value Ref Range Status   12/20/2021 137 136 - 145 mmol/L Final   12/19/2021 136 136 - 145 mmol/L Final      Potassium Potassium   Date Value Ref " Range Status   12/20/2021 5.0 3.5 - 5.2 mmol/L Final   12/19/2021 5.0 3.5 - 5.2 mmol/L Final     Comment:     Slight hemolysis detected by analyzer. Results may be affected.      Chloride Chloride   Date Value Ref Range Status   12/20/2021 100 98 - 107 mmol/L Final   12/19/2021 101 98 - 107 mmol/L Final      CO2 CO2   Date Value Ref Range Status   12/20/2021 20.0 (L) 22.0 - 29.0 mmol/L Final   12/19/2021 19.0 (L) 22.0 - 29.0 mmol/L Final      BUN BUN   Date Value Ref Range Status   12/20/2021 75 (H) 8 - 23 mg/dL Final   12/19/2021 55 (H) 8 - 23 mg/dL Final      Creatinine Creatinine   Date Value Ref Range Status   12/20/2021 6.23 (H) 0.57 - 1.00 mg/dL Final   12/19/2021 5.03 (H) 0.57 - 1.00 mg/dL Final      Calcium Calcium   Date Value Ref Range Status   12/20/2021 9.5 8.6 - 10.5 mg/dL Final   12/19/2021 9.3 8.6 - 10.5 mg/dL Final      PO4 No results found for: CAPO4   Albumin No results found for: ALBUMIN   Magnesium No results found for: MG   Uric Acid No results found for: URICACID        Results Review:     I reviewed the patient's new clinical results.    albumin human, , ,   albumin human, , ,   albumin human, , ,   albumin human, , ,   amLODIPine, 5 mg, Oral, Q24H  atorvastatin, 80 mg, Oral, Daily  calcium acetate, 667 mg, Oral, TID With Meals  carvedilol, 25 mg, Oral, BID With Meals  cyanocobalamin, 1,000 mcg, Intramuscular, Q28 Days  heparin (porcine), 5,000 Units, Subcutaneous, Q8H  insulin lispro, 0-7 Units, Subcutaneous, TID AC  polyethylene glycol, 17 g, Oral, Daily  senna, 2 tablet, Oral, Nightly  sodium chloride, 10 mL, Intravenous, Q12H           Medication Review:     Assessment/Plan       Right lower lobe pneumonia    Hepatocellular carcinoma metastatic to bone s/p RXT     Hemochromatosis    Cirrhosis of liver (HCC)    Chronic Hep C     ESRD (end stage renal disease)    Chronic ulcer of right foot    S/P left BKA    GERD (gastroesophageal reflux disease)    Chronic pain on Methadone    Essential  hypertension    Type 2 diabetes mellitus (HCC)    AMS (altered mental status)    Colitis    Normocytic anemia    Hypokalemia      1- ESRD - [facility]   2- HTN- BP on the low end of normal, may need to liberalize BP meds.   3- Mild hypokalemia- resolved.   4- Anemia of chronic disease: stable   5- Familial hemochromatosis   6- Hx of Metastatic hepatocellular carcinoma    7- Metabolic Acidosis: correct with HD.      Plan:     -AV fistula nonfunctional.  +THC in place.    - Renal diet.    - Transfuse for Hgb less than 7.0    Agree with the above plan.  Okay to discharge to rehab center as long as she has outpatient dialysis available.    Staci Bates MD  12/21/21  11:45 EST

## 2021-12-21 NOTE — PLAN OF CARE
Goal Outcome Evaluation:  Plan of Care Reviewed With: patient     Pt VSS on RA. Pt has been pleasant. No c/o pain. IV removed per protocol.

## 2021-12-21 NOTE — DISCHARGE PLACEMENT REQUEST
Jeaneth Keita (61 y.o. Female)   To James Sanchez  From Radha Miranda [phone]      COVID-19, ABBOTT IN-HOUSE,NASAL Swab (NO TRANSPORT MEDIA) 2 HR TAT - Swab, Nasopharynx  Order: [id number] - Part of Panel Order [id number]   Status: Final result     Visible to patient: No (not released)     Next appt: 12/27/2021 at 08:30 AM in Family Medicine (Joanna Wells DO)    Specimen Information: Nasopharynx; Swab         0 Result Notes    Component   Ref Range & Units    COVID19   Presumptive Negative - Ref. Range Presumptive Negative    Resulting Agency [facility]             Narrative  Performed by: [facility]  Fact sheet for providers: https://www.fda.gov/media/806619/download     Fact sheet for patients: https://www.fda.gov/media/553968/download     Test performed by PCR.   If inconsistent with clinical signs and symptoms patient should be tested with different authorized molecular test.      Specimen Collected: 12/21/21 10:07 Last Resulted: 12/21/21 10:39       Order Details     View Encounter     Lab and Collection Details     Routing     Result History             Result Care Coordination      Patient Communication    Not Released Not seen Back to Top             Provider Comment To Patient      COVID PRE-OP / PRE-PROCEDURE SCREENING ORDER (NO ISOLATION) - Swab, Nasopharynx    Not Released Not seen        COVID-19, ABBOTT IN-HOUSE,NASAL Swab (NO TRANSPORT MEDIA) 2 HR TAT - Swab, Nasopharynx    Not Released Not seen     Result Read / Acknowledged    Acknowledge result    COVID-19, ABBOTT IN-HOUSE,NASAL Swab (NO TRANSPORT MEDIA) 2 HR TAT - Swab, Nasopharynx (Order [id number])    No acknowledgement history exists for this order.     Lab Component SmartPhrase Guide    COVID PRE-OP / PRE-PROCEDURE SCREENING ORDER (NO ISOLATION) - Swab, Nasopharynx (Order #[id number]) on 12/21/21             [address] [address]  Phone:  [phone]  Fax:  [phone] Date: Dec 21,        Ambulatory Referral to Home Health     Patient:  Jeaneth Keita MRN:  [id number]   [address] [facility] [address] :  1960  SSN:    Phone: [phone] Sex:  F      INSURANCE PAYOR PLAN GROUP # SUBSCRIBER ID   Primary:    [employer]OSS [id number]      Referring Provider Information:  ALYCE GARAY Phone: [phone] Fax: [phone]      Referral Information:   # Visits:  999 Referral Type: Home Health [42]   Urgency:  Routine Referral Reason: Specialty Services Required   Start Date: Dec 21, 2021 End Date:  To be determined by Insurer   Diagnosis: Confusion (R41.0 [ICD-10-CM] 298.9 [ICD-9-CM])  Pneumonia of right lower lobe due to infectious organism (J18.9 [ICD-10-CM] 486 [ICD-9-CM])  Hypokalemia (E87.6 [ICD-10-CM] 276.8 [ICD-9-CM])  Hypocalcemia (E83.51 [ICD-10-CM] 275.41 [ICD-9-CM])  End stage renal disease (HCC) (N18.6 [ICD-10-CM] 585.6 [ICD-9-CM])  Delirium (R41.0 [ICD-10-CM] 780.09 [ICD-9-CM])  Nontraumatic subcortical hemorrhage of left cerebral hemisphere (HCC) (I61.0 [ICD-10-CM] 431 [ICD-9-CM])  Alcoholic cirrhosis of liver without ascites (HCC) (K70.30 [ICD-10-CM] 571.2 [ICD-9-CM])  Ulcer of foot, chronic, right, with unspecified severity (HCC) (L97.519 [ICD-10-CM] 707.15 [ICD-9-CM])  S/P BKA (below knee amputation), left (HCC) (Z89.512 [ICD-10-CM] V49.75 [ICD-9-CM])  Essential hypertension (I10 [ICD-10-CM] 401.9 [ICD-9-CM])      Refer to Dept:   Refer to Provider:   Refer to Facility:       Face to Face Visit Date: 2021  Follow-up provider for Plan of Care? I treated the patient in an acute care facility and will not continue treatment after discharge.  Follow-up provider: [facility] [[id number]]  Reason/Clinical Findings: sp hospial stay  Describe mobility limitations that make leaving home difficult: weakness, pna, esrd  Nursing/Therapeutic Services Requested: Physical Therapy  Nursing/Therapeutic Services Requested:  "Occupational Therapy  PT orders: Therapeutic exercise  PT orders: Gait Training  PT orders: Transfer training  PT orders: Strengthening  Weight Bearing Status: As Tolerated  Occupational orders: Activities of daily living  Occupational orders: Energy conservation  Occupational orders: Strengthening  Occupational orders: Cognition  Frequency: 1 Week 1     This document serves as a request of services and does not constitute Insurance authorization or approval of services.  To determine eligibility, please contact the members Insurance carrier to verify and review coverage.     If you have medical questions regarding this request for services. Please contact [address] at [phone] during normal business hours.        Verbal Order Mode: Telephone with readback   Authorizing Provider: Jesus James DO  Authorizing Provider's NPI: [id number]     Order Entered By: Radha Miranda RN 12/21/2021 10:08 AM                   [facility] may not be able to see her until after she sees her PCP                                                                                                     Date of Birth Social Security Number Address Home Phone MRN    1960  [address] [facility] [address] [phone] [id number]    Hindu Marital Status             Restoration        Admission Date Admission Type Admitting Provider Attending Provider Department, Room/Bed    12/8/21 Emergency Jesus James DO Shields, Daniel Alan, DO [facility] 5F, S517/1    Discharge Date Discharge Disposition Discharge Destination           Skilled Nursing Facility (DC - External)              Attending Provider: Jesus James DO    Allergies: Sulfa Antibiotics    Isolation: None   Infection: None   Code Status: CPR   Advance Care Planning Activity    Ht: 172.7 cm (68\")   Wt: 62.7 kg (138 lb 4.8 oz)    Admission Cmt: None   Principal Problem: Right lower lobe pneumonia " [J18.9]                 Active Insurance as of 2021     Primary Coverage     Payor Plan Insurance Group Employer/Plan Group    [employer] [place] [id number]     Payor Plan Address Payor Plan Phone Number Payor Plan Fax Number Effective Dates    [address]   2005 - None Entered    [facility] [id number]       Subscriber Name Subscriber Birth Date Member ID       JEANETH TATUM 1960 [id number]                 Emergency Contacts      (Rel.) Home Phone Work Phone Mobile Phone    MiguelParviz (Spouse) [phone] -- [phone]    chester Madsen (Brother) [phone] -- [phone]    Luzmaria Prescott (Brother) -- -- --    Glendy Rivera (Sister) -- -- --                 Discharge Summary      Jesus James DO at 21 0953              [facility] Medicine Services  DISCHARGE SUMMARY    Patient Name: Jeaneth Tatum  : 1960  MRN: [id number]    Date of Admission: 2021 12:37 PM  Date of Discharge: 2021  Primary Care Physician: Provider, No Known    Consults     Date and Time Order Name Status Description    2021  6:03 PM Inpatient Nephrology Consult Completed           Hospital Course     Presenting Problem:   AMS (altered mental status) [R41.82]  Pneumonia [J18.9]    Active Hospital Problems    Diagnosis  POA   • **Right lower lobe pneumonia [J18.9]  Yes   • AMS (altered mental status) [R41.82]  Yes   • Colitis [K52.9]  Unknown   • Normocytic anemia [D64.9]  Unknown   • Hypokalemia [E87.6]  Unknown   • Hepatocellular carcinoma metastatic to bone s/p RXT  [C79.51, C22.0]  Yes   • Cirrhosis of liver (HCC) [K74.60]  Yes   • Hemochromatosis [E83.119]  Yes   • S/P left BKA [Z89.512]  Not Applicable   • Chronic Hep C  [B18.2]  Yes   • Chronic pain on Methadone [F11.90]  Yes   • Chronic ulcer of right foot [L97.519]  Yes   • ESRD (end stage renal disease) [N18.6]  Yes   • GERD (gastroesophageal reflux disease) [K21.9]  Yes   • Essential  hypertension [I10]  Yes   • Type 2 diabetes mellitus (HCC) [E11.9]  Yes      Resolved Hospital Problems   No resolved problems to display.          Hospital Course:  Jeaneth Keita is a 61 y.o. female with cirrhosis and HCC metastatic to bone s/p XRT, hepatitis C, chronic pain on methadone, ESRD on HD, who was brought to the ED for evaluation of alteration in her mental status.  MR imaging of the brain demonstrated thalamic bleed which is a known event from October 2021 without other acute process.  She was treated empirically with antibiotics for a right lower lobe pneumonia and has completed 7 days of Zosyn and 5 days of doxycycline.  She is at her baseline mental status.  Nephrology has followed to continue her hemodialysis.  Of note her AV fistula was nonfunctioning requiring placement of tunneled catheter which was done the day she was initially planned to leave.  Her blood pressure has been low prompting discontinuation of her oral hydralazine and new stabilization of her BP.  Due to missing her ambulance time from delayed dialysis she is remained hospitalized for an additional 4+ days awaiting rescheduling of transportation.    ESRD on HD  Nonfunctional AV fistula  -Nephrology has followed for MWF dialysis  -AV fistula was nonfunctional, new RT IJ tunneled HD catheter placed 12/17/21  -Most recent dialysis 12/20/21     RT lower lobe pneumonia, s/p antibiotics  Acute metabolic encephalopathy, improved  -MRI head without acute process  -S/p x7 days Zosyn/x5 days doxycycline  -Continue Seroquel at bedtime for insomnia     Hypotension with chronic hypertension  -Continue amlodipine, carvedilol; hydralazine has been discontinued     DM type 2, A1c 5.1%, without long-term use of insulin     Chronic hepatitis C with cirrhosis of the liver  HCC with bony metastasis s/p XRT  Familial hemochromatosis  -Receives her care at [facility]  -Home meds     Chronic pain syndrome  -By report previously on methadone; per PDMP last  fill was a 30-day supply 9/27/2021      Discharge Follow Up Recommendations for outpatient labs/diagnostics:  PCP 1-2 weeks  Continue dialysis    Day of Discharge     HPI:   No complaints, no events overnight, cleared to return to [facility] today.    Review of Systems  Gen- No fevers, chills  CV- No chest pain, palpitations  Resp- No cough, dyspnea  GI- No N/V/D, abd pain    Vital Signs:   Temp:  [97.5 °F (36.4 °C)-98.6 °F (37 °C)] 98.1 °F (36.7 °C)  Heart Rate:  [72-95] 72  Resp:  [16-17] 16  BP: ()/(56-83) 109/72     Physical Exam:  Constitutional: Awake, alert, chronically ill-appearing elderly female sitting up in bed  HENT: NCAT, mucous membranes moist  Respiratory: Clear to auscultation bilaterally, respiratory effort normal   Cardiovascular: RRR, no murmurs, rubs, or gallops, palpable radial pulses  Gastrointestinal: Positive bowel sounds, soft, nontender, nondistended  Musculoskeletal: Diminished muscle mass; tunneled HD catheter in RT chest wall  Psychiatric: Appropriate affect, cooperative  Neurologic: Speech clear    Pertinent  and/or Most Recent Results     LAB RESULTS:      Lab 12/19/21  0345 12/18/21  0231 12/17/21  0930   WBC 6.04 5.63  --    HEMOGLOBIN 8.3* 8.2*  --    HEMATOCRIT 24.5* 23.3*  --    PLATELETS 156 176 171   NEUTROS ABS 3.52 3.30  --    IMMATURE GRANS (ABS) 0.02 0.02  --    LYMPHS ABS 1.82 1.71  --    MONOS ABS 0.45 0.42  --    EOS ABS 0.20 0.13  --    MCV 95.7 93.2  --    PROTIME  --   --  14.6*         Lab 12/20/21  0323 12/19/21  0540 12/18/21  0231 12/17/21  0317   SODIUM 137 136 137 137   POTASSIUM 5.0 5.0 4.6 4.8   CHLORIDE 100 101 101 99   CO2 20.0* 19.0* 23.0 24.0   ANION GAP 17.0* 16.0* 13.0 14.0   BUN 75* 55* 38* 65*   CREATININE 6.23* 5.03* 3.73* 5.36*   GLUCOSE 147* 119* 99 126*   CALCIUM 9.5 9.3 9.5 8.9         Lab 12/17/21  0317   TOTAL PROTEIN 6.0   ALBUMIN 3.50   GLOBULIN 2.5   ALT (SGPT) 41*   AST (SGOT) 42*   BILIRUBIN 0.3   ALK PHOS 87         Lab  12/17/21  0930   PROTIME 14.6*   INR 1.17*                 Brief Urine Lab Results  (Last result in the past 365 days)      Color   Clarity   Blood   Leuk Est   Nitrite   Protein   CREAT   Urine HCG        12/08/21 1404 Yellow   Clear   Negative   Negative   Negative   >=300 mg/dL (3+)               Microbiology Results (last 10 days)     Procedure Component Value - Date/Time    COVID PRE-OP / PRE-PROCEDURE SCREENING ORDER (NO ISOLATION) - Swab, Nasopharynx [[id number]]  (Normal) Collected: 12/16/21 2039    Lab Status: Final result Specimen: Swab from Nasopharynx Updated: 12/16/21 2114    Narrative:      The following orders were created for panel order COVID PRE-OP / PRE-PROCEDURE SCREENING ORDER (NO ISOLATION) - Swab, Nasopharynx.  Procedure                               Abnormality         Status                     ---------                               -----------         ------                     COVID-19 and FLU A/B PCR...[[id number]]  Normal              Final result                 Please view results for these tests on the individual orders.    COVID-19 and FLU A/B PCR - Swab, Nasopharynx [[id number]]  (Normal) Collected: 12/16/21 2039    Lab Status: Final result Specimen: Swab from Nasopharynx Updated: 12/16/21 2114     COVID19 Not Detected     Influenza A PCR Not Detected     Influenza B PCR Not Detected    Narrative:      Fact sheet for providers: https://www.fda.gov/media/732692/download    Fact sheet for patients: https://www.fda.gov/media/356932/download    Test performed by PCR.          MRI Brain Without Contrast    Result Date: 12/12/2021  EXAMINATION: MRI BRAIN WO CONTRAST-  INDICATION: AMS with h/o hepatocellular carcinoma, ESRD so unable to give contrast; R41.0-Disorientation, unspecified; J18.9-Pneumonia, unspecified organism; E87.6-Hypokalemia; E83.51-Hypocalcemia; N18.6-End stage renal disease  TECHNIQUE: Multiplanar multisequence MRI of the brain performed without IV contrast  COMPARISON:  Outside MRI 10/5/2021  FINDINGS: No acute infarct noted on diffusion weighted sequences. Midline structures are unremarkable and the craniocervical junction is satisfactory in appearance. Chronic and age-related changes are present with generalized volume loss and typical T2 hyperintense white matter sequela of chronic small vessel ischemia, in addition to evidence of prior left thalamic hemorrhage. There is otherwise no evidence of intracranial hemorrhage, mass or mass effect. The ventricles are unchanged in size and configuration. The orbits are unremarkable and the paranasal sinuses are grossly clear. Intracranial arterial flow voids are maintained.      Stable chronic and age-related changes of the brain including evidence of prior left thalamic hemorrhage. No evidence of acute ischemia, hemorrhage, mass or mass effect.   This report was finalized on 12/12/2021 8:26 AM by Victor Manuel Arvizu.      IR Removal Tunnel CV Cath Without Port    Result Date: 12/10/2021  Procedure: Tunneled dialysis catheter removal.                                                                                                         History: Catheter no longer needed.                                            : Ba Hallman MD.                                                                            Modality: Not applicable.                                                                           No sedation.  Anesthesia: Lidocaine local infiltration.              Estimated blood loss:  < 5 cc.          Technique: A universal timeout was performed prior to starting the procedure.   The  used personal protective equipment and sterile gloves.  The catheter was exposed after removal of the dressing. The cuff was exposed. The retention suture was clipped. The catheter was removed in entirety. Hemostasis was achieved with manual compression.  An aseptic dressing was applied.  The patient was in stable condition.                    Complications: None immediate.                                                                  Impression:                                                              Successful removal of a right IJ route tunneled dialysis catheter as described above.  Thank you for the opportunity to assist in the care of your patient.  This report was finalized on 12/10/2021 2:09 PM by Ba Hallman MD.        Results for orders placed during the hospital encounter of 10/05/21    Duplex Initial Vein Mapping Hemodialysis Access Unilateral Left or Right CAR    Interpretation Summary  · The left cephalic vein is patent and of adequate size in the upper arm.  · The left cephalic vein is patent and of adequate size in the forearm.      Results for orders placed during the hospital encounter of 10/05/21    Duplex Initial Vein Mapping Hemodialysis Access Unilateral Left or Right CAR    Interpretation Summary  · The left cephalic vein is patent and of adequate size in the upper arm.  · The left cephalic vein is patent and of adequate size in the forearm.      Results for orders placed during the hospital encounter of 10/05/21    Adult Transthoracic Echo Complete W/ Cont if Necessary Per Protocol    Interpretation Summary  · Left ventricular ejection fraction appears to be 61 - 65%. Left ventricular systolic function is normal.  · Left atrial volume is mildly increased.      Discharge Details        Discharge Medications      New Medications      Instructions Start Date   QUEtiapine 25 MG tablet  Commonly known as: SEROquel   25 mg, Oral, Nightly PRN         Continue These Medications      Instructions Start Date   amLODIPine 10 MG tablet  Commonly known as: NORVASC   10 mg, Oral, Every 24 Hours Scheduled      atorvastatin 80 MG tablet  Commonly known as: LIPITOR   80 mg, Oral, Daily      B Complex-Vitamin C capsule   Oral, Daily      calcium acetate 667 MG capsule capsule  Commonly known as: PHOS BINDER)   667 mg,  Oral, 3 Times Daily With Meals      carvedilol 25 MG tablet  Commonly known as: COREG   25 mg, Oral, 2 Times Daily With Meals      polyethylene glycol 17 g packet  Commonly known as: MIRALAX   17 g, Oral, Daily      sennosides-docusate 8.6-50 MG per tablet  Commonly known as: PERICOLACE   2 tablets, Oral, 2 Times Daily      vitamin D 1.25 MG (11422 UT) capsule capsule  Commonly known as: ERGOCALCIFEROL   50,000 Units, Oral, Weekly         Stop These Medications    hydrALAZINE 100 MG tablet  Commonly known as: APRESOLINE            Allergies   Allergen Reactions   • Sulfa Antibiotics          Discharge Disposition:  Skilled Nursing Facility (DC - External)    Diet:  Hospital:  Diet Order   Procedures   • Diet Regular; Cardiac, Consistent Carbohydrate, Renal       Activity:  Activity Instructions    Activity as tolerated              CODE STATUS:    Code Status and Medical Interventions:   Ordered at: 12/08/21 1940     Level Of Support Discussed With:    Patient     Code Status (Patient has no pulse and is not breathing):    CPR (Attempt to Resuscitate)     Medical Interventions (Patient has pulse or is breathing):    Full Support       No future appointments.    Additional Instructions for the Follow-ups that You Need to Schedule     Discharge Follow-up with PCP   As directed       Currently Documented PCP:    Provider, No Known    PCP Phone Number:    None     Follow Up Details: 1-2 weeks                     Jesus James DO  12/21/21      Time Spent on Discharge:  I spent  28  minutes on this discharge activity which included: face-to-face encounter with the patient, reviewing the data in the system, coordination of the care with the nursing staff as well as consultants, documentation, and entering orders.            Electronically signed by Jesus James DO at 12/21/21 1000

## 2021-12-21 NOTE — DISCHARGE PLACEMENT REQUEST
"Jeaneth Keita (61 y.o. Female)   To [facility]  From Radha Miranda [phone]    COVID-19, ABBOTT IN-HOUSE,NASAL Swab (NO TRANSPORT MEDIA) 2 HR TAT - Swab, Nasopharynx  Order: [id number] - Part of Panel Order [id number]   Status: Final result     Visible to patient: No (not released)     Next appt: 12/27/2021 at 08:30 AM in Family Medicine (Joanna Wells DO)    Specimen Information: Nasopharynx; Swab         0 Result Notes    Component   Ref Range & Units    COVID19   Presumptive Negative - Ref. Range Presumptive Negative    Resulting Agency [facility]             Narrative  Performed by: [facility]  Fact sheet for providers: https://www.fda.gov/media/260425/download     Fact sheet for patients: https://www.fda.gov/media/015095/download     Test performed by PCR.   If inconsistent with clinical signs and symptoms patient should be tested with different authorized molecular test.      Specimen Collected: 12/21/21 10:07 Last Resulted: 12/21/21 10:39                       Date of Birth Social Security Number Address Home Phone MRN    1960  [address] [facility] [address] [phone] [id number]    Bahai Marital Status             Restoration        Admission Date Admission Type Admitting Provider Attending Provider Department, Room/Bed    12/8/21 Emergency Jesus James DO Shields, Daniel Alan, DO [address], [address]/1    Discharge Date Discharge Disposition Discharge Destination           Skilled Nursing Facility (DC - External)              Attending Provider: Jesus James DO    Allergies: Sulfa Antibiotics    Isolation: None   Infection: None   Code Status: CPR   Advance Care Planning Activity    Ht: 172.7 cm (68\")   Wt: 62.7 kg (138 lb 4.8 oz)    Admission Cmt: None   Principal Problem: Right lower lobe pneumonia [J18.9]                 Active Insurance as of 12/8/2021     Primary Coverage     Payor Plan Insurance Group Employer/Plan Group    [id number] [place] [id number]     Payor Plan Address Payor Plan Phone Number Payor Plan Fax Number Effective Dates    [address]   2005 - None Entered    [facility] [id number]       Subscriber Name Subscriber Birth Date Member ID       JEANETH TATUM 1960 [id number]                 Emergency Contacts      (Rel.) Home Phone Work Phone Mobile Phone    Parviz Rivera (Spouse) [phone] -- [phone]    chester Madsen (Brother) [phone] -- [phone]    PrescottLuzmaria (Brother) -- -- --    SantosGlendy rucker (Sister) -- -- --                 Discharge Summary      Jesus James DO at 21 0953              [facility]  DISCHARGE SUMMARY    Patient Name: Jeaneth Tatum  : 1960  MRN: [id number]    Date of Admission: 2021 12:37 PM  Date of Discharge: 2021  Primary Care Physician: Provider, No Known    Consults     Date and Time Order Name Status Description    2021  6:03 PM Inpatient Nephrology Consult Completed           Hospital Course     Presenting Problem:   AMS (altered mental status) [R41.82]  Pneumonia [J18.9]    Active Hospital Problems    Diagnosis  POA   • **Right lower lobe pneumonia [J18.9]  Yes   • AMS (altered mental status) [R41.82]  Yes   • Colitis [K52.9]  Unknown   • Normocytic anemia [D64.9]  Unknown   • Hypokalemia [E87.6]  Unknown   • Hepatocellular carcinoma metastatic to bone s/p RXT  [C79.51, C22.0]  Yes   • Cirrhosis of liver (HCC) [K74.60]  Yes   • Hemochromatosis [E83.119]  Yes   • S/P left BKA [Z89.512]  Not Applicable   • Chronic Hep C  [B18.2]  Yes   • Chronic pain on Methadone [F11.90]  Yes   • Chronic ulcer of right foot [L97.519]  Yes   • ESRD (end stage renal disease) [N18.6]  Yes   • GERD (gastroesophageal reflux disease) [K21.9]  Yes   • Essential hypertension [I10]  Yes   • Type 2 diabetes mellitus (HCC) [E11.9]  Yes      Resolved Hospital Problems   No resolved problems to display.           Hospital Course:  Jeaneth Keita is a 61 y.o. female with cirrhosis and HCC metastatic to bone s/p XRT, hepatitis C, chronic pain on methadone, ESRD on HD, who was brought to the ED for evaluation of alteration in her mental status.  MR imaging of the brain demonstrated thalamic bleed which is a known event from October 2021 without other acute process.  She was treated empirically with antibiotics for a right lower lobe pneumonia and has completed 7 days of Zosyn and 5 days of doxycycline.  She is at her baseline mental status.  Nephrology has followed to continue her hemodialysis.  Of note her AV fistula was nonfunctioning requiring placement of tunneled catheter which was done the day she was initially planned to leave.  Her blood pressure has been low prompting discontinuation of her oral hydralazine and new stabilization of her BP.  Due to missing her ambulance time from delayed dialysis she is remained hospitalized for an additional 4+ days awaiting rescheduling of transportation.    ESRD on HD  Nonfunctional AV fistula  -Nephrology has followed for MWF dialysis  -AV fistula was nonfunctional, new RT IJ tunneled HD catheter placed 12/17/21  -Most recent dialysis 12/20/21     RT lower lobe pneumonia, s/p antibiotics  Acute metabolic encephalopathy, improved  -MRI head without acute process  -S/p x7 days Zosyn/x5 days doxycycline  -Continue Seroquel at bedtime for insomnia     Hypotension with chronic hypertension  -Continue amlodipine, carvedilol; hydralazine has been discontinued     DM type 2, A1c 5.1%, without long-term use of insulin     Chronic hepatitis C with cirrhosis of the liver  HCC with bony metastasis s/p XRT  Familial hemochromatosis  -[facility]ceives [facility] care at [facility]  -Home meds     Chronic pain syndrome  -By report previously on methadone; per PDMP last fill was a 30-day supply 9/27/2021      Discharge Follow Up Recommendations for outpatient labs/diagnostics:  PCP 1-2 weeks  Continue  dialysis    Day of Discharge     HPI:   No complaints, no events overnight, cleared to return to [facility] today.    Review of Systems  Gen- No fevers, chills  CV- No chest pain, palpitations  Resp- No cough, dyspnea  GI- No N/V/D, abd pain    Vital Signs:   Temp:  [97.5 °F (36.4 °C)-98.6 °F (37 °C)] 98.1 °F (36.7 °C)  Heart Rate:  [72-95] 72  Resp:  [16-17] 16  BP: ()/(56-83) 109/72     Physical Exam:  Constitutional: Awake, alert, chronically ill-appearing elderly female sitting up in bed  HENT: NCAT, mucous membranes moist  Respiratory: Clear to auscultation bilaterally, respiratory effort normal   Cardiovascular: RRR, no murmurs, rubs, or gallops, palpable radial pulses  Gastrointestinal: Positive bowel sounds, soft, nontender, nondistended  Musculoskeletal: Diminished muscle mass; tunneled HD catheter in RT chest wall  Psychiatric: Appropriate affect, cooperative  Neurologic: Speech clear    Pertinent  and/or Most Recent Results     LAB RESULTS:      Lab 12/19/21  0345 12/18/21  0231 12/17/21  0930   WBC 6.04 5.63  --    HEMOGLOBIN 8.3* 8.2*  --    HEMATOCRIT 24.5* 23.3*  --    PLATELETS 156 176 171   NEUTROS ABS 3.52 3.30  --    IMMATURE GRANS (ABS) 0.02 0.02  --    LYMPHS ABS 1.82 1.71  --    MONOS ABS 0.45 0.42  --    EOS ABS 0.20 0.13  --    MCV 95.7 93.2  --    PROTIME  --   --  14.6*         Lab 12/20/21  0323 12/19/21  0540 12/18/21  0231 12/17/21  0317   SODIUM 137 136 137 137   POTASSIUM 5.0 5.0 4.6 4.8   CHLORIDE 100 101 101 99   CO2 20.0* 19.0* 23.0 24.0   ANION GAP 17.0* 16.0* 13.0 14.0   BUN 75* 55* 38* 65*   CREATININE 6.23* 5.03* 3.73* 5.36*   GLUCOSE 147* 119* 99 126*   CALCIUM 9.5 9.3 9.5 8.9         Lab 12/17/21  0317   TOTAL PROTEIN 6.0   ALBUMIN 3.50   GLOBULIN 2.5   ALT (SGPT) 41*   AST (SGOT) 42*   BILIRUBIN 0.3   ALK PHOS 87         Lab 12/17/21  0930   PROTIME 14.6*   INR 1.17*                 Brief Urine Lab Results  (Last result in the past 365 days)      Color   Clarity    Blood   Leuk Est   Nitrite   Protein   CREAT   Urine HCG        12/08/21 1404 Yellow   Clear   Negative   Negative   Negative   >=300 mg/dL (3+)               Microbiology Results (last 10 days)     Procedure Component Value - Date/Time    COVID PRE-OP / PRE-PROCEDURE SCREENING ORDER (NO ISOLATION) - Swab, Nasopharynx [[id number]]  (Normal) Collected: 12/16/21 2039    Lab Status: Final result Specimen: Swab from Nasopharynx Updated: 12/16/21 2114    Narrative:      The following orders were created for panel order COVID PRE-OP / PRE-PROCEDURE SCREENING ORDER (NO ISOLATION) - Swab, Nasopharynx.  Procedure                               Abnormality         Status                     ---------                               -----------         ------                     COVID-19 and FLU A/B PCR...[[id number]]  Normal              Final result                 Please view results for these tests on the individual orders.    COVID-19 and FLU A/B PCR - Swab, Nasopharynx [[id number]]  (Normal) Collected: 12/16/21 2039    Lab Status: Final result Specimen: Swab from Nasopharynx Updated: 12/16/21 2114     COVID19 Not Detected     Influenza A PCR Not Detected     Influenza B PCR Not Detected    Narrative:      Fact sheet for providers: https://www.fda.gov/media/347727/download    Fact sheet for patients: https://www.fda.gov/media/664595/download    Test performed by PCR.          MRI Brain Without Contrast    Result Date: 12/12/2021  EXAMINATION: MRI BRAIN WO CONTRAST-  INDICATION: AMS with h/o hepatocellular carcinoma, ESRD so unable to give contrast; R41.0-Disorientation, unspecified; J18.9-Pneumonia, unspecified organism; E87.6-Hypokalemia; E83.51-Hypocalcemia; N18.6-End stage renal disease  TECHNIQUE: Multiplanar multisequence MRI of the brain performed without IV contrast  COMPARISON: Outside MRI 10/5/2021  FINDINGS: No acute infarct noted on diffusion weighted sequences. Midline structures are unremarkable and the  craniocervical junction is satisfactory in appearance. Chronic and age-related changes are present with generalized volume loss and typical T2 hyperintense white matter sequela of chronic small vessel ischemia, in addition to evidence of prior left thalamic hemorrhage. There is otherwise no evidence of intracranial hemorrhage, mass or mass effect. The ventricles are unchanged in size and configuration. The orbits are unremarkable and the paranasal sinuses are grossly clear. Intracranial arterial flow voids are maintained.      Stable chronic and age-related changes of the brain including evidence of prior left thalamic hemorrhage. No evidence of acute ischemia, hemorrhage, mass or mass effect.   This report was finalized on 12/12/2021 8:26 AM by Victor Manuel Arvizu.      IR Removal Tunnel CV Cath Without Port    Result Date: 12/10/2021  Procedure: Tunneled dialysis catheter removal.                                                                                                         History: Catheter no longer needed.                                            : Ba Hallman MD.                                                                            Modality: Not applicable.                                                                           No sedation.  Anesthesia: Lidocaine local infiltration.              Estimated blood loss:  < 5 cc.          Technique: A universal timeout was performed prior to starting the procedure.   The  used personal protective equipment and sterile gloves.  The catheter was exposed after removal of the dressing. The cuff was exposed. The retention suture was clipped. The catheter was removed in entirety. Hemostasis was achieved with manual compression.  An aseptic dressing was applied.  The patient was in stable condition.                   Complications: None immediate.                                                                  Impression:                                                               Successful removal of a right IJ route tunneled dialysis catheter as described above.  Thank you for the opportunity to assist in the care of your patient.  This report was finalized on 12/10/2021 2:09 PM by Ba Hallman MD.        Results for orders placed during the hospital encounter of 10/05/21    Duplex Initial Vein Mapping Hemodialysis Access Unilateral Left or Right CAR    Interpretation Summary  · The left cephalic vein is patent and of adequate size in the upper arm.  · The left cephalic vein is patent and of adequate size in the forearm.      Results for orders placed during the hospital encounter of 10/05/21    Duplex Initial Vein Mapping Hemodialysis Access Unilateral Left or Right CAR    Interpretation Summary  · The left cephalic vein is patent and of adequate size in the upper arm.  · The left cephalic vein is patent and of adequate size in the forearm.      Results for orders placed during the hospital encounter of 10/05/21    Adult Transthoracic Echo Complete W/ Cont if Necessary Per Protocol    Interpretation Summary  · Left ventricular ejection fraction appears to be 61 - 65%. Left ventricular systolic function is normal.  · Left atrial volume is mildly increased.      Discharge Details        Discharge Medications      New Medications      Instructions Start Date   QUEtiapine 25 MG tablet  Commonly known as: SEROquel   25 mg, Oral, Nightly PRN         Continue These Medications      Instructions Start Date   amLODIPine 10 MG tablet  Commonly known as: NORVASC   10 mg, Oral, Every 24 Hours Scheduled      atorvastatin 80 MG tablet  Commonly known as: LIPITOR   80 mg, Oral, Daily      B Complex-Vitamin C capsule   Oral, Daily      calcium acetate 667 MG capsule capsule  Commonly known as: PHOS BINDER)   667 mg, Oral, 3 Times Daily With Meals      carvedilol 25 MG tablet  Commonly known as: COREG   25 mg, Oral, 2 Times Daily With Meals       polyethylene glycol 17 g packet  Commonly known as: MIRALAX   17 g, Oral, Daily      sennosides-docusate 8.6-50 MG per tablet  Commonly known as: PERICOLACE   2 tablets, Oral, 2 Times Daily      vitamin D 1.25 MG (97731 UT) capsule capsule  Commonly known as: ERGOCALCIFEROL   50,000 Units, Oral, Weekly         Stop These Medications    hydrALAZINE 100 MG tablet  Commonly known as: APRESOLINE            Allergies   Allergen Reactions   • Sulfa Antibiotics          Discharge Disposition:  Skilled Nursing Facility (DC - External)    Diet:  Hospital:  Diet Order   Procedures   • Diet Regular; Cardiac, Consistent Carbohydrate, Renal       Activity:  Activity Instructions    Activity as tolerated              CODE STATUS:    Code Status and Medical Interventions:   Ordered at: 12/08/21 1940     Level Of Support Discussed With:    Patient     Code Status (Patient has no pulse and is not breathing):    CPR (Attempt to Resuscitate)     Medical Interventions (Patient has pulse or is breathing):    Full Support       No future appointments.    Additional Instructions for the Follow-ups that You Need to Schedule     Discharge Follow-up with PCP   As directed       Currently Documented PCP:    Provider, No Known    PCP Phone Number:    None     Follow Up Details: 1-2 weeks                     Jesus James DO  12/21/21      Time Spent on Discharge:  I spent  28  minutes on this discharge activity which included: face-to-face encounter with the patient, reviewing the data in the system, coordination of the care with the nursing staff as well as consultants, documentation, and entering orders.            Electronically signed by Jesus James DO at 12/21/21 1000

## 2021-12-21 NOTE — DISCHARGE SUMMARY
[facility] Medicine Services  DISCHARGE SUMMARY    Patient Name: Jeaneth Keita  : 1960  MRN: [id number]    Date of Admission: 2021 12:37 PM  Date of Discharge: 2021  Primary Care Physician: Provider, No Known    Consults     Date and Time Order Name Status Description    2021  6:03 PM Inpatient Nephrology Consult Completed           Hospital Course     Presenting Problem:   AMS (altered mental status) [R41.82]  Pneumonia [J18.9]    Active Hospital Problems    Diagnosis  POA   • **Right lower lobe pneumonia [J18.9]  Yes   • AMS (altered mental status) [R41.82]  Yes   • Colitis [K52.9]  Unknown   • Normocytic anemia [D64.9]  Unknown   • Hypokalemia [E87.6]  Unknown   • Hepatocellular carcinoma metastatic to bone s/p RXT  [C79.51, C22.0]  Yes   • Cirrhosis of liver (HCC) [K74.60]  Yes   • Hemochromatosis [E83.119]  Yes   • S/P left BKA [Z89.512]  Not Applicable   • Chronic Hep C  [B18.2]  Yes   • Chronic pain on Methadone [F11.90]  Yes   • Chronic ulcer of right foot [L97.519]  Yes   • ESRD (end stage renal disease) [N18.6]  Yes   • GERD (gastroesophageal reflux disease) [K21.9]  Yes   • Essential hypertension [I10]  Yes   • Type 2 diabetes mellitus (HCC) [E11.9]  Yes      Resolved Hospital Problems   No resolved problems to display.          Hospital Course:  Jeaneth Keita is a 61 y.o. female with cirrhosis and HCC metastatic to bone s/p XRT, hepatitis C, chronic pain on methadone, ESRD on HD, who was brought to the ED for evaluation of alteration in her mental status.  MR imaging of the brain demonstrated thalamic bleed which is a known event from 2021 without other acute process.  She was treated empirically with antibiotics for a right lower lobe pneumonia and has completed 7 days of Zosyn and 5 days of doxycycline.  She is at her baseline mental status.  Nephrology has followed to continue her hemodialysis.  Of note her AV fistula was  nonfunctioning requiring placement of tunneled catheter which was done the day she was initially planned to leave.  Her blood pressure has been low prompting discontinuation of her oral hydralazine and new stabilization of her BP.  Due to missing her ambulance time from delayed dialysis she is remained hospitalized for an additional 4+ days awaiting rescheduling of transportation.    ESRD on HD  Nonfunctional AV fistula  -Nephrology has followed for MWF dialysis  -AV fistula was nonfunctional, new RT IJ tunneled HD catheter placed 12/17/21  -Most recent dialysis 12/20/21     RT lower lobe pneumonia, s/p antibiotics  Acute metabolic encephalopathy, improved  -MRI head without acute process  -S/p x7 days Zosyn/x5 days doxycycline  -Continue Seroquel at bedtime for insomnia     Hypotension with chronic hypertension  -Continue amlodipine, carvedilol; hydralazine has been discontinued     DM type 2, A1c 5.1%, without long-term use of insulin     Chronic hepatitis C with cirrhosis of the liver  HCC with bony metastasis s/p XRT  Familial hemochromatosis  -[facility] at [facility]  -Home meds     Chronic pain syndrome  -By report previously on methadone; per PDMP last fill was a 30-day supply 9/27/2021      Discharge Follow Up Recommendations for outpatient labs/diagnostics:  PCP 1-2 weeks  Continue dialysis    Day of Discharge     HPI:   No complaints, no events overnight, cleared to return to [facility] today.    Review of Systems  Gen- No fevers, chills  CV- No chest pain, palpitations  Resp- No cough, dyspnea  GI- No N/V/D, abd pain    Vital Signs:   Temp:  [97.5 °F (36.4 °C)-98.6 °F (37 °C)] 98.1 °F (36.7 °C)  Heart Rate:  [72-95] 72  Resp:  [16-17] 16  BP: ()/(56-83) 109/72     Physical Exam:  Constitutional: Awake, alert, chronically ill-appearing elderly female sitting up in bed  HENT: NCAT, mucous membranes moist  Respiratory: Clear to auscultation bilaterally, respiratory effort normal   Cardiovascular:  RRR, no murmurs, rubs, or gallops, palpable radial pulses  Gastrointestinal: Positive bowel sounds, soft, nontender, nondistended  Musculoskeletal: Diminished muscle mass; tunneled HD catheter in RT chest wall  Psychiatric: Appropriate affect, cooperative  Neurologic: Speech clear    Pertinent  and/or Most Recent Results     LAB RESULTS:      Lab 12/19/21  0345 12/18/21  0231 12/17/21  0930   WBC 6.04 5.63  --    HEMOGLOBIN 8.3* 8.2*  --    HEMATOCRIT 24.5* 23.3*  --    PLATELETS 156 176 171   NEUTROS ABS 3.52 3.30  --    IMMATURE GRANS (ABS) 0.02 0.02  --    LYMPHS ABS 1.82 1.71  --    MONOS ABS 0.45 0.42  --    EOS ABS 0.20 0.13  --    MCV 95.7 93.2  --    PROTIME  --   --  14.6*         Lab 12/20/21  0323 12/19/21  0540 12/18/21  0231 12/17/21  0317   SODIUM 137 136 137 137   POTASSIUM 5.0 5.0 4.6 4.8   CHLORIDE 100 101 101 99   CO2 20.0* 19.0* 23.0 24.0   ANION GAP 17.0* 16.0* 13.0 14.0   BUN 75* 55* 38* 65*   CREATININE 6.23* 5.03* 3.73* 5.36*   GLUCOSE 147* 119* 99 126*   CALCIUM 9.5 9.3 9.5 8.9         Lab 12/17/21  0317   TOTAL PROTEIN 6.0   ALBUMIN 3.50   GLOBULIN 2.5   ALT (SGPT) 41*   AST (SGOT) 42*   BILIRUBIN 0.3   ALK PHOS 87         Lab 12/17/21  0930   PROTIME 14.6*   INR 1.17*                 Brief Urine Lab Results  (Last result in the past 365 days)      Color   Clarity   Blood   Leuk Est   Nitrite   Protein   CREAT   Urine HCG        12/08/21 1404 Yellow   Clear   Negative   Negative   Negative   >=300 mg/dL (3+)               Microbiology Results (last 10 days)     Procedure Component Value - Date/Time    COVID PRE-OP / PRE-PROCEDURE SCREENING ORDER (NO ISOLATION) - Swab, Nasopharynx [[id number]]  (Normal) Collected: 12/16/21 2039    Lab Status: Final result Specimen: Swab from Nasopharynx Updated: 12/16/21 2114    Narrative:      The following orders were created for panel order COVID PRE-OP / PRE-PROCEDURE SCREENING ORDER (NO ISOLATION) - Swab, Nasopharynx.  Procedure                                Abnormality         Status                     ---------                               -----------         ------                     COVID-19 and FLU A/B PCR...[[id number]]  Normal              Final result                 Please view results for these tests on the individual orders.    COVID-19 and FLU A/B PCR - Swab, Nasopharynx [[id number]]  (Normal) Collected: 12/16/21 2039    Lab Status: Final result Specimen: Swab from Nasopharynx Updated: 12/16/21 2114     COVID19 Not Detected     Influenza A PCR Not Detected     Influenza B PCR Not Detected    Narrative:      Fact sheet for providers: https://www.fda.gov/media/824814/download    Fact sheet for patients: https://www.fda.gov/media/345264/download    Test performed by PCR.          MRI Brain Without Contrast    Result Date: 12/12/2021  EXAMINATION: MRI BRAIN WO CONTRAST-  INDICATION: AMS with h/o hepatocellular carcinoma, ESRD so unable to give contrast; R41.0-Disorientation, unspecified; J18.9-Pneumonia, unspecified organism; E87.6-Hypokalemia; E83.51-Hypocalcemia; N18.6-End stage renal disease  TECHNIQUE: Multiplanar multisequence MRI of the brain performed without IV contrast  COMPARISON: Outside MRI 10/5/2021  FINDINGS: No acute infarct noted on diffusion weighted sequences. Midline structures are unremarkable and the craniocervical junction is satisfactory in appearance. Chronic and age-related changes are present with generalized volume loss and typical T2 hyperintense white matter sequela of chronic small vessel ischemia, in addition to evidence of prior left thalamic hemorrhage. There is otherwise no evidence of intracranial hemorrhage, mass or mass effect. The ventricles are unchanged in size and configuration. The orbits are unremarkable and the paranasal sinuses are grossly clear. Intracranial arterial flow voids are maintained.      Stable chronic and age-related changes of the brain including evidence of prior left thalamic hemorrhage. No  evidence of acute ischemia, hemorrhage, mass or mass effect.   This report was finalized on 12/12/2021 8:26 AM by Victor Manuel Arvizu.      IR Removal Tunnel CV Cath Without Port    Result Date: 12/10/2021  Procedure: Tunneled dialysis catheter removal.                                                                                                         History: Catheter no longer needed.                                            : Ba Hallman MD.                                                                            Modality: Not applicable.                                                                           No sedation.  Anesthesia: Lidocaine local infiltration.              Estimated blood loss:  < 5 cc.          Technique: A universal timeout was performed prior to starting the procedure.   The  used personal protective equipment and sterile gloves.  The catheter was exposed after removal of the dressing. The cuff was exposed. The retention suture was clipped. The catheter was removed in entirety. Hemostasis was achieved with manual compression.  An aseptic dressing was applied.  The patient was in stable condition.                   Complications: None immediate.                                                                  Impression:                                                              Successful removal of a right IJ route tunneled dialysis catheter as described above.  Thank you for the opportunity to assist in the care of your patient.  This report was finalized on 12/10/2021 2:09 PM by Ba Hallman MD.        Results for orders placed during the hospital encounter of 10/05/21    Duplex Initial Vein Mapping Hemodialysis Access Unilateral Left or Right CAR    Interpretation Summary  · The left cephalic vein is patent and of adequate size in the upper arm.  · The left cephalic vein is patent and of adequate size in the forearm.      Results for orders placed  during the hospital encounter of 10/05/21    Duplex Initial Vein Mapping Hemodialysis Access Unilateral Left or Right CAR    Interpretation Summary  · The left cephalic vein is patent and of adequate size in the upper arm.  · The left cephalic vein is patent and of adequate size in the forearm.      Results for orders placed during the hospital encounter of 10/05/21    Adult Transthoracic Echo Complete W/ Cont if Necessary Per Protocol    Interpretation Summary  · Left ventricular ejection fraction appears to be 61 - 65%. Left ventricular systolic function is normal.  · Left atrial volume is mildly increased.      Discharge Details        Discharge Medications      New Medications      Instructions Start Date   QUEtiapine 25 MG tablet  Commonly known as: SEROquel   25 mg, Oral, Nightly PRN         Continue These Medications      Instructions Start Date   amLODIPine 10 MG tablet  Commonly known as: NORVASC   10 mg, Oral, Every 24 Hours Scheduled      atorvastatin 80 MG tablet  Commonly known as: LIPITOR   80 mg, Oral, Daily      B Complex-Vitamin C capsule   Oral, Daily      calcium acetate 667 MG capsule capsule  Commonly known as: PHOS BINDER)   667 mg, Oral, 3 Times Daily With Meals      carvedilol 25 MG tablet  Commonly known as: COREG   25 mg, Oral, 2 Times Daily With Meals      polyethylene glycol 17 g packet  Commonly known as: MIRALAX   17 g, Oral, Daily      sennosides-docusate 8.6-50 MG per tablet  Commonly known as: PERICOLACE   2 tablets, Oral, 2 Times Daily      vitamin D 1.25 MG (79912 UT) capsule capsule  Commonly known as: ERGOCALCIFEROL   50,000 Units, Oral, Weekly         Stop These Medications    hydrALAZINE 100 MG tablet  Commonly known as: APRESOLINE            Allergies   Allergen Reactions   • Sulfa Antibiotics          Discharge Disposition:  Skilled Nursing Facility (DC - External)    Diet:  Hospital:  Diet Order   Procedures   • Diet Regular; Cardiac, Consistent Carbohydrate, Renal        Activity:  Activity Instructions    Activity as tolerated              CODE STATUS:    Code Status and Medical Interventions:   Ordered at: 12/08/21 1940     Level Of Support Discussed With:    Patient     Code Status (Patient has no pulse and is not breathing):    CPR (Attempt to Resuscitate)     Medical Interventions (Patient has pulse or is breathing):    Full Support       No future appointments.    Additional Instructions for the Follow-ups that You Need to Schedule     Discharge Follow-up with PCP   As directed       Currently Documented PCP:    Provider, No Known    PCP Phone Number:    None     Follow Up Details: 1-2 weeks                     Jesus James,   12/21/21      Time Spent on Discharge:  I spent  28  minutes on this discharge activity which included: face-to-face encounter with the patient, reviewing the data in the system, coordination of the care with the nursing staff as well as consultants, documentation, and entering orders.

## 2021-12-31 ENCOUNTER — APPOINTMENT (OUTPATIENT)
Dept: CT IMAGING | Facility: HOSPITAL | Age: 61
End: 2021-12-31

## 2021-12-31 ENCOUNTER — HOSPITAL ENCOUNTER (EMERGENCY)
Facility: HOSPITAL | Age: 61
Discharge: HOME | End: 2021-12-31
Attending: EMERGENCY MEDICINE | Admitting: EMERGENCY MEDICINE

## 2021-12-31 VITALS
OXYGEN SATURATION: 98 % | WEIGHT: 165 LBS | BODY MASS INDEX: 25.01 KG/M2 | HEIGHT: 68 IN | RESPIRATION RATE: 18 BRPM | SYSTOLIC BLOOD PRESSURE: 121 MMHG | DIASTOLIC BLOOD PRESSURE: 79 MMHG | HEART RATE: 93 BPM | TEMPERATURE: 98.4 F

## 2021-12-31 DIAGNOSIS — R10.12 LEFT UPPER QUADRANT ABDOMINAL PAIN: Primary | ICD-10-CM

## 2021-12-31 LAB
ALBUMIN SERPL-MCNC: 4 G/DL (ref 3.5–5.2)
ALBUMIN/GLOB SERPL: 1.6 G/DL
ALP SERPL-CCNC: 77 U/L (ref 39–117)
ALT SERPL W P-5'-P-CCNC: 26 U/L (ref 1–33)
ANION GAP SERPL CALCULATED.3IONS-SCNC: 14 MMOL/L (ref 5–15)
AST SERPL-CCNC: 28 U/L (ref 1–32)
BASOPHILS # BLD AUTO: 0.03 10*3/MM3 (ref 0–0.2)
BASOPHILS NFR BLD AUTO: 0.5 % (ref 0–1.5)
BILIRUB SERPL-MCNC: 0.4 MG/DL (ref 0–1.2)
BUN SERPL-MCNC: 35 MG/DL (ref 8–23)
BUN/CREAT SERPL: 7.9 (ref 7–25)
CALCIUM SPEC-SCNC: 9.4 MG/DL (ref 8.6–10.5)
CHLORIDE SERPL-SCNC: 101 MMOL/L (ref 98–107)
CO2 SERPL-SCNC: 25 MMOL/L (ref 22–29)
CREAT SERPL-MCNC: 4.43 MG/DL (ref 0.57–1)
D-LACTATE SERPL-SCNC: 1.8 MMOL/L (ref 0.5–2)
DEPRECATED RDW RBC AUTO: 44.4 FL (ref 37–54)
EOSINOPHIL # BLD AUTO: 0.08 10*3/MM3 (ref 0–0.4)
EOSINOPHIL NFR BLD AUTO: 1.2 % (ref 0.3–6.2)
ERYTHROCYTE [DISTWIDTH] IN BLOOD BY AUTOMATED COUNT: 13.5 % (ref 12.3–15.4)
GFR SERPL CREATININE-BSD FRML MDRD: 10 ML/MIN/1.73
GFR SERPL CREATININE-BSD FRML MDRD: ABNORMAL ML/MIN/{1.73_M2}
GLOBULIN UR ELPH-MCNC: 2.5 GM/DL
GLUCOSE SERPL-MCNC: 266 MG/DL (ref 65–99)
HCT VFR BLD AUTO: 22.3 % (ref 34–46.6)
HGB BLD-MCNC: 8.2 G/DL (ref 12–15.9)
HOLD SPECIMEN: NORMAL
IMM GRANULOCYTES # BLD AUTO: 0.03 10*3/MM3 (ref 0–0.05)
IMM GRANULOCYTES NFR BLD AUTO: 0.5 % (ref 0–0.5)
LIPASE SERPL-CCNC: 119 U/L (ref 13–60)
LYMPHOCYTES # BLD AUTO: 1.44 10*3/MM3 (ref 0.7–3.1)
LYMPHOCYTES NFR BLD AUTO: 22.2 % (ref 19.6–45.3)
MCH RBC QN AUTO: 33.3 PG (ref 26.6–33)
MCHC RBC AUTO-ENTMCNC: 36.8 G/DL (ref 31.5–35.7)
MCV RBC AUTO: 90.7 FL (ref 79–97)
MONOCYTES # BLD AUTO: 0.47 10*3/MM3 (ref 0.1–0.9)
MONOCYTES NFR BLD AUTO: 7.2 % (ref 5–12)
NEUTROPHILS NFR BLD AUTO: 4.45 10*3/MM3 (ref 1.7–7)
NEUTROPHILS NFR BLD AUTO: 68.4 % (ref 42.7–76)
NRBC BLD AUTO-RTO: 0 /100 WBC (ref 0–0.2)
PLATELET # BLD AUTO: 174 10*3/MM3 (ref 140–450)
PMV BLD AUTO: 9.8 FL (ref 6–12)
POTASSIUM SERPL-SCNC: 3.2 MMOL/L (ref 3.5–5.2)
PROT SERPL-MCNC: 6.5 G/DL (ref 6–8.5)
RBC # BLD AUTO: 2.46 10*6/MM3 (ref 3.77–5.28)
SODIUM SERPL-SCNC: 140 MMOL/L (ref 136–145)
WBC NRBC COR # BLD: 6.5 10*3/MM3 (ref 3.4–10.8)
WHOLE BLOOD HOLD SPECIMEN: NORMAL
WHOLE BLOOD HOLD SPECIMEN: NORMAL

## 2021-12-31 PROCEDURE — 96375 TX/PRO/DX INJ NEW DRUG ADDON: CPT

## 2021-12-31 PROCEDURE — 83690 ASSAY OF LIPASE: CPT | Performed by: EMERGENCY MEDICINE

## 2021-12-31 PROCEDURE — 25010000002 MORPHINE PER 10 MG: Performed by: EMERGENCY MEDICINE

## 2021-12-31 PROCEDURE — 85025 COMPLETE CBC W/AUTO DIFF WBC: CPT | Performed by: EMERGENCY MEDICINE

## 2021-12-31 PROCEDURE — 80053 COMPREHEN METABOLIC PANEL: CPT | Performed by: EMERGENCY MEDICINE

## 2021-12-31 PROCEDURE — 25010000002 ONDANSETRON PER 1 MG: Performed by: EMERGENCY MEDICINE

## 2021-12-31 PROCEDURE — 96374 THER/PROPH/DIAG INJ IV PUSH: CPT

## 2021-12-31 PROCEDURE — 74176 CT ABD & PELVIS W/O CONTRAST: CPT

## 2021-12-31 PROCEDURE — 99283 EMERGENCY DEPT VISIT LOW MDM: CPT

## 2021-12-31 PROCEDURE — 83605 ASSAY OF LACTIC ACID: CPT | Performed by: EMERGENCY MEDICINE

## 2021-12-31 RX ORDER — SODIUM CHLORIDE 0.9 % (FLUSH) 0.9 %
10 SYRINGE (ML) INJECTION AS NEEDED
Status: DISCONTINUED | OUTPATIENT
Start: 2021-12-31 | End: 2021-12-31 | Stop reason: HOSPADM

## 2021-12-31 RX ORDER — ONDANSETRON 2 MG/ML
4 INJECTION INTRAMUSCULAR; INTRAVENOUS ONCE
Status: COMPLETED | OUTPATIENT
Start: 2021-12-31 | End: 2021-12-31

## 2021-12-31 RX ORDER — MORPHINE SULFATE 2 MG/ML
2 INJECTION, SOLUTION INTRAMUSCULAR; INTRAVENOUS ONCE
Status: COMPLETED | OUTPATIENT
Start: 2021-12-31 | End: 2021-12-31

## 2021-12-31 RX ADMIN — ONDANSETRON 4 MG: 2 INJECTION INTRAMUSCULAR; INTRAVENOUS at 15:38

## 2021-12-31 RX ADMIN — MORPHINE SULFATE 2 MG: 2 INJECTION, SOLUTION INTRAMUSCULAR; INTRAVENOUS at 15:38

## 2021-12-31 RX ADMIN — SODIUM CHLORIDE 1000 ML: 9 INJECTION, SOLUTION INTRAVENOUS at 15:36

## 2022-01-04 ENCOUNTER — APPOINTMENT (OUTPATIENT)
Dept: CT IMAGING | Facility: HOSPITAL | Age: 62
End: 2022-01-04

## 2022-01-04 ENCOUNTER — APPOINTMENT (OUTPATIENT)
Dept: GENERAL RADIOLOGY | Facility: HOSPITAL | Age: 62
End: 2022-01-04

## 2022-01-04 ENCOUNTER — HOSPITAL ENCOUNTER (EMERGENCY)
Facility: HOSPITAL | Age: 62
Discharge: HOME | End: 2022-01-04
Attending: STUDENT IN AN ORGANIZED HEALTH CARE EDUCATION/TRAINING PROGRAM | Admitting: STUDENT IN AN ORGANIZED HEALTH CARE EDUCATION/TRAINING PROGRAM

## 2022-01-04 VITALS
TEMPERATURE: 98.4 F | HEIGHT: 68 IN | OXYGEN SATURATION: 97 % | RESPIRATION RATE: 18 BRPM | HEART RATE: 85 BPM | WEIGHT: 170 LBS | SYSTOLIC BLOOD PRESSURE: 138 MMHG | BODY MASS INDEX: 25.76 KG/M2 | DIASTOLIC BLOOD PRESSURE: 72 MMHG

## 2022-01-04 DIAGNOSIS — R10.30 LOWER ABDOMINAL PAIN: Primary | ICD-10-CM

## 2022-01-04 DIAGNOSIS — Z91.15 NON-COMPLIANCE WITH RENAL DIALYSIS: ICD-10-CM

## 2022-01-04 DIAGNOSIS — R30.0 DYSURIA: ICD-10-CM

## 2022-01-04 DIAGNOSIS — N18.6 ESRD (END STAGE RENAL DISEASE): Chronic | ICD-10-CM

## 2022-01-04 LAB
ALBUMIN SERPL-MCNC: 4 G/DL (ref 3.5–5.2)
ALBUMIN/GLOB SERPL: 1.5 G/DL
ALP SERPL-CCNC: 110 U/L (ref 39–117)
ALT SERPL W P-5'-P-CCNC: 23 U/L (ref 1–33)
ANION GAP SERPL CALCULATED.3IONS-SCNC: 15 MMOL/L (ref 5–15)
AST SERPL-CCNC: 22 U/L (ref 1–32)
BACTERIA UR QL AUTO: NORMAL /HPF
BASOPHILS # BLD AUTO: 0.03 10*3/MM3 (ref 0–0.2)
BASOPHILS NFR BLD AUTO: 0.5 % (ref 0–1.5)
BILIRUB SERPL-MCNC: 0.3 MG/DL (ref 0–1.2)
BILIRUB UR QL STRIP: NEGATIVE
BUN SERPL-MCNC: 53 MG/DL (ref 8–23)
BUN/CREAT SERPL: 10.9 (ref 7–25)
CALCIUM SPEC-SCNC: 8.9 MG/DL (ref 8.6–10.5)
CHLORIDE SERPL-SCNC: 104 MMOL/L (ref 98–107)
CLARITY UR: CLEAR
CO2 SERPL-SCNC: 20 MMOL/L (ref 22–29)
COLOR UR: YELLOW
CREAT SERPL-MCNC: 4.87 MG/DL (ref 0.57–1)
DEPRECATED RDW RBC AUTO: 46.5 FL (ref 37–54)
EOSINOPHIL # BLD AUTO: 0.18 10*3/MM3 (ref 0–0.4)
EOSINOPHIL NFR BLD AUTO: 3.1 % (ref 0.3–6.2)
ERYTHROCYTE [DISTWIDTH] IN BLOOD BY AUTOMATED COUNT: 13.7 % (ref 12.3–15.4)
GFR SERPL CREATININE-BSD FRML MDRD: 9 ML/MIN/1.73
GFR SERPL CREATININE-BSD FRML MDRD: ABNORMAL ML/MIN/{1.73_M2}
GLOBULIN UR ELPH-MCNC: 2.7 GM/DL
GLUCOSE SERPL-MCNC: 261 MG/DL (ref 65–99)
GLUCOSE UR STRIP-MCNC: ABNORMAL MG/DL
HCT VFR BLD AUTO: 24.4 % (ref 34–46.6)
HGB BLD-MCNC: 8.7 G/DL (ref 12–15.9)
HGB UR QL STRIP.AUTO: NEGATIVE
HOLD SPECIMEN: NORMAL
HYALINE CASTS UR QL AUTO: NORMAL /LPF
IMM GRANULOCYTES # BLD AUTO: 0.02 10*3/MM3 (ref 0–0.05)
IMM GRANULOCYTES NFR BLD AUTO: 0.3 % (ref 0–0.5)
KETONES UR QL STRIP: NEGATIVE
LEUKOCYTE ESTERASE UR QL STRIP.AUTO: NEGATIVE
LYMPHOCYTES # BLD AUTO: 1.42 10*3/MM3 (ref 0.7–3.1)
LYMPHOCYTES NFR BLD AUTO: 24.2 % (ref 19.6–45.3)
MCH RBC QN AUTO: 33 PG (ref 26.6–33)
MCHC RBC AUTO-ENTMCNC: 35.7 G/DL (ref 31.5–35.7)
MCV RBC AUTO: 92.4 FL (ref 79–97)
MONOCYTES # BLD AUTO: 0.41 10*3/MM3 (ref 0.1–0.9)
MONOCYTES NFR BLD AUTO: 7 % (ref 5–12)
NEUTROPHILS NFR BLD AUTO: 3.8 10*3/MM3 (ref 1.7–7)
NEUTROPHILS NFR BLD AUTO: 64.9 % (ref 42.7–76)
NITRITE UR QL STRIP: NEGATIVE
NRBC BLD AUTO-RTO: 0 /100 WBC (ref 0–0.2)
NT-PROBNP SERPL-MCNC: 4955 PG/ML (ref 0–900)
PH UR STRIP.AUTO: 6.5 [PH] (ref 5–8)
PLATELET # BLD AUTO: 175 10*3/MM3 (ref 140–450)
PMV BLD AUTO: 9.8 FL (ref 6–12)
POTASSIUM SERPL-SCNC: 3.5 MMOL/L (ref 3.5–5.2)
PROT SERPL-MCNC: 6.7 G/DL (ref 6–8.5)
PROT UR QL STRIP: ABNORMAL
QT INTERVAL: 378 MS
QTC INTERVAL: 467 MS
RBC # BLD AUTO: 2.64 10*6/MM3 (ref 3.77–5.28)
RBC # UR STRIP: NORMAL /HPF
REF LAB TEST METHOD: NORMAL
SODIUM SERPL-SCNC: 139 MMOL/L (ref 136–145)
SP GR UR STRIP: 1.01 (ref 1–1.03)
SQUAMOUS #/AREA URNS HPF: NORMAL /HPF
TROPONIN T SERPL-MCNC: 0.06 NG/ML (ref 0–0.03)
TROPONIN T SERPL-MCNC: 0.06 NG/ML (ref 0–0.03)
UROBILINOGEN UR QL STRIP: ABNORMAL
WBC # UR STRIP: NORMAL /HPF
WBC NRBC COR # BLD: 5.86 10*3/MM3 (ref 3.4–10.8)
WHOLE BLOOD HOLD SPECIMEN: NORMAL
WHOLE BLOOD HOLD SPECIMEN: NORMAL

## 2022-01-04 PROCEDURE — 71045 X-RAY EXAM CHEST 1 VIEW: CPT

## 2022-01-04 PROCEDURE — 81001 URINALYSIS AUTO W/SCOPE: CPT | Performed by: STUDENT IN AN ORGANIZED HEALTH CARE EDUCATION/TRAINING PROGRAM

## 2022-01-04 PROCEDURE — 85025 COMPLETE CBC W/AUTO DIFF WBC: CPT | Performed by: STUDENT IN AN ORGANIZED HEALTH CARE EDUCATION/TRAINING PROGRAM

## 2022-01-04 PROCEDURE — 93005 ELECTROCARDIOGRAM TRACING: CPT | Performed by: STUDENT IN AN ORGANIZED HEALTH CARE EDUCATION/TRAINING PROGRAM

## 2022-01-04 PROCEDURE — P9612 CATHETERIZE FOR URINE SPEC: HCPCS

## 2022-01-04 PROCEDURE — 74176 CT ABD & PELVIS W/O CONTRAST: CPT

## 2022-01-04 PROCEDURE — 83880 ASSAY OF NATRIURETIC PEPTIDE: CPT | Performed by: STUDENT IN AN ORGANIZED HEALTH CARE EDUCATION/TRAINING PROGRAM

## 2022-01-04 PROCEDURE — 93005 ELECTROCARDIOGRAM TRACING: CPT

## 2022-01-04 PROCEDURE — 36415 COLL VENOUS BLD VENIPUNCTURE: CPT

## 2022-01-04 PROCEDURE — 25010000002 HYDROMORPHONE PER 4 MG: Performed by: STUDENT IN AN ORGANIZED HEALTH CARE EDUCATION/TRAINING PROGRAM

## 2022-01-04 PROCEDURE — 96374 THER/PROPH/DIAG INJ IV PUSH: CPT

## 2022-01-04 PROCEDURE — 84484 ASSAY OF TROPONIN QUANT: CPT | Performed by: STUDENT IN AN ORGANIZED HEALTH CARE EDUCATION/TRAINING PROGRAM

## 2022-01-04 PROCEDURE — 80053 COMPREHEN METABOLIC PANEL: CPT | Performed by: STUDENT IN AN ORGANIZED HEALTH CARE EDUCATION/TRAINING PROGRAM

## 2022-01-04 PROCEDURE — 99284 EMERGENCY DEPT VISIT MOD MDM: CPT

## 2022-01-04 RX ORDER — OXYCODONE HYDROCHLORIDE 5 MG/1
5 TABLET ORAL EVERY 4 HOURS PRN
Status: DISCONTINUED | OUTPATIENT
Start: 2022-01-04 | End: 2022-01-04 | Stop reason: HOSPADM

## 2022-01-04 RX ORDER — SODIUM CHLORIDE 0.9 % (FLUSH) 0.9 %
10 SYRINGE (ML) INJECTION AS NEEDED
Status: DISCONTINUED | OUTPATIENT
Start: 2022-01-04 | End: 2022-01-04 | Stop reason: HOSPADM

## 2022-01-04 RX ORDER — PHENAZOPYRIDINE HYDROCHLORIDE 100 MG/1
100 TABLET, FILM COATED ORAL ONCE
Status: COMPLETED | OUTPATIENT
Start: 2022-01-04 | End: 2022-01-04

## 2022-01-04 RX ORDER — HYDROMORPHONE HYDROCHLORIDE 1 MG/ML
0.5 INJECTION, SOLUTION INTRAMUSCULAR; INTRAVENOUS; SUBCUTANEOUS ONCE
Status: COMPLETED | OUTPATIENT
Start: 2022-01-04 | End: 2022-01-04

## 2022-01-04 RX ORDER — ASPIRIN 81 MG/1
324 TABLET, CHEWABLE ORAL ONCE
Status: COMPLETED | OUTPATIENT
Start: 2022-01-04 | End: 2022-01-04

## 2022-01-04 RX ADMIN — ASPIRIN 324 MG: 81 TABLET, CHEWABLE ORAL at 03:48

## 2022-01-04 RX ADMIN — OXYCODONE HYDROCHLORIDE 5 MG: 5 TABLET ORAL at 06:35

## 2022-01-04 RX ADMIN — PHENAZOPYRIDINE HYDROCHLORIDE 100 MG: 100 TABLET ORAL at 06:35

## 2022-01-04 RX ADMIN — HYDROMORPHONE HYDROCHLORIDE 0.5 MG: 1 INJECTION, SOLUTION INTRAMUSCULAR; INTRAVENOUS; SUBCUTANEOUS at 02:28

## 2022-01-04 NOTE — ED PROVIDER NOTES
EMERGENCY DEPARTMENT ENCOUNTER    Pt Name: Jeaneth Keita  MRN: [id number]  Pt :   1960  Room Number:    Date of encounter:  2022  PCP: Provider, No Known  ED Provider: Richard Dunaway MD    Historian: Patient      HPI:  Chief Complaint: Abdominal pain        Context: Jeaneth Keita is a 61 y.o. female with end-stage renal disease, fibromyalgia, diabetes who presents to the ED c/o approximately 24 hours of worsening, severe generalized abdominal pain that is worse in the low abdomen.  She says she has missed at least her last dialysis appointment may be the one prior to that as well.  She is concerned she may have a urinary tract infection because even though she is on dialysis she does still make urine.  She denies fevers, systemic symptoms.  She says she had a bowel movement a couple of days ago which is normal for her and says it was not watery or hard.  She denies any cough or chest pain.  She denies any sick contacts.  No other complaints at this time.      PAST MEDICAL HISTORY  Past Medical History:   Diagnosis Date   • Anxiety    • DDD (degenerative disc disease), lumbar    • Depression    • Diabetes mellitus (HCC)    • Fibromyalgia    • Hemochromatosis    • Hep B w/o coma, chronic, w/o delta (HCC)    • Hep C w/o coma, chronic (HCC)    • Hypertension    • Vertigo          PAST SURGICAL HISTORY  Past Surgical History:   Procedure Laterality Date   • ARTERIOVENOUS FISTULA/SHUNT SURGERY Left 10/16/2021    Procedure: UPPER EXTREMITY ARTERIOVENOUS FISTULA FORMATION LEFT;  Surgeon: Johnny Rousseau MD;  Location: [facility];  Service: Vascular;  Laterality: Left;   • BACK SURGERY     • BELOW KNEE LEG AMPUTATION     •  SECTION     • FOOT SURGERY     • KNEE SURGERY     • ROTATOR CUFF REPAIR     • SPINAL CORD STIMULATOR IMPLANT           FAMILY HISTORY  History reviewed. No pertinent family history.      SOCIAL HISTORY  Social History     Socioeconomic History   • Marital  status:    Tobacco Use   • Smoking status: Never Smoker   • Smokeless tobacco: Never Used   Vaping Use   • Vaping Use: Never used   Substance and Sexual Activity   • Alcohol use: No   • Drug use: No   • Sexual activity: Never         ALLERGIES  Sulfa antibiotics        REVIEW OF SYSTEMS  Review of Systems       All systems reviewed and negative except for those discussed in HPI.       PHYSICAL EXAM    I have reviewed the triage vital signs and nursing notes.    ED Triage Vitals [01/04/22 0010]   Temp Heart Rate Resp BP SpO2   98.4 °F (36.9 °C) 94 18 123/65 98 %      Temp src Heart Rate Source Patient Position BP Location FiO2 (%)   Oral Monitor Lying Right arm --       Physical Exam  GENERAL:   Appears chronically ill and uncomfortable  HENT: Nares patent.  Atraumatic  EYES: No scleral icterus.  Pupils equal and reactive  CV: Regular rhythm, regular rate.  No murmurs rubs or gallops  RESPIRATORY: Normal effort.  No audible wheezes, rales or rhonchi.  ABDOMEN: Soft, endorses tenderness to palpation in the bilateral lower quadrants and suprapubic area without guarding or rigidity, hyperactive bowel sounds  MUSCULOSKELETAL: No deformities.   NEURO: Alert, moves all extremities, follows commands.  SKIN: Warm, dry, no rash visualized.        LAB RESULTS  Recent Results (from the past 24 hour(s))   Comprehensive Metabolic Panel    Collection Time: 01/04/22 12:18 AM    Specimen: Blood   Result Value Ref Range    Glucose 261 (H) 65 - 99 mg/dL    BUN 53 (H) 8 - 23 mg/dL    Creatinine 4.87 (H) 0.57 - 1.00 mg/dL    Sodium 139 136 - 145 mmol/L    Potassium 3.5 3.5 - 5.2 mmol/L    Chloride 104 98 - 107 mmol/L    CO2 20.0 (L) 22.0 - 29.0 mmol/L    Calcium 8.9 8.6 - 10.5 mg/dL    Total Protein 6.7 6.0 - 8.5 g/dL    Albumin 4.00 3.50 - 5.20 g/dL    ALT (SGPT) 23 1 - 33 U/L    AST (SGOT) 22 1 - 32 U/L    Alkaline Phosphatase 110 39 - 117 U/L    Total Bilirubin 0.3 0.0 - 1.2 mg/dL    eGFR Non  Amer 9 (L) >60  mL/min/1.73    eGFR  African Amer      Globulin 2.7 gm/dL    A/G Ratio 1.5 g/dL    BUN/Creatinine Ratio 10.9 7.0 - 25.0    Anion Gap 15.0 5.0 - 15.0 mmol/L   BNP    Collection Time: 01/04/22 12:18 AM    Specimen: Blood   Result Value Ref Range    proBNP 4,955.0 (H) 0.0 - 900.0 pg/mL   Troponin    Collection Time: 01/04/22 12:18 AM    Specimen: Blood   Result Value Ref Range    Troponin T 0.058 (C) 0.000 - 0.030 ng/mL   Green Top (Gel)    Collection Time: 01/04/22 12:18 AM   Result Value Ref Range    Extra Tube Hold for add-ons.    Lavender Top    Collection Time: 01/04/22 12:18 AM   Result Value Ref Range    Extra Tube hold for add-on    Gold Top - SST    Collection Time: 01/04/22 12:18 AM   Result Value Ref Range    Extra Tube Hold for add-ons.    Gray Top    Collection Time: 01/04/22 12:18 AM   Result Value Ref Range    Extra Tube Hold for add-ons.    CBC Auto Differential    Collection Time: 01/04/22 12:18 AM    Specimen: Blood   Result Value Ref Range    WBC 5.86 3.40 - 10.80 10*3/mm3    RBC 2.64 (L) 3.77 - 5.28 10*6/mm3    Hemoglobin 8.7 (L) 12.0 - 15.9 g/dL    Hematocrit 24.4 (L) 34.0 - 46.6 %    MCV 92.4 79.0 - 97.0 fL    MCH 33.0 26.6 - 33.0 pg    MCHC 35.7 31.5 - 35.7 g/dL    RDW 13.7 12.3 - 15.4 %    RDW-SD 46.5 37.0 - 54.0 fl    MPV 9.8 6.0 - 12.0 fL    Platelets 175 140 - 450 10*3/mm3    Neutrophil % 64.9 42.7 - 76.0 %    Lymphocyte % 24.2 19.6 - 45.3 %    Monocyte % 7.0 5.0 - 12.0 %    Eosinophil % 3.1 0.3 - 6.2 %    Basophil % 0.5 0.0 - 1.5 %    Immature Grans % 0.3 0.0 - 0.5 %    Neutrophils, Absolute 3.80 1.70 - 7.00 10*3/mm3    Lymphocytes, Absolute 1.42 0.70 - 3.10 10*3/mm3    Monocytes, Absolute 0.41 0.10 - 0.90 10*3/mm3    Eosinophils, Absolute 0.18 0.00 - 0.40 10*3/mm3    Basophils, Absolute 0.03 0.00 - 0.20 10*3/mm3    Immature Grans, Absolute 0.02 0.00 - 0.05 10*3/mm3    nRBC 0.0 0.0 - 0.2 /100 WBC   Light Blue Top    Collection Time: 01/04/22 12:43 AM   Result Value Ref Range    Extra Tube  hold for add-on    Urinalysis With Microscopic If Indicated (No Culture) - Urine, Catheter    Collection Time: 01/04/22  1:56 AM    Specimen: Urine, Catheter   Result Value Ref Range    Color, UA Yellow Yellow, Straw    Appearance, UA Clear Clear    pH, UA 6.5 5.0 - 8.0    Specific Gravity, UA 1.015 1.001 - 1.030    Glucose, UA >=1000 mg/dL (3+) (A) Negative    Ketones, UA Negative Negative    Bilirubin, UA Negative Negative    Blood, UA Negative Negative    Protein, UA >=300 mg/dL (3+) (A) Negative    Leuk Esterase, UA Negative Negative    Nitrite, UA Negative Negative    Urobilinogen, UA 0.2 E.U./dL 0.2 - 1.0 E.U./dL   Urinalysis, Microscopic Only - Urine, Catheter    Collection Time: 01/04/22  1:56 AM    Specimen: Urine, Catheter   Result Value Ref Range    RBC, UA 0-2 None Seen, 0-2 /HPF    WBC, UA 0-2 None Seen, 0-2 /HPF    Bacteria, UA None Seen None Seen, Trace /HPF    Squamous Epithelial Cells, UA 0-2 None Seen, 0-2 /HPF    Hyaline Casts, UA 0-6 0 - 6 /LPF    Methodology Automated Microscopy    Troponin    Collection Time: 01/04/22  3:48 AM    Specimen: Blood   Result Value Ref Range    Troponin T 0.058 (C) 0.000 - 0.030 ng/mL       If labs were ordered, I independently reviewed the results.        RADIOLOGY  CT Abdomen Pelvis Without Contrast    Result Date: 1/4/2022  CT Abdomen Pelvis WO INDICATION: Severe right lower quadrant pain with guarding this morning TECHNIQUE: CT of the abdomen and pelvis without IV contrast. Coronal and sagittal reconstructions were obtained.  Radiation dose reduction techniques included automated exposure control or exposure modulation based on body size. Count of known CT and cardiac nuc med studies performed in previous 12 months: 2. COMPARISON: 12/31/2021 FINDINGS: Abdomen: Right lower lobe medial infiltrate with air bronchograms is stable. This is about 5 cm in diameter. The liver is normal except for postoperative changes involving the intrahepatic IVC. The spleen,  pancreas, adrenal glands and left kidney are normal. Small amount ascites is present adjacent to the liver and spleen. The appendix is seen on axial image 61 and 60 and appears normal. The bowel is unremarkable. Aorta is normal in size. There is no adenopathy.. There is a 6 mm calcification in or very close to the proximal right ureter. It is unchanged from the prior 2 studies dating back to 12/8/2021. Ureteral stone cannot be excluded but there is no hydronephrosis visible. There is a 2 mm nonobstructing stone in the lower pole the right kidney. There are postoperative changes in lumbar spine Pelvis: Bladder uterus and adnexal regions are normal. There is trace amount of free fluid in the pelvis.     I believe a normal appendix is visible and I do not see any evidence of appendicitis. There may be a 6 mm proximal right ureteral stone without hydronephrosis. This is unchanged the prior 2 studies dating back to 12/8/2021 Postoperative changes lumbar spine and liver. Minimal ascites No change right lower lobe medial infiltrate Signer Name: Diogenes Blair MD  Signed: 1/4/2022 2:21 AM  Workstation Name: [email or web address]  Radiology [facility]    XR Chest 1 View    Result Date: 1/4/2022  CR Chest 1 Vw INDICATION: Shortness of air this morning COMPARISON:  Of 1021 FINDINGS: Portable AP view(s) of the chest.  Heart size and vascular normal. Left lung is clear. Central venous catheter stable. There is increased density in the right lower lobe medially     Medial right base atelectasis or infiltrate is present. Otherwise there is no active disease Signer Name: Diogenes Blair MD  Signed: 1/4/2022 12:42 AM  Workstation Name: [id number]  Radiology [facility]      I ordered and reviewed the above noted radiographic studies.      I viewed images of chest x-ray which reveals opacity versus atelectasis in the right lower lobe, and CT scan of the abdomen pelvis which reveals stable right-sided kidney stone without  hydronephrosis that is unchanged from imaging months ago, and trace ascites.    See radiologist's dictation for official interpretation.        PROCEDURES    Procedures    ECG 12 Lead             MEDICATIONS GIVEN IN ER    Medications   sodium chloride 0.9 % flush 10 mL (has no administration in time range)   oxyCODONE (ROXICODONE) immediate release tablet 5 mg (5 mg Oral Given 1/4/22 0635)   HYDROmorphone (DILAUDID) injection 0.5 mg (0.5 mg Intravenous Given 1/4/22 0228)   aspirin chewable tablet 324 mg (324 mg Oral Given 1/4/22 0348)   phenazopyridine (PYRIDIUM) tablet 100 mg (100 mg Oral Given 1/4/22 0635)         PROGRESS, DATA ANALYSIS, CONSULTS, AND MEDICAL DECISION MAKING    All labs have been independently reviewed by me.  All radiology studies have been reviewed by me and the radiologist dictating the report.   EKG's have been independently viewed and interpreted by me.      Differential diagnoses: Cystitis, pyelonephritis, bowel obstruction, volvulus, ileus, electrolyte abnormality           In summary this is a 61-year-old woman with diabetes end-stage renal disease, fibromyalgia who comes complaining of approximately 24 hours of diffuse abdominal pain worse in the low abdomen.  Denied fevers, chills, systemic symptoms.  Denies chest pain or cough.  He is concerned she has a urinary tract infection.  Treating her pain with Dilaudid.  Labs reveal chronic anemia with hemoglobin of 8.7 but no leukocytosis.  She has hyperglycemia which is consistent with her priors and significantly elevated creatinine as expected stable with prior.  She does not have hyperkalemia or any other significant electrolyte abnormalities that would merit emergent dialysis.  Urinalysis is not concerning for urinary tract infection.  She says she is cold and is having difficulty urinating and requested repeat cath which returned only 200 cc of urine.  Chest x-ray reveals right-sided atelectasis versus opacity but in the absence of  chest pain, cough, fever, or leukocytosis I think this is likely atelectasis and does not need antibiotic coverage at this time.  CT scan of the abdomen pelvis reveals only chronic findings such as an unchanged kidney stone without hydronephrosis is not moved and I do not think is causing her symptoms.  Troponin is elevated and she was given aspirin but in the absence of chest pain I think this is related to her poor renal clearance.  Repeat delta troponin is unchanged and I have no concern for NSTEMI at this time.  She continues to complain of dysuria and was given treatment with Pyridium.  At this point she was agreeable to discharge with return precautions.  I counseled her multiple times on the importance of making it to her next dialysis appointment and she says she will.  She was counseled on return precautions verbally expressed understanding of these.      AS OF 07:44 EST VITALS:    BP - 117/60  HR - 84  TEMP - 98.4 °F (36.9 °C) (Oral)  O2 SATS - 99%                  DIAGNOSIS  Final diagnoses:   Lower abdominal pain   ESRD (end stage renal disease)   Dysuria   Non-compliance with renal dialysis ([facility])         DISPOSITION  DISCHARGE    Patient discharged in stable condition.    Reviewed implications of results, diagnosis, meds, responsibility to follow up, warning signs and symptoms of possible worsening, potential complications and reasons to return to ER.    Patient/Family voiced understanding of above instructions.    Discussed plan for discharge, as there is no emergent indication for admission.  Pt/family is agreeable and understands need for follow up and possible repeat testing.  Pt/family is aware that discharge does not mean that nothing is wrong but that it indicates no emergency is currently present that requires admission and they must continue care with follow-up as given below or with a physician of their choice.     FOLLOW-UP  PATIENT CONNECTION - [facility]  [id number]  [phone]  Call            Medication List      No changes were made to your prescriptions during this visit.                    Richard Dunaway MD  01/04/22 0800

## 2022-01-04 NOTE — CASE MANAGEMENT/SOCIAL WORK
Continued Stay Note  [facility]     Patient Name: Jeaneth Keita  MRN: [id number]  Today's Date: 1/4/2022    Admit Date: 1/4/2022     Discharge Plan     Row Name 01/04/22 0823       Plan    Plan     Plan Comments ’er spoke with Safia with  Ambulance services, who explains they have no opening and [facility] will not be available until 9pm. Safia with  Ambulance services explained she would call ’er back if an opening becomes available.     [facility] [phone], who can’t transport and suggest contacting Wheels. Nurse explains that patients uses Wheels; however patient doesn’t have Wheelchair. Nurse also suggest that [facility]KEY Jj be contacted; [facility] attempted to contact Parviz no answer, left a message (will see if he transport or can get patients wheelchair).     [facility] spoke with Rock who explains he can  patients Wheelchair, so patient can ride Wheels. Rock explains he has a pickup truck and cant transport her. Rock reports he will be here by 1pm. Only other option available will be [facility] at 3:30; ’er scheduled.               Discharge Codes    No documentation.                     SURJIT Kirkland (Kay)

## 2022-01-04 NOTE — CASE MANAGEMENT/SOCIAL WORK
Continued Stay Note  [facility]     Patient Name: Jeaneth Keita  MRN: [id number]  Today's Date: 1/4/2022    Admit Date: 1/4/2022     Discharge Plan     Row Name 01/04/22 0936       Plan    Plan SW    Plan Comments Received a call from Parviz who explains patients sister-in-law will bring wheelchair and transport patient back to [facility].                   Discharge Codes    No documentation.                     Silva Arboleda) SURJIT Robison

## 2022-01-04 NOTE — DISCHARGE INSTRUCTIONS
Overall today's work-up was reassuring and I think most of your symptoms are related to missing dialysis.  Imperative that you make it to your next dialysis appointment.  Continue to encourage good oral hydration which will help with urinary symptoms as well the medicines you were given here today.  Please return to the ED or seek other medical care for any concerning symptoms.

## 2022-01-04 NOTE — CASE MANAGEMENT/SOCIAL WORK
Continued Stay Note  [facility]     Patient Name: Jeaneth Keita  MRN: [id number]  Today's Date: 1/4/2022    Admit Date: 1/4/2022     Discharge Plan     Row Name 01/04/22 0823       Plan    Plan SW    Plan Comments SW'er exploring options for transportation. Options are very limited.               Discharge Codes    No documentation.                     Silva Arboleda) SURJIT Robison

## 2022-01-04 NOTE — CASE MANAGEMENT/SOCIAL WORK
Continued Stay Note  [facility]     Patient Name: Jeaneth Keita  MRN: [id number]  Today's Date: 1/4/2022    Admit Date: 1/4/2022     Discharge Plan     Row Name 01/04/22 0925       Plan    Plan SW    Plan Comments Received a call from Parviz who explains he will work on getting patients wheelchair to hospital for transport.                                     Discharge Codes    No documentation.                     SURJIT Kirkland (Kay)

## 2022-04-04 ENCOUNTER — APPOINTMENT (OUTPATIENT)
Dept: GENERAL RADIOLOGY | Facility: HOSPITAL | Age: 62
End: 2022-04-04

## 2022-04-04 ENCOUNTER — APPOINTMENT (OUTPATIENT)
Dept: CT IMAGING | Facility: HOSPITAL | Age: 62
End: 2022-04-04

## 2022-04-04 ENCOUNTER — HOSPITAL ENCOUNTER (EMERGENCY)
Facility: HOSPITAL | Age: 62
Discharge: NURSING, REHAB OR LONG-TERM CARE | End: 2022-04-04
Attending: EMERGENCY MEDICINE | Admitting: EMERGENCY MEDICINE

## 2022-04-04 VITALS
OXYGEN SATURATION: 100 % | BODY MASS INDEX: 27.32 KG/M2 | DIASTOLIC BLOOD PRESSURE: 81 MMHG | HEART RATE: 79 BPM | SYSTOLIC BLOOD PRESSURE: 144 MMHG | HEIGHT: 66 IN | RESPIRATION RATE: 15 BRPM | TEMPERATURE: 98.8 F | WEIGHT: 170 LBS

## 2022-04-04 DIAGNOSIS — E16.2 HYPOGLYCEMIA: Primary | ICD-10-CM

## 2022-04-04 LAB
ALBUMIN SERPL-MCNC: 4.3 G/DL (ref 3.5–5.2)
ALBUMIN/GLOB SERPL: 1.5 G/DL
ALP SERPL-CCNC: 97 U/L (ref 39–117)
ALT SERPL W P-5'-P-CCNC: 29 U/L (ref 1–33)
ANION GAP SERPL CALCULATED.3IONS-SCNC: 13 MMOL/L (ref 5–15)
AST SERPL-CCNC: 31 U/L (ref 1–32)
BACTERIA UR QL AUTO: ABNORMAL /HPF
BASOPHILS # BLD AUTO: 0.02 10*3/MM3 (ref 0–0.2)
BASOPHILS NFR BLD AUTO: 0.3 % (ref 0–1.5)
BILIRUB SERPL-MCNC: 0.4 MG/DL (ref 0–1.2)
BILIRUB UR QL STRIP: NEGATIVE
BUN SERPL-MCNC: 58 MG/DL (ref 8–23)
BUN/CREAT SERPL: 11.6 (ref 7–25)
CALCIUM SPEC-SCNC: 9.2 MG/DL (ref 8.6–10.5)
CHLORIDE SERPL-SCNC: 94 MMOL/L (ref 98–107)
CLARITY UR: CLEAR
CO2 SERPL-SCNC: 25 MMOL/L (ref 22–29)
COLOR UR: YELLOW
CREAT SERPL-MCNC: 5.02 MG/DL (ref 0.57–1)
D-LACTATE SERPL-SCNC: 0.9 MMOL/L (ref 0.5–2)
DEPRECATED RDW RBC AUTO: 42.7 FL (ref 37–54)
EGFRCR SERPLBLD CKD-EPI 2021: 9.2 ML/MIN/1.73
EOSINOPHIL # BLD AUTO: 0.2 10*3/MM3 (ref 0–0.4)
EOSINOPHIL NFR BLD AUTO: 3.2 % (ref 0.3–6.2)
ERYTHROCYTE [DISTWIDTH] IN BLOOD BY AUTOMATED COUNT: 12.3 % (ref 12.3–15.4)
FLUAV RNA RESP QL NAA+PROBE: NOT DETECTED
FLUBV RNA RESP QL NAA+PROBE: NOT DETECTED
GLOBULIN UR ELPH-MCNC: 2.9 GM/DL
GLUCOSE BLDC GLUCOMTR-MCNC: 102 MG/DL (ref 70–130)
GLUCOSE BLDC GLUCOMTR-MCNC: 125 MG/DL (ref 70–130)
GLUCOSE SERPL-MCNC: 223 MG/DL (ref 65–99)
GLUCOSE UR STRIP-MCNC: NEGATIVE MG/DL
HCT VFR BLD AUTO: 27.4 % (ref 34–46.6)
HGB BLD-MCNC: 9.6 G/DL (ref 12–15.9)
HGB UR QL STRIP.AUTO: NEGATIVE
HOLD SPECIMEN: NORMAL
HYALINE CASTS UR QL AUTO: ABNORMAL /LPF
IMM GRANULOCYTES # BLD AUTO: 0.03 10*3/MM3 (ref 0–0.05)
IMM GRANULOCYTES NFR BLD AUTO: 0.5 % (ref 0–0.5)
KETONES UR QL STRIP: NEGATIVE
LEUKOCYTE ESTERASE UR QL STRIP.AUTO: NEGATIVE
LYMPHOCYTES # BLD AUTO: 1.12 10*3/MM3 (ref 0.7–3.1)
LYMPHOCYTES NFR BLD AUTO: 17.7 % (ref 19.6–45.3)
MCH RBC QN AUTO: 33.2 PG (ref 26.6–33)
MCHC RBC AUTO-ENTMCNC: 35 G/DL (ref 31.5–35.7)
MCV RBC AUTO: 94.8 FL (ref 79–97)
MONOCYTES # BLD AUTO: 0.42 10*3/MM3 (ref 0.1–0.9)
MONOCYTES NFR BLD AUTO: 6.6 % (ref 5–12)
NEUTROPHILS NFR BLD AUTO: 4.53 10*3/MM3 (ref 1.7–7)
NEUTROPHILS NFR BLD AUTO: 71.7 % (ref 42.7–76)
NITRITE UR QL STRIP: NEGATIVE
NRBC BLD AUTO-RTO: 0 /100 WBC (ref 0–0.2)
PH UR STRIP.AUTO: 8 [PH] (ref 5–8)
PLATELET # BLD AUTO: 179 10*3/MM3 (ref 140–450)
PMV BLD AUTO: 9.8 FL (ref 6–12)
POTASSIUM SERPL-SCNC: 5 MMOL/L (ref 3.5–5.2)
PROT SERPL-MCNC: 7.2 G/DL (ref 6–8.5)
PROT UR QL STRIP: ABNORMAL
RBC # BLD AUTO: 2.89 10*6/MM3 (ref 3.77–5.28)
RBC # UR STRIP: ABNORMAL /HPF
REF LAB TEST METHOD: ABNORMAL
SARS-COV-2 RNA RESP QL NAA+PROBE: NOT DETECTED
SODIUM SERPL-SCNC: 132 MMOL/L (ref 136–145)
SP GR UR STRIP: 1.01 (ref 1–1.03)
SQUAMOUS #/AREA URNS HPF: ABNORMAL /HPF
UROBILINOGEN UR QL STRIP: ABNORMAL
WBC # UR STRIP: ABNORMAL /HPF
WBC NRBC COR # BLD: 6.32 10*3/MM3 (ref 3.4–10.8)
WHOLE BLOOD HOLD SPECIMEN: NORMAL
WHOLE BLOOD HOLD SPECIMEN: NORMAL

## 2022-04-04 PROCEDURE — 36415 COLL VENOUS BLD VENIPUNCTURE: CPT

## 2022-04-04 PROCEDURE — 87150 DNA/RNA AMPLIFIED PROBE: CPT | Performed by: EMERGENCY MEDICINE

## 2022-04-04 PROCEDURE — 81001 URINALYSIS AUTO W/SCOPE: CPT | Performed by: EMERGENCY MEDICINE

## 2022-04-04 PROCEDURE — 87636 SARSCOV2 & INF A&B AMP PRB: CPT | Performed by: EMERGENCY MEDICINE

## 2022-04-04 PROCEDURE — 99284 EMERGENCY DEPT VISIT MOD MDM: CPT

## 2022-04-04 PROCEDURE — 82962 GLUCOSE BLOOD TEST: CPT

## 2022-04-04 PROCEDURE — 83605 ASSAY OF LACTIC ACID: CPT | Performed by: EMERGENCY MEDICINE

## 2022-04-04 PROCEDURE — 71045 X-RAY EXAM CHEST 1 VIEW: CPT

## 2022-04-04 PROCEDURE — 87147 CULTURE TYPE IMMUNOLOGIC: CPT | Performed by: EMERGENCY MEDICINE

## 2022-04-04 PROCEDURE — 70450 CT HEAD/BRAIN W/O DYE: CPT

## 2022-04-04 PROCEDURE — 93005 ELECTROCARDIOGRAM TRACING: CPT | Performed by: EMERGENCY MEDICINE

## 2022-04-04 PROCEDURE — 85025 COMPLETE CBC W/AUTO DIFF WBC: CPT | Performed by: EMERGENCY MEDICINE

## 2022-04-04 PROCEDURE — 80053 COMPREHEN METABOLIC PANEL: CPT | Performed by: EMERGENCY MEDICINE

## 2022-04-04 PROCEDURE — 87040 BLOOD CULTURE FOR BACTERIA: CPT | Performed by: EMERGENCY MEDICINE

## 2022-04-04 RX ORDER — SODIUM CHLORIDE 0.9 % (FLUSH) 0.9 %
10 SYRINGE (ML) INJECTION AS NEEDED
Status: DISCONTINUED | OUTPATIENT
Start: 2022-04-04 | End: 2022-04-04 | Stop reason: HOSPADM

## 2022-04-04 RX ADMIN — SODIUM CHLORIDE 500 ML: 9 INJECTION, SOLUTION INTRAVENOUS at 03:16

## 2022-04-04 NOTE — ED PROVIDER NOTES
EMERGENCY DEPARTMENT ENCOUNTER    Pt Name: Jeaneth Keita  MRN: [id number]  Pt :   1960  Room Number:    Date of encounter:  2022  PCP: Provider, No Known  ED Provider: Stuart Jaimes MD            Context: Jeaneth Keita is a 61 y.o. female who presents to the ED c/o weakness and hypoglycemia at her nursing care center.  She has no complaints at the time of ED evaluation and says she feels well.      PAST MEDICAL HISTORY  Past Medical History:   Diagnosis Date   • Anxiety    • DDD (degenerative disc disease), lumbar    • Depression    • Diabetes mellitus (HCC)    • Fibromyalgia    • Hemochromatosis    • Hep B w/o coma, chronic, w/o delta (HCC)    • Hep C w/o coma, chronic (HCC)    • Hypertension    • Vertigo          PAST SURGICAL HISTORY  Past Surgical History:   Procedure Laterality Date   • ARTERIOVENOUS FISTULA/SHUNT SURGERY Left 10/16/2021    Procedure: UPPER EXTREMITY ARTERIOVENOUS FISTULA FORMATION LEFT;  Surgeon: Johnny Rousseau MD;  Location: [facility];  Service: Vascular;  Laterality: Left;   • BACK SURGERY     • BELOW KNEE LEG AMPUTATION     •  SECTION     • FOOT SURGERY     • KNEE SURGERY     • ROTATOR CUFF REPAIR     • SPINAL CORD STIMULATOR IMPLANT           FAMILY HISTORY  No family history on file.      SOCIAL HISTORY  Social History     Socioeconomic History   • Marital status:    Tobacco Use   • Smoking status: Never Smoker   • Smokeless tobacco: Never Used   Vaping Use   • Vaping Use: Never used   Substance and Sexual Activity   • Alcohol use: No   • Drug use: No   • Sexual activity: Never         ALLERGIES  Sulfa antibiotics        REVIEW OF SYSTEMS  Review of Systems     General no fevers chills nausea or vomiting  All systems reviewed and negative except for those discussed in HPI.       PHYSICAL EXAM    I have reviewed the triage vital signs and nursing notes.    ED Triage Vitals   Temp Heart Rate Resp BP SpO2   22 0145 22 0145  04/04/22 0112 04/04/22 0145 04/04/22 0145   98.8 °F (37.1 °C) 66 16 121/74 96 %      Temp src Heart Rate Source Patient Position BP Location FiO2 (%)   04/04/22 0145 -- -- -- --   Oral           Physical Exam  GENERAL:   Appears alert conversant  HENT: Nares patent.  EYES: No scleral icterus.  CV: Regular rhythm, regular rate.  RESPIRATORY: Normal effort.  No audible wheezes, rales or rhonchi.  ABDOMEN: Soft, nontender  MUSCULOSKELETAL: Left amputee.  Right lower extremity normal perfusion  NEURO: Alert, oriented.  SKIN: Warm, dry, no rash visualized.        LAB RESULTS  No results found for this or any previous visit (from the past 24 hour(s)).    If labs were ordered, I independently reviewed the results.        RADIOLOGY  No Radiology Exams Resulted Within Past 24 Hours    I ordered and reviewed the above noted radiographic studies.      I viewed images of chest x-ray which showed no acute disease per my independent interpretation.    See radiologist's dictation for official interpretation.        PROCEDURES    Procedures    ECG 12 Lead   Final Result   Test Reason : weakness   Blood Pressure :   */*   mmHG   Vent. Rate :  66 BPM     Atrial Rate :  66 BPM      P-R Int : 186 ms          QRS Dur :  72 ms       QT Int : 474 ms       P-R-T Axes :  71  31  47 degrees      QTc Int : 496 ms      Normal sinus rhythm      Confirmed by JOSE F GROSSMAN ([id number]) on 4/6/2022 2:03:17 PM      Referred By: EDMD           Confirmed By: JOSE F GROSSMAN          MEDICATIONS GIVEN IN ER    Medications   sodium chloride 0.9 % bolus 500 mL (0 mL Intravenous Stopped 4/4/22 0825)         PROGRESS, DATA ANALYSIS, CONSULTS, AND MEDICAL DECISION MAKING    After monitoring and recheck, this Misheff remains well, and shows no sign of acute neurologic emergency, or recurrent hypoglycemia.      AS OF 03:33 EDT VITALS:    BP - 144/81  HR - 79  TEMP - 98.8 °F (37.1 °C) (Oral)  O2 SATS - 100%                  DIAGNOSIS  Final diagnoses:    Hypoglycemia         DISPOSITION  home           Stuart Jaimes MD  04/04/22 0607       Stuart Jaimes MD  04/09/22 0330

## 2022-04-06 ENCOUNTER — TELEPHONE (OUTPATIENT)
Dept: EMERGENCY DEPT | Facility: HOSPITAL | Age: 62
End: 2022-04-06

## 2022-04-06 LAB
BACTERIA BLD CULT: ABNORMAL
QT INTERVAL: 474 MS
QTC INTERVAL: 496 MS

## 2022-04-06 NOTE — TELEPHONE ENCOUNTER
Spoke to Sheri at [facility].  Their phone number is [phone].  She took my name and the nurse of . Ms. tang will call me back as she is currently in a meeting.  I did call back to get the fax number as I am also faxing the culture results to them and I left my name and contact information once again.

## 2022-04-07 ENCOUNTER — TELEPHONE (OUTPATIENT)
Dept: EMERGENCY DEPT | Facility: HOSPITAL | Age: 62
End: 2022-04-07

## 2022-04-07 LAB
BACTERIA SPEC AEROBE CULT: ABNORMAL
GRAM STN SPEC: ABNORMAL
ISOLATED FROM: ABNORMAL

## 2022-04-09 LAB — BACTERIA SPEC AEROBE CULT: NORMAL

## 2022-05-23 ENCOUNTER — HOSPITAL ENCOUNTER (OUTPATIENT)
Facility: HOSPITAL | Age: 62
Setting detail: OBSERVATION
Discharge: HOME | End: 2022-05-25
Attending: EMERGENCY MEDICINE | Admitting: HOSPITALIST

## 2022-05-23 ENCOUNTER — APPOINTMENT (OUTPATIENT)
Dept: MRI IMAGING | Facility: HOSPITAL | Age: 62
End: 2022-05-23

## 2022-05-23 ENCOUNTER — APPOINTMENT (OUTPATIENT)
Dept: CT IMAGING | Facility: HOSPITAL | Age: 62
End: 2022-05-23

## 2022-05-23 ENCOUNTER — APPOINTMENT (OUTPATIENT)
Dept: GENERAL RADIOLOGY | Facility: HOSPITAL | Age: 62
End: 2022-05-23

## 2022-05-23 ENCOUNTER — APPOINTMENT (OUTPATIENT)
Dept: NEUROLOGY | Facility: HOSPITAL | Age: 62
End: 2022-05-23

## 2022-05-23 DIAGNOSIS — N18.6 END STAGE RENAL DISEASE ON DIALYSIS: ICD-10-CM

## 2022-05-23 DIAGNOSIS — R29.90 STROKE-LIKE SYMPTOMS: Primary | ICD-10-CM

## 2022-05-23 DIAGNOSIS — Z86.73 HISTORY OF STROKE: ICD-10-CM

## 2022-05-23 DIAGNOSIS — D64.9 ANEMIA, UNSPECIFIED TYPE: ICD-10-CM

## 2022-05-23 DIAGNOSIS — R41.841 COGNITIVE COMMUNICATION DEFICIT: ICD-10-CM

## 2022-05-23 DIAGNOSIS — Z99.2 END STAGE RENAL DISEASE ON DIALYSIS: ICD-10-CM

## 2022-05-23 PROBLEM — I63.9 CVA (CEREBRAL VASCULAR ACCIDENT) (HCC): Status: ACTIVE | Noted: 2022-05-23

## 2022-05-23 LAB
ALBUMIN SERPL-MCNC: 4.2 G/DL (ref 3.5–5.2)
ALBUMIN/GLOB SERPL: 1.4 G/DL
ALP SERPL-CCNC: 101 U/L (ref 39–117)
ALT SERPL W P-5'-P-CCNC: 34 U/L (ref 1–33)
AMMONIA BLD-SCNC: 15 UMOL/L (ref 11–51)
AMPHET+METHAMPHET UR QL: NEGATIVE
AMPHETAMINES UR QL: NEGATIVE
ANION GAP SERPL CALCULATED.3IONS-SCNC: 13 MMOL/L (ref 5–15)
APTT PPP: 32.7 SECONDS (ref 22–39)
AST SERPL-CCNC: 32 U/L (ref 1–32)
BACTERIA UR QL AUTO: NORMAL /HPF
BARBITURATES UR QL SCN: NEGATIVE
BASOPHILS # BLD AUTO: 0.04 10*3/MM3 (ref 0–0.2)
BASOPHILS NFR BLD AUTO: 0.7 % (ref 0–1.5)
BENZODIAZ UR QL SCN: NEGATIVE
BILIRUB SERPL-MCNC: 0.5 MG/DL (ref 0–1.2)
BILIRUB UR QL STRIP: NEGATIVE
BUN BLDA-MCNC: 58 MG/DL (ref 8–26)
BUN SERPL-MCNC: 56 MG/DL (ref 8–23)
BUN/CREAT SERPL: 9.4 (ref 7–25)
BUPRENORPHINE SERPL-MCNC: NEGATIVE NG/ML
CA-I BLDA-SCNC: 1.23 MMOL/L (ref 1.2–1.32)
CALCIUM SPEC-SCNC: 9.8 MG/DL (ref 8.6–10.5)
CANNABINOIDS SERPL QL: NEGATIVE
CHLORIDE BLDA-SCNC: 101 MMOL/L (ref 98–109)
CHLORIDE SERPL-SCNC: 99 MMOL/L (ref 98–107)
CLARITY UR: CLEAR
CO2 BLDA-SCNC: 23 MMOL/L (ref 24–29)
CO2 SERPL-SCNC: 24 MMOL/L (ref 22–29)
COCAINE UR QL: NEGATIVE
COLOR UR: YELLOW
CREAT BLDA-MCNC: 6.6 MG/DL (ref 0.6–1.3)
CREAT SERPL-MCNC: 5.93 MG/DL (ref 0.57–1)
DEPRECATED RDW RBC AUTO: 39.5 FL (ref 37–54)
EGFRCR SERPLBLD CKD-EPI 2021: 6.6 ML/MIN/1.73
EGFRCR SERPLBLD CKD-EPI 2021: 7.5 ML/MIN/1.73
EOSINOPHIL # BLD AUTO: 0.23 10*3/MM3 (ref 0–0.4)
EOSINOPHIL NFR BLD AUTO: 3.8 % (ref 0.3–6.2)
ERYTHROCYTE [DISTWIDTH] IN BLOOD BY AUTOMATED COUNT: 11.8 % (ref 12.3–15.4)
FLUAV SUBTYP SPEC NAA+PROBE: NOT DETECTED
FLUBV RNA ISLT QL NAA+PROBE: NOT DETECTED
GLOBULIN UR ELPH-MCNC: 3 GM/DL
GLUCOSE BLDC GLUCOMTR-MCNC: 59 MG/DL (ref 70–130)
GLUCOSE BLDC GLUCOMTR-MCNC: 92 MG/DL (ref 70–130)
GLUCOSE BLDC GLUCOMTR-MCNC: 94 MG/DL (ref 70–130)
GLUCOSE BLDC GLUCOMTR-MCNC: 98 MG/DL (ref 70–130)
GLUCOSE SERPL-MCNC: 60 MG/DL (ref 65–99)
GLUCOSE UR STRIP-MCNC: NEGATIVE MG/DL
HCT VFR BLD AUTO: 25.7 % (ref 34–46.6)
HCT VFR BLDA CALC: 25 % (ref 38–51)
HGB BLD-MCNC: 9.2 G/DL (ref 12–15.9)
HGB BLDA-MCNC: 8.5 G/DL (ref 12–17)
HGB UR QL STRIP.AUTO: NEGATIVE
HOLD SPECIMEN: NORMAL
HYALINE CASTS UR QL AUTO: NORMAL /LPF
IMM GRANULOCYTES # BLD AUTO: 0.04 10*3/MM3 (ref 0–0.05)
IMM GRANULOCYTES NFR BLD AUTO: 0.7 % (ref 0–0.5)
INR PPP: 1.2 (ref 0.8–1.2)
KETONES UR QL STRIP: NEGATIVE
LEUKOCYTE ESTERASE UR QL STRIP.AUTO: NEGATIVE
LYMPHOCYTES # BLD AUTO: 1.2 10*3/MM3 (ref 0.7–3.1)
LYMPHOCYTES NFR BLD AUTO: 20 % (ref 19.6–45.3)
MCH RBC QN AUTO: 32.6 PG (ref 26.6–33)
MCHC RBC AUTO-ENTMCNC: 35.8 G/DL (ref 31.5–35.7)
MCV RBC AUTO: 91.1 FL (ref 79–97)
METHADONE UR QL SCN: NEGATIVE
MONOCYTES # BLD AUTO: 0.45 10*3/MM3 (ref 0.1–0.9)
MONOCYTES NFR BLD AUTO: 7.5 % (ref 5–12)
NEUTROPHILS NFR BLD AUTO: 4.05 10*3/MM3 (ref 1.7–7)
NEUTROPHILS NFR BLD AUTO: 67.3 % (ref 42.7–76)
NITRITE UR QL STRIP: NEGATIVE
NRBC BLD AUTO-RTO: 0 /100 WBC (ref 0–0.2)
OPIATES UR QL: NEGATIVE
OXYCODONE UR QL SCN: NEGATIVE
PCP UR QL SCN: NEGATIVE
PH UR STRIP.AUTO: 8.5 [PH] (ref 5–8)
PLATELET # BLD AUTO: 181 10*3/MM3 (ref 140–450)
PMV BLD AUTO: 9.3 FL (ref 6–12)
POTASSIUM BLDA-SCNC: 4.3 MMOL/L (ref 3.5–4.9)
POTASSIUM SERPL-SCNC: 4.3 MMOL/L (ref 3.5–5.2)
PROPOXYPH UR QL: NEGATIVE
PROT SERPL-MCNC: 7.2 G/DL (ref 6–8.5)
PROT UR QL STRIP: ABNORMAL
PROTHROMBIN TIME: 14.6 SECONDS (ref 12.8–15.2)
QT INTERVAL: 440 MS
QTC INTERVAL: 491 MS
RBC # BLD AUTO: 2.82 10*6/MM3 (ref 3.77–5.28)
RBC # UR STRIP: NORMAL /HPF
REF LAB TEST METHOD: NORMAL
SARS-COV-2 RNA PNL SPEC NAA+PROBE: NOT DETECTED
SODIUM BLD-SCNC: 137 MMOL/L (ref 138–146)
SODIUM SERPL-SCNC: 136 MMOL/L (ref 136–145)
SP GR UR STRIP: 1.02 (ref 1–1.03)
SQUAMOUS #/AREA URNS HPF: NORMAL /HPF
TRICYCLICS UR QL SCN: NEGATIVE
TROPONIN T SERPL-MCNC: 0.05 NG/ML (ref 0–0.03)
TSH SERPL DL<=0.05 MIU/L-ACNC: 0.78 UIU/ML (ref 0.27–4.2)
UROBILINOGEN UR QL STRIP: ABNORMAL
VIT B12 BLD-MCNC: 596 PG/ML (ref 211–946)
WBC # UR STRIP: NORMAL /HPF
WBC NRBC COR # BLD: 6.01 10*3/MM3 (ref 3.4–10.8)
WHOLE BLOOD HOLD COAG: NORMAL
WHOLE BLOOD HOLD SPECIMEN: NORMAL

## 2022-05-23 PROCEDURE — G0378 HOSPITAL OBSERVATION PER HR: HCPCS

## 2022-05-23 PROCEDURE — 70496 CT ANGIOGRAPHY HEAD: CPT

## 2022-05-23 PROCEDURE — 93005 ELECTROCARDIOGRAM TRACING: CPT | Performed by: EMERGENCY MEDICINE

## 2022-05-23 PROCEDURE — 96374 THER/PROPH/DIAG INJ IV PUSH: CPT

## 2022-05-23 PROCEDURE — 87636 SARSCOV2 & INF A&B AMP PRB: CPT | Performed by: INTERNAL MEDICINE

## 2022-05-23 PROCEDURE — 82140 ASSAY OF AMMONIA: CPT

## 2022-05-23 PROCEDURE — 85014 HEMATOCRIT: CPT

## 2022-05-23 PROCEDURE — P9612 CATHETERIZE FOR URINE SPEC: HCPCS

## 2022-05-23 PROCEDURE — 70498 CT ANGIOGRAPHY NECK: CPT

## 2022-05-23 PROCEDURE — 85610 PROTHROMBIN TIME: CPT

## 2022-05-23 PROCEDURE — 99285 EMERGENCY DEPT VISIT HI MDM: CPT

## 2022-05-23 PROCEDURE — 81001 URINALYSIS AUTO W/SCOPE: CPT | Performed by: EMERGENCY MEDICINE

## 2022-05-23 PROCEDURE — 85730 THROMBOPLASTIN TIME PARTIAL: CPT | Performed by: EMERGENCY MEDICINE

## 2022-05-23 PROCEDURE — 95816 EEG AWAKE AND DROWSY: CPT

## 2022-05-23 PROCEDURE — 80050 GENERAL HEALTH PANEL: CPT | Performed by: EMERGENCY MEDICINE

## 2022-05-23 PROCEDURE — 99214 OFFICE O/P EST MOD 30 MIN: CPT | Performed by: PSYCHIATRY & NEUROLOGY

## 2022-05-23 PROCEDURE — 99220 PR INITIAL OBSERVATION CARE/DAY 70 MINUTES: CPT | Performed by: INTERNAL MEDICINE

## 2022-05-23 PROCEDURE — 82607 VITAMIN B-12: CPT

## 2022-05-23 PROCEDURE — 71045 X-RAY EXAM CHEST 1 VIEW: CPT

## 2022-05-23 PROCEDURE — 80047 BASIC METABLC PNL IONIZED CA: CPT

## 2022-05-23 PROCEDURE — C9803 HOPD COVID-19 SPEC COLLECT: HCPCS

## 2022-05-23 PROCEDURE — 70551 MRI BRAIN STEM W/O DYE: CPT

## 2022-05-23 PROCEDURE — 80306 DRUG TEST PRSMV INSTRMNT: CPT | Performed by: PSYCHIATRY & NEUROLOGY

## 2022-05-23 PROCEDURE — 0042T HC CT CEREBRAL PERFUSION W/WO CONTRAST: CPT

## 2022-05-23 PROCEDURE — 82962 GLUCOSE BLOOD TEST: CPT

## 2022-05-23 PROCEDURE — 0 IOPAMIDOL PER 1 ML: Performed by: EMERGENCY MEDICINE

## 2022-05-23 PROCEDURE — 70450 CT HEAD/BRAIN W/O DYE: CPT

## 2022-05-23 PROCEDURE — 84484 ASSAY OF TROPONIN QUANT: CPT | Performed by: EMERGENCY MEDICINE

## 2022-05-23 RX ORDER — SODIUM CHLORIDE 0.9 % (FLUSH) 0.9 %
10 SYRINGE (ML) INJECTION AS NEEDED
Status: DISCONTINUED | OUTPATIENT
Start: 2022-05-23 | End: 2022-05-25 | Stop reason: HOSPADM

## 2022-05-23 RX ORDER — ACETAMINOPHEN 160 MG/5ML
650 SOLUTION ORAL EVERY 4 HOURS PRN
Status: DISCONTINUED | OUTPATIENT
Start: 2022-05-23 | End: 2022-05-25 | Stop reason: HOSPADM

## 2022-05-23 RX ORDER — ACETAMINOPHEN 650 MG/1
650 SUPPOSITORY RECTAL EVERY 4 HOURS PRN
Status: DISCONTINUED | OUTPATIENT
Start: 2022-05-23 | End: 2022-05-25 | Stop reason: HOSPADM

## 2022-05-23 RX ORDER — VENLAFAXINE HYDROCHLORIDE 75 MG/1
150 CAPSULE, EXTENDED RELEASE ORAL DAILY
COMMUNITY
End: 2022-08-24 | Stop reason: HOSPADM

## 2022-05-23 RX ORDER — ONDANSETRON 2 MG/ML
4 INJECTION INTRAMUSCULAR; INTRAVENOUS EVERY 6 HOURS PRN
Status: DISCONTINUED | OUTPATIENT
Start: 2022-05-23 | End: 2022-05-25 | Stop reason: HOSPADM

## 2022-05-23 RX ORDER — LOPERAMIDE HYDROCHLORIDE 2 MG/1
2 CAPSULE ORAL 4 TIMES DAILY PRN
COMMUNITY
End: 2022-08-24 | Stop reason: HOSPADM

## 2022-05-23 RX ORDER — SODIUM CHLORIDE 0.9 % (FLUSH) 0.9 %
10 SYRINGE (ML) INJECTION EVERY 12 HOURS SCHEDULED
Status: DISCONTINUED | OUTPATIENT
Start: 2022-05-23 | End: 2022-05-25 | Stop reason: HOSPADM

## 2022-05-23 RX ORDER — POLYETHYLENE GLYCOL 3350 17 G/17G
17 POWDER, FOR SOLUTION ORAL DAILY PRN
Status: DISCONTINUED | OUTPATIENT
Start: 2022-05-23 | End: 2022-05-25 | Stop reason: HOSPADM

## 2022-05-23 RX ORDER — ONDANSETRON 4 MG/1
4 TABLET, FILM COATED ORAL EVERY 6 HOURS PRN
COMMUNITY

## 2022-05-23 RX ORDER — QUETIAPINE FUMARATE 25 MG/1
25 TABLET, FILM COATED ORAL NIGHTLY PRN
Status: DISCONTINUED | OUTPATIENT
Start: 2022-05-23 | End: 2022-05-25 | Stop reason: HOSPADM

## 2022-05-23 RX ORDER — ONDANSETRON 4 MG/1
4 TABLET, FILM COATED ORAL EVERY 6 HOURS PRN
Status: DISCONTINUED | OUTPATIENT
Start: 2022-05-23 | End: 2022-05-25 | Stop reason: HOSPADM

## 2022-05-23 RX ORDER — DEXTROSE MONOHYDRATE 25 G/50ML
25 INJECTION, SOLUTION INTRAVENOUS ONCE
Status: COMPLETED | OUTPATIENT
Start: 2022-05-23 | End: 2022-05-23

## 2022-05-23 RX ORDER — AMOXICILLIN 250 MG
2 CAPSULE ORAL 2 TIMES DAILY
Status: DISCONTINUED | OUTPATIENT
Start: 2022-05-23 | End: 2022-05-25 | Stop reason: HOSPADM

## 2022-05-23 RX ORDER — ACETAMINOPHEN 325 MG/1
650 TABLET ORAL EVERY 4 HOURS PRN
Status: DISCONTINUED | OUTPATIENT
Start: 2022-05-23 | End: 2022-05-25 | Stop reason: HOSPADM

## 2022-05-23 RX ORDER — ACETAMINOPHEN 325 MG/1
650 TABLET ORAL EVERY 6 HOURS PRN
COMMUNITY

## 2022-05-23 RX ORDER — BISACODYL 5 MG/1
5 TABLET, DELAYED RELEASE ORAL DAILY PRN
Status: DISCONTINUED | OUTPATIENT
Start: 2022-05-23 | End: 2022-05-25 | Stop reason: HOSPADM

## 2022-05-23 RX ORDER — DEXTROSE AND SODIUM CHLORIDE 5; .9 G/100ML; G/100ML
40 INJECTION, SOLUTION INTRAVENOUS CONTINUOUS
Status: ACTIVE | OUTPATIENT
Start: 2022-05-23 | End: 2022-05-24

## 2022-05-23 RX ORDER — BISACODYL 10 MG
10 SUPPOSITORY, RECTAL RECTAL DAILY PRN
Status: DISCONTINUED | OUTPATIENT
Start: 2022-05-23 | End: 2022-05-25 | Stop reason: HOSPADM

## 2022-05-23 RX ADMIN — DEXTROSE MONOHYDRATE 25 G: 25 INJECTION, SOLUTION INTRAVENOUS at 13:09

## 2022-05-23 RX ADMIN — IOPAMIDOL 115 ML: 755 INJECTION, SOLUTION INTRAVENOUS at 12:51

## 2022-05-23 RX ADMIN — DEXTROSE AND SODIUM CHLORIDE 40 ML/HR: 5; 900 INJECTION, SOLUTION INTRAVENOUS at 17:07

## 2022-05-23 RX ADMIN — Medication 10 ML: at 20:43

## 2022-05-23 NOTE — CASE MANAGEMENT/SOCIAL WORK
Discharge Planning Assessment  [facility]     Patient Name: Jeaneth Keita  MRN: [id number]  Today's Date: 5/23/2022    Admit Date: 5/23/2022     Discharge Needs Assessment     Row Name 05/23/22 1542       Living Environment    People in Home facility resident;other (see comments)  [facility] since Oct/Nov 2021    Current Living Arrangements assisted living facility    Potentially Unsafe Housing Conditions unable to assess    Primary Care Provided by self    Provides Primary Care For no one    Family Caregiver if Needed sibling(s);spouse    Family Caregiver Names Pavriz Rivera ex /POA and Bob Madsen brother    Quality of Family Relationships unable to assess    Able to Return to Prior Arrangements yes       Resource/Environmental Concerns    Resource/Environmental Concerns none    Transportation Concerns none       Transition Planning    Patient/Family Anticipates Transition to other (see comments);home  [facility]    Patient/Family Anticipated Services at Transition none    Transportation Anticipated family or friend will provide;other (see comments)  May need assistance with transportation. Will need her W/C       Discharge Needs Assessment    Readmission Within the Last 30 Days no previous admission in last 30 days    Equipment Currently Used at Home wheelchair    Concerns to be Addressed no discharge needs identified    Equipment Needed After Discharge other (see comments)  TBD    Discharge Facility/Level of Care Needs other (see comments)  TBD               Discharge Plan     Row Name 05/23/22 1547       Plan    Plan IDP    Patient/Family in Agreement with Plan yes    Plan Comments MSW met with pt. at bedside. Pt. was very confused and gave MSW permission to speak to her brother Bob Dutta via phone. Pt.’s brother reports that pt. is a resident of [facility] in [facility]. Pt.’s PCP and pharmacy is through [facility]. Pt.’s insurance is [employer]. Pt.’s brother reports that at  baseline pt. was independent with ADL’s/IADL’s. Pt. has a W/C. No O2 or HH services. Transportation may need to be arranged for pt. if she does not have her W/C here. Pt. does dialysis 3 days per week. Pt.’s brother reports that pt.’s ex- is her POA (Parviz Rivera [phone]). Pt. does have a living will on file. Pt.’s goal is to return home. CM will continue to follow throughout pt.’s stay.    Final Discharge Disposition Code 30 - still a patient              Continued Care and Services - Admitted Since 5/23/2022    Coordination has not been started for this encounter.          Demographic Summary     Row Name 05/23/22 1541       General Information    Admission Type inpatient    Arrived From home    Referral Source admission list;emergency department    Reason for Consult discharge planning    Preferred Language English               Functional Status     Row Name 05/23/22 1541       Functional Status, IADL    Medications independent    Meal Preparation independent    Housekeeping independent    Laundry independent    Shopping independent    IADL Comments Per brother, independent at baseline       Mental Status Summary    Recent Changes in Mental Status/Cognitive Functioning unable to assess       Employment/    Employment Status retired               Psychosocial    No documentation.                Abuse/Neglect    No documentation.                Legal    No documentation.                Substance Abuse    No documentation.                Patient Forms    No documentation.                   SURJIT Floyd

## 2022-05-23 NOTE — CONSULTS
Stroke Consult Note    Patient Name: Jeaneth Keita   MRN: [id number]  Age: 61 y.o.  Sex: female  : 1960    Primary Care Physician: Provider, No Known  Referring Physician:  Dr. Goldy Lira    TIME STROKE TEAM CALLED: 1234 EST     TIME PATIENT SEEN: 1238 EST    Handedness: Right  Race:     Chief Complaint/Reason for Consultation: Left facial numbness    HPI:     Mrs. 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  The patient tells me that she currently feels numbness in bilateral lower extremities and bilateral upper extremities.    The patient tells me that she does not take any antithrombotic or anticoagulation medications.  She has no residual deficits from her prior left thalamic hemorrhage.    Last Known Normal Date/Time: Unknown, patient unable to remember what time she went to bed last evening EST     Review of Systems   Unable to perform ROS: Mental status change      Past Medical History:   Diagnosis Date   • Anxiety    • DDD (degenerative disc disease), lumbar    • Depression    • Diabetes mellitus (HCC)    • Fibromyalgia    • Hemochromatosis    • Hep B w/o coma, chronic, w/o delta (HCC)    • Hep C w/o coma, chronic (HCC)    • Hypertension    • Vertigo      Past Surgical History:   Procedure Laterality Date   • ARTERIOVENOUS FISTULA/SHUNT SURGERY Left 10/16/2021    Procedure: UPPER EXTREMITY ARTERIOVENOUS FISTULA FORMATION LEFT;  Surgeon: Johnny Rousseau MD;  Location: [facility];  Service: Vascular;  Laterality: Left;    • BACK SURGERY     • BELOW KNEE LEG AMPUTATION     •  SECTION     • FOOT SURGERY     • KNEE SURGERY     • ROTATOR CUFF REPAIR     • SPINAL CORD STIMULATOR IMPLANT       No family history on file.  Social History     Socioeconomic History   • Marital status:    Tobacco Use   • Smoking status: Never Smoker   • Smokeless tobacco: Never Used   Vaping Use   • Vaping Use: Never used   Substance and Sexual Activity   • Alcohol use: No   • Drug use: No   • Sexual activity: Never     Allergies   Allergen Reactions   • Sulfa Antibiotics      Prior to Admission medications    Medication Sig Start Date End Date Taking? Authorizing Provider   amLODIPine (NORVASC) 10 MG tablet Take 1 tablet by mouth Daily. 10/28/21   Adali Jaimes APRN   atorvastatin (LIPITOR) 80 MG tablet Take 80 mg by mouth Daily.    ProviderKaitlin MD   B Complex-C (B Complex-Vitamin C) capsule Take  by mouth Daily.    Kaitlin Dao MD   calcium acetate (PHOS BINDER,) 667 MG capsule capsule Take 1 capsule by mouth 3 (Three) Times a Day With Meals. 10/28/21   Adali Jaimes APRN   carvedilol (COREG) 25 MG tablet Take 25 mg by mouth 2 (two) times a day with meals.    ProviderKaitlin MD   polyethylene glycol (MIRALAX) 17 g packet Take 17 g by mouth Daily. 10/28/21   Adali Jaimes APRN   QUEtiapine (SEROquel) 25 MG tablet Take 1 tablet by mouth At Night As Needed (insomnia). 21   Jesus James,    sennosides-docusate (PERICOLACE) 8.6-50 MG per tablet Take 2 tablets by mouth 2 (Two) Times a Day. 10/28/21   Adali Jaimes APRN   vitamin D (ERGOCALCIFEROL) 1.25 MG (73133 UT) capsule capsule Take 50,000 Units by mouth 1 (One) Time Per Week.    ProviderKaitlin MD            Neurological Exam  Mental Status  Alert. Oriented only to person. Speech is normal. Language is fluent with no aphasia.    Cranial Nerves  CN II: Visual fields full to confrontation.  CN III, IV, VI: Extraocular  movements intact bilaterally. Pupils equal round and reactive to light bilaterally.  CN V: Facial sensation is normal.  CN VII: Full and symmetric facial movement.  CN VIII: Hearing intact bilaterally.  CN IX, X: Palate elevates symmetrically  CN XI: Shoulder shrug strength is normal.  CN XII: Tongue midline without atrophy or fasciculations.    Motor  Normal muscle bulk throughout. No fasciculations present. Normal muscle tone. Strength is 5/5 throughout all four extremities.    Sensory  Light touch abnormality: Bilateral lower extremity and bilateral upper extremity.     Coordination  Right: Finger-to-nose normal.Left: Finger-to-nose normal.    Gait    Not observed.      Physical Exam  Vitals reviewed.   Constitutional:       General: She is not in acute distress.     Appearance: She is obese. She is ill-appearing.   HENT:      Head: Normocephalic.      Mouth/Throat:      Mouth: Mucous membranes are dry.   Eyes:      Extraocular Movements: Extraocular movements intact.      Pupils: Pupils are equal, round, and reactive to light.   Cardiovascular:      Rate and Rhythm: Normal rate and regular rhythm.   Pulmonary:      Effort: Pulmonary effort is normal. No respiratory distress.   Skin:     General: Skin is warm and dry.   Neurological:      Mental Status: She is alert. She is disoriented.      Cranial Nerves: No cranial nerve deficit.      Sensory: Sensory deficit present.      Motor: No weakness.      Coordination: Coordination normal.      Deep Tendon Reflexes: Strength normal.   Psychiatric:         Mood and Affect: Mood normal.         Speech: Speech normal.         Behavior: Behavior normal.         Acute Stroke Data    Thrombolytic Inclusion / Exclusion Criteria    Time: 12:48 EDT  Person Administering Scale: MAYELIN Guerin    Inclusion Criteria  [x]   18 years of age or greater   []   Onset of symptoms < 4.5 hours before beginning treatment (stroke onset = time patient was last seen well or  without symptoms).   []   Diagnosis of acute ischemic stroke causing measurable disabling deficit (Complete Hemianopia, Any Aphasia, Visual or Sensory Extinction, Any weakness limiting sustained effort against gravity)   []   Any remaining deficit considered potentially disabling in view of patient and practitioner   Exclusion criteria (Do not proceed with Alteplase if any are checked under exclusion criteria)  [x]   Onset unknown or GREATER than 4.5 hours   []   ICH on CT/MRI   []   CT demonstrates hypodensity representing acute or subacute infarct   []   Significant head trauma or prior stroke in the previous 3 months   []   Symptoms suggestive of subarachnoid hemorrhage   []   History of un-ruptured intracranial aneurysm GREATER than 10 mm   []   Recent intracranial or intraspinal surgery within the last 3 months   []   Arterial puncture at a non-compressible site in the previous 7 days   []   Active internal bleeding   []   Acute bleeding tendency   []   Platelet count LESS than 100,000 for known hematological diseases such as leukemia, thrombocytopenia or chronic cirrhosis   []   Current use of anticoagulant with INR GREATER than 1.7 or PT GREATER than 15 seconds, aPTT GREATER than 40 seconds   []   Heparin received within 48 hours, resulting in abnormally elevated aPTT GREATER than upper limit of normal   []   Current use of direct thrombin inhibitors or direct factor Xa inhibitors in the past 48 hours   []   Elevated blood pressure refractory to treatment (systolic GREATER than 185 mm/Hg or diastolic  GREATER than 110 mm/Hg   []   Suspected infective endocarditis and aortic arch dissection   []   Current use of therapeutic treatment dose of low-molecular-weight heparin (LMWH) within the previous 24 hours   []   Structural GI malignancy or bleed   Relative exclusion for all patients  []   Only minor non-disabling symptoms   []   Pregnancy   []   Seizure at onset with postictal residual neurological impairments    []   Major surgery or previous trauma within past 14 days   []   History of previous spontaneous ICH, intracranial neoplasm, or AV malformation   []   Postpartum (within previous 14 days)   []   Recent GI or urinary tract hemorrhage (within previous 21 days)   []   Recent acute MI (within previous 3 months)   []   History of un-ruptured intracranial aneurysm LESS than 10 mm   []   History of ruptured intracranial aneurysm   []   Blood glucose LESS than 50 mg/dL (2.7 mmol/L)   []   Dural puncture within the last 7 days   []   Known GREATER than 10 cerebral microbleeds   Additional exclusions for patients with symptoms onset between 3 and 4.5 hours.  []   Age > 80.   []   On any anticoagulants regardless of INR  >>> Warfarin (Coumadin), Heparin, Enoxaparin (Lovenox), fondaparinux (Arixtra), bivalirudin (Angiomax), Argatroban, dabigatran (Pradaxa), rivaroxaban (Xarelto), or apixaban (Eliquis)   []   Severe stroke (NIHSS > 25).   []   History of BOTH diabetes and previous ischemic stroke.   []   The risks and benefits have been discussed with the patient or family related to the administration of IV thrombolytic therapy for stroke symptoms.   []   I have discussed and reviewed the patient's case and imaging with the attending prior to IV thrombolytic therapy.   N/A Time IV thrombolytic administered       Hospital Meds:  Scheduled-    Infusions-     PRNs- •  sodium chloride    Functional Status Prior to Current Stroke/[place] Score: 2-3    NIH Stroke Scale  Time: 12:48 EDT  Person Administering Scale: MAYELIN Guerin    Interval: baseline  1a. Level of Consciousness: 0-->Alert, keenly responsive  1b. LOC Questions: 2-->Answers neither question correctly  1c. LOC Commands: 0-->Performs both tasks correctly  2. Best Gaze: 0-->Normal  3. Visual: 0-->No visual loss  4. Facial Palsy: 0-->Normal symmetrical movements  5a. Motor Arm, Left: 0-->No drift, limb holds 90 (or 45) degrees for full 10 secs  5b. Motor  Arm, Right: 0-->No drift, limb holds 90 (or 45) degrees for full 10 secs  6a. Motor Leg, Left: 0-->No drift, leg holds 30 degree position for full 5 secs  6b. Motor Leg, Right: 0-->No drift, leg holds 30 degree position for full 5 secs  7. Limb Ataxia: 0-->Absent  8. Sensory: 1-->Mild-to-moderate sensory loss, patient feels pinprick is less sharp or is dull on the affected side, or there is a loss of superficial pain with pinprick, but patient is aware of being touched  9. Best Language: 0-->No aphasia, normal  10. Dysarthria: 0-->Normal  11. Extinction and Inattention (formerly Neglect): 0-->No abnormality    Total (NIH Stroke Scale): 3    Results Reviewed:  I have personally reviewed current lab, radiology, and data and agree with results.    CT of the head without contrast is negative for hemorrhaging/or acute stroke.    CTA of the head/neck no evidence of atherosclerotic disease, flow-limiting stenosis, or large vessel occlusive stroke.    CT perfusion scan is negative for large vessel occlusive stroke.    Glucose 59  Creatinine 6.60, BUN 58  Sodium 137  H/H 8.5/25  Platelets 181    Results for orders placed during the hospital encounter of 10/05/21    Adult Transthoracic Echo Complete W/ Cont if Necessary Per Protocol    Interpretation Summary  · Left ventricular ejection fraction appears to be 61 - 65%. Left ventricular systolic function is normal.  · Left atrial volume is mildly increased.       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  She is not a candidate for IV  thrombolytic therapy secondary to last known well greater than 4.5 hours and is not a candidate for endovascular therapy secondary to no large vessel occlusive stroke on CTA head/neck or CT perfusion scan.    Antiplatelet: None  Anticoagulant: None      1. Paresthesias  -Differential diagnoses include hypoglycemia versus brain metastasis versus TIA/CVA   -Will not initiate stroke order set at this time  -We will check vitamin B12, TSH, ammonia  -MRI brain without contrast  -NPO until bedside nursing dysphagia screen completed    2. Altered mental status  -Unsure of patient's baseline  -Recommend infectious work-up     3. Essential hypertension  -Allow autoregulation of blood pressure at this time.  -Hold all home antihypertensive medications until MRI of the brain is complete    4. Diabetes mellitus type 2  -Management per hospitalist  -Maintain euglycemia, goal blood glucose <140  -A1c in a.m.    5. End-stage renal disease  -Patient will need dialysis within the next 24 hours as she received contrast dye.  -Nephrology consulted    Plan of care was discussed with Dr. Lira and Dr. Atkinson.  Neurology will continue to follow.  Please call with any questions or concerns.  Thank you for this consult.        Lissa Jaimes, APRN  May 23, 2022  12:48 EDT

## 2022-05-23 NOTE — H&P
"    [facility] Medicine Services  HISTORY AND PHYSICAL    Patient Name: Jeaneth Keita  : 1960  MRN: [id number]  Primary Care Physician: Provider, No Known  Date of admission: 2022      Subjective   Subjective     Chief Complaint:  Left facial numbness    HPI:  Jeaneth Keita is a 61 y.o. female with history of hepatitis C, cirrhosis, hemochromatosis, hepatocellular carcinoma with bone mets, prior CVA, chronic pain on methadone, type 2 diabetes, left BKA who presents with left facial numbness, per ED report.  History obtained from ED notes as patient a poor historian and told me \"I do not know\" when I asked her what brought her to the ER.  Patient reported to the ED provider that the facial numbness was present upon waking up this morning.  She also has numbness in her bilateral lower extremities and bilateral upper extremities.  Patient also reports bilateral jaw pain.  She overall just does not feel well.  Has chills but no fevers.  She repetitively states that she does not remember.  She does have some low abdominal pain and some epigastric pain.  Denies any nausea or vomiting.  Decreased appetite.  When asked about her amputation she seems confused and states that she does not have an amputation.  Hospital medicine was called for admission.  She states that she has not made urine in a long time when asked about dysuria.  She also states that she does not know if she is living with anyone.       Review of Systems   Constitutional: Positive for appetite change and chills. Negative for fever.   HENT: Negative for trouble swallowing.    Eyes: Negative for visual disturbance.   Respiratory: Negative for cough and shortness of breath.    Cardiovascular: Negative for chest pain.   Gastrointestinal: Positive for abdominal pain. Negative for nausea and vomiting.   Genitourinary: Negative for dysuria.   Musculoskeletal: Negative for back pain.   Skin: Negative for rash. "   Neurological: Negative for headaches.          Personal History     Past Medical History:   Diagnosis Date   • Anxiety    • DDD (degenerative disc disease), lumbar    • Depression    • Diabetes mellitus (HCC)    • Fibromyalgia    • Hemochromatosis    • Hep B w/o coma, chronic, w/o delta (HCC)    • Hep C w/o coma, chronic (HCC)    • Hypertension    • Vertigo          Oncology Problem List:  Hepatocellular carcinoma metastatic to bone s/p RXT  (10/05/2021;   Status: Active)       Past Surgical History:   Procedure Laterality Date   • ARTERIOVENOUS FISTULA/SHUNT SURGERY Left 10/16/2021    Procedure: UPPER EXTREMITY ARTERIOVENOUS FISTULA FORMATION LEFT;  Surgeon: Johnny Rousseau MD;  Location: [facility];  Service: Vascular;  Laterality: Left;   • BACK SURGERY     • BELOW KNEE LEG AMPUTATION     •  SECTION     • FOOT SURGERY     • KNEE SURGERY     • ROTATOR CUFF REPAIR     • SPINAL CORD STIMULATOR IMPLANT         Family History: No significant family history.  History limited due to patient's mental status    Social History:  reports that she has never smoked. She has never used smokeless tobacco. She reports that she does not drink alcohol and does not use drugs.  Social History     Social History Narrative   • Not on file       Medications:  Available home medication information reviewed.  (Not in a hospital admission)      Allergies   Allergen Reactions   • Sulfa Antibiotics        Objective   Objective     Vital Signs:   Heart Rate:  [73] 73  Resp:  [18] 18  BP: (141)/(76) 141/76  Total (NIH Stroke Scale): 3    Physical Exam   Constitutional: Sleepy but awake, no acute distress, appears tired but nontoxic  Eyes: PERRL, sclerae anicteric, no conjunctival injection  HENT: NCAT, mucous membranes dry  Neck: Supple, trachea midline  Respiratory: Clear to auscultation bilaterally, nonlabored respirations on room air  Cardiovascular: RRR, no murmurs, rubs, or gallops, palpable pedal pulses  "bilaterally  Gastrointestinal: Positive bowel sounds, soft, nontender, nondistended  Musculoskeletal: No bilateral ankle edema, left BKA, stump without any erythema, or drainage  Psychiatric: Flat affect, states \"I do not know\" multiple times during evaluation for things such as who she lives with, whether or not she has an amputation, why she came.  She is able to provide history about some other things as well, slow speech  Neurologic: Oriented x 2, moving all extremities without difficulty, no gross focal neurological deficits, no slurred speech, no facial droop  Skin: No rashes      Result Review:  I have personally reviewed the results from the time of this admission to 5/23/2022 13:49 EDT and agree with these findings:  [x]  Laboratory  [x]  Microbiology  [x]  Radiology  [x]  EKG/Telemetry   []  Cardiology/Vascular   []  Pathology  [x]  Old records  []  Other:        LAB RESULTS:      Lab 05/23/22  1251 05/23/22  1247   WBC 6.01  --    HEMOGLOBIN 9.2*  --    HEMOGLOBIN, POC  --  8.5*   HEMATOCRIT 25.7*  --    HEMATOCRIT POC  --  25*   PLATELETS 181  --    NEUTROS ABS 4.05  --    IMMATURE GRANS (ABS) 0.04  --    LYMPHS ABS 1.20  --    MONOS ABS 0.45  --    EOS ABS 0.23  --    MCV 91.1  --    PROTIME  --  14.6   INR  --  1.2   APTT 32.7  --          Lab 05/23/22  1251 05/23/22  1247   SODIUM 136  --    POTASSIUM 4.3  --    CHLORIDE 99  --    CO2 24.0  --    ANION GAP 13.0  --    BUN 56*  --    CREATININE 5.93* 6.60*   EGFR 7.5* 6.6*   GLUCOSE 60*  --    CALCIUM 9.8  --    TSH 0.776  --          Lab 05/23/22  1251   TOTAL PROTEIN 7.2   ALBUMIN 4.20   GLOBULIN 3.0   ALT (SGPT) 34*   AST (SGOT) 32   BILIRUBIN 0.5   ALK PHOS 101         Lab 05/23/22  1251   TROPONIN T 0.047*                 UA    Urinalysis 12/8/21 12/8/21 1/4/22 1/4/22 4/4/22 4/4/22    1404 1404 0156 0156 0304 0304   Squamous Epithelial Cells, UA  0-2  0-2  None Seen   Specific [place], UA 1.018  1.015  1.015    Ketones, UA Negative  Negative  " Negative    Blood, UA Negative  Negative  Negative    Leukocytes, UA Negative  Negative  Negative    Nitrite, UA Negative  Negative  Negative    RBC, UA  0-2  0-2  0-2   WBC, UA  0-2  0-2  13-20 (A)   Bacteria, UA  None Seen  None Seen  None Seen   (A) Abnormal value              Microbiology Results (last 10 days)     ** No results found for the last 240 hours. **          CT Angiogram Neck    Result Date: 5/23/2022  EXAMINATION: CT CEREBRAL PERFUSION W WO CONTRAST-, CT ANGIOGRAM HEAD W AI ANALYSIS OF LVO-, CT ANGIOGRAM NECK-  DATE OF EXAM: 5/23/2022 12:43 PM  INDICATION: Neuro deficit, acute, stroke suspected.  COMPARISON: None available.  TECHNIQUE: Axial CT images of the brain were obtained prior to and after the administration of 115 cc Isovue-370. CT Perfusion protocol was utilized. Automated post processing was performed by RAPID software and submitted to PACS for interpretation. CTA of the head and neck was also performed, with reconstructions. Automated exposure control and iterative reconstruction was utilized.  FINDINGS: CT Perfusion: CBF (<30%) volume: 0 mL Tmax (>6.0s) volume: 0 mL Mismatch volume: 0 mL Mismatch ratio: None  The examination appears to be technically adequate. There is no reduced cerebral blood volume (CBV) or reduced cerebral blood flow (CBF) to suggest an area of acute infarction in a large vessel territory. The cerebral perfusion appears symmetric. No increased mean transit time (MTT) is seen. No areas of mismatch to suggest presence of a penumbra.  CTA head/neck: Normal arch anatomy. Proximal right common carotid artery partially obscured by streak artifact from intravenous contrast in the subclavian vein. Right and left common carotid arteries are patent without stenosis. Carotid bifurcation patent bilaterally. Right and left internal carotid arteries are patent without stenosis. Both vertebral arteries originate from the subclavian's. Both vertebral arteries are patent without  stenosis. Basilar artery is patent. Proximal anterior, middle, and posterior cerebral arteries are patent. Peripheral cerebral branches are symmetric. No aneurysms are demonstrated.  There are no intracranial contrast enhancing abnormalities. No acute soft tissue neck abnormality demonstrated. Bilateral thyroid nodules up to 2.1 cm on the right. Lung apices are clear      Impression: 1. No focal area of decreased cerebral blood flow (CBF) is seen to suggest an acute infarct in a large vessel territory.  No defects are seen to suggest a core infarct or an area of reversible ischemia. 2. Patent bilateral carotid and vertebral arteries with no significant stenosis 3. No large intracranial arterial occlusion  These results communicated by telephone to the emergency department at 1:32 PM on 05/23/2022    This report was finalized on 5/23/2022 1:32 PM by Kelby Lieberman.      CT Head Without Contrast Stroke Protocol    Result Date: 5/23/2022  CT HEAD WO CONTRAST STROKE PROTOCOL-  Date of Exam: 5/23/2022 12:40 PM  Indication: Neuro deficit, acute, stroke suspected.  Comparison Exams: None available.  Technique: Multiple axial images were obtained from the skull base to the vertex without the administration of IV contrast. The axial data was used to generate reformatted images in the coronal and sagittal planes. Automated exposure control and iterative reconstruction methods were used.  FINDINGS: There is mild generalized brain volume loss.  There is some patchy low-attenuation within the periventricular white matter bilaterally compatible changes of chronic small vessel ischemic disease.  There is no acute infarct or hemorrhage.   No abnormal extra-axial fluid collections are seen.   There is no mass effect or hydrocephalus.  There is no evidence of skull fracture.   There is mucosal thickening within left maxillary sinus.  Visualized paranasal sinuses and mastoid air cells are clear.  The globes and orbits are within  normal limits.      Impression:   1.  Mild generalized brain volume loss and changes of chronic small vessel ischemic disease. 2.  No acute intracranial abnormality.  Surgically no evidence of acute infarct or hemorrhage.  3.  Findings were  personally communicated to the stroke team at 12:43 PM on 5/23/2022  This report was finalized on 5/23/2022 1:46 PM by Delmer Franklin MD.      CT Angiogram Head w AI Analysis of LVO    Result Date: 5/23/2022  EXAMINATION: CT CEREBRAL PERFUSION W WO CONTRAST-, CT ANGIOGRAM HEAD W AI ANALYSIS OF LVO-, CT ANGIOGRAM NECK-  DATE OF EXAM: 5/23/2022 12:43 PM  INDICATION: Neuro deficit, acute, stroke suspected.  COMPARISON: None available.  TECHNIQUE: Axial CT images of the brain were obtained prior to and after the administration of 115 cc Isovue-370. CT Perfusion protocol was utilized. Automated post processing was performed by RAPID software and submitted to PACS for interpretation. CTA of the head and neck was also performed, with reconstructions. Automated exposure control and iterative reconstruction was utilized.  FINDINGS: CT Perfusion: CBF (<30%) volume: 0 mL Tmax (>6.0s) volume: 0 mL Mismatch volume: 0 mL Mismatch ratio: None  The examination appears to be technically adequate. There is no reduced cerebral blood volume (CBV) or reduced cerebral blood flow (CBF) to suggest an area of acute infarction in a large vessel territory. The cerebral perfusion appears symmetric. No increased mean transit time (MTT) is seen. No areas of mismatch to suggest presence of a penumbra.  CTA head/neck: Normal arch anatomy. Proximal right common carotid artery partially obscured by streak artifact from intravenous contrast in the subclavian vein. Right and left common carotid arteries are patent without stenosis. Carotid bifurcation patent bilaterally. Right and left internal carotid arteries are patent without stenosis. Both vertebral arteries originate from the subclavian's. Both vertebral  arteries are patent without stenosis. Basilar artery is patent. Proximal anterior, middle, and posterior cerebral arteries are patent. Peripheral cerebral branches are symmetric. No aneurysms are demonstrated.  There are no intracranial contrast enhancing abnormalities. No acute soft tissue neck abnormality demonstrated. Bilateral thyroid nodules up to 2.1 cm on the right. Lung apices are clear      Impression: 1. No focal area of decreased cerebral blood flow (CBF) is seen to suggest an acute infarct in a large vessel territory.  No defects are seen to suggest a core infarct or an area of reversible ischemia. 2. Patent bilateral carotid and vertebral arteries with no significant stenosis 3. No large intracranial arterial occlusion  These results communicated by telephone to the emergency department at 1:32 PM on 05/23/2022    This report was finalized on 5/23/2022 1:32 PM by Kelby Lieberman.      CT CEREBRAL PERFUSION WITH & WITHOUT CONTRAST    Result Date: 5/23/2022  EXAMINATION: CT CEREBRAL PERFUSION W WO CONTRAST-, CT ANGIOGRAM HEAD W AI ANALYSIS OF LVO-, CT ANGIOGRAM NECK-  DATE OF EXAM: 5/23/2022 12:43 PM  INDICATION: Neuro deficit, acute, stroke suspected.  COMPARISON: None available.  TECHNIQUE: Axial CT images of the brain were obtained prior to and after the administration of 115 cc Isovue-370. CT Perfusion protocol was utilized. Automated post processing was performed by RAPID software and submitted to PACS for interpretation. CTA of the head and neck was also performed, with reconstructions. Automated exposure control and iterative reconstruction was utilized.  FINDINGS: CT Perfusion: CBF (<30%) volume: 0 mL Tmax (>6.0s) volume: 0 mL Mismatch volume: 0 mL Mismatch ratio: None  The examination appears to be technically adequate. There is no reduced cerebral blood volume (CBV) or reduced cerebral blood flow (CBF) to suggest an area of acute infarction in a large vessel territory. The cerebral perfusion  appears symmetric. No increased mean transit time (MTT) is seen. No areas of mismatch to suggest presence of a penumbra.  CTA head/neck: Normal arch anatomy. Proximal right common carotid artery partially obscured by streak artifact from intravenous contrast in the subclavian vein. Right and left common carotid arteries are patent without stenosis. Carotid bifurcation patent bilaterally. Right and left internal carotid arteries are patent without stenosis. Both vertebral arteries originate from the subclavian's. Both vertebral arteries are patent without stenosis. Basilar artery is patent. Proximal anterior, middle, and posterior cerebral arteries are patent. Peripheral cerebral branches are symmetric. No aneurysms are demonstrated.  There are no intracranial contrast enhancing abnormalities. No acute soft tissue neck abnormality demonstrated. Bilateral thyroid nodules up to 2.1 cm on the right. Lung apices are clear      Impression: 1. No focal area of decreased cerebral blood flow (CBF) is seen to suggest an acute infarct in a large vessel territory.  No defects are seen to suggest a core infarct or an area of reversible ischemia. 2. Patent bilateral carotid and vertebral arteries with no significant stenosis 3. No large intracranial arterial occlusion  These results communicated by telephone to the emergency department at 1:32 PM on 05/23/2022    This report was finalized on 5/23/2022 1:32 PM by Kelby Lieberman.        Results for orders placed during the hospital encounter of 10/05/21    Adult Transthoracic Echo Complete W/ Cont if Necessary Per Protocol    Interpretation Summary  · Left ventricular ejection fraction appears to be 61 - 65%. Left ventricular systolic function is normal.  · Left atrial volume is mildly increased.      Assessment & Plan   Assessment & Plan     Active Hospital Problems    Diagnosis  POA   • CVA (cerebral vascular accident) ([facility]) [I63.9]  Yes       Jeaneth Keita is a 61 y.o.  "female with history of hepatitis C, cirrhosis, hemochromatosis, hepatocellular carcinoma with bone mets, prior CVA, chronic pain on methadone, type 2 diabetes, left BKA who presents with left facial numbness.      Paresthesias  -CT head without contrast shows generalized brain volume loss and changes of chronic small vessel ischemic disease, no acute abnormalities.  -CTA of the head and neck shows patent vessels bilaterally.  -CT perfusion shows no reversible ischemia  -Awaiting MRI  -EEG pending  -Continue home statin  -Neurology consulted.  -PT/OT, speech    Encephalopathy  -May be secondary to hypoglycemia  - Unclear baseline, patient reports \"I do not know\" to multiple questions including whether or not she has an amputation or whether she lives with anyone.  Other question she is able to answer without difficulty.  -Sugars on the low side  -Ammonia level within normal limits  -No suggestion of infection at this time.  CBC within normal limits.    ESRD  -Nephrology consult for dialysis    Hypoglycemia  Type 2 diabetes  -Received D50 in the ED  -We will give gentle D5NS at 40cc/hr for 6 hours, consider more if she remains hypoglycemic but do not want to overcorrect.   - Med list does not show that she is on anything for diabetes. Will have pharmacy confirm medications with her pharmacy  - q1 hour sugars for now until glucose within normal range and stable      Hypertension  -Holding blood pressure medications for permissive hypertension    Elevated troponin  -Suspect likely secondary to ESRD  -Chronically elevated    Anemia  -Hemoglobin has ranged between 8.2-9.6.  Currently at baseline    DVT prophylaxis: Holding pharmacologic pending MRI, SCDs      CODE STATUS: Full code  There are no questions and answers to display.         Padmini Augilar MD  05/23/22    "

## 2022-05-23 NOTE — ED PROVIDER NOTES
Subjective   61-year-old female with a history of prior stroke presents for evaluation of left-sided numbness.  The patient is a somewhat poor historian.  She states that she woke up this morning with numbness to her left face and left arm/leg.  The patient has a history of prior stroke.  She denies any significant residual deficits.  She has a history of a left BKA.  Her numbness persisted throughout the morning so EMS was called and she was brought to our facility to be evaluated.  Per EMS, the patient seemed somewhat confused in route, but her mental status did improve on the way here.  She was not hypoglycemic.  She denies any accompanying weakness or speech difficulty at this time.          Review of Systems   Unable to perform ROS: Mental status change   Neurological: Positive for numbness.   Psychiatric/Behavioral: Positive for confusion.       Past Medical History:   Diagnosis Date   • Anxiety    • DDD (degenerative disc disease), lumbar    • Depression    • Diabetes mellitus (HCC)    • Fibromyalgia    • Hemochromatosis    • Hep B w/o coma, chronic, w/o delta (HCC)    • Hep C w/o coma, chronic (HCC)    • Hypertension    • Vertigo        Allergies   Allergen Reactions   • Sulfa Antibiotics        Past Surgical History:   Procedure Laterality Date   • ARTERIOVENOUS FISTULA/SHUNT SURGERY Left 10/16/2021    Procedure: UPPER EXTREMITY ARTERIOVENOUS FISTULA FORMATION LEFT;  Surgeon: Johnny Rousseau MD;  Location: [facility];  Service: Vascular;  Laterality: Left;   • BACK SURGERY     • BELOW KNEE LEG AMPUTATION     •  SECTION     • FOOT SURGERY     • KNEE SURGERY     • ROTATOR CUFF REPAIR     • SPINAL CORD STIMULATOR IMPLANT         No family history on file.    Social History     Socioeconomic History   • Marital status:    Tobacco Use   • Smoking status: Never Smoker   • Smokeless tobacco: Never Used   Vaping Use   • Vaping Use: Never used   Substance and Sexual Activity   • Alcohol use:  No   • Drug use: No   • Sexual activity: Never           Objective   Physical Exam  Vitals and nursing note reviewed.   Constitutional:       General: She is not in acute distress.     Appearance: She is well-developed. She is not diaphoretic.      Comments: Chronically ill-appearing female   HENT:      Head: Normocephalic and atraumatic.   Eyes:      Pupils: Pupils are equal, round, and reactive to light.   Neck:      Vascular: No carotid bruit.   Cardiovascular:      Rate and Rhythm: Normal rate and regular rhythm.      Heart sounds: Normal heart sounds. No murmur heard.    No friction rub. No gallop.   Pulmonary:      Effort: Pulmonary effort is normal. No respiratory distress.      Breath sounds: Normal breath sounds. No wheezing or rales.   Abdominal:      General: Bowel sounds are normal. There is no distension.      Palpations: Abdomen is soft. There is no mass.      Tenderness: There is no abdominal tenderness. There is no guarding or rebound.   Musculoskeletal:      Comments: Left BKA present   Skin:     General: Skin is warm and dry.      Findings: No erythema or rash.   Neurological:      Mental Status: She is alert.      Comments: Appears somewhat confused, alert to person and place but not year or month, clear and fluent speech, no ataxia or dysmetria, subjectively the patient has decreased sensation to her left arm and left leg when compared to the right, no cranial nerve deficits noted   Psychiatric:         Thought Content: Thought content normal.         Judgment: Judgment normal.         Procedures           ED Course  ED Course as of 05/23/22 1549   Mon May 23, 2022   0741 61-year-old female with a history of prior stroke presents for evaluation of left-sided numbness.  She reports that she woke up with the symptoms this morning.  Of note, the patient has a history of prior stroke and her baseline is unknown.  She states that the numbness is new today when compared to her baseline.  On arrival to  the ED, a code stroke was initiated.  Our stroke navigator met me in the CT scanner.  CT head negative for bleed.  The patient is outside of TNK window.  On exam, the patient has subjective left-sided numbness but no other objective neurological deficits noted on exam.  Left BKA is present.  We will obtain labs and imaging, and we will reassess following initial interventions. [DD]   1332 CTA and perfusion scans are negative. [DD]   1332 I spoke with Dr. Cooper, and the patient will be admitted under her care for further evaluation and treatment.  The patient is aware/agreeable with the plan at this time. [DD]      ED Course User Index  [DD] Blake Lira MD                                          Recent Results (from the past 24 hour(s))   POC Protime / INR    Collection Time: 05/23/22 12:47 PM    Specimen: Blood   Result Value Ref Range    Protime 14.6 12.8 - 15.2 seconds    INR 1.2 0.8 - 1.2   POC CHEM 8    Collection Time: 05/23/22 12:47 PM    Specimen: Blood   Result Value Ref Range    Glucose 59 (L) 70 - 130 mg/dL    BUN 58 (H) 8 - 26 mg/dL    Creatinine 6.60 (H) 0.60 - 1.30 mg/dL    Sodium 137 (L) 138 - 146 mmol/L    POC Potassium 4.3 3.5 - 4.9 mmol/L    Chloride 101 98 - 109 mmol/L    Total CO2 23 (L) 24 - 29 mmol/L    Hemoglobin 8.5 (L) 12.0 - 17.0 g/dL    Hematocrit 25 (L) 38 - 51 %    Ionized Calcium 1.23 1.20 - 1.32 mmol/L    eGFR 6.6 (L) >60.0 mL/min/1.73   Troponin    Collection Time: 05/23/22 12:51 PM    Specimen: Blood   Result Value Ref Range    Troponin T 0.047 (C) 0.000 - 0.030 ng/mL   aPTT    Collection Time: 05/23/22 12:51 PM    Specimen: Blood   Result Value Ref Range    PTT 32.7 22.0 - 39.0 seconds   Green Top (Gel)    Collection Time: 05/23/22 12:51 PM   Result Value Ref Range    Extra Tube Hold for add-ons.    Lavender Top    Collection Time: 05/23/22 12:51 PM   Result Value Ref Range    Extra Tube hold for add-on    Gold Top - SST    Collection Time: 05/23/22 12:51 PM   Result Value  Ref Range    Extra Tube Hold for add-ons.    Light Blue Top    Collection Time: 05/23/22 12:51 PM   Result Value Ref Range    Extra Tube Hold for add-ons.    CBC Auto Differential    Collection Time: 05/23/22 12:51 PM    Specimen: Blood   Result Value Ref Range    WBC 6.01 3.40 - 10.80 10*3/mm3    RBC 2.82 (L) 3.77 - 5.28 10*6/mm3    Hemoglobin 9.2 (L) 12.0 - 15.9 g/dL    Hematocrit 25.7 (L) 34.0 - 46.6 %    MCV 91.1 79.0 - 97.0 fL    MCH 32.6 26.6 - 33.0 pg    MCHC 35.8 (H) 31.5 - 35.7 g/dL    RDW 11.8 (L) 12.3 - 15.4 %    RDW-SD 39.5 37.0 - 54.0 fl    MPV 9.3 6.0 - 12.0 fL    Platelets 181 140 - 450 10*3/mm3    Neutrophil % 67.3 42.7 - 76.0 %    Lymphocyte % 20.0 19.6 - 45.3 %    Monocyte % 7.5 5.0 - 12.0 %    Eosinophil % 3.8 0.3 - 6.2 %    Basophil % 0.7 0.0 - 1.5 %    Immature Grans % 0.7 (H) 0.0 - 0.5 %    Neutrophils, Absolute 4.05 1.70 - 7.00 10*3/mm3    Lymphocytes, Absolute 1.20 0.70 - 3.10 10*3/mm3    Monocytes, Absolute 0.45 0.10 - 0.90 10*3/mm3    Eosinophils, Absolute 0.23 0.00 - 0.40 10*3/mm3    Basophils, Absolute 0.04 0.00 - 0.20 10*3/mm3    Immature Grans, Absolute 0.04 0.00 - 0.05 10*3/mm3    nRBC 0.0 0.0 - 0.2 /100 WBC   Comprehensive Metabolic Panel    Collection Time: 05/23/22 12:51 PM    Specimen: Blood   Result Value Ref Range    Glucose 60 (L) 65 - 99 mg/dL    BUN 56 (H) 8 - 23 mg/dL    Creatinine 5.93 (H) 0.57 - 1.00 mg/dL    Sodium 136 136 - 145 mmol/L    Potassium 4.3 3.5 - 5.2 mmol/L    Chloride 99 98 - 107 mmol/L    CO2 24.0 22.0 - 29.0 mmol/L    Calcium 9.8 8.6 - 10.5 mg/dL    Total Protein 7.2 6.0 - 8.5 g/dL    Albumin 4.20 3.50 - 5.20 g/dL    ALT (SGPT) 34 (H) 1 - 33 U/L    AST (SGOT) 32 1 - 32 U/L    Alkaline Phosphatase 101 39 - 117 U/L    Total Bilirubin 0.5 0.0 - 1.2 mg/dL    Globulin 3.0 gm/dL    A/G Ratio 1.4 g/dL    BUN/Creatinine Ratio 9.4 7.0 - 25.0    Anion Gap 13.0 5.0 - 15.0 mmol/L    eGFR 7.5 (L) >60.0 mL/min/1.73   TSH    Collection Time: 05/23/22 12:51 PM    Specimen:  Blood   Result Value Ref Range    TSH 0.776 0.270 - 4.200 uIU/mL   ECG 12 Lead    Collection Time: 05/23/22  1:09 PM   Result Value Ref Range    QT Interval 440 ms    QTC Interval 491 ms   Ammonia    Collection Time: 05/23/22  1:17 PM    Specimen: Blood   Result Value Ref Range    Ammonia 15 11 - 51 umol/L     Note: In addition to lab results from this visit, the labs listed above may include labs taken at another facility or during a different encounter within the last 24 hours. Please correlate lab times with ED admission and discharge times for further clarification of the services performed during this visit.    XR Chest 1 View   Final Result   Chronic changes in the right infrahilar region and left basilar   scarring. No acute cardiopulmonary process demonstrated       This report was finalized on 5/23/2022 2:18 PM by Kelby Lieberman.          CT Angiogram Head w AI Analysis of LVO   Final Result   1. No focal area of decreased cerebral blood flow (CBF) is seen to   suggest an acute infarct in a large vessel territory.  No defects are   seen to suggest a core infarct or an area of reversible ischemia.   2. Patent bilateral carotid and vertebral arteries with no significant   stenosis   3. No large intracranial arterial occlusion       These results communicated by telephone to the emergency department at   1:32 PM on 05/23/2022               This report was finalized on 5/23/2022 1:32 PM by Kelby Lieberman.          CT CEREBRAL PERFUSION WITH & WITHOUT CONTRAST   Final Result   1. No focal area of decreased cerebral blood flow (CBF) is seen to   suggest an acute infarct in a large vessel territory.  No defects are   seen to suggest a core infarct or an area of reversible ischemia.   2. Patent bilateral carotid and vertebral arteries with no significant   stenosis   3. No large intracranial arterial occlusion       These results communicated by telephone to the emergency department at   1:32 PM on 05/23/2022                This report was finalized on 5/23/2022 1:32 PM by Kelby Lieberman.          CT Angiogram Neck   Final Result   1. No focal area of decreased cerebral blood flow (CBF) is seen to   suggest an acute infarct in a large vessel territory.  No defects are   seen to suggest a core infarct or an area of reversible ischemia.   2. Patent bilateral carotid and vertebral arteries with no significant   stenosis   3. No large intracranial arterial occlusion       These results communicated by telephone to the emergency department at   1:32 PM on 05/23/2022               This report was finalized on 5/23/2022 1:32 PM by Kelby Lieberman.          CT Head Without Contrast Stroke Protocol   Final Result           1.  Mild generalized brain volume loss and changes of chronic small   vessel ischemic disease.   2.  No acute intracranial abnormality.  Surgically no evidence of acute   infarct or hemorrhage.     3.  Findings were  personally communicated to the stroke team at 12:43   PM on 5/23/2022       This report was finalized on 5/23/2022 1:46 PM by Delmer Franklin MD.          MRI Brain Without Contrast    (Results Pending)     Vitals:    05/23/22 1500 05/23/22 1515 05/23/22 1530 05/23/22 1552   BP: 153/83  149/85    BP Location:       Patient Position:       Pulse: 82 82 85 87   Resp:       SpO2: 100% 97% 100% 100%   Weight:       Height:         Medications   sodium chloride 0.9 % flush 10 mL (has no administration in time range)   iopamidol (ISOVUE-370) 76 % injection 115 mL (115 mL Intravenous Given 5/23/22 1251)   dextrose (D50W) (25 g/50 mL) IV injection 25 g (25 g Intravenous Given 5/23/22 1309)     ECG/EMG Results (last 24 hours)     Procedure Component Value Units Date/Time    ECG 12 Lead [[id number]] Collected: 05/23/22 1309     Updated: 05/23/22 1449     QT Interval 440 ms      QTC Interval 491 ms     Narrative:      Test Reason : STROKE  Blood Pressure :   */*   mmHG  Vent. Rate :  75 BPM     Atrial Rate :  75  BPM     P-R Int : 164 ms          QRS Dur :  60 ms      QT Int : 440 ms       P-R-T Axes :  31  23  37 degrees     QTc Int : 491 ms    Normal sinus rhythm  Low voltage QRS  Prolonged QT  Abnormal ECG  Confirmed by MD Lira Michael ([id number]) on 5/23/2022 2:49:14 PM    Referred By: RAND           Confirmed By: Goldy Lira MD        ECG 12 Lead   Final Result   Test Reason : STROKE   Blood Pressure :   */*   mmHG   Vent. Rate :  75 BPM     Atrial Rate :  75 BPM      P-R Int : 164 ms          QRS Dur :  60 ms       QT Int : 440 ms       P-R-T Axes :  31  23  37 degrees      QTc Int : 491 ms      Normal sinus rhythm   Low voltage QRS   Prolonged QT   Abnormal ECG   Confirmed by MD Lira Michael ([id number]) on 5/23/2022 2:49:14 PM      Referred By: RAND           Confirmed By: Goldy Lira MD                    [facility]    Final diagnoses:   Stroke-like symptoms   History of stroke   Anemia, unspecified type   End stage renal disease on dialysis ([facility])       ED Disposition  ED Disposition     ED Disposition   Decision to Admit    Condition   --    Comment   Level of Care: Telemetry [5]   Diagnosis: CVA (cerebral vascular accident) ([facility]) [[id number]]   Certification: I Certify That Inpatient Hospital Services Are Medically Necessary For Greater Than 2 Midnights               No follow-up provider specified.       Medication List      No changes were made to your prescriptions during this visit.          Blake Lira MD  05/23/22 8380

## 2022-05-24 ENCOUNTER — APPOINTMENT (OUTPATIENT)
Dept: NEPHROLOGY | Facility: HOSPITAL | Age: 62
End: 2022-05-24

## 2022-05-24 PROBLEM — R29.90 STROKE-LIKE SYMPTOMS: Status: ACTIVE | Noted: 2022-05-24

## 2022-05-24 LAB
ANION GAP SERPL CALCULATED.3IONS-SCNC: 17 MMOL/L (ref 5–15)
BASOPHILS # BLD AUTO: 0.03 10*3/MM3 (ref 0–0.2)
BASOPHILS NFR BLD AUTO: 0.6 % (ref 0–1.5)
BUN SERPL-MCNC: 58 MG/DL (ref 8–23)
BUN/CREAT SERPL: 9.4 (ref 7–25)
CALCIUM SPEC-SCNC: 8.9 MG/DL (ref 8.6–10.5)
CHLORIDE SERPL-SCNC: 100 MMOL/L (ref 98–107)
CO2 SERPL-SCNC: 21 MMOL/L (ref 22–29)
CREAT SERPL-MCNC: 6.15 MG/DL (ref 0.57–1)
DEPRECATED RDW RBC AUTO: 39.1 FL (ref 37–54)
EGFRCR SERPLBLD CKD-EPI 2021: 7.2 ML/MIN/1.73
EOSINOPHIL # BLD AUTO: 0.18 10*3/MM3 (ref 0–0.4)
EOSINOPHIL NFR BLD AUTO: 3.5 % (ref 0.3–6.2)
ERYTHROCYTE [DISTWIDTH] IN BLOOD BY AUTOMATED COUNT: 12.2 % (ref 12.3–15.4)
GLUCOSE BLDC GLUCOMTR-MCNC: 119 MG/DL (ref 70–130)
GLUCOSE BLDC GLUCOMTR-MCNC: 206 MG/DL (ref 70–130)
GLUCOSE SERPL-MCNC: 95 MG/DL (ref 65–99)
HBA1C MFR BLD: 5.1 % (ref 4.8–5.6)
HCT VFR BLD AUTO: 20.4 % (ref 34–46.6)
HGB BLD-MCNC: 7.5 G/DL (ref 12–15.9)
IMM GRANULOCYTES # BLD AUTO: 0.03 10*3/MM3 (ref 0–0.05)
IMM GRANULOCYTES NFR BLD AUTO: 0.6 % (ref 0–0.5)
LYMPHOCYTES # BLD AUTO: 1.45 10*3/MM3 (ref 0.7–3.1)
LYMPHOCYTES NFR BLD AUTO: 27.8 % (ref 19.6–45.3)
MCH RBC QN AUTO: 33 PG (ref 26.6–33)
MCHC RBC AUTO-ENTMCNC: 36.8 G/DL (ref 31.5–35.7)
MCV RBC AUTO: 89.9 FL (ref 79–97)
MONOCYTES # BLD AUTO: 0.32 10*3/MM3 (ref 0.1–0.9)
MONOCYTES NFR BLD AUTO: 6.1 % (ref 5–12)
NEUTROPHILS NFR BLD AUTO: 3.2 10*3/MM3 (ref 1.7–7)
NEUTROPHILS NFR BLD AUTO: 61.4 % (ref 42.7–76)
NRBC BLD AUTO-RTO: 0 /100 WBC (ref 0–0.2)
PLATELET # BLD AUTO: 177 10*3/MM3 (ref 140–450)
PMV BLD AUTO: 9.6 FL (ref 6–12)
POTASSIUM SERPL-SCNC: 4.7 MMOL/L (ref 3.5–5.2)
RBC # BLD AUTO: 2.27 10*6/MM3 (ref 3.77–5.28)
SODIUM SERPL-SCNC: 138 MMOL/L (ref 136–145)
WBC NRBC COR # BLD: 5.21 10*3/MM3 (ref 3.4–10.8)

## 2022-05-24 PROCEDURE — 97530 THERAPEUTIC ACTIVITIES: CPT

## 2022-05-24 PROCEDURE — 99213 OFFICE O/P EST LOW 20 MIN: CPT | Performed by: PSYCHIATRY & NEUROLOGY

## 2022-05-24 PROCEDURE — 92523 SPEECH SOUND LANG COMPREHEN: CPT

## 2022-05-24 PROCEDURE — 82962 GLUCOSE BLOOD TEST: CPT

## 2022-05-24 PROCEDURE — 80048 BASIC METABOLIC PNL TOTAL CA: CPT | Performed by: INTERNAL MEDICINE

## 2022-05-24 PROCEDURE — 96375 TX/PRO/DX INJ NEW DRUG ADDON: CPT

## 2022-05-24 PROCEDURE — 97166 OT EVAL MOD COMPLEX 45 MIN: CPT

## 2022-05-24 PROCEDURE — G0378 HOSPITAL OBSERVATION PER HR: HCPCS

## 2022-05-24 PROCEDURE — 85025 COMPLETE CBC W/AUTO DIFF WBC: CPT | Performed by: INTERNAL MEDICINE

## 2022-05-24 PROCEDURE — 92610 EVALUATE SWALLOWING FUNCTION: CPT

## 2022-05-24 PROCEDURE — 83036 HEMOGLOBIN GLYCOSYLATED A1C: CPT | Performed by: INTERNAL MEDICINE

## 2022-05-24 PROCEDURE — 99225 PR SBSQ OBSERVATION CARE/DAY 25 MINUTES: CPT | Performed by: INTERNAL MEDICINE

## 2022-05-24 PROCEDURE — 25010000002 HEPARIN (PORCINE) PER 1000 UNITS: Performed by: INTERNAL MEDICINE

## 2022-05-24 PROCEDURE — G0257 UNSCHED DIALYSIS ESRD PT HOS: HCPCS

## 2022-05-24 PROCEDURE — 25010000002 ONDANSETRON PER 1 MG: Performed by: INTERNAL MEDICINE

## 2022-05-24 RX ORDER — CARVEDILOL 12.5 MG/1
25 TABLET ORAL 2 TIMES DAILY WITH MEALS
Status: DISCONTINUED | OUTPATIENT
Start: 2022-05-24 | End: 2022-05-25 | Stop reason: HOSPADM

## 2022-05-24 RX ORDER — HUMAN INSULIN 100 [IU]/ML
INJECTION, SUSPENSION SUBCUTANEOUS
Status: ON HOLD | COMMUNITY
End: 2022-05-25 | Stop reason: SDUPTHER

## 2022-05-24 RX ORDER — VENLAFAXINE HYDROCHLORIDE 75 MG/1
75 CAPSULE, EXTENDED RELEASE ORAL DAILY
Status: DISCONTINUED | OUTPATIENT
Start: 2022-05-24 | End: 2022-05-25 | Stop reason: HOSPADM

## 2022-05-24 RX ORDER — INSULIN LISPRO 100 [IU]/ML
0-7 INJECTION, SOLUTION INTRAVENOUS; SUBCUTANEOUS
Status: DISCONTINUED | OUTPATIENT
Start: 2022-05-24 | End: 2022-05-25 | Stop reason: SDUPTHER

## 2022-05-24 RX ORDER — ALBUMIN (HUMAN) 12.5 G/50ML
12.5 SOLUTION INTRAVENOUS AS NEEDED
Status: CANCELLED | OUTPATIENT
Start: 2022-05-24 | End: 2022-05-25

## 2022-05-24 RX ORDER — HEPARIN SODIUM 1000 [USP'U]/ML
1600 INJECTION, SOLUTION INTRAVENOUS; SUBCUTANEOUS AS NEEDED
Status: DISCONTINUED | OUTPATIENT
Start: 2022-05-24 | End: 2022-05-25 | Stop reason: HOSPADM

## 2022-05-24 RX ORDER — DEXTROSE MONOHYDRATE 25 G/50ML
25 INJECTION, SOLUTION INTRAVENOUS
Status: DISCONTINUED | OUTPATIENT
Start: 2022-05-24 | End: 2022-05-25 | Stop reason: SDUPTHER

## 2022-05-24 RX ORDER — AMLODIPINE BESYLATE 10 MG/1
10 TABLET ORAL
Status: DISCONTINUED | OUTPATIENT
Start: 2022-05-24 | End: 2022-05-25 | Stop reason: HOSPADM

## 2022-05-24 RX ORDER — NICOTINE POLACRILEX 4 MG
15 LOZENGE BUCCAL
Status: DISCONTINUED | OUTPATIENT
Start: 2022-05-24 | End: 2022-05-25 | Stop reason: SDUPTHER

## 2022-05-24 RX ORDER — ALBUMIN (HUMAN) 12.5 G/50ML
12.5 SOLUTION INTRAVENOUS AS NEEDED
Status: CANCELLED | OUTPATIENT
Start: 2022-05-25 | End: 2022-05-25

## 2022-05-24 RX ADMIN — Medication 10 ML: at 20:24

## 2022-05-24 RX ADMIN — CARVEDILOL 25 MG: 12.5 TABLET, FILM COATED ORAL at 16:35

## 2022-05-24 RX ADMIN — ONDANSETRON 4 MG: 2 INJECTION INTRAMUSCULAR; INTRAVENOUS at 08:40

## 2022-05-24 RX ADMIN — Medication 10 ML: at 08:40

## 2022-05-24 RX ADMIN — HEPARIN SODIUM 10000 UNITS: 1000 INJECTION INTRAVENOUS; SUBCUTANEOUS at 15:41

## 2022-05-24 RX ADMIN — SENNOSIDES AND DOCUSATE SODIUM 2 TABLET: 50; 8.6 TABLET ORAL at 20:24

## 2022-05-24 NOTE — PLAN OF CARE
Goal Outcome Evaluation:Scheduled HD completed, pt tolerated well. UF: 2.0 L removed, BLP: 62.8. Called report to Keeley

## 2022-05-24 NOTE — PROGRESS NOTES
"                    Clinical Nutrition       Patient Name: Jeaneth Keita  YOB: 1960  MRN: [id number]  Date of Encounter: 05/24/22 13:14 EDT  Admission date: 5/23/2022      Reason for Visit   Identified at risk by screening criteria \"chewing\"      EMR  Reviewed   Yes    Height: Height: 172.7 cm (68\")  Weight: Weight: 72.5 kg (159 lb 14.4 oz) (05/23/22 1652)  BKA 2020    Problem:    Hepatocellular carcinoma metastatic to bone s/p RXT     Hemochromatosis    Cirrhosis of liver (HCC)    Chronic Hep C     ESRD (end stage renal disease)    S/P left BKA    Chronic pain on Methadone    Type 2 diabetes mellitus (HCC)    CVA (cerebral vascular accident) (HCC)    Stroke-like symptoms  HD MWF    Per SlP 5/25 ok for Reg texture food, thin liquid     Reported/Observed/Food/Nutrition Related - Comments     Pt rpt no problem chewing, but sometimes nausea p meal.      Current Nutrition Prescription     Diet Regular; Thin; Cardiac, Consistent Carbohydrate  No active supplement orders      Average Intake from Charting:  insuffic data, obs ~35% intake 1 meal.      Actions     Follow treatment progress, Care plan reviewed, Advise alternate selection, Menu provided    Monitor Per Protocol      Zita Hendrickson RD,   Time Spent: 25 min        "

## 2022-05-24 NOTE — PROGRESS NOTES
Neurology       Patient Care Team:  Provider, No Known as PCP - General    Chief complaint: Altered mental status    History: Patient has had normal blood sugar and is basically awake and alert and appears back to baseline  Past Medical History:   Diagnosis Date   • Anxiety    • DDD (degenerative disc disease), lumbar    • Depression    • Diabetes mellitus (HCC)    • Fibromyalgia    • Hemochromatosis    • Hep B w/o coma, chronic, w/o delta (HCC)    • Hep C w/o coma, chronic (HCC)    • Hypertension    • Vertigo        Vital Signs   Vitals:    05/24/22 0325 05/24/22 0500 05/24/22 0702 05/24/22 1053   BP: 129/73  132/70 134/66   BP Location: Right arm  Right arm Right arm   Patient Position: Lying  Lying Lying   Pulse: 87 88 85 94   Resp: 16  18 18   Temp:   98.2 °F (36.8 °C) 98.3 °F (36.8 °C)   TempSrc:   Oral Oral   SpO2: 94% 100% 96% 96%   Weight:       Height:           Physical Exam:   General: Awake and conversational              Neuro: Oriented to person and place.    Speech is normal.    Working well with physical therapy.        Results Review:  Hemoglobin is 7.5.  EEG showed mild slowing.  No evidence of epilepsy    Results from last 7 days   Lab Units 05/24/22  0733   WBC 10*3/mm3 5.21   HEMOGLOBIN g/dL 7.5*   HEMATOCRIT % 20.4*   PLATELETS 10*3/mm3 177     Results from last 7 days   Lab Units 05/24/22  0733 05/23/22  1251 05/23/22  1247   SODIUM mmol/L 138 136  --    POTASSIUM mmol/L 4.7 4.3  --    CHLORIDE mmol/L 100 99  --    CO2 mmol/L 21.0* 24.0  --    BUN mg/dL 58* 56*  --    CREATININE mg/dL 6.15* 5.93* 6.60*   CALCIUM mg/dL 8.9 9.8  --    BILIRUBIN mg/dL  --  0.5  --    ALK PHOS U/L  --  101  --    ALT (SGPT) U/L  --  34*  --    AST (SGOT) U/L  --  32  --    GLUCOSE mg/dL 95 60*  --        Imaging Results (Last 24 Hours)     Procedure Component Value Units Date/Time    MRI Brain Without Contrast [[id number]] Collected: 05/23/22 2016     Updated: 05/23/22 2021    Narrative:      DATE OF EXAM:  5/23/2022 6:36 PM     PROCEDURE: MRI BRAIN WO CONTRAST-     INDICATIONS: parasthesias, AMS; R29.90-Unspecified symptoms and signs  involving the nervous system; Z86.73-Personal history of transient  ischemic attack (TIA), and cerebral infarction without residual  deficits; D64.9-Anemia, unspecified; N18.6-End stage renal disease;  Z99.2-Dependence on renal dialysis     COMPARISON: CT earlier the same day     TECHNIQUE: Multiplanar multisequence images of the brain were performed  without contrast according to routine brain MRI protocol.      FINDINGS:   No acute infarct is noted on diffusion weighted sequences. Midline  structures are unremarkable and the craniocervical junction is  satisfactory in appearance. Redemonstrated age-related changes with  moderate volume loss and T2 hyperintense periventricular white matter  changes of chronic small vessel ischemia. There is otherwise no evidence  of intracranial hemorrhage, mass or mass effect. Susceptibility artifact  is again present related to prior left thalamic hemorrhage. The  ventricles are normal in size and configuration, accounting for  surrounding volume loss. The orbits are unremarkable and the paranasal  sinuses are grossly clear. Intracranial arterial flow voids appear  maintained.        Impression:      Stable age-related and chronic changes as above. No evidence of acute  infarct, hemorrhage, mass or mass effect.     This report was finalized on 5/23/2022 8:18 PM by Victor Manuel Arvizu.       XR Chest 1 View [[id number]] Collected: 05/23/22 1417     Updated: 05/23/22 1421    Narrative:      DATE OF EXAM: 5/23/2022 1:48 PM     PROCEDURE: XR CHEST 1 VW-     INDICATIONS: Acute Stroke Protocol (onset < 12 hrs)     COMPARISON: 04/04/2022     TECHNIQUE: Single radiographic AP view of the chest was obtained.     FINDINGS:  Right IJ dialysis catheter noted. Heart size and pulmonary vasculature  are within normal limits. Chronic atelectasis/fibrosis in the  right  infrahilar region. Scarring in the left lung base. No new pulmonary  abnormality. Costophrenic angles sharp        Impression:      Chronic changes in the right infrahilar region and left basilar  scarring. No acute cardiopulmonary process demonstrated     This report was finalized on 5/23/2022 2:18 PM by Kelby Lieberman.       CT Head Without Contrast Stroke Protocol [[id number]] Collected: 05/23/22 1344     Updated: 05/23/22 1349    Narrative:      CT HEAD WO CONTRAST STROKE PROTOCOL-     Date of Exam: 5/23/2022 12:40 PM     Indication: Neuro deficit, acute, stroke suspected.     Comparison Exams: None available.     Technique: Multiple axial images were obtained from the skull base to  the vertex without the administration of IV contrast. The axial data was  used to generate reformatted images in the coronal and sagittal planes.  Automated exposure control and iterative reconstruction methods were  used.     FINDINGS:  There is mild generalized brain volume loss.  There is some patchy  low-attenuation within the periventricular white matter bilaterally  compatible changes of chronic small vessel ischemic disease.  There is  no acute infarct or hemorrhage.   No abnormal extra-axial fluid  collections are seen.   There is no mass effect or hydrocephalus.     There is no evidence of skull fracture.   There is mucosal thickening  within left maxillary sinus.  Visualized paranasal sinuses and mastoid  air cells are clear.     The globes and orbits are within normal limits.       Impression:            1.  Mild generalized brain volume loss and changes of chronic small  vessel ischemic disease.  2.  No acute intracranial abnormality.  Surgically no evidence of acute  infarct or hemorrhage.    3.  Findings were  personally communicated to the stroke team at 12:43  PM on 5/23/2022     This report was finalized on 5/23/2022 1:46 PM by Delmer Franklin MD.       CT Angiogram Head w AI Analysis of LVO [[id number]]  Collected: 05/23/22 1310     Updated: 05/23/22 1335    Narrative:      EXAMINATION: CT CEREBRAL PERFUSION W WO CONTRAST-, CT ANGIOGRAM HEAD W  AI ANALYSIS OF LVO-, CT ANGIOGRAM NECK-     DATE OF EXAM: 5/23/2022 12:43 PM     INDICATION: Neuro deficit, acute, stroke suspected.     COMPARISON: None available.     TECHNIQUE: Axial CT images of the brain were obtained prior to and after  the administration of 115 cc Isovue-370. CT Perfusion protocol was  utilized. Automated post processing was performed by RAPID software and  submitted to PACS for interpretation. CTA of the head and neck was also  performed, with reconstructions. Automated exposure control and  iterative reconstruction was utilized.      FINDINGS:   CT Perfusion:  CBF (<30%) volume: 0 mL  Tmax (>6.0s) volume: 0 mL  Mismatch volume: 0 mL  Mismatch ratio: None     The examination appears to be technically adequate. There is no reduced  cerebral blood volume (CBV) or reduced cerebral blood flow (CBF) to  suggest an area of acute infarction in a large vessel territory. The  cerebral perfusion appears symmetric. No increased mean transit time  (MTT) is seen. No areas of mismatch to suggest presence of a penumbra.     CTA head/neck: Normal arch anatomy. Proximal right common carotid artery  partially obscured by streak artifact from intravenous contrast in the  subclavian vein. Right and left common carotid arteries are patent  without stenosis. Carotid bifurcation patent bilaterally. Right and left  internal carotid arteries are patent without stenosis. Both vertebral  arteries originate from the subclavian's. Both vertebral arteries are  patent without stenosis. Basilar artery is patent. Proximal anterior,  middle, and posterior cerebral arteries are patent. Peripheral cerebral  branches are symmetric. No aneurysms are demonstrated.     There are no intracranial contrast enhancing abnormalities. No acute  soft tissue neck abnormality demonstrated. Bilateral  thyroid nodules up  to 2.1 cm on the right. Lung apices are clear       Impression:      1. No focal area of decreased cerebral blood flow (CBF) is seen to  suggest an acute infarct in a large vessel territory.  No defects are  seen to suggest a core infarct or an area of reversible ischemia.  2. Patent bilateral carotid and vertebral arteries with no significant  stenosis  3. No large intracranial arterial occlusion     These results communicated by telephone to the emergency department at  1:32 PM on 05/23/2022           This report was finalized on 5/23/2022 1:32 PM by Kelby Lieberman.       CT CEREBRAL PERFUSION WITH & WITHOUT CONTRAST [[id number]] Collected: 05/23/22 1310     Updated: 05/23/22 1335    Narrative:      EXAMINATION: CT CEREBRAL PERFUSION W WO CONTRAST-, CT ANGIOGRAM HEAD W  AI ANALYSIS OF LVO-, CT ANGIOGRAM NECK-     DATE OF EXAM: 5/23/2022 12:43 PM     INDICATION: Neuro deficit, acute, stroke suspected.     COMPARISON: None available.     TECHNIQUE: Axial CT images of the brain were obtained prior to and after  the administration of 115 cc Isovue-370. CT Perfusion protocol was  utilized. Automated post processing was performed by RAPID software and  submitted to PACS for interpretation. CTA of the head and neck was also  performed, with reconstructions. Automated exposure control and  iterative reconstruction was utilized.      FINDINGS:   CT Perfusion:  CBF (<30%) volume: 0 mL  Tmax (>6.0s) volume: 0 mL  Mismatch volume: 0 mL  Mismatch ratio: None     The examination appears to be technically adequate. There is no reduced  cerebral blood volume (CBV) or reduced cerebral blood flow (CBF) to  suggest an area of acute infarction in a large vessel territory. The  cerebral perfusion appears symmetric. No increased mean transit time  (MTT) is seen. No areas of mismatch to suggest presence of a penumbra.     CTA head/neck: Normal arch anatomy. Proximal right common carotid artery  partially obscured by  streak artifact from intravenous contrast in the  subclavian vein. Right and left common carotid arteries are patent  without stenosis. Carotid bifurcation patent bilaterally. Right and left  internal carotid arteries are patent without stenosis. Both vertebral  arteries originate from the subclavian's. Both vertebral arteries are  patent without stenosis. Basilar artery is patent. Proximal anterior,  middle, and posterior cerebral arteries are patent. Peripheral cerebral  branches are symmetric. No aneurysms are demonstrated.     There are no intracranial contrast enhancing abnormalities. No acute  soft tissue neck abnormality demonstrated. Bilateral thyroid nodules up  to 2.1 cm on the right. Lung apices are clear       Impression:      1. No focal area of decreased cerebral blood flow (CBF) is seen to  suggest an acute infarct in a large vessel territory.  No defects are  seen to suggest a core infarct or an area of reversible ischemia.  2. Patent bilateral carotid and vertebral arteries with no significant  stenosis  3. No large intracranial arterial occlusion     These results communicated by telephone to the emergency department at  1:32 PM on 05/23/2022           This report was finalized on 5/23/2022 1:32 PM by Kelby Lieberman.       CT Angiogram Neck [[id number]] Collected: 05/23/22 1310     Updated: 05/23/22 1335    Narrative:      EXAMINATION: CT CEREBRAL PERFUSION W WO CONTRAST-, CT ANGIOGRAM HEAD W  AI ANALYSIS OF LVO-, CT ANGIOGRAM NECK-     DATE OF EXAM: 5/23/2022 12:43 PM     INDICATION: Neuro deficit, acute, stroke suspected.     COMPARISON: None available.     TECHNIQUE: Axial CT images of the brain were obtained prior to and after  the administration of 115 cc Isovue-370. CT Perfusion protocol was  utilized. Automated post processing was performed by RAPID software and  submitted to PACS for interpretation. CTA of the head and neck was also  performed, with reconstructions. Automated exposure  control and  iterative reconstruction was utilized.      FINDINGS:   CT Perfusion:  CBF (<30%) volume: 0 mL  Tmax (>6.0s) volume: 0 mL  Mismatch volume: 0 mL  Mismatch ratio: None     The examination appears to be technically adequate. There is no reduced  cerebral blood volume (CBV) or reduced cerebral blood flow (CBF) to  suggest an area of acute infarction in a large vessel territory. The  cerebral perfusion appears symmetric. No increased mean transit time  (MTT) is seen. No areas of mismatch to suggest presence of a penumbra.     CTA head/neck: Normal arch anatomy. Proximal right common carotid artery  partially obscured by streak artifact from intravenous contrast in the  subclavian vein. Right and left common carotid arteries are patent  without stenosis. Carotid bifurcation patent bilaterally. Right and left  internal carotid arteries are patent without stenosis. Both vertebral  arteries originate from the subclavian's. Both vertebral arteries are  patent without stenosis. Basilar artery is patent. Proximal anterior,  middle, and posterior cerebral arteries are patent. Peripheral cerebral  branches are symmetric. No aneurysms are demonstrated.     There are no intracranial contrast enhancing abnormalities. No acute  soft tissue neck abnormality demonstrated. Bilateral thyroid nodules up  to 2.1 cm on the right. Lung apices are clear       Impression:      1. No focal area of decreased cerebral blood flow (CBF) is seen to  suggest an acute infarct in a large vessel territory.  No defects are  seen to suggest a core infarct or an area of reversible ischemia.  2. Patent bilateral carotid and vertebral arteries with no significant  stenosis  3. No large intracranial arterial occlusion     These results communicated by telephone to the emergency department at  1:32 PM on 05/23/2022           This report was finalized on 5/23/2022 1:32 PM by Kelby Lieberman.             Assessment:  Encephalopathy likely secondary  to hypoglycemia, resolved    Plan:  Okay to discharge nursing home anytime    Comment:  Doing better.  We will see the patient on request         I discussed the patients findings and my recommendations with patient and nursing staff    Stuart Atkinson MD  05/24/22  12:20 EDT

## 2022-05-24 NOTE — CASE MANAGEMENT/SOCIAL WORK
Continued Stay Note  [facility]     Patient Name: Jeaneth Keita  MRN: [id number]  Today's Date: 5/24/2022    Admit Date: 5/23/2022     Discharge Plan     Row Name 05/24/22 1140       Plan    Plan [facility]    Patient/Family in Agreement with Plan yes    Plan Comments Spoke with Felisa at [facility] and she stated patient was pretty independent at their facility. Felisa asked that we fax updated PT/OT notes when available to [phone]. CM will continue to follow.    Final Discharge Disposition Code 01 - home or self-care    Row Name 05/24/22 1109       Plan    Plan [facility]    Patient/Family in Agreement with Plan yes    Plan Comments Spoke with patient at bedside. Patient wants to return to [facility]. CM called and left a voicemail for Felisa at [facility]. Plan is back to [facility] pending PT/OT recs. CM will continue to follow.    Final Discharge Disposition Code 01 - home or self-care               Discharge Codes    No documentation.                     Johnny Ware RN

## 2022-05-24 NOTE — PROGRESS NOTES
[facility] Services  PROGRESS NOTE    Patient Name: Jeaneth Keita  : 1960  MRN: [id number]    Date of Admission: 2022  Primary Care Physician: Provider, No Known    Subjective   Subjective     CC:  AMS    HPI:  Back to baseline now. Denies any issues overnight. SLP at bedside working with pt.     ROS:  Gen- No fevers, chills  CV- No chest pain, palpitations  Resp- No cough, dyspnea  GI- No N/V/D, abd pain        Objective   Objective     Vital Signs:   Temp:  [97.7 °F (36.5 °C)-98.2 °F (36.8 °C)] 98.2 °F (36.8 °C)  Heart Rate:  [73-90] 85  Resp:  [16-18] 18  BP: (129-153)/(70-85) 132/70     Physical Exam:  Constitutional: No acute distress, awake, alert  HENT: NCAT, mucous membranes moist  Respiratory: Clear to auscultation bilaterally, respiratory effort normal   Cardiovascular: RRR, no murmurs, rubs, or gallops  Gastrointestinal: Positive bowel sounds, soft, nontender, nondistended  Musculoskeletal: No right ankle edema, L BKA  Psychiatric: Appropriate affect, cooperative  Neurologic: Oriented x 2, strength symmetric in all extremities, Cranial Nerves grossly intact to confrontation, speech clear  Skin: No rashes    Results Reviewed:  LAB RESULTS:      Lab 22  1251 22  1247   WBC 6.01  --    HEMOGLOBIN 9.2*  --    HEMOGLOBIN, POC  --  8.5*   HEMATOCRIT 25.7*  --    HEMATOCRIT POC  --  25*   PLATELETS 181  --    NEUTROS ABS 4.05  --    IMMATURE GRANS (ABS) 0.04  --    LYMPHS ABS 1.20  --    MONOS ABS 0.45  --    EOS ABS 0.23  --    MCV 91.1  --    PROTIME  --  14.6   APTT 32.7  --          Lab 22  1251 22  1247   SODIUM 136  --    POTASSIUM 4.3  --    CHLORIDE 99  --    CO2 24.0  --    ANION GAP 13.0  --    BUN 56*  --    CREATININE 5.93* 6.60*   EGFR 7.5* 6.6*   GLUCOSE 60*  --    CALCIUM 9.8  --    TSH 0.776  --          Lab 22  1251   TOTAL PROTEIN 7.2   ALBUMIN 4.20   GLOBULIN 3.0   ALT (SGPT) 34*   AST (SGOT) 32   BILIRUBIN 0.5    ALK PHOS 101         Lab 05/23/22  1251 05/23/22  1247   TROPONIN T 0.047*  --    PROTIME  --  14.6   INR  --  1.2             Lab 05/23/22  1251   VITAMIN B 12 596         Brief Urine Lab Results  (Last result in the past 365 days)      Color   Clarity   Blood   Leuk Est   Nitrite   Protein   CREAT   Urine HCG        05/23/22 1539 Yellow   Clear   Negative   Negative   Negative   >=300 mg/dL (3+)                 Microbiology Results Abnormal     Procedure Component Value - Date/Time    COVID PRE-OP / PRE-PROCEDURE SCREENING ORDER (NO ISOLATION) - Swab, Nasopharynx [[id number]]  (Normal) Collected: 05/23/22 1526    Lab Status: Final result Specimen: Swab from Nasopharynx Updated: 05/23/22 1613    Narrative:      The following orders were created for panel order COVID PRE-OP / PRE-PROCEDURE SCREENING ORDER (NO ISOLATION) - Swab, Nasopharynx.  Procedure                               Abnormality         Status                     ---------                               -----------         ------                     COVID-19 and FLU A/B PCR...[[id number]]  Normal              Final result                 Please view results for these tests on the individual orders.    COVID-19 and FLU A/B PCR - Swab, Nasopharynx [[id number]]  (Normal) Collected: 05/23/22 1526    Lab Status: Final result Specimen: Swab from Nasopharynx Updated: 05/23/22 1613     COVID19 Not Detected     Influenza A PCR Not Detected     Influenza B PCR Not Detected    Narrative:      Fact sheet for providers: https://www.fda.gov/media/618147/download    Fact sheet for patients: https://www.fda.gov/media/834458/download    Test performed by PCR.          EEG    Result Date: 5/23/2022  Reason for referral: 61 y.o.female with altered mental status Technical Summary:  A 19 channel digital EEG was performed using the international 10-20 placement system, including eye leads and EKG leads. Duration: 20 minutes . Findings: The awake tracing shows diffuse low to  medium amplitude 5-7 Hz theta which is present symmetrically over both hemispheres.  At times a poorly regulated 7-8 Hz posterior rhythm is evident over the occipital leads.  Drowsiness is seen with mild slowing of the background but stage II sleep is not clearly seen.  Photic stimulation does not change the tracing.  Hyperventilation is not performed.  No focal features or epileptiform activity are seen. Video: On Technical quality: Good EKG: Regular, 70-80 bpm SUMMARY: Mild generalized slow No focal features or epileptiform activity are seen     Impression: Diffuse cerebral dysfunction of mild degree, nonspecific No evidence for epilepsy is seen This report is transcribed using the Dragon dictation system.      CT Angiogram Neck    Result Date: 5/23/2022  EXAMINATION: CT CEREBRAL PERFUSION W WO CONTRAST-, CT ANGIOGRAM HEAD W AI ANALYSIS OF LVO-, CT ANGIOGRAM NECK-  DATE OF EXAM: 5/23/2022 12:43 PM  INDICATION: Neuro deficit, acute, stroke suspected.  COMPARISON: None available.  TECHNIQUE: Axial CT images of the brain were obtained prior to and after the administration of 115 cc Isovue-370. CT Perfusion protocol was utilized. Automated post processing was performed by RAPID software and submitted to PACS for interpretation. CTA of the head and neck was also performed, with reconstructions. Automated exposure control and iterative reconstruction was utilized.  FINDINGS: CT Perfusion: CBF (<30%) volume: 0 mL Tmax (>6.0s) volume: 0 mL Mismatch volume: 0 mL Mismatch ratio: None  The examination appears to be technically adequate. There is no reduced cerebral blood volume (CBV) or reduced cerebral blood flow (CBF) to suggest an area of acute infarction in a large vessel territory. The cerebral perfusion appears symmetric. No increased mean transit time (MTT) is seen. No areas of mismatch to suggest presence of a penumbra.  CTA head/neck: Normal arch anatomy. Proximal right common carotid artery partially obscured by  streak artifact from intravenous contrast in the subclavian vein. Right and left common carotid arteries are patent without stenosis. Carotid bifurcation patent bilaterally. Right and left internal carotid arteries are patent without stenosis. Both vertebral arteries originate from the subclavian's. Both vertebral arteries are patent without stenosis. Basilar artery is patent. Proximal anterior, middle, and posterior cerebral arteries are patent. Peripheral cerebral branches are symmetric. No aneurysms are demonstrated.  There are no intracranial contrast enhancing abnormalities. No acute soft tissue neck abnormality demonstrated. Bilateral thyroid nodules up to 2.1 cm on the right. Lung apices are clear      Impression: 1. No focal area of decreased cerebral blood flow (CBF) is seen to suggest an acute infarct in a large vessel territory.  No defects are seen to suggest a core infarct or an area of reversible ischemia. 2. Patent bilateral carotid and vertebral arteries with no significant stenosis 3. No large intracranial arterial occlusion  These results communicated by telephone to the emergency department at 1:32 PM on 05/23/2022    This report was finalized on 5/23/2022 1:32 PM by Kelby Lieberman.      MRI Brain Without Contrast    Result Date: 5/23/2022  DATE OF EXAM: 5/23/2022 6:36 PM  PROCEDURE: MRI BRAIN WO CONTRAST-  INDICATIONS: parasthesias, AMS; R29.90-Unspecified symptoms and signs involving the nervous system; Z86.73-Personal history of transient ischemic attack (TIA), and cerebral infarction without residual deficits; D64.9-Anemia, unspecified; N18.6-End stage renal disease; Z99.2-Dependence on renal dialysis  COMPARISON: CT earlier the same day  TECHNIQUE: Multiplanar multisequence images of the brain were performed without contrast according to routine brain MRI protocol.  FINDINGS: No acute infarct is noted on diffusion weighted sequences. Midline structures are unremarkable and the craniocervical  junction is satisfactory in appearance. Redemonstrated age-related changes with moderate volume loss and T2 hyperintense periventricular white matter changes of chronic small vessel ischemia. There is otherwise no evidence of intracranial hemorrhage, mass or mass effect. Susceptibility artifact is again present related to prior left thalamic hemorrhage. The ventricles are normal in size and configuration, accounting for surrounding volume loss. The orbits are unremarkable and the paranasal sinuses are grossly clear. Intracranial arterial flow voids appear maintained.      Impression: Stable age-related and chronic changes as above. No evidence of acute infarct, hemorrhage, mass or mass effect.  This report was finalized on 5/23/2022 8:18 PM by Victor Manuel Arvizu.      XR Chest 1 View    Result Date: 5/23/2022  DATE OF EXAM: 5/23/2022 1:48 PM  PROCEDURE: XR CHEST 1 VW-  INDICATIONS: Acute Stroke Protocol (onset < 12 hrs)  COMPARISON: 04/04/2022  TECHNIQUE: Single radiographic AP view of the chest was obtained.  FINDINGS: Right IJ dialysis catheter noted. Heart size and pulmonary vasculature are within normal limits. Chronic atelectasis/fibrosis in the right infrahilar region. Scarring in the left lung base. No new pulmonary abnormality. Costophrenic angles sharp      Impression: Chronic changes in the right infrahilar region and left basilar scarring. No acute cardiopulmonary process demonstrated  This report was finalized on 5/23/2022 2:18 PM by Kelby Lieberman.      CT Head Without Contrast Stroke Protocol    Result Date: 5/23/2022  CT HEAD WO CONTRAST STROKE PROTOCOL-  Date of Exam: 5/23/2022 12:40 PM  Indication: Neuro deficit, acute, stroke suspected.  Comparison Exams: None available.  Technique: Multiple axial images were obtained from the skull base to the vertex without the administration of IV contrast. The axial data was used to generate reformatted images in the coronal and sagittal planes. Automated  exposure control and iterative reconstruction methods were used.  FINDINGS: There is mild generalized brain volume loss.  There is some patchy low-attenuation within the periventricular white matter bilaterally compatible changes of chronic small vessel ischemic disease.  There is no acute infarct or hemorrhage.   No abnormal extra-axial fluid collections are seen.   There is no mass effect or hydrocephalus.  There is no evidence of skull fracture.   There is mucosal thickening within left maxillary sinus.  Visualized paranasal sinuses and mastoid air cells are clear.  The globes and orbits are within normal limits.      Impression:   1.  Mild generalized brain volume loss and changes of chronic small vessel ischemic disease. 2.  No acute intracranial abnormality.  Surgically no evidence of acute infarct or hemorrhage.  3.  Findings were  personally communicated to the stroke team at 12:43 PM on 5/23/2022  This report was finalized on 5/23/2022 1:46 PM by Delmer Franklin MD.      CT Angiogram Head w AI Analysis of LVO    Result Date: 5/23/2022  EXAMINATION: CT CEREBRAL PERFUSION W WO CONTRAST-, CT ANGIOGRAM HEAD W AI ANALYSIS OF LVO-, CT ANGIOGRAM NECK-  DATE OF EXAM: 5/23/2022 12:43 PM  INDICATION: Neuro deficit, acute, stroke suspected.  COMPARISON: None available.  TECHNIQUE: Axial CT images of the brain were obtained prior to and after the administration of 115 cc Isovue-370. CT Perfusion protocol was utilized. Automated post processing was performed by RAPID software and submitted to PACS for interpretation. CTA of the head and neck was also performed, with reconstructions. Automated exposure control and iterative reconstruction was utilized.  FINDINGS: CT Perfusion: CBF (<30%) volume: 0 mL Tmax (>6.0s) volume: 0 mL Mismatch volume: 0 mL Mismatch ratio: None  The examination appears to be technically adequate. There is no reduced cerebral blood volume (CBV) or reduced cerebral blood flow (CBF) to suggest an area  of acute infarction in a large vessel territory. The cerebral perfusion appears symmetric. No increased mean transit time (MTT) is seen. No areas of mismatch to suggest presence of a penumbra.  CTA head/neck: Normal arch anatomy. Proximal right common carotid artery partially obscured by streak artifact from intravenous contrast in the subclavian vein. Right and left common carotid arteries are patent without stenosis. Carotid bifurcation patent bilaterally. Right and left internal carotid arteries are patent without stenosis. Both vertebral arteries originate from the subclavian's. Both vertebral arteries are patent without stenosis. Basilar artery is patent. Proximal anterior, middle, and posterior cerebral arteries are patent. Peripheral cerebral branches are symmetric. No aneurysms are demonstrated.  There are no intracranial contrast enhancing abnormalities. No acute soft tissue neck abnormality demonstrated. Bilateral thyroid nodules up to 2.1 cm on the right. Lung apices are clear      Impression: 1. No focal area of decreased cerebral blood flow (CBF) is seen to suggest an acute infarct in a large vessel territory.  No defects are seen to suggest a core infarct or an area of reversible ischemia. 2. Patent bilateral carotid and vertebral arteries with no significant stenosis 3. No large intracranial arterial occlusion  These results communicated by telephone to the emergency department at 1:32 PM on 05/23/2022    This report was finalized on 5/23/2022 1:32 PM by Kelby Lieberman.      CT CEREBRAL PERFUSION WITH & WITHOUT CONTRAST    Result Date: 5/23/2022  EXAMINATION: CT CEREBRAL PERFUSION W WO CONTRAST-, CT ANGIOGRAM HEAD W AI ANALYSIS OF LVO-, CT ANGIOGRAM NECK-  DATE OF EXAM: 5/23/2022 12:43 PM  INDICATION: Neuro deficit, acute, stroke suspected.  COMPARISON: None available.  TECHNIQUE: Axial CT images of the brain were obtained prior to and after the administration of 115 cc Isovue-370. CT Perfusion  protocol was utilized. Automated post processing was performed by RAPID software and submitted to PACS for interpretation. CTA of the head and neck was also performed, with reconstructions. Automated exposure control and iterative reconstruction was utilized.  FINDINGS: CT Perfusion: CBF (<30%) volume: 0 mL Tmax (>6.0s) volume: 0 mL Mismatch volume: 0 mL Mismatch ratio: None  The examination appears to be technically adequate. There is no reduced cerebral blood volume (CBV) or reduced cerebral blood flow (CBF) to suggest an area of acute infarction in a large vessel territory. The cerebral perfusion appears symmetric. No increased mean transit time (MTT) is seen. No areas of mismatch to suggest presence of a penumbra.  CTA head/neck: Normal arch anatomy. Proximal right common carotid artery partially obscured by streak artifact from intravenous contrast in the subclavian vein. Right and left common carotid arteries are patent without stenosis. Carotid bifurcation patent bilaterally. Right and left internal carotid arteries are patent without stenosis. Both vertebral arteries originate from the subclavian's. Both vertebral arteries are patent without stenosis. Basilar artery is patent. Proximal anterior, middle, and posterior cerebral arteries are patent. Peripheral cerebral branches are symmetric. No aneurysms are demonstrated.  There are no intracranial contrast enhancing abnormalities. No acute soft tissue neck abnormality demonstrated. Bilateral thyroid nodules up to 2.1 cm on the right. Lung apices are clear      Impression: 1. No focal area of decreased cerebral blood flow (CBF) is seen to suggest an acute infarct in a large vessel territory.  No defects are seen to suggest a core infarct or an area of reversible ischemia. 2. Patent bilateral carotid and vertebral arteries with no significant stenosis 3. No large intracranial arterial occlusion  These results communicated by telephone to the emergency  department at 1:32 PM on 05/23/2022    This report was finalized on 5/23/2022 1:32 PM by Kelby Lieberman.        Results for orders placed during the hospital encounter of 10/05/21    Adult Transthoracic Echo Complete W/ Cont if Necessary Per Protocol    Interpretation Summary  · Left ventricular ejection fraction appears to be 61 - 65%. Left ventricular systolic function is normal.  · Left atrial volume is mildly increased.      I have reviewed the medications:  Scheduled Meds:senna-docusate sodium, 2 tablet, Oral, BID  sodium chloride, 10 mL, Intravenous, Q12H      Continuous Infusions:Pharmacy Consult,       PRN Meds:.•  acetaminophen **OR** acetaminophen **OR** acetaminophen  •  senna-docusate sodium **AND** polyethylene glycol **AND** bisacodyl **AND** bisacodyl  •  ondansetron **OR** ondansetron  •  Pharmacy Consult  •  QUEtiapine  •  sodium chloride  •  sodium chloride    Assessment & Plan   Assessment & Plan     Active Hospital Problems    Diagnosis  POA   • CVA (cerebral vascular accident) (HCC) [I63.9]  Yes   • Cirrhosis of liver (HCC) [K74.60]  Yes   • Hemochromatosis [E83.119]  Yes   • Hepatocellular carcinoma metastatic to bone s/p RXT  [C79.51, C22.0]  Yes   • Chronic pain on Methadone [F11.90]  Yes   • Chronic Hep C  [B18.2]  Yes   • ESRD (end stage renal disease) [N18.6]  Yes   • S/P left BKA [Z89.512]  Not Applicable   • Type 2 diabetes mellitus (HCC) [E11.9]  Yes      Resolved Hospital Problems   No resolved problems to display.        Brief Hospital Course to date:  Jeaneth Keita is a 61 y.o. female with history of hepatitis C cirrhosis, hemochromatosis, hepatocellular carcinoma with bone mets, prior CVA, chronic pain on methadone, type 2 diabetes, ESRD on HD,  left BKA who presented with left facial numbness.       Paresthesias  -CT head without contrast shows generalized brain volume loss and changes of chronic small vessel ischemic disease, no acute abnormalities.  -CTA of the head and neck  shows patent vessels bilaterally.  -CT perfusion shows no reversible ischemia  -MRI with no acute infarct  -EEG with no seizure  -Continue home statin  -Neurology consulted.  -PT/OT, speech     Encephalopathy  -secondary to hypoglycemia  -Ammonia level within normal limits  -No suggestion of infection at this time.  CBC within normal limits.     ESRD  -Nephrology consulted for dialysis     Hypoglycemia  Type 2 diabetes  -Received D50 in the ED  -s/p D5NS at 40cc/hr for 6 hours, consider more if she remains hypoglycemic but do not want to overcorrect.   - Pharmacy has updated her med list. Defer restarting insulin for now. Will do SSI       Hypertension  -- resume home blood pressure medications     Elevated troponin  -Suspect likely secondary to ESRD  -Chronically elevated     Anemia  -Hemoglobin has ranged between 8.2-9.6.  Currently at baseline        DVT prophylaxis:  Mechanical DVT prophylaxis orders are present.            Disposition: I expect the patient to be discharged back to [facility] hopefully tomorrow.     CODE STATUS:   Code Status and Medical Interventions:   Ordered at: 05/24/22 0719     Level Of Support Discussed With:    Patient     Code Status (Patient has no pulse and is not breathing):    CPR (Attempt to Resuscitate)     Medical Interventions (Patient has pulse or is breathing):    Full Support       Selina Flowers MD  05/24/22

## 2022-05-24 NOTE — THERAPY EVALUATION
Patient Name: Jeaneth Keita  : 1960    MRN: [id number]                              Today's Date: 2022       Admit Date: 2022    Visit Dx:     ICD-10-CM ICD-9-CM   1. Stroke-like symptoms  R29.90 781.99   2. History of stroke  Z86.73 V12.54   3. Anemia, unspecified type  D64.9 285.9   4. End stage renal disease on dialysis (HCC)  N18.6 585.6    Z99.2 V45.11     Patient Active Problem List   Diagnosis   • ICH (intracerebral hemorrhage)   • Hepatocellular carcinoma metastatic to bone s/p RXT    • Hemochromatosis   • Cirrhosis of liver (HCC)   • Chronic Hep C    • ESRD (end stage renal disease)   • Chronic ulcer of right foot   • S/P left BKA   • GERD (gastroesophageal reflux disease)   • Chronic pain on Methadone   • Essential hypertension   • Type 2 diabetes mellitus (HCC)   • Right lower lobe pneumonia   • AMS (altered mental status)   • Colitis   • Normocytic anemia   • Hypokalemia   • CVA (cerebral vascular accident) (HCC)   • Stroke-like symptoms     Past Medical History:   Diagnosis Date   • Anxiety    • DDD (degenerative disc disease), lumbar    • Depression    • Diabetes mellitus (HCC)    • Fibromyalgia    • Hemochromatosis    • Hep B w/o coma, chronic, w/o delta (HCC)    • Hep C w/o coma, chronic (HCC)    • Hypertension    • Vertigo      Past Surgical History:   Procedure Laterality Date   • ARTERIOVENOUS FISTULA/SHUNT SURGERY Left 10/16/2021    Procedure: UPPER EXTREMITY ARTERIOVENOUS FISTULA FORMATION LEFT;  Surgeon: Johnny Rousseau MD;  Location: [facility];  Service: Vascular;  Laterality: Left;   • BACK SURGERY     • BELOW KNEE LEG AMPUTATION     •  SECTION     • FOOT SURGERY     • KNEE SURGERY     • ROTATOR CUFF REPAIR     • SPINAL CORD STIMULATOR IMPLANT        General Information     Row Name 22 0836          OT Time and Intention    Document Type evaluation  -JY     Mode of Treatment occupational therapy;individual therapy  -JY     Row Name 22 0836     "      General Information    Patient Profile Reviewed yes  -JY     Prior Level of Function independent:;feeding;grooming;dressing;bathing;transfer;bed mobility;all household mobility;w/c or scooter;dependent:;home management;cooking;cleaning;driving  per pt I in ADLs, in home mobility via w/c primarily, able to use FWW for t/fs yet often completes with sit pivot surface to surface, dependent for house mgmt, cooking and laundry at facility  -JY     Existing Precautions/Restrictions fall;other (see comments)  remote L BKA, chronic pain  -JY     Barriers to Rehab medically complex;previous functional deficit  -JY     Row Name 05/24/22 0836          Occupational Profile    Environmental Supports and Barriers (Occupational Profile) walk in shower, standard toilet with grab bars available for A  -[employer]     Row Name 05/24/22 0836          Living Environment    People in Home facility resident;other (see comments)  [place] (appears to be snf) since 10/11 2021  -JY     Row Name 05/24/22 0836          Home Main Entrance    Number of Stairs, Main Entrance none  -JY     Stair Railings, Main Entrance none  -JY     Row Name 05/24/22 0836          Stairs Within Home, Primary    Number of Stairs, Within Home, Primary none  -JY     Stair Railings, Within Home, Primary none  -JY     Row Name 05/24/22 0836          Cognition    Orientation Status (Cognition) oriented to;person;situation;verbal cues/prompts needed for orientation;time;place;other (see comments)  stated correct month however incorrect year, able to state \"hospital\" in [place] yet not name  -[employer]     Row Name 05/24/22 0836          Safety Issues, Functional Mobility    Safety Issues Affecting Function (Mobility) insight into deficits/self-awareness;positioning of assistive device;safety precaution awareness;safety precautions follow-through/compliance;sequencing abilities  -JY     Impairments Affecting Function (Mobility) balance;endurance/activity " tolerance;pain;postural/trunk control;sensation/sensory awareness;strength  -JY     Comment, Safety Issues/Impairments (Mobility) pt alert and able to follow commands; presented with nausea at rest and increased upon sitting/standing as well as dizziness upon sitting/standing with no decrease in blood pressure  -JY           User Key  (r) = Recorded By, (t) = Taken By, (c) = Cosigned By    Initials Name Provider Type    Mamie Morrissey OT Occupational Therapist                 Mobility/ADL's     Row Name 05/24/22 0843          Bed Mobility    Bed Mobility supine-sit;scooting/bridging;sit-supine  -JY     Scooting/Bridging [place] (Bed Mobility) supervision;verbal cues  -JY     Supine-Sit [place] (Bed Mobility) supervision  -JFILI     Sit-Supine [place] (Bed Mobility) supervision  -JFILI     Bed Mobility, Safety Issues impaired trunk control for bed mobility  -JY     Assistive Device (Bed Mobility) bed rails;head of bed elevated  -JY     Comment, (Bed Mobility) pt did not require physical A to complete bed mobility from semi reclined position, spv for monitoring of vitals and to ensure improved balance; skilled cues for further scooting to gain hip symmetry at EOB for more stability with R foot in contact with floor; pt reported dizziness and nausea upon sitting, improved mildly with increased time; postural sway at times sitting unsupported  -NOE     Row Name 05/24/22 0879          Transfers    Transfers sit-stand transfer;stand-sit transfer  -JY     Comment, (Transfers) skilled cues for optimal hand placement for controlled ascend, descend specifically to reach back prior to sitting and push up from seated surface vs pulling at wx; at baseline pt completes sit pivot t/fs in addiiton to some STS however pt nausea and dizziness limited further t/f assessment  -JY     Sit-Stand [place] (Transfers) minimum assist (75% patient effort);verbal cues  -JY     Stand-Sit [place] (Transfers) minimum assist  (75% patient effort);verbal cues  -JY     Row Name 05/24/22 0843          Sit-Stand Transfer    Assistive Device (Sit-Stand Transfers) walker, front-wheeled;other (see comments)  gait belt for safety protocol  -JY     Row Name 05/24/22 0843          Stand-Sit Transfer    Assistive Device (Stand-Sit Transfers) walker, front-wheeled;other (see comments)  gait belt for safety protocol  -JY     Row Name 05/24/22 0843          Functional Mobility    Functional Mobility- Comment defer to PT for specifics; pt reportedly uses w/c for primary mobility, did assess pt move laterally toward HOB in which pt pivot on R foot to advance minimally upward  -JY     Row Name 05/24/22 0843          Activities of Daily Living    BADL Assessment/Intervention upper body dressing;lower body dressing;grooming  -JY     Row Name 05/24/22 0843          Upper Body Dressing Assessment/Training    [place] Level (Upper Body Dressing) doff;don;pajama/robe;minimum assist (75% patient effort);verbal cues  -     Position (Upper Body Dressing) edge of bed sitting  -JY     Row Name 05/24/22 0843          Lower Body Dressing Assessment/Training    [place] Level (Lower Body Dressing) doff;don;socks;supervision;verbal cues  -Y     Position (Lower Body Dressing) edge of bed sitting  -JY     Row Name 05/24/22 0843          Grooming Assessment/Training    [place] Level (Grooming) wash face, hands;set up  -     Position (Grooming) sitting up in bed  -           User Key  (r) = Recorded By, (t) = Taken By, (c) = Cosigned By    Initials Name Provider Type    Mamie Morrissey OT Occupational Therapist               Obj/Interventions     Row Name 05/24/22 0859          Sensory Assessment (Somatosensory)    Sensory Assessment (Somatosensory) bilateral UE;sensation intact  -     Bilateral UE Sensory Assessment general sensation;light touch awareness;light touch localization;intact;other (see comments)  pt initially denied any numbness or  tingling at BUEs however toward end of session reported mild numbness at B hands, digits; reported numbness at B LEs from knee downward [place]  -JY     Sensory Subjective Reports numbness  -     Sensory Assessment pt initially denied any numbness or tingling at BUEs however toward end of session reported mild numbness at B hands, digits; reported numbness at B LEs from knee downward [place]; able to recognize LT stimuli at BUEs  -     Row Name 05/24/22 0858          Vision Assessment/Intervention    Visual Impairment/Limitations corrective lenses for reading  -J     Vision Assessment Comment denies any acute changes to vision  -     Row Name 05/24/22 0858          Range of Motion Comprehensive    General Range of Motion bilateral upper extremity ROM WFL  -[facility] Name 05/24/22 0858          Strength Comprehensive (MMT)    General Manual Muscle Testing (MMT) Assessment upper extremity strength deficits identified  -     Comment, General Manual Muscle Testing (MMT) Assessment BUE MMS grossly 4/5 per MMT  -[facility] Name 05/24/22 0858          Motor Skills    Motor Skills functional endurance;coordination  -JY     Coordination finger to nose;other (see comments);WFL  finger to thumb opposition  -JY     Functional Endurance decreased activity tolerance during more dynamic tasks sitting and standing (~ 30 second duration)  -[facility] Name 05/24/22 0858          Balance    Balance Assessment sitting static balance;sitting dynamic balance;standing static balance;standing dynamic balance  -JY     Static Sitting Balance supervision  -JY     Dynamic Sitting Balance supervision;contact guard;verbal cues;other (see comments)  UBD, pre t/f skills  -JY     Position, Sitting Balance unsupported;sitting edge of bed  -JY     Static Standing Balance minimal assist;verbal cues  -JY     Dynamic Standing Balance minimal assist;moderate assist;verbal cues  -JY     Position/Device Used, Standing Balance supported;walker,  front-wheeled  -JY     Balance Interventions sitting;standing;static;dynamic;sit to stand;supported;occupation based/functional task  -JY     Comment, Balance pt with postural sway sitting at EOB, improved with increased hip symmetry and R foot on floor and UE support [place]; req'd A and FWW support for standing  -JY           User Key  (r) = Recorded By, (t) = Taken By, (c) = Cosigned By    Initials Name Provider Type    Mamie Morrissey OT Occupational Therapist               Goals/Plan     Row Name 05/24/22 0909          Transfer Goal 1 (OT)    Activity/Assistive Device (Transfer Goal 1, OT) sit-to-stand/stand-to-sit;toilet;commode;wheelchair transfer;other (see comments)  STS w/ AD, sit squat pivots b/t bed, w/c, toilet  -JY     [place] Level/Cues Needed (Transfer Goal 1, OT) contact guard required;verbal cues required  -JY     Time Frame (Transfer Goal 1, OT) long term goal (LTG);by discharge  -JY     Progress/Outcome (Transfer Goal 1, OT) goal ongoing  -[employer]     Row Name 05/24/22 0909          Dressing Goal 1 (OT)    Activity/Device (Dressing Goal 1, OT) upper body dressing;lower body dressing;other (see comments)  d/d TB garments  -JY     [place]/Cues Needed (Dressing Goal 1, OT) standby assist;verbal cues required  -JY     Time Frame (Dressing Goal 1, OT) long term goal (LTG);by discharge  -JY     Progress/Outcome (Dressing Goal 1, OT) goal ongoing  -[employer]     Row Name 05/24/22 0909          Toileting Goal 1 (OT)    Activity/Device (Toileting Goal 1, OT) adjust/manage clothing  -JY     [place] Level/Cues Needed (Toileting Goal 1, OT) contact guard required;verbal cues required  -JY     Time Frame (Toileting Goal 1, OT) long term goal (LTG);by discharge  -JY     Progress/Outcome (Toileting Goal 1, OT) goal ongoing  -[employer]     Row Name 05/24/22 0909          Strength Goal 1 (OT)    Strength Goal 1 (OT) Pt to complete seated TE program encompassing BUEs focused on strength, endurance w/ progressive  sets/reps/resistance in order to improve integration in ADLs, related t/fs and w/c mobility  -JY     Time Frame (Strength Goal 1, OT) long term goal (LTG);by discharge  -JY     Progress/Outcome (Strength Goal 1, OT) goal ongoing  -JY     Row Name 05/24/22 0909          Problem Specific Goal 1 (OT)    Problem Specific Goal 1 (OT) Pt to demonstrate improved sitting balance while unsupported as evidenced by SBA during ADLs with BUE integration to improve stability and safety during fxl tasks.  -JY     Time Frame (Problem Specific Goal 1, OT) long term goal (LTG);by discharge  -JY     Progress/Outcome (Problem Specific Goal 1, OT) goal ongoing  -JY     Row Name 05/24/22 0909          Therapy Assessment/Plan (OT)    Planned Therapy Interventions (OT) activity tolerance training;adaptive equipment training;BADL retraining;functional balance retraining;occupation/activity based interventions;patient/caregiver education/training;ROM/therapeutic exercise;strengthening exercise;transfer/mobility retraining  -JY           User Key  (r) = Recorded By, (t) = Taken By, (c) = Cosigned By    Initials Name Provider Type    Mamie Morrissey, GILDARDO Occupational Therapist               Clinical Impression     Row Name 05/24/22 0903          Pain Assessment    Pretreatment Pain Rating 7/10  -JY     Posttreatment Pain Rating 6/10  -JY     Pain Location - Side/Orientation Bilateral  -JY     Pain Location generalized  -JY     Pain Location - other (see comments)  stomach  -JY     Pre/Posttreatment Pain Comment pt reported persistent stomach pain, RN aware, improved mildly with return to bed after sitting and standing  -JY     Pain Intervention(s) Repositioned;Ambulation/increased activity  -JY     Row Name 05/24/22 0903          Plan of Care Review    Plan of Care Reviewed With patient  -JY     Progress no change  OT IE  -JY     Outcome Evaluation OT evaluation complete. Pt presents with decreased I in ADLs, related t/fs, mobility compared  to baseline limited by decreased functional endurance, impaired sitting and standing balance, muscle weakness at BUEs, numbness at hands/digits and BLEs knee and distal and currently nausea and dizziness with RN aware of latter. Pt demonstrated need for spv in bed mobility with HOB elevated and bed rails used, min A and FWW for STS at EOB x 2 and brief pivot at R foot to advance to HOB with brief standing tolerance, min A d/d gown, spv to d/d R sock, SUA for face/hand washing. Progressive pivot t/fs withheld d/t pt's increased nausea and dizziness with sequential movement. Recommend IPOT POC and IRF at d/c pending progress with mobility prior to return to prior residency.  -     Row Name 05/24/22 0903          Therapy Assessment/Plan (OT)    Patient/Family Therapy Goal Statement (OT) to increase I in ADLs, related t/fs, return to PLOF  -JY     Rehab Potential (OT) good, to achieve stated therapy goals  -J     Criteria for Skilled Therapeutic Interventions Met (OT) yes;skilled treatment is necessary  -JY     Therapy Frequency (OT) daily  -[employer]     Row Name 05/24/22 0903          Therapy Plan Review/Discharge Plan (OT)    Anticipated Discharge Disposition (OT) inpatient rehabilitation facility  -     Row Name 05/24/22 0903          Vital Signs    Pre Systolic BP Rehab 132  -JY     Pre Treatment Diastolic BP 70  -JY     Intra Systolic BP Rehab 144  -JY     Intra Treatment Diastolic BP 77  -JY     Post Systolic BP Rehab 150  -JY     Post Treatment Diastolic BP 76  -JY     Pretreatment Heart Rate (beats/min) 98  -JY     Posttreatment Heart Rate (beats/min) 88  -JY     Pre SpO2 (%) 100  -JY     O2 Delivery Pre Treatment room air  -JY     Intra SpO2 (%) 99  -JY     O2 Delivery Intra Treatment room air  -JY     Post SpO2 (%) 100  -JY     O2 Delivery Post Treatment room air  -JY     Pre Patient Position Supine  -JY     Intra Patient Position Standing  -JY     Post Patient Position Supine  -JY     Row Name 05/24/22 0903           Positioning and Restraints    Pre-Treatment Position in bed  -JY     Post Treatment Position bed  -JY     In Bed notified nsg;side lying right;call light within reach;encouraged to call for assist;exit alarm on;side rails up x2  semi reclined  -NOE           User Key  (r) = Recorded By, (t) = Taken By, (c) = Cosigned By    Initials Name Provider Type    Mamie Morrissey OT Occupational Therapist               Outcome Measures     Row Name 05/24/22 0944          How much help from another is currently needed...    Putting on and taking off regular lower body clothing? 3  -JY     Bathing (including washing, rinsing, and drying) 2  -JY     Toileting (which includes using toilet bed pan or urinal) 2  -JY     Putting on and taking off regular upper body clothing 3  -JY     Taking care of personal grooming (such as brushing teeth) 3  -JY     Eating meals 3  -JY     AM-PAC 6 Clicks Score (OT) 16  -JY     Row Name 05/24/22 0944          Functional Assessment    Outcome Measure Options AM-PAC 6 Clicks Daily Activity (OT)  -JFILI           User Key  (r) = Recorded By, (t) = Taken By, (c) = Cosigned By    Initials Name Provider Type    Mamie Morrissey OT Occupational Therapist                Occupational Therapy Education                 Title: PT OT SLP Therapies (In Progress)     Topic: Occupational Therapy (In Progress)     Point: ADL training (In Progress)     Description:   Instruct learner(s) on proper safety adaptation and remediation techniques during self care or transfers.   Instruct in proper use of assistive devices.              Learning Progress Summary           Patient Acceptance, E,D, NR by NOE at 5/24/2022 0750                   Point: Home exercise program (Not Started)     Description:   Instruct learner(s) on appropriate technique for monitoring, assisting and/or progressing therapeutic exercises/activities.              Learner Progress:  Not documented in this visit.          Point: Precautions (In  Progress)     Description:   Instruct learner(s) on prescribed precautions during self-care and functional transfers.              Learning Progress Summary           Patient Acceptance, E,D, NR by NOE at 5/24/2022 0750                   Point: Body mechanics (In Progress)     Description:   Instruct learner(s) on proper positioning and spine alignment during self-care, functional mobility activities and/or exercises.              Learning Progress Summary           Patient Acceptance, E,D, NR by NOE at 5/24/2022 0750                               User Key     Initials Effective Dates Name Provider Type Discipline    NOE 06/16/21 -  Mamie Gonzalez OT Occupational Therapist OT              OT Recommendation and Plan  Planned Therapy Interventions (OT): activity tolerance training, adaptive equipment training, BADL retraining, functional balance retraining, occupation/activity based interventions, patient/caregiver education/training, ROM/therapeutic exercise, strengthening exercise, transfer/mobility retraining  Therapy Frequency (OT): daily  Plan of Care Review  Plan of Care Reviewed With: patient  Progress: no change (OT IE)  Outcome Evaluation: OT evaluation complete. Pt presents with decreased I in ADLs, related t/fs, mobility compared to baseline limited by decreased functional endurance, impaired sitting and standing balance, muscle weakness at BUEs, numbness at hands/digits and BLEs knee and distal and currently nausea and dizziness with RN aware of latter. Pt demonstrated need for spv in bed mobility with HOB elevated and bed rails used, min A and FWW for STS at EOB x 2 and brief pivot at R foot to advance to HOB with brief standing tolerance, min A d/d gown, spv to d/d R sock, SUA for face/hand washing. Progressive pivot t/fs withheld d/t pt's increased nausea and dizziness with sequential movement. Recommend IPOT POC and IRF at d/c pending progress with mobility prior to return to prior residency.     Time  Calculation:    Time Calculation- OT     Row Name 05/24/22 0955             Time Calculation- OT    OT Start Time 0750  -JY      OT Received On 05/24/22  -JY      OT Goal Re-Cert Due Date 06/03/22  -JY              Timed Charges    26342 - OT Therapeutic Activity Minutes 10  -JY      96741 - OT Self Care/Mgmt Minutes 5  -JY              Untimed Charges    OT Eval/Re-eval Minutes 46  -JY              Total Minutes    Timed Charges Total Minutes 15  -JY      Untimed Charges Total Minutes 46  -JY       Total Minutes 61  -JY            User Key  (r) = Recorded By, (t) = Taken By, (c) = Cosigned By    Initials Name Provider Type    Mamie Morrissey OT Occupational Therapist              Therapy Charges for Today     Code Description Service Date Service Provider Modifiers Qty    63724549342 HC OT THERAPEUTIC ACT EA 15 MIN 5/24/2022 Mamie Gonzalez OT GO 1    05848253718 HC OT EVAL MOD COMPLEXITY 4 5/24/2022 Mamie Gonzalez OT GO 1               Mamie Gonzalez OT  5/24/2022

## 2022-05-24 NOTE — PLAN OF CARE
Problem: Adult Inpatient Plan of Care  Goal: Plan of Care Review  Recent Flowsheet Documentation  Taken 5/24/2022 0903 by Mamie Gonzalez OT  Progress: (OT IE) no change  Plan of Care Reviewed With: patient  Outcome Evaluation: OT evaluation complete. Pt presents with decreased I in ADLs, related t/fs, mobility compared to baseline limited by decreased functional endurance, impaired sitting and standing balance, muscle weakness at BUEs, numbness at hands/digits and BLEs knee and distal and currently nausea and dizziness with RN aware of latter. Pt demonstrated need for spv in bed mobility with HOB elevated and bed rails used, min A and FWW for STS at EOB x 2 and brief pivot at R foot to advance to HOB with brief standing tolerance, min A d/d gown, spv to d/d R sock, SUA for face/hand washing. Progressive pivot t/fs withheld d/t pt's increased nausea and dizziness with sequential movement. Recommend IPOT POC and IRF at d/c pending progress with mobility and medical readiness prior to return to prior residency.

## 2022-05-24 NOTE — THERAPY EVALUATION
Acute Care - Speech Language Pathology Initial Evaluation  [facility]   Cognitive-Communication Evaluation  Clinical Swallow Evaluation     Patient Name: Jeaneth Keita  : 1960  MRN: [id number]  Today's Date: 2022               Admit Date: 2022     Visit Dx:    ICD-10-CM ICD-9-CM   1. Stroke-like symptoms  R29.90 781.99   2. History of stroke  Z86.73 V12.54   3. Anemia, unspecified type  D64.9 285.9   4. End stage renal disease on dialysis (HCC)  N18.6 585.6    Z99.2 V45.11   5. Cognitive communication deficit  R41.841 799.52     Patient Active Problem List   Diagnosis   • ICH (intracerebral hemorrhage)   • Hepatocellular carcinoma metastatic to bone s/p RXT    • Hemochromatosis   • Cirrhosis of liver (HCC)   • Chronic Hep C    • ESRD (end stage renal disease)   • Chronic ulcer of right foot   • S/P left BKA   • GERD (gastroesophageal reflux disease)   • Chronic pain on Methadone   • Essential hypertension   • Type 2 diabetes mellitus (HCC)   • Right lower lobe pneumonia   • AMS (altered mental status)   • Colitis   • Normocytic anemia   • Hypokalemia   • CVA (cerebral vascular accident) (HCC)   • Stroke-like symptoms     Past Medical History:   Diagnosis Date   • Anxiety    • DDD (degenerative disc disease), lumbar    • Depression    • Diabetes mellitus (HCC)    • Fibromyalgia    • Hemochromatosis    • Hep B w/o coma, chronic, w/o delta (HCC)    • Hep C w/o coma, chronic (HCC)    • Hypertension    • Vertigo      Past Surgical History:   Procedure Laterality Date   • ARTERIOVENOUS FISTULA/SHUNT SURGERY Left 10/16/2021    Procedure: UPPER EXTREMITY ARTERIOVENOUS FISTULA FORMATION LEFT;  Surgeon: Johnny Rousseau MD;  [facility];  Service: Vascular;  Laterality: Left;   • BACK SURGERY     • BELOW KNEE LEG AMPUTATION     •  SECTION     • FOOT SURGERY     • KNEE SURGERY     • ROTATOR CUFF REPAIR     • SPINAL CORD STIMULATOR IMPLANT         SLP Recommendation and Plan  SLP  Diagnosis: Mild cognitive-linguistic deficit impacting memory, thought organization, and mental manipulation. Not at baseline. Otherwise language, motor speech, reading, & writing were WFL. Will address deficits during treatment. (05/24/22 0930)        Swallow Criteria for Skilled Therapeutic Interventions Met: no problems identified which require skilled intervention, baseline status (05/24/22 0930)  SLC Criteria for Skilled Therapy Interventions Met: yes (05/24/22 0930)  Anticipated Discharge Disposition (SLP): unknown, anticipate therapy at next level of care (05/24/22 0930)     Therapy Frequency (Swallow): evaluation only (05/24/22 0930)  Predicted Duration Therapy Intervention (Days): until discharge (05/24/22 0930)                    Plan of Care Reviewed With: patient (05/24/22 1032)      SLP EVALUATION (last 72 hours)     SLP SLC Evaluation     Row Name 05/24/22 0930                   Communication Assessment/Intervention    Document Type evaluation  -RD        Subjective Information no complaints  -RD        Patient Observations alert;cooperative;agree to therapy  -RD        Patient/Family/Caregiver Comments/Observations none  -RD        Patient Effort good  -RD                  General Information    Patient Profile Reviewed yes  -RD        Pertinent History Of Current Problem Adm w/ r/o CVA- L numbness. Failed RN dysphagia screen. Per stroke protocol. Hx of hepatocellular carcinoma, mets to bone s/p RXT, cirrhosis, chronic hep C, ESRD, s/p L BKA, DM 2. MRI revealed no acute infarct.  -RD        Precautions/Limitations, Vision corrective lenses needed for reading  -RD        Precautions/Limitations, Hearing WFL;for purposes of eval  -RD        Patient Level of Education college  -RD        Prior Level of Function-Communication unknown  -RD        Plans/Goals Discussed with patient;agreed upon  -RD        Barriers to Rehab none identified  -RD        Patient's Goals for Discharge return to all previous  roles/activities;functional cognition  -RD                  Pain    Additional Documentation Pain Scale: Numbers Pre/Post-Treatment (Group)  -RD                  Pain Scale: Numbers Pre/Post-Treatment    Pretreatment Pain Rating 5/10  -RD        Posttreatment Pain Rating 5/10  -RD        Pain Location generalized  -RD        Pain Location - other (see comments)  stomach  -RD        Pre/Posttreatment Pain Comment RN aware  -RD        Pain Intervention(s) Repositioned  -RD                  Comprehension Assessment/Intervention    Comprehension Assessment/Intervention Auditory Comprehension;Reading Comprehension  -RD                  Auditory Comprehension Assessment/Intervention    Auditory Comprehension (Communication) WFL  -RD        Able to Identify Objects/Pictures (Communication) WFL;pictures of common objects  -RD        Answers Questions (Communication) WFL;complex;yes/no  -RD        Able to Follow Commands (Communication) WFL;multi-step  -RD        Narrative Discourse simple paragraph level;WFL;conversational level  -RD                  Reading Comprehension Assessment/Intervention    Reading Comprehension (Communication) WFL  -RD        Scanning (Reading) WFL;paragraphs  -RD        Paragraph Level WFL  -RD        Functional Reading Tasks WFL  -RD                  Expression Assessment/Intervention    Expression Assessment/Intervention verbal expression;graphic expression  -RD                  Verbal Expression Assessment/Intervention    Verbal Expression WFL  -RD        Automatic Speech (Communication) WFL;response to greeting  -RD        Repetition WFL;sentences  -RD        Phrase Completion WFL;automatic/predictable;unpredictable  -RD        Responsive Naming WFL  -RD        Confrontational Naming WFL;high frequency;low frequency  -RD        Spontaneous/Functional Words WFL  -RD        Sentence Formulation WFL  -RD        Conversational Discourse/Fluency WFL  -RD                  Graphic Expression  Assessment/Intervention    Graphic Expression WFL  -RD        Graphic Expression to Dictation WFL;sentences  -RD        Functional Correspondence WFL  -RD        Graphic Expression, Comment pt able to construct a sentence using two key words w/o difficulty  -RD                  Motor Speech Assessment/Intervention    Motor Speech Function WFL  -RD        Motor Speech, Comment no dysarthria or apraxia. Speech was 100% intelligible in conversation  -RD                  Cognitive Assessment Intervention- SLP    Cognitive Function (Cognition) mild impairment  -RD        Orientation Status (Cognition) mild impairment;time;WFL;person;place;situation  -RD        Memory (Cognitive) mild impairment;moderate impairment;short-term;immediate;delayed  -RD        Attention (Cognitive) WFL  -RD        Thought Organization (Cognitive) mild impairment;moderate impairment;abstract convergent;drawing conclusions;mental manipulation;concrete divergent;WFL;concrete convergent  -RD        Reasoning (Cognitive) WFL;analogies;simple  -RD        Problem Solving (Cognitive) mild impairment;temporal  -RD        Functional Math (Cognitive) mild impairment;word problems  -RD        Executive Function (Cognition) WFL  -RD        Pragmatics (Communication) WFL  -RD        Right Hemisphere Function WFL  -RD                  SLP Evaluation Clinical Impressions    SLP Diagnosis Mild cognitive-linguistic deficit impacting memory, thought organization, and mental manipulation. Not at baseline. Otherwise language, motor speech, reading, & writing were WFL. Will address deficits during treatment.  -RD        Rehab Potential/Prognosis good  -RD        SLC Criteria for Skilled Therapy Interventions Met yes  -RD                  Recommendations    Therapy Frequency (SLP SLC) 5 days per week  -RD        Predicted Duration Therapy Intervention (Days) until discharge  -RD        Anticipated Discharge Disposition (SLP) unknown;anticipate therapy at next level  of care  -RD              User Key  (r) = Recorded By, (t) = Taken By, (c) = Cosigned By    Initials Name Effective Dates    Jen Sun MS CCC-SLP 06/16/21 -                    EDUCATION  The patient has been educated in the following areas:     Cognitive Impairment Communication Impairment.           SLP GOALS     Row Name 05/24/22 0966             Orientation Goal 1 (SLP)    Improve Orientation Through Goal 1 (SLP) demonstrating orientation to day;demonstrating orientation to month;demonstrating orientation to year;100%;independently (over 90% accuracy)  -RD      Time Frame (Orientation Goal 1, SLP) short term goal (STG)  -RD              Memory Skills Goal 1 (SLP)    Improve Memory Skills Through Goal 1 (SLP) recalling unrelated word lists immediately;recalling unrelated word lists with an imposed delay;recall details of the day;90%;with minimal cues (75-90%)  -RD      Time Frame (Memory Skills Goal 1, SLP) short term goal (STG)  -RD              Organizational Skills Goal 1 (SLP)    Improve Thought Organization Through Goal 1 (SLP) completing a divergent naming task;completing a convergent naming task;drawing conclusions;completing mental manipulation task;abstract;80%;with minimal cues (75-90%)  -RD      Time Frame (Thought Organization Skills Goal 1, SLP) short term goal (STG)  -RD              Functional Problem Solving Skills Goal 1 (SLP)    Improve Problem Solving Through Goal 1 (SLP) sequence steps in a task;80%;with minimal cues (75-90%)  -RD      Time Frame (Problem Solving Goal 1, SLP) short term goal (STG)  -RD              Additional Goal 1 (SLP)    Additional Goal 1, SLP LTG: pt will improve cognitive-linguistic skills to actively participate in care w/ 90% accuracy w/o cues  -RD      Time Frame (Additional Goal 1, SLP) by discharge  -RD            User Key  (r) = Recorded By, (t) = Taken By, (c) = Cosigned By    Initials Name Provider Type    Jen Sun MS CCC-SLP Speech and  Language Pathologist                        Time Calculation:      Time Calculation- SLP     Row Name 22 1048             Time Calculation- SLP    SLP Start Time 0930  -RD      SLP Received On 22  -RD              Untimed Charges    32602-CR Eval Speech and Production w/ Language Minutes 40  -RD      [id number] Eval Oral Pharyng Swallow Minutes 38  -RD              Total Minutes    Untimed Charges Total Minutes 78  -RD       Total Minutes 78  -RD            User Key  (r) = Recorded By, (t) = Taken By, (c) = Cosigned By    Initials Name Provider Type    Jen Sun, MS CCC-SLP Speech and Language Pathologist                Therapy Charges for Today     Code Description Service Date Service Provider Modifiers Qty    77142465155 HC ST EVAL ORAL PHARYNG SWALLOW 3 2022 Jen Santana, MS CCC-SLP GN 1    32661606357 HC ST EVAL SPEECH AND PROD W LANG  3 2022 Jen Santana MS CCC-SLP GN 1                     MS JESUS Mcmullen  2022 and Acute Care - Speech Language Pathology   Swallow Initial Evaluation [facility]     Patient Name: Jeaneth Keita  : 1960  MRN: [id number]  Today's Date: 2022               Admit Date: 2022    Visit Dx:     ICD-10-CM ICD-9-CM   1. Stroke-like symptoms  R29.90 781.99   2. History of stroke  Z86.73 V12.54   3. Anemia, unspecified type  D64.9 285.9   4. End stage renal disease on dialysis (HCC)  N18.6 585.6    Z99.2 V45.11   5. Cognitive communication deficit  R41.841 799.52     Patient Active Problem List   Diagnosis   • ICH (intracerebral hemorrhage)   • Hepatocellular carcinoma metastatic to bone s/p RXT    • Hemochromatosis   • Cirrhosis of liver (HCC)   • Chronic Hep C    • ESRD (end stage renal disease)   • Chronic ulcer of right foot   • S/P left BKA   • GERD (gastroesophageal reflux disease)   • Chronic pain on Methadone   • Essential hypertension   • Type 2 diabetes mellitus (HCC)   • Right lower lobe  pneumonia   • AMS (altered mental status)   • Colitis   • Normocytic anemia   • Hypokalemia   • CVA (cerebral vascular accident) (HCC)   • Stroke-like symptoms     Past Medical History:   Diagnosis Date   • Anxiety    • DDD (degenerative disc disease), lumbar    • Depression    • Diabetes mellitus (HCC)    • Fibromyalgia    • Hemochromatosis    • Hep B w/o coma, chronic, w/o delta (HCC)    • Hep C w/o coma, chronic (HCC)    • Hypertension    • Vertigo      Past Surgical History:   Procedure Laterality Date   • ARTERIOVENOUS FISTULA/SHUNT SURGERY Left 10/16/2021    Procedure: UPPER EXTREMITY ARTERIOVENOUS FISTULA FORMATION LEFT;  Surgeon: Johnny Rousseau MD;  Location: [facility];  Service: Vascular;  Laterality: Left;   • BACK SURGERY     • BELOW KNEE LEG AMPUTATION     •  SECTION     • FOOT SURGERY     • KNEE SURGERY     • ROTATOR CUFF REPAIR     • SPINAL CORD STIMULATOR IMPLANT         SLP Recommendation and Plan  SLP Swallowing Diagnosis: swallow WFL (22)  SLP Diet Recommendation: regular textures, thin liquids (22)  Recommended Precautions and Strategies: upright posture during/after eating, general aspiration precautions (22)  SLP Rec. for Method of Medication Administration: meds whole, with thin liquids, with pudding or applesauce, as tolerated (22)     Monitor for Signs of Aspiration: yes, notify SLP if any concerns (22)     Swallow Criteria for Skilled Therapeutic Interventions Met: no problems identified which require skilled intervention, baseline status (22)  Anticipated Discharge Disposition (SLP): unknown, anticipate therapy at next level of care (22)  Rehab Potential/Prognosis, Swallowing: good, to achieve stated therapy goals (22)  Therapy Frequency (Swallow): evaluation only (22)  Predicted Duration Therapy Intervention (Days): until discharge (22)                                   Plan of Care Reviewed With: patient      SWALLOW EVALUATION (last 72 hours)     SLP Adult Swallow Evaluation     Row Name 05/24/22 0930                   General Information    Current Method of Nutrition NPO  -RD        Prior Level of Function-Communication unknown  -RD        Prior Level of Function-Swallowing no diet consistency restrictions  -RD        Patient's Goals for Discharge return to PO diet;return to regular diet  -RD                  Oral Motor Structure and Function    Dentition Assessment missing teeth  -RD        Secretion Management WNL/WFL  -RD        Mucosal Quality moist, healthy  -RD        Volitional Swallow WFL  -RD        Volitional Cough WFL  -RD                  Oral Musculature and Cranial Nerve Assessment    Oral Motor General Assessment WFL  -RD                  General Eating/Swallowing Observations    Respiratory Support Currently in Use room air  -RD        Eating/Swallowing Skills fed by SLP;self-fed;appropriate self-feeding skills observed  -RD        Positioning During Eating upright 90 degree;upright in bed  -RD        Utensils Used spoon;cup;straw  -RD        Consistencies Trialed thin liquids;pureed;regular textures  -RD                  Respiratory    Respiratory Status room air  -RD                  Clinical Swallow Eval    Oral Prep Phase WFL  incr'd prep only 2' dentition, but mastication adequate  -RD        Oral Residue WFL  -RD        Pharyngeal Phase no overt signs/symptoms of pharyngeal impairment  -RD        Esophageal Phase unremarkable  -RD        Clinical Swallow Evaluation Summary No overt s/s of aspiration w/ any trial or consistency, even when pushed w/ multiple consecutive straw sips of thins & 3oz water swallow. Adequate mastication of regular solids despite edentulous status. No further SLP dysphagia needs at this time.  -RD                  SLP Evaluation Clinical Impression    SLP Swallowing Diagnosis swallow WFL  -RD        Functional Impact no impact  on function  -RD        Rehab Potential/Prognosis, Swallowing good, to achieve stated therapy goals  -RD        Swallow Criteria for Skilled Therapeutic Interventions Met no problems identified which require skilled intervention;baseline status  -RD                  Recommendations    Therapy Frequency (Swallow) evaluation only  -RD        SLP Diet Recommendation regular textures;thin liquids  -RD        Recommended Precautions and Strategies upright posture during/after eating;general aspiration precautions  -RD        Oral Care Recommendations Oral Care BID/PRN  -RD        SLP Rec. for Method of Medication Administration meds whole;with thin liquids;with pudding or applesauce;as tolerated  -RD        Monitor for Signs of Aspiration yes;notify SLP if any concerns  -RD              User Key  (r) = Recorded By, (t) = Taken By, (c) = Cosigned By    Initials Name Effective Dates    RD Jen Santana, MS CCC-SLP 06/16/21 -                 EDUCATION  The patient has been educated in the following areas:   Dysphagia (Swallowing Impairment) Oral Care/Hydration Modified Diet Instruction.        SLP GOALS     Row Name 05/24/22 0930             Orientation Goal 1 (SLP)    Improve Orientation Through Goal 1 (SLP) demonstrating orientation to day;demonstrating orientation to month;demonstrating orientation to year;100%;independently (over 90% accuracy)  -RD      Time Frame (Orientation Goal 1, SLP) short term goal (STG)  -RD              Memory Skills Goal 1 (SLP)    Improve Memory Skills Through Goal 1 (SLP) recalling unrelated word lists immediately;recalling unrelated word lists with an imposed delay;recall details of the day;90%;with minimal cues (75-90%)  -RD      Time Frame (Memory Skills Goal 1, SLP) short term goal (STG)  -RD              Organizational Skills Goal 1 (SLP)    Improve Thought Organization Through Goal 1 (SLP) completing a divergent naming task;completing a convergent naming task;drawing  conclusions;completing mental manipulation task;abstract;80%;with minimal cues (75-90%)  -RD      Time Frame (Thought Organization Skills Goal 1, SLP) short term goal (STG)  -RD              Functional Problem Solving Skills Goal 1 (SLP)    Improve Problem Solving Through Goal 1 (SLP) sequence steps in a task;80%;with minimal cues (75-90%)  -RD      Time Frame (Problem Solving Goal 1, SLP) short term goal (STG)  -RD              Additional Goal 1 (SLP)    Additional Goal 1, SLP LTG: pt will improve cognitive-linguistic skills to actively participate in care w/ 90% accuracy w/o cues  -RD      Time Frame (Additional Goal 1, SLP) by discharge  -RD            User Key  (r) = Recorded By, (t) = Taken By, (c) = Cosigned By    Initials Name Provider Type    Jen Sun MS CCC-SLP Speech and Language Pathologist                   Time Calculation:    Time Calculation- SLP     Row Name 05/24/22 1048             Time Calculation- SLP    SLP Start Time 0930  -RD      SLP Received On 05/24/22  -RD              Untimed Charges    [id number]-JC Eval Speech and Production w/ Language Minutes 40  -RD      [id number]-FM Eval Oral Pharyng Swallow Minutes 38  -RD              Total Minutes    Untimed Charges Total Minutes 78  -RD       Total Minutes 78  -RD            User Key  (r) = Recorded By, (t) = Taken By, (c) = Cosigned By    Initials Name Provider Type    Jen Sun MS CCC-SLP Speech and Language Pathologist                Therapy Charges for Today     Code Description Service Date Service Provider Modifiers Qty    99153776382 HC ST EVAL ORAL PHARYNG SWALLOW 3 5/24/2022 Jen Santana MS CCC-SLP GN 1    34688220475 HC ST EVAL SPEECH AND PROD W LANG  3 5/24/2022 Jen Santana MS CCC-SLP GN 1             Patient was not wearing a face mask and did exhibit coughing during this therapy encounter.  Procedure performed was aerosolizing, involved close contact (within 6 feet for at least 15 minutes or longer),  and did not involve contact with infectious secretions or specimens.  Therapist used appropriate personal protective equipment including gloves, standard procedure mask and eye protection.  Appropriate PPE was worn during the entire therapy session.  Hand hygiene was completed before and after therapy session.   Jen Santana MS CCC-SLP  5/24/2022

## 2022-05-24 NOTE — CASE MANAGEMENT/SOCIAL WORK
Continued Stay Note  [facility]     Patient Name: Jeaneth Keita  MRN: [id number]  Today's Date: 5/24/2022    Admit Date: 5/23/2022     Discharge Plan     Row Name 05/24/22 1109       Plan    Plan [facility]    Patient/Family in Agreement with Plan yes    Plan Comments Spoke with patient at bedside. Patient wants to return to [facility]. CM called and left a voicemail for Felisa at [facility]. Plan is back to [facility] pending PT/OT recs. CM will continue to follow.    Final Discharge Disposition Code 01 - home or self-care               Discharge Codes    No documentation.                     Johnny Ware RN

## 2022-05-24 NOTE — PLAN OF CARE
Goal Outcome Evaluation:  Plan of Care Reviewed With: patient   SLP evaluations completed. Will address cognitive deficit during treatment. Will sign-off on swallowing as no further dysphagia needs at this time. Please see note for further details and recommendations.

## 2022-05-24 NOTE — CONSULTS
Referring Provider: Padmini Aguilar  Reason for Consultation: ESRD    Subjective     Chief complaint Left facial numbness    History of present illness:  This is 61 year old man with past medical hx of ESRD on HD [facility], HTN, DM, Chronic hepatitis C, Familial hemochromatosis,cirrhosis and Hepatocellular carcinoma with bone mets, left BKD who presented to ED with left facial numbness. Patient is poor historian. Denies any CP , SOA, headache, nausea or vomiting. Nephrology service has sundar consulted for ESRD management.Mmissed dialysis yesterday     History  Past Medical History:   Diagnosis Date   • Anxiety    • DDD (degenerative disc disease), lumbar    • Depression    • Diabetes mellitus (HCC)    • Fibromyalgia    • Hemochromatosis    • Hep B w/o coma, chronic, w/o delta (HCC)    • Hep C w/o coma, chronic (HCC)    • Hypertension    • Vertigo    ,   Past Surgical History:   Procedure Laterality Date   • ARTERIOVENOUS FISTULA/SHUNT SURGERY Left 10/16/2021    Procedure: UPPER EXTREMITY ARTERIOVENOUS FISTULA FORMATION LEFT;  Surgeon: Johnny Rousseau MD;  Location: [facility];  Service: Vascular;  Laterality: Left;   • BACK SURGERY     • BELOW KNEE LEG AMPUTATION     •  SECTION     • FOOT SURGERY     • KNEE SURGERY     • ROTATOR CUFF REPAIR     • SPINAL CORD STIMULATOR IMPLANT     , No family history on file.,   Social History     Socioeconomic History   • Marital status:    Tobacco Use   • Smoking status: Never Smoker   • Smokeless tobacco: Never Used   Vaping Use   • Vaping Use: Never used   Substance and Sexual Activity   • Alcohol use: No   • Drug use: No   • Sexual activity: Never     E-cigarette/Vaping   • E-cigarette/Vaping Use Never User      E-cigarette/Vaping Substances     E-cigarette/Vaping Devices       ,   Medications Prior to Admission   Medication Sig Dispense Refill Last Dose   • acetaminophen (TYLENOL) 325 MG tablet Take 650 mg by mouth Every 6 (Six) Hours As Needed for Mild  Pain .   5/22/2022 at Unknown time   • amLODIPine (NORVASC) 10 MG tablet Take 1 tablet by mouth Daily.   5/22/2022 at Unknown time   • atorvastatin (LIPITOR) 80 MG tablet Take 80 mg by mouth Daily.   5/22/2022 at Unknown time   • B Complex-C (B Complex-Vitamin C) capsule Take  by mouth Daily.   5/22/2022 at Unknown time   • calcium acetate (PHOS BINDER,) 667 MG capsule capsule Take 1 capsule by mouth 3 (Three) Times a Day With Meals. 180 capsule  5/22/2022 at Unknown time   • carvedilol (COREG) 25 MG tablet Take 25 mg by mouth 2 (two) times a day with meals.   5/22/2022 at Unknown time   • ondansetron (ZOFRAN) 4 MG tablet Take 4 mg by mouth Every 6 (Six) Hours As Needed for Nausea or Vomiting.   5/22/2022 at Unknown time   • polyethylene glycol (MIRALAX) 17 g packet Take 17 g by mouth Daily.   5/22/2022 at Unknown time   • QUEtiapine (SEROquel) 25 MG tablet Take 1 tablet by mouth At Night As Needed (insomnia). 15 tablet 0 5/22/2022 at Unknown time   • sennosides-docusate (PERICOLACE) 8.6-50 MG per tablet Take 2 tablets by mouth 2 (Two) Times a Day.   5/22/2022 at Unknown time   • venlafaxine XR (EFFEXOR-XR) 75 MG 24 hr capsule Take 75 mg by mouth Daily.   5/22/2022 at Unknown time   • vitamin D (ERGOCALCIFEROL) 1.25 MG (50276 UT) capsule capsule Take 50,000 Units by mouth 1 (One) Time Per Week.   5/22/2022 at Unknown time   • loperamide (IMODIUM) 2 MG capsule Take 2 mg by mouth 4 (Four) Times a Day As Needed for Diarrhea. Take 2 capsules by mouth daily times one, then 1 capsule by mouth after each loose stool as needed.  Max 4 capsules in 24 hours.   Unknown at Unknown time   , Scheduled Meds:  senna-docusate sodium, 2 tablet, Oral, BID  sodium chloride, 10 mL, Intravenous, Q12H    , Continuous Infusions:  Pharmacy Consult,     , PRN Meds:  •  acetaminophen **OR** acetaminophen **OR** acetaminophen  •  senna-docusate sodium **AND** polyethylene glycol **AND** bisacodyl **AND** bisacodyl  •  ondansetron **OR**  ondansetron  •  Pharmacy Consult  •  QUEtiapine  •  sodium chloride  •  sodium chloride and Allergies:  Sulfa antibiotics    Review of Systems  Pertinent items are noted in HPI    Objective     Vital Signs  Temp:  [97.7 °F (36.5 °C)-98.2 °F (36.8 °C)] 98.2 °F (36.8 °C)  Heart Rate:  [73-90] 85  Resp:  [16-18] 18  BP: (129-153)/(70-85) 132/70    No intake/output data recorded.  I/O last 3 completed shifts:  In: -   Out: 200 [Urine:200]    Physical Exam:  General Appearance:    Alert, cooperative, in no acute distress   Head:    Normocephalic, without obvious abnormality, atraumatic   Eyes:            Conjunctivae and sclerae normal, no   icterus, no pallor, corneas clear, PERRLA           Neck:   Supple, trachea midline, no thyromegaly,  no JVD       Lungs:     Clear to auscultation,respirations regular, even and               unlabored    Heart:    Regular rhythm and normal rate, normal S1 and S2, no       murmur, no gallop, no rub, no click       Abdomen:     Normal bowel sounds,soft, non-tender, non-distended, no guarding, no rebound tenderness       Extremities:   Moves all extremities well, no edema, no cyanosis, no         redness   Pulses:   Pulses palpable and equal bilaterally           Neurologic:   Cranial nerves 2 - 12 grossly intact, no focal deficit         Results Review:   I reviewed the patient's new clinical results.    WBC WBC   Date Value Ref Range Status   05/24/2022 5.21 3.40 - 10.80 10*3/mm3 Final   05/23/2022 6.01 3.40 - 10.80 10*3/mm3 Final      HGB Hemoglobin   Date Value Ref Range Status   05/24/2022 7.5 (L) 12.0 - 15.9 g/dL Final   05/23/2022 9.2 (L) 12.0 - 15.9 g/dL Final   05/23/2022 8.5 (L) 12.0 - 17.0 g/dL Final     Comment:     Serial Number: [id number]:  [id number]      HCT Hematocrit   Date Value Ref Range Status   05/24/2022 20.4 (C) 34.0 - 46.6 % Final   05/23/2022 25.7 (L) 34.0 - 46.6 % Final   05/23/2022 25 (L) 38 - 51 % Final      Platlets No results found for: LABPLAT   MCV  MCV   Date Value Ref Range Status   05/24/2022 89.9 79.0 - 97.0 fL Final   05/23/2022 91.1 79.0 - 97.0 fL Final          Sodium Sodium   Date Value Ref Range Status   05/24/2022 138 136 - 145 mmol/L Final   05/23/2022 136 136 - 145 mmol/L Final      Potassium Potassium   Date Value Ref Range Status   05/24/2022 4.7 3.5 - 5.2 mmol/L Final   05/23/2022 4.3 3.5 - 5.2 mmol/L Final      Chloride Chloride   Date Value Ref Range Status   05/24/2022 100 98 - 107 mmol/L Final   05/23/2022 99 98 - 107 mmol/L Final      CO2 CO2   Date Value Ref Range Status   05/24/2022 21.0 (L) 22.0 - 29.0 mmol/L Final   05/23/2022 24.0 22.0 - 29.0 mmol/L Final      BUN BUN   Date Value Ref Range Status   05/24/2022 58 (H) 8 - 23 mg/dL Final   05/23/2022 56 (H) 8 - 23 mg/dL Final      Creatinine Creatinine   Date Value Ref Range Status   05/24/2022 6.15 (H) 0.57 - 1.00 mg/dL Final   05/23/2022 5.93 (H) 0.57 - 1.00 mg/dL Final   05/23/2022 6.60 (H) 0.60 - 1.30 mg/dL Final      Calcium Calcium   Date Value Ref Range Status   05/24/2022 8.9 8.6 - 10.5 mg/dL Final   05/23/2022 9.8 8.6 - 10.5 mg/dL Final      PO4 No results found for: CAPO4   Albumin Albumin   Date Value Ref Range Status   05/23/2022 4.20 3.50 - 5.20 g/dL Final      Magnesium No results found for: MG   Uric Acid No results found for: URICACID         senna-docusate sodium, 2 tablet, Oral, BID  sodium chloride, 10 mL, Intravenous, Q12H      Pharmacy Consult,         Assessment & Plan       Hepatocellular carcinoma metastatic to bone s/p RXT     Hemochromatosis    Cirrhosis of liver (HCC)    Chronic Hep C     ESRD (end stage renal disease)    S/P left BKA    Chronic pain on Methadone    Type 2 diabetes mellitus (HCC)    CVA (cerebral vascular accident) (HCC)    Stroke-like symptoms         1- ESRD - MWF - [facility]   2- HTN   3- Mild hypokalemia   4- Anemia of chronic disease   5- Familial hemochromatosis   6- Hx of Metastatic hepatocellular carcinoma      Plan:  - HD today and  tomorrow, UF as tolerated.   - Renal diet.   - Transfuse for HGb less than 7.0      I discussed the patients findings and my recommendations with nursing staff      Panchito Austin MD  05/24/22

## 2022-05-24 NOTE — PLAN OF CARE
Goal Outcome Evaluation:  Plan of Care Reviewed With: patient      Pt awake most of the night. VSS. NSR. RA. NIH 1. Pt remains NPO until evaluated by speech. Pt remains confused to situation and forgetful, but easily redirected. Will continue to monitor.   Problem: Adult Inpatient Plan of Care  Goal: Plan of Care Review  Outcome: Ongoing, Progressing  Flowsheets (Taken 5/24/2022 9526)  Progress: no change  Plan of Care Reviewed With: patient       Progress: no change

## 2022-05-25 ENCOUNTER — APPOINTMENT (OUTPATIENT)
Dept: NEPHROLOGY | Facility: HOSPITAL | Age: 62
End: 2022-05-25

## 2022-05-25 VITALS
SYSTOLIC BLOOD PRESSURE: 132 MMHG | BODY MASS INDEX: 24.23 KG/M2 | HEART RATE: 95 BPM | DIASTOLIC BLOOD PRESSURE: 79 MMHG | TEMPERATURE: 98.3 F | RESPIRATION RATE: 18 BRPM | OXYGEN SATURATION: 95 % | WEIGHT: 159.9 LBS | HEIGHT: 68 IN

## 2022-05-25 PROBLEM — E16.2 HYPOGLYCEMIA: Status: ACTIVE | Noted: 2022-05-25

## 2022-05-25 PROBLEM — Z86.39 HISTORY OF HYPOGLYCEMIA: Status: ACTIVE | Noted: 2022-05-25

## 2022-05-25 LAB
GLUCOSE BLDC GLUCOMTR-MCNC: 125 MG/DL (ref 70–130)
GLUCOSE BLDC GLUCOMTR-MCNC: 295 MG/DL (ref 70–130)
HCT VFR BLD AUTO: 23.8 % (ref 34–46.6)
HGB BLD-MCNC: 8.8 G/DL (ref 12–15.9)

## 2022-05-25 PROCEDURE — 97162 PT EVAL MOD COMPLEX 30 MIN: CPT

## 2022-05-25 PROCEDURE — 25010000002 HEPARIN (PORCINE) PER 1000 UNITS: Performed by: INTERNAL MEDICINE

## 2022-05-25 PROCEDURE — 85014 HEMATOCRIT: CPT | Performed by: INTERNAL MEDICINE

## 2022-05-25 PROCEDURE — 85018 HEMOGLOBIN: CPT | Performed by: INTERNAL MEDICINE

## 2022-05-25 PROCEDURE — 99217 PR OBSERVATION CARE DISCHARGE MANAGEMENT: CPT | Performed by: HOSPITALIST

## 2022-05-25 PROCEDURE — 82962 GLUCOSE BLOOD TEST: CPT

## 2022-05-25 PROCEDURE — G0378 HOSPITAL OBSERVATION PER HR: HCPCS

## 2022-05-25 PROCEDURE — 63710000001 INSULIN LISPRO (HUMAN) PER 5 UNITS: Performed by: HOSPITALIST

## 2022-05-25 PROCEDURE — G0257 UNSCHED DIALYSIS ESRD PT HOS: HCPCS

## 2022-05-25 RX ORDER — NICOTINE POLACRILEX 4 MG
15 LOZENGE BUCCAL
Status: DISCONTINUED | OUTPATIENT
Start: 2022-05-25 | End: 2022-05-25 | Stop reason: HOSPADM

## 2022-05-25 RX ORDER — DEXTROSE MONOHYDRATE 25 G/50ML
25 INJECTION, SOLUTION INTRAVENOUS
Status: DISCONTINUED | OUTPATIENT
Start: 2022-05-25 | End: 2022-05-25 | Stop reason: HOSPADM

## 2022-05-25 RX ORDER — HUMAN INSULIN 100 [IU]/ML
1 INJECTION, SUSPENSION SUBCUTANEOUS
Qty: 0.6 ML | Refills: 0 | Status: SHIPPED | OUTPATIENT
Start: 2022-05-25 | End: 2022-06-24

## 2022-05-25 RX ORDER — INSULIN LISPRO 100 [IU]/ML
0-7 INJECTION, SOLUTION INTRAVENOUS; SUBCUTANEOUS
Status: DISCONTINUED | OUTPATIENT
Start: 2022-05-25 | End: 2022-05-25 | Stop reason: HOSPADM

## 2022-05-25 RX ORDER — INSULIN LISPRO 100 [IU]/ML
0-7 INJECTION, SOLUTION INTRAVENOUS; SUBCUTANEOUS
Qty: 630 UNITS | Refills: 0 | Status: SHIPPED | OUTPATIENT
Start: 2022-05-25 | End: 2022-06-24

## 2022-05-25 RX ADMIN — HEPARIN SODIUM 10000 UNITS: 1000 INJECTION INTRAVENOUS; SUBCUTANEOUS at 10:49

## 2022-05-25 RX ADMIN — VENLAFAXINE HYDROCHLORIDE 75 MG: 75 CAPSULE, EXTENDED RELEASE ORAL at 12:22

## 2022-05-25 RX ADMIN — INSULIN LISPRO 4 UNITS: 100 INJECTION, SOLUTION INTRAVENOUS; SUBCUTANEOUS at 12:21

## 2022-05-25 RX ADMIN — SENNOSIDES AND DOCUSATE SODIUM 2 TABLET: 50; 8.6 TABLET ORAL at 12:22

## 2022-05-25 RX ADMIN — CARVEDILOL 25 MG: 12.5 TABLET, FILM COATED ORAL at 12:22

## 2022-05-25 RX ADMIN — Medication 10 ML: at 12:22

## 2022-05-25 RX ADMIN — AMLODIPINE BESYLATE 10 MG: 10 TABLET ORAL at 12:22

## 2022-05-25 NOTE — THERAPY EVALUATION
Patient Name: Jeaneth Keita  : 1960    MRN: [id number]                              Today's Date: 2022       Admit Date: 2022    Visit Dx:     ICD-10-CM ICD-9-CM   1. Stroke-like symptoms  R29.90 781.99   2. History of stroke  Z86.73 V12.54   3. Anemia, unspecified type  D64.9 285.9   4. End stage renal disease on dialysis (HCC)  N18.6 585.6    Z99.2 V45.11   5. Cognitive communication deficit  R41.841 799.52     Patient Active Problem List   Diagnosis   • ICH (intracerebral hemorrhage)   • Hepatocellular carcinoma metastatic to bone s/p RXT    • Hemochromatosis   • Cirrhosis of liver (HCC)   • Chronic Hep C    • ESRD (end stage renal disease)   • Chronic ulcer of right foot   • S/P left BKA   • GERD (gastroesophageal reflux disease)   • Chronic pain on Methadone   • Essential hypertension   • Type 2 diabetes mellitus (HCC)   • Right lower lobe pneumonia   • AMS (altered mental status)   • Colitis   • Normocytic anemia   • Hypokalemia   • CVA (cerebral vascular accident) (HCC)   • Stroke-like symptoms   • History of hypoglycemia   • Hypoglycemia     Past Medical History:   Diagnosis Date   • Anxiety    • DDD (degenerative disc disease), lumbar    • Depression    • Diabetes mellitus (HCC)    • Fibromyalgia    • Hemochromatosis    • Hep B w/o coma, chronic, w/o delta (HCC)    • Hep C w/o coma, chronic (HCC)    • Hypertension    • Vertigo      Past Surgical History:   Procedure Laterality Date   • ARTERIOVENOUS FISTULA/SHUNT SURGERY Left 10/16/2021    Procedure: UPPER EXTREMITY ARTERIOVENOUS FISTULA FORMATION LEFT;  Surgeon: Johnny Rousseau MD;  Location: [facility];  Service: Vascular;  Laterality: Left;   • BACK SURGERY     • BELOW KNEE LEG AMPUTATION     •  SECTION     • FOOT SURGERY     • KNEE SURGERY     • ROTATOR CUFF REPAIR     • SPINAL CORD STIMULATOR IMPLANT        General Information     Row Name 22 1120          Physical Therapy Time and Intention    Document Type  evaluation  -MB     Mode of Treatment physical therapy  -MB     Row Name 05/25/22 1120          General Information    Patient Profile Reviewed yes  -MB     Prior Level of Function independent:;bed mobility;ADL's;transfer;all household mobility;w/c or scooter  -MB     Existing Precautions/Restrictions fall  remote L BKA  -MB     Barriers to Rehab medically complex;previous functional deficit  -MB     Row Name 05/25/22 1120          Living Environment    People in Home facility resident  -MB     Row Name 05/25/22 1120          Home Main Entrance    Number of Stairs, Main Entrance none  -MB     Row Name 05/25/22 1120          Stairs Within Home, Primary    Number of Stairs, Within Home, Primary none  -MB     Row Name 05/25/22 1120          Cognition    Orientation Status (Cognition) oriented to;person;situation;verbal cues/prompts needed for orientation;time  -MB     Row Name 05/25/22 1120          Safety Issues, Functional Mobility    Safety Issues Affecting Function (Mobility) insight into deficits/self-awareness;positioning of assistive device;safety precaution awareness;safety precautions follow-through/compliance;sequencing abilities  -MB     Impairments Affecting Function (Mobility) balance;endurance/activity tolerance;pain;postural/trunk control;sensation/sensory awareness;strength  -MB           User Key  (r) = Recorded By, (t) = Taken By, (c) = Cosigned By    Initials Name Provider Type    Lupe Diaz, PT Physical Therapist               Mobility     Row Name 05/25/22 1120          Bed Mobility    Supine-Sit [place] (Bed Mobility) modified independence  -MB     Sit-Supine [place] (Bed Mobility) not tested  -MB     Assistive Device (Bed Mobility) bed rails;head of bed elevated  -MB     Comment, (Bed Mobility) Pt. advanced to EOB w/out assist w/ good safety awareness.  -MB     Row Name 05/25/22 1120          Transfers    Comment, (Transfers) STS and SPT to patient R side w/ VCs for safety  and sequencing. No LOB observed.  -MB     Row Name 05/25/22 1120          Bed-Chair Transfer    Bed-Chair [place] (Transfers) contact guard  -MB     Row Name 05/25/22 1120          Sit-Stand Transfer    Sit-Stand [place] (Transfers) contact guard;verbal cues  -MB     Assistive Device (Sit-Stand Transfers) walker, front-wheeled  -MB     Row Name 05/25/22 1120          Gait/Stairs (Locomotion)    [place] Level (Gait) not tested  -MB     Comment, (Gait/Stairs) Pt uses w/c for primary mobility at baseline.  -MB           User Key  (r) = Recorded By, (t) = Taken By, (c) = Cosigned By    Initials Name Provider Type    MB Lupe Linares, PT Physical Therapist               Obj/Interventions     [facility] Name 05/25/22 1416          Range of Motion Comprehensive    General Range of Motion bilateral lower extremity ROM WFL  -[facility] Name 05/25/22 1416          Strength Comprehensive (MMT)    General Manual Muscle Testing (MMT) Assessment upper extremity strength deficits identified  -MB     Comment, General Manual Muscle Testing (MMT) Assessment RLE grossly 4/5, L residual limb 3+/5  -[facility] Name 05/25/22 1416          Motor Skills    Therapeutic Exercise hip;knee;ankle  -[facility] Name 05/25/22 1416          Hip (Therapeutic Exercise)    Hip (Therapeutic Exercise) strengthening exercise;isometric exercises  -MB     Hip Isometrics (Therapeutic Exercise) bilateral;gluteal sets  -MB     Hip Strengthening (Therapeutic Exercise) bilateral;marching while seated  -[facility] Name 05/25/22 1416          Knee (Therapeutic Exercise)    Knee (Therapeutic Exercise) strengthening exercise  -MB     Knee Strengthening (Therapeutic Exercise) right;LAQ (long arc quad);10 repetitions  -[facility] Name 05/25/22 1416          Ankle (Therapeutic Exercise)    Ankle (Therapeutic Exercise) strengthening exercise  -MB     Ankle Strengthening (Therapeutic Exercise) right;dorsiflexion;plantarflexion  -[facility] Name 05/25/22  1416          Balance    Dynamic Sitting Balance supervision  -MB     Position, Sitting Balance unsupported;sitting edge of bed  -MB     Static Standing Balance contact guard  -MB     Dynamic Standing Balance contact guard  -MB     Row Name 05/25/22 1416          Sensory Assessment (Somatosensory)    Sensory Assessment (Somatosensory) right LE  -MB     Right LE Sensory Assessment light touch awareness;impaired  -MB           User Key  (r) = Recorded By, (t) = Taken By, (c) = Cosigned By    Initials Name Provider Type    Lupe Diaz, PT Physical Therapist               Goals/Plan     Row Name 05/25/22 1422          Bed Mobility Goal 1 (PT)    Activity/Assistive Device (Bed Mobility Goal 1, PT) bed mobility activities, all  -MB     [place] Level/Cues Needed (Bed Mobility Goal 1, PT) independent  -MB     Time Frame (Bed Mobility Goal 1, PT) 1 week  -MB     Progress/Outcomes (Bed Mobility Goal 1, PT) goal ongoing  -MB     Row Name 05/25/22 1422          Transfer Goal 1 (PT)    Activity/Assistive Device (Transfer Goal 1, PT) transfers, all;sit-to-stand/stand-to-sit;bed-to-chair/chair-to-bed;walker, rolling;wheelchair transfer  -MB     [place] Level/Cues Needed (Transfer Goal 1, PT) modified independence  -MB     Time Frame (Transfer Goal 1, PT) 10 days  -MB     Progress/Outcome (Transfer Goal 1, PT) goal ongoing  -MB     Row Name 05/25/22 1422          Patient Education Goal (PT)    Activity (Patient Education Goal, PT) HEP  -MB     [place]/Cues/Accuracy (Memory Goal 2, PT) demonstrates adequately  -MB     Time Frame (Patient Education Goal, PT) 1 week  -MB     Progress/Outcome (Patient Education Goal, PT) goal ongoing  -MB     Row Name 05/25/22 1422          Therapy Assessment/Plan (PT)    Planned Therapy Interventions (PT) balance training;bed mobility training;home exercise program;patient/family education;postural re-education;strengthening;transfer training  -MB           User Key  (r) =  Recorded By, (t) = Taken By, (c) = Cosigned By    Initials Name Provider Type    Lupe Diaz, PT Physical Therapist               Clinical Impression     Row Name 05/25/22 1418          Pain    Pretreatment Pain Rating 2/10  -MB     Posttreatment Pain Rating 2/10  -MB     Pain Location - abdomen  -MB     Row Name 05/25/22 1418          Plan of Care Review    Plan of Care Reviewed With patient  -MB     Progress improving  -MB     Outcome Evaluation PT eval completed. Patient presents w/ generalized weakness, impaired balance, and decreased functional endurance. She completed bed mobility independently and transfers w/ CGA. Pt. would benefit from acute PT to maximize patient's safety and independence w/ functional mobility. Recommend home ([facility]) w/ assist and HHPT when medically appropriate.  -MB     Row Name 05/25/22 1418          Therapy Assessment/Plan (PT)    Patient/Family Therapy Goals Statement (PT) Return to PLOF.  -MB     Rehab Potential (PT) good, to achieve stated therapy goals  -MB     Criteria for Skilled Interventions Met (PT) yes;meets criteria;skilled treatment is necessary  -MB     Therapy Frequency (PT) daily  -MB     Row Name 05/25/22 1418          Vital Signs    Pre Systolic BP Rehab 142  -MB     Pre Treatment Diastolic BP 64  -MB     Pre Patient Position Supine  -MB     Intra Patient Position Standing  -MB     Post Patient Position Sitting  -MB     Row Name 05/25/22 1418          Positioning and Restraints    Pre-Treatment Position in bed  -MB     Post Treatment Position chair  -MB     In Chair notified nsg;reclined;call light within reach;encouraged to call for assist;exit alarm on;waffle cushion;legs elevated;heels elevated  -MB           User Key  (r) = Recorded By, (t) = Taken By, (c) = Cosigned By    Initials Name Provider Type    Lupe Diaz, PT Physical Therapist               Outcome Measures     Row Name 05/25/22 1423 05/25/22 0730       How much help from another  person do you currently need...    Turning from your back to your side while in flat bed without using bedrails? 4  -MB 3  -LM    Moving from lying on back to sitting on the side of a flat bed without bedrails? 3  -MB 3  -LM    Moving to and from a bed to a chair (including a wheelchair)? 3  -MB 2  -LM    Standing up from a chair using your arms (e.g., wheelchair, bedside chair)? 3  -MB 2  -LM    Climbing 3-5 steps with a railing? 1  -MB 2  -LM    To walk in hospital room? 2  -MB 2  -LM    AM-PAC 6 Clicks Score (PT) 16  -MB 14  -LM    Highest level of mobility 5 --> Static standing  -MB 4 --> Transferred to chair/commode  -LM    Row Name 05/25/22 1423          Functional Assessment    Outcome Measure Options AM-PAC 6 Clicks Basic Mobility (PT)  -MB           User Key  (r) = Recorded By, (t) = Taken By, (c) = Cosigned By    Initials Name Provider Type    Lupe Diaz, PT Physical Therapist    Harper Hanley, RN Registered Nurse                             Physical Therapy Education                 Title: PT OT SLP Therapies (In Progress)     Topic: Physical Therapy (Done)     Point: Mobility training (Done)     Learning Progress Summary           Patient Acceptance, E,D, VU by MB at 5/25/2022 1424                   Point: Home exercise program (Done)     Learning Progress Summary           Patient Acceptance, E,D, VU by MB at 5/25/2022 1424                   Point: Body mechanics (Done)     Learning Progress Summary           Patient Acceptance, E,D, VU by MB at 5/25/2022 1424                   Point: Precautions (Done)     Learning Progress Summary           Patient Acceptance, E,D, VU by MB at 5/25/2022 1424                               User Key     Initials Effective Dates Name Provider Type Discipline    MB 06/16/21 -  Lupe Linares, PT Physical Therapist PT              PT Recommendation and Plan  Planned Therapy Interventions (PT): balance training, bed mobility training, home exercise  program, patient/family education, postural re-education, strengthening, transfer training  Plan of Care Reviewed With: patient  Progress: improving  Outcome Evaluation: PT eval completed. Patient presents w/ generalized weakness, impaired balance, and decreased functional endurance. She completed bed mobility independently and transfers w/ CGA. Pt. would benefit from acute PT to maximize patient's safety and independence w/ functional mobility. Recommend home ([facility]) w/ assist and HHPT when medically appropriate.     Time Calculation:    PT Charges     Row Name 05/25/22 1425             Time Calculation    Start Time 1120  -MB      PT Received On 05/25/22  -MB      PT Goal Re-Cert Due Date 06/04/22  -MB              Untimed Charges    PT Eval/Re-eval Minutes 46  -MB              Total Minutes    Untimed Charges Total Minutes 46  -MB       Total Minutes 46  -MB            User Key  (r) = Recorded By, (t) = Taken By, (c) = Cosigned By    Initials Name Provider Type    Lupe Diaz, PT Physical Therapist              Therapy Charges for Today     Code Description Service Date Service Provider Modifiers Qty    70834798252 HC PT EVAL MOD COMPLEXITY 4 5/25/2022 Lupe Linares, PT GP 1          PT G-Codes  Outcome Measure Options: AM-PAC 6 Clicks Basic Mobility (PT)  AM-PAC 6 Clicks Score (PT): 16  AM-PAC 6 Clicks Score (OT): 16    Lupe Linares PT  5/25/2022

## 2022-05-25 NOTE — DISCHARGE SUMMARY
[facility] Medicine Services  DISCHARGE SUMMARY    Patient Name: Jeaneth Keita  : 1960  MRN: [id number]    Date of Admission: 2022 12:36 PM  Date of Discharge:  2022 (Wednesday)  Primary Care Physician: Provider, No Known    Consults     Date and Time Order Name Status Description    2022  1:10 PM Inpatient Nephrology Consult Completed     2022 12:37 PM Inpatient Neurology Consult Stroke Completed         Panchito Austin MD - Nephrology  Stuart Atkinson MD - Neurology    Hospital Course     Presenting Problem:   CVA (cerebral vascular accident) (HCC) [I63.9]  Stroke-like symptoms [R29.90]    Active Hospital Problems    Diagnosis  POA   • History of hypoglycemia [Z86.39]  Yes   • Hypoglycemia [E16.2]  Yes   • Stroke-like symptoms [R29.90]  Yes   • CVA (cerebral vascular accident) (HCC) [I63.9]  Yes   • Cirrhosis of liver (HCC) [K74.60]  Yes   • Hemochromatosis [E83.119]  Yes   • Hepatocellular carcinoma metastatic to bone s/p RXT  [C79.51, C22.0]  Yes   • Chronic pain on Methadone [F11.90]  Yes   • Chronic Hep C  [B18.2]  Yes   • ESRD (end stage renal disease) [N18.6]  Yes   • S/P left BKA [Z89.512]  Not Applicable   • Type 2 diabetes mellitus (HCC) [E11.9]  Yes      Resolved Hospital Problems   No resolved problems to display.      Recurrent Hypoglycemia    Hospital Course:  Jeaneth Keita is a 61 y.o. female with history of hepatitis C cirrhosis, hemochromatosis, hepatocellular carcinoma with bone mets, prior CVA, chronic pain on methadone, type 2 diabetes, ESRD on HD,  left BKA who presented with left facial numbness.       Paresthesias  -CT head without contrast shows generalized brain volume loss and changes of chronic small vessel ischemic disease, no acute abnormalities.  -CTA of the head and neck shows patent vessels bilaterally.  -CT perfusion shows no reversible ischemia  -MRI with no acute infarct  -EEG with no seizure  -Continue home  statin  -PT/OT, speech  -Neurology signed off     Encephalopathy  -secondary to hypoglycemia  -Ammonia level within normal limits  -mental status improved with improved blood glucose     ESRD  -Had dialysis today  -on MWF at [facility]     Hypoglycemia  Type 2 diabetes  -Received D50 in the ED  -s/p D5NS at 40cc/hr for 6 hours, consider more if she remains hypoglycemic but do not want to overcorrect  -Historic A1Cs with excellent glycemic control  -5.1%, 5.1%, 5.3% in Oct 2021  -she has frequent hypoglycemic events she says and therefore should be on less insulin  -will need surveillance     Discharge Follow Up Recommendations for outpatient labs/diagnostics:   follow up with Primary Care Doctor in 1 week    Day of Discharge     HPI:   Seen in dialysis.  Feels well.  Would like to go home.  Says she has been having hypoglycemic episodes for quite some time, at least 6 months or more.   Her oral intake was assessed to be suspected 35% of oral and he A1C has been low for over 1 year in the 5 range.    Review of Systems    Gen- No fevers, chills  CV- No chest pain, palpitations  Resp- No cough, dyspnea  GI- No N/V/D, abd pain        Vital Signs:   Temp:  [96 °F (35.6 °C)-98.3 °F (36.8 °C)] 98.3 °F (36.8 °C)  Heart Rate:  [78-98] 95  Resp:  [16-18] 18  BP: (115-157)/(64-93) 132/79  Flow (L/min):  [2] 2      Physical Exam:    Constitutional: No acute distress, awake, alert  HENT: NCAT, dry tongue  Respiratory: Clear to auscultation bilaterally, respiratory effort normal   Cardiovascular: RRR, s1 and s2  Gastrointestinal: Positive bowel sounds, soft, nontender, nondistended  Musculoskeletal:  Left below Knee amputation  Psychiatric: Appropriate affect, cooperative  Skin: No rashes    Pertinent  and/or Most Recent Results     LAB RESULTS:      Lab 05/25/22  0731 05/24/22  0733 05/23/22  1251 05/23/22  1247   WBC  --  5.21 6.01  --    HEMOGLOBIN 8.8* 7.5* 9.2*  --    HEMOGLOBIN, POC  --   --   --  8.5*   HEMATOCRIT 23.8*  20.4* 25.7*  --    HEMATOCRIT POC  --   --   --  25*   PLATELETS  --  177 181  --    NEUTROS ABS  --  3.20 4.05  --    IMMATURE GRANS (ABS)  --  0.03 0.04  --    LYMPHS ABS  --  1.45 1.20  --    MONOS ABS  --  0.32 0.45  --    EOS ABS  --  0.18 0.23  --    MCV  --  89.9 91.1  --    PROTIME  --   --   --  14.6   APTT  --   --  32.7  --          Lab 05/24/22  0733 05/23/22  1251 05/23/22  1247   SODIUM 138 136  --    POTASSIUM 4.7 4.3  --    CHLORIDE 100 99  --    CO2 21.0* 24.0  --    ANION GAP 17.0* 13.0  --    BUN 58* 56*  --    CREATININE 6.15* 5.93* 6.60*   EGFR 7.2* 7.5* 6.6*   GLUCOSE 95 60*  --    CALCIUM 8.9 9.8  --    HEMOGLOBIN A1C 5.10  --   --    TSH  --  0.776  --          Lab 05/23/22  1251   TOTAL PROTEIN 7.2   ALBUMIN 4.20   GLOBULIN 3.0   ALT (SGPT) 34*   AST (SGOT) 32   BILIRUBIN 0.5   ALK PHOS 101         Lab 05/23/22  1251 05/23/22  1247   TROPONIN T 0.047*  --    PROTIME  --  14.6   INR  --  1.2             Lab 05/23/22  1251   VITAMIN B 12 596         Brief Urine Lab Results  (Last result in the past 365 days)      Color   Clarity   Blood   Leuk Est   Nitrite   Protein   CREAT   Urine HCG        05/23/22 1539 Yellow   Clear   Negative   Negative   Negative   >=300 mg/dL (3+)               Microbiology Results (last 10 days)     Procedure Component Value - Date/Time    COVID PRE-OP / PRE-PROCEDURE SCREENING ORDER (NO ISOLATION) - Swab, Nasopharynx [[id number]]  (Normal) Collected: 05/23/22 1526    Lab Status: Final result Specimen: Swab from Nasopharynx Updated: 05/23/22 1613    Narrative:      The following orders were created for panel order COVID PRE-OP / PRE-PROCEDURE SCREENING ORDER (NO ISOLATION) - Swab, Nasopharynx.  Procedure                               Abnormality         Status                     ---------                               -----------         ------                     COVID-19 and FLU A/B PCR...[[id number]]  Normal              Final result                 Please view  results for these tests on the individual orders.    COVID-19 and FLU A/B PCR - Swab, Nasopharynx [[id number]]  (Normal) Collected: 05/23/22 1526    Lab Status: Final result Specimen: Swab from Nasopharynx Updated: 05/23/22 1613     COVID19 Not Detected     Influenza A PCR Not Detected     Influenza B PCR Not Detected    Narrative:      Fact sheet for providers: https://www.fda.gov/media/442104/download    Fact sheet for patients: https://www.fda.gov/media/452433/download    Test performed by PCR.          EEG    Result Date: 5/23/2022  Reason for referral: 61 y.o.female with altered mental status Technical Summary:  A 19 channel digital EEG was performed using the international 10-20 placement system, including eye leads and EKG leads. Duration: 20 minutes . Findings: The awake tracing shows diffuse low to medium amplitude 5-7 Hz theta which is present symmetrically over both hemispheres.  At times a poorly regulated 7-8 Hz posterior rhythm is evident over the occipital leads.  Drowsiness is seen with mild slowing of the background but stage II sleep is not clearly seen.  Photic stimulation does not change the tracing.  Hyperventilation is not performed.  No focal features or epileptiform activity are seen. Video: On Technical quality: Good EKG: Regular, 70-80 bpm SUMMARY: Mild generalized slow No focal features or epileptiform activity are seen     Diffuse cerebral dysfunction of mild degree, nonspecific No evidence for epilepsy is seen This report is transcribed using the Dragon dictation system.      CT Angiogram Neck    Result Date: 5/23/2022  EXAMINATION: CT CEREBRAL PERFUSION W WO CONTRAST-, CT ANGIOGRAM HEAD W AI ANALYSIS OF LVO-, CT ANGIOGRAM NECK-  DATE OF EXAM: 5/23/2022 12:43 PM  INDICATION: Neuro deficit, acute, stroke suspected.  COMPARISON: None available.  TECHNIQUE: Axial CT images of the brain were obtained prior to and after the administration of 115 cc Isovue-370. CT Perfusion protocol was  utilized. Automated post processing was performed by RAPID software and submitted to PACS for interpretation. CTA of the head and neck was also performed, with reconstructions. Automated exposure control and iterative reconstruction was utilized.  FINDINGS: CT Perfusion: CBF (<30%) volume: 0 mL Tmax (>6.0s) volume: 0 mL Mismatch volume: 0 mL Mismatch ratio: None  The examination appears to be technically adequate. There is no reduced cerebral blood volume (CBV) or reduced cerebral blood flow (CBF) to suggest an area of acute infarction in a large vessel territory. The cerebral perfusion appears symmetric. No increased mean transit time (MTT) is seen. No areas of mismatch to suggest presence of a penumbra.  CTA head/neck: Normal arch anatomy. Proximal right common carotid artery partially obscured by streak artifact from intravenous contrast in the subclavian vein. Right and left common carotid arteries are patent without stenosis. Carotid bifurcation patent bilaterally. Right and left internal carotid arteries are patent without stenosis. Both vertebral arteries originate from the subclavian's. Both vertebral arteries are patent without stenosis. Basilar artery is patent. Proximal anterior, middle, and posterior cerebral arteries are patent. Peripheral cerebral branches are symmetric. No aneurysms are demonstrated.  There are no intracranial contrast enhancing abnormalities. No acute soft tissue neck abnormality demonstrated. Bilateral thyroid nodules up to 2.1 cm on the right. Lung apices are clear      1. No focal area of decreased cerebral blood flow (CBF) is seen to suggest an acute infarct in a large vessel territory.  No defects are seen to suggest a core infarct or an area of reversible ischemia. 2. Patent bilateral carotid and vertebral arteries with no significant stenosis 3. No large intracranial arterial occlusion  These results communicated by telephone to the emergency department at 1:32 PM on  05/23/2022    This report was finalized on 5/23/2022 1:32 PM by Kelby Lieberman.      MRI Brain Without Contrast    Result Date: 5/23/2022  DATE OF EXAM: 5/23/2022 6:36 PM  PROCEDURE: MRI BRAIN WO CONTRAST-  INDICATIONS: parasthesias, AMS; R29.90-Unspecified symptoms and signs involving the nervous system; Z86.73-Personal history of transient ischemic attack (TIA), and cerebral infarction without residual deficits; D64.9-Anemia, unspecified; N18.6-End stage renal disease; Z99.2-Dependence on renal dialysis  COMPARISON: CT earlier the same day  TECHNIQUE: Multiplanar multisequence images of the brain were performed without contrast according to routine brain MRI protocol.  FINDINGS: No acute infarct is noted on diffusion weighted sequences. Midline structures are unremarkable and the craniocervical junction is satisfactory in appearance. Redemonstrated age-related changes with moderate volume loss and T2 hyperintense periventricular white matter changes of chronic small vessel ischemia. There is otherwise no evidence of intracranial hemorrhage, mass or mass effect. Susceptibility artifact is again present related to prior left thalamic hemorrhage. The ventricles are normal in size and configuration, accounting for surrounding volume loss. The orbits are unremarkable and the paranasal sinuses are grossly clear. Intracranial arterial flow voids appear maintained.      Stable age-related and chronic changes as above. No evidence of acute infarct, hemorrhage, mass or mass effect.  This report was finalized on 5/23/2022 8:18 PM by Victor Manuel Arvizu.      XR Chest 1 View    Result Date: 5/23/2022  DATE OF EXAM: 5/23/2022 1:48 PM  PROCEDURE: XR CHEST 1 VW-  INDICATIONS: Acute Stroke Protocol (onset < 12 hrs)  COMPARISON: 04/04/2022  TECHNIQUE: Single radiographic AP view of the chest was obtained.  FINDINGS: Right IJ dialysis catheter noted. Heart size and pulmonary vasculature are within normal limits. Chronic  atelectasis/fibrosis in the right infrahilar region. Scarring in the left lung base. No new pulmonary abnormality. Costophrenic angles sharp      Chronic changes in the right infrahilar region and left basilar scarring. No acute cardiopulmonary process demonstrated  This report was finalized on 5/23/2022 2:18 PM by Kelby Lieberman.      CT Head Without Contrast Stroke Protocol    Result Date: 5/23/2022  CT HEAD WO CONTRAST STROKE PROTOCOL-  Date of Exam: 5/23/2022 12:40 PM  Indication: Neuro deficit, acute, stroke suspected.  Comparison Exams: None available.  Technique: Multiple axial images were obtained from the skull base to the vertex without the administration of IV contrast. The axial data was used to generate reformatted images in the coronal and sagittal planes. Automated exposure control and iterative reconstruction methods were used.  FINDINGS: There is mild generalized brain volume loss.  There is some patchy low-attenuation within the periventricular white matter bilaterally compatible changes of chronic small vessel ischemic disease.  There is no acute infarct or hemorrhage.   No abnormal extra-axial fluid collections are seen.   There is no mass effect or hydrocephalus.  There is no evidence of skull fracture.   There is mucosal thickening within left maxillary sinus.  Visualized paranasal sinuses and mastoid air cells are clear.  The globes and orbits are within normal limits.        1.  Mild generalized brain volume loss and changes of chronic small vessel ischemic disease. 2.  No acute intracranial abnormality.  Surgically no evidence of acute infarct or hemorrhage.  3.  Findings were  personally communicated to the stroke team at 12:43 PM on 5/23/2022  This report was finalized on 5/23/2022 1:46 PM by Delmer Franklin MD.      CT Angiogram Head w AI Analysis of LVO    Result Date: 5/23/2022  EXAMINATION: CT CEREBRAL PERFUSION W WO CONTRAST-, CT ANGIOGRAM HEAD W AI ANALYSIS OF LVO-, CT ANGIOGRAM NECK-   DATE OF EXAM: 5/23/2022 12:43 PM  INDICATION: Neuro deficit, acute, stroke suspected.  COMPARISON: None available.  TECHNIQUE: Axial CT images of the brain were obtained prior to and after the administration of 115 cc Isovue-370. CT Perfusion protocol was utilized. Automated post processing was performed by RAPID software and submitted to PACS for interpretation. CTA of the head and neck was also performed, with reconstructions. Automated exposure control and iterative reconstruction was utilized.  FINDINGS: CT Perfusion: CBF (<30%) volume: 0 mL Tmax (>6.0s) volume: 0 mL Mismatch volume: 0 mL Mismatch ratio: None  The examination appears to be technically adequate. There is no reduced cerebral blood volume (CBV) or reduced cerebral blood flow (CBF) to suggest an area of acute infarction in a large vessel territory. The cerebral perfusion appears symmetric. No increased mean transit time (MTT) is seen. No areas of mismatch to suggest presence of a penumbra.  CTA head/neck: Normal arch anatomy. Proximal right common carotid artery partially obscured by streak artifact from intravenous contrast in the subclavian vein. Right and left common carotid arteries are patent without stenosis. Carotid bifurcation patent bilaterally. Right and left internal carotid arteries are patent without stenosis. Both vertebral arteries originate from the subclavian's. Both vertebral arteries are patent without stenosis. Basilar artery is patent. Proximal anterior, middle, and posterior cerebral arteries are patent. Peripheral cerebral branches are symmetric. No aneurysms are demonstrated.  There are no intracranial contrast enhancing abnormalities. No acute soft tissue neck abnormality demonstrated. Bilateral thyroid nodules up to 2.1 cm on the right. Lung apices are clear      1. No focal area of decreased cerebral blood flow (CBF) is seen to suggest an acute infarct in a large vessel territory.  No defects are seen to suggest a core  infarct or an area of reversible ischemia. 2. Patent bilateral carotid and vertebral arteries with no significant stenosis 3. No large intracranial arterial occlusion  These results communicated by telephone to the emergency department at 1:32 PM on 05/23/2022    This report was finalized on 5/23/2022 1:32 PM by Kelby Lieberman.      CT CEREBRAL PERFUSION WITH & WITHOUT CONTRAST    Result Date: 5/23/2022  EXAMINATION: CT CEREBRAL PERFUSION W WO CONTRAST-, CT ANGIOGRAM HEAD W AI ANALYSIS OF LVO-, CT ANGIOGRAM NECK-  DATE OF EXAM: 5/23/2022 12:43 PM  INDICATION: Neuro deficit, acute, stroke suspected.  COMPARISON: None available.  TECHNIQUE: Axial CT images of the brain were obtained prior to and after the administration of 115 cc Isovue-370. CT Perfusion protocol was utilized. Automated post processing was performed by RAPID software and submitted to PACS for interpretation. CTA of the head and neck was also performed, with reconstructions. Automated exposure control and iterative reconstruction was utilized.  FINDINGS: CT Perfusion: CBF (<30%) volume: 0 mL Tmax (>6.0s) volume: 0 mL Mismatch volume: 0 mL Mismatch ratio: None  The examination appears to be technically adequate. There is no reduced cerebral blood volume (CBV) or reduced cerebral blood flow (CBF) to suggest an area of acute infarction in a large vessel territory. The cerebral perfusion appears symmetric. No increased mean transit time (MTT) is seen. No areas of mismatch to suggest presence of a penumbra.  CTA head/neck: Normal arch anatomy. Proximal right common carotid artery partially obscured by streak artifact from intravenous contrast in the subclavian vein. Right and left common carotid arteries are patent without stenosis. Carotid bifurcation patent bilaterally. Right and left internal carotid arteries are patent without stenosis. Both vertebral arteries originate from the subclavian's. Both vertebral arteries are patent without stenosis. Basilar  artery is patent. Proximal anterior, middle, and posterior cerebral arteries are patent. Peripheral cerebral branches are symmetric. No aneurysms are demonstrated.  There are no intracranial contrast enhancing abnormalities. No acute soft tissue neck abnormality demonstrated. Bilateral thyroid nodules up to 2.1 cm on the right. Lung apices are clear      1. No focal area of decreased cerebral blood flow (CBF) is seen to suggest an acute infarct in a large vessel territory.  No defects are seen to suggest a core infarct or an area of reversible ischemia. 2. Patent bilateral carotid and vertebral arteries with no significant stenosis 3. No large intracranial arterial occlusion  These results communicated by telephone to the emergency department at 1:32 PM on 05/23/2022    This report was finalized on 5/23/2022 1:32 PM by Kelby Lieberman.        Results for orders placed during the hospital encounter of 10/05/21    Duplex Initial Vein Mapping Hemodialysis Access Unilateral Left or Right CAR    Interpretation Summary  · The left cephalic vein is patent and of adequate size in the upper arm.  · The left cephalic vein is patent and of adequate size in the forearm.      Results for orders placed during the hospital encounter of 10/05/21    Duplex Initial Vein Mapping Hemodialysis Access Unilateral Left or Right CAR    Interpretation Summary  · The left cephalic vein is patent and of adequate size in the upper arm.  · The left cephalic vein is patent and of adequate size in the forearm.      Results for orders placed during the hospital encounter of 10/05/21    Adult Transthoracic Echo Complete W/ Cont if Necessary Per Protocol    Interpretation Summary  · Left ventricular ejection fraction appears to be 61 - 65%. Left ventricular systolic function is normal.  · Left atrial volume is mildly increased.      Discharge Details        Discharge Medications      Changes to Medications      Instructions Start Date   calcium  acetate 667 MG capsule capsule  Commonly known as: PHOS BINDER)  What changed: when to take this   667 mg, Oral, 3 Times Daily With Meals      NovoLIN 70/30 FlexPen (70-30) 100 UNIT/ML suspension pen-injector  Generic drug: Insulin NPH Isophane & Regular  What changed: how much to take   1 Units, Subcutaneous, 2 Times Daily Before Meals, 24 units before breakfast and 22 units before dinner      QUEtiapine 25 MG tablet  Commonly known as: SEROquel  What changed: when to take this   25 mg, Oral, Nightly PRN         Continue These Medications      Instructions Start Date   acetaminophen 325 MG tablet  Commonly known as: TYLENOL   650 mg, Oral, Every 6 Hours PRN      amLODIPine 10 MG tablet  Commonly known as: NORVASC   10 mg, Oral, Every 24 Hours Scheduled      atorvastatin 80 MG tablet  Commonly known as: LIPITOR   80 mg, Oral, Nightly      B Complex-Vitamin C capsule   1 capsule, Oral, Daily      carvedilol 25 MG tablet  Commonly known as: COREG   25 mg, Oral, 2 Times Daily With Meals      loperamide 2 MG capsule  Commonly known as: IMODIUM   2 mg, Oral, 4 Times Daily PRN, Take 2 capsules by mouth daily times one, then 1 capsule by mouth after each loose stool as needed.  Max 4 capsules in 24 hours.      ondansetron 4 MG tablet  Commonly known as: ZOFRAN   4 mg, Oral, Every 6 Hours PRN      venlafaxine XR 75 MG 24 hr capsule  Commonly known as: EFFEXOR-XR   75 mg, Oral, Daily      vitamin D 1.25 MG (91399 UT) capsule capsule  Commonly known as: ERGOCALCIFEROL   50,000 Units, Oral, Weekly, Sunday         Stop These Medications    polyethylene glycol 17 g packet  Commonly known as: MIRALAX     sennosides-docusate 8.6-50 MG per tablet  Commonly known as: PERICOLACE            Allergies   Allergen Reactions   • Sulfa Antibiotics      Discharge Disposition:  Home or Self Care    Diet:  Hospital:  Diet Order   Procedures   • Diet Regular; Thin; Cardiac, Consistent Carbohydrate     Activity:  As tolerated    Restrictions or  Other Recommendations:   Continue dialysis as recommended by Nephrology.  She is getting dialysis today and will be ok to leave after per Nephrology.  Since she has had excellent glycemic control long term and has been off insulin here, will significantly cut back on her Home Insulin.  She was also noted to have been eating 30% of her meals/calories       CODE STATUS:    Code Status and Medical Interventions:   Ordered at: 05/24/22 0719     Level Of Support Discussed With:    Patient     Code Status (Patient has no pulse and is not breathing):    CPR (Attempt to Resuscitate)     Medical Interventions (Patient has pulse or is breathing):    Full Support       No future appointments.      Patient has been cleared for discharge after dialysis today.  I discussed case with Dr. Norman Magallanes MD  05/25/22      Time Spent on Discharge:  I spent  48  minutes on this discharge activity which included: face-to-face encounter with the patient, reviewing the data in the system, coordination of the care with the nursing staff as well as consultants, documentation, and entering orders.

## 2022-05-25 NOTE — PLAN OF CARE
Goal Outcome Evaluation:  Plan of Care Reviewed With: patient        Progress: improving  Outcome Evaluation: PT eval completed. Patient presents w/ generalized weakness, impaired balance, and decreased functional endurance. She completed bed mobility independently and transfers w/ CGA. Pt. would benefit from acute PT to maximize patient's safety and independence w/ functional mobility. Recommend home (TOMMY) w/ assist and HHPT when medically appropriate.

## 2022-05-25 NOTE — PLAN OF CARE
Goal Outcome Evaluation:      Patient is alert and oriented x 4. Forgetful at times and needed to be reminded to ask for assistance prior to transfering from the bed to the wheelchair. BM and urine output this shift. NIH 0.

## 2022-05-25 NOTE — PROGRESS NOTES
"   LOS: 1 day    Patient Care Team:  Provider, No Known as PCP - General    Chief Complaint:  SOA    Subjective     61 year old man with past medical hx of ESRD on HD [facility], HTN, DM, Chronic hepatitis C, Familial hemochromatosis,cirrhosis and Hepatocellular carcinoma with bone mets, left BKA who presented to ED with left facial numbness    Interval History:     No acute events overnight. No new complaints       Review of Systems:   No CP or SOA , fever, chills, rigors, rash, N/V, Constipation.       Objective     Vital Sign Min/Max for last 24 hours  Temp  Min: 96 °F (35.6 °C)  Max: 98.3 °F (36.8 °C)   BP  Min: 115/66  Max: 157/82   Pulse  Min: 78  Max: 98   Resp  Min: 16  Max: 18   SpO2  Min: 95 %  Max: 100 %   Flow (L/min)  Min: 2  Max: 2   No data recorded     Flowsheet Rows    Flowsheet Row First Filed Value   Admission Height 167.6 cm (66\") Documented at 05/23/2022 1300   Admission Weight 77.1 kg (170 lb) Documented at 05/23/2022 1300          No intake/output data recorded.  I/O last 3 completed shifts:  In: -   Out: 2900 [Urine:600; Other:2300]    Physical Exam:    Gen: Alert, NAD   HENT: NC, AT, EOMI   NECK: Supple, no JVD, Trachea midline   LUNGS: CTA bilaterally, non labored respirtation   CVS: S1/S2 audible, RRR, no murmur   Abd: Soft, NT, ND, BS+   Ext: No pedal edema, no cyanosis   CNS: Alert, No focal deficit noted grossly  Psy: Cooperative  Skin: Warm, dry and intact      WBC WBC   Date Value Ref Range Status   05/24/2022 5.21 3.40 - 10.80 10*3/mm3 Final   05/23/2022 6.01 3.40 - 10.80 10*3/mm3 Final      HGB Hemoglobin   Date Value Ref Range Status   05/25/2022 8.8 (L) 12.0 - 15.9 g/dL Final   05/24/2022 7.5 (L) 12.0 - 15.9 g/dL Final   05/23/2022 9.2 (L) 12.0 - 15.9 g/dL Final   05/23/2022 8.5 (L) 12.0 - 17.0 g/dL Final     Comment:     Serial Number: [id number]:  [id number]      HCT Hematocrit   Date Value Ref Range Status   05/25/2022 23.8 (L) 34.0 - 46.6 % Final   05/24/2022 20.4 (C) 34.0 - " 46.6 % Final   05/23/2022 25.7 (L) 34.0 - 46.6 % Final   05/23/2022 25 (L) 38 - 51 % Final      Platlets No results found for: LABPLAT   MCV MCV   Date Value Ref Range Status   05/24/2022 89.9 79.0 - 97.0 fL Final   05/23/2022 91.1 79.0 - 97.0 fL Final          Sodium Sodium   Date Value Ref Range Status   05/24/2022 138 136 - 145 mmol/L Final   05/23/2022 136 136 - 145 mmol/L Final      Potassium Potassium   Date Value Ref Range Status   05/24/2022 4.7 3.5 - 5.2 mmol/L Final   05/23/2022 4.3 3.5 - 5.2 mmol/L Final      Chloride Chloride   Date Value Ref Range Status   05/24/2022 100 98 - 107 mmol/L Final   05/23/2022 99 98 - 107 mmol/L Final      CO2 CO2   Date Value Ref Range Status   05/24/2022 21.0 (L) 22.0 - 29.0 mmol/L Final   05/23/2022 24.0 22.0 - 29.0 mmol/L Final      BUN BUN   Date Value Ref Range Status   05/24/2022 58 (H) 8 - 23 mg/dL Final   05/23/2022 56 (H) 8 - 23 mg/dL Final      Creatinine Creatinine   Date Value Ref Range Status   05/24/2022 6.15 (H) 0.57 - 1.00 mg/dL Final   05/23/2022 5.93 (H) 0.57 - 1.00 mg/dL Final   05/23/2022 6.60 (H) 0.60 - 1.30 mg/dL Final      Calcium Calcium   Date Value Ref Range Status   05/24/2022 8.9 8.6 - 10.5 mg/dL Final   05/23/2022 9.8 8.6 - 10.5 mg/dL Final      PO4 No results found for: CAPO4   Albumin Albumin   Date Value Ref Range Status   05/23/2022 4.20 3.50 - 5.20 g/dL Final      Magnesium No results found for: MG   Uric Acid No results found for: URICACID        Results Review:     I reviewed the patient's new clinical results.    amLODIPine, 10 mg, Oral, Q24H  carvedilol, 25 mg, Oral, BID With Meals  insulin lispro, 0-7 Units, Subcutaneous, TID AC  senna-docusate sodium, 2 tablet, Oral, BID  sodium chloride, 10 mL, Intravenous, Q12H  venlafaxine XR, 75 mg, Oral, Daily           Medication Review:     Assessment & Plan       Hepatocellular carcinoma metastatic to bone s/p RXT     Hemochromatosis    Cirrhosis of liver (HCC)    Chronic Hep C     ESRD (end  stage renal disease)    S/P left BKA    Chronic pain on Methadone    Type 2 diabetes mellitus (HCC)    CVA (cerebral vascular accident) (HCC)    Stroke-like symptoms    History of hypoglycemia    Hypoglycemia         1- ESRD - MWF - [facility]   2- HTN   3- Mild hypokalemia   4- Anemia of chronic disease   5- Familial hemochromatosis   6- Hx of Metastatic hepatocellular carcinoma      Plan:  - Patient seen on dialysis, UF as tolerated.   - Renal diet.   - Transfuse for HGb less than 7.0     Panchito Austin MD  05/25/22  10:04 EDT

## 2022-05-25 NOTE — DISCHARGE PLACEMENT REQUEST
"Jeaneth Tatum (61 y.o. Female)             Date of Birth   1960    Social Security Number       Address   [address] [place] [address]    Home Phone   [phone]    MRN   [id number]       Yazidi   Presybeterian    Marital Status                               Admission Date   22    Admission Type   Emergency    Admitting Provider   Franc Magallanes MD    Attending Provider   Franc Magallanes MD    Department, Room/Bed   [address], S327/1       Discharge Date       Discharge Disposition   Home or Self Care    Discharge Destination                               Attending Provider: Franc Magallanes MD    Allergies: Sulfa Antibiotics    Isolation: None   Infection: None   Code Status: CPR   Advance Care Planning Activity    Ht: 172.7 cm (68\")   Wt: 72.5 kg (159 lb 14.4 oz)    Admission Cmt: None   Principal Problem: None                Active Insurance as of 2022     Primary Coverage     Payor Plan Insurance Group Employer/Plan Group    [place] [id number]     Payor Plan Address Payor Plan Phone Number Payor Plan Fax Number Effective Dates    [address]   2005 - None Entered    [place] [id number]       Subscriber Name Subscriber Birth Date Member ID       JEANETH TATUM 1960 [id number]                 Emergency Contacts      (Rel.) Home Phone Work Phone Mobile Phone    CHLOÉ TREVIÑO (Spouse) [phone] -- [phone]    HAILEY CANALES (Brother) [phone] -- [phone]    NORMA TREVIÑO (Sister) -- -- [phone]            Physical Therapy Notes (most recent note)    No notes exist for this encounter.            Occupational Therapy Notes (most recent note)      Mamie Gonzalez OT at 22 0750          Patient Name: Jeaneth Tatum  : 1960    MRN: [id number]                              Today's Date: 2022       Admit Date: 2022    Visit Dx:     ICD-10-CM ICD-9-CM   1. Stroke-like " symptoms  R29.90 781.99   2. History of stroke  Z86.73 V12.54   3. Anemia, unspecified type  D64.9 285.9   4. End stage renal disease on dialysis (HCC)  N18.6 585.6    Z99.2 V45.11     Patient Active Problem List   Diagnosis   • ICH (intracerebral hemorrhage)   • Hepatocellular carcinoma metastatic to bone s/p RXT    • Hemochromatosis   • Cirrhosis of liver (HCC)   • Chronic Hep C    • ESRD (end stage renal disease)   • Chronic ulcer of right foot   • S/P left BKA   • GERD (gastroesophageal reflux disease)   • Chronic pain on Methadone   • Essential hypertension   • Type 2 diabetes mellitus (HCC)   • Right lower lobe pneumonia   • AMS (altered mental status)   • Colitis   • Normocytic anemia   • Hypokalemia   • CVA (cerebral vascular accident) (HCC)   • Stroke-like symptoms     Past Medical History:   Diagnosis Date   • Anxiety    • DDD (degenerative disc disease), lumbar    • Depression    • Diabetes mellitus (HCC)    • Fibromyalgia    • Hemochromatosis    • Hep B w/o coma, chronic, w/o delta (HCC)    • Hep C w/o coma, chronic (HCC)    • Hypertension    • Vertigo      Past Surgical History:   Procedure Laterality Date   • ARTERIOVENOUS FISTULA/SHUNT SURGERY Left 10/16/2021    Procedure: UPPER EXTREMITY ARTERIOVENOUS FISTULA FORMATION LEFT;  Surgeon: Johnny Rousseau MD;  Location: [facility];  Service: Vascular;  Laterality: Left;   • BACK SURGERY     • BELOW KNEE LEG AMPUTATION     •  SECTION     • FOOT SURGERY     • KNEE SURGERY     • ROTATOR CUFF REPAIR     • SPINAL CORD STIMULATOR IMPLANT        General Information     Row Name 22 0836          OT Time and Intention    Document Type evaluation  -JY     Mode of Treatment occupational therapy;individual therapy  -JY     Row Name 22 0836          General Information    Patient Profile Reviewed yes  -JY     Prior Level of Function independent:;feeding;grooming;dressing;bathing;transfer;bed mobility;all household mobility;w/c or  "scooter;dependent:;home management;cooking;cleaning;driving  per pt I in ADLs, in home mobility via w/c primarily, able to use FWW for t/fs yet often completes with sit pivot surface to surface, dependent for house mgmt, cooking and laundry at facility  -JY     Existing Precautions/Restrictions fall;other (see comments)  remote L BKA, chronic pain  -JY     Barriers to Rehab medically complex;previous functional deficit  -JY     Row Name 05/24/22 0836          Occupational Profile    Environmental Supports and Barriers (Occupational Profile) walk in shower, standard toilet with grab bars available for A  -Tellja     Row Name 05/24/22 0836          Living Environment    People in Home facility resident;other (see comments)  Joan (appears to be [facility]) since 10/11 2021  -VBI VaccinesY     Row Name 05/24/22 0836          Home Main Entrance    Number of Stairs, Main Entrance none  -JY     Stair Railings, Main Entrance none  -[employer]BI VaccinesY     Row Name 05/24/22 0836          Stairs Within Home, Primary    Number of Stairs, Within Home, Primary none  -JY     Stair Railings, Within Home, Primary none  -JY     Row Name 05/24/22 0836          Cognition    Orientation Status (Cognition) oriented to;person;situation;verbal cues/prompts needed for orientation;time;place;other (see comments)  stated correct month however incorrect year, able to state \"hospital\" in [place] yet not name  -Tellja     Row Name 05/24/22 0836          Safety Issues, Functional Mobility    Safety Issues Affecting Function (Mobility) insight into deficits/self-awareness;positioning of assistive device;safety precaution awareness;safety precautions follow-through/compliance;sequencing abilities  -JY     Impairments Affecting Function (Mobility) balance;endurance/activity tolerance;pain;postural/trunk control;sensation/sensory awareness;strength  -JY     Comment, Safety Issues/Impairments (Mobility) pt alert and able to follow commands; presented with nausea at rest and increased " upon sitting/standing as well as dizziness upon sitting/standing with no decrease in blood pressure  -NOE           User Key  (r) = Recorded By, (t) = Taken By, (c) = Cosigned By    Initials Name Provider Type    Mamie Morrissey OT Occupational Therapist                 Mobility/ADL's     Row Name 05/24/22 0843          Bed Mobility    Bed Mobility supine-sit;scooting/bridging;sit-supine  -JY     Scooting/Bridging [place] (Bed Mobility) supervision;verbal cues  -NOE     Supine-Sit [place] (Bed Mobility) supervision  -NOE     Sit-Supine [place] (Bed Mobility) supervision  -JFILI     Bed Mobility, Safety Issues impaired trunk control for bed mobility  -JY     Assistive Device (Bed Mobility) bed rails;head of bed elevated  -NOE     Comment, (Bed Mobility) pt did not require physical A to complete bed mobility from semi reclined position, spv for monitoring of vitals and to ensure improved balance; skilled cues for further scooting to gain hip symmetry at EOB for more stability with R foot in contact with floor; pt reported dizziness and nausea upon sitting, improved mildly with increased time; postural sway at times sitting unsupported  -NOE     Row Name 05/24/22 0843          Transfers    Transfers sit-stand transfer;stand-sit transfer  -NOE     Comment, (Transfers) skilled cues for optimal hand placement for controlled ascend, descend specifically to reach back prior to sitting and push up from seated surface vs pulling at wx; at baseline pt completes sit pivot t/fs in addiiton to some STS however pt nausea and dizziness limited further t/f assessment  -NOE     Sit-Stand [place] (Transfers) minimum assist (75% patient effort);verbal cues  -NOE     Stand-Sit [place] (Transfers) minimum assist (75% patient effort);verbal cues  -NOE     Row Name 05/24/22 0870          Sit-Stand Transfer    Assistive Device (Sit-Stand Transfers) walker, front-wheeled;other (see comments)  gait belt for safety protocol   -[facility] Name 05/24/22 0843          Stand-Sit Transfer    Assistive Device (Stand-Sit Transfers) walker, front-wheeled;other (see comments)  gait belt for safety protocol  -[facility] Name 05/24/22 0843          Functional Mobility    Functional Mobility- Comment defer to PT for specifics; pt reportedly uses w/c for primary mobility, did assess pt move laterally toward HOB in which pt pivot on R foot to advance minimally upward  -[facility] Name 05/24/22 0843          Activities of Daily Living    BADL Assessment/Intervention upper body dressing;lower body dressing;grooming  -JY     Row Name 05/24/22 0843          Upper Body Dressing Assessment/Training    [place] Level (Upper Body Dressing) doff;don;pajama/robe;minimum assist (75% patient effort);verbal cues  -JY     Position (Upper Body Dressing) edge of bed sitting  -JY     Row Name 05/24/22 0843          Lower Body Dressing Assessment/Training    [place] Level (Lower Body Dressing) doff;don;socks;supervision;verbal cues  -JY     Position (Lower Body Dressing) edge of bed sitting  -JY     Row Name 05/24/22 0843          Grooming Assessment/Training    [place] Level (Grooming) wash face, hands;set up  -JY     Position (Grooming) sitting up in bed  -J           User Key  (r) = Recorded By, (t) = Taken By, (c) = Cosigned By    Initials Name Provider Type    Mamie Morrissey OT Occupational Therapist               Obj/Interventions     [facility] Name 05/24/22 0858          Sensory Assessment (Somatosensory)    Sensory Assessment (Somatosensory) bilateral UE;sensation intact  -JY     Bilateral UE Sensory Assessment general sensation;light touch awareness;light touch localization;intact;other (see comments)  pt initially denied any numbness or tingling at BUEs however toward end of session reported mild numbness at B hands, digits; reported numbness at B LEs from knee downward [place]  -JY     Sensory Subjective Reports numbness  -JY     Sensory Assessment  pt initially denied any numbness or tingling at BUEs however toward end of session reported mild numbness at B hands, digits; reported numbness at B LEs from knee downward [place]; able to recognize LT stimuli at BUEs  -FILI     Row Name 05/24/22 0858          Vision Assessment/Intervention    Visual Impairment/Limitations corrective lenses for reading  -     Vision Assessment Comment denies any acute changes to vision  -JY     Row Name 05/24/22 0858          Range of Motion Comprehensive    General Range of Motion bilateral upper extremity ROM WFL  -JY     Row Name 05/24/22 0858          Strength Comprehensive (MMT)    General Manual Muscle Testing (MMT) Assessment upper extremity strength deficits identified  -JY     Comment, General Manual Muscle Testing (MMT) Assessment E MMS grossly 4/5 per MMT  -JY     Row Name 05/24/22 0858          Motor Skills    Motor Skills functional endurance;coordination  -JY     Coordination finger to nose;other (see comments);WFL  finger to thumb opposition  -JY     Functional Endurance decreased activity tolerance during more dynamic tasks sitting and standing (~ 30 second duration)  -JY     Row Name 05/24/22 0858          Balance    Balance Assessment sitting static balance;sitting dynamic balance;standing static balance;standing dynamic balance  -JY     Static Sitting Balance supervision  -JY     Dynamic Sitting Balance supervision;contact guard;verbal cues;other (see comments)  UBD, pre t/f skills  -JY     Position, Sitting Balance unsupported;sitting edge of bed  -JY     Static Standing Balance minimal assist;verbal cues  -JY     Dynamic Standing Balance minimal assist;moderate assist;verbal cues  -JY     Position/Device Used, Standing Balance supported;walker, front-wheeled  -JY     Balance Interventions sitting;standing;static;dynamic;sit to stand;supported;occupation based/functional task  -JY     Comment, Balance pt with postural sway sitting at EOB, improved with increased  hip symmetry and R foot on floor and UE support [place]; req'd A and FWW support for standing  -JY           User Key  (r) = Recorded By, (t) = Taken By, (c) = Cosigned By    Initials Name Provider Type    Mamie Morrissey, GILDARDO Occupational Therapist               Goals/Plan     Row Name 05/24/22 0909          Transfer Goal 1 (OT)    Activity/Assistive Device (Transfer Goal 1, OT) sit-to-stand/stand-to-sit;toilet;commode;wheelchair transfer;other (see comments)  STS w/ AD, sit squat pivots b/t bed, w/c, toilet  -JY     [place] Level/Cues Needed (Transfer Goal 1, OT) contact guard required;verbal cues required  -JY     Time Frame (Transfer Goal 1, OT) long term goal (LTG);by discharge  -JY     Progress/Outcome (Transfer Goal 1, OT) goal ongoing  -[employer] Arts[employer]I     Row Name 05/24/22 0909          Dressing Goal 1 (OT)    Activity/Device (Dressing Goal 1, OT) upper body dressing;lower body dressing;other (see comments)  d/d TB garments  -JY     [place]/Cues Needed (Dressing Goal 1, OT) standby assist;verbal cues required  -JY     Time Frame (Dressing Goal 1, OT) long term goal (LTG);by discharge  -JY     Progress/Outcome (Dressing Goal 1, OT) goal ongoing  -[employer] ArtsY     Row Name 05/24/22 0909          Toileting Goal 1 (OT)    Activity/Device (Toileting Goal 1, OT) adjust/manage clothing  -JY     [place] Level/Cues Needed (Toileting Goal 1, OT) contact guard required;verbal cues required  -JY     Time Frame (Toileting Goal 1, OT) long term goal (LTG);by discharge  -JY     Progress/Outcome (Toileting Goal 1, OT) goal ongoing  -[employer]     Row Name 05/24/22 0909          Strength Goal 1 (OT)    Strength Goal 1 (OT) Pt to complete seated TE program encompassing BUEs focused on strength, endurance w/ progressive sets/reps/resistance in order to improve integration in ADLs, related t/fs and w/c mobility  -JY     Time Frame (Strength Goal 1, OT) long term goal (LTG);by discharge  -JY     Progress/Outcome (Strength Goal 1, OT) goal  ongoing  -JY     Row Name 05/24/22 0909          Problem Specific Goal 1 (OT)    Problem Specific Goal 1 (OT) Pt to demonstrate improved sitting balance while unsupported as evidenced by SBA during ADLs with BUE integration to improve stability and safety during fxl tasks.  -JY     Time Frame (Problem Specific Goal 1, OT) long term goal (LTG);by discharge  -JY     Progress/Outcome (Problem Specific Goal 1, OT) goal ongoing  -JY     Row Name 05/24/22 0909          Therapy Assessment/Plan (OT)    Planned Therapy Interventions (OT) activity tolerance training;adaptive equipment training;BADL retraining;functional balance retraining;occupation/activity based interventions;patient/caregiver education/training;ROM/therapeutic exercise;strengthening exercise;transfer/mobility retraining  -JY           User Key  (r) = Recorded By, (t) = Taken By, (c) = Cosigned By    Initials Name Provider Type    Mamie Morrissey, GILDARDO Occupational Therapist               Clinical Impression     Row Name 05/24/22 0903          Pain Assessment    Pretreatment Pain Rating 7/10  -JY     Posttreatment Pain Rating 6/10  -JY     Pain Location - Side/Orientation Bilateral  -JY     Pain Location generalized  -JY     Pain Location - other (see comments)  stomach  -JY     Pre/Posttreatment Pain Comment pt reported persistent stomach pain, RN aware, improved mildly with return to bed after sitting and standing  -JY     Pain Intervention(s) Repositioned;Ambulation/increased activity  -JY     Row Name 05/24/22 0903          Plan of Care Review    Plan of Care Reviewed With patient  -JY     Progress no change  OT IE  -JY     Outcome Evaluation OT evaluation complete. Pt presents with decreased I in ADLs, related t/fs, mobility compared to baseline limited by decreased functional endurance, impaired sitting and standing balance, muscle weakness at BUEs, numbness at hands/digits and BLEs knee and distal and currently nausea and dizziness with RN aware of  latter. Pt demonstrated need for spv in bed mobility with HOB elevated and bed rails used, min A and FWW for STS at EOB x 2 and brief pivot at R foot to advance to HOB with brief standing tolerance, min A d/d gown, spv to d/d R sock, SUA for face/hand washing. Progressive pivot t/fs withheld d/t pt's increased nausea and dizziness with sequential movement. Recommend IPOT POC and IRF at d/c pending progress with mobility prior to return to prior residency.  -[facility] Name 05/24/22 0903          Therapy Assessment/Plan (OT)    Patient/Family Therapy Goal Statement (OT) to increase I in ADLs, related t/fs, return to PLOF  -JY     Rehab Potential (OT) good, to achieve stated therapy goals  -     Criteria for Skilled Therapeutic Interventions Met (OT) yes;skilled treatment is necessary  -JY     Therapy Frequency (OT) daily  -[facility] Name 05/24/22 0903          Therapy Plan Review/Discharge Plan (OT)    Anticipated Discharge Disposition (OT) inpatient rehabilitation facility  -[facility] Name 05/24/22 0903          Vital Signs    Pre Systolic BP Rehab 132  -JY     Pre Treatment Diastolic BP 70  -JY     Intra Systolic BP Rehab 144  -JY     Intra Treatment Diastolic BP 77  -JY     Post Systolic BP Rehab 150  -JY     Post Treatment Diastolic BP 76  -JY     Pretreatment Heart Rate (beats/min) 98  -JY     Posttreatment Heart Rate (beats/min) 88  -JY     Pre SpO2 (%) 100  -JY     O2 Delivery Pre Treatment room air  -JY     Intra SpO2 (%) 99  -JY     O2 Delivery Intra Treatment room air  -JY     Post SpO2 (%) 100  -JY     O2 Delivery Post Treatment room air  -JY     Pre Patient Position Supine  -JY     Intra Patient Position Standing  -JY     Post Patient Position Supine  -JY     Row Name 05/24/22 0903          Positioning and Restraints    Pre-Treatment Position in bed  -JY     Post Treatment Position bed  -JY     In Bed notified nsg;side lying right;call light within reach;encouraged to call for assist;exit alarm  on;side rails up x2  semi reclined  -NOE           User Key  (r) = Recorded By, (t) = Taken By, (c) = Cosigned By    Initials Name Provider Type    Mamie Morrissey OT Occupational Therapist               Outcome Measures     Row Name 05/24/22 0944          How much help from another is currently needed...    Putting on and taking off regular lower body clothing? 3  -JY     Bathing (including washing, rinsing, and drying) 2  -JY     Toileting (which includes using toilet bed pan or urinal) 2  -JY     Putting on and taking off regular upper body clothing 3  -JY     Taking care of personal grooming (such as brushing teeth) 3  -JY     Eating meals 3  -JY     AM-PAC 6 Clicks Score (OT) 16  -JY     Row Name 05/24/22 0944          Functional Assessment    Outcome Measure Options AM-PAC 6 Clicks Daily Activity (OT)  -NOE           User Key  (r) = Recorded By, (t) = Taken By, (c) = Cosigned By    Initials Name Provider Type    Mamie Morrissey OT Occupational Therapist                Occupational Therapy Education                 Title: PT OT SLP Therapies (In Progress)     Topic: Occupational Therapy (In Progress)     Point: ADL training (In Progress)     Description:   Instruct learner(s) on proper safety adaptation and remediation techniques during self care or transfers.   Instruct in proper use of assistive devices.              Learning Progress Summary           Patient Acceptance, E,D, NR by NOE at 5/24/2022 0750                   Point: Home exercise program (Not Started)     Description:   Instruct learner(s) on appropriate technique for monitoring, assisting and/or progressing therapeutic exercises/activities.              Learner Progress:  Not documented in this visit.          Point: Precautions (In Progress)     Description:   Instruct learner(s) on prescribed precautions during self-care and functional transfers.              Learning Progress Summary           Patient Acceptance, E,D, NR by NOE at 5/24/2022  0750                   Point: Body mechanics (In Progress)     Description:   Instruct learner(s) on proper positioning and spine alignment during self-care, functional mobility activities and/or exercises.              Learning Progress Summary           Patient Acceptance, E,D, NR by NOE at 5/24/2022 0750                               User Key     Initials Effective Dates Name Provider Type Discipline    BECKYFILI 06/16/21 -  Mamie Gonzalez OT Occupational Therapist OT              OT Recommendation and Plan  Planned Therapy Interventions (OT): activity tolerance training, adaptive equipment training, BADL retraining, functional balance retraining, occupation/activity based interventions, patient/caregiver education/training, ROM/therapeutic exercise, strengthening exercise, transfer/mobility retraining  Therapy Frequency (OT): daily  Plan of Care Review  Plan of Care Reviewed With: patient  Progress: no change (OT IE)  Outcome Evaluation: OT evaluation complete. Pt presents with decreased I in ADLs, related t/fs, mobility compared to baseline limited by decreased functional endurance, impaired sitting and standing balance, muscle weakness at BUEs, numbness at hands/digits and BLEs knee and distal and currently nausea and dizziness with RN aware of latter. Pt demonstrated need for spv in bed mobility with HOB elevated and bed rails used, min A and FWW for STS at EOB x 2 and brief pivot at R foot to advance to HOB with brief standing tolerance, min A d/d gown, spv to d/d R sock, SUA for face/hand washing. Progressive pivot t/fs withheld d/t pt's increased nausea and dizziness with sequential movement. Recommend IPOT POC and IRF at d/c pending progress with mobility prior to return to prior residency.     Time Calculation:    Time Calculation- OT     Row Name 05/24/22 0955             Time Calculation- OT    OT Start Time 0750  -JY      OT Received On 05/24/22 -JY      OT Goal Re-Cert Due Date 06/03/22  -JY               Timed Charges    68721 - OT Therapeutic Activity Minutes 10  -JY      41177 - OT Self Care/Mgmt Minutes 5  -JY              Untimed Charges    OT Eval/Re-eval Minutes 46  -JY              Total Minutes    Timed Charges Total Minutes 15  -JY      Untimed Charges Total Minutes 46  -JY       Total Minutes 61  -JY            User Key  (r) = Recorded By, (t) = Taken By, (c) = Cosigned By    Initials Name Provider Type    Mamie Morrissey OT Occupational Therapist              Therapy Charges for Today     Code Description Service Date Service Provider Modifiers Qty    18530706003 HC OT THERAPEUTIC ACT EA 15 MIN 2022 Mamie Gonzalez OT GO 1    79518880970 HC OT EVAL MOD COMPLEXITY 4 2022 Mamie Gonzalez OT GO 1               Mamie Gonzalez OT  2022    Electronically signed by Mamie Gonzalez OT at 22 0957          Discharge Summary      Franc Magallanes MD at 22 1022              [facility] Medicine Services  DISCHARGE SUMMARY    Patient Name: Jeaneth Keita  : 1960  MRN: [id number]    Date of Admission: 2022 12:36 PM  Date of Discharge:  2022 (Wednesday)  Primary Care Physician: Provider, No Known    Consults     Date and Time Order Name Status Description    2022  1:10 PM Inpatient Nephrology Consult Completed     2022 12:37 PM Inpatient Neurology Consult Stroke Completed         Panchito Austin MD - Nephrology  Stuart Atkinson MD - Neurology    Hospital Course     Presenting Problem:   CVA (cerebral vascular accident) (HCC) [I63.9]  Stroke-like symptoms [R29.90]    Active Hospital Problems    Diagnosis  POA   • History of hypoglycemia [Z86.39]  Yes   • Hypoglycemia [E16.2]  Yes   • Stroke-like symptoms [R29.90]  Yes   • CVA (cerebral vascular accident) (HCC) [I63.9]  Yes   • Cirrhosis of liver (HCC) [K74.60]  Yes   • Hemochromatosis [E83.119]  Yes   • Hepatocellular carcinoma metastatic to bone s/p RXT  [C79.51, C22.0]  Yes   •  Chronic pain on Methadone [F11.90]  Yes   • Chronic Hep C  [B18.2]  Yes   • ESRD (end stage renal disease) [N18.6]  Yes   • S/P left BKA [Z89.512]  Not Applicable   • Type 2 diabetes mellitus (HCC) [E11.9]  Yes      Resolved Hospital Problems   No resolved problems to display.      Recurrent Hypoglycemia    Hospital Course:  Jeaneth Keita is a 61 y.o. female with history of hepatitis C cirrhosis, hemochromatosis, hepatocellular carcinoma with bone mets, prior CVA, chronic pain on methadone, type 2 diabetes, ESRD on HD,  left BKA who presented with left facial numbness.       Paresthesias  -CT head without contrast shows generalized brain volume loss and changes of chronic small vessel ischemic disease, no acute abnormalities.  -CTA of the head and neck shows patent vessels bilaterally.  -CT perfusion shows no reversible ischemia  -MRI with no acute infarct  -EEG with no seizure  -Continue home statin  -PT/OT, speech  -Neurology signed off     Encephalopathy  -secondary to hypoglycemia  -Ammonia level within normal limits  -mental status improved with improved blood glucose     ESRD  -Had dialysis today  -on MWF at [facility]  Type 2 diabetes  -Received D50 in the ED  -s/p D5NS at 40cc/hr for 6 hours, consider more if she remains hypoglycemic but do not want to overcorrect  -Historic A1Cs with excellent glycemic control  -5.1%, 5.1%, 5.3% in Oct 2021  -she has frequent hypoglycemic events she says and therefore should be on less insulin  -will need surveillance     Discharge Follow Up Recommendations for outpatient labs/diagnostics:   follow up with Primary Care Doctor in 1 week    Day of Discharge     HPI:   Seen in dialysis.  Feels well.  Would like to go home.  Says she has been having hypoglycemic episodes for quite some time, at least 6 months or more.   Her oral intake was assessed to be suspected 35% of oral and he A1C has been low for over 1 year in the 5 range.    Review of  Systems    Gen- No fevers, chills  CV- No chest pain, palpitations  Resp- No cough, dyspnea  GI- No N/V/D, abd pain        Vital Signs:   Temp:  [96 °F (35.6 °C)-98.3 °F (36.8 °C)] 98.3 °F (36.8 °C)  Heart Rate:  [78-98] 95  Resp:  [16-18] 18  BP: (115-157)/(64-93) 132/79  Flow (L/min):  [2] 2      Physical Exam:    Constitutional: No acute distress, awake, alert  HENT: NCAT, dry tongue  Respiratory: Clear to auscultation bilaterally, respiratory effort normal   Cardiovascular: RRR, s1 and s2  Gastrointestinal: Positive bowel sounds, soft, nontender, nondistended  Musculoskeletal:  Left below Knee amputation  Psychiatric: Appropriate affect, cooperative  Skin: No rashes    Pertinent  and/or Most Recent Results     LAB RESULTS:      Lab 05/25/22  0731 05/24/22  0733 05/23/22  1251 05/23/22  1247   WBC  --  5.21 6.01  --    HEMOGLOBIN 8.8* 7.5* 9.2*  --    HEMOGLOBIN, POC  --   --   --  8.5*   HEMATOCRIT 23.8* 20.4* 25.7*  --    HEMATOCRIT POC  --   --   --  25*   PLATELETS  --  177 181  --    NEUTROS ABS  --  3.20 4.05  --    IMMATURE GRANS (ABS)  --  0.03 0.04  --    LYMPHS ABS  --  1.45 1.20  --    MONOS ABS  --  0.32 0.45  --    EOS ABS  --  0.18 0.23  --    MCV  --  89.9 91.1  --    PROTIME  --   --   --  14.6   APTT  --   --  32.7  --          Lab 05/24/22  0733 05/23/22  1251 05/23/22  1247   SODIUM 138 136  --    POTASSIUM 4.7 4.3  --    CHLORIDE 100 99  --    CO2 21.0* 24.0  --    ANION GAP 17.0* 13.0  --    BUN 58* 56*  --    CREATININE 6.15* 5.93* 6.60*   EGFR 7.2* 7.5* 6.6*   GLUCOSE 95 60*  --    CALCIUM 8.9 9.8  --    HEMOGLOBIN A1C 5.10  --   --    TSH  --  0.776  --          Lab 05/23/22  1251   TOTAL PROTEIN 7.2   ALBUMIN 4.20   GLOBULIN 3.0   ALT (SGPT) 34*   AST (SGOT) 32   BILIRUBIN 0.5   ALK PHOS 101         Lab 05/23/22  1251 05/23/22  1247   TROPONIN T 0.047*  --    PROTIME  --  14.6   INR  --  1.2             Lab 05/23/22  1251   VITAMIN B 12 596         Brief Urine Lab Results  (Last result in  the past 365 days)      Color   Clarity   Blood   Leuk Est   Nitrite   Protein   CREAT   Urine HCG        05/23/22 1539 Yellow   Clear   Negative   Negative   Negative   >=300 mg/dL (3+)               Microbiology Results (last 10 days)     Procedure Component Value - Date/Time    COVID PRE-OP / PRE-PROCEDURE SCREENING ORDER (NO ISOLATION) - Swab, Nasopharynx [[id number]]  (Normal) Collected: 05/23/22 1526    Lab Status: Final result Specimen: Swab from Nasopharynx Updated: 05/23/22 1613    Narrative:      The following orders were created for panel order COVID PRE-OP / PRE-PROCEDURE SCREENING ORDER (NO ISOLATION) - Swab, Nasopharynx.  Procedure                               Abnormality         Status                     ---------                               -----------         ------                     COVID-19 and FLU A/B PCR...[[id number]]  Normal              Final result                 Please view results for these tests on the individual orders.    COVID-19 and FLU A/B PCR - Swab, Nasopharynx [[id number]]  (Normal) Collected: 05/23/22 1526    Lab Status: Final result Specimen: Swab from Nasopharynx Updated: 05/23/22 1613     COVID19 Not Detected     Influenza A PCR Not Detected     Influenza B PCR Not Detected    Narrative:      Fact sheet for providers: https://www.fda.gov/media/478360/download    Fact sheet for patients: https://www.fda.gov/media/471724/download    Test performed by PCR.          EEG    Result Date: 5/23/2022  Reason for referral: 61 y.o.female with altered mental status Technical Summary:  A 19 channel digital EEG was performed using the international 10-20 placement system, including eye leads and EKG leads. Duration: 20 minutes . Findings: The awake tracing shows diffuse low to medium amplitude 5-7 Hz theta which is present symmetrically over both hemispheres.  At times a poorly regulated 7-8 Hz posterior rhythm is evident over the occipital leads.  Drowsiness is seen with mild slowing  of the background but stage II sleep is not clearly seen.  Photic stimulation does not change the tracing.  Hyperventilation is not performed.  No focal features or epileptiform activity are seen. Video: On Technical quality: Good EKG: Regular, 70-80 bpm SUMMARY: Mild generalized slow No focal features or epileptiform activity are seen     Diffuse cerebral dysfunction of mild degree, nonspecific No evidence for epilepsy is seen This report is transcribed using the Dragon dictation system.      CT Angiogram Neck    Result Date: 5/23/2022  EXAMINATION: CT CEREBRAL PERFUSION W WO CONTRAST-, CT ANGIOGRAM HEAD W AI ANALYSIS OF LVO-, CT ANGIOGRAM NECK-  DATE OF EXAM: 5/23/2022 12:43 PM  INDICATION: Neuro deficit, acute, stroke suspected.  COMPARISON: None available.  TECHNIQUE: Axial CT images of the brain were obtained prior to and after the administration of 115 cc Isovue-370. CT Perfusion protocol was utilized. Automated post processing was performed by RAPID software and submitted to PACS for interpretation. CTA of the head and neck was also performed, with reconstructions. Automated exposure control and iterative reconstruction was utilized.  FINDINGS: CT Perfusion: CBF (<30%) volume: 0 mL Tmax (>6.0s) volume: 0 mL Mismatch volume: 0 mL Mismatch ratio: None  The examination appears to be technically adequate. There is no reduced cerebral blood volume (CBV) or reduced cerebral blood flow (CBF) to suggest an area of acute infarction in a large vessel territory. The cerebral perfusion appears symmetric. No increased mean transit time (MTT) is seen. No areas of mismatch to suggest presence of a penumbra.  CTA head/neck: Normal arch anatomy. Proximal right common carotid artery partially obscured by streak artifact from intravenous contrast in the subclavian vein. Right and left common carotid arteries are patent without stenosis. Carotid bifurcation patent bilaterally. Right and left internal carotid arteries are patent  without stenosis. Both vertebral arteries originate from the subclavian's. Both vertebral arteries are patent without stenosis. Basilar artery is patent. Proximal anterior, middle, and posterior cerebral arteries are patent. Peripheral cerebral branches are symmetric. No aneurysms are demonstrated.  There are no intracranial contrast enhancing abnormalities. No acute soft tissue neck abnormality demonstrated. Bilateral thyroid nodules up to 2.1 cm on the right. Lung apices are clear      1. No focal area of decreased cerebral blood flow (CBF) is seen to suggest an acute infarct in a large vessel territory.  No defects are seen to suggest a core infarct or an area of reversible ischemia. 2. Patent bilateral carotid and vertebral arteries with no significant stenosis 3. No large intracranial arterial occlusion  These results communicated by telephone to the emergency department at 1:32 PM on 05/23/2022    This report was finalized on 5/23/2022 1:32 PM by Kelby Lieberman.      MRI Brain Without Contrast    Result Date: 5/23/2022  DATE OF EXAM: 5/23/2022 6:36 PM  PROCEDURE: MRI BRAIN WO CONTRAST-  INDICATIONS: parasthesias, AMS; R29.90-Unspecified symptoms and signs involving the nervous system; Z86.73-Personal history of transient ischemic attack (TIA), and cerebral infarction without residual deficits; D64.9-Anemia, unspecified; N18.6-End stage renal disease; Z99.2-Dependence on renal dialysis  COMPARISON: CT earlier the same day  TECHNIQUE: Multiplanar multisequence images of the brain were performed without contrast according to routine brain MRI protocol.  FINDINGS: No acute infarct is noted on diffusion weighted sequences. Midline structures are unremarkable and the craniocervical junction is satisfactory in appearance. Redemonstrated age-related changes with moderate volume loss and T2 hyperintense periventricular white matter changes of chronic small vessel ischemia. There is otherwise no evidence of intracranial  hemorrhage, mass or mass effect. Susceptibility artifact is again present related to prior left thalamic hemorrhage. The ventricles are normal in size and configuration, accounting for surrounding volume loss. The orbits are unremarkable and the paranasal sinuses are grossly clear. Intracranial arterial flow voids appear maintained.      Stable age-related and chronic changes as above. No evidence of acute infarct, hemorrhage, mass or mass effect.  This report was finalized on 5/23/2022 8:18 PM by Victor Manuel Arvizu.      XR Chest 1 View    Result Date: 5/23/2022  DATE OF EXAM: 5/23/2022 1:48 PM  PROCEDURE: XR CHEST 1 VW-  INDICATIONS: Acute Stroke Protocol (onset < 12 hrs)  COMPARISON: 04/04/2022  TECHNIQUE: Single radiographic AP view of the chest was obtained.  FINDINGS: Right IJ dialysis catheter noted. Heart size and pulmonary vasculature are within normal limits. Chronic atelectasis/fibrosis in the right infrahilar region. Scarring in the left lung base. No new pulmonary abnormality. Costophrenic angles sharp      Chronic changes in the right infrahilar region and left basilar scarring. No acute cardiopulmonary process demonstrated  This report was finalized on 5/23/2022 2:18 PM by Kelby Lieberman.      CT Head Without Contrast Stroke Protocol    Result Date: 5/23/2022  CT HEAD WO CONTRAST STROKE PROTOCOL-  Date of Exam: 5/23/2022 12:40 PM  Indication: Neuro deficit, acute, stroke suspected.  Comparison Exams: None available.  Technique: Multiple axial images were obtained from the skull base to the vertex without the administration of IV contrast. The axial data was used to generate reformatted images in the coronal and sagittal planes. Automated exposure control and iterative reconstruction methods were used.  FINDINGS: There is mild generalized brain volume loss.  There is some patchy low-attenuation within the periventricular white matter bilaterally compatible changes of chronic small vessel ischemic  disease.  There is no acute infarct or hemorrhage.   No abnormal extra-axial fluid collections are seen.   There is no mass effect or hydrocephalus.  There is no evidence of skull fracture.   There is mucosal thickening within left maxillary sinus.  Visualized paranasal sinuses and mastoid air cells are clear.  The globes and orbits are within normal limits.        1.  Mild generalized brain volume loss and changes of chronic small vessel ischemic disease. 2.  No acute intracranial abnormality.  Surgically no evidence of acute infarct or hemorrhage.  3.  Findings were  personally communicated to the stroke team at 12:43 PM on 5/23/2022  This report was finalized on 5/23/2022 1:46 PM by Delmer Franklin MD.      CT Angiogram Head w AI Analysis of LVO    Result Date: 5/23/2022  EXAMINATION: CT CEREBRAL PERFUSION W WO CONTRAST-, CT ANGIOGRAM HEAD W AI ANALYSIS OF LVO-, CT ANGIOGRAM NECK-  DATE OF EXAM: 5/23/2022 12:43 PM  INDICATION: Neuro deficit, acute, stroke suspected.  COMPARISON: None available.  TECHNIQUE: Axial CT images of the brain were obtained prior to and after the administration of 115 cc Isovue-370. CT Perfusion protocol was utilized. Automated post processing was performed by RAPID software and submitted to PACS for interpretation. CTA of the head and neck was also performed, with reconstructions. Automated exposure control and iterative reconstruction was utilized.  FINDINGS: CT Perfusion: CBF (<30%) volume: 0 mL Tmax (>6.0s) volume: 0 mL Mismatch volume: 0 mL Mismatch ratio: None  The examination appears to be technically adequate. There is no reduced cerebral blood volume (CBV) or reduced cerebral blood flow (CBF) to suggest an area of acute infarction in a large vessel territory. The cerebral perfusion appears symmetric. No increased mean transit time (MTT) is seen. No areas of mismatch to suggest presence of a penumbra.  CTA head/neck: Normal arch anatomy. Proximal right common carotid artery  partially obscured by streak artifact from intravenous contrast in the subclavian vein. Right and left common carotid arteries are patent without stenosis. Carotid bifurcation patent bilaterally. Right and left internal carotid arteries are patent without stenosis. Both vertebral arteries originate from the subclavian's. Both vertebral arteries are patent without stenosis. Basilar artery is patent. Proximal anterior, middle, and posterior cerebral arteries are patent. Peripheral cerebral branches are symmetric. No aneurysms are demonstrated.  There are no intracranial contrast enhancing abnormalities. No acute soft tissue neck abnormality demonstrated. Bilateral thyroid nodules up to 2.1 cm on the right. Lung apices are clear      1. No focal area of decreased cerebral blood flow (CBF) is seen to suggest an acute infarct in a large vessel territory.  No defects are seen to suggest a core infarct or an area of reversible ischemia. 2. Patent bilateral carotid and vertebral arteries with no significant stenosis 3. No large intracranial arterial occlusion  These results communicated by telephone to the emergency department at 1:32 PM on 05/23/2022    This report was finalized on 5/23/2022 1:32 PM by Kelby Lieberman.      CT CEREBRAL PERFUSION WITH & WITHOUT CONTRAST    Result Date: 5/23/2022  EXAMINATION: CT CEREBRAL PERFUSION W WO CONTRAST-, CT ANGIOGRAM HEAD W AI ANALYSIS OF LVO-, CT ANGIOGRAM NECK-  DATE OF EXAM: 5/23/2022 12:43 PM  INDICATION: Neuro deficit, acute, stroke suspected.  COMPARISON: None available.  TECHNIQUE: Axial CT images of the brain were obtained prior to and after the administration of 115 cc Isovue-370. CT Perfusion protocol was utilized. Automated post processing was performed by RAPID software and submitted to PACS for interpretation. CTA of the head and neck was also performed, with reconstructions. Automated exposure control and iterative reconstruction was utilized.  FINDINGS: CT Perfusion:  CBF (<30%) volume: 0 mL Tmax (>6.0s) volume: 0 mL Mismatch volume: 0 mL Mismatch ratio: None  The examination appears to be technically adequate. There is no reduced cerebral blood volume (CBV) or reduced cerebral blood flow (CBF) to suggest an area of acute infarction in a large vessel territory. The cerebral perfusion appears symmetric. No increased mean transit time (MTT) is seen. No areas of mismatch to suggest presence of a penumbra.  CTA head/neck: Normal arch anatomy. Proximal right common carotid artery partially obscured by streak artifact from intravenous contrast in the subclavian vein. Right and left common carotid arteries are patent without stenosis. Carotid bifurcation patent bilaterally. Right and left internal carotid arteries are patent without stenosis. Both vertebral arteries originate from the subclavian's. Both vertebral arteries are patent without stenosis. Basilar artery is patent. Proximal anterior, middle, and posterior cerebral arteries are patent. Peripheral cerebral branches are symmetric. No aneurysms are demonstrated.  There are no intracranial contrast enhancing abnormalities. No acute soft tissue neck abnormality demonstrated. Bilateral thyroid nodules up to 2.1 cm on the right. Lung apices are clear      1. No focal area of decreased cerebral blood flow (CBF) is seen to suggest an acute infarct in a large vessel territory.  No defects are seen to suggest a core infarct or an area of reversible ischemia. 2. Patent bilateral carotid and vertebral arteries with no significant stenosis 3. No large intracranial arterial occlusion  These results communicated by telephone to the emergency department at 1:32 PM on 05/23/2022    This report was finalized on 5/23/2022 1:32 PM by Kelby Lieberman.        Results for orders placed during the hospital encounter of 10/05/21    Duplex Initial Vein Mapping Hemodialysis Access Unilateral Left or Right CAR    Interpretation Summary  · The left cephalic  vein is patent and of adequate size in the upper arm.  · The left cephalic vein is patent and of adequate size in the forearm.      Results for orders placed during the hospital encounter of 10/05/21    Duplex Initial Vein Mapping Hemodialysis Access Unilateral Left or Right CAR    Interpretation Summary  · The left cephalic vein is patent and of adequate size in the upper arm.  · The left cephalic vein is patent and of adequate size in the forearm.      Results for orders placed during the hospital encounter of 10/05/21    Adult Transthoracic Echo Complete W/ Cont if Necessary Per Protocol    Interpretation Summary  · Left ventricular ejection fraction appears to be 61 - 65%. Left ventricular systolic function is normal.  · Left atrial volume is mildly increased.      Discharge Details        Discharge Medications      Changes to Medications      Instructions Start Date   calcium acetate 667 MG capsule capsule  Commonly known as: PHOS BINDER)  What changed: when to take this   667 mg, Oral, 3 Times Daily With Meals      NovoLIN 70/30 FlexPen (70-30) 100 UNIT/ML suspension pen-injector  Generic drug: Insulin NPH Isophane & Regular  What changed: how much to take   1 Units, Subcutaneous, 2 Times Daily Before Meals, 24 units before breakfast and 22 units before dinner      QUEtiapine 25 MG tablet  Commonly known as: SEROquel  What changed: when to take this   25 mg, Oral, Nightly PRN         Continue These Medications      Instructions Start Date   acetaminophen 325 MG tablet  Commonly known as: TYLENOL   650 mg, Oral, Every 6 Hours PRN      amLODIPine 10 MG tablet  Commonly known as: NORVASC   10 mg, Oral, Every 24 Hours Scheduled      atorvastatin 80 MG tablet  Commonly known as: LIPITOR   80 mg, Oral, Nightly      B Complex-Vitamin C capsule   1 capsule, Oral, Daily      carvedilol 25 MG tablet  Commonly known as: COREG   25 mg, Oral, 2 Times Daily With Meals      loperamide 2 MG capsule  Commonly known as:  IMODIUM   2 mg, Oral, 4 Times Daily PRN, Take 2 capsules by mouth daily times one, then 1 capsule by mouth after each loose stool as needed.  Max 4 capsules in 24 hours.      ondansetron 4 MG tablet  Commonly known as: ZOFRAN   4 mg, Oral, Every 6 Hours PRN      venlafaxine XR 75 MG 24 hr capsule  Commonly known as: EFFEXOR-XR   75 mg, Oral, Daily      vitamin D 1.25 MG (23659 UT) capsule capsule  Commonly known as: ERGOCALCIFEROL   50,000 Units, Oral, Weekly, Sunday         Stop These Medications    polyethylene glycol 17 g packet  Commonly known as: MIRALAX     sennosides-docusate 8.6-50 MG per tablet  Commonly known as: PERICOLACE            Allergies   Allergen Reactions   • Sulfa Antibiotics      Discharge Disposition:  Home or Self Care    Diet:  Hospital:  Diet Order   Procedures   • Diet Regular; Thin; Cardiac, Consistent Carbohydrate     Activity:  As tolerated    Restrictions or Other Recommendations:   Continue dialysis as recommended by Nephrology.  She is getting dialysis today and will be ok to leave after per Nephrology.  Since she has had excellent glycemic control long term and has been off insulin here, will significantly cut back on her Home Insulin.  She was also noted to have been eating 30% of her meals/calories       CODE STATUS:    Code Status and Medical Interventions:   Ordered at: 05/24/22 0719     Level Of Support Discussed With:    Patient     Code Status (Patient has no pulse and is not breathing):    CPR (Attempt to Resuscitate)     Medical Interventions (Patient has pulse or is breathing):    Full Support       No future appointments.      Patient has been cleared for discharge after dialysis today.  I discussed case with Dr. Norman Magallanes MD  05/25/22      Time Spent on Discharge:  I spent  48  minutes on this discharge activity which included: face-to-face encounter with the patient, reviewing the data in the system, coordination of the care with the nursing staff as  well as consultants, documentation, and entering orders.          Electronically signed by Franc Magallanes MD at 05/25/22 3741

## 2022-05-25 NOTE — CASE MANAGEMENT/SOCIAL WORK
Case Management Discharge Note      Final Note:       Ms. Keita is medically ready for discharge today. I spoke to Felisa with [facility] who advises they can accept Ms. Keita back today. I have faxed the discharge summary and therapy notes to Felisa. Family will be available to transport Ms. Keita by private vehicle at the time of discharge. No further discharge needs identified.       Selected Continued Care - Admitted Since 5/23/2022          Transportation Services  Private: Car    Final Discharge Disposition Code: 01 - home or self-care

## 2022-05-25 NOTE — PLAN OF CARE
Goal Outcome Evaluation: Pt tolerated well, pt consumed approx 100 per cent of breakfast. UF:2.3L removed, BLP:63 L. Called report to Nusrat HINES.

## 2022-05-26 ENCOUNTER — READMISSION MANAGEMENT (OUTPATIENT)
Dept: CALL CENTER | Facility: HOSPITAL | Age: 62
End: 2022-05-26

## 2022-05-26 NOTE — OUTREACH NOTE
Prep Survey    Flowsheet Row Responses   [facility] patient discharged from? [facility]   Is LACE score < 7 ? No   Emergency Room discharge w/ pulse ox? No   Eligibility Not Eligible   Does the patient have one of the following disease processes/diagnoses(primary or secondary)? Other   Prep survey completed? Yes          ED CHILDERS - Registered Nurse

## 2022-06-27 ENCOUNTER — APPOINTMENT (OUTPATIENT)
Dept: GENERAL RADIOLOGY | Facility: HOSPITAL | Age: 62
End: 2022-06-27

## 2022-06-27 ENCOUNTER — APPOINTMENT (OUTPATIENT)
Dept: CT IMAGING | Facility: HOSPITAL | Age: 62
End: 2022-06-27

## 2022-06-27 ENCOUNTER — HOSPITAL ENCOUNTER (INPATIENT)
Facility: HOSPITAL | Age: 62
LOS: 2 days | Discharge: HOME | End: 2022-06-29
Attending: EMERGENCY MEDICINE | Admitting: HOSPITALIST

## 2022-06-27 DIAGNOSIS — R53.1 GENERALIZED WEAKNESS: ICD-10-CM

## 2022-06-27 DIAGNOSIS — Z99.2 END STAGE RENAL DISEASE ON DIALYSIS: Primary | ICD-10-CM

## 2022-06-27 DIAGNOSIS — R07.9 CHEST PAIN, UNSPECIFIED TYPE: ICD-10-CM

## 2022-06-27 DIAGNOSIS — N18.6 END STAGE RENAL DISEASE ON DIALYSIS: Primary | ICD-10-CM

## 2022-06-27 LAB
ALBUMIN SERPL-MCNC: 4.5 G/DL (ref 3.5–5.2)
ALBUMIN/GLOB SERPL: 1.4 G/DL
ALP SERPL-CCNC: 103 U/L (ref 39–117)
ALT SERPL W P-5'-P-CCNC: 34 U/L (ref 1–33)
AMMONIA BLD-SCNC: 11 UMOL/L (ref 11–51)
ANION GAP SERPL CALCULATED.3IONS-SCNC: 16 MMOL/L (ref 5–15)
AST SERPL-CCNC: 30 U/L (ref 1–32)
BACTERIA UR QL AUTO: NORMAL /HPF
BASOPHILS # BLD AUTO: 0.04 10*3/MM3 (ref 0–0.2)
BASOPHILS NFR BLD AUTO: 0.5 % (ref 0–1.5)
BILIRUB SERPL-MCNC: 0.5 MG/DL (ref 0–1.2)
BILIRUB UR QL STRIP: NEGATIVE
BUN SERPL-MCNC: 51 MG/DL (ref 8–23)
BUN/CREAT SERPL: 9.1 (ref 7–25)
CALCIUM SPEC-SCNC: 9.6 MG/DL (ref 8.6–10.5)
CHLORIDE SERPL-SCNC: 100 MMOL/L (ref 98–107)
CLARITY UR: CLEAR
CO2 SERPL-SCNC: 24 MMOL/L (ref 22–29)
COLOR UR: YELLOW
CREAT SERPL-MCNC: 5.61 MG/DL (ref 0.57–1)
DEPRECATED RDW RBC AUTO: 42.5 FL (ref 37–54)
EGFRCR SERPLBLD CKD-EPI 2021: 8 ML/MIN/1.73
EOSINOPHIL # BLD AUTO: 0.13 10*3/MM3 (ref 0–0.4)
EOSINOPHIL NFR BLD AUTO: 1.7 % (ref 0.3–6.2)
ERYTHROCYTE [DISTWIDTH] IN BLOOD BY AUTOMATED COUNT: 12.3 % (ref 12.3–15.4)
FLUAV RNA RESP QL NAA+PROBE: NOT DETECTED
FLUBV RNA RESP QL NAA+PROBE: NOT DETECTED
GLOBULIN UR ELPH-MCNC: 3.2 GM/DL
GLUCOSE BLDC GLUCOMTR-MCNC: 158 MG/DL (ref 70–130)
GLUCOSE SERPL-MCNC: 117 MG/DL (ref 65–99)
GLUCOSE UR STRIP-MCNC: NEGATIVE MG/DL
HCT VFR BLD AUTO: 27.3 % (ref 34–46.6)
HGB BLD-MCNC: 10 G/DL (ref 12–15.9)
HGB UR QL STRIP.AUTO: NEGATIVE
HOLD SPECIMEN: NORMAL
HYALINE CASTS UR QL AUTO: NORMAL /LPF
IMM GRANULOCYTES # BLD AUTO: 0.03 10*3/MM3 (ref 0–0.05)
IMM GRANULOCYTES NFR BLD AUTO: 0.4 % (ref 0–0.5)
KETONES UR QL STRIP: NEGATIVE
LEUKOCYTE ESTERASE UR QL STRIP.AUTO: NEGATIVE
LYMPHOCYTES # BLD AUTO: 1.04 10*3/MM3 (ref 0.7–3.1)
LYMPHOCYTES NFR BLD AUTO: 13.7 % (ref 19.6–45.3)
MAGNESIUM SERPL-MCNC: 2.3 MG/DL (ref 1.6–2.4)
MCH RBC QN AUTO: 34.4 PG (ref 26.6–33)
MCHC RBC AUTO-ENTMCNC: 36.6 G/DL (ref 31.5–35.7)
MCV RBC AUTO: 93.8 FL (ref 79–97)
MONOCYTES # BLD AUTO: 0.32 10*3/MM3 (ref 0.1–0.9)
MONOCYTES NFR BLD AUTO: 4.2 % (ref 5–12)
NEUTROPHILS NFR BLD AUTO: 6.05 10*3/MM3 (ref 1.7–7)
NEUTROPHILS NFR BLD AUTO: 79.5 % (ref 42.7–76)
NITRITE UR QL STRIP: NEGATIVE
NRBC BLD AUTO-RTO: 0 /100 WBC (ref 0–0.2)
PH UR STRIP.AUTO: 8 [PH] (ref 5–8)
PLATELET # BLD AUTO: 180 10*3/MM3 (ref 140–450)
PMV BLD AUTO: 9.2 FL (ref 6–12)
POTASSIUM SERPL-SCNC: 4.2 MMOL/L (ref 3.5–5.2)
PROT SERPL-MCNC: 7.7 G/DL (ref 6–8.5)
PROT UR QL STRIP: ABNORMAL
QT INTERVAL: 430 MS
QTC INTERVAL: 490 MS
RBC # BLD AUTO: 2.91 10*6/MM3 (ref 3.77–5.28)
RBC # UR STRIP: NORMAL /HPF
REF LAB TEST METHOD: NORMAL
SARS-COV-2 RNA RESP QL NAA+PROBE: NOT DETECTED
SODIUM SERPL-SCNC: 140 MMOL/L (ref 136–145)
SP GR UR STRIP: 1.01 (ref 1–1.03)
SQUAMOUS #/AREA URNS HPF: NORMAL /HPF
TROPONIN T SERPL-MCNC: 0.05 NG/ML (ref 0–0.03)
TROPONIN T SERPL-MCNC: 0.06 NG/ML (ref 0–0.03)
UROBILINOGEN UR QL STRIP: ABNORMAL
WBC # UR STRIP: NORMAL /HPF
WBC NRBC COR # BLD: 7.61 10*3/MM3 (ref 3.4–10.8)
WHOLE BLOOD HOLD COAG: NORMAL
WHOLE BLOOD HOLD SPECIMEN: NORMAL

## 2022-06-27 PROCEDURE — 83735 ASSAY OF MAGNESIUM: CPT | Performed by: EMERGENCY MEDICINE

## 2022-06-27 PROCEDURE — 99222 1ST HOSP IP/OBS MODERATE 55: CPT | Performed by: FAMILY MEDICINE

## 2022-06-27 PROCEDURE — 82140 ASSAY OF AMMONIA: CPT | Performed by: EMERGENCY MEDICINE

## 2022-06-27 PROCEDURE — 74176 CT ABD & PELVIS W/O CONTRAST: CPT

## 2022-06-27 PROCEDURE — 87636 SARSCOV2 & INF A&B AMP PRB: CPT | Performed by: NURSE PRACTITIONER

## 2022-06-27 PROCEDURE — 93005 ELECTROCARDIOGRAM TRACING: CPT | Performed by: EMERGENCY MEDICINE

## 2022-06-27 PROCEDURE — 71045 X-RAY EXAM CHEST 1 VIEW: CPT

## 2022-06-27 PROCEDURE — 84484 ASSAY OF TROPONIN QUANT: CPT | Performed by: NURSE PRACTITIONER

## 2022-06-27 PROCEDURE — 99285 EMERGENCY DEPT VISIT HI MDM: CPT

## 2022-06-27 PROCEDURE — 85025 COMPLETE CBC W/AUTO DIFF WBC: CPT | Performed by: EMERGENCY MEDICINE

## 2022-06-27 PROCEDURE — 81001 URINALYSIS AUTO W/SCOPE: CPT | Performed by: EMERGENCY MEDICINE

## 2022-06-27 PROCEDURE — 84484 ASSAY OF TROPONIN QUANT: CPT | Performed by: EMERGENCY MEDICINE

## 2022-06-27 PROCEDURE — 93005 ELECTROCARDIOGRAM TRACING: CPT | Performed by: NURSE PRACTITIONER

## 2022-06-27 PROCEDURE — 82962 GLUCOSE BLOOD TEST: CPT

## 2022-06-27 PROCEDURE — 70450 CT HEAD/BRAIN W/O DYE: CPT

## 2022-06-27 PROCEDURE — 80053 COMPREHEN METABOLIC PANEL: CPT | Performed by: EMERGENCY MEDICINE

## 2022-06-27 RX ORDER — BISACODYL 10 MG
10 SUPPOSITORY, RECTAL RECTAL DAILY PRN
Status: DISCONTINUED | OUTPATIENT
Start: 2022-06-27 | End: 2022-06-29 | Stop reason: HOSPADM

## 2022-06-27 RX ORDER — PANTOPRAZOLE SODIUM 40 MG/1
40 TABLET, DELAYED RELEASE ORAL ONCE
Status: COMPLETED | OUTPATIENT
Start: 2022-06-27 | End: 2022-06-27

## 2022-06-27 RX ORDER — ACETAMINOPHEN 325 MG/1
650 TABLET ORAL EVERY 6 HOURS PRN
Status: DISCONTINUED | OUTPATIENT
Start: 2022-06-27 | End: 2022-06-29 | Stop reason: HOSPADM

## 2022-06-27 RX ORDER — NICOTINE POLACRILEX 4 MG
15 LOZENGE BUCCAL
Status: DISCONTINUED | OUTPATIENT
Start: 2022-06-27 | End: 2022-06-29 | Stop reason: HOSPADM

## 2022-06-27 RX ORDER — INSULIN LISPRO 100 [IU]/ML
0-7 INJECTION, SOLUTION INTRAVENOUS; SUBCUTANEOUS
Status: DISCONTINUED | OUTPATIENT
Start: 2022-06-28 | End: 2022-06-29 | Stop reason: HOSPADM

## 2022-06-27 RX ORDER — ALBUMIN (HUMAN) 12.5 G/50ML
12.5 SOLUTION INTRAVENOUS AS NEEDED
Status: CANCELLED | OUTPATIENT
Start: 2022-06-28 | End: 2022-06-28

## 2022-06-27 RX ORDER — MULTIPLE VITAMINS W/ MINERALS TAB 9MG-400MCG
1 TAB ORAL DAILY
Status: DISCONTINUED | OUTPATIENT
Start: 2022-06-28 | End: 2022-06-29 | Stop reason: HOSPADM

## 2022-06-27 RX ORDER — CARVEDILOL 12.5 MG/1
25 TABLET ORAL 2 TIMES DAILY WITH MEALS
Status: DISCONTINUED | OUTPATIENT
Start: 2022-06-27 | End: 2022-06-29 | Stop reason: HOSPADM

## 2022-06-27 RX ORDER — DEXTROSE MONOHYDRATE 25 G/50ML
25 INJECTION, SOLUTION INTRAVENOUS
Status: DISCONTINUED | OUTPATIENT
Start: 2022-06-27 | End: 2022-06-29 | Stop reason: HOSPADM

## 2022-06-27 RX ORDER — INSULIN LISPRO 100 [IU]/ML
5 INJECTION, SOLUTION INTRAVENOUS; SUBCUTANEOUS
Status: DISCONTINUED | OUTPATIENT
Start: 2022-06-28 | End: 2022-06-29 | Stop reason: HOSPADM

## 2022-06-27 RX ORDER — VENLAFAXINE HYDROCHLORIDE 75 MG/1
75 CAPSULE, EXTENDED RELEASE ORAL DAILY
Status: DISCONTINUED | OUTPATIENT
Start: 2022-06-28 | End: 2022-06-29 | Stop reason: HOSPADM

## 2022-06-27 RX ORDER — CALCIUM ACETATE 667 MG/1
667 CAPSULE ORAL
Status: DISCONTINUED | OUTPATIENT
Start: 2022-06-28 | End: 2022-06-29 | Stop reason: HOSPADM

## 2022-06-27 RX ORDER — AMLODIPINE BESYLATE 10 MG/1
10 TABLET ORAL
Status: DISCONTINUED | OUTPATIENT
Start: 2022-06-28 | End: 2022-06-29 | Stop reason: HOSPADM

## 2022-06-27 RX ORDER — SODIUM CHLORIDE 0.9 % (FLUSH) 0.9 %
10 SYRINGE (ML) INJECTION AS NEEDED
Status: DISCONTINUED | OUTPATIENT
Start: 2022-06-27 | End: 2022-06-29 | Stop reason: HOSPADM

## 2022-06-27 RX ORDER — PANTOPRAZOLE SODIUM 40 MG/1
40 TABLET, DELAYED RELEASE ORAL EVERY MORNING
Status: DISCONTINUED | OUTPATIENT
Start: 2022-06-27 | End: 2022-06-29 | Stop reason: HOSPADM

## 2022-06-27 RX ORDER — SODIUM CHLORIDE 0.9 % (FLUSH) 0.9 %
10 SYRINGE (ML) INJECTION EVERY 12 HOURS SCHEDULED
Status: DISCONTINUED | OUTPATIENT
Start: 2022-06-27 | End: 2022-06-29 | Stop reason: HOSPADM

## 2022-06-27 RX ORDER — ATORVASTATIN CALCIUM 40 MG/1
80 TABLET, FILM COATED ORAL NIGHTLY
Status: DISCONTINUED | OUTPATIENT
Start: 2022-06-27 | End: 2022-06-29 | Stop reason: HOSPADM

## 2022-06-27 RX ORDER — AMOXICILLIN 250 MG
2 CAPSULE ORAL 2 TIMES DAILY
Status: DISCONTINUED | OUTPATIENT
Start: 2022-06-27 | End: 2022-06-29 | Stop reason: HOSPADM

## 2022-06-27 RX ORDER — BISACODYL 5 MG/1
5 TABLET, DELAYED RELEASE ORAL DAILY PRN
Status: DISCONTINUED | OUTPATIENT
Start: 2022-06-27 | End: 2022-06-29 | Stop reason: HOSPADM

## 2022-06-27 RX ORDER — POLYETHYLENE GLYCOL 3350 17 G/17G
17 POWDER, FOR SOLUTION ORAL DAILY PRN
Status: DISCONTINUED | OUTPATIENT
Start: 2022-06-27 | End: 2022-06-29 | Stop reason: HOSPADM

## 2022-06-27 RX ADMIN — CARVEDILOL 25 MG: 12.5 TABLET, FILM COATED ORAL at 22:10

## 2022-06-27 RX ADMIN — PANTOPRAZOLE SODIUM 40 MG: 40 TABLET, DELAYED RELEASE ORAL at 22:12

## 2022-06-27 RX ADMIN — PANTOPRAZOLE SODIUM 40 MG: 40 TABLET, DELAYED RELEASE ORAL at 22:10

## 2022-06-27 RX ADMIN — Medication 10 ML: at 22:12

## 2022-06-27 RX ADMIN — SENNOSIDES AND DOCUSATE SODIUM 2 TABLET: 50; 8.6 TABLET ORAL at 22:09

## 2022-06-27 RX ADMIN — ATORVASTATIN CALCIUM 80 MG: 40 TABLET, FILM COATED ORAL at 22:10

## 2022-06-28 ENCOUNTER — APPOINTMENT (OUTPATIENT)
Dept: NEPHROLOGY | Facility: HOSPITAL | Age: 62
End: 2022-06-28

## 2022-06-28 LAB
ALBUMIN SERPL-MCNC: 3.6 G/DL (ref 3.5–5.2)
ANION GAP SERPL CALCULATED.3IONS-SCNC: 14 MMOL/L (ref 5–15)
BASOPHILS # BLD AUTO: 0.04 10*3/MM3 (ref 0–0.2)
BASOPHILS NFR BLD AUTO: 0.8 % (ref 0–1.5)
BUN SERPL-MCNC: 60 MG/DL (ref 8–23)
BUN/CREAT SERPL: 9.9 (ref 7–25)
CALCIUM SPEC-SCNC: 8.9 MG/DL (ref 8.6–10.5)
CHLORIDE SERPL-SCNC: 102 MMOL/L (ref 98–107)
CO2 SERPL-SCNC: 23 MMOL/L (ref 22–29)
CREAT SERPL-MCNC: 6.07 MG/DL (ref 0.57–1)
DEPRECATED RDW RBC AUTO: 42.6 FL (ref 37–54)
EGFRCR SERPLBLD CKD-EPI 2021: 7.3 ML/MIN/1.73
EOSINOPHIL # BLD AUTO: 0.13 10*3/MM3 (ref 0–0.4)
EOSINOPHIL NFR BLD AUTO: 2.7 % (ref 0.3–6.2)
ERYTHROCYTE [DISTWIDTH] IN BLOOD BY AUTOMATED COUNT: 12.3 % (ref 12.3–15.4)
FERRITIN SERPL-MCNC: 508.3 NG/ML (ref 13–150)
GLUCOSE BLDC GLUCOMTR-MCNC: 107 MG/DL (ref 70–130)
GLUCOSE BLDC GLUCOMTR-MCNC: 131 MG/DL (ref 70–130)
GLUCOSE BLDC GLUCOMTR-MCNC: 153 MG/DL (ref 70–130)
GLUCOSE BLDC GLUCOMTR-MCNC: 154 MG/DL (ref 70–130)
GLUCOSE SERPL-MCNC: 127 MG/DL (ref 65–99)
HCT VFR BLD AUTO: 22 % (ref 34–46.6)
HGB BLD-MCNC: 7.8 G/DL (ref 12–15.9)
IMM GRANULOCYTES # BLD AUTO: 0.02 10*3/MM3 (ref 0–0.05)
IMM GRANULOCYTES NFR BLD AUTO: 0.4 % (ref 0–0.5)
IRON 24H UR-MRATE: 84 MCG/DL (ref 37–145)
IRON SATN MFR SERPL: 38 % (ref 20–50)
LYMPHOCYTES # BLD AUTO: 1.29 10*3/MM3 (ref 0.7–3.1)
LYMPHOCYTES NFR BLD AUTO: 26.7 % (ref 19.6–45.3)
MCH RBC QN AUTO: 34.2 PG (ref 26.6–33)
MCHC RBC AUTO-ENTMCNC: 35.5 G/DL (ref 31.5–35.7)
MCV RBC AUTO: 96.5 FL (ref 79–97)
MONOCYTES # BLD AUTO: 0.34 10*3/MM3 (ref 0.1–0.9)
MONOCYTES NFR BLD AUTO: 7 % (ref 5–12)
NEUTROPHILS NFR BLD AUTO: 3.01 10*3/MM3 (ref 1.7–7)
NEUTROPHILS NFR BLD AUTO: 62.4 % (ref 42.7–76)
NRBC BLD AUTO-RTO: 0 /100 WBC (ref 0–0.2)
PHOSPHATE SERPL-MCNC: 5.4 MG/DL (ref 2.5–4.5)
PLATELET # BLD AUTO: 138 10*3/MM3 (ref 140–450)
PMV BLD AUTO: 9.7 FL (ref 6–12)
POTASSIUM SERPL-SCNC: 4.2 MMOL/L (ref 3.5–5.2)
QT INTERVAL: 382 MS
QTC INTERVAL: 467 MS
RBC # BLD AUTO: 2.28 10*6/MM3 (ref 3.77–5.28)
SODIUM SERPL-SCNC: 139 MMOL/L (ref 136–145)
TIBC SERPL-MCNC: 222 MCG/DL (ref 298–536)
TRANSFERRIN SERPL-MCNC: 149 MG/DL (ref 200–360)
TROPONIN T SERPL-MCNC: 0.05 NG/ML (ref 0–0.03)
TROPONIN T SERPL-MCNC: 0.05 NG/ML (ref 0–0.03)
WBC NRBC COR # BLD: 4.83 10*3/MM3 (ref 3.4–10.8)

## 2022-06-28 PROCEDURE — 97165 OT EVAL LOW COMPLEX 30 MIN: CPT

## 2022-06-28 PROCEDURE — 82728 ASSAY OF FERRITIN: CPT | Performed by: INTERNAL MEDICINE

## 2022-06-28 PROCEDURE — 83540 ASSAY OF IRON: CPT | Performed by: INTERNAL MEDICINE

## 2022-06-28 PROCEDURE — 63710000001 INSULIN DETEMIR PER 5 UNITS: Performed by: NURSE PRACTITIONER

## 2022-06-28 PROCEDURE — 63710000001 INSULIN LISPRO (HUMAN) PER 5 UNITS: Performed by: NURSE PRACTITIONER

## 2022-06-28 PROCEDURE — 84484 ASSAY OF TROPONIN QUANT: CPT | Performed by: NURSE PRACTITIONER

## 2022-06-28 PROCEDURE — 97161 PT EVAL LOW COMPLEX 20 MIN: CPT | Performed by: PHYSICAL THERAPIST

## 2022-06-28 PROCEDURE — 85025 COMPLETE CBC W/AUTO DIFF WBC: CPT | Performed by: NURSE PRACTITIONER

## 2022-06-28 PROCEDURE — 80069 RENAL FUNCTION PANEL: CPT | Performed by: INTERNAL MEDICINE

## 2022-06-28 PROCEDURE — 84466 ASSAY OF TRANSFERRIN: CPT | Performed by: INTERNAL MEDICINE

## 2022-06-28 PROCEDURE — 5A1D70Z PERFORMANCE OF URINARY FILTRATION, INTERMITTENT, LESS THAN 6 HOURS PER DAY: ICD-10-PCS | Performed by: INTERNAL MEDICINE

## 2022-06-28 PROCEDURE — 82962 GLUCOSE BLOOD TEST: CPT

## 2022-06-28 PROCEDURE — 99232 SBSQ HOSP IP/OBS MODERATE 35: CPT | Performed by: HOSPITALIST

## 2022-06-28 RX ORDER — SIMETHICONE 80 MG
80 TABLET,CHEWABLE ORAL 4 TIMES DAILY PRN
Status: DISCONTINUED | OUTPATIENT
Start: 2022-06-28 | End: 2022-06-29 | Stop reason: HOSPADM

## 2022-06-28 RX ORDER — HYDROCODONE BITARTRATE AND ACETAMINOPHEN 5; 325 MG/1; MG/1
1 TABLET ORAL ONCE
Status: COMPLETED | OUTPATIENT
Start: 2022-06-28 | End: 2022-06-28

## 2022-06-28 RX ORDER — ONDANSETRON 4 MG/1
4 TABLET, ORALLY DISINTEGRATING ORAL EVERY 6 HOURS PRN
Status: DISCONTINUED | OUTPATIENT
Start: 2022-06-28 | End: 2022-06-29 | Stop reason: HOSPADM

## 2022-06-28 RX ORDER — HEPARIN SODIUM 1000 [USP'U]/ML
1000 INJECTION, SOLUTION INTRAVENOUS; SUBCUTANEOUS ONCE
Status: DISCONTINUED | OUTPATIENT
Start: 2022-06-28 | End: 2022-06-29 | Stop reason: HOSPADM

## 2022-06-28 RX ORDER — LIDOCAINE 50 MG/G
2 PATCH TOPICAL
Status: DISCONTINUED | OUTPATIENT
Start: 2022-06-28 | End: 2022-06-29 | Stop reason: HOSPADM

## 2022-06-28 RX ADMIN — CALCIUM ACETATE 667 MG: 667 CAPSULE ORAL at 17:07

## 2022-06-28 RX ADMIN — INSULIN LISPRO 5 UNITS: 100 INJECTION, SOLUTION INTRAVENOUS; SUBCUTANEOUS at 12:38

## 2022-06-28 RX ADMIN — ACETAMINOPHEN 650 MG: 325 TABLET ORAL at 21:28

## 2022-06-28 RX ADMIN — INSULIN DETEMIR 15 UNITS: 100 INJECTION, SOLUTION SUBCUTANEOUS at 21:30

## 2022-06-28 RX ADMIN — Medication 10 ML: at 21:30

## 2022-06-28 RX ADMIN — MULTIPLE VITAMINS W/ MINERALS TAB 1 TABLET: TAB at 12:38

## 2022-06-28 RX ADMIN — SENNOSIDES AND DOCUSATE SODIUM 2 TABLET: 50; 8.6 TABLET ORAL at 21:28

## 2022-06-28 RX ADMIN — POLYETHYLENE GLYCOL (3350) 17 G: 17 POWDER, FOR SOLUTION ORAL at 13:34

## 2022-06-28 RX ADMIN — CALCIUM ACETATE 667 MG: 667 CAPSULE ORAL at 12:39

## 2022-06-28 RX ADMIN — SENNOSIDES AND DOCUSATE SODIUM 2 TABLET: 50; 8.6 TABLET ORAL at 12:38

## 2022-06-28 RX ADMIN — ACETAMINOPHEN 650 MG: 325 TABLET ORAL at 14:34

## 2022-06-28 RX ADMIN — VENLAFAXINE HYDROCHLORIDE 75 MG: 75 CAPSULE, EXTENDED RELEASE ORAL at 12:39

## 2022-06-28 RX ADMIN — CARVEDILOL 25 MG: 12.5 TABLET, FILM COATED ORAL at 17:07

## 2022-06-28 RX ADMIN — Medication 10 ML: at 12:39

## 2022-06-28 RX ADMIN — HYDROCODONE BITARTRATE AND ACETAMINOPHEN 1 TABLET: 5; 325 TABLET ORAL at 03:48

## 2022-06-28 RX ADMIN — INSULIN LISPRO 5 UNITS: 100 INJECTION, SOLUTION INTRAVENOUS; SUBCUTANEOUS at 17:06

## 2022-06-28 RX ADMIN — AMLODIPINE BESYLATE 10 MG: 10 TABLET ORAL at 12:38

## 2022-06-28 RX ADMIN — BISACODYL 5 MG: 5 TABLET, COATED ORAL at 13:34

## 2022-06-28 RX ADMIN — INSULIN LISPRO 2 UNITS: 100 INJECTION, SOLUTION INTRAVENOUS; SUBCUTANEOUS at 12:46

## 2022-06-28 RX ADMIN — LIDOCAINE 2 PATCH: 50 PATCH CUTANEOUS at 17:06

## 2022-06-28 RX ADMIN — ATORVASTATIN CALCIUM 80 MG: 40 TABLET, FILM COATED ORAL at 21:28

## 2022-06-28 RX ADMIN — PANTOPRAZOLE SODIUM 40 MG: 40 TABLET, DELAYED RELEASE ORAL at 06:14

## 2022-06-28 RX ADMIN — BISACODYL 10 MG: 10 SUPPOSITORY RECTAL at 13:34

## 2022-06-29 ENCOUNTER — READMISSION MANAGEMENT (OUTPATIENT)
Dept: CALL CENTER | Facility: HOSPITAL | Age: 62
End: 2022-06-29

## 2022-06-29 VITALS
DIASTOLIC BLOOD PRESSURE: 74 MMHG | SYSTOLIC BLOOD PRESSURE: 129 MMHG | RESPIRATION RATE: 18 BRPM | HEART RATE: 82 BPM | WEIGHT: 169.8 LBS | OXYGEN SATURATION: 100 % | BODY MASS INDEX: 25.73 KG/M2 | TEMPERATURE: 98.1 F | HEIGHT: 68 IN

## 2022-06-29 LAB
GLUCOSE BLDC GLUCOMTR-MCNC: 102 MG/DL (ref 70–130)
GLUCOSE BLDC GLUCOMTR-MCNC: 68 MG/DL (ref 70–130)
GLUCOSE BLDC GLUCOMTR-MCNC: 70 MG/DL (ref 70–130)

## 2022-06-29 PROCEDURE — 82962 GLUCOSE BLOOD TEST: CPT

## 2022-06-29 PROCEDURE — 99239 HOSP IP/OBS DSCHRG MGMT >30: CPT | Performed by: HOSPITALIST

## 2022-06-29 PROCEDURE — 63710000001 INSULIN LISPRO (HUMAN) PER 5 UNITS: Performed by: NURSE PRACTITIONER

## 2022-06-29 RX ORDER — PANTOPRAZOLE SODIUM 40 MG/1
40 TABLET, DELAYED RELEASE ORAL EVERY MORNING
Qty: 30 TABLET | Refills: 0 | Status: ON HOLD | OUTPATIENT
Start: 2022-06-30 | End: 2022-08-04

## 2022-06-29 RX ADMIN — PANTOPRAZOLE SODIUM 40 MG: 40 TABLET, DELAYED RELEASE ORAL at 06:26

## 2022-06-29 RX ADMIN — SENNOSIDES AND DOCUSATE SODIUM 2 TABLET: 50; 8.6 TABLET ORAL at 08:25

## 2022-06-29 RX ADMIN — CARVEDILOL 25 MG: 12.5 TABLET, FILM COATED ORAL at 08:24

## 2022-06-29 RX ADMIN — INSULIN LISPRO 5 UNITS: 100 INJECTION, SOLUTION INTRAVENOUS; SUBCUTANEOUS at 12:29

## 2022-06-29 RX ADMIN — MULTIPLE VITAMINS W/ MINERALS TAB 1 TABLET: TAB at 08:24

## 2022-06-29 RX ADMIN — VENLAFAXINE HYDROCHLORIDE 75 MG: 75 CAPSULE, EXTENDED RELEASE ORAL at 08:25

## 2022-06-29 RX ADMIN — ACETAMINOPHEN 650 MG: 325 TABLET ORAL at 08:36

## 2022-06-29 RX ADMIN — AMLODIPINE BESYLATE 10 MG: 10 TABLET ORAL at 08:24

## 2022-06-29 RX ADMIN — CALCIUM ACETATE 667 MG: 667 CAPSULE ORAL at 12:29

## 2022-06-29 RX ADMIN — CALCIUM ACETATE 667 MG: 667 CAPSULE ORAL at 08:24

## 2022-07-06 ENCOUNTER — READMISSION MANAGEMENT (OUTPATIENT)
Dept: CALL CENTER | Facility: HOSPITAL | Age: 62
End: 2022-07-06

## 2022-07-06 NOTE — OUTREACH NOTE
Medical Week 2 Survey    Flowsheet Row Responses   [facility] [place] by [facility] patient discharged from? [facility]   Does the patient have one of the following disease processes/diagnoses(primary or secondary)? Other   Week 2 attempt successful? No   Unsuccessful attempts Attempt 1          YOLIE TORIBIO - Licensed Nurse

## 2022-07-15 ENCOUNTER — READMISSION MANAGEMENT (OUTPATIENT)
Dept: CALL CENTER | Facility: HOSPITAL | Age: 62
End: 2022-07-15

## 2022-07-15 NOTE — OUTREACH NOTE
Medical Week 3 Survey    Flowsheet Row Responses   [facility] patient discharged from? [place]   Does the patient have one of the following disease processes/diagnoses(primary or secondary)? Other   Week 3 attempt successful? Yes   Call start time 1210   Call end time 1213   General alerts for this patient [place]   Discharge diagnosis atypical chest pain,  ESRD   Is patient permission given to speak with other caregiver? Yes   List who call center can speak with Parviz--ex    Person spoke with today (if not patient) and relationship Parviz--ex  and POA   Meds reviewed with patient/caregiver? Yes   Is the patient taking all medications as directed (includes completed medication regime)? Yes   Medication comments Parviz thinks she has her meds   Has the patient kept scheduled appointments due by today? Yes   Comments Provider comes to [place]   Psychosocial issues? No   What is the patient's perception of their health status since discharge? Improving   Is the patient/caregiver able to teach back the hierarchy of who to call/visit for symptoms/problems? PCP, Specialist, Home health nurse, Urgent Care, ED, 911 Yes   If the patient is a current smoker, are they able to teach back resources for cessation? Not a smoker   Additional teach back comments Ex , Parviz feels like she is back to baseline and is doing well.    Week 3 Call Completed? Yes          LEE LARA - Registered Nurse

## 2022-07-26 ENCOUNTER — READMISSION MANAGEMENT (OUTPATIENT)
Dept: CALL CENTER | Facility: HOSPITAL | Age: 62
End: 2022-07-26

## 2022-07-26 ENCOUNTER — HOSPITAL ENCOUNTER (EMERGENCY)
Facility: HOSPITAL | Age: 62
Discharge: HOME | End: 2022-07-27
Attending: STUDENT IN AN ORGANIZED HEALTH CARE EDUCATION/TRAINING PROGRAM | Admitting: STUDENT IN AN ORGANIZED HEALTH CARE EDUCATION/TRAINING PROGRAM

## 2022-07-26 DIAGNOSIS — M54.6 ACUTE LEFT-SIDED THORACIC BACK PAIN: ICD-10-CM

## 2022-07-26 DIAGNOSIS — R21 RASH: ICD-10-CM

## 2022-07-26 DIAGNOSIS — B02.9 HERPES ZOSTER WITHOUT COMPLICATION: Primary | ICD-10-CM

## 2022-07-26 PROCEDURE — 99283 EMERGENCY DEPT VISIT LOW MDM: CPT

## 2022-07-26 RX ORDER — GABAPENTIN 300 MG/1
300 CAPSULE ORAL ONCE
Status: COMPLETED | OUTPATIENT
Start: 2022-07-26 | End: 2022-07-26

## 2022-07-26 RX ORDER — IBUPROFEN 600 MG/1
600 TABLET ORAL ONCE
Status: COMPLETED | OUTPATIENT
Start: 2022-07-26 | End: 2022-07-26

## 2022-07-26 RX ORDER — VALACYCLOVIR HYDROCHLORIDE 500 MG/1
1000 TABLET, FILM COATED ORAL ONCE
Status: COMPLETED | OUTPATIENT
Start: 2022-07-26 | End: 2022-07-27

## 2022-07-26 RX ORDER — ACETAMINOPHEN 500 MG
1000 TABLET ORAL ONCE
Status: COMPLETED | OUTPATIENT
Start: 2022-07-26 | End: 2022-07-26

## 2022-07-26 RX ADMIN — ACETAMINOPHEN 1000 MG: 500 TABLET, FILM COATED ORAL at 23:58

## 2022-07-26 RX ADMIN — IBUPROFEN 600 MG: 600 TABLET, FILM COATED ORAL at 23:58

## 2022-07-26 RX ADMIN — GABAPENTIN 300 MG: 300 CAPSULE ORAL at 23:58

## 2022-07-26 NOTE — OUTREACH NOTE
Medical Week 4 Survey    Flowsheet Row Responses   [facility] patient discharged from? [place]   Does the patient have one of the following disease processes/diagnoses(primary or secondary)? Other   Week 4 attempt successful? No          AVERY FLORES - Registered Nurse

## 2022-07-27 VITALS
DIASTOLIC BLOOD PRESSURE: 63 MMHG | RESPIRATION RATE: 16 BRPM | BODY MASS INDEX: 25.76 KG/M2 | OXYGEN SATURATION: 98 % | WEIGHT: 170 LBS | SYSTOLIC BLOOD PRESSURE: 104 MMHG | TEMPERATURE: 98.2 F | HEIGHT: 68 IN | HEART RATE: 82 BPM

## 2022-07-27 RX ORDER — GABAPENTIN 300 MG/1
300 CAPSULE ORAL EVERY 8 HOURS PRN
Qty: 8 CAPSULE | Refills: 0 | Status: SHIPPED | OUTPATIENT
Start: 2022-07-27 | End: 2022-08-04 | Stop reason: HOSPADM

## 2022-07-27 RX ORDER — IBUPROFEN 600 MG/1
600 TABLET ORAL EVERY 6 HOURS PRN
Qty: 20 TABLET | Refills: 0 | Status: SHIPPED | OUTPATIENT
Start: 2022-07-27 | End: 2022-08-24 | Stop reason: HOSPADM

## 2022-07-27 RX ORDER — VALACYCLOVIR HYDROCHLORIDE 1 G/1
1000 TABLET, FILM COATED ORAL 3 TIMES DAILY
Qty: 21 TABLET | Refills: 0 | Status: SHIPPED | OUTPATIENT
Start: 2022-07-27 | End: 2022-08-04 | Stop reason: HOSPADM

## 2022-07-27 RX ADMIN — VALACYCLOVIR HYDROCHLORIDE 1000 MG: 500 TABLET, FILM COATED ORAL at 00:08

## 2022-07-29 ENCOUNTER — APPOINTMENT (OUTPATIENT)
Dept: MRI IMAGING | Facility: HOSPITAL | Age: 62
End: 2022-07-29

## 2022-07-29 ENCOUNTER — APPOINTMENT (OUTPATIENT)
Dept: GENERAL RADIOLOGY | Facility: HOSPITAL | Age: 62
End: 2022-07-29

## 2022-07-29 ENCOUNTER — APPOINTMENT (OUTPATIENT)
Dept: NEUROLOGY | Facility: HOSPITAL | Age: 62
End: 2022-07-29

## 2022-07-29 ENCOUNTER — APPOINTMENT (OUTPATIENT)
Dept: CT IMAGING | Facility: HOSPITAL | Age: 62
End: 2022-07-29

## 2022-07-29 ENCOUNTER — HOSPITAL ENCOUNTER (INPATIENT)
Facility: HOSPITAL | Age: 62
LOS: 6 days | Discharge: HOME | End: 2022-08-04
Attending: EMERGENCY MEDICINE | Admitting: HOSPITALIST

## 2022-07-29 DIAGNOSIS — R41.0 DISORIENTATION: Primary | ICD-10-CM

## 2022-07-29 DIAGNOSIS — N18.6 ESRD ON DIALYSIS: ICD-10-CM

## 2022-07-29 DIAGNOSIS — Z99.2 ESRD ON DIALYSIS: ICD-10-CM

## 2022-07-29 LAB
ALBUMIN SERPL-MCNC: 4.3 G/DL (ref 3.5–5.2)
ALBUMIN/GLOB SERPL: 1.5 G/DL
ALP SERPL-CCNC: 107 U/L (ref 39–117)
ALT SERPL W P-5'-P-CCNC: 33 U/L (ref 1–33)
AMMONIA BLD-SCNC: 40 UMOL/L (ref 11–51)
ANION GAP SERPL CALCULATED.3IONS-SCNC: 12 MMOL/L (ref 5–15)
APPEARANCE CSF: ABNORMAL
ARTERIAL PATENCY WRIST A: ABNORMAL
AST SERPL-CCNC: 29 U/L (ref 1–32)
ATMOSPHERIC PRESS: ABNORMAL MM[HG]
BACTERIA UR QL AUTO: NORMAL /HPF
BASE EXCESS BLDA CALC-SCNC: 1 MMOL/L (ref 0–2)
BASOPHILS # BLD AUTO: 0.03 10*3/MM3 (ref 0–0.2)
BASOPHILS NFR BLD AUTO: 0.6 % (ref 0–1.5)
BDY SITE: ABNORMAL
BILIRUB SERPL-MCNC: 0.4 MG/DL (ref 0–1.2)
BILIRUB UR QL STRIP: NEGATIVE
BODY TEMPERATURE: 37 C
BUN SERPL-MCNC: 53 MG/DL (ref 8–23)
BUN/CREAT SERPL: 8.8 (ref 7–25)
C GATTII+NEOFOR DNA CSF QL NAA+NON-PROBE: NOT DETECTED
CALCIUM SPEC-SCNC: 9.3 MG/DL (ref 8.6–10.5)
CHLORIDE SERPL-SCNC: 96 MMOL/L (ref 98–107)
CLARITY UR: CLEAR
CMV DNA CSF QL NAA+PROBE: NOT DETECTED
CO2 BLDA-SCNC: 26.4 MMOL/L (ref 22–33)
CO2 SERPL-SCNC: 24 MMOL/L (ref 22–29)
COHGB MFR BLD: 1 % (ref 0–2)
COLOR CSF: ABNORMAL
COLOR SPUN CSF: COLORLESS
COLOR UR: YELLOW
CREAT SERPL-MCNC: 6.02 MG/DL (ref 0.57–1)
D-LACTATE SERPL-SCNC: 1.2 MMOL/L (ref 0.5–2)
DEPRECATED RDW RBC AUTO: 39.9 FL (ref 37–54)
E COLI K1 DNA CSF QL NAA+NON-PROBE: NOT DETECTED
EGFRCR SERPLBLD CKD-EPI 2021: 7.4 ML/MIN/1.73
EOSINOPHIL # BLD AUTO: 0.33 10*3/MM3 (ref 0–0.4)
EOSINOPHIL NFR BLD AUTO: 6.3 % (ref 0.3–6.2)
EOSINOPHIL NFR CSF MANUAL: 4 %
EPAP: 0
ERYTHROCYTE [DISTWIDTH] IN BLOOD BY AUTOMATED COUNT: 11.9 % (ref 12.3–15.4)
EV RNA CSF QL NAA+PROBE: NOT DETECTED
FLUAV SUBTYP SPEC NAA+PROBE: NOT DETECTED
FLUBV RNA ISLT QL NAA+PROBE: NOT DETECTED
GLOBULIN UR ELPH-MCNC: 2.9 GM/DL
GLUCOSE BLDC GLUCOMTR-MCNC: 132 MG/DL (ref 70–130)
GLUCOSE CSF-MCNC: 97 MG/DL (ref 40–70)
GLUCOSE SERPL-MCNC: 177 MG/DL (ref 65–99)
GLUCOSE UR STRIP-MCNC: ABNORMAL MG/DL
GP B STREP DNA SPEC QL NAA+PROBE: NOT DETECTED
HAEM INFLU SEROTYP DNA SPEC NAA+PROBE: NOT DETECTED
HCO3 BLDA-SCNC: 25.3 MMOL/L (ref 20–26)
HCT VFR BLD AUTO: 27.1 % (ref 34–46.6)
HCT VFR BLD CALC: 27.7 % (ref 38–51)
HGB BLD-MCNC: 9.9 G/DL (ref 12–15.9)
HGB BLDA-MCNC: 9 G/DL (ref 14–18)
HGB UR QL STRIP.AUTO: NEGATIVE
HHV6 DNA CSF QL NAA+PROBE: NOT DETECTED
HOLD SPECIMEN: NORMAL
HSV1 DNA CSF QL NAA+PROBE: NOT DETECTED
HSV2 DNA CSF QL NAA+PROBE: NOT DETECTED
HYALINE CASTS UR QL AUTO: NORMAL /LPF
IMM GRANULOCYTES # BLD AUTO: 0.01 10*3/MM3 (ref 0–0.05)
IMM GRANULOCYTES NFR BLD AUTO: 0.2 % (ref 0–0.5)
INHALED O2 CONCENTRATION: 21 %
IPAP: 0
KETONES UR QL STRIP: NEGATIVE
L MONOCYTOG RRNA SPEC QL PROBE: NOT DETECTED
LEUKOCYTE ESTERASE UR QL STRIP.AUTO: NEGATIVE
LYMPHOCYTES # BLD AUTO: 1.09 10*3/MM3 (ref 0.7–3.1)
LYMPHOCYTES NFR BLD AUTO: 20.8 % (ref 19.6–45.3)
LYMPHOCYTES NFR CSF MANUAL: 31 % (ref 62–100)
MAGNESIUM SERPL-MCNC: 2.4 MG/DL (ref 1.6–2.4)
MCH RBC QN AUTO: 33.8 PG (ref 26.6–33)
MCHC RBC AUTO-ENTMCNC: 36.5 G/DL (ref 31.5–35.7)
MCV RBC AUTO: 92.5 FL (ref 79–97)
METHGB BLD QL: 0.6 % (ref 0–1.5)
MODALITY: ABNORMAL
MONOCYTES # BLD AUTO: 0.31 10*3/MM3 (ref 0.1–0.9)
MONOCYTES NFR BLD AUTO: 5.9 % (ref 5–12)
MONOCYTES NFR CSF MANUAL: 4 % (ref 0–36)
N MEN DNA SPEC QL NAA+PROBE: NOT DETECTED
NEUTROPHILS NFR BLD AUTO: 3.46 10*3/MM3 (ref 1.7–7)
NEUTROPHILS NFR BLD AUTO: 66.2 % (ref 42.7–76)
NEUTROPHILS NFR CSF MICRO: 60 % (ref 0–6)
NITRITE UR QL STRIP: NEGATIVE
NOTE: ABNORMAL
NRBC BLD AUTO-RTO: 0 /100 WBC (ref 0–0.2)
NT-PROBNP SERPL-MCNC: 2545 PG/ML (ref 0–900)
OXYHGB MFR BLDV: 94.6 % (ref 94–99)
PARECHOVIRUS A RNA CSF QL NAA+NON-PROBE: NOT DETECTED
PAW @ PEAK INSP FLOW SETTING VENT: 0 CMH2O
PCO2 BLDA: 37.8 MM HG (ref 35–45)
PCO2 TEMP ADJ BLD: 37.8 MM HG (ref 35–45)
PH BLDA: 7.43 PH UNITS (ref 7.35–7.45)
PH UR STRIP.AUTO: 8.5 [PH] (ref 5–8)
PH, TEMP CORRECTED: 7.43 PH UNITS
PLATELET # BLD AUTO: 156 10*3/MM3 (ref 140–450)
PMV BLD AUTO: 9.4 FL (ref 6–12)
PO2 BLDA: 74.2 MM HG (ref 83–108)
PO2 TEMP ADJ BLD: 74.2 MM HG (ref 83–108)
POTASSIUM SERPL-SCNC: 4.9 MMOL/L (ref 3.5–5.2)
PROT CSF-MCNC: 419.4 MG/DL (ref 15–45)
PROT SERPL-MCNC: 7.2 G/DL (ref 6–8.5)
PROT UR QL STRIP: ABNORMAL
RBC # BLD AUTO: 2.93 10*6/MM3 (ref 3.77–5.28)
RBC # CSF MANUAL: ABNORMAL /MM3 (ref 0–5)
RBC # UR STRIP: NORMAL /HPF
REF LAB TEST METHOD: NORMAL
S PNEUM DNA CSF QL NAA+NON-PROBE: NOT DETECTED
SARS-COV-2 RNA PNL SPEC NAA+PROBE: NOT DETECTED
SODIUM SERPL-SCNC: 132 MMOL/L (ref 136–145)
SP GR UR STRIP: 1.01 (ref 1–1.03)
SPECIMEN VOL CSF: 6.5 ML
SQUAMOUS #/AREA URNS HPF: NORMAL /HPF
TOTAL RATE: 0 BREATHS/MINUTE
TROPONIN T SERPL-MCNC: 0.03 NG/ML (ref 0–0.03)
TROPONIN T SERPL-MCNC: 0.04 NG/ML (ref 0–0.03)
TROPONIN T SERPL-MCNC: 0.04 NG/ML (ref 0–0.03)
TUBE # CSF: 1
UROBILINOGEN UR QL STRIP: ABNORMAL
VZV DNA CSF QL NAA+PROBE: NOT DETECTED
WBC # CSF MANUAL: 20 /MM3 (ref 0–5)
WBC # UR STRIP: NORMAL /HPF
WBC NRBC COR # BLD: 5.23 10*3/MM3 (ref 3.4–10.8)
WHOLE BLOOD HOLD COAG: NORMAL
WHOLE BLOOD HOLD SPECIMEN: NORMAL

## 2022-07-29 PROCEDURE — 84484 ASSAY OF TROPONIN QUANT: CPT | Performed by: EMERGENCY MEDICINE

## 2022-07-29 PROCEDURE — 93005 ELECTROCARDIOGRAM TRACING: CPT

## 2022-07-29 PROCEDURE — 83880 ASSAY OF NATRIURETIC PEPTIDE: CPT | Performed by: NURSE PRACTITIONER

## 2022-07-29 PROCEDURE — 87483 CNS DNA AMP PROBE TYPE 12-25: CPT | Performed by: PSYCHIATRY & NEUROLOGY

## 2022-07-29 PROCEDURE — 84157 ASSAY OF PROTEIN OTHER: CPT | Performed by: PSYCHIATRY & NEUROLOGY

## 2022-07-29 PROCEDURE — 82140 ASSAY OF AMMONIA: CPT | Performed by: NURSE PRACTITIONER

## 2022-07-29 PROCEDURE — 82805 BLOOD GASES W/O2 SATURATION: CPT

## 2022-07-29 PROCEDURE — 82945 GLUCOSE OTHER FLUID: CPT | Performed by: PSYCHIATRY & NEUROLOGY

## 2022-07-29 PROCEDURE — 87327 CRYPTOCOCCUS NEOFORM AG IA: CPT | Performed by: PSYCHIATRY & NEUROLOGY

## 2022-07-29 PROCEDURE — 74018 RADEX ABDOMEN 1 VIEW: CPT

## 2022-07-29 PROCEDURE — 86694 HERPES SIMPLEX NES ANTBDY: CPT | Performed by: PSYCHIATRY & NEUROLOGY

## 2022-07-29 PROCEDURE — 83605 ASSAY OF LACTIC ACID: CPT | Performed by: NURSE PRACTITIONER

## 2022-07-29 PROCEDURE — 0 LIDOCAINE 1 % SOLUTION: Performed by: RADIOLOGY

## 2022-07-29 PROCEDURE — 87040 BLOOD CULTURE FOR BACTERIA: CPT | Performed by: NURSE PRACTITIONER

## 2022-07-29 PROCEDURE — 87070 CULTURE OTHR SPECIMN AEROBIC: CPT | Performed by: PSYCHIATRY & NEUROLOGY

## 2022-07-29 PROCEDURE — 82746 ASSAY OF FOLIC ACID SERUM: CPT | Performed by: PSYCHIATRY & NEUROLOGY

## 2022-07-29 PROCEDURE — 71045 X-RAY EXAM CHEST 1 VIEW: CPT

## 2022-07-29 PROCEDURE — 83735 ASSAY OF MAGNESIUM: CPT | Performed by: EMERGENCY MEDICINE

## 2022-07-29 PROCEDURE — 99223 1ST HOSP IP/OBS HIGH 75: CPT | Performed by: INTERNAL MEDICINE

## 2022-07-29 PROCEDURE — 89051 BODY FLUID CELL COUNT: CPT | Performed by: PSYCHIATRY & NEUROLOGY

## 2022-07-29 PROCEDURE — 86592 SYPHILIS TEST NON-TREP QUAL: CPT | Performed by: PSYCHIATRY & NEUROLOGY

## 2022-07-29 PROCEDURE — 87015 SPECIMEN INFECT AGNT CONCNTJ: CPT | Performed by: PSYCHIATRY & NEUROLOGY

## 2022-07-29 PROCEDURE — 82607 VITAMIN B-12: CPT | Performed by: PSYCHIATRY & NEUROLOGY

## 2022-07-29 PROCEDURE — 009U3ZX DRAINAGE OF SPINAL CANAL, PERCUTANEOUS APPROACH, DIAGNOSTIC: ICD-10-PCS | Performed by: RADIOLOGY

## 2022-07-29 PROCEDURE — 82375 ASSAY CARBOXYHB QUANT: CPT

## 2022-07-29 PROCEDURE — 86789 WEST NILE VIRUS ANTIBODY: CPT | Performed by: PSYCHIATRY & NEUROLOGY

## 2022-07-29 PROCEDURE — 84484 ASSAY OF TROPONIN QUANT: CPT | Performed by: NURSE PRACTITIONER

## 2022-07-29 PROCEDURE — 95819 EEG AWAKE AND ASLEEP: CPT

## 2022-07-29 PROCEDURE — 99285 EMERGENCY DEPT VISIT HI MDM: CPT

## 2022-07-29 PROCEDURE — 87116 MYCOBACTERIA CULTURE: CPT | Performed by: PSYCHIATRY & NEUROLOGY

## 2022-07-29 PROCEDURE — 99223 1ST HOSP IP/OBS HIGH 75: CPT | Performed by: PSYCHIATRY & NEUROLOGY

## 2022-07-29 PROCEDURE — 86788 WEST NILE VIRUS AB IGM: CPT | Performed by: PSYCHIATRY & NEUROLOGY

## 2022-07-29 PROCEDURE — 87206 SMEAR FLUORESCENT/ACID STAI: CPT | Performed by: PSYCHIATRY & NEUROLOGY

## 2022-07-29 PROCEDURE — 70551 MRI BRAIN STEM W/O DYE: CPT

## 2022-07-29 PROCEDURE — 93005 ELECTROCARDIOGRAM TRACING: CPT | Performed by: EMERGENCY MEDICINE

## 2022-07-29 PROCEDURE — 82962 GLUCOSE BLOOD TEST: CPT

## 2022-07-29 PROCEDURE — 36600 WITHDRAWAL OF ARTERIAL BLOOD: CPT

## 2022-07-29 PROCEDURE — 87205 SMEAR GRAM STAIN: CPT | Performed by: PSYCHIATRY & NEUROLOGY

## 2022-07-29 PROCEDURE — 86787 VARICELLA-ZOSTER ANTIBODY: CPT | Performed by: PSYCHIATRY & NEUROLOGY

## 2022-07-29 PROCEDURE — 86735 MUMPS ANTIBODY: CPT | Performed by: PSYCHIATRY & NEUROLOGY

## 2022-07-29 PROCEDURE — 86765 RUBEOLA ANTIBODY: CPT | Performed by: PSYCHIATRY & NEUROLOGY

## 2022-07-29 PROCEDURE — 85025 COMPLETE CBC W/AUTO DIFF WBC: CPT | Performed by: EMERGENCY MEDICINE

## 2022-07-29 PROCEDURE — P9612 CATHETERIZE FOR URINE SPEC: HCPCS

## 2022-07-29 PROCEDURE — 83050 HGB METHEMOGLOBIN QUAN: CPT

## 2022-07-29 PROCEDURE — 80053 COMPREHEN METABOLIC PANEL: CPT | Performed by: EMERGENCY MEDICINE

## 2022-07-29 PROCEDURE — 70450 CT HEAD/BRAIN W/O DYE: CPT

## 2022-07-29 PROCEDURE — 81001 URINALYSIS AUTO W/SCOPE: CPT | Performed by: EMERGENCY MEDICINE

## 2022-07-29 PROCEDURE — 25010000002 DIAZEPAM PER 5 MG: Performed by: EMERGENCY MEDICINE

## 2022-07-29 PROCEDURE — 87636 SARSCOV2 & INF A&B AMP PRB: CPT | Performed by: NURSE PRACTITIONER

## 2022-07-29 PROCEDURE — 25010000002 HALOPERIDOL LACTATE PER 5 MG: Performed by: NURSE PRACTITIONER

## 2022-07-29 RX ORDER — ATORVASTATIN CALCIUM 40 MG/1
80 TABLET, FILM COATED ORAL NIGHTLY
Status: DISCONTINUED | OUTPATIENT
Start: 2022-07-29 | End: 2022-08-04 | Stop reason: HOSPADM

## 2022-07-29 RX ORDER — CARVEDILOL 12.5 MG/1
25 TABLET ORAL 2 TIMES DAILY WITH MEALS
Status: DISCONTINUED | OUTPATIENT
Start: 2022-07-29 | End: 2022-08-04 | Stop reason: HOSPADM

## 2022-07-29 RX ORDER — CALCIUM ACETATE 667 MG/1
667 CAPSULE ORAL
Status: DISCONTINUED | OUTPATIENT
Start: 2022-07-29 | End: 2022-08-04 | Stop reason: HOSPADM

## 2022-07-29 RX ORDER — DEXTROSE MONOHYDRATE 25 G/50ML
25 INJECTION, SOLUTION INTRAVENOUS
Status: DISCONTINUED | OUTPATIENT
Start: 2022-07-29 | End: 2022-08-04 | Stop reason: HOSPADM

## 2022-07-29 RX ORDER — DIAZEPAM 5 MG/ML
5 INJECTION, SOLUTION INTRAMUSCULAR; INTRAVENOUS ONCE
Status: COMPLETED | OUTPATIENT
Start: 2022-07-29 | End: 2022-07-29

## 2022-07-29 RX ORDER — ACETAMINOPHEN 160 MG/5ML
650 SOLUTION ORAL EVERY 4 HOURS PRN
Status: DISCONTINUED | OUTPATIENT
Start: 2022-07-29 | End: 2022-08-04 | Stop reason: HOSPADM

## 2022-07-29 RX ORDER — AMLODIPINE BESYLATE 10 MG/1
10 TABLET ORAL
Status: DISCONTINUED | OUTPATIENT
Start: 2022-07-30 | End: 2022-08-04 | Stop reason: HOSPADM

## 2022-07-29 RX ORDER — PANTOPRAZOLE SODIUM 40 MG/1
40 TABLET, DELAYED RELEASE ORAL DAILY
Status: COMPLETED | OUTPATIENT
Start: 2022-07-30 | End: 2022-07-30

## 2022-07-29 RX ORDER — ACETAMINOPHEN 650 MG/1
650 SUPPOSITORY RECTAL EVERY 4 HOURS PRN
Status: DISCONTINUED | OUTPATIENT
Start: 2022-07-29 | End: 2022-08-04 | Stop reason: HOSPADM

## 2022-07-29 RX ORDER — METHADONE HYDROCHLORIDE 10 MG/1
10 TABLET ORAL EVERY 6 HOURS PRN
COMMUNITY
End: 2022-08-04 | Stop reason: HOSPADM

## 2022-07-29 RX ORDER — NICOTINE POLACRILEX 4 MG
15 LOZENGE BUCCAL
Status: DISCONTINUED | OUTPATIENT
Start: 2022-07-29 | End: 2022-08-04 | Stop reason: HOSPADM

## 2022-07-29 RX ORDER — AMOXICILLIN 250 MG
1 CAPSULE ORAL DAILY
COMMUNITY

## 2022-07-29 RX ORDER — ONDANSETRON 2 MG/ML
4 INJECTION INTRAMUSCULAR; INTRAVENOUS EVERY 6 HOURS PRN
Status: DISCONTINUED | OUTPATIENT
Start: 2022-07-29 | End: 2022-08-04 | Stop reason: HOSPADM

## 2022-07-29 RX ORDER — SODIUM CHLORIDE 0.9 % (FLUSH) 0.9 %
10 SYRINGE (ML) INJECTION AS NEEDED
Status: DISCONTINUED | OUTPATIENT
Start: 2022-07-29 | End: 2022-08-04 | Stop reason: HOSPADM

## 2022-07-29 RX ORDER — VALACYCLOVIR HYDROCHLORIDE 500 MG/1
500 TABLET, FILM COATED ORAL EVERY 24 HOURS
Status: DISPENSED | OUTPATIENT
Start: 2022-07-30 | End: 2022-08-03

## 2022-07-29 RX ORDER — INSULIN LISPRO 100 [IU]/ML
0-7 INJECTION, SOLUTION INTRAVENOUS; SUBCUTANEOUS
Status: DISCONTINUED | OUTPATIENT
Start: 2022-07-29 | End: 2022-08-04 | Stop reason: HOSPADM

## 2022-07-29 RX ORDER — SODIUM CHLORIDE 0.9 % (FLUSH) 0.9 %
10 SYRINGE (ML) INJECTION EVERY 12 HOURS SCHEDULED
Status: DISCONTINUED | OUTPATIENT
Start: 2022-07-29 | End: 2022-08-04 | Stop reason: HOSPADM

## 2022-07-29 RX ORDER — HALOPERIDOL 5 MG/ML
1 INJECTION INTRAMUSCULAR ONCE
Status: COMPLETED | OUTPATIENT
Start: 2022-07-29 | End: 2022-07-29

## 2022-07-29 RX ORDER — VENLAFAXINE HYDROCHLORIDE 75 MG/1
75 CAPSULE, EXTENDED RELEASE ORAL DAILY
Status: DISCONTINUED | OUTPATIENT
Start: 2022-07-30 | End: 2022-08-04 | Stop reason: HOSPADM

## 2022-07-29 RX ORDER — ONDANSETRON 4 MG/1
4 TABLET, FILM COATED ORAL EVERY 6 HOURS PRN
Status: DISCONTINUED | OUTPATIENT
Start: 2022-07-29 | End: 2022-08-04 | Stop reason: HOSPADM

## 2022-07-29 RX ORDER — QUETIAPINE FUMARATE 25 MG/1
25 TABLET, FILM COATED ORAL NIGHTLY
Status: DISCONTINUED | OUTPATIENT
Start: 2022-07-29 | End: 2022-08-04 | Stop reason: HOSPADM

## 2022-07-29 RX ORDER — ALBUMIN (HUMAN) 12.5 G/50ML
12.5 SOLUTION INTRAVENOUS AS NEEDED
Status: ACTIVE | OUTPATIENT
Start: 2022-07-29 | End: 2022-07-29

## 2022-07-29 RX ORDER — HYDRALAZINE HYDROCHLORIDE 50 MG/1
100 TABLET, FILM COATED ORAL 3 TIMES DAILY
COMMUNITY

## 2022-07-29 RX ORDER — ACETAMINOPHEN 325 MG/1
650 TABLET ORAL EVERY 4 HOURS PRN
Status: DISCONTINUED | OUTPATIENT
Start: 2022-07-29 | End: 2022-08-04 | Stop reason: HOSPADM

## 2022-07-29 RX ORDER — LIDOCAINE HYDROCHLORIDE 10 MG/ML
5 INJECTION, SOLUTION INFILTRATION; PERINEURAL ONCE
Status: COMPLETED | OUTPATIENT
Start: 2022-07-29 | End: 2022-07-29

## 2022-07-29 RX ADMIN — LIDOCAINE HYDROCHLORIDE 5 ML: 10 INJECTION, SOLUTION INFILTRATION; PERINEURAL at 15:43

## 2022-07-29 RX ADMIN — CARVEDILOL 25 MG: 12.5 TABLET, FILM COATED ORAL at 18:34

## 2022-07-29 RX ADMIN — QUETIAPINE FUMARATE 25 MG: 25 TABLET ORAL at 21:07

## 2022-07-29 RX ADMIN — HALOPERIDOL LACTATE 1 MG: 5 INJECTION, SOLUTION INTRAMUSCULAR at 21:54

## 2022-07-29 RX ADMIN — DIAZEPAM 5 MG: 5 INJECTION, SOLUTION INTRAMUSCULAR; INTRAVENOUS at 14:49

## 2022-07-29 RX ADMIN — Medication 10 ML: at 21:07

## 2022-07-29 RX ADMIN — ATORVASTATIN CALCIUM 80 MG: 40 TABLET, FILM COATED ORAL at 21:07

## 2022-07-29 RX ADMIN — CALCIUM ACETATE 667 MG: 667 CAPSULE ORAL at 18:33

## 2022-07-30 ENCOUNTER — APPOINTMENT (OUTPATIENT)
Dept: MRI IMAGING | Facility: HOSPITAL | Age: 62
End: 2022-07-30

## 2022-07-30 LAB
ALBUMIN SERPL-MCNC: 3.9 G/DL (ref 3.5–5.2)
ALBUMIN/GLOB SERPL: 1.3 G/DL
ALP SERPL-CCNC: 79 U/L (ref 39–117)
ALT SERPL W P-5'-P-CCNC: 32 U/L (ref 1–33)
ANION GAP SERPL CALCULATED.3IONS-SCNC: 14 MMOL/L (ref 5–15)
AST SERPL-CCNC: 29 U/L (ref 1–32)
BASOPHILS # BLD AUTO: 0.04 10*3/MM3 (ref 0–0.2)
BASOPHILS NFR BLD AUTO: 0.9 % (ref 0–1.5)
BILIRUB SERPL-MCNC: 0.4 MG/DL (ref 0–1.2)
BUN SERPL-MCNC: 56 MG/DL (ref 8–23)
BUN/CREAT SERPL: 7.7 (ref 7–25)
CALCIUM SPEC-SCNC: 9.4 MG/DL (ref 8.6–10.5)
CHLORIDE SERPL-SCNC: 103 MMOL/L (ref 98–107)
CO2 SERPL-SCNC: 22 MMOL/L (ref 22–29)
CREAT SERPL-MCNC: 7.29 MG/DL (ref 0.57–1)
CRYPTOC AG CSF QL LA: NEGATIVE
DEPRECATED RDW RBC AUTO: 39.5 FL (ref 37–54)
EGFRCR SERPLBLD CKD-EPI 2021: 5.8 ML/MIN/1.73
EOSINOPHIL # BLD AUTO: 0.25 10*3/MM3 (ref 0–0.4)
EOSINOPHIL NFR BLD AUTO: 5.4 % (ref 0.3–6.2)
ERYTHROCYTE [DISTWIDTH] IN BLOOD BY AUTOMATED COUNT: 11.9 % (ref 12.3–15.4)
FOLATE SERPL-MCNC: >20 NG/ML (ref 4.78–24.2)
GLOBULIN UR ELPH-MCNC: 3.1 GM/DL
GLUCOSE BLDC GLUCOMTR-MCNC: 114 MG/DL (ref 70–130)
GLUCOSE BLDC GLUCOMTR-MCNC: 128 MG/DL (ref 70–130)
GLUCOSE BLDC GLUCOMTR-MCNC: 132 MG/DL (ref 70–130)
GLUCOSE BLDC GLUCOMTR-MCNC: 189 MG/DL (ref 70–130)
GLUCOSE SERPL-MCNC: 133 MG/DL (ref 65–99)
HBA1C MFR BLD: 5.3 % (ref 4.8–5.6)
HCT VFR BLD AUTO: 25.3 % (ref 34–46.6)
HGB BLD-MCNC: 9.2 G/DL (ref 12–15.9)
IMM GRANULOCYTES # BLD AUTO: 0.01 10*3/MM3 (ref 0–0.05)
IMM GRANULOCYTES NFR BLD AUTO: 0.2 % (ref 0–0.5)
LYMPHOCYTES # BLD AUTO: 1.31 10*3/MM3 (ref 0.7–3.1)
LYMPHOCYTES NFR BLD AUTO: 28.3 % (ref 19.6–45.3)
MAGNESIUM SERPL-MCNC: 2.3 MG/DL (ref 1.6–2.4)
MCH RBC QN AUTO: 33.5 PG (ref 26.6–33)
MCHC RBC AUTO-ENTMCNC: 36.4 G/DL (ref 31.5–35.7)
MCV RBC AUTO: 92 FL (ref 79–97)
MONOCYTES # BLD AUTO: 0.34 10*3/MM3 (ref 0.1–0.9)
MONOCYTES NFR BLD AUTO: 7.3 % (ref 5–12)
NEUTROPHILS NFR BLD AUTO: 2.68 10*3/MM3 (ref 1.7–7)
NEUTROPHILS NFR BLD AUTO: 57.9 % (ref 42.7–76)
NRBC BLD AUTO-RTO: 0 /100 WBC (ref 0–0.2)
PLATELET # BLD AUTO: 138 10*3/MM3 (ref 140–450)
PMV BLD AUTO: 9.3 FL (ref 6–12)
POTASSIUM SERPL-SCNC: 4.6 MMOL/L (ref 3.5–5.2)
PROT SERPL-MCNC: 7 G/DL (ref 6–8.5)
QT INTERVAL: 374 MS
QTC INTERVAL: 477 MS
RBC # BLD AUTO: 2.75 10*6/MM3 (ref 3.77–5.28)
RPR SER QL: NORMAL
SODIUM SERPL-SCNC: 139 MMOL/L (ref 136–145)
TSH SERPL DL<=0.05 MIU/L-ACNC: 1.1 UIU/ML (ref 0.27–4.2)
VIT B12 BLD-MCNC: 459 PG/ML (ref 211–946)
WBC NRBC COR # BLD: 4.63 10*3/MM3 (ref 3.4–10.8)

## 2022-07-30 PROCEDURE — 80050 GENERAL HEALTH PANEL: CPT | Performed by: NURSE PRACTITIONER

## 2022-07-30 PROCEDURE — 83735 ASSAY OF MAGNESIUM: CPT | Performed by: NURSE PRACTITIONER

## 2022-07-30 PROCEDURE — 63710000001 INSULIN LISPRO (HUMAN) PER 5 UNITS: Performed by: NURSE PRACTITIONER

## 2022-07-30 PROCEDURE — 83036 HEMOGLOBIN GLYCOSYLATED A1C: CPT | Performed by: NURSE PRACTITIONER

## 2022-07-30 PROCEDURE — 97165 OT EVAL LOW COMPLEX 30 MIN: CPT

## 2022-07-30 PROCEDURE — 97162 PT EVAL MOD COMPLEX 30 MIN: CPT

## 2022-07-30 PROCEDURE — 70544 MR ANGIOGRAPHY HEAD W/O DYE: CPT

## 2022-07-30 PROCEDURE — 70547 MR ANGIOGRAPHY NECK W/O DYE: CPT

## 2022-07-30 PROCEDURE — 93005 ELECTROCARDIOGRAM TRACING: CPT | Performed by: NURSE PRACTITIONER

## 2022-07-30 PROCEDURE — 82962 GLUCOSE BLOOD TEST: CPT

## 2022-07-30 PROCEDURE — 93010 ELECTROCARDIOGRAM REPORT: CPT | Performed by: INTERNAL MEDICINE

## 2022-07-30 PROCEDURE — 99232 SBSQ HOSP IP/OBS MODERATE 35: CPT | Performed by: FAMILY MEDICINE

## 2022-07-30 PROCEDURE — 99232 SBSQ HOSP IP/OBS MODERATE 35: CPT | Performed by: PSYCHIATRY & NEUROLOGY

## 2022-07-30 PROCEDURE — 70551 MRI BRAIN STEM W/O DYE: CPT

## 2022-07-30 RX ORDER — AMOXICILLIN 250 MG
2 CAPSULE ORAL 2 TIMES DAILY
Status: DISCONTINUED | OUTPATIENT
Start: 2022-07-30 | End: 2022-08-04 | Stop reason: HOSPADM

## 2022-07-30 RX ORDER — POLYETHYLENE GLYCOL 3350 17 G/17G
17 POWDER, FOR SOLUTION ORAL DAILY PRN
Status: DISCONTINUED | OUTPATIENT
Start: 2022-07-30 | End: 2022-08-04 | Stop reason: HOSPADM

## 2022-07-30 RX ORDER — BISACODYL 5 MG/1
5 TABLET, DELAYED RELEASE ORAL DAILY PRN
Status: DISCONTINUED | OUTPATIENT
Start: 2022-07-30 | End: 2022-08-04 | Stop reason: HOSPADM

## 2022-07-30 RX ORDER — BISACODYL 10 MG
10 SUPPOSITORY, RECTAL RECTAL DAILY PRN
Status: DISCONTINUED | OUTPATIENT
Start: 2022-07-30 | End: 2022-08-04 | Stop reason: HOSPADM

## 2022-07-30 RX ADMIN — SENNOSIDES AND DOCUSATE SODIUM 2 TABLET: 50; 8.6 TABLET ORAL at 12:11

## 2022-07-30 RX ADMIN — CALCIUM ACETATE 667 MG: 667 CAPSULE ORAL at 12:11

## 2022-07-30 RX ADMIN — PANTOPRAZOLE SODIUM 40 MG: 40 TABLET, DELAYED RELEASE ORAL at 09:08

## 2022-07-30 RX ADMIN — Medication 10 ML: at 22:09

## 2022-07-30 RX ADMIN — ATORVASTATIN CALCIUM 80 MG: 40 TABLET, FILM COATED ORAL at 22:08

## 2022-07-30 RX ADMIN — VALACYCLOVIR HYDROCHLORIDE 500 MG: 500 TABLET, FILM COATED ORAL at 09:08

## 2022-07-30 RX ADMIN — CARVEDILOL 25 MG: 12.5 TABLET, FILM COATED ORAL at 17:41

## 2022-07-30 RX ADMIN — AMLODIPINE BESYLATE 10 MG: 10 TABLET ORAL at 09:08

## 2022-07-30 RX ADMIN — QUETIAPINE FUMARATE 25 MG: 25 TABLET ORAL at 22:08

## 2022-07-30 RX ADMIN — SENNOSIDES AND DOCUSATE SODIUM 2 TABLET: 50; 8.6 TABLET ORAL at 22:08

## 2022-07-30 RX ADMIN — CARVEDILOL 25 MG: 12.5 TABLET, FILM COATED ORAL at 09:07

## 2022-07-30 RX ADMIN — Medication 10 ML: at 09:08

## 2022-07-30 RX ADMIN — INSULIN LISPRO 2 UNITS: 100 INJECTION, SOLUTION INTRAVENOUS; SUBCUTANEOUS at 12:11

## 2022-07-30 RX ADMIN — CALCIUM ACETATE 667 MG: 667 CAPSULE ORAL at 17:41

## 2022-07-30 RX ADMIN — CALCIUM ACETATE 667 MG: 667 CAPSULE ORAL at 09:08

## 2022-07-30 RX ADMIN — VENLAFAXINE HYDROCHLORIDE 75 MG: 75 CAPSULE, EXTENDED RELEASE ORAL at 09:07

## 2022-07-31 ENCOUNTER — APPOINTMENT (OUTPATIENT)
Dept: MRI IMAGING | Facility: HOSPITAL | Age: 62
End: 2022-07-31

## 2022-07-31 LAB
GLUCOSE BLDC GLUCOMTR-MCNC: 124 MG/DL (ref 70–130)
GLUCOSE BLDC GLUCOMTR-MCNC: 128 MG/DL (ref 70–130)
GLUCOSE BLDC GLUCOMTR-MCNC: 129 MG/DL (ref 70–130)

## 2022-07-31 PROCEDURE — 81050 URINALYSIS VOLUME MEASURE: CPT | Performed by: INTERNAL MEDICINE

## 2022-07-31 PROCEDURE — 99232 SBSQ HOSP IP/OBS MODERATE 35: CPT | Performed by: HOSPITALIST

## 2022-07-31 PROCEDURE — 82570 ASSAY OF URINE CREATININE: CPT | Performed by: INTERNAL MEDICINE

## 2022-07-31 PROCEDURE — 99232 SBSQ HOSP IP/OBS MODERATE 35: CPT | Performed by: PSYCHIATRY & NEUROLOGY

## 2022-07-31 PROCEDURE — 5A1D70Z PERFORMANCE OF URINARY FILTRATION, INTERMITTENT, LESS THAN 6 HOURS PER DAY: ICD-10-PCS | Performed by: INTERNAL MEDICINE

## 2022-07-31 PROCEDURE — 70551 MRI BRAIN STEM W/O DYE: CPT

## 2022-07-31 PROCEDURE — 70544 MR ANGIOGRAPHY HEAD W/O DYE: CPT

## 2022-07-31 PROCEDURE — 82962 GLUCOSE BLOOD TEST: CPT

## 2022-07-31 RX ADMIN — QUETIAPINE FUMARATE 25 MG: 25 TABLET ORAL at 21:39

## 2022-07-31 RX ADMIN — SENNOSIDES AND DOCUSATE SODIUM 2 TABLET: 50; 8.6 TABLET ORAL at 21:39

## 2022-07-31 RX ADMIN — CARVEDILOL 25 MG: 12.5 TABLET, FILM COATED ORAL at 09:16

## 2022-07-31 RX ADMIN — CALCIUM ACETATE 667 MG: 667 CAPSULE ORAL at 17:16

## 2022-07-31 RX ADMIN — Medication 10 ML: at 21:39

## 2022-07-31 RX ADMIN — SENNOSIDES AND DOCUSATE SODIUM 2 TABLET: 50; 8.6 TABLET ORAL at 09:16

## 2022-07-31 RX ADMIN — CALCIUM ACETATE 667 MG: 667 CAPSULE ORAL at 09:16

## 2022-07-31 RX ADMIN — VALACYCLOVIR HYDROCHLORIDE 500 MG: 500 TABLET, FILM COATED ORAL at 10:58

## 2022-07-31 RX ADMIN — VENLAFAXINE HYDROCHLORIDE 75 MG: 75 CAPSULE, EXTENDED RELEASE ORAL at 10:20

## 2022-07-31 RX ADMIN — CALCIUM ACETATE 667 MG: 667 CAPSULE ORAL at 13:13

## 2022-07-31 RX ADMIN — Medication 10 ML: at 09:22

## 2022-07-31 RX ADMIN — CARVEDILOL 25 MG: 12.5 TABLET, FILM COATED ORAL at 17:16

## 2022-07-31 RX ADMIN — ATORVASTATIN CALCIUM 80 MG: 40 TABLET, FILM COATED ORAL at 21:39

## 2022-07-31 RX ADMIN — AMLODIPINE BESYLATE 10 MG: 10 TABLET ORAL at 09:16

## 2022-08-01 ENCOUNTER — APPOINTMENT (OUTPATIENT)
Dept: NEPHROLOGY | Facility: HOSPITAL | Age: 62
End: 2022-08-01

## 2022-08-01 PROBLEM — R41.0 DISORIENTATION: Status: ACTIVE | Noted: 2022-08-01

## 2022-08-01 LAB
BACTERIA SPEC AEROBE CULT: NORMAL
BACTERIA UR QL AUTO: ABNORMAL /HPF
BILIRUB UR QL STRIP: NEGATIVE
CLARITY UR: CLEAR
COLOR UR: YELLOW
GLUCOSE BLDC GLUCOMTR-MCNC: 104 MG/DL (ref 70–130)
GLUCOSE BLDC GLUCOMTR-MCNC: 126 MG/DL (ref 70–130)
GLUCOSE BLDC GLUCOMTR-MCNC: 139 MG/DL (ref 70–130)
GLUCOSE BLDC GLUCOMTR-MCNC: 143 MG/DL (ref 70–130)
GLUCOSE UR STRIP-MCNC: NEGATIVE MG/DL
GRAM STN SPEC: NORMAL
GRAM STN SPEC: NORMAL
HGB UR QL STRIP.AUTO: NEGATIVE
HYALINE CASTS UR QL AUTO: ABNORMAL /LPF
KETONES UR QL STRIP: NEGATIVE
LEUKOCYTE ESTERASE UR QL STRIP.AUTO: ABNORMAL
NITRITE UR QL STRIP: NEGATIVE
PH UR STRIP.AUTO: 6.5 [PH] (ref 5–8)
PROT UR QL STRIP: ABNORMAL
QT INTERVAL: 368 MS
QTC INTERVAL: 462 MS
RBC # UR STRIP: ABNORMAL /HPF
REF LAB TEST METHOD: ABNORMAL
SP GR UR STRIP: 1.01 (ref 1–1.03)
SQUAMOUS #/AREA URNS HPF: ABNORMAL /HPF
UROBILINOGEN UR QL STRIP: ABNORMAL
WBC # UR STRIP: ABNORMAL /HPF

## 2022-08-01 PROCEDURE — G0378 HOSPITAL OBSERVATION PER HR: HCPCS

## 2022-08-01 PROCEDURE — 25010000002 HEPARIN (PORCINE) PER 1000 UNITS: Performed by: INTERNAL MEDICINE

## 2022-08-01 PROCEDURE — 97530 THERAPEUTIC ACTIVITIES: CPT

## 2022-08-01 PROCEDURE — 82962 GLUCOSE BLOOD TEST: CPT

## 2022-08-01 PROCEDURE — 99232 SBSQ HOSP IP/OBS MODERATE 35: CPT | Performed by: PSYCHIATRY & NEUROLOGY

## 2022-08-01 PROCEDURE — 81001 URINALYSIS AUTO W/SCOPE: CPT | Performed by: HOSPITALIST

## 2022-08-01 PROCEDURE — 97110 THERAPEUTIC EXERCISES: CPT

## 2022-08-01 PROCEDURE — 99232 SBSQ HOSP IP/OBS MODERATE 35: CPT | Performed by: HOSPITALIST

## 2022-08-01 PROCEDURE — 87186 SC STD MICRODIL/AGAR DIL: CPT | Performed by: HOSPITALIST

## 2022-08-01 PROCEDURE — 87077 CULTURE AEROBIC IDENTIFY: CPT | Performed by: HOSPITALIST

## 2022-08-01 PROCEDURE — 87086 URINE CULTURE/COLONY COUNT: CPT | Performed by: HOSPITALIST

## 2022-08-01 RX ORDER — HEPARIN SODIUM 1000 [USP'U]/ML
1600 INJECTION, SOLUTION INTRAVENOUS; SUBCUTANEOUS AS NEEDED
Status: DISCONTINUED | OUTPATIENT
Start: 2022-08-01 | End: 2022-08-04 | Stop reason: HOSPADM

## 2022-08-01 RX ADMIN — QUETIAPINE FUMARATE 25 MG: 25 TABLET ORAL at 21:23

## 2022-08-01 RX ADMIN — CALCIUM ACETATE 667 MG: 667 CAPSULE ORAL at 17:12

## 2022-08-01 RX ADMIN — VENLAFAXINE HYDROCHLORIDE 75 MG: 75 CAPSULE, EXTENDED RELEASE ORAL at 15:20

## 2022-08-01 RX ADMIN — VALACYCLOVIR HYDROCHLORIDE 500 MG: 500 TABLET, FILM COATED ORAL at 15:19

## 2022-08-01 RX ADMIN — CALCIUM ACETATE 667 MG: 667 CAPSULE ORAL at 15:19

## 2022-08-01 RX ADMIN — AMLODIPINE BESYLATE 10 MG: 10 TABLET ORAL at 15:19

## 2022-08-01 RX ADMIN — HEPARIN SODIUM 1600 UNITS: 1000 INJECTION, SOLUTION INTRAVENOUS; SUBCUTANEOUS at 12:53

## 2022-08-01 RX ADMIN — CARVEDILOL 25 MG: 12.5 TABLET, FILM COATED ORAL at 15:20

## 2022-08-01 RX ADMIN — ATORVASTATIN CALCIUM 80 MG: 40 TABLET, FILM COATED ORAL at 21:23

## 2022-08-01 RX ADMIN — Medication 10 ML: at 21:23

## 2022-08-01 NOTE — PROGRESS NOTES
"   LOS: 3 days    Patient Care Team:  Provider, No Known as PCP - General    Chief Complaint:  AMS    Subjective     Interval History:     No acute events overnight. No new complaints      Review of Systems:   No CP or SOA , fever, chills, rigors, rash, N/V, Constipation.     Objective     Vital Sign Min/Max for last 24 hours  Temp  Min: 98.1 °F (36.7 °C)  Max: 98.7 °F (37.1 °C)   BP  Min: 115/78  Max: 149/81   Pulse  Min: 86  Max: 98   Resp  Min: 12  Max: 18   SpO2  Min: 94 %  Max: 99 %   No data recorded   No data recorded     Flowsheet Rows    Flowsheet Row First Filed Value   Admission Height 167.6 cm (66\") Documented at 07/29/2022 0903   Admission Weight 77.1 kg (170 lb) Documented at 07/29/2022 0903          No intake/output data recorded.  I/O last 3 completed shifts:  In: -   Out: 1500 [Urine:1500]    Physical Exam:    Gen: Alert, NAD   HENT: NC, AT, EOMI   NECK: Supple, no JVD, Trachea midline   LUNGS: CTA bilaterally, non labored respirtation   CVS: S1/S2 audible, RRR, no murmur   Abd: Soft, NT, ND, BS+   Ext: No pedal edema, no cyanosis   CNS: Alert, No focal deficit noted grossly  Psy: Cooperative  Skin: Warm, dry and intact      WBC WBC   Date Value Ref Range Status   07/30/2022 4.63 3.40 - 10.80 10*3/mm3 Final   07/29/2022 5.23 3.40 - 10.80 10*3/mm3 Final      HGB Hemoglobin   Date Value Ref Range Status   07/30/2022 9.2 (L) 12.0 - 15.9 g/dL Final   07/29/2022 9.9 (L) 12.0 - 15.9 g/dL Final      HCT Hematocrit   Date Value Ref Range Status   07/30/2022 25.3 (L) 34.0 - 46.6 % Final   07/29/2022 27.1 (L) 34.0 - 46.6 % Final      Platlets No results found for: LABPLAT   MCV MCV   Date Value Ref Range Status   07/30/2022 92.0 79.0 - 97.0 fL Final   07/29/2022 92.5 79.0 - 97.0 fL Final          Sodium Sodium   Date Value Ref Range Status   07/30/2022 139 136 - 145 mmol/L Final   07/29/2022 132 (L) 136 - 145 mmol/L Final      Potassium Potassium   Date Value Ref Range Status   07/30/2022 4.6 3.5 - 5.2 mmol/L " Final   07/29/2022 4.9 3.5 - 5.2 mmol/L Final     Comment:     Slight hemolysis detected by analyzer. Results may be affected.      Chloride Chloride   Date Value Ref Range Status   07/30/2022 103 98 - 107 mmol/L Final   07/29/2022 96 (L) 98 - 107 mmol/L Final      CO2 CO2   Date Value Ref Range Status   07/30/2022 22.0 22.0 - 29.0 mmol/L Final   07/29/2022 24.0 22.0 - 29.0 mmol/L Final      BUN BUN   Date Value Ref Range Status   07/30/2022 56 (H) 8 - 23 mg/dL Final   07/29/2022 53 (H) 8 - 23 mg/dL Final      Creatinine Creatinine   Date Value Ref Range Status   07/30/2022 7.29 (H) 0.57 - 1.00 mg/dL Final   07/29/2022 6.02 (H) 0.57 - 1.00 mg/dL Final      Calcium Calcium   Date Value Ref Range Status   07/30/2022 9.4 8.6 - 10.5 mg/dL Final   07/29/2022 9.3 8.6 - 10.5 mg/dL Final      PO4 No results found for: CAPO4   Albumin Albumin   Date Value Ref Range Status   07/30/2022 3.90 3.50 - 5.20 g/dL Final   07/29/2022 4.30 3.50 - 5.20 g/dL Final      Magnesium Magnesium   Date Value Ref Range Status   07/30/2022 2.3 1.6 - 2.4 mg/dL Final   07/29/2022 2.4 1.6 - 2.4 mg/dL Final      Uric Acid No results found for: URICACID        Results Review:     I reviewed the patient's new clinical results.    amLODIPine, 10 mg, Oral, Q24H  atorvastatin, 80 mg, Oral, Nightly  calcium acetate, 667 mg, Oral, TID With Meals  carvedilol, 25 mg, Oral, BID With Meals  insulin lispro, 0-7 Units, Subcutaneous, TID AC  QUEtiapine, 25 mg, Oral, Nightly  senna-docusate sodium, 2 tablet, Oral, BID  sodium chloride, 10 mL, Intravenous, Q12H  valACYclovir, 500 mg, Oral, Q24H  venlafaxine XR, 75 mg, Oral, Daily           Medication Review:     Assessment & Plan       AMS (altered mental status)    Hepatocellular carcinoma metastatic to bone s/p RXT     Cirrhosis of liver (HCC)    Chronic Hep C     GERD (gastroesophageal reflux disease)    Essential hypertension    CVA (cerebral vascular accident) (HCC)    End stage renal disease on dialysis  (HCC)      1- ESRD -MWF   2-HTN   3- Anemia of chronic disease   4- Hyponatremia -improved  5- AMS     Plan:  - Patient seen on dialysis, UF as tolerated.   - Renal diet   - ADRIEN with HD   - 24 hr urine creatinine clearance. UOP reported about 1 lit daily.     Panchito Austin MD  08/01/22  08:57 EDT

## 2022-08-01 NOTE — PLAN OF CARE
Goal Outcome Evaluation:              Outcome Evaluation: PT REPORTS FATIGUE AFTER DIALYSIS. REQUIRED CGA FOR BED MOBILITY, MIN ASSIST FOR STS AND MOD ASSIST FOR SPT BED TO BSC USING R WALKER.  TOLERATED STANDING X 1 MIN FOR 2 REPS AT R WALKER WITH MIN ASSIST.  RECOMMEND HOME WITH ASSIST AND HHPT.

## 2022-08-01 NOTE — THERAPY TREATMENT NOTE
Patient Name: Jeaneth Keita  : 1960    MRN: [id number]                              Today's Date: 2022       Admit Date: 2022    Visit Dx:     ICD-10-CM ICD-9-CM   1. Disorientation  R41.0 780.99   2. ESRD on dialysis (HCC)  N18.6 585.6    Z99.2 V45.11     Patient Active Problem List   Diagnosis   • ICH (intracerebral hemorrhage)   • Hepatocellular carcinoma metastatic to bone s/p RXT    • Hemochromatosis   • Cirrhosis of liver (HCC)   • Chronic Hep C    • ESRD (end stage renal disease)   • Chronic ulcer of right foot   • S/P left BKA   • GERD (gastroesophageal reflux disease)   • Chronic pain on Methadone   • Essential hypertension   • Type 2 diabetes mellitus (HCC)   • Right lower lobe pneumonia   • AMS (altered mental status)   • Colitis   • Normocytic anemia   • Hypokalemia   • CVA (cerebral vascular accident) (HCC)   • Stroke-like symptoms   • History of hypoglycemia   • Hypoglycemia   • Chest pain   • End stage renal disease on dialysis (HCC)   • Disorientation     Past Medical History:   Diagnosis Date   • Anxiety    • Cancer (HCC)     liver cell carcinoma   • DDD (degenerative disc disease), lumbar    • Depression    • Diabetes mellitus (HCC)    • Fibromyalgia    • Hemochromatosis    • Hep B w/o coma, chronic, w/o delta (HCC)    • Hep C w/o coma, chronic (HCC)    • Hypertension    • Stroke (HCC)    • Vertigo      Past Surgical History:   Procedure Laterality Date   • ARTERIOVENOUS FISTULA/SHUNT SURGERY Left 10/16/2021    Procedure: UPPER EXTREMITY ARTERIOVENOUS FISTULA FORMATION LEFT;  Surgeon: Johnny Rousseau MD;  Location: [facility];  Service: Vascular;  Laterality: Left;   • BACK SURGERY     • BELOW KNEE LEG AMPUTATION     •  SECTION     • FOOT SURGERY     • KNEE SURGERY     • ROTATOR CUFF REPAIR     • SPINAL CORD STIMULATOR IMPLANT        General Information     Row Name 22 1634          Physical Therapy Time and Intention    Document Type therapy note (daily  note)  -CD     Mode of Treatment physical therapy  -CD     Row Name 08/01/22 1634          General Information    Patient Profile Reviewed yes  -CD     Existing Precautions/Restrictions fall  REMOTE L BKA.  -CD     Barriers to Rehab medically complex;previous functional deficit;cognitive status  -CD     Row Name 08/01/22 1634          Cognition    Orientation Status (Cognition) oriented to;person  -CD     Row Name 08/01/22 1634          Safety Issues, Functional Mobility    Safety Issues Affecting Function (Mobility) safety precautions follow-through/compliance;safety precaution awareness;positioning of assistive device;insight into deficits/self-awareness  -CD     Impairments Affecting Function (Mobility) balance;cognition;endurance/activity tolerance;strength  -CD     Cognitive Impairments, Mobility Safety/Performance insight into deficits/self-awareness;safety precaution awareness;safety precaution follow-through;sequencing abilities  -CD     Comment, Safety Issues/Impairments (Mobility) NOTED EXPRESSIVE APHASIA WHICH NS REPORTS IS BASELINE. PT FOLLOWING MOST COMMANDS WITH INCREASED TIME TO RESPOND.  -CD           User Key  (r) = Recorded By, (t) = Taken By, (c) = Cosigned By    Initials Name Provider Type    CD Rosa Betancourt, PT Physical Therapist               Mobility     Row Name 08/01/22 1642          Bed Mobility    Supine-Sit [place] (Bed Mobility) contact guard;verbal cues  -CD     Assistive Device (Bed Mobility) bed rails;head of bed elevated  -CD     Comment, (Bed Mobility) INCREASED TIME AND EFFORT TO COMPLETE  -CD     Row Name 08/01/22 1642          Transfers    Comment, (Transfers) SPT BED TO BSC TO RECLINER F/B STS FROM RECLINER WITH R WALKER AND MOD CUES.  -CD     Row Name 08/01/22 1642          Bed-Chair Transfer    Bed-Chair [place] (Transfers) moderate assist (50% patient effort)  -CD     Assistive Device (Bed-Chair Transfers) walker, front-wheeled  -CD     Row Name 08/01/22 1642           Sit-Stand Transfer    Sit-Stand [place] (Transfers) contact guard;verbal cues  -CD     Assistive Device (Sit-Stand Transfers) walker, front-wheeled  -CD     Row Name 08/01/22 1642          Gait/Stairs (Locomotion)    Comment, (Gait/Stairs) PT IS NON-AMBULATORY AT BASELINE PER CHART.  -CD           User Key  (r) = Recorded By, (t) = Taken By, (c) = Cosigned By    Initials Name Provider Type    Rosa Morgan, RJ Physical Therapist               Obj/Interventions     Row Name 08/01/22 1644          Motor Skills    Therapeutic Exercise --  COMPLETED SEATED B LE THER EX: 1 SET OF 10 REPS EACH- LAQ, HIP FLEX, R AP.  -CD     Row Name 08/01/22 1644          Balance    Static Sitting Balance standby assist  -CD     Dynamic Sitting Balance contact guard  -CD     Position, Sitting Balance sitting edge of bed;sitting in chair  -CD     Sit to Stand Dynamic Balance minimal assist  -CD     Static Standing Balance minimal assist  -CD     Dynamic Standing Balance moderate assist  -CD     Position/Device Used, Standing Balance walker, front-wheeled  -CD     Comment, Balance INCREASED ASSIST WITH TRANSFERS USING R WALKER. STS FROM BSC WITH R WALKER FOR HYGIENE AFTER TOILETING. CUES FOR UPRIGHT POSTURE. STOOD X 2 REPS FOR APPROX 1 MINUTE EACH. USING R WALKER FOR SUPPORT.  -CD           User Key  (r) = Recorded By, (t) = Taken By, (c) = Cosigned By    Initials Name Provider Type    Rosa Morgan, PT Physical Therapist               Goals/Plan    No documentation.                Clinical Impression     Row Name 08/01/22 1646          Pain    Pretreatment Pain Rating 0/10 - no pain  -CD     Posttreatment Pain Rating 0/10 - no pain  -CD     Row Name 08/01/22 1646          Plan of Care Review    Outcome Evaluation PT REPORTS FATIGUE AFTER DIALYSIS. REQUIRED CGA FOR BED MOBILITY, MIN ASSIST FOR STS AND MOD ASSIST FOR SPT BED TO BSC USING R WALKER.TOLERATED STANDING X 1 MIN FOR 2 REPS AT R WALKER WITH MIN ASSIST. RECOMMEND  HOME WITH ASSIST AND HHPT.  -CD     Row Name 08/01/22 1646          Therapy Assessment/Plan (PT)    Patient/Family Therapy Goals Statement (PT) DID NOT STATE.  -CD     Rehab Potential (PT) good, to achieve stated therapy goals  -CD     Criteria for Skilled Interventions Met (PT) yes;meets criteria  -CD     Therapy Frequency (PT) daily  -CD     Row Name 08/01/22 1646          Vital Signs    Pre Systolic BP Rehab --  VSS. NSG CLEARED FOR TREATMENT.  -CD     Pre Patient Position Supine  -CD     Intra Patient Position Standing  -CD     Post Patient Position Sitting  -CD     Row Name 08/01/22 1646          Positioning and Restraints    Pre-Treatment Position in bed  -CD     Post Treatment Position chair  -CD     In Chair reclined;call light within reach;encouraged to call for assist;exit alarm on;legs elevated;notified nsg  -CD           User Key  (r) = Recorded By, (t) = Taken By, (c) = Cosigned By    Initials Name Provider Type    CD Rosa Betancourt, PT Physical Therapist               Outcome Measures     Row Name 08/01/22 1649 08/01/22 0722       How much help from another person do you currently need...    Turning from your back to your side while in flat bed without using bedrails? 3  -CD 3  -BF    Moving from lying on back to sitting on the side of a flat bed without bedrails? 3  -CD 3  -BF    Moving to and from a bed to a chair (including a wheelchair)? 2  -CD 2  -BF    Standing up from a chair using your arms (e.g., wheelchair, bedside chair)? 2  -CD 2  -BF    Climbing 3-5 steps with a railing? 1  -CD 2  -BF    To walk in hospital room? 2  -CD 2  -BF    AM-PAC 6 Clicks Score (PT) 13  -CD 14  -BF    Highest level of mobility 4 --> Transferred to chair/commode  -CD 4 --> Transferred to chair/commode  -BF    Row Name 08/01/22 1649          Functional Assessment    Outcome Measure Options AM-PAC 6 Clicks Basic Mobility (PT)  -CD           User Key  (r) = Recorded By, (t) = Taken By, (c) = Cosigned By    Initials Name  Provider Type    CD Rosa Betancourt, PT Physical Therapist     Yazmin Munoz, RN Registered Nurse                             Physical Therapy Education                 Title: PT OT SLP Therapies (In Progress)     Topic: Physical Therapy (Done)     Point: Mobility training (Done)     Learning Progress Summary           Patient Acceptance, E, VU,NR by CD at 8/1/2022 1649    Comment: SEE FLOWSHEET.    Acceptance, E,D, VU,NR by HP at 7/30/2022 1327                   Point: Home exercise program (Done)     Learning Progress Summary           Patient Acceptance, E, VU,NR by CD at 8/1/2022 1649    Comment: SEE FLOWSHEET.    Acceptance, E,D, VU,NR by HP at 7/30/2022 1327                   Point: Body mechanics (Done)     Learning Progress Summary           Patient Acceptance, E, VU,NR by CD at 8/1/2022 1649    Comment: SEE FLOWSHEET.    Acceptance, E,D, VU,NR by HP at 7/30/2022 1327                   Point: Precautions (Done)     Learning Progress Summary           Patient Acceptance, E, VU,NR by CD at 8/1/2022 1649    Comment: SEE FLOWSHEET.    Acceptance, E,D, VU,NR by HP at 7/30/2022 1327                               User Key     Initials Effective Dates Name Provider Type Discipline    CD 06/16/21 -  Rosa Betancourt, PT Physical Therapist PT     06/01/21 -  Nelia Nettles, RJ Physical Therapist PT              PT Recommendation and Plan     Outcome Evaluation: PT REPORTS FATIGUE AFTER DIALYSIS. REQUIRED CGA FOR BED MOBILITY, MIN ASSIST FOR STS AND MOD ASSIST FOR SPT BED TO BSC USING R WALKER.TOLERATED STANDING X 1 MIN FOR 2 REPS AT R WALKER WITH MIN ASSIST. RECOMMEND HOME WITH ASSIST AND HHPT.     Time Calculation:    PT Charges     Row Name 08/01/22 1652             Time Calculation    Start Time 1557  -CD      PT Received On 08/01/22  -      PT Goal Re-Cert Due Date 08/09/22  -CD              Time Calculation- PT    Total Timed Code Minutes- PT 33 minute(s)  -CD              Timed Charges    80834 - PT  Therapeutic Exercise Minutes 10  -CD      78507 - PT Therapeutic Activity Minutes 20  -CD              Total Minutes    Timed Charges Total Minutes 30  -CD       Total Minutes 30  -CD            User Key  (r) = Recorded By, (t) = Taken By, (c) = Cosigned By    Initials Name Provider Type    CD Rosa Betancourt, PT Physical Therapist              Therapy Charges for Today     Code Description Service Date Service Provider Modifiers Qty    10402449236 HC PT THER PROC EA 15 MIN 8/1/2022 Rosa Betancourt, PT GP 1    62851986463 HC PT THERAPEUTIC ACT EA 15 MIN 8/1/2022 Rosa Betancourt, PT GP 1          PT G-Codes  Outcome Measure Options: AM-PAC 6 Clicks Basic Mobility (PT)  AM-PAC 6 Clicks Score (PT): 13  AM-PAC 6 Clicks Score (OT): 19    Rosa Betancourt, PT  8/1/2022

## 2022-08-01 NOTE — PLAN OF CARE
Goal Outcome Evaluation: Scheduled HD completed. Pt tolerated well. 1 large bm during tx. UF: 1.7L removed. 77.9 BLP:  Called report to Marie HINES

## 2022-08-01 NOTE — PROGRESS NOTES
[facility] Medicine Services  PROGRESS NOTE    Patient Name: Jeaneth Keita  : 1960  MRN: [id number]    Date of Admission: 2022  Primary Care Physician: Provider, No Known    Subjective   Subjective     CC:  F/u AMS     HPI:  Up in dialysis. Denies pain. Oriented to person/place, not time. Denies HA. No f/c. No n/v.     ROS:  Difficult to assess     Objective   Objective     Vital Signs:   Temp:  [98.1 °F (36.7 °C)-98.7 °F (37.1 °C)] 98.6 °F (37 °C)  Heart Rate:  [86-98] 86  Resp:  [12-18] 18  BP: (115-149)/(78-81) 115/78     Physical Exam:  NAD, alert, chronically ill appearing  OP clear, MMM  RRR  CTAB  KRISHNAMURTHY  Normal affect    Results Reviewed:  LAB RESULTS:      Lab 22  0831 22  0904   WBC 4.63 5.23   HEMOGLOBIN 9.2* 9.9*   HEMATOCRIT 25.3* 27.1*   PLATELETS 138* 156   NEUTROS ABS 2.68 3.46   IMMATURE GRANS (ABS) 0.01 0.01   LYMPHS ABS 1.31 1.09   MONOS ABS 0.34 0.31   EOS ABS 0.25 0.33   MCV 92.0 92.5   LACTATE  --  1.2         Lab 22  0831 22  0904   SODIUM 139 132*   POTASSIUM 4.6 4.9   CHLORIDE 103 96*   CO2 22.0 24.0   ANION GAP 14.0 12.0   BUN 56* 53*   CREATININE 7.29* 6.02*   EGFR 5.8* 7.4*   GLUCOSE 133* 177*   CALCIUM 9.4 9.3   MAGNESIUM 2.3 2.4   HEMOGLOBIN A1C 5.30  --    TSH 1.100  --          Lab 22  0831 22  0904   TOTAL PROTEIN 7.0 7.2   ALBUMIN 3.90 4.30   GLOBULIN 3.1 2.9   ALT (SGPT) 32 33   AST (SGOT) 29 29   BILIRUBIN 0.4 0.4   ALK PHOS 79 107         Lab 22  2137 22  1828 22  1725 22  0904   PROBNP  --   --  2,545.0*  --    TROPONIN T 0.035* 0.034*  --  0.036*             Lab 22  1725   FOLATE >20.00   VITAMIN B 12 459         Lab 22  1011   PH, ARTERIAL 7.434   PCO2, ARTERIAL 37.8   PO2 ART 74.2*   FIO2 21   HCO3 ART 25.3   BASE EXCESS ART 1.0   CARBOXYHEMOGLOBIN 1.0     Brief Urine Lab Results  (Last result in the past 365 days)      Color   Clarity   Blood   Leuk Est   Nitrite    Protein   CREAT   Urine HCG        07/29/22 0915 Yellow   Clear   Negative   Negative   Negative   100 mg/dL (2+)                 Microbiology Results Abnormal     Procedure Component Value - Date/Time    Blood Culture - Blood, Arm, Right [[id number]]  (Normal) Collected: 07/29/22 1019    Lab Status: Preliminary result Specimen: Blood from Arm, Right Updated: 07/31/22 1101     Blood Culture No growth at 2 days    Blood Culture - Blood, Arm, Left [[id number]]  (Normal) Collected: 07/29/22 1015    Lab Status: Preliminary result Specimen: Blood from Arm, Left Updated: 07/31/22 1101     Blood Culture No growth at 2 days    Culture, CSF - Cerebrospinal Fluid, Lumbar Puncture [[id number]] Collected: 07/29/22 1545    Lab Status: Preliminary result Specimen: Cerebrospinal Fluid from Lumbar Puncture Updated: 07/31/22 0946     CSF Culture No growth at 2 days     Gram Stain Rare (1+) WBCs seen      No organisms seen    AFB Culture - Cerebrospinal Fluid, Lumbar Puncture [[id number]] Collected: 07/29/22 1545    Lab Status: Preliminary result Specimen: Cerebrospinal Fluid from Lumbar Puncture Updated: 07/30/22 1051     AFB Stain No acid fast bacilli seen on direct smear    Cryptococcal AG, CSF - Cerebrospinal Fluid, Lumbar Puncture [[id number]]  (Normal) Collected: 07/29/22 1545    Lab Status: Final result Specimen: Cerebrospinal Fluid from Lumbar Puncture Updated: 07/30/22 0817     Cryptococcal Antigen, CSF Negative    Meningitis / Encephalitis Panel, PCR - Cerebrospinal Fluid, Lumbar Puncture [[id number]]  (Normal) Collected: 07/29/22 1545    Lab Status: Final result Specimen: Cerebrospinal Fluid from Lumbar Puncture Updated: 07/29/22 1753     ESCHERICHIA COLI K1, PCR Not Detected     HAEMOPHILUS INFLUENZAE, PCR Not Detected     LISTERIA MONOCYTOGENES, PCR Not Detected     NEISSERIA MENINGITIDIS, PCR Not Detected     STREPTOCOCCUS AGALACTIAE, PCR Not Detected     STREPTOCOCCUS PNEUMONIAE, PCR Not Detected     CYTOMEGALOVIRUS (CMV),  PCR Not Detected     ENTEROVIRUS, PCR Not Detected     HERPES SIMPLEX VIRUS 1 (HSV-1), PCR Not Detected     HERPES SIMPLEX VIRUS 2 (HSV-2), PCR Not Detected     HUMAN PARECHOVIRUS, PCR Not Detected     VARICELLA ZOSTER VIRUS (VZV), PCR Not Detected     CRYPTOCOCCUS NEOFORMANS / GATTII, PCR Not Detected     HUMAN HERPES VIRUS 6 PCR Not Detected    COVID-19 and FLU A/B PCR - Swab, Nasopharynx [[id number]]  (Normal) Collected: 07/29/22 0937    Lab Status: Final result Specimen: Swab from Nasopharynx Updated: 07/29/22 1030     COVID19 Not Detected     Influenza A PCR Not Detected     Influenza B PCR Not Detected    Narrative:      Fact sheet for providers: https://www.fda.gov/media/625104/download    Fact sheet for patients: https://www.fda.gov/media/067836/download    Test performed by PCR.          MRI Angiogram Head Without Contrast    Result Date: 7/31/2022  DATE OF EXAM: 7/31/2022 6:25 PM  PROCEDURE: MRI ANGIOGRAM HEAD WO CONTRAST-  INDICATIONS: Neuro deficit, acute, stroke suspected; R41.0-Disorientation, unspecified; N18.6-End stage renal disease; Z99.2-Dependence on renal dialysis  COMPARISON: MRI brain 07/31/2022  TECHNIQUE: 3-D time-of-flight MRA [facility] [place].  FINDINGS: The basilar artery does not appear unusual. The visualized distal portion internal carotid arteries seem patent. The anterior and middle cerebral arteries as well as the right posterior cerebral artery are grossly unremarkable. There is some diminished signal in the left posterior cerebral artery. This is a change from the prior study. This may relate to motion artifact on this exam as opposed to an abnormality of the posterior cerebral artery. It does look like there is motion artifact on the source images.      Impression: 1.  Some interval change in the left posterior cerebral artery that could relate to motion artifact noted on source images. An abnormality is not definitely identified within the territory of the left posterior cerebral  artery on the more recent MRI from the same day.  This report was finalized on 7/31/2022 7:56 PM by Robbie Jason MD.      MRI Angiogram Head Without Contrast    Result Date: 7/30/2022  EXAMINATION: MRI ANGIOGRAM HEAD WO CONTRAST DATE: 7/30/2022 9:05 PM  INDICATION: Altered mental status, ataxia  COMPARISON: None available.  TECHNIQUE:  Axially acquired 3-D time of flight noncontrast MRA images were obtained through the head.  Multiple maximum intensity projection reformatted images were created and viewed in rotatable, 3-dimensional forms.  No contrast was administered.   FINDINGS:  DEEPTI:No hemodynamically significant stenosis or branch vessel occlusion. No aneurysm. MCA:No hemodynamically significant stenosis or branch vessel occlusion. No aneurysm. PCA:No hemodynamically significant stenosis or branch vessel occlusion. No aneurysm. Fetal disposition on the left and conventional on the right. Visualized internal carotid arteries:No hemodynamically significant stenosis or dissection. Intact as visualized. Basilar:No hemodynamically significant stenosis or dissection. Intact. Visualized vertebral arteries:No hemodynamically significant stenosis or dissection. Intact as visualized.      Impression:  No large vessel occlusion. Slot: 93 Electronically signed by:  Pedro Luis Izaguirre M.D.  7/30/2022 7:45 PM Mountain Time    MRI Angiogram Neck Without Contrast    Result Date: 7/30/2022  EXAMINATION: MR ANGIOGRAM OF THE NECK WITH AND WITHOUT CONTRAST DATE: 7/30/2022 9:06 PM INDICATION: Altered mental status, ataxia COMPARISON: CT angiogram neck 5/23/2022 TECHNIQUE: An axial fat saturated T1 sequence was initially obtained, with axial 3-D time-of-flight MRA images and 3-dimensional reformatted images.  Additional maximum intensity projection reformatted images were created and viewed in 3-dimensional rotatable forms.  There were no immediate complications.  Stenosis based on NASCET criteria. FINDINGS: Aorta: Imaged portions of  the aortic arch are unremarkable. Carotids: Right: No hemodynamically significant stenosis or evidence of dissection. No significant atherosclerotic disease Left:No hemodynamically significant stenosis or evidence of dissection. No significant atherosclerotic disease Vertebrals: Vertebral arteries are codominant.  No hemodynamically significant stenosis or dissection. No significant atherosclerotic disease.     Impression: No hemodynamically significant stenosis or dissection within the vasculature of the neck. Slot: 93  Electronically signed by:  Pedro Luis Izaguirre M.D.  7/30/2022 7:47 PM Mountain Time    MRI Brain Without Contrast    Result Date: 7/31/2022  DATE OF EXAM: 7/31/2022 6:25 PM  PROCEDURE: MRI BRAIN WO CONTRAST-  INDICATIONS: Delirium; R41.0-Disorientation, unspecified; N18.6-End stage renal disease; Z99.2-Dependence on renal dialysis  COMPARISON: 07/30/2022  TECHNIQUE: Multiplanar multisequence images of the brain were performed without contrast according to routine brain MRI protocol.  FINDINGS: There is no restricted diffusion. There are some periventricular T2 signal changes as well as some signal in the madisyn as well as basal ganglia that could reflect more chronic small vessel ischemic change. The pituitary is not enlarged. No definite orbital abnormality is appreciated. On susceptibility imaging, there is blooming in the left thalamic area which could relate to old hemorrhagic process. This is unchanged from the prior exam.      Impression: 1.  There are some T2 signal changes involving the madisyn, basal ganglia and periventricular areas which could reflect more chronic small vessel ischemic change. 2.  Susceptibility artifact left thalamic area that could relate to old bleed.  Exam somewhat limited due to patient confusion and motion  This report was finalized on 7/31/2022 7:46 PM by Robbie Jason MD.      MRI Brain Without Contrast    Result Date: 7/30/2022  EXAMINATION: MR of the brain without  contrast DATE: 7/30/2022 9:04 PM INDICATION: Altered mental status, ataxia COMPARISON: MR brain 7/29/2022 TECHNIQUE: Multiplanar, multisequence imaging of the brain was obtained without the administration of contrast. FINDINGS: No diffusion restriction. Susceptibility in the left thalamic region consistent with a remote hemorrhagic insult. Midline structures are intact. No cerebellar tonsillar ectopia. Normal marrow signal at the skull base. No acute abnormality in the visualized cervical spine. Mild volume loss. Mild T2 prolongation white matter favoring chronic microvascular ischemic changes. No midline shift or mass effect. No abnormal extra-axial fluid collection. Ventricular size and configuration is within normal limits. Basal cisterns are intact. Visualized intracranial flow voids are grossly intact. Globes and orbits are unremarkable. Mild mucosal thickening in the left maxillary sinus. Mastoid air cells are clear. No scalp soft tissue abnormality.     Impression: 1. No diffusion restriction to indicate acute infarct. 2. Sequela of remote hemorrhage in the left thalamus, unchanged from prior MRI. 3. Mild volume loss and mild chronic microvascular ischemic changes. 4. No significant interval change when compared with 7/29/2022. Slot: 93 Electronically signed by:  Pedro Luis Izaguirre M.D.  7/30/2022 7:56 PM Mountain Time      Results for orders placed during the hospital encounter of 10/05/21    Adult Transthoracic Echo Complete W/ Cont if Necessary Per Protocol    Interpretation Summary  · Left ventricular ejection fraction appears to be 61 - 65%. Left ventricular systolic function is normal.  · Left atrial volume is mildly increased.      I have reviewed the medications:  Scheduled Meds:amLODIPine, 10 mg, Oral, Q24H  atorvastatin, 80 mg, Oral, Nightly  calcium acetate, 667 mg, Oral, TID With Meals  carvedilol, 25 mg, Oral, BID With Meals  insulin lispro, 0-7 Units, Subcutaneous, TID AC  QUEtiapine, 25 mg, Oral,  Nightly  senna-docusate sodium, 2 tablet, Oral, BID  sodium chloride, 10 mL, Intravenous, Q12H  valACYclovir, 500 mg, Oral, Q24H  venlafaxine XR, 75 mg, Oral, Daily      Continuous Infusions:   PRN Meds:.•  acetaminophen **OR** acetaminophen **OR** acetaminophen  •  senna-docusate sodium **AND** polyethylene glycol **AND** bisacodyl **AND** bisacodyl  •  dextrose  •  dextrose  •  glucagon (human recombinant)  •  ondansetron **OR** ondansetron  •  sodium chloride  •  sodium chloride    Assessment & Plan   Assessment & Plan     Active Hospital Problems    Diagnosis  POA   • **AMS (altered mental status) [R41.82]  Yes   • End stage renal disease on dialysis (HCC) [N18.6, Z99.2]  Not Applicable   • CVA (cerebral vascular accident) (HCC) [I63.9]  Yes   • Cirrhosis of liver (HCC) [K74.60]  Yes   • 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        Brief Hospital Course to date:  Jeaneth Keita is a 61 y.o. female a resident at [facility] with a past medical history significant for ESRD on HD, CVA,  liver cell carcinoma with bone metastasis, Hep C, cirrhosis, chronic anemia, T2DM,  HTN, and depression who presents to the ED due to AMS.     AMS  Hx MCI, old CVA  -She has been admitted several times over the last 2 months d/t similar symptoms. Previous neuro work-up negative. She does have some confusion at baseline per her POA, Parviz ([phone]).  -Neurology following, could be due to valtrex not being renally dosed, seems to be improving overall, but waxes/wanes  -check UA again  -CT head revealed no acute intracranial findings  -LP performed  -EEG WNL  -MRI brain no acute finding   -Hold recently started Gabapentin (started d/t recent Shingles)       ESRD on HD  -Nephrology consult for  HD       Chronic Anemia   -Hgb 9.9 on admission, at baseline        T2DM  -recent admission due to confusion/encephalopathy felt due to hypoglycemia  -Low dose SSI for now, stable, with no hypoglycemia noted again       HTN  HLD  -continue Norvasc, Coreg, Lipitor      Liver CA with bone mets  Chronic pain  Hx Hep C  Cirrhosis   -Previously followed with [facility] Dr. Raul Berrios  -Her POA reported decision to pursue comfort measures, palliative consulted  -Palliative consult      Herpes Zoster  -Hold recently started gabapentin d/t above  -Continue Valtrex to complete 7 day regimen         Expected Discharge Location and Transportation: Back to facility   Expected Discharge Date: 8/2     DVT prophylaxis:  Mechanical DVT prophylaxis orders are present.     AM-PAC 6 Clicks Score (PT): 14 (08/01/22 8527)    CODE STATUS:   Code Status and Medical Interventions:   Ordered at: 07/29/22 1721     Medical Intervention Limits:    NO intubation (DNI)     Code Status (Patient has no pulse and is not breathing):    No CPR (Do Not Attempt to Resuscitate)     Medical Interventions (Patient has pulse or is breathing):    Limited Support       Washington Lazo MD  08/01/22

## 2022-08-01 NOTE — NURSING NOTE
DAILY LEE:           Daily Low Lee <=14           Ele score:  14         At this time the patient's RN reports no new skin issues or needs.  Per the bedside RN, this patient does not require a specialty mattress/bed and is on a waffle topper at this time.     Recommendations:    Continue to provide good general skin care. Apply preventative or barrier cream BID and/or PRN. Keep patient dry and turn Q2H.  Elevate and offload heels, or apply pressure relieving boots.      If alteration to skin integrity or change in wound bed presentation, please contact WOC team.

## 2022-08-01 NOTE — PLAN OF CARE
Goal Outcome Evaluation:              Outcome Evaluation: A&O to self. VSS on RA. Pt has had no c/o of pain during this shift. Pt completed dialysis today, 1.7 L took off per Dialysis RN. 24 hour urine completed. All skin measures in place. Fall precautions maintained.

## 2022-08-01 NOTE — CASE MANAGEMENT/SOCIAL WORK
Continued Stay Note  [facility]     Patient Name: Jeaneth Keita  MRN: [id number]  Today's Date: 8/1/2022    Admit Date: 7/29/2022     Discharge Plan     Row Name 08/01/22 1526       Plan    Plan Comments Patient is off unit in dialysis. Will attempt to see her later    Final Discharge Disposition Code 30 - still a patient               Discharge Codes    No documentation.                     Sandra Boo RN

## 2022-08-01 NOTE — PROGRESS NOTES
Neurology Note    Patient:  Jeaneth Keita    YOB: 1960    REFERRING PHYSICIAN:  Dr. Lazo    CHIEF COMPLAINT:    AMS    HISTORY OF PRESENT ILLNESS:   The patient has been more alert and oriented today, still inattentive, denies specific complaints, able to eat.    Past Medical History:  Past Medical History:   Diagnosis Date   • Anxiety    • Cancer (HCC)     liver cell carcinoma   • DDD (degenerative disc disease), lumbar    • Depression    • Diabetes mellitus (HCC)    • Fibromyalgia    • Hemochromatosis    • Hep B w/o coma, chronic, w/o delta (HCC)    • Hep C w/o coma, chronic (HCC)    • Hypertension    • Stroke (HCC)    • Vertigo        Past Surgical History:  Past Surgical History:   Procedure Laterality Date   • ARTERIOVENOUS FISTULA/SHUNT SURGERY Left 10/16/2021    Procedure: UPPER EXTREMITY ARTERIOVENOUS FISTULA FORMATION LEFT;  Surgeon: Johnny Rousseau MD;  [facility];  Service: Vascular;  Laterality: Left;   • BACK SURGERY     • BELOW KNEE LEG AMPUTATION     •  SECTION     • FOOT SURGERY     • KNEE SURGERY     • ROTATOR CUFF REPAIR     • SPINAL CORD STIMULATOR IMPLANT         Social History:   Social History     Socioeconomic History   • Marital status:    Tobacco Use   • Smoking status: Never Smoker   • Smokeless tobacco: Never Used   Vaping Use   • Vaping Use: Never used   Substance and Sexual Activity   • Alcohol use: No   • Drug use: No   • Sexual activity: Defer        Family History:   Family History   Problem Relation Age of Onset   • Heart disease Father    • Heart attack Father        Medications Prior to Admission:    Prior to Admission medications    Medication Sig Start Date End Date Taking? Authorizing Provider   acetaminophen (TYLENOL) 325 MG tablet Take 650 mg by mouth Every 6 (Six) Hours As Needed for Mild Pain .    Provider, MD Kaitlin   amLODIPine (NORVASC) 10 MG tablet Take 1 tablet by mouth Daily. 10/28/21   Adali Jaimes,  MAYELIN   atorvastatin (LIPITOR) 80 MG tablet Take 80 mg by mouth Every Night.    Kaitlin Dao MD   B Complex-C (B Complex-Vitamin C) capsule Take 1 capsule by mouth Daily.    Kaitlin Dao MD   calcium acetate (PHOS BINDER,) 667 MG capsule capsule Take 1 capsule by mouth 3 (Three) Times a Day With Meals.  Patient taking differently: Take 667 mg by mouth 3 (Three) Times a Day Before Meals. 10/28/21   Adali Jaimes APRN   carvedilol (COREG) 25 MG tablet Take 25 mg by mouth 2 (two) times a day with meals.    Kaitlin Dao MD   gabapentin (NEURONTIN) 300 MG capsule Take 1 capsule by mouth Every 8 (Eight) Hours As Needed (Severe pain). 7/27/22   Richard Dunaway MD   hydrALAZINE (APRESOLINE) 50 MG tablet Take 100 mg by mouth 3 (Three) Times a Day.    Kaitlin Dao MD   ibuprofen (ADVIL,MOTRIN) 600 MG tablet Take 1 tablet by mouth Every 6 (Six) Hours As Needed for Mild Pain . 7/27/22   Richard Dunaway MD   Insulin Lispro (humaLOG) 100 UNIT/ML injection Inject 0-7 Units under the skin into the appropriate area as directed 3 (Three) Times a Day Before Meals for 30 days. 5/25/22 6/24/22  Franc Magallanes MD   Insulin NPH Isophane & Regular (NovoLIN 70/30 FlexPen) (70-30) 100 UNIT/ML suspension pen-injector Inject 1 Units under the skin into the appropriate area as directed 2 (Two) Times a Day Before Meals for 30 days. 24 units before breakfast and 22 units before dinner 5/25/22 6/24/22  Franc Magallanes MD   loperamide (IMODIUM) 2 MG capsule Take 2 mg by mouth 4 (Four) Times a Day As Needed for Diarrhea. Take 2 capsules by mouth daily times one, then 1 capsule by mouth after each loose stool as needed.  Max 4 capsules in 24 hours.    Kaitlin Dao MD   methadone (DOLOPHINE) 10 MG tablet Take 10 mg by mouth Every 6 (Six) Hours As Needed for Moderate Pain ,    Kaitlin Dao MD   ondansetron (ZOFRAN) 4 MG tablet Take 4 mg by mouth Every 6 (Six) Hours As Needed for  Nausea or Vomiting.    Kaitlin Dao MD   QUEtiapine (SEROquel) 25 MG tablet Take 1 tablet by mouth At Night As Needed (insomnia).  Patient taking differently: Take 25 mg by mouth Every Night. 12/21/21   Jesus James, DO   sennosides-docusate (senna-docusate sodium) 8.6-50 MG per tablet Take 1 tablet by mouth Daily.    Kaitlin Dao MD   valACYclovir (VALTREX) 1000 MG tablet Take 1 tablet by mouth 3 (Three) Times a Day for 7 days. 7/27/22 8/3/22  Richard Dunaway MD   venlafaxine XR (EFFEXOR-XR) 75 MG 24 hr capsule Take 75 mg by mouth Daily.    Kaitlin Dao MD   vitamin D (ERGOCALCIFEROL) 1.25 MG (35368 UT) capsule capsule Take 50,000 Units by mouth 1 (One) Time Per Week. Sunday    Kaitlin Dao MD       Allergies:  Sulfa antibiotics      Review of system  Review of Systems   Unable to perform ROS: Mental status change       Vitals:    08/01/22 1200   BP: 124/90   Pulse: 94   Resp:    Temp:    SpO2: 100%       Physical exam  Physical Exam  HENT:      Head: Normocephalic.   Cardiovascular:      Rate and Rhythm: Normal rate.   Pulmonary:      Effort: Pulmonary effort is normal.   Musculoskeletal:      Cervical back: Neck supple.   Neurological:      Mental Status: She is alert and oriented to person, place, and time.      Deep Tendon Reflexes: Babinski sign absent on the right side. Babinski sign absent on the left side.      Comments: Speech fairly clear, no facial droop, moves limbs well, DTRs hypoactive.            Lab Results   Component Value Date    WBC 4.63 07/30/2022    HGB 9.2 (L) 07/30/2022    HCT 25.3 (L) 07/30/2022    MCV 92.0 07/30/2022     (L) 07/30/2022     Lab Results   Component Value Date    GLUCOSE 133 (H) 07/30/2022    BUN 56 (H) 07/30/2022    CREATININE 7.29 (H) 07/30/2022    EGFRIFNONA 9 (L) 01/04/2022    EGFRIFAFRI  01/04/2022      Comment:      <15 Indicative of kidney failure.    BCR 7.7 07/30/2022    CO2 22.0 07/30/2022    CALCIUM 9.4  07/30/2022    ALBUMIN 3.90 07/30/2022    AST 29 07/30/2022    ALT 32 07/30/2022     TSH   0.270 - 4.200 uIU/mL 1.100      RPR   Non-Reactive Non-Reactive      Folate   4.78 - 24.20 ng/mL >20.00      Vitamin B-12   211 - 946 pg/mL 459      Ammonia   11 - 51 umol/L 40        Meningitis / Encephalitis Panel, PCR - Cerebrospinal Fluid, Lumbar Puncture  Order: [id number] - Reflex for Order [id number]   Status: Final result     Visible to patient: No (not released)     Next appt: None    Specimen Information: Lumbar Puncture; Cerebrospinal Fluid         0 Result Notes    Component   Ref Range & Units    ESCHERICHIA COLI K1, PCR   Not Detected Not Detected    HAEMOPHILUS INFLUENZAE, PCR   Not Detected Not Detected    LISTERIA MONOCYTOGENES, PCR   Not Detected Not Detected    NEISSERIA MENINGITIDIS, PCR   Not Detected Not Detected    STREPTOCOCCUS AGALACTIAE, PCR   Not Detected Not Detected    STREPTOCOCCUS PNEUMONIAE, PCR   Not Detected Not Detected    CYTOMEGALOVIRUS (CMV), PCR   Not Detected Not Detected    ENTEROVIRUS, PCR   Not Detected Not Detected    HERPES SIMPLEX VIRUS 1 (HSV-1), PCR   Not Detected Not Detected    HERPES SIMPLEX VIRUS 2 (HSV-2), PCR   Not Detected Not Detected    HUMAN PARECHOVIRUS, PCR   Not Detected Not Detected    VARICELLA ZOSTER VIRUS (VZV), PCR   Not Detected Not Detected    CRYPTOCOCCUS NEOFORMANS / GATTII, PCR   Not Detected Not Detected    HUMAN HERPES VIRUS 6 PCR   Not Detected Not Detected    Resulting Agency [facility]              Specimen Collected: 07/29/22 15:45 Last Resulted: 07/29/22 17:53               Radiological Studies:  EEG    Result Date: 7/29/2022  Reason for referral: 61 y.o.female with altered mental status Technical Summary:  A 19 channel digital EEG was performed using the international 10-20 placement system, including eye leads and EKG leads. Duration: 22 minutes Findings: The awake tracing shows diffuse low to medium amplitude intermixed theta and alpha activity which  is present symmetrically over both hemispheres.  Low amplitude EMG artifact is seen anteriorly along with occasional intermittent eye blink artifact.  As the study proceeds, brief bursts of slower medium amplitude 2-3 Hz generalized delta are noted.  Hyperventilation yields mild symmetric slowing of the background.  Later in the study, stage II sleep is seen with vertex waves and sleep spindles.  Photic stimulation does not change the background.  No focal features or epileptiform activity are seen.  No rhythmic or periodic discharges are present. Video: On Technical quality: Superior EKG: Regular, 80 bpm SUMMARY: Intermittent generalized slow No focal features or epileptiform activity are seen No periodic or rhythmic discharges are present     Diffuse cerebral dysfunction of mild degree but nonspecific No evidence for epilepsy or encephalitis is seen This report is transcribed using the Dragon dictation system.      CT Head Without Contrast    Result Date: 7/29/2022  DATE OF EXAM: 7/29/2022 9:38 AM  PROCEDURE: CT HEAD WO CONTRAST-  INDICATIONS: ams  COMPARISON: Head CT 6/27/2022  TECHNIQUE: Routine transaxial and coronal reconstruction images were obtained through the head without the administration of contrast. Automated exposure control and iterative reconstruction methods were used.  The radiation dose reduction device was turned on for each scan per the ALARA (As Low as Reasonably Achievable) protocol.  FINDINGS: No acute intracranial hemorrhage. No acute large territory infarct. There are scattered subcortical and periventricular white matter hypodensities which are nonspecific and can be seen in the setting of chronic small vessel ischemic change.  No extra-axial collections.  No midline shift or herniation.  Normal size and configuration of the ventricles commensurate with degree of cerebral volume loss. Unremarkable appearance of the orbits.  The mastoid air cells are clear.  Tiny mucous retention cyst in  the left maxillary sinus with otherwise clear paranasal sinuses. No acute osseous findings.      No acute intracranial findings.  This report was finalized on 7/29/2022 10:06 AM by David Lugo MD.      MRI Angiogram Head Without Contrast    Result Date: 7/31/2022  DATE OF EXAM: 7/31/2022 6:25 PM  PROCEDURE: MRI ANGIOGRAM HEAD WO CONTRAST-  INDICATIONS: Neuro deficit, acute, stroke suspected; R41.0-Disorientation, unspecified; N18.6-End stage renal disease; Z99.2-Dependence on renal dialysis  COMPARISON: MRI brain 07/31/2022  TECHNIQUE: 3-D time-of-flight MRA [place].  FINDINGS: The basilar artery does not appear unusual. The visualized distal portion internal carotid arteries seem patent. The anterior and middle cerebral arteries as well as the right posterior cerebral artery are grossly unremarkable. There is some diminished signal in the left posterior cerebral artery. This is a change from the prior study. This may relate to motion artifact on this exam as opposed to an abnormality of the posterior cerebral artery. It does look like there is motion artifact on the source images.      1.  Some interval change in the left posterior cerebral artery that could relate to motion artifact noted on source images. An abnormality is not definitely identified within the territory of the left posterior cerebral artery on the more recent MRI from the same day.  This report was finalized on 7/31/2022 7:56 PM by Robbie Jason MD.      MRI Angiogram Head Without Contrast    Result Date: 7/30/2022  EXAMINATION: MRI ANGIOGRAM HEAD WO CONTRAST DATE: 7/30/2022 9:05 PM  INDICATION: Altered mental status, ataxia  COMPARISON: None available.  TECHNIQUE:  Axially acquired 3-D time of flight noncontrast MRA images were obtained through the head.  Multiple maximum intensity projection reformatted images were created and viewed in rotatable, 3-dimensional forms.  No contrast was administered.   FINDINGS:  DEEPTI:No hemodynamically  significant stenosis or branch vessel occlusion. No aneurysm. MCA:No hemodynamically significant stenosis or branch vessel occlusion. No aneurysm. PCA:No hemodynamically significant stenosis or branch vessel occlusion. No aneurysm. Fetal disposition on the left and conventional on the right. Visualized internal carotid arteries:No hemodynamically significant stenosis or dissection. Intact as visualized. Basilar:No hemodynamically significant stenosis or dissection. Intact. Visualized vertebral arteries:No hemodynamically significant stenosis or dissection. Intact as visualized.       No large vessel occlusion. Slot: 93 Electronically signed by:  Pedro Luis Izaguirre M.D.  7/30/2022 7:45 PM Mountain Time    MRI Angiogram Neck Without Contrast    Result Date: 7/30/2022  EXAMINATION: MR ANGIOGRAM OF THE NECK WITH AND WITHOUT CONTRAST DATE: 7/30/2022 9:06 PM INDICATION: Altered mental status, ataxia COMPARISON: CT angiogram neck 5/23/2022 TECHNIQUE: An axial fat saturated T1 sequence was initially obtained, with axial 3-D time-of-flight MRA images and 3-dimensional reformatted images.  Additional maximum intensity projection reformatted images were created and viewed in 3-dimensional rotatable forms.  There were no immediate complications.  Stenosis based on NASCET criteria. FINDINGS: Aorta: Imaged portions of the aortic arch are unremarkable. Carotids: Right: No hemodynamically significant stenosis or evidence of dissection. No significant atherosclerotic disease Left:No hemodynamically significant stenosis or evidence of dissection. No significant atherosclerotic disease Vertebrals: Vertebral arteries are codominant.  No hemodynamically significant stenosis or dissection. No significant atherosclerotic disease.     No hemodynamically significant stenosis or dissection within the vasculature of the neck. Slot: 93  Electronically signed by:  Pedro Luis Izaguirre M.D.  7/30/2022 7:47 PM Mountain Time    MRI Brain Without  Contrast    Result Date: 7/31/2022  DATE OF EXAM: 7/31/2022 6:25 PM  PROCEDURE: MRI BRAIN WO CONTRAST-  INDICATIONS: Delirium; R41.0-Disorientation, unspecified; N18.6-End stage renal disease; Z99.2-Dependence on renal dialysis  COMPARISON: 07/30/2022  TECHNIQUE: Multiplanar multisequence images of the brain were performed without contrast according to routine brain MRI protocol.  FINDINGS: There is no restricted diffusion. There are some periventricular T2 signal changes as well as some signal in the madisyn as well as basal ganglia that could reflect more chronic small vessel ischemic change. The pituitary is not enlarged. No definite orbital abnormality is appreciated. On susceptibility imaging, there is blooming in the left thalamic area which could relate to old hemorrhagic process. This is unchanged from the prior exam.      1.  There are some T2 signal changes involving the madisyn, basal ganglia and periventricular areas which could reflect more chronic small vessel ischemic change. 2.  Susceptibility artifact left thalamic area that could relate to old bleed.  Exam somewhat limited due to patient confusion and motion  This report was finalized on 7/31/2022 7:46 PM by Robbie Jason MD.      MRI Brain Without Contrast    Result Date: 7/30/2022  EXAMINATION: MR of the brain without contrast DATE: 7/30/2022 9:04 PM INDICATION: Altered mental status, ataxia COMPARISON: MR brain 7/29/2022 TECHNIQUE: Multiplanar, multisequence imaging of the brain was obtained without the administration of contrast. FINDINGS: No diffusion restriction. Susceptibility in the left thalamic region consistent with a remote hemorrhagic insult. Midline structures are intact. No cerebellar tonsillar ectopia. Normal marrow signal at the skull base. No acute abnormality in the visualized cervical spine. Mild volume loss. Mild T2 prolongation white matter favoring chronic microvascular ischemic changes. No midline shift or mass effect. No abnormal  extra-axial fluid collection. Ventricular size and configuration is within normal limits. Basal cisterns are intact. Visualized intracranial flow voids are grossly intact. Globes and orbits are unremarkable. Mild mucosal thickening in the left maxillary sinus. Mastoid air cells are clear. No scalp soft tissue abnormality.     1. No diffusion restriction to indicate acute infarct. 2. Sequela of remote hemorrhage in the left thalamus, unchanged from prior MRI. 3. Mild volume loss and mild chronic microvascular ischemic changes. 4. No significant interval change when compared with 7/29/2022. Slot: 93 Electronically signed by:  Pedro Luis Izaguirre M.D.  7/30/2022 7:56 PM Mountain Time    MRI Brain Without Contrast    Result Date: 7/29/2022  EXAMINATION: MRI BRAIN WO CONTRAST-  DATE OF EXAM: 7/29/2022 4:11 PM  INDICATION: Altered mental status, nontraumatic (Ped 0-17y); R41.0-Disorientation, unspecified; N18.6-End stage renal disease; Z99.2-Dependence on renal dialysis.  COMPARISON: Head CT dated 07/29/2022, MRI brain dated 05/23/2022  TECHNIQUE: Multiplanar multisequence images of the brain were performed without contrast according to routine brain MRI protocol.  FINDINGS: Many of the sequences are motion degraded due to patient clinical condition. There are no areas of restricted diffusion to suggest acute infarct. The ventricles and sulci are proportional prominent collateral with global cerebral volume loss. There is no mass effect or midline shift. There is no acute intracranial hemorrhage. There is no abnormal extra-axial fluid collection. There are no abnormal areas of intrinsic T1 hyperintense signal abnormality. There are multiple foci of susceptibility within the right occipital lobe which appear unchanged from prior examination. There is susceptibility and likely sequelae of prior hemorrhage or infarct involving the left thalamus which appears stable from prior examination.  There is mild periventricular white  matter T2/FLAIR hyperintense signal changes likely representing sequelae of chronic small vessel ischemic disease. The midline structures including the pituitary appear within normal limits. There is mild degenerative pannus formation posterior to the dens. The visualized orbits and globes appear symmetric with thinning of the lenses bilaterally. The mastoid air cells and middle ears appear well aerated. There is no evidence of CP angle mass lesion.      1. No evidence of acute infarct, hemorrhage, or mass. 2. Sequelae of prior hemorrhage or infarct within the left thalamus, unchanged from prior MRI. 3. Mild periventricular white matter signal changes likely representing sequelae of chronic small vessel ischemic disease.  This report was finalized on 7/29/2022 4:37 PM by Madan Florez MD.      XR Chest 1 View    Result Date: 7/29/2022  DATE OF EXAM: 7/29/2022 8:52 AM  PROCEDURE: XR CHEST 1 VW-  INDICATIONS: Weak/Dizzy/AMS triage protocol  COMPARISON: 6/27/2022  TECHNIQUE: Single radiographic AP view of the chest was obtained.  FINDINGS: The heart size is normal. Pulmonary vascular markings are normal. There is a right-sided tunneled dialysis catheter with the tip at the cavoatrial junction. There is atelectasis in the right lung base but this has decreased compared with the last study.       1. Atelectasis in the right lung base, decreased from the patient's previous study.  This report was finalized on 7/29/2022 9:20 AM by Parviz Sabillon MD.      IR LUMBAR PUNCTURE DIAGNOSTIC    Result Date: 7/29/2022  EXAMINATION: IR LUMBAR PUNCTURE DIAGNOSTIC-  INDICATION: Altered mental status, she has herpes zoster.; R41.0-Disorientation, unspecified; N18.6-End stage renal disease; Z99.2-Dependence on renal dialysis  TECHNIQUE: 18 seconds of fluoroscopic time was used for this procedure. 1 associated image was saved. The procedure was deemed emergent by Dr. Janes Gonzalez.  The patient was placed in the prone position on the  table. The back was prepped and draped in a sterile fashion. 1% lidocaine with used as a local anesthetic to the skin and subcutaneous tissues. Under fluoroscopic guidance a 22-gauge spinal needle was advanced at the L4-L5 level to the CSF space. Approximately 8 cc of blood-tinged CSF was returned and collected in 4 numbered vials. Sample vials were labeled and sent to pathology for further analysis. The needle was removed.  COMPARISON: NONE  FINDINGS: The patient tolerated the procedure well, and no immediate complications occurred.       Successful fluoroscopic guided lumbar puncture as above with no immediate complications.    This report was finalized on 7/29/2022 10:44 PM by Dr. Washington Valladares MD.      XR Abdomen KUB    Result Date: 7/29/2022  DATE OF EXAM: 7/29/2022 3:00 PM  PROCEDURE: XR ABDOMEN KUB-  INDICATIONS: mri  COMPARISON: 10/10/2021 KUB  TECHNIQUE: Single radiographic view of the abdomen was obtained.  FINDINGS: Previous posterior lumbar and interbody fusion is noted at L4-5-S1. There is a mild thoracolumbar scoliosis, convex to left in the lower thoracic spine. Bony structures elsewhere appear intact. There is a suggestion of mild to moderate fecal stasis, and mildly and diffusely increased small bowel gas but no dilated large or small bowel loops are seen. No bowel wall edema or pneumatosis is seen. There is no evidence of metallic shrapnel metallic foreign body other than the patient's fusion hardware.       1. Posterior lumbar fusion. No evidence of metallic foreign body elsewhere. 2. Mild fecal stasis.  This report was finalized on 7/29/2022 3:30 PM by Dr. Washington Valladares MD.          During this visit the following were done:  Labs Reviewed [x]    Labs Ordered []    Radiology Reports Reviewed [x]    Radiology Ordered []    EKG, echo, and/or stress test reviewed []    EEG results reviewed  []    EEG reviewed and interpreted per myself   []    Discussed case with neurointerventionalist or neuroradiologist  []    Referring Provider Records Reviewed []    ER Records Reviewed []    Hospital Records Reviewed []    History Obtained From Family []    Radiological images view and Interpreted per myself [x]    Case Discussed with referring provider []     Decision to obtain and request outside records  []        Assessment and Plan     TME 22 Valtrex toxicity in the setting of ESRD, doing better today. Valtrex prescribed for zoster. CSF consistent with traumatic tap, no signs of infection, PCR negative for varicella, CTH and MRI/MRA brain not suggestive of a SAH or acute stroke. EEG w/o epileptic changes. Her CSF protein is unusually high, cause unclear. She has a h/o hepatocellular carcinoma with mets to the bone which raises suspicion of carcinomatosis.   - Will try to add CSF cytology. She may need a repeat LP with cytology at a latter point.   - ST, PT, OT.    Call for questions, will see prn. Thanks.          Electronically signed by Bradly Malcolm MD on 8/1/2022 at 12:54 EDT

## 2022-08-02 LAB
COLLECT DURATION TIME UR: 24 HRS
CREAT UR-MCNC: 72.3 MG/DL
CREATINE 24H UR-MRATE: 0.61 G/24 HR (ref 0.7–1.6)
GLUCOSE BLDC GLUCOMTR-MCNC: 117 MG/DL (ref 70–130)
GLUCOSE BLDC GLUCOMTR-MCNC: 148 MG/DL (ref 70–130)
GLUCOSE BLDC GLUCOMTR-MCNC: 180 MG/DL (ref 70–130)
SPECIMEN VOL 24H UR: 850 ML

## 2022-08-02 PROCEDURE — 63710000001 INSULIN LISPRO (HUMAN) PER 5 UNITS: Performed by: NURSE PRACTITIONER

## 2022-08-02 PROCEDURE — 99232 SBSQ HOSP IP/OBS MODERATE 35: CPT | Performed by: HOSPITALIST

## 2022-08-02 PROCEDURE — G0378 HOSPITAL OBSERVATION PER HR: HCPCS

## 2022-08-02 PROCEDURE — 82962 GLUCOSE BLOOD TEST: CPT

## 2022-08-02 PROCEDURE — 97530 THERAPEUTIC ACTIVITIES: CPT

## 2022-08-02 PROCEDURE — 25010000002 CEFTRIAXONE PER 250 MG: Performed by: HOSPITALIST

## 2022-08-02 RX ADMIN — INSULIN LISPRO 2 UNITS: 100 INJECTION, SOLUTION INTRAVENOUS; SUBCUTANEOUS at 12:46

## 2022-08-02 RX ADMIN — CARVEDILOL 25 MG: 12.5 TABLET, FILM COATED ORAL at 08:20

## 2022-08-02 RX ADMIN — SENNOSIDES AND DOCUSATE SODIUM 2 TABLET: 50; 8.6 TABLET ORAL at 08:20

## 2022-08-02 RX ADMIN — CARVEDILOL 25 MG: 12.5 TABLET, FILM COATED ORAL at 17:28

## 2022-08-02 RX ADMIN — AMLODIPINE BESYLATE 10 MG: 10 TABLET ORAL at 08:20

## 2022-08-02 RX ADMIN — VENLAFAXINE HYDROCHLORIDE 75 MG: 75 CAPSULE, EXTENDED RELEASE ORAL at 08:20

## 2022-08-02 RX ADMIN — QUETIAPINE FUMARATE 25 MG: 25 TABLET ORAL at 21:04

## 2022-08-02 RX ADMIN — SODIUM CHLORIDE 1 G: 900 INJECTION INTRAVENOUS at 11:14

## 2022-08-02 RX ADMIN — SENNOSIDES AND DOCUSATE SODIUM 2 TABLET: 50; 8.6 TABLET ORAL at 21:04

## 2022-08-02 RX ADMIN — Medication 10 ML: at 08:23

## 2022-08-02 RX ADMIN — CALCIUM ACETATE 667 MG: 667 CAPSULE ORAL at 08:20

## 2022-08-02 RX ADMIN — ATORVASTATIN CALCIUM 80 MG: 40 TABLET, FILM COATED ORAL at 21:04

## 2022-08-02 RX ADMIN — CALCIUM ACETATE 667 MG: 667 CAPSULE ORAL at 17:28

## 2022-08-02 RX ADMIN — CALCIUM ACETATE 667 MG: 667 CAPSULE ORAL at 11:14

## 2022-08-02 RX ADMIN — VALACYCLOVIR HYDROCHLORIDE 500 MG: 500 TABLET, FILM COATED ORAL at 17:28

## 2022-08-02 RX ADMIN — Medication 10 ML: at 21:04

## 2022-08-02 NOTE — PROGRESS NOTES
[facility] Medicine Services  PROGRESS NOTE    Patient Name: Jeaneth Keita  : 1960  MRN: [id number]    Date of Admission: 2022  Primary Care Physician: Provider, No Known    Subjective   Subjective     CC:  F/u AMS     HPI:  Up in bed. Denies pain. No f/c. No dyspnea. No abdominal pain.     ROS:  Difficult to assess     Objective   Objective     Vital Signs:   Temp:  [96.3 °F (35.7 °C)-98.3 °F (36.8 °C)] 97 °F (36.1 °C)  Heart Rate:  [] 86  Resp:  [16-19] 16  BP: ()/(65-92) 124/69  Flow (L/min):  [2] 2     Physical Exam:  NAD, alert, chronically ill appearing  OP clear, MMM  RRR  CTAB  KRISHNAMURTHY  Normal affect  No change in exam from , better overall, answers questions appropriately    Results Reviewed:  LAB RESULTS:      Lab 22  0831 22  0904   WBC 4.63 5.23   HEMOGLOBIN 9.2* 9.9*   HEMATOCRIT 25.3* 27.1*   PLATELETS 138* 156   NEUTROS ABS 2.68 3.46   IMMATURE GRANS (ABS) 0.01 0.01   LYMPHS ABS 1.31 1.09   MONOS ABS 0.34 0.31   EOS ABS 0.25 0.33   MCV 92.0 92.5   LACTATE  --  1.2         Lab 22  0831 22  0904   SODIUM 139 132*   POTASSIUM 4.6 4.9   CHLORIDE 103 96*   CO2 22.0 24.0   ANION GAP 14.0 12.0   BUN 56* 53*   CREATININE 7.29* 6.02*   EGFR 5.8* 7.4*   GLUCOSE 133* 177*   CALCIUM 9.4 9.3   MAGNESIUM 2.3 2.4   HEMOGLOBIN A1C 5.30  --    TSH 1.100  --          Lab 22  0831 22  0904   TOTAL PROTEIN 7.0 7.2   ALBUMIN 3.90 4.30   GLOBULIN 3.1 2.9   ALT (SGPT) 32 33   AST (SGOT) 29 29   BILIRUBIN 0.4 0.4   ALK PHOS 79 107         Lab 22  2137 22  1828 22  1725 22  0904   PROBNP  --   --  2,545.0*  --    TROPONIN T 0.035* 0.034*  --  0.036*             Lab 22  1725   FOLATE >20.00   VITAMIN B 12 459         Lab 22  1011   PH, ARTERIAL 7.434   PCO2, ARTERIAL 37.8   PO2 ART 74.2*   FIO2 21   HCO3 ART 25.3   BASE EXCESS ART 1.0   CARBOXYHEMOGLOBIN 1.0     Brief Urine Lab Results  (Last result in  the past 365 days)      Color   Clarity   Blood   Leuk Est   Nitrite   Protein   CREAT   Urine HCG        08/01/22 1438 Yellow   Clear   Negative   Small (1+)   Negative   >=300 mg/dL (3+)                 Microbiology Results Abnormal     Procedure Component Value - Date/Time    Blood Culture - Blood, Arm, Left [[id number]]  (Normal) Collected: 07/29/22 1015    Lab Status: Preliminary result Specimen: Blood from Arm, Left Updated: 08/01/22 1103     Blood Culture No growth at 3 days    Blood Culture - Blood, Arm, Right [[id number]]  (Normal) Collected: 07/29/22 1019    Lab Status: Preliminary result Specimen: Blood from Arm, Right Updated: 08/01/22 1103     Blood Culture No growth at 3 days    Culture, CSF - Cerebrospinal Fluid, Lumbar Puncture [[id number]] Collected: 07/29/22 1545    Lab Status: Final result Specimen: Cerebrospinal Fluid from Lumbar Puncture Updated: 08/01/22 0953     CSF Culture No growth at 3 days     Gram Stain Rare (1+) WBCs seen      No organisms seen    AFB Culture - Cerebrospinal Fluid, Lumbar Puncture [[id number]] Collected: 07/29/22 1545    Lab Status: Preliminary result Specimen: Cerebrospinal Fluid from Lumbar Puncture Updated: 07/30/22 1051     AFB Stain No acid fast bacilli seen on direct smear    Cryptococcal AG, CSF - Cerebrospinal Fluid, Lumbar Puncture [[id number]]  (Normal) Collected: 07/29/22 1545    Lab Status: Final result Specimen: Cerebrospinal Fluid from Lumbar Puncture Updated: 07/30/22 0817     Cryptococcal Antigen, CSF Negative    Meningitis / Encephalitis Panel, PCR - Cerebrospinal Fluid, Lumbar Puncture [[id number]]  (Normal) Collected: 07/29/22 1545    Lab Status: Final result Specimen: Cerebrospinal Fluid from Lumbar Puncture Updated: 07/29/22 1753     ESCHERICHIA COLI K1, PCR Not Detected     HAEMOPHILUS INFLUENZAE, PCR Not Detected     LISTERIA MONOCYTOGENES, PCR Not Detected     NEISSERIA MENINGITIDIS, PCR Not Detected     STREPTOCOCCUS AGALACTIAE, PCR Not Detected      STREPTOCOCCUS PNEUMONIAE, PCR Not Detected     CYTOMEGALOVIRUS (CMV), PCR Not Detected     ENTEROVIRUS, PCR Not Detected     HERPES SIMPLEX VIRUS 1 (HSV-1), PCR Not Detected     HERPES SIMPLEX VIRUS 2 (HSV-2), PCR Not Detected     HUMAN PARECHOVIRUS, PCR Not Detected     VARICELLA ZOSTER VIRUS (VZV), PCR Not Detected     CRYPTOCOCCUS NEOFORMANS / GATTII, PCR Not Detected     HUMAN HERPES VIRUS 6 PCR Not Detected    COVID-19 and FLU A/B PCR - Swab, Nasopharynx [[id number]]  (Normal) Collected: 07/29/22 0937    Lab Status: Final result Specimen: Swab from Nasopharynx Updated: 07/29/22 1030     COVID19 Not Detected     Influenza A PCR Not Detected     Influenza B PCR Not Detected    Narrative:      Fact sheet for providers: https://www.fda.gov/media/165632/download    Fact sheet for patients: https://www.fda.gov/media/409194/download    Test performed by PCR.          MRI Angiogram Head Without Contrast    Result Date: 7/31/2022  DATE OF EXAM: 7/31/2022 6:25 PM  PROCEDURE: MRI ANGIOGRAM HEAD WO CONTRAST-  INDICATIONS: Neuro deficit, acute, stroke suspected; R41.0-Disorientation, unspecified; N18.6-End stage renal disease; Z99.2-Dependence on renal dialysis  COMPARISON: MRI brain 07/31/2022  TECHNIQUE: 3-D time-of-flight MRA Lummi of [place].  FINDINGS: The basilar artery does not appear unusual. The visualized distal portion internal carotid arteries seem patent. The anterior and middle cerebral arteries as well as the right posterior cerebral artery are grossly unremarkable. There is some diminished signal in the left posterior cerebral artery. This is a change from the prior study. This may relate to motion artifact on this exam as opposed to an abnormality of the posterior cerebral artery. It does look like there is motion artifact on the source images.      Impression: 1.  Some interval change in the left posterior cerebral artery that could relate to motion artifact noted on source images. An abnormality is not  definitely identified within the territory of the left posterior cerebral artery on the more recent MRI from the same day.  This report was finalized on 7/31/2022 7:56 PM by Robbie Jason MD.      MRI Brain Without Contrast    Result Date: 7/31/2022  DATE OF EXAM: 7/31/2022 6:25 PM  PROCEDURE: MRI BRAIN WO CONTRAST-  INDICATIONS: Delirium; R41.0-Disorientation, unspecified; N18.6-End stage renal disease; Z99.2-Dependence on renal dialysis  COMPARISON: 07/30/2022  TECHNIQUE: Multiplanar multisequence images of the brain were performed without contrast according to routine brain MRI protocol.  FINDINGS: There is no restricted diffusion. There are some periventricular T2 signal changes as well as some signal in the madisyn as well as basal ganglia that could reflect more chronic small vessel ischemic change. The pituitary is not enlarged. No definite orbital abnormality is appreciated. On susceptibility imaging, there is blooming in the left thalamic area which could relate to old hemorrhagic process. This is unchanged from the prior exam.      Impression: 1.  There are some T2 signal changes involving the madisyn, basal ganglia and periventricular areas which could reflect more chronic small vessel ischemic change. 2.  Susceptibility artifact left thalamic area that could relate to old bleed.  Exam somewhat limited due to patient confusion and motion  This report was finalized on 7/31/2022 7:46 PM by Robbie Jason MD.        Results for orders placed during the hospital encounter of 10/05/21    Adult Transthoracic Echo Complete W/ Cont if Necessary Per Protocol    Interpretation Summary  · Left ventricular ejection fraction appears to be 61 - 65%. Left ventricular systolic function is normal.  · Left atrial volume is mildly increased.      I have reviewed the medications:  Scheduled Meds:amLODIPine, 10 mg, Oral, Q24H  atorvastatin, 80 mg, Oral, Nightly  calcium acetate, 667 mg, Oral, TID With Meals  carvedilol, 25 mg, Oral,  BID With Meals  cefTRIAXone, 1 g, Intravenous, Q24H  insulin lispro, 0-7 Units, Subcutaneous, TID AC  QUEtiapine, 25 mg, Oral, Nightly  senna-docusate sodium, 2 tablet, Oral, BID  sodium chloride, 10 mL, Intravenous, Q12H  valACYclovir, 500 mg, Oral, Q24H  venlafaxine XR, 75 mg, Oral, Daily      Continuous Infusions:   PRN Meds:.•  acetaminophen **OR** acetaminophen **OR** acetaminophen  •  senna-docusate sodium **AND** polyethylene glycol **AND** bisacodyl **AND** bisacodyl  •  dextrose  •  dextrose  •  glucagon (human recombinant)  •  heparin (porcine)  •  ondansetron **OR** ondansetron  •  sodium chloride  •  sodium chloride    Assessment & Plan   Assessment & Plan     Active Hospital Problems    Diagnosis  POA   • **AMS (altered mental status) [R41.82]  Yes   • Disorientation [R41.0]  Yes   • End stage renal disease on dialysis (HCC) [N18.6, Z99.2]  Not Applicable   • CVA (cerebral vascular accident) (HCC) [I63.9]  Yes   • Cirrhosis of liver (HCC) [K74.60]  Yes   • 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        Brief Hospital Course to date:  Jeaneth Keita is a 61 y.o. female a resident at [facility] with a past medical history significant for ESRD on HD, CVA,  liver cell carcinoma with bone metastasis, Hep C, cirrhosis, chronic anemia, T2DM,  HTN, and depression who presents to the ED due to AMS.     AMS  Hx MCI, old CVA  -She has been admitted several times over the last 2 months d/t similar symptoms. Previous neuro work-up negative. She does have some confusion at baseline per her POA, Parviz ([phone]).  -Neurology following, could be due to valtrex not being renally dosed, seems to be improving overall, but waxes/wanes  -CT head revealed no acute intracranial  findings  -LP performed  -EEG WNL  -MRI brain no acute finding   -Hold recently started Gabapentin (started d/t recent Shingles)    UTI  -Rocephin, culture pending     ESRD on HD  -Nephrology consulted for HD, tolerating well       Chronic Anemia   -Hgb 9.9 on admission, at baseline        T2DM  -stable trend on LSSI       HTN  HLD  -continue Norvasc, Coreg, Lipitor      Liver CA with bone mets  Chronic pain  Hx Hep C  Cirrhosis   -Previously followed with [facility] Dr. Raul Berrios  -Her POA reported decision to pursue comfort measures, palliative consulted  -Palliative consult      Herpes Zoster  -Hold recently started gabapentin d/t above  -Continue Valtrex to complete 7 day regimen         Expected Discharge Location and Transportation: Back to facility   Expected Discharge Date: 8/2     DVT prophylaxis:  Medical and mechanical DVT prophylaxis orders are present.     AM-PAC 6 Clicks Score (PT): 13 (08/01/22 1649)    CODE STATUS:   Code Status and Medical Interventions:   Ordered at: 07/29/22 1721     Medical Intervention Limits:    NO intubation (DNI)     Code Status (Patient has no pulse and is not breathing):    No CPR (Do Not Attempt to Resuscitate)     Medical Interventions (Patient has pulse or is breathing):    Limited Support       Washington Lazo MD  08/02/22

## 2022-08-02 NOTE — PLAN OF CARE
Goal Outcome Evaluation:              Outcome Evaluation: VSS on RA. A&O X3. Pt had no c/o of pain during this shift. Fall precautions maintained.

## 2022-08-02 NOTE — CASE MANAGEMENT/SOCIAL WORK
Discharge Planning Assessment  [facility]     Patient Name: Jeaneth Keita  MRN: [id number]  Today's Date: 8/2/2022    Admit Date: 7/29/2022     Discharge Needs Assessment     Row Name 08/02/22 0829       Living Environment    [facility] in Home facility resident  [facility] living in [facility]    Current Living Arrangements assisted living facility    Primary Care Provided by --  facility staffs assists    Provides Primary Care For no one, unable/limited ability to care for self    Family Caregiver if Needed sibling(s);spouse    Quality of Family Relationships unable to assess    Able to Return to Prior Arrangements yes       Transition Planning    Patient/Family Anticipates Transition to home    Transportation Anticipated family or friend will provide;health plan transportation       Discharge Needs Assessment    Current Outpatient/Agency/Support Group outpatient hemodialysis    Equipment Currently Used at Home wheelchair;shower chair    Outpatient/Agency/Support Group Needs outpatient hemodialysis    Discharge Facility/Level of Care Needs home with home health    Current Discharge Risk dependent with mobility/activities of daily living;chronically ill               Discharge Plan     Row Name 08/02/22 0834       Plan    Plan Back to [facility]    Patient/Family in Agreement with Plan yes    Plan Comments Ms. Keita lives at [facility] in [facility].  She is wheelchair bound and facility staff assists with ADL's.  She goes to outpatient dialysis MWF at [facility].  Per reivew of chart she uses Wheels for transportation.  Plan is back to [place].    Final Discharge Disposition Code 01 - home or self-care              Continued Care and Services - Admitted Since 7/29/2022    Coordination has not been started for this encounter.          Demographic Summary     Row Name 08/02/22 0828       General Information    Admission Type observation    Reason for Consult discharge  planning    Preferred Language English               Functional Status     Row Name 08/02/22 0829       Functional Status, IADL    Medications assistive person    Meal Preparation assistive equipment and person    Housekeeping assistive equipment and person    Laundry assistive equipment and person    Shopping assistive equipment and person               Psychosocial    No documentation.                Abuse/Neglect    No documentation.                Legal    No documentation.                Substance Abuse    No documentation.                Patient Forms    No documentation.                   Petty Ferrell RN

## 2022-08-02 NOTE — PLAN OF CARE
Goal Outcome Evaluation:  Plan of Care Reviewed With: patient        Progress: improving  Outcome Evaluation: Pt oriented to self. Performed bed mobility with supervision, sit > stand with CGA x 1 and RW, and bed > chair with CGA x1 and RW.

## 2022-08-02 NOTE — CASE MANAGEMENT/SOCIAL WORK
Continued Stay Note  [facility]     Patient Name: Jeaneth Keita  MRN: [id number]  Today's Date: 8/2/2022    Admit Date: 7/29/2022     Discharge Plan     Row Name 08/02/22 1304       Plan    Plan [facility]    Patient/Family in Agreement with Plan yes    Plan Comments Plan of care discussed in MDR.  Patient could possibly be ready to return to [facility] tomorrow after dialysis.  I have left message for DON at [facility] to verify if patient can return if medically ready.  Awaiting reply.  I update spouse who stated someone in family could transport her back tomorrow.    Final Discharge Disposition Code 01 - home or self-care               Discharge Codes    No documentation.                     Petty Ferrell RN

## 2022-08-02 NOTE — THERAPY TREATMENT NOTE
Patient Name: Jeaneth Keita  : 1960    MRN: [id number]                              Today's Date: 2022       Admit Date: 2022    Visit Dx:     ICD-10-CM ICD-9-CM   1. Disorientation  R41.0 780.99   2. ESRD on dialysis (HCC)  N18.6 585.6    Z99.2 V45.11     Patient Active Problem List   Diagnosis   • ICH (intracerebral hemorrhage)   • Hepatocellular carcinoma metastatic to bone s/p RXT    • Hemochromatosis   • Cirrhosis of liver (HCC)   • Chronic Hep C    • ESRD (end stage renal disease)   • Chronic ulcer of right foot   • S/P left BKA   • GERD (gastroesophageal reflux disease)   • Chronic pain on Methadone   • Essential hypertension   • Type 2 diabetes mellitus (HCC)   • Right lower lobe pneumonia   • AMS (altered mental status)   • Colitis   • Normocytic anemia   • Hypokalemia   • CVA (cerebral vascular accident) (HCC)   • Stroke-like symptoms   • History of hypoglycemia   • Hypoglycemia   • Chest pain   • End stage renal disease on dialysis (HCC)   • Disorientation     Past Medical History:   Diagnosis Date   • Anxiety    • Cancer (HCC)     liver cell carcinoma   • DDD (degenerative disc disease), lumbar    • Depression    • Diabetes mellitus (HCC)    • Fibromyalgia    • Hemochromatosis    • Hep B w/o coma, chronic, w/o delta (HCC)    • Hep C w/o coma, chronic (HCC)    • Hypertension    • Stroke (HCC)    • Vertigo      Past Surgical History:   Procedure Laterality Date   • ARTERIOVENOUS FISTULA/SHUNT SURGERY Left 10/16/2021    Procedure: UPPER EXTREMITY ARTERIOVENOUS FISTULA FORMATION LEFT;  Surgeon: Johnny Rousseau MD;  Location: [facility];  Service: Vascular;  Laterality: Left;   • BACK SURGERY     • BELOW KNEE LEG AMPUTATION     •  SECTION     • FOOT SURGERY     • KNEE SURGERY     • ROTATOR CUFF REPAIR     • SPINAL CORD STIMULATOR IMPLANT        General Information     Row Name 22 1533          Physical Therapy Time and Intention    Document Type therapy note (daily  note)  -     Mode of Treatment physical therapy  -[facility] Name 08/02/22 1533          General Information    Patient Profile Reviewed yes  -     Existing Precautions/Restrictions fall;other (see comments)  REMOTE L BKA.  -[facility] Name 08/02/22 1533          Cognition    Orientation Status (Cognition) oriented to;person;disoriented to;place;situation;time  -[facility] Name 08/02/22 1533          Safety Issues, Functional Mobility    Safety Issues Affecting Function (Mobility) safety precaution awareness;safety precautions follow-through/compliance;sequencing abilities;insight into deficits/self-awareness;awareness of need for assistance;positioning of assistive device;problem-solving  -     Impairments Affecting Function (Mobility) balance;cognition;endurance/activity tolerance;strength  -     Cognitive Impairments, Mobility Safety/Performance awareness, need for assistance;insight into deficits/self-awareness;judgment;problem-solving/reasoning;safety precaution awareness;safety precaution follow-through;sequencing abilities  -           User Key  (r) = Recorded By, (t) = Taken By, (c) = Cosigned By    Initials Name Provider Type     Philip Carson, PT Physical Therapist               Mobility     [facility] Name 08/02/22 1534          Bed Mobility    Bed Mobility supine-sit  -     Supine-Sit [place] (Bed Mobility) supervision;verbal cues  -     Comment, (Bed Mobility) cues for safety  -[facility] Name 08/02/22 1534          Transfers    Comment, (Transfers) cues for safe sequencing, good balance demonstrated with RW and UE support  -[facility] Name 08/02/22 1534          Bed-Chair Transfer    Bed-Chair [place] (Transfers) contact guard;verbal cues  -     Assistive Device (Bed-Chair Transfers) walker, front-wheeled  -[facility] Name 08/02/22 1534          Sit-Stand Transfer    Sit-Stand [place] (Transfers) contact guard;verbal cues  -     Assistive Device (Sit-Stand Transfers)  walker, front-wheeled  -           User Key  (r) = Recorded By, (t) = Taken By, (c) = Cosigned By    Initials Name Provider Type    Philip Langley PT Physical Therapist               Obj/Interventions     [facility] Name 08/02/22 1534          Balance    Balance Assessment sitting static balance;sitting dynamic balance;sit to stand dynamic balance;standing static balance;standing dynamic balance  -     Static Sitting Balance standby assist  -     Dynamic Sitting Balance standby assist  -     Position, Sitting Balance unsupported;sitting edge of bed  -     Sit to Stand Dynamic Balance contact guard;verbal cues  -     Static Standing Balance contact guard;verbal cues  -     Dynamic Standing Balance contact guard;verbal cues  -     Position/Device Used, Standing Balance supported;walker, rolling  -     Balance Interventions sitting;standing;sit to stand;supported;static;dynamic;minimal challenge  -           User Key  (r) = Recorded By, (t) = Taken By, (c) = Cosigned By    Initials Name Provider Type    Philip Langley PT Physical Therapist               Goals/Plan    No documentation.                Clinical Impression     [facility] Name 08/02/22 1535          Pain    Pretreatment Pain Rating 0/10 - no pain  -     Posttreatment Pain Rating 0/10 - no pain  -[facility] Name 08/02/22 1535          Plan of Care Review    Plan of Care Reviewed With patient  -     Progress improving  -     Outcome Evaluation Pt oriented to self. Performed bed mobility with supervision, sit > stand with CGA x 1 and RW, and bed > chair with CGA x1 and RW.  -[facility] Name 08/02/22 1532          Therapy Assessment/Plan (PT)    Rehab Potential (PT) good, to achieve stated therapy goals  -     Criteria for Skilled Interventions Met (PT) yes;meets criteria  -     Therapy Frequency (PT) daily  -[facility] Name 08/02/22 1534          Vital Signs    Pre Patient Position Supine  -     Intra Patient Position Standing  -      Post Patient Position Sitting  -     Row Name 08/02/22 1535          Positioning and Restraints    Pre-Treatment Position in bed  -     Post Treatment Position chair  -     In Chair notified nsg;reclined;call light within reach;encouraged to call for assist;exit alarm on;compression device;LLE elevated  -           User Key  (r) = Recorded By, (t) = Taken By, (c) = Cosigned By    Initials Name Provider Type     Philip Carson, PT Physical Therapist               Outcome Measures     Row Name 08/02/22 1536 08/02/22 0815       How much help from another person do you currently need...    Turning from your back to your side while in flat bed without using bedrails? 3  - 3  -BF    Moving from lying on back to sitting on the side of a flat bed without bedrails? 3  - 3  -BF    Moving to and from a bed to a chair (including a wheelchair)? 3  - 3  -BF    Standing up from a chair using your arms (e.g., wheelchair, bedside chair)? 3  - 2  -BF    Climbing 3-5 steps with a railing? 2  - 2  -BF    To walk in hospital room? 2  - 2  -BF    AM-PAC 6 Clicks Score (PT) 16  - 15  -    Highest level of mobility 5 --> Static standing  - 4 --> Transferred to chair/commode  -[facility] Name 08/02/22 1536          Functional Assessment    Outcome Measure Options AM-PAC 6 Clicks Basic Mobility (PT)  -           User Key  (r) = Recorded By, (t) = Taken By, (c) = Cosigned By    Initials Name Provider Type    Philip Langley, PT Physical Therapist    BF Yazmin Munoz, RN Registered Nurse                             Physical Therapy Education                 Title: PT OT SLP Therapies (In Progress)     Topic: Physical Therapy (In Progress)     Point: Mobility training (In Progress)     Learning Progress Summary           Patient Acceptance, E,TB, NR by  at 8/2/2022 1537    Acceptance, E, VU,NR by CD at 8/1/2022 1649    Comment: SEE FLOWSHEET.    Acceptance, E,D, VU,NR by  at 7/30/2022 1325                    Point: Home exercise program (In Progress)     Learning Progress Summary           Patient Acceptance, E,TB, NR by  at 8/2/2022 1537    Acceptance, E, VU,NR by  at 8/1/2022 1649    Comment: SEE FLOWSHEET.    Acceptance, E,D, VU,NR by  at 7/30/2022 1327                   Point: Body mechanics (In Progress)     Learning Progress Summary           Patient Acceptance, E,TB, NR by  at 8/2/2022 1537    Acceptance, E, VU,NR by  at 8/1/2022 1649    Comment: SEE FLOWSHEET.    Acceptance, E,D, VU,NR by HP at 7/30/2022 1327                   Point: Precautions (In Progress)     Learning Progress Summary           Patient Acceptance, E,TB, NR by  at 8/2/2022 1537    Acceptance, E, VU,NR by  at 8/1/2022 1649    Comment: SEE FLOWSHEET.    Acceptance, E,D, VU,NR by  at 7/30/2022 1327                               User Key     Initials Effective Dates Name Provider Type Discipline    CD 06/16/21 -  Rosa Betancourt, PT Physical Therapist PT     09/22/20 -  Philip Carson, PT Physical Therapist PT     06/01/21 -  Nelia Nettles, RJ Physical Therapist PT              PT Recommendation and Plan     Plan of Care Reviewed With: patient  Progress: improving  Outcome Evaluation: Pt oriented to self. Performed bed mobility with supervision, sit > stand with CGA x 1 and RW, and bed > chair with CGA x1 and RW.     Time Calculation:    PT Charges     Row Name 08/02/22 1537             Time Calculation    Start Time 1512  -      PT Received On 08/02/22  -      PT Goal Re-Cert Due Date 08/09/22  -              Time Calculation- PT    Total Timed Code Minutes- PT 14 minute(s)  -              Timed Charges    25347 - PT Therapeutic Activity Minutes 14  -              Total Minutes    Timed Charges Total Minutes 14  -       Total Minutes 14  -            User Key  (r) = Recorded By, (t) = Taken By, (c) = Cosigned By    Initials Name Provider Type     Philip Carson, PT Physical Therapist              Therapy  Charges for Today     Code Description Service Date Service Provider Modifiers Qty    37248131202 HC PT THERAPEUTIC ACT EA 15 MIN 8/2/2022 Philip Carson, PT GP 1          PT G-Codes  Outcome Measure Options: AM-PAC 6 Clicks Basic Mobility (PT)  AM-PAC 6 Clicks Score (PT): 16  AM-PAC 6 Clicks Score (OT): 19    Philip Carson PT  8/2/2022

## 2022-08-02 NOTE — PROGRESS NOTES
"   LOS: 3 days    Patient Care Team:  Provider, No Known as PCP - General    Chief Complaint:  AMS    Subjective     Interval History:     No acute events overnight. No new complaints      Review of Systems:   No CP or SOA , fever, chills, rigors, rash, N/V, Constipation.     Objective     Vital Sign Min/Max for last 24 hours  Temp  Min: 96.3 °F (35.7 °C)  Max: 98.3 °F (36.8 °C)   BP  Min: 99/65  Max: 136/78   Pulse  Min: 86  Max: 103   Resp  Min: 16  Max: 19   SpO2  Min: 91 %  Max: 100 %   Flow (L/min)  Min: 2  Max: 2   No data recorded     Flowsheet Rows    Flowsheet Row First Filed Value   Admission Height 167.6 cm (66\") Documented at 07/29/2022 0903   Admission Weight 77.1 kg (170 lb) Documented at 07/29/2022 0903          No intake/output data recorded.  I/O last 3 completed shifts:  In: -   Out: 2450 [Urine:750; Other:1700]    Physical Exam:    Gen: Alert, NAD   HENT: NC, AT, EOMI   NECK: Supple, no JVD, Trachea midline   LUNGS: CTA bilaterally, non labored respirtation   CVS: S1/S2 audible, RRR, no murmur   Abd: Soft, NT, ND, BS+   Ext: No pedal edema, no cyanosis   CNS: Alert, No focal deficit noted grossly  Psy: Cooperative  Skin: Warm, dry and intact      WBC No results found for: WBC   HGB No results found for: HGB   HCT No results found for: HCT   Platlets No results found for: LABPLAT   MCV No results found for: MCV       Sodium No results found for: NA   Potassium No results found for: K   Chloride No results found for: CL   CO2 No results found for: CO2   BUN No results found for: BUN   Creatinine No results found for: CREATININE   Calcium No results found for: CALCIUM   PO4 No results found for: CAPO4   Albumin No results found for: ALBUMIN   Magnesium No results found for: MG   Uric Acid No results found for: URICACID        Results Review:     I reviewed the patient's new clinical results.    amLODIPine, 10 mg, Oral, Q24H  atorvastatin, 80 mg, Oral, Nightly  calcium acetate, 667 mg, Oral, TID With " Meals  carvedilol, 25 mg, Oral, BID With Meals  insulin lispro, 0-7 Units, Subcutaneous, TID AC  QUEtiapine, 25 mg, Oral, Nightly  senna-docusate sodium, 2 tablet, Oral, BID  sodium chloride, 10 mL, Intravenous, Q12H  valACYclovir, 500 mg, Oral, Q24H  venlafaxine XR, 75 mg, Oral, Daily           Medication Review:     Assessment & Plan       AMS (altered mental status)    Hepatocellular carcinoma metastatic to bone s/p RXT     Cirrhosis of liver (HCC)    Chronic Hep C     GERD (gastroesophageal reflux disease)    Essential hypertension    CVA (cerebral vascular accident) (HCC)    End stage renal disease on dialysis (HCC)    Disorientation      1- ESRD -MWF   2-HTN   3- Anemia of chronic disease   4- Hyponatremia -improved  5- AMS- improving/stable     Plan:  - HD tomorrow, UF as tolerated.   - Renal diet   - ADRIEN with HD   - 24 hr urine creatinine clearance. 7 ml/min only     Panchito Austin MD  08/02/22  09:04 EDT

## 2022-08-02 NOTE — NURSING NOTE
Pt alert to self and place, VSS, NSR on tele, 2L NC during sleep. No c/o pain, states she is tired due to dialysis treatment. No acute events.

## 2022-08-03 ENCOUNTER — APPOINTMENT (OUTPATIENT)
Dept: NEPHROLOGY | Facility: HOSPITAL | Age: 62
End: 2022-08-03

## 2022-08-03 LAB
ALBUMIN SERPL-MCNC: 3.5 G/DL (ref 3.5–5.2)
ANION GAP SERPL CALCULATED.3IONS-SCNC: 16 MMOL/L (ref 5–15)
BACTERIA SPEC AEROBE CULT: ABNORMAL
BACTERIA SPEC AEROBE CULT: NORMAL
BACTERIA SPEC AEROBE CULT: NORMAL
BASOPHILS # BLD AUTO: 0.03 10*3/MM3 (ref 0–0.2)
BASOPHILS NFR BLD AUTO: 0.6 % (ref 0–1.5)
BUN SERPL-MCNC: 57 MG/DL (ref 8–23)
BUN/CREAT SERPL: 8.5 (ref 7–25)
CALCIUM SPEC-SCNC: 9 MG/DL (ref 8.6–10.5)
CHLORIDE SERPL-SCNC: 100 MMOL/L (ref 98–107)
CO2 SERPL-SCNC: 20 MMOL/L (ref 22–29)
CREAT SERPL-MCNC: 6.69 MG/DL (ref 0.57–1)
DEPRECATED RDW RBC AUTO: 39.5 FL (ref 37–54)
EGFRCR SERPLBLD CKD-EPI 2021: 6.5 ML/MIN/1.73
EOSINOPHIL # BLD AUTO: 0.28 10*3/MM3 (ref 0–0.4)
EOSINOPHIL NFR BLD AUTO: 6 % (ref 0.3–6.2)
ERYTHROCYTE [DISTWIDTH] IN BLOOD BY AUTOMATED COUNT: 12.1 % (ref 12.3–15.4)
GLUCOSE BLDC GLUCOMTR-MCNC: 128 MG/DL (ref 70–130)
GLUCOSE BLDC GLUCOMTR-MCNC: 144 MG/DL (ref 70–130)
GLUCOSE BLDC GLUCOMTR-MCNC: 151 MG/DL (ref 70–130)
GLUCOSE SERPL-MCNC: 131 MG/DL (ref 65–99)
HCT VFR BLD AUTO: 24.3 % (ref 34–46.6)
HGB BLD-MCNC: 8.8 G/DL (ref 12–15.9)
IMM GRANULOCYTES # BLD AUTO: 0.03 10*3/MM3 (ref 0–0.05)
IMM GRANULOCYTES NFR BLD AUTO: 0.6 % (ref 0–0.5)
LYMPHOCYTES # BLD AUTO: 0.55 10*3/MM3 (ref 0.7–3.1)
LYMPHOCYTES NFR BLD AUTO: 11.9 % (ref 19.6–45.3)
MCH RBC QN AUTO: 33.7 PG (ref 26.6–33)
MCHC RBC AUTO-ENTMCNC: 36.2 G/DL (ref 31.5–35.7)
MCV RBC AUTO: 93.1 FL (ref 79–97)
MONOCYTES # BLD AUTO: 0.37 10*3/MM3 (ref 0.1–0.9)
MONOCYTES NFR BLD AUTO: 8 % (ref 5–12)
NEUTROPHILS NFR BLD AUTO: 3.37 10*3/MM3 (ref 1.7–7)
NEUTROPHILS NFR BLD AUTO: 72.9 % (ref 42.7–76)
NRBC BLD AUTO-RTO: 0 /100 WBC (ref 0–0.2)
PHOSPHATE SERPL-MCNC: 4.3 MG/DL (ref 2.5–4.5)
PLATELET # BLD AUTO: 120 10*3/MM3 (ref 140–450)
PMV BLD AUTO: 10 FL (ref 6–12)
POTASSIUM SERPL-SCNC: 4.2 MMOL/L (ref 3.5–5.2)
RBC # BLD AUTO: 2.61 10*6/MM3 (ref 3.77–5.28)
SODIUM SERPL-SCNC: 136 MMOL/L (ref 136–145)
WBC NRBC COR # BLD: 4.63 10*3/MM3 (ref 3.4–10.8)

## 2022-08-03 PROCEDURE — 25010000002 ERTAPENEM PER 500 MG: Performed by: HOSPITALIST

## 2022-08-03 PROCEDURE — 80069 RENAL FUNCTION PANEL: CPT | Performed by: INTERNAL MEDICINE

## 2022-08-03 PROCEDURE — 63710000001 INSULIN LISPRO (HUMAN) PER 5 UNITS: Performed by: NURSE PRACTITIONER

## 2022-08-03 PROCEDURE — 25010000002 EPOETIN ALFA-EPBX 10000 UNIT/ML SOLUTION: Performed by: INTERNAL MEDICINE

## 2022-08-03 PROCEDURE — 82962 GLUCOSE BLOOD TEST: CPT

## 2022-08-03 PROCEDURE — 99232 SBSQ HOSP IP/OBS MODERATE 35: CPT | Performed by: NURSE PRACTITIONER

## 2022-08-03 PROCEDURE — 85025 COMPLETE CBC W/AUTO DIFF WBC: CPT | Performed by: INTERNAL MEDICINE

## 2022-08-03 RX ORDER — ALBUMIN (HUMAN) 12.5 G/50ML
12.5 SOLUTION INTRAVENOUS AS NEEDED
Status: ACTIVE | OUTPATIENT
Start: 2022-08-03 | End: 2022-08-03

## 2022-08-03 RX ADMIN — Medication 10 ML: at 11:45

## 2022-08-03 RX ADMIN — SENNOSIDES AND DOCUSATE SODIUM 2 TABLET: 50; 8.6 TABLET ORAL at 21:36

## 2022-08-03 RX ADMIN — CALCIUM ACETATE 667 MG: 667 CAPSULE ORAL at 11:45

## 2022-08-03 RX ADMIN — INSULIN LISPRO 2 UNITS: 100 INJECTION, SOLUTION INTRAVENOUS; SUBCUTANEOUS at 17:13

## 2022-08-03 RX ADMIN — VENLAFAXINE HYDROCHLORIDE 75 MG: 75 CAPSULE, EXTENDED RELEASE ORAL at 11:45

## 2022-08-03 RX ADMIN — Medication 10 ML: at 21:36

## 2022-08-03 RX ADMIN — CALCIUM ACETATE 667 MG: 667 CAPSULE ORAL at 17:10

## 2022-08-03 RX ADMIN — CARVEDILOL 25 MG: 12.5 TABLET, FILM COATED ORAL at 11:45

## 2022-08-03 RX ADMIN — EPOETIN ALFA-EPBX 10000 UNITS: 10000 INJECTION, SOLUTION INTRAVENOUS; SUBCUTANEOUS at 12:08

## 2022-08-03 RX ADMIN — ERTAPENEM SODIUM 500 MG: 1 INJECTION, POWDER, LYOPHILIZED, FOR SOLUTION INTRAMUSCULAR; INTRAVENOUS at 11:52

## 2022-08-03 RX ADMIN — QUETIAPINE FUMARATE 25 MG: 25 TABLET ORAL at 21:36

## 2022-08-03 RX ADMIN — SENNOSIDES AND DOCUSATE SODIUM 2 TABLET: 50; 8.6 TABLET ORAL at 11:45

## 2022-08-03 RX ADMIN — ATORVASTATIN CALCIUM 80 MG: 40 TABLET, FILM COATED ORAL at 21:36

## 2022-08-03 RX ADMIN — AMLODIPINE BESYLATE 10 MG: 10 TABLET ORAL at 11:45

## 2022-08-03 RX ADMIN — CARVEDILOL 25 MG: 12.5 TABLET, FILM COATED ORAL at 17:10

## 2022-08-03 NOTE — PROGRESS NOTES
"   LOS: 3 days    Patient Care Team:  Provider, No Known as PCP - General    Chief Complaint:  AMS    Subjective     Interval History:     No acute events overnight. No new complaints      Review of Systems:   No CP or SOA , fever, chills, rigors, rash, N/V, Constipation.     Objective     Vital Sign Min/Max for last 24 hours  Temp  Min: 96.1 °F (35.6 °C)  Max: 98.3 °F (36.8 °C)   BP  Min: 100/60  Max: 142/82   Pulse  Min: 82  Max: 101   Resp  Min: 16  Max: 18   SpO2  Min: 94 %  Max: 98 %   No data recorded   No data recorded     Flowsheet Rows    Flowsheet Row First Filed Value   Admission Height 167.6 cm (66\") Documented at 07/29/2022 0903   Admission Weight 77.1 kg (170 lb) Documented at 07/29/2022 0903          No intake/output data recorded.  No intake/output data recorded.    Physical Exam:    Gen: Alert, NAD   HENT: NC, AT, EOMI   NECK: Supple, no JVD, Trachea midline   LUNGS: CTA bilaterally, non labored respirtation   CVS: S1/S2 audible, RRR, no murmur   Abd: Soft, NT, ND, BS+   Ext: No pedal edema, no cyanosis   CNS: Alert, No focal deficit noted grossly  Psy: Cooperative  Skin: Warm, dry and intact      WBC WBC   Date Value Ref Range Status   08/03/2022 4.63 3.40 - 10.80 10*3/mm3 Final      HGB Hemoglobin   Date Value Ref Range Status   08/03/2022 8.8 (L) 12.0 - 15.9 g/dL Final      HCT Hematocrit   Date Value Ref Range Status   08/03/2022 24.3 (L) 34.0 - 46.6 % Final      Platlets No results found for: LABPLAT   MCV MCV   Date Value Ref Range Status   08/03/2022 93.1 79.0 - 97.0 fL Final          Sodium Sodium   Date Value Ref Range Status   08/03/2022 136 136 - 145 mmol/L Final      Potassium Potassium   Date Value Ref Range Status   08/03/2022 4.2 3.5 - 5.2 mmol/L Final      Chloride Chloride   Date Value Ref Range Status   08/03/2022 100 98 - 107 mmol/L Final      CO2 CO2   Date Value Ref Range Status   08/03/2022 20.0 (L) 22.0 - 29.0 mmol/L Final      BUN BUN   Date Value Ref Range Status "   08/03/2022 57 (H) 8 - 23 mg/dL Final      Creatinine Creatinine   Date Value Ref Range Status   08/03/2022 6.69 (H) 0.57 - 1.00 mg/dL Final      Calcium Calcium   Date Value Ref Range Status   08/03/2022 9.0 8.6 - 10.5 mg/dL Final      PO4 No results found for: CAPO4   Albumin Albumin   Date Value Ref Range Status   08/03/2022 3.50 3.50 - 5.20 g/dL Final      Magnesium No results found for: MG   Uric Acid No results found for: URICACID        Results Review:     I reviewed the patient's new clinical results.    amLODIPine, 10 mg, Oral, Q24H  atorvastatin, 80 mg, Oral, Nightly  calcium acetate, 667 mg, Oral, TID With Meals  carvedilol, 25 mg, Oral, BID With Meals  cefTRIAXone, 1 g, Intravenous, Q24H  epoetin allie/allie-epbx, 10,000 Units, Subcutaneous, Once per day on Mon Wed Fri  insulin lispro, 0-7 Units, Subcutaneous, TID AC  QUEtiapine, 25 mg, Oral, Nightly  senna-docusate sodium, 2 tablet, Oral, BID  sodium chloride, 10 mL, Intravenous, Q12H  valACYclovir, 500 mg, Oral, Q24H  venlafaxine XR, 75 mg, Oral, Daily           Medication Review:     Assessment & Plan       AMS (altered mental status)    Hepatocellular carcinoma metastatic to bone s/p RXT     Cirrhosis of liver (HCC)    Chronic Hep C     GERD (gastroesophageal reflux disease)    Essential hypertension    CVA (cerebral vascular accident) (HCC)    End stage renal disease on dialysis (HCC)    Disorientation      1- ESRD -MWF   2-HTN   3- Anemia of chronic disease   4- Hyponatremia -improved  5- AMS- improving/stable     Plan:  - Seen on dialysis, UF as tolerated.   - Renal diet   - ADRIEN with HD   - 24 hr urine creatinine clearance. 7 ml/min only     Panchito Austin MD  08/03/22  09:27 EDT

## 2022-08-03 NOTE — CASE MANAGEMENT/SOCIAL WORK
Continued Stay Note  [facility]     Patient Name: Jeaneth Keita  MRN: [id number]  Today's Date: 8/3/2022    Admit Date: 7/29/2022     Discharge Plan     Row Name 08/03/22 1331       Plan    Plan [place] assisted living    Patient/Family in Agreement with Plan yes    Plan Comments Plan to return to [place] 8/4/22.  I was told that family member would be able to transport her back.  I called and updated spouse and he is agreeable to plan.  I called [facility] and verified that patient would be able to get her dialysis on Friday as planned.  Discharge summary will need to be faxed to [facility] at [phone].  Also patient will need to be made meds to bed for any new medication so she can take them with her to [facility].               Discharge Codes    No documentation.                     Petty Ferrell RN

## 2022-08-03 NOTE — PROGRESS NOTES
[facility] Medicine Services  PROGRESS NOTE    Patient Name: Jeaneth Keita  : 1960  MRN: [id number]    Date of Admission: 2022  Primary Care Physician: Provider, No Known    Subjective   Subjective   CC:  F/u AMS     HPI:  Patient sitting up in bed receiving dialysis and eating breakfast in NAD.  No adverse events overnight.  No complaints at this time.    ROS:  Mildly confused, but denies any complaints at this time    Objective   Objective     Vital Signs:   Temp:  [97.3 °F (36.3 °C)-98.3 °F (36.8 °C)] 98.1 °F (36.7 °C)  Heart Rate:  [] 90  Resp:  [16-18] 18  BP: (100-142)/(59-82) 100/60     Physical Exam:  Constitutional: Alert, chronically ill-appearing female sitting up in bed in NAD eating breakfast  Eyes:  EOMI, sclerae anicteric, no conjunctival injection  Head: NCAT  ENT: [place], moist mucous membranes   Respiratory: Nonlabored, symmetrical chest expansion, CTAB, 95% RA  Cardiovascular: RRR, , no M/R/G  Gastrointestinal: Soft, NT, ND +BS  Musculoskeletal: KRISHNAMURTHY; no LE edema; +left BKA  Neurologic: Oriented to self, hospital, strength symmetric in all extremities, follows all commands, speech clear  Skin: No rashes on exposed skin  Psychiatric: Pleasant and cooperative; normal affect    Results Reviewed:  LAB RESULTS:      Lab 22  0742 22  0831 22  0904   WBC 4.63 4.63 5.23   HEMOGLOBIN 8.8* 9.2* 9.9*   HEMATOCRIT 24.3* 25.3* 27.1*   PLATELETS 120* 138* 156   NEUTROS ABS 3.37 2.68 3.46   IMMATURE GRANS (ABS) 0.03 0.01 0.01   LYMPHS ABS 0.55* 1.31 1.09   MONOS ABS 0.37 0.34 0.31   EOS ABS 0.28 0.25 0.33   MCV 93.1 92.0 92.5   LACTATE  --   --  1.2         Lab 22  0831 22  0904   SODIUM 139 132*   POTASSIUM 4.6 4.9   CHLORIDE 103 96*   CO2 22.0 24.0   ANION GAP 14.0 12.0   BUN 56* 53*   CREATININE 7.29* 6.02*   EGFR 5.8* 7.4*   GLUCOSE 133* 177*   CALCIUM 9.4 9.3   MAGNESIUM 2.3 2.4   HEMOGLOBIN A1C 5.30  --    TSH 1.100  --           Lab 07/30/22  0831 07/29/22  0904   TOTAL PROTEIN 7.0 7.2   ALBUMIN 3.90 4.30   GLOBULIN 3.1 2.9   ALT (SGPT) 32 33   AST (SGOT) 29 29   BILIRUBIN 0.4 0.4   ALK PHOS 79 107         Lab 07/29/22  2137 07/29/22  1828 07/29/22  1725 07/29/22  0904   PROBNP  --   --  2,545.0*  --    TROPONIN T 0.035* 0.034*  --  0.036*             Lab 07/29/22  1725   FOLATE >20.00   VITAMIN B 12 459         Lab 07/29/22  1011   PH, ARTERIAL 7.434   PCO2, ARTERIAL 37.8   PO2 ART 74.2*   FIO2 21   HCO3 ART 25.3   BASE EXCESS ART 1.0   CARBOXYHEMOGLOBIN 1.0     Brief Urine Lab Results  (Last result in the past 365 days)      Color   Clarity   Blood   Leuk Est   Nitrite   Protein   CREAT   Urine HCG        08/01/22 1438 Yellow   Clear   Negative   Small (1+)   Negative   >=300 mg/dL (3+)                 Microbiology Results Abnormal     Procedure Component Value - Date/Time    Blood Culture - Blood, Arm, Left [[id number]]  (Normal) Collected: 07/29/22 1015    Lab Status: Preliminary result Specimen: Blood from Arm, Left Updated: 08/02/22 1103     Blood Culture No growth at 4 days    Blood Culture - Blood, Arm, Right [[id number]]  (Normal) Collected: 07/29/22 1019    Lab Status: Preliminary result Specimen: Blood from Arm, Right Updated: 08/02/22 1103     Blood Culture No growth at 4 days    Culture, CSF - Cerebrospinal Fluid, Lumbar Puncture [[id number]] Collected: 07/29/22 1545    Lab Status: Final result Specimen: Cerebrospinal Fluid from Lumbar Puncture Updated: 08/01/22 0953     CSF Culture No growth at 3 days     Gram Stain Rare (1+) WBCs seen      No organisms seen    AFB Culture - Cerebrospinal Fluid, Lumbar Puncture [[id number]] Collected: 07/29/22 1548    Lab Status: Preliminary result Specimen: Cerebrospinal Fluid from Lumbar Puncture Updated: 07/30/22 1051     AFB Stain No acid fast bacilli seen on direct smear    Cryptococcal AG, CSF - Cerebrospinal Fluid, Lumbar Puncture [[id number]]  (Normal) Collected: 07/29/22 1540     Lab Status: Final result Specimen: Cerebrospinal Fluid from Lumbar Puncture Updated: 07/30/22 0817     Cryptococcal Antigen, CSF Negative    Meningitis / Encephalitis Panel, PCR - Cerebrospinal Fluid, Lumbar Puncture [[id number]]  (Normal) Collected: 07/29/22 1545    Lab Status: Final result Specimen: Cerebrospinal Fluid from Lumbar Puncture Updated: 07/29/22 1753     ESCHERICHIA COLI K1, PCR Not Detected     HAEMOPHILUS INFLUENZAE, PCR Not Detected     LISTERIA MONOCYTOGENES, PCR Not Detected     NEISSERIA MENINGITIDIS, PCR Not Detected     STREPTOCOCCUS AGALACTIAE, PCR Not Detected     STREPTOCOCCUS PNEUMONIAE, PCR Not Detected     CYTOMEGALOVIRUS (CMV), PCR Not Detected     ENTEROVIRUS, PCR Not Detected     HERPES SIMPLEX VIRUS 1 (HSV-1), PCR Not Detected     HERPES SIMPLEX VIRUS 2 (HSV-2), PCR Not Detected     HUMAN PARECHOVIRUS, PCR Not Detected     VARICELLA ZOSTER VIRUS (VZV), PCR Not Detected     CRYPTOCOCCUS NEOFORMANS / GATTII, PCR Not Detected     HUMAN HERPES VIRUS 6 PCR Not Detected    COVID-19 and FLU A/B PCR - Swab, Nasopharynx [[id number]]  (Normal) Collected: 07/29/22 0937    Lab Status: Final result Specimen: Swab from Nasopharynx Updated: 07/29/22 1030     COVID19 Not Detected     Influenza A PCR Not Detected     Influenza B PCR Not Detected    Narrative:      Fact sheet for providers: https://www.fda.gov/media/899242/download    Fact sheet for patients: https://www.fda.gov/media/591254/download    Test performed by PCR.          No radiology results from the last 24 hrs    Results for orders placed during the hospital encounter of 10/05/21    Adult Transthoracic Echo Complete W/ Cont if Necessary Per Protocol    Interpretation Summary  · Left ventricular ejection fraction appears to be 61 - 65%. Left ventricular systolic function is normal.  · Left atrial volume is mildly increased.      I have reviewed the medications:  Scheduled Meds:amLODIPine, 10 mg, Oral, Q24H  atorvastatin, 80 mg, Oral,  Nightly  calcium acetate, 667 mg, Oral, TID With Meals  carvedilol, 25 mg, Oral, BID With Meals  cefTRIAXone, 1 g, Intravenous, Q24H  epoetin allie/allie-epbx, 10,000 Units, Subcutaneous, Once per day on Mon Wed Fri  insulin lispro, 0-7 Units, Subcutaneous, TID AC  QUEtiapine, 25 mg, Oral, Nightly  senna-docusate sodium, 2 tablet, Oral, BID  sodium chloride, 10 mL, Intravenous, Q12H  valACYclovir, 500 mg, Oral, Q24H  venlafaxine XR, 75 mg, Oral, Daily      Continuous Infusions:   PRN Meds:.•  acetaminophen **OR** acetaminophen **OR** acetaminophen  •  albumin human  •  senna-docusate sodium **AND** polyethylene glycol **AND** bisacodyl **AND** bisacodyl  •  dextrose  •  dextrose  •  glucagon (human recombinant)  •  heparin (porcine)  •  ondansetron **OR** ondansetron  •  sodium chloride  •  sodium chloride    Assessment & Plan   Assessment & Plan     Active Hospital Problems    Diagnosis  POA   • **AMS (altered mental status) [R41.82]  Yes   • Disorientation [R41.0]  Yes   • End stage renal disease on dialysis (HCC) [N18.6, Z99.2]  Not Applicable   • CVA (cerebral vascular accident) (HCC) [I63.9]  Yes   • Cirrhosis of liver (HCC) [K74.60]  Yes   • 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        Brief Hospital Course to date:  Jeaneth Keita is a 61 y.o. female a resident at [facility] with a past medical history significant for ESRD on HD, CVA,  liver cell carcinoma with bone metastasis, Hep C, cirrhosis, chronic anemia, T2DM,  HTN, and depression who presents to the ED due to AMS.    These problems are new to me today    This patient's problems and plans were partially entered by my partner and updated as appropriate by me 08/03/22.     AMS  Hx MCI, old CVA  -She has been  admitted several times over the last 2 months d/t similar symptoms. Previous neuro work-up negative. She does have some confusion at baseline per her POA, Parviz ([phone]).  -Neurology following, could be due to valtrex not being renally dosed, seems to be improving overall, but waxes/wanes  -CT head revealed no acute intracranial findings  -LP performed  -EEG WNL  -MRI brain no acute finding   -Hold recently started Gabapentin (started d/t recent Shingles)  --PT had patient doing various tasks with assist x1  -- Can return to facility if back at baseline with only assist x1    UTI  --IV Rocephin  --Culture showed >100,000 gram negative bacilli     ESRD on HD  -Nephrology consulted for HD, tolerating well    Chronic Anemia   -Hgb 9.9 on admission, at baseline      T2DM  --24H glucose 117-180  --SSI       HTN  HLD  -continue Norvasc, Coreg, Lipitor      Liver CA with bone mets  Chronic pain  Hx Hep C  Cirrhosis   -Previously followed with [facility] Dr. Raul Berrios  -Her POA reported decision to pursue comfort measures, palliative consulted  -Palliative consult      Herpes Zoster  -Hold recently started gabapentin d/t above  -Continue Valtrex to complete 7 day regimen; needs to be renally doses    Expected Discharge Location and Transportation: Back to facility     Expected Discharge Date: 8/3    DVT prophylaxis:  Medical and mechanical DVT prophylaxis orders are present.     AM-PAC 6 Clicks Score (PT): 16 (08/02/22 1536)    CODE STATUS:   Code Status and Medical Interventions:   Ordered at: 07/29/22 7481     Medical Intervention Limits:    NO intubation (DNI)     Code Status (Patient has no pulse and is not breathing):    No CPR (Do Not Attempt to Resuscitate)     Medical Interventions (Patient has pulse or is breathing):    Limited Support       Nazanin Helton, APRN  08/03/22

## 2022-08-03 NOTE — PLAN OF CARE
Goal Outcome Evaluation: Scheduled HD completed. Pt tolerated well. UF: 1.0L, BLP:71.6 L. Called report to DONNY Patton.

## 2022-08-04 VITALS
OXYGEN SATURATION: 96 % | SYSTOLIC BLOOD PRESSURE: 108 MMHG | DIASTOLIC BLOOD PRESSURE: 65 MMHG | RESPIRATION RATE: 18 BRPM | WEIGHT: 170 LBS | TEMPERATURE: 98.1 F | BODY MASS INDEX: 27.32 KG/M2 | HEART RATE: 97 BPM | HEIGHT: 66 IN

## 2022-08-04 PROBLEM — R41.82 AMS (ALTERED MENTAL STATUS): Status: RESOLVED | Noted: 2021-12-08 | Resolved: 2022-08-04

## 2022-08-04 LAB
GLUCOSE BLDC GLUCOMTR-MCNC: 121 MG/DL (ref 70–130)
GLUCOSE BLDC GLUCOMTR-MCNC: 175 MG/DL (ref 70–130)

## 2022-08-04 PROCEDURE — 25010000002 ERTAPENEM PER 500 MG: Performed by: HOSPITALIST

## 2022-08-04 PROCEDURE — 99239 HOSP IP/OBS DSCHRG MGMT >30: CPT | Performed by: NURSE PRACTITIONER

## 2022-08-04 PROCEDURE — 82962 GLUCOSE BLOOD TEST: CPT

## 2022-08-04 RX ORDER — PANTOPRAZOLE SODIUM 40 MG/1
40 TABLET, DELAYED RELEASE ORAL EVERY MORNING
Qty: 30 TABLET | Refills: 1 | Status: SHIPPED | OUTPATIENT
Start: 2022-08-04 | End: 2022-09-03

## 2022-08-04 RX ORDER — QUETIAPINE FUMARATE 25 MG/1
25 TABLET, FILM COATED ORAL NIGHTLY
Qty: 30 TABLET | Refills: 1 | Status: SHIPPED | OUTPATIENT
Start: 2022-08-04

## 2022-08-04 RX ORDER — GRANULES FOR ORAL 3 G/1
3 POWDER ORAL ONCE
Status: COMPLETED | OUTPATIENT
Start: 2022-08-04 | End: 2022-08-04

## 2022-08-04 RX ADMIN — Medication 10 ML: at 08:12

## 2022-08-04 RX ADMIN — VENLAFAXINE HYDROCHLORIDE 75 MG: 75 CAPSULE, EXTENDED RELEASE ORAL at 08:12

## 2022-08-04 RX ADMIN — CARVEDILOL 25 MG: 12.5 TABLET, FILM COATED ORAL at 08:12

## 2022-08-04 RX ADMIN — SENNOSIDES AND DOCUSATE SODIUM 2 TABLET: 50; 8.6 TABLET ORAL at 08:12

## 2022-08-04 RX ADMIN — ERTAPENEM SODIUM 500 MG: 1 INJECTION, POWDER, LYOPHILIZED, FOR SOLUTION INTRAMUSCULAR; INTRAVENOUS at 08:12

## 2022-08-04 RX ADMIN — CALCIUM ACETATE 667 MG: 667 CAPSULE ORAL at 08:12

## 2022-08-04 RX ADMIN — AMLODIPINE BESYLATE 10 MG: 10 TABLET ORAL at 08:12

## 2022-08-04 RX ADMIN — FOSFOMYCIN TROMETHAMINE 3 G: 3 POWDER ORAL at 10:31

## 2022-08-04 NOTE — DISCHARGE PLACEMENT REQUEST
"Jeaneth Tatum (61 y.o. Female)             Date of Birth   1960    Social Security Number       Address   [address] [place], [place] [address]    Home Phone   [phone]    MRN   [id number]       Alevism   Presybeterian    Marital Status                               Admission Date   7/29/22    Admission Type   Emergency    Admitting Provider   Washington Lazo MD    Attending Provider   Washington Lazo MD    Department, Room/Bed   [facility] 3F, S327/1       Discharge Date       Discharge Disposition   Skilled Nursing Facility (DC - External)    Discharge Destination                               Attending Provider: Washington Lazo MD    Allergies: Sulfa Antibiotics    Isolation: Contact   Infection: ESBL E coli (08/03/22)   Code Status: No CPR   Advance Care Planning Activity    Ht: 167.6 cm (66\")   Wt: 77.1 kg (170 lb)    Admission Cmt: None   Principal Problem: AMS (altered mental status) [R41.82]                 Active Insurance as of 7/29/2022     Primary Coverage     Payor Plan Insurance Group Employer/Plan Group    Pending sale to [id number]     Payor Plan Address Payor Plan Phone Number Payor Plan Fax Number Effective Dates    [address]   1/1/2005 - None Entered    [place] [address]       Subscriber Name Subscriber Birth Date Member ID       JEANETH TATUM 1960 [id number]                 Emergency Contacts      (Rel.) Home Phone Work Phone Mobile Phone    CHLOÉ TREVIÑO (Spouse) [phone] -- [phone]    HAILEY CANALES (Brother) [phone] -- [phone]    HUDSONNORMA (Sister) [phone] -- [phone]      COVID-19 and FLU A/B PCR - Swab, Nasopharynx  Order: [id number]   Status: Final result     Visible to patient: No (not released)     Next appt: 10/05/2022 at 01:30 PM in Neurology (Kray Vizcarra, MAYELIN)    Specimen Information: Nasopharynx; Swab         0 Result Notes    Component   Ref Range & Units  "   COVID19   Not Detected - Ref. Range Not Detected    Influenza A PCR   Not Detected Not Detected    Influenza B PCR   Not Detected Not Detected    Resulting Agency [facility]                 Discharge Summary      Nazanin Helton APRN at 22 0904     Attestation signed by Washington Lazo MD at 22 0937    I have reviewed this documentation and agree.                      [facility] Medicine Services  TRANSFER SUMMARY    Patient Name: Jeaneth Keita  : 1960  MRN: [id number]    Date of Admission: 2022  Date of Discharge: 2022  Length of Stay: 4  Primary Care Physician: Provider, No Known    Consults     Date and Time Order Name Status Description    2022 12:34 AM Inpatient Nephrology Consult Completed           Hospital Course   Presenting Problem:   AMS (altered mental status) [R41.82]  Disorientation [R41.0]    Active Hospital Problems    Diagnosis  POA   • Disorientation [R41.0]  Yes   • End stage renal disease on dialysis (HCC) [N18.6, Z99.2]  Not Applicable   • CVA (cerebral vascular accident) (HCC) [I63.9]  Yes   • Cirrhosis of liver (HCC) [K74.60]  Yes   • 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 female  a resident at [facility] with a past medical history significant for ESRD on HD, CVA,  liver cell carcinoma with bone metastasis, Hep C, cirrhosis, chronic anemia, T2DM,  HTN, and depression who presents to the ED due to AMS.     AMS  Hx MCI, old CVA  -She has been admitted several times over the last 2 months d/t similar symptoms. Previous neuro work-up negative. She does have some confusion at baseline per her  MIKEYParviz ([phone]).  -Neurology following, could be due to valtrex not being renally dosed, seems to be improving overall, but waxes/wanes  -CT head revealed no acute intracranial findings  -LP performed  -EEG WNL  -MRI brain no acute finding   -Hold recently started Gabapentin (started d/t recent Shingles); Did NOT restart at DC  --PT had patient doing various tasks with assist x1  --Can return to facility if back at baseline with only assist x1     UTI  --IV Rocephin completed in Hospital  --Culture showed >100,000 gram negative bacilli     ESRD on HD  -Nephrology consulted for HD, tolerating well     Chronic Anemia   -Hgb 9.9 on admission, at baseline      T2DM  --24H glucose 121-180  --SSI     HTN  HLD  -continue Norvasc, Coreg, Lipitor      Liver CA with bone mets  Chronic pain  Hx Hep C  Cirrhosis   -Previously followed with [facility] Dr. Raul Berrios  -Her POA reported decision to pursue comfort measures, palliative consulted  -Palliative follow up at facility     Herpes Zoster  -Hold recently started gabapentin d/t above  -Continue Valtrex to complete 7 day regimen; needs to be renally doses  --May restart at facility but only with Herpes exacerbation     Patient will be discharged back to [place] today with family to transport.  Discharge follow-up recommendations and appointments are listed below.    Discharge Follow Up Recommendations for outpatient labs/diagnostics:  --Follow-up with facility provider 1 to 2 days  --Follow-up with kidney doctor on Monday, Wednesdays and Fridays at dialysis  --Take Retacrit at dialysis per nephrology  --Follow-up with Neurology in 2 to 3 weeks  --Palliative to Follow at facility  Day of Discharge   HPI:   Patient very happy today.  Speaking well and moving all extremities without any difficulty.  No complaints at this time..    Review of Systems  Gen- No fevers, chills  CV- No chest pain, palpitations  Resp- No cough, dyspnea  GI- No N/V/D, abd pain  All other systems  have been reviewed and the pertinent positives and negatives are listed above in the HPI    Vital Signs:   Temp:  [98.1 °F (36.7 °C)-99 °F (37.2 °C)] 98.1 °F (36.7 °C)  Heart Rate:  [] 91  Resp:  [14-18] 18  BP: (101-125)/(66-82) 101/67     Physical Exam:  Constitutional: Alert, chronically ill-appearing female sitting up in bed in NAD eating breakfast  Eyes:  EOMI, sclerae anicteric, no conjunctival injection  Head: NCAT  ENT: [place], moist mucous membranes   Respiratory: Nonlabored, symmetrical chest expansion, CTAB, 99% RA  Cardiovascular: RRR, HR 98, no M/R/G  Gastrointestinal: Soft, NT, ND +BS  Musculoskeletal: KRISHNAMURTHY; no LE edema; +left BKA  Neurologic: Oriented to self, hospital, strength symmetric in all extremities, follows all commands, speech clear  Skin: No rashes on exposed skin  Psychiatric: Pleasant and cooperative; normal affect  Pertinent Results     Results from last 7 days   Lab Units 08/03/22  0742 07/30/22  0831 07/29/22  0904   WBC 10*3/mm3 4.63 4.63 5.23   HEMOGLOBIN g/dL 8.8* 9.2* 9.9*   HEMATOCRIT % 24.3* 25.3* 27.1*   PLATELETS 10*3/mm3 120* 138* 156   SODIUM mmol/L 136 139 132*   POTASSIUM mmol/L 4.2 4.6 4.9   CHLORIDE mmol/L 100 103 96*   CO2 mmol/L 20.0* 22.0 24.0   BUN mg/dL 57* 56* 53*   CREATININE mg/dL 6.69* 7.29* 6.02*   GLUCOSE mg/dL 131* 133* 177*   CALCIUM mg/dL 9.0 9.4 9.3     Results from last 7 days   Lab Units 07/30/22  0831 07/29/22  0904   BILIRUBIN mg/dL 0.4 0.4   ALK PHOS U/L 79 107   ALT (SGPT) U/L 32 33   AST (SGOT) U/L 29 29           Invalid input(s): TG, LDLCALC, LDLREALC  Results from last 7 days   Lab Units 07/30/22  0831   TSH uIU/mL 1.100   HEMOGLOBIN A1C % 5.30     Brief Urine Lab Results  (Last result in the past 365 days)      Color   Clarity   Blood   Leuk Est   Nitrite   Protein   CREAT   Urine HCG        08/01/22 1438 Yellow   Clear   Negative   Small (1+)   Negative   >=300 mg/dL (3+)                 Microbiology Results Abnormal     Procedure Component  Value - Date/Time    AFB Culture - Cerebrospinal Fluid, Lumbar Puncture [[id number]] Collected: 07/29/22 1545    Lab Status: Preliminary result Specimen: Cerebrospinal Fluid from Lumbar Puncture Updated: 08/03/22 1617     AFB Culture No AFB isolated at less than 1 week     AFB Stain No acid fast bacilli seen on direct smear    Blood Culture - Blood, Arm, Right [[id number]]  (Normal) Collected: 07/29/22 1019    Lab Status: Final result Specimen: Blood from Arm, Right Updated: 08/03/22 1102     Blood Culture No growth at 5 days    Blood Culture - Blood, Arm, Left [[id number]]  (Normal) Collected: 07/29/22 1015    Lab Status: Final result Specimen: Blood from Arm, Left Updated: 08/03/22 1102     Blood Culture No growth at 5 days    Culture, CSF - Cerebrospinal Fluid, Lumbar Puncture [[id number]] Collected: 07/29/22 1545    Lab Status: Final result Specimen: Cerebrospinal Fluid from Lumbar Puncture Updated: 08/01/22 0953     CSF Culture No growth at 3 days     Gram Stain Rare (1+) WBCs seen      No organisms seen    Cryptococcal AG, CSF - Cerebrospinal Fluid, Lumbar Puncture [[id number]]  (Normal) Collected: 07/29/22 1545    Lab Status: Final result Specimen: Cerebrospinal Fluid from Lumbar Puncture Updated: 07/30/22 0817     Cryptococcal Antigen, CSF Negative    Meningitis / Encephalitis Panel, PCR - Cerebrospinal Fluid, Lumbar Puncture [[id number]]  (Normal) Collected: 07/29/22 1545    Lab Status: Final result Specimen: Cerebrospinal Fluid from Lumbar Puncture Updated: 07/29/22 1753     ESCHERICHIA COLI K1, PCR Not Detected     HAEMOPHILUS INFLUENZAE, PCR Not Detected     LISTERIA MONOCYTOGENES, PCR Not Detected     NEISSERIA MENINGITIDIS, PCR Not Detected     STREPTOCOCCUS AGALACTIAE, PCR Not Detected     STREPTOCOCCUS PNEUMONIAE, PCR Not Detected     CYTOMEGALOVIRUS (CMV), PCR Not Detected     ENTEROVIRUS, PCR Not Detected     HERPES SIMPLEX VIRUS 1 (HSV-1), PCR Not Detected     HERPES SIMPLEX VIRUS 2 (HSV-2), PCR Not  Detected     HUMAN PARECHOVIRUS, PCR Not Detected     VARICELLA ZOSTER VIRUS (VZV), PCR Not Detected     CRYPTOCOCCUS NEOFORMANS / GATTII, PCR Not Detected     HUMAN HERPES VIRUS 6 PCR Not Detected    COVID-19 and FLU A/B PCR - Swab, Nasopharynx [[id number]]  (Normal) Collected: 07/29/22 0937    Lab Status: Final result Specimen: Swab from Nasopharynx Updated: 07/29/22 1030     COVID19 Not Detected     Influenza A PCR Not Detected     Influenza B PCR Not Detected    Narrative:      Fact sheet for providers: https://www.fda.gov/media/216000/download    Fact sheet for patients: https://www.fda.gov/media/740385/download    Test performed by PCR.          Imaging Results (All)     Procedure Component Value Units Date/Time    MRI Angiogram Head Without Contrast [[id number]] Collected: 07/31/22 1952     Updated: 07/31/22 1959    Narrative:      DATE OF EXAM: 7/31/2022 6:25 PM     PROCEDURE: MRI ANGIOGRAM HEAD WO CONTRAST-     INDICATIONS: Neuro deficit, acute, stroke suspected;  R41.0-Disorientation, unspecified; N18.6-End stage renal disease;  Z99.2-Dependence on renal dialysis     COMPARISON: MRI brain 07/31/2022     TECHNIQUE: 3-D time-of-flight MRA [place].     FINDINGS:   The basilar artery does not appear unusual. The visualized distal  portion internal carotid arteries seem patent. The anterior and middle  cerebral arteries as well as the right posterior cerebral artery are  grossly unremarkable. There is some diminished signal in the left  posterior cerebral artery. This is a change from the prior study. This  may relate to motion artifact on this exam as opposed to an abnormality  of the posterior cerebral artery. It does look like there is motion  artifact on the source images.       Impression:      1.  Some interval change in the left posterior cerebral artery that  could relate to motion artifact noted on source images. An abnormality  is not definitely identified within the territory of the left  posterior  cerebral artery on the more recent MRI from the same day.     This report was finalized on 7/31/2022 7:56 PM by Robbie Jason MD.       MRI Brain Without Contrast [[id number]] Collected: 07/31/22 1943     Updated: 07/31/22 1949    Narrative:      DATE OF EXAM: 7/31/2022 6:25 PM     PROCEDURE: MRI BRAIN WO CONTRAST-     INDICATIONS: Delirium; R41.0-Disorientation, unspecified; N18.6-End  stage renal disease; Z99.2-Dependence on renal dialysis     COMPARISON: 07/30/2022     TECHNIQUE: Multiplanar multisequence images of the brain were performed  without contrast according to routine brain MRI protocol.      FINDINGS:   There is no restricted diffusion. There are some periventricular T2  signal changes as well as some signal in the madisyn as well as basal  ganglia that could reflect more chronic small vessel ischemic change.  The pituitary is not enlarged. No definite orbital abnormality is  appreciated. On susceptibility imaging, there is blooming in the left  thalamic area which could relate to old hemorrhagic process. This is  unchanged from the prior exam.       Impression:      1.  There are some T2 signal changes involving the madisyn, basal ganglia  and periventricular areas which could reflect more chronic small vessel  ischemic change.  2.  Susceptibility artifact left thalamic area that could relate to old  bleed.     Exam somewhat limited due to patient confusion and motion     This report was finalized on 7/31/2022 7:46 PM by Robbie Jason MD.       MRI Brain Without Contrast [[id number]] Collected: 07/30/22 2153     Updated: 07/30/22 2157    Narrative:      EXAMINATION: MR of the brain without contrast    DATE: 7/30/2022 9:04 PM    INDICATION: Altered mental status, ataxia    COMPARISON: MR brain 7/29/2022    TECHNIQUE: Multiplanar, multisequence imaging of the brain was obtained without the administration of contrast.    FINDINGS:    No diffusion restriction. Susceptibility in the left thalamic region  consistent with a remote hemorrhagic insult.    Midline structures are intact. No cerebellar tonsillar ectopia. Normal marrow signal at the skull base. No acute abnormality in the visualized cervical spine.    Mild volume loss. Mild T2 prolongation white matter favoring chronic microvascular ischemic changes.    No midline shift or mass effect. No abnormal extra-axial fluid collection.     Ventricular size and configuration is within normal limits. Basal cisterns are intact.    Visualized intracranial flow voids are grossly intact.    Globes and orbits are unremarkable. Mild mucosal thickening in the left maxillary sinus. Mastoid air cells are clear. No scalp soft tissue abnormality.        Impression:        1. No diffusion restriction to indicate acute infarct.    2. Sequela of remote hemorrhage in the left thalamus, unchanged from prior MRI.    3. Mild volume loss and mild chronic microvascular ischemic changes.    4. No significant interval change when compared with 7/29/2022.          Slot: 93    Electronically signed by:  Pedro Luis Izaguirre M.D.    7/30/2022 7:56 PM Mountain Time    MRI Angiogram Neck Without Contrast [[id number]] Collected: 07/30/22 2146     Updated: 07/30/22 2149    Narrative:      EXAMINATION: MR ANGIOGRAM OF THE NECK WITH AND WITHOUT CONTRAST    DATE: 7/30/2022 9:06 PM    INDICATION: Altered mental status, ataxia    COMPARISON: CT angiogram neck 5/23/2022    TECHNIQUE: An axial fat saturated T1 sequence was initially obtained, with axial 3-D time-of-flight MRA images and 3-dimensional reformatted images.  Additional maximum intensity projection reformatted images were created and viewed in 3-dimensional   rotatable forms.  There were no immediate complications.  Stenosis based on NASCET criteria.    FINDINGS:    Aorta: Imaged portions of the aortic arch are unremarkable.    Carotids:    Right: No hemodynamically significant stenosis or evidence of dissection. No significant atherosclerotic  disease    Left:No hemodynamically significant stenosis or evidence of dissection. No significant atherosclerotic disease    Vertebrals:     Vertebral arteries are codominant.      No hemodynamically significant stenosis or dissection. No significant atherosclerotic disease.         Impression:        No hemodynamically significant stenosis or dissection within the vasculature of the neck.        Slot: 93         Electronically signed by:  Pedro Luis Izaguirre M.D.    7/30/2022 7:47 PM Mountain Time    MRI Angiogram Head Without Contrast [[id number]] Collected: 07/30/22 2142     Updated: 07/30/22 2147    Narrative:      EXAMINATION: MRI ANGIOGRAM HEAD WO CONTRAST    DATE: 7/30/2022 9:05 PM     INDICATION: Altered mental status, ataxia     COMPARISON: None available.     TECHNIQUE:  Axially acquired 3-D time of flight noncontrast MRA images were obtained through the head.  Multiple maximum intensity projection reformatted images were created and viewed in rotatable, 3-dimensional forms.  No contrast was administered.       FINDINGS:     DEEPTI:No hemodynamically significant stenosis or branch vessel occlusion. No aneurysm.    MCA:No hemodynamically significant stenosis or branch vessel occlusion. No aneurysm.    PCA:No hemodynamically significant stenosis or branch vessel occlusion. No aneurysm. Fetal disposition on the left and conventional on the right.    Visualized internal carotid arteries:No hemodynamically significant stenosis or dissection. Intact as visualized.    Basilar:No hemodynamically significant stenosis or dissection. Intact.    Visualized vertebral arteries:No hemodynamically significant stenosis or dissection. Intact as visualized.         Impression:         No large vessel occlusion.          Slot: 93    Electronically signed by:  Pedro Luis Izaguirre M.D.    7/30/2022 7:45 PM Mountain Time    IR LUMBAR PUNCTURE DIAGNOSTIC [[id number]] Collected: 07/29/22 1549     Updated: 07/29/22 2247    Narrative:       EXAMINATION: IR LUMBAR PUNCTURE DIAGNOSTIC-     INDICATION: Altered mental status, she has herpes zoster.;  R41.0-Disorientation, unspecified; N18.6-End stage renal disease;  Z99.2-Dependence on renal dialysis     TECHNIQUE: 18 seconds of fluoroscopic time was used for this procedure.  1 associated image was saved. The procedure was deemed emergent by Dr. Janes Gonzalez.     The patient was placed in the prone position on the table. The back was  prepped and draped in a sterile fashion. 1% lidocaine with used as a  local anesthetic to the skin and subcutaneous tissues. Under  fluoroscopic guidance a 22-gauge spinal needle was advanced at the L4-L5  level to the CSF space. Approximately 8 cc of blood-tinged CSF was  returned and collected in 4 numbered vials. Sample vials were labeled  and sent to pathology for further analysis. The needle was removed.     COMPARISON: NONE     FINDINGS: The patient tolerated the procedure well, and no immediate  complications occurred.          Impression:      Successful fluoroscopic guided lumbar puncture as above with  no immediate complications.         This report was finalized on 7/29/2022 10:44 PM by Dr. Washington Valladares MD.       MRI Brain Without Contrast [[id number]] Collected: 07/29/22 1632     Updated: 07/29/22 1641    Narrative:      EXAMINATION: MRI BRAIN WO CONTRAST-     DATE OF EXAM: 7/29/2022 4:11 PM     INDICATION: Altered mental status, nontraumatic (Ped 0-17y);  R41.0-Disorientation, unspecified; N18.6-End stage renal disease;  Z99.2-Dependence on renal dialysis.     COMPARISON: Head CT dated 07/29/2022, MRI brain dated 05/23/2022     TECHNIQUE: Multiplanar multisequence images of the brain were performed  without contrast according to routine brain MRI protocol.     FINDINGS:  Many of the sequences are motion degraded due to patient clinical  condition.  There are no areas of restricted diffusion to suggest acute infarct. The  ventricles and sulci are proportional  prominent collateral with global  cerebral volume loss. There is no mass effect or midline shift. There is  no acute intracranial hemorrhage. There is no abnormal extra-axial fluid  collection. There are no abnormal areas of intrinsic T1 hyperintense  signal abnormality. There are multiple foci of susceptibility within the  right occipital lobe which appear unchanged from prior examination.  There is susceptibility and likely sequelae of prior hemorrhage or  infarct involving the left thalamus which appears stable from prior  examination.     There is mild periventricular white matter T2/FLAIR hyperintense signal  changes likely representing sequelae of chronic small vessel ischemic  disease. The midline structures including the pituitary appear within  normal limits. There is mild degenerative pannus formation posterior to  the dens. The visualized orbits and globes appear symmetric with  thinning of the lenses bilaterally. The mastoid air cells and middle  ears appear well aerated. There is no evidence of CP angle mass lesion.       Impression:      1. No evidence of acute infarct, hemorrhage, or mass.  2. Sequelae of prior hemorrhage or infarct within the left thalamus,  unchanged from prior MRI.  3. Mild periventricular white matter signal changes likely representing  sequelae of chronic small vessel ischemic disease.     This report was finalized on 7/29/2022 4:37 PM by Madan Florez MD.       XR Abdomen KUB [[id number]] Collected: 07/29/22 1521     Updated: 07/29/22 1533    Narrative:      DATE OF EXAM: 7/29/2022 3:00 PM     PROCEDURE: XR ABDOMEN KUB-     INDICATIONS: mri     COMPARISON: 10/10/2021 KUB     TECHNIQUE: Single radiographic view of the abdomen was obtained.     FINDINGS:  Previous posterior lumbar and interbody fusion is noted at L4-5-S1.  There is a mild thoracolumbar scoliosis, convex to left in the lower  thoracic spine. Bony structures elsewhere appear intact. There is a  suggestion of mild  to moderate fecal stasis, and mildly and diffusely  increased small bowel gas but no dilated large or small bowel loops are  seen. No bowel wall edema or pneumatosis is seen. There is no evidence  of metallic shrapnel metallic foreign body other than the patient's  fusion hardware.        Impression:         1. Posterior lumbar fusion. No evidence of metallic foreign body  elsewhere.  2. Mild fecal stasis.     This report was finalized on 7/29/2022 3:30 PM by Dr. Washington Valladares MD.       CT Head Without Contrast [[id number]] Collected: 07/29/22 0955     Updated: 07/29/22 1009    Narrative:      DATE OF EXAM: 7/29/2022 9:38 AM     PROCEDURE: CT HEAD WO CONTRAST-     INDICATIONS: ams     COMPARISON: Head CT 6/27/2022     TECHNIQUE: Routine transaxial and coronal reconstruction images were  obtained through the head without the administration of contrast.  Automated exposure control and iterative reconstruction methods were  used.     The radiation dose reduction device was turned on for each scan per the  ALARA (As Low as Reasonably Achievable) protocol.     FINDINGS:  No acute intracranial hemorrhage. No acute large territory infarct.  There are scattered subcortical and periventricular white matter  hypodensities which are nonspecific and can be seen in the setting of  chronic small vessel ischemic change.  No extra-axial collections.  No  midline shift or herniation.  Normal size and configuration of the  ventricles commensurate with degree of cerebral volume loss.   Unremarkable appearance of the orbits.  The mastoid air cells are clear.   Tiny mucous retention cyst in the left maxillary sinus with otherwise  clear paranasal sinuses. No acute osseous findings.       Impression:      No acute intracranial findings.     This report was finalized on 7/29/2022 10:06 AM by David Lugo MD.       XR Chest 1 View [[id number]] Collected: 07/29/22 0920     Updated: 07/29/22 0924    Narrative:      DATE OF EXAM: 7/29/2022 8:52  AM     PROCEDURE: XR CHEST 1 VW-     INDICATIONS: Weak/Dizzy/AMS triage protocol     COMPARISON: 6/27/2022     TECHNIQUE: Single radiographic AP view of the chest was obtained.     FINDINGS:  The heart size is normal. Pulmonary vascular markings are normal. There  is a right-sided tunneled dialysis catheter with the tip at the  cavoatrial junction. There is atelectasis in the right lung base but  this has decreased compared with the last study.        Impression:         1. Atelectasis in the right lung base, decreased from the patient's  previous study.     This report was finalized on 7/29/2022 9:20 AM by Parviz Sabillon MD.             Results for orders placed during the hospital encounter of 10/05/21    Adult Transthoracic Echo Complete W/ Cont if Necessary Per Protocol    Interpretation Summary  · Left ventricular ejection fraction appears to be 61 - 65%. Left ventricular systolic function is normal.  · Left atrial volume is mildly increased.      Pending Labs     Order Current Status    Encephalitis CSF - Cerebrospinal Fluid, Lumbar Puncture In process    AFB Culture - Cerebrospinal Fluid, Lumbar Puncture Preliminary result          Discharge Details        Discharge Medications      New Medications      Instructions Start Date   epoetin allie-epbx 11018 UNIT/ML injection  Commonly known as: RETACRIT   10,000 Units, Subcutaneous, 3 Times Weekly   Start Date: August 5, 2022        Changes to Medications      Instructions Start Date   calcium acetate 667 MG capsule capsule  Commonly known as: PHOS BINDER)  What changed: when to take this   667 mg, Oral, 3 Times Daily With Meals         Continue These Medications      Instructions Start Date   acetaminophen 325 MG tablet  Commonly known as: TYLENOL   650 mg, Oral, Every 6 Hours PRN      amLODIPine 10 MG tablet  Commonly known as: NORVASC   10 mg, Oral, Every 24 Hours Scheduled      atorvastatin 80 MG tablet  Commonly known as: LIPITOR   80 mg, Oral, Nightly      B  Complex-Vitamin C capsule   1 capsule, Oral, Daily      carvedilol 25 MG tablet  Commonly known as: COREG   25 mg, Oral, 2 Times Daily With Meals      hydrALAZINE 50 MG tablet  Commonly known as: APRESOLINE   100 mg, Oral, 3 Times Daily      ibuprofen 600 MG tablet  Commonly known as: ADVIL,MOTRIN   600 mg, Oral, Every 6 Hours PRN      Insulin Lispro 100 UNIT/ML injection  Commonly known as: humaLOG   0-7 Units, Subcutaneous, 3 Times Daily Before Meals      loperamide 2 MG capsule  Commonly known as: IMODIUM   2 mg, Oral, 4 Times Daily PRN, Take 2 capsules by mouth daily times one, then 1 capsule by mouth after each loose stool as needed.  Max 4 capsules in 24 hours.      NovoLIN 70/30 FlexPen (70-30) 100 UNIT/ML suspension pen-injector  Generic drug: Insulin NPH Isophane & Regular   1 Units, Subcutaneous, 2 Times Daily Before Meals, 24 units before breakfast and 22 units before dinner      ondansetron 4 MG tablet  Commonly known as: ZOFRAN   4 mg, Oral, Every 6 Hours PRN      pantoprazole 40 MG EC tablet  Commonly known as: PROTONIX   40 mg, Oral, Every Morning      QUEtiapine 25 MG tablet  Commonly known as: SEROquel   25 mg, Oral, Nightly      sennosides-docusate 8.6-50 MG per tablet  Commonly known as: PERICOLACE   1 tablet, Oral, Daily      venlafaxine XR 75 MG 24 hr capsule  Commonly known as: EFFEXOR-XR   75 mg, Oral, Daily      vitamin D 1.25 MG (62708 UT) capsule capsule  Commonly known as: ERGOCALCIFEROL   50,000 Units, Oral, Weekly, Sunday         Stop These Medications    gabapentin 300 MG capsule  Commonly known as: NEURONTIN     methadone 10 MG tablet  Commonly known as: DOLOPHINE     valACYclovir 1000 MG tablet  Commonly known as: VALTREX            Allergies   Allergen Reactions   • Sulfa Antibiotics          Discharge Disposition:  Skilled Nursing Facility (DC - External)    Discharge Diet:  Diet Order   Procedures   • Diet Regular; Cardiac, Consistent Carbohydrate       Discharge  Activity:  Activity Instructions     Activity as Tolerated              CODE STATUS:    Code Status and Medical Interventions:   Ordered at: 07/29/22 1721     Medical Intervention Limits:    NO intubation (DNI)     Code Status (Patient has no pulse and is not breathing):    No CPR (Do Not Attempt to Resuscitate)     Medical Interventions (Patient has pulse or is breathing):    Limited Support         Additional Instructions for the Follow-ups that You Need to Schedule     Discharge Follow-up with PCP   As directed       Currently Documented PCP:    Provider, No Known    PCP Phone Number:    None     Follow Up Details: Follow-up with facility provider in 1 to 2 days         Discharge Follow-up with Specified Provider: Follow-up with nephrology on M [facility] in dialysis   As directed      To: Follow-up with nephrology on M [facility] in dialysis         Discharge Follow-up with Specified Provider: Neurology; 2 to 3 weeks; please schedule appointment prior to patient's discharge   As directed      To: Neurology; 2 to 3 weeks; please schedule appointment prior to patient's discharge               Electronically signed by MAYELIN Garcia, 08/04/22, 9:05 AM EDT.      Time Spent on Discharge: I spent  45  minutes on this discharge activity which included: face-to-face encounter with the patient, reviewing the data in the system, coordination of the care with the nursing staff as well as consultants, documentation, and entering orders.        Electronically signed by Washington Lazo MD at 08/04/22 0937

## 2022-08-04 NOTE — DISCHARGE SUMMARY
[facility] Medicine Services  TRANSFER SUMMARY    Patient Name: Jeaneth Keita  : 1960  MRN: [id number]    Date of Admission: 2022  Date of Discharge: 2022  Length of Stay: 4  Primary Care Physician: Provider, No Known    Consults     Date and Time Order Name Status Description    2022 12:34 AM Inpatient Nephrology Consult Completed           Hospital Course   Presenting Problem:   AMS (altered mental status) [R41.82]  Disorientation [R41.0]    Active Hospital Problems    Diagnosis  POA   • Disorientation [R41.0]  Yes   • End stage renal disease on dialysis (HCC) [N18.6, Z99.2]  Not Applicable   • CVA (cerebral vascular accident) (HCC) [I63.9]  Yes   • Cirrhosis of liver (HCC) [K74.60]  Yes   • 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 female  a resident at [facility] with a past medical history significant for ESRD on HD, CVA,  liver cell carcinoma with bone metastasis, Hep C, cirrhosis, chronic anemia, T2DM,  HTN, and depression who presents to the ED due to AMS.     AMS  Hx MCI, old CVA  -She has been admitted several times over the last 2 months d/t similar symptoms. Previous neuro work-up negative. She does have some confusion at baseline per her POA, Parviz ([phone]).  -Neurology following, could be due to valtrex not being renally dosed, seems to be improving overall, but waxes/wanes  -CT head revealed no acute intracranial findings  -LP performed  -EEG WNL  -MRI brain no acute finding   -Hold recently started Gabapentin (started d/t recent Shingles); Did NOT restart at DC  --PT had patient doing various tasks with  assist x1  --Can return to facility if back at baseline with only assist x1     UTI  --IV Rocephin completed in Hospital  --Culture showed >100,000 gram negative bacilli     ESRD on HD  -Nephrology consulted for HD, tolerating well     Chronic Anemia   -Hgb 9.9 on admission, at baseline      T2DM  --24H glucose 121-180  --SSI     HTN  HLD  -continue Norvasc, Coreg, Lipitor      Liver CA with bone mets  Chronic pain  Hx Hep C  Cirrhosis   -Previously followed with [facility] Dr. Raul Berrios  -Her POA reported decision to pursue comfort measures, palliative consulted  -Palliative follow up at facility     Herpes Zoster  -Hold recently started gabapentin d/t above  -Continue Valtrex to complete 7 day regimen; needs to be renally doses  --May restart at facility but only with Herpes exacerbation     Patient will be discharged back to [place] today with family to transport.  Discharge follow-up recommendations and appointments are listed below.    Discharge Follow Up Recommendations for outpatient labs/diagnostics:  --Follow-up with facility provider 1 to 2 days  --Follow-up with kidney doctor on Monday, Wednesdays and Fridays at dialysis  --Take Retacrit at dialysis per nephrology  --Follow-up with Neurology in 2 to 3 weeks  --Palliative to Follow at facility  Day of Discharge   HPI:   Patient very happy today.  Speaking well and moving all extremities without any difficulty.  No complaints at this time..    Review of Systems  Gen- No fevers, chills  CV- No chest pain, palpitations  Resp- No cough, dyspnea  GI- No N/V/D, abd pain  All other systems have been reviewed and the pertinent positives and negatives are listed above in the HPI    Vital Signs:   Temp:  [98.1 °F (36.7 °C)-99 °F (37.2 °C)] 98.1 °F (36.7 °C)  Heart Rate:  [] 91  Resp:  [14-18] 18  BP: (101-125)/(66-82) 101/67     Physical Exam:  Constitutional: Alert, chronically ill-appearing female sitting up in bed in NAD eating breakfast  Eyes:  EOMI,  sclerae anicteric, no conjunctival injection  Head: NCAT  ENT: [place], moist mucous membranes   Respiratory: Nonlabored, symmetrical chest expansion, CTAB, 99% RA  Cardiovascular: RRR, HR 98, no M/R/G  Gastrointestinal: Soft, NT, ND +BS  Musculoskeletal: KRISH[place]URTHY; no LE edema; +left BKA  Neurologic: Oriented to self, hospital, strength symmetric in all extremities, follows all commands, speech clear  Skin: No rashes on exposed skin  Psychiatric: Pleasant and cooperative; normal affect  Pertinent Results     Results from last 7 days   Lab Units 08/03/22  0742 07/30/22  0831 07/29/22  0904   WBC 10*3/mm3 4.63 4.63 5.23   HEMOGLOBIN g/dL 8.8* 9.2* 9.9*   HEMATOCRIT % 24.3* 25.3* 27.1*   PLATELETS 10*3/mm3 120* 138* 156   SODIUM mmol/L 136 139 132*   POTASSIUM mmol/L 4.2 4.6 4.9   CHLORIDE mmol/L 100 103 96*   CO2 mmol/L 20.0* 22.0 24.0   BUN mg/dL 57* 56* 53*   CREATININE mg/dL 6.69* 7.29* 6.02*   GLUCOSE mg/dL 131* 133* 177*   CALCIUM mg/dL 9.0 9.4 9.3     Results from last 7 days   Lab Units 07/30/22  0831 07/29/22  0904   BILIRUBIN mg/dL 0.4 0.4   ALK PHOS U/L 79 107   ALT (SGPT) U/L 32 33   AST (SGOT) U/L 29 29           Invalid input(s): TG, LDLCALC, LDLREALC  Results from last 7 days   Lab Units 07/30/22  0831   TSH uIU/mL 1.100   HEMOGLOBIN A1C % 5.30     Brief Urine Lab Results  (Last result in the past 365 days)      Color   Clarity   Blood   Leuk Est   Nitrite   Protein   CREAT   Urine HCG        08/01/22 1438 Yellow   Clear   Negative   Small (1+)   Negative   >=300 mg/dL (3+)                 Microbiology Results Abnormal     Procedure Component Value - Date/Time    AFB Culture - Cerebrospinal Fluid, Lumbar Puncture [[id number]] Collected: 07/29/22 1543    Lab Status: Preliminary result Specimen: Cerebrospinal Fluid from Lumbar Puncture Updated: 08/03/22 1617     AFB Culture No AFB isolated at less than 1 week     AFB Stain No acid fast bacilli seen on direct smear    Blood Culture - Blood, Arm, Right [[id number]]   (Normal) Collected: 07/29/22 1019    Lab Status: Final result Specimen: Blood from Arm, Right Updated: 08/03/22 1102     Blood Culture No growth at 5 days    Blood Culture - Blood, Arm, Left [[id number]]  (Normal) Collected: 07/29/22 1015    Lab Status: Final result Specimen: Blood from Arm, Left Updated: 08/03/22 1102     Blood Culture No growth at 5 days    Culture, CSF - Cerebrospinal Fluid, Lumbar Puncture [[id number]] Collected: 07/29/22 1545    Lab Status: Final result Specimen: Cerebrospinal Fluid from Lumbar Puncture Updated: 08/01/22 0953     CSF Culture No growth at 3 days     Gram Stain Rare (1+) WBCs seen      No organisms seen    Cryptococcal AG, CSF - Cerebrospinal Fluid, Lumbar Puncture [[id number]]  (Normal) Collected: 07/29/22 1545    Lab Status: Final result Specimen: Cerebrospinal Fluid from Lumbar Puncture Updated: 07/30/22 0817     Cryptococcal Antigen, CSF Negative    Meningitis / Encephalitis Panel, PCR - Cerebrospinal Fluid, Lumbar Puncture [[id number]]  (Normal) Collected: 07/29/22 1545    Lab Status: Final result Specimen: Cerebrospinal Fluid from Lumbar Puncture Updated: 07/29/22 1753     ESCHERICHIA COLI K1, PCR Not Detected     HAEMOPHILUS INFLUENZAE, PCR Not Detected     LISTERIA MONOCYTOGENES, PCR Not Detected     NEISSERIA MENINGITIDIS, PCR Not Detected     STREPTOCOCCUS AGALACTIAE, PCR Not Detected     STREPTOCOCCUS PNEUMONIAE, PCR Not Detected     CYTOMEGALOVIRUS (CMV), PCR Not Detected     ENTEROVIRUS, PCR Not Detected     HERPES SIMPLEX VIRUS 1 (HSV-1), PCR Not Detected     HERPES SIMPLEX VIRUS 2 (HSV-2), PCR Not Detected     HUMAN PARECHOVIRUS, PCR Not Detected     VARICELLA ZOSTER VIRUS (VZV), PCR Not Detected     CRYPTOCOCCUS NEOFORMANS / GATTII, PCR Not Detected     HUMAN HERPES VIRUS 6 PCR Not Detected    COVID-19 and FLU A/B PCR - Swab, Nasopharynx [[id number]]  (Normal) Collected: 07/29/22 0937    Lab Status: Final result Specimen: Swab from Nasopharynx Updated: 07/29/22 1030      COVID19 Not Detected     Influenza A PCR Not Detected     Influenza B PCR Not Detected    Narrative:      Fact sheet for providers: https://www.fda.gov/media/917939/download    Fact sheet for patients: https://www.fda.gov/media/966471/download    Test performed by PCR.          Imaging Results (All)     Procedure Component Value Units Date/Time    MRI Angiogram Head Without Contrast [[id number]] Collected: 07/31/22 1952     Updated: 07/31/22 1959    Narrative:      DATE OF EXAM: 7/31/2022 6:25 PM     PROCEDURE: MRI ANGIOGRAM HEAD WO CONTRAST-     INDICATIONS: Neuro deficit, acute, stroke suspected;  R41.0-Disorientation, unspecified; N18.6-End stage renal disease;  Z99.2-Dependence on renal dialysis     COMPARISON: MRI brain 07/31/2022     TECHNIQUE: 3-D time-of-flight MRA [facility] [employer] [place].     FINDINGS:   The basilar artery does not appear unusual. The visualized distal  portion internal carotid arteries seem patent. The anterior and middle  cerebral arteries as well as the right posterior cerebral artery are  grossly unremarkable. There is some diminished signal in the left  posterior cerebral artery. This is a change from the prior study. This  may relate to motion artifact on this exam as opposed to an abnormality  of the posterior cerebral artery. It does look like there is motion  artifact on the source images.       Impression:      1.  Some interval change in the left posterior cerebral artery that  could relate to motion artifact noted on source images. An abnormality  is not definitely identified within the territory of the left posterior  cerebral artery on the more recent MRI from the same day.     This report was finalized on 7/31/2022 7:56 PM by Robbie Jason MD.       MRI Brain Without Contrast [[id number]] Collected: 07/31/22 1943     Updated: 07/31/22 1949    Narrative:      DATE OF EXAM: 7/31/2022 6:25 PM     PROCEDURE: MRI BRAIN WO CONTRAST-     INDICATIONS: Delirium; R41.0-Disorientation,  unspecified; N18.6-End  stage renal disease; Z99.2-Dependence on renal dialysis     COMPARISON: 07/30/2022     TECHNIQUE: Multiplanar multisequence images of the brain were performed  without contrast according to routine brain MRI protocol.      FINDINGS:   There is no restricted diffusion. There are some periventricular T2  signal changes as well as some signal in the madisyn as well as basal  ganglia that could reflect more chronic small vessel ischemic change.  The pituitary is not enlarged. No definite orbital abnormality is  appreciated. On susceptibility imaging, there is blooming in the left  thalamic area which could relate to old hemorrhagic process. This is  unchanged from the prior exam.       Impression:      1.  There are some T2 signal changes involving the madisyn, basal ganglia  and periventricular areas which could reflect more chronic small vessel  ischemic change.  2.  Susceptibility artifact left thalamic area that could relate to old  bleed.     Exam somewhat limited due to patient confusion and motion     This report was finalized on 7/31/2022 7:46 PM by Robbie Jasno MD.       MRI Brain Without Contrast [[id number]] Collected: 07/30/22 2153     Updated: 07/30/22 2157    Narrative:      EXAMINATION: MR of the brain without contrast    DATE: 7/30/2022 9:04 PM    INDICATION: Altered mental status, ataxia    COMPARISON: MR brain 7/29/2022    TECHNIQUE: Multiplanar, multisequence imaging of the brain was obtained without the administration of contrast.    FINDINGS:    No diffusion restriction. Susceptibility in the left thalamic region consistent with a remote hemorrhagic insult.    Midline structures are intact. No cerebellar tonsillar ectopia. Normal marrow signal at the skull base. No acute abnormality in the visualized cervical spine.    Mild volume loss. Mild T2 prolongation white matter favoring chronic microvascular ischemic changes.    No midline shift or mass effect. No abnormal extra-axial fluid  collection.     Ventricular size and configuration is within normal limits. Basal cisterns are intact.    Visualized intracranial flow voids are grossly intact.    Globes and orbits are unremarkable. Mild mucosal thickening in the left maxillary sinus. Mastoid air cells are clear. No scalp soft tissue abnormality.        Impression:        1. No diffusion restriction to indicate acute infarct.    2. Sequela of remote hemorrhage in the left thalamus, unchanged from prior MRI.    3. Mild volume loss and mild chronic microvascular ischemic changes.    4. No significant interval change when compared with 7/29/2022.          Slot: 93    Electronically signed by:  Pedro Luis Izaguirre M.D.    7/30/2022 7:56 PM Mountain Time    MRI Angiogram Neck Without Contrast [[id number]] Collected: 07/30/22 2146     Updated: 07/30/22 2149    Narrative:      EXAMINATION: MR ANGIOGRAM OF THE NECK WITH AND WITHOUT CONTRAST    DATE: 7/30/2022 9:06 PM    INDICATION: Altered mental status, ataxia    COMPARISON: CT angiogram neck 5/23/2022    TECHNIQUE: An axial fat saturated T1 sequence was initially obtained, with axial 3-D time-of-flight MRA images and 3-dimensional reformatted images.  Additional maximum intensity projection reformatted images were created and viewed in 3-dimensional   rotatable forms.  There were no immediate complications.  Stenosis based on NASCET criteria.    FINDINGS:    Aorta: Imaged portions of the aortic arch are unremarkable.    Carotids:    Right: No hemodynamically significant stenosis or evidence of dissection. No significant atherosclerotic disease    Left:No hemodynamically significant stenosis or evidence of dissection. No significant atherosclerotic disease    Vertebrals:     Vertebral arteries are codominant.      No hemodynamically significant stenosis or dissection. No significant atherosclerotic disease.         Impression:        No hemodynamically significant stenosis or dissection within the vasculature  of the neck.        Slot: 93         Electronically signed by:  Pedro Luis Izaguirre M.D.    7/30/2022 7:47 PM Mountain Time    MRI Angiogram Head Without Contrast [[id number]] Collected: 07/30/22 2142     Updated: 07/30/22 2147    Narrative:      EXAMINATION: MRI ANGIOGRAM HEAD WO CONTRAST    DATE: 7/30/2022 9:05 PM     INDICATION: Altered mental status, ataxia     COMPARISON: None available.     TECHNIQUE:  Axially acquired 3-D time of flight noncontrast MRA images were obtained through the head.  Multiple maximum intensity projection reformatted images were created and viewed in rotatable, 3-dimensional forms.  No contrast was administered.       FINDINGS:     DEEPTI:No hemodynamically significant stenosis or branch vessel occlusion. No aneurysm.    MCA:No hemodynamically significant stenosis or branch vessel occlusion. No aneurysm.    PCA:No hemodynamically significant stenosis or branch vessel occlusion. No aneurysm. Fetal disposition on the left and conventional on the right.    Visualized internal carotid arteries:No hemodynamically significant stenosis or dissection. Intact as visualized.    Basilar:No hemodynamically significant stenosis or dissection. Intact.    Visualized vertebral arteries:No hemodynamically significant stenosis or dissection. Intact as visualized.         Impression:         No large vessel occlusion.          Slot: 93    Electronically signed by:  Pedro Luis Izaguirre M.D.    7/30/2022 7:45 PM Mountain Time    IR LUMBAR PUNCTURE DIAGNOSTIC [[id number]] Collected: 07/29/22 1549     Updated: 07/29/22 2247    Narrative:      EXAMINATION: IR LUMBAR PUNCTURE DIAGNOSTIC-     INDICATION: Altered mental status, she has herpes zoster.;  R41.0-Disorientation, unspecified; N18.6-End stage renal disease;  Z99.2-Dependence on renal dialysis     TECHNIQUE: 18 seconds of fluoroscopic time was used for this procedure.  1 associated image was saved. The procedure was deemed emergent by Dr. Janes Gonzalez.     The  patient was placed in the prone position on the table. The back was  prepped and draped in a sterile fashion. 1% lidocaine with used as a  local anesthetic to the skin and subcutaneous tissues. Under  fluoroscopic guidance a 22-gauge spinal needle was advanced at the L4-L5  level to the CSF space. Approximately 8 cc of blood-tinged CSF was  returned and collected in 4 numbered vials. Sample vials were labeled  and sent to pathology for further analysis. The needle was removed.     COMPARISON: NONE     FINDINGS: The patient tolerated the procedure well, and no immediate  complications occurred.          Impression:      Successful fluoroscopic guided lumbar puncture as above with  no immediate complications.         This report was finalized on 7/29/2022 10:44 PM by Dr. Washington Valladares MD.       MRI Brain Without Contrast [[id number]] Collected: 07/29/22 1632     Updated: 07/29/22 1641    Narrative:      EXAMINATION: MRI BRAIN WO CONTRAST-     DATE OF EXAM: 7/29/2022 4:11 PM     INDICATION: Altered mental status, nontraumatic (Ped 0-17y);  R41.0-Disorientation, unspecified; N18.6-End stage renal disease;  Z99.2-Dependence on renal dialysis.     COMPARISON: Head CT dated 07/29/2022, MRI brain dated 05/23/2022     TECHNIQUE: Multiplanar multisequence images of the brain were performed  without contrast according to routine brain MRI protocol.     FINDINGS:  Many of the sequences are motion degraded due to patient clinical  condition.  There are no areas of restricted diffusion to suggest acute infarct. The  ventricles and sulci are proportional prominent collateral with global  cerebral volume loss. There is no mass effect or midline shift. There is  no acute intracranial hemorrhage. There is no abnormal extra-axial fluid  collection. There are no abnormal areas of intrinsic T1 hyperintense  signal abnormality. There are multiple foci of susceptibility within the  right occipital lobe which appear unchanged from prior  examination.  There is susceptibility and likely sequelae of prior hemorrhage or  infarct involving the left thalamus which appears stable from prior  examination.     There is mild periventricular white matter T2/FLAIR hyperintense signal  changes likely representing sequelae of chronic small vessel ischemic  disease. The midline structures including the pituitary appear within  normal limits. There is mild degenerative pannus formation posterior to  the dens. The visualized orbits and globes appear symmetric with  thinning of the lenses bilaterally. The mastoid air cells and middle  ears appear well aerated. There is no evidence of CP angle mass lesion.       Impression:      1. No evidence of acute infarct, hemorrhage, or mass.  2. Sequelae of prior hemorrhage or infarct within the left thalamus,  unchanged from prior MRI.  3. Mild periventricular white matter signal changes likely representing  sequelae of chronic small vessel ischemic disease.     This report was finalized on 7/29/2022 4:37 PM by Madan Florez MD.       XR Abdomen KUB [[id number]] Collected: 07/29/22 1521     Updated: 07/29/22 1533    Narrative:      DATE OF EXAM: 7/29/2022 3:00 PM     PROCEDURE: XR ABDOMEN KUB-     INDICATIONS: mri     COMPARISON: 10/10/2021 KUB     TECHNIQUE: Single radiographic view of the abdomen was obtained.     FINDINGS:  Previous posterior lumbar and interbody fusion is noted at L4-5-S1.  There is a mild thoracolumbar scoliosis, convex to left in the lower  thoracic spine. Bony structures elsewhere appear intact. There is a  suggestion of mild to moderate fecal stasis, and mildly and diffusely  increased small bowel gas but no dilated large or small bowel loops are  seen. No bowel wall edema or pneumatosis is seen. There is no evidence  of metallic shrapnel metallic foreign body other than the patient's  fusion hardware.        Impression:         1. Posterior lumbar fusion. No evidence of metallic foreign  body  elsewhere.  2. Mild fecal stasis.     This report was finalized on 7/29/2022 3:30 PM by Dr. Washington Valladares MD.       CT Head Without Contrast [[id number]] Collected: 07/29/22 0955     Updated: 07/29/22 1009    Narrative:      DATE OF EXAM: 7/29/2022 9:38 AM     PROCEDURE: CT HEAD WO CONTRAST-     INDICATIONS: ams     COMPARISON: Head CT 6/27/2022     TECHNIQUE: Routine transaxial and coronal reconstruction images were  obtained through the head without the administration of contrast.  Automated exposure control and iterative reconstruction methods were  used.     The radiation dose reduction device was turned on for each scan per the  ALARA (As Low as Reasonably Achievable) protocol.     FINDINGS:  No acute intracranial hemorrhage. No acute large territory infarct.  There are scattered subcortical and periventricular white matter  hypodensities which are nonspecific and can be seen in the setting of  chronic small vessel ischemic change.  No extra-axial collections.  No  midline shift or herniation.  Normal size and configuration of the  ventricles commensurate with degree of cerebral volume loss.   Unremarkable appearance of the orbits.  The mastoid air cells are clear.   Tiny mucous retention cyst in the left maxillary sinus with otherwise  clear paranasal sinuses. No acute osseous findings.       Impression:      No acute intracranial findings.     This report was finalized on 7/29/2022 10:06 AM by David Lugo MD.       XR Chest 1 View [[id number]] Collected: 07/29/22 0920     Updated: 07/29/22 0924    Narrative:      DATE OF EXAM: 7/29/2022 8:52 AM     PROCEDURE: XR CHEST 1 VW-     INDICATIONS: Weak/Dizzy/AMS triage protocol     COMPARISON: 6/27/2022     TECHNIQUE: Single radiographic AP view of the chest was obtained.     FINDINGS:  The heart size is normal. Pulmonary vascular markings are normal. There  is a right-sided tunneled dialysis catheter with the tip at the  cavoatrial junction. There is  atelectasis in the right lung base but  this has decreased compared with the last study.        Impression:         1. Atelectasis in the right lung base, decreased from the patient's  previous study.     This report was finalized on 7/29/2022 9:20 AM by Parviz Sabillon MD.             Results for orders placed during the hospital encounter of 10/05/21    Adult Transthoracic Echo Complete W/ Cont if Necessary Per Protocol    Interpretation Summary  · Left ventricular ejection fraction appears to be 61 - 65%. Left ventricular systolic function is normal.  · Left atrial volume is mildly increased.      Pending Labs     Order Current Status    Encephalitis CSF - Cerebrospinal Fluid, Lumbar Puncture In process    AFB Culture - Cerebrospinal Fluid, Lumbar Puncture Preliminary result          Discharge Details        Discharge Medications      New Medications      Instructions Start Date   epoetin allie-epbx 71142 UNIT/ML injection  Commonly known as: RETACRIT   10,000 Units, Subcutaneous, 3 Times Weekly   Start Date: August 5, 2022        Changes to Medications      Instructions Start Date   calcium acetate 667 MG capsule capsule  Commonly known as: PHOS BINDER)  What changed: when to take this   667 mg, Oral, 3 Times Daily With Meals         Continue These Medications      Instructions Start Date   acetaminophen 325 MG tablet  Commonly known as: TYLENOL   650 mg, Oral, Every 6 Hours PRN      amLODIPine 10 MG tablet  Commonly known as: NORVASC   10 mg, Oral, Every 24 Hours Scheduled      atorvastatin 80 MG tablet  Commonly known as: LIPITOR   80 mg, Oral, Nightly      B Complex-Vitamin C capsule   1 capsule, Oral, Daily      carvedilol 25 MG tablet  Commonly known as: COREG   25 mg, Oral, 2 Times Daily With Meals      hydrALAZINE 50 MG tablet  Commonly known as: APRESOLINE   100 mg, Oral, 3 Times Daily      ibuprofen 600 MG tablet  Commonly known as: ADVIL,MOTRIN   600 mg, Oral, Every 6 Hours PRN      Insulin Lispro 100  UNIT/ML injection  Commonly known as: humaLOG   0-7 Units, Subcutaneous, 3 Times Daily Before Meals      loperamide 2 MG capsule  Commonly known as: IMODIUM   2 mg, Oral, 4 Times Daily PRN, Take 2 capsules by mouth daily times one, then 1 capsule by mouth after each loose stool as needed.  Max 4 capsules in 24 hours.      NovoLIN 70/30 FlexPen (70-30) 100 UNIT/ML suspension pen-injector  Generic drug: Insulin NPH Isophane & Regular   1 Units, Subcutaneous, 2 Times Daily Before Meals, 24 units before breakfast and 22 units before dinner      ondansetron 4 MG tablet  Commonly known as: ZOFRAN   4 mg, Oral, Every 6 Hours PRN      pantoprazole 40 MG EC tablet  Commonly known as: PROTONIX   40 mg, Oral, Every Morning      QUEtiapine 25 MG tablet  Commonly known as: SEROquel   25 mg, Oral, Nightly      sennosides-docusate 8.6-50 MG per tablet  Commonly known as: PERICOLACE   1 tablet, Oral, Daily      venlafaxine XR 75 MG 24 hr capsule  Commonly known as: EFFEXOR-XR   75 mg, Oral, Daily      vitamin D 1.25 MG (36853 UT) capsule capsule  Commonly known as: ERGOCALCIFEROL   50,000 Units, Oral, Weekly, Sunday         Stop These Medications    gabapentin 300 MG capsule  Commonly known as: NEURONTIN     methadone 10 MG tablet  Commonly known as: DOLOPHINE     valACYclovir 1000 MG tablet  Commonly known as: VALTREX            Allergies   Allergen Reactions   • Sulfa Antibiotics          Discharge Disposition:  Skilled Nursing Facility (DC - External)    Discharge Diet:  Diet Order   Procedures   • Diet Regular; Cardiac, Consistent Carbohydrate       Discharge Activity:  Activity Instructions     Activity as Tolerated              CODE STATUS:    Code Status and Medical Interventions:   Ordered at: 07/29/22 1230     Medical Intervention Limits:    NO intubation (DNI)     Code Status (Patient has no pulse and is not breathing):    No CPR (Do Not Attempt to Resuscitate)     Medical Interventions (Patient has pulse or is  breathing):    Limited Support         Additional Instructions for the Follow-ups that You Need to Schedule     Discharge Follow-up with PCP   As directed       Currently Documented PCP:    Provider, No Known    PCP Phone Number:    None     Follow Up Details: Follow-up with facility provider in 1 to 2 days         Discharge Follow-up with Specified Provider: Follow-up with nephrology on M [facility] in dialysis   As directed      To: Follow-up with nephrology on M [facility] in dialysis         Discharge Follow-up with Specified Provider: Neurology; 2 to 3 weeks; please schedule appointment prior to patient's discharge   As directed      To: Neurology; 2 to 3 weeks; please schedule appointment prior to patient's discharge               Electronically signed by MAYELIN Garcia, 08/04/22, 9:05 AM EDT.      Time Spent on Discharge: I spent  45  minutes on this discharge activity which included: face-to-face encounter with the patient, reviewing the data in the system, coordination of the care with the nursing staff as well as consultants, documentation, and entering orders.

## 2022-08-04 NOTE — DISCHARGE PLACEMENT REQUEST
"Jeaneth Tatum (61 y.o. Female)             Date of Birth   1960    Social Security Number       Address   [address] [place] [address]    Home Phone   [phone]    MRN   [id number]       Restorationism   Jainism    Marital Status                               Admission Date   22    Admission Type   Emergency    Admitting Provider   Washington Lazo MD    Attending Provider   Washington Lazo MD    Department, Room/Bed   [facility] 3F, S327/1       Discharge Date       Discharge Disposition   Skilled Nursing Facility (DC - External)    Discharge Destination                               Attending Provider: Washington Lazo MD    Allergies: Sulfa Antibiotics    Isolation: Contact   Infection: ESBL E coli (22)   Code Status: No CPR   Advance Care Planning Activity    Ht: 167.6 cm (66\")   Wt: 77.1 kg (170 lb)    Admission Cmt: None   Principal Problem: AMS (altered mental status) [R41.82]                 Active Insurance as of 2022     Primary Coverage     Payor Plan Insurance Group Employer/Plan Group    [id number]     Payor Plan Address Payor Plan Phone Number Payor Plan Fax Number Effective Dates    [address]   2005 - None Entered    [place] [address]       Subscriber Name Subscriber Birth Date Member ID       JEANETH TATUM 1960 [id number]                 Emergency Contacts      (Rel.) Home Phone Work Phone Mobile Phone    CHLOÉ TREVIÑO (Spouse) [phone] -- [phone]    HAILEY CANALES (Brother) [phone] -- [phone]    YAMILEJACENORMA FERNANDEZ (Sister) [phone] -- [phone]               Discharge Summary      Nazanin Helton APRN at 22 0904     Attestation signed by Washington Lazo MD at 22 0937    I have reviewed this documentation and agree.                      [facility] Medicine Services  TRANSFER SUMMARY    Patient Name: Jeaneth Tatum  : " 1960  MRN: [id number]    Date of Admission: 7/29/2022  Date of Discharge: 8/4/2022  Length of Stay: 4  Primary Care Physician: Provider, No Known    Consults     Date and Time Order Name Status Description    7/30/2022 12:34 AM Inpatient Nephrology Consult Completed           Hospital Course   Presenting Problem:   AMS (altered mental status) [R41.82]  Disorientation [R41.0]    Active Hospital Problems    Diagnosis  POA   • Disorientation [R41.0]  Yes   • End stage renal disease on dialysis (HCC) [N18.6, Z99.2]  Not Applicable   • CVA (cerebral vascular accident) (HCC) [I63.9]  Yes   • Cirrhosis of liver (HCC) [K74.60]  Yes   • 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 female  a resident at [facility] with a past medical history significant for ESRD on HD, CVA,  liver cell carcinoma with bone metastasis, Hep C, cirrhosis, chronic anemia, T2DM,  HTN, and depression who presents to the ED due to AMS.     AMS  Hx MCI, old CVA  -She has been admitted several times over the last 2 months d/t similar symptoms. Previous neuro work-up negative. She does have some confusion at baseline per her POAParviz ([phone]).  -Neurology following, could be due to valtrex not being renally dosed, seems to be improving overall, but waxes/wanes  -CT head revealed no acute intracranial findings  -LP performed  -EEG WNL  -MRI brain no acute finding   -Hold recently started Gabapentin (started d/t recent Shingles); Did NOT restart at DC  --PT had patient doing various tasks with assist x1  --Can return to facility if back at baseline with only assist x1     UTI  --IV Rocephin completed in  Hospital  --Culture showed >100,000 gram negative bacilli     ESRD on HD  -Nephrology consulted for HD, tolerating well     Chronic Anemia   -Hgb 9.9 on admission, at baseline      T2DM  --24H glucose 121-180  --SSI     HTN  HLD  -continue Norvasc, Coreg, Lipitor      Liver CA with bone mets  Chronic pain  Hx Hep C  Cirrhosis   -Previously followed with [facility] Dr. Raul Berrios  -Her POA reported decision to pursue comfort measures, palliative consulted  -Palliative follow up at facility     Herpes Zoster  -Hold recently started gabapentin d/t above  -Continue Valtrex to complete 7 day regimen; needs to be renally doses  --May restart at facility but only with Herpes exacerbation     Patient will be discharged back to [facility] today with family to transport.  Discharge follow-up recommendations and appointments are listed below.    Discharge Follow Up Recommendations for outpatient labs/diagnostics:  --Follow-up with facility provider 1 to 2 days  --Follow-up with kidney doctor on Monday, Wednesdays and Fridays at dialysis  --Take Retacrit at dialysis per nephrology  --Follow-up with Neurology in 2 to 3 weeks  --Palliative to Follow at facility  Day of Discharge   HPI:   Patient very happy today.  Speaking well and moving all extremities without any difficulty.  No complaints at this time..    Review of Systems  Gen- No fevers, chills  CV- No chest pain, palpitations  Resp- No cough, dyspnea  GI- No N/V/D, abd pain  All other systems have been reviewed and the pertinent positives and negatives are listed above in the HPI    Vital Signs:   Temp:  [98.1 °F (36.7 °C)-99 °F (37.2 °C)] 98.1 °F (36.7 °C)  Heart Rate:  [] 91  Resp:  [14-18] 18  BP: (101-125)/(66-82) 101/67     Physical Exam:  Constitutional: Alert, chronically ill-appearing female sitting up in bed in NAD eating breakfast  Eyes:  EOMI, sclerae anicteric, no conjunctival injection  Head: NCAT  ENT: [place], moist mucous membranes   Respiratory:  Nonlabored, symmetrical chest expansion, CTAB, 99% RA  Cardiovascular: RRR, HR 98, no M/R/G  Gastrointestinal: Soft, NT, ND +BS  Musculoskeletal: KRISHNAMURTHY; no LE edema; +left BKA  Neurologic: Oriented to self, hospital, strength symmetric in all extremities, follows all commands, speech clear  Skin: No rashes on exposed skin  Psychiatric: Pleasant and cooperative; normal affect  Pertinent Results     Results from last 7 days   Lab Units 08/03/22  0742 07/30/22  0831 07/29/22  0904   WBC 10*3/mm3 4.63 4.63 5.23   HEMOGLOBIN g/dL 8.8* 9.2* 9.9*   HEMATOCRIT % 24.3* 25.3* 27.1*   PLATELETS 10*3/mm3 120* 138* 156   SODIUM mmol/L 136 139 132*   POTASSIUM mmol/L 4.2 4.6 4.9   CHLORIDE mmol/L 100 103 96*   CO2 mmol/L 20.0* 22.0 24.0   BUN mg/dL 57* 56* 53*   CREATININE mg/dL 6.69* 7.29* 6.02*   GLUCOSE mg/dL 131* 133* 177*   CALCIUM mg/dL 9.0 9.4 9.3     Results from last 7 days   Lab Units 07/30/22  0831 07/29/22  0904   BILIRUBIN mg/dL 0.4 0.4   ALK PHOS U/L 79 107   ALT (SGPT) U/L 32 33   AST (SGOT) U/L 29 29           Invalid input(s): TG, LDLCALC, LDLREALC  Results from last 7 days   Lab Units 07/30/22  0831   TSH uIU/mL 1.100   HEMOGLOBIN A1C % 5.30     Brief Urine Lab Results  (Last result in the past 365 days)      Color   Clarity   Blood   Leuk Est   Nitrite   Protein   CREAT   Urine HCG        08/01/22 1438 Yellow   Clear   Negative   Small (1+)   Negative   >=300 mg/dL (3+)                 Microbiology Results Abnormal     Procedure Component Value - Date/Time    AFB Culture - Cerebrospinal Fluid, Lumbar Puncture [[id number]] Collected: 07/29/22 1545    Lab Status: Preliminary result Specimen: Cerebrospinal Fluid from Lumbar Puncture Updated: 08/03/22 1617     AFB Culture No AFB isolated at less than 1 week     AFB Stain No acid fast bacilli seen on direct smear    Blood Culture - Blood, Arm, Right [[id number]]  (Normal) Collected: 07/29/22 1019    Lab Status: Final result Specimen: Blood from Arm, Right Updated:  08/03/22 1102     Blood Culture No growth at 5 days    Blood Culture - Blood, Arm, Left [[id number]]  (Normal) Collected: 07/29/22 1015    Lab Status: Final result Specimen: Blood from Arm, Left Updated: 08/03/22 1102     Blood Culture No growth at 5 days    Culture, CSF - Cerebrospinal Fluid, Lumbar Puncture [[id number]] Collected: 07/29/22 1545    Lab Status: Final result Specimen: Cerebrospinal Fluid from Lumbar Puncture Updated: 08/01/22 0953     CSF Culture No growth at 3 days     Gram Stain Rare (1+) WBCs seen      No organisms seen    Cryptococcal AG, CSF - Cerebrospinal Fluid, Lumbar Puncture [[id number]]  (Normal) Collected: 07/29/22 1545    Lab Status: Final result Specimen: Cerebrospinal Fluid from Lumbar Puncture Updated: 07/30/22 0817     Cryptococcal Antigen, CSF Negative    Meningitis / Encephalitis Panel, PCR - Cerebrospinal Fluid, Lumbar Puncture [[id number]]  (Normal) Collected: 07/29/22 1545    Lab Status: Final result Specimen: Cerebrospinal Fluid from Lumbar Puncture Updated: 07/29/22 1753     ESCHERICHIA COLI K1, PCR Not Detected     HAEMOPHILUS INFLUENZAE, PCR Not Detected     LISTERIA MONOCYTOGENES, PCR Not Detected     NEISSERIA MENINGITIDIS, PCR Not Detected     STREPTOCOCCUS AGALACTIAE, PCR Not Detected     STREPTOCOCCUS PNEUMONIAE, PCR Not Detected     CYTOMEGALOVIRUS (CMV), PCR Not Detected     ENTEROVIRUS, PCR Not Detected     HERPES SIMPLEX VIRUS 1 (HSV-1), PCR Not Detected     HERPES SIMPLEX VIRUS 2 (HSV-2), PCR Not Detected     HUMAN PARECHOVIRUS, PCR Not Detected     VARICELLA ZOSTER VIRUS (VZV), PCR Not Detected     CRYPTOCOCCUS NEOFORMANS / GATTII, PCR Not Detected     HUMAN HERPES VIRUS 6 PCR Not Detected    COVID-19 and FLU A/B PCR - Swab, Nasopharynx [[id number]]  (Normal) Collected: 07/29/22 0937    Lab Status: Final result Specimen: Swab from Nasopharynx Updated: 07/29/22 1030     COVID19 Not Detected     Influenza A PCR Not Detected     Influenza B PCR Not Detected    Narrative:       Fact sheet for providers: https://www.fda.gov/media/466024/download    Fact sheet for patients: https://www.fda.gov/media/289297/download    Test performed by PCR.          Imaging Results (All)     Procedure Component Value Units Date/Time    MRI Angiogram Head Without Contrast [[id number]] Collected: 07/31/22 1952     Updated: 07/31/22 1959    Narrative:      DATE OF EXAM: 7/31/2022 6:25 PM     PROCEDURE: MRI ANGIOGRAM HEAD WO CONTRAST-     INDICATIONS: Neuro deficit, acute, stroke suspected;  R41.0-Disorientation, unspecified; N18.6-End stage renal disease;  Z99.2-Dependence on renal dialysis     COMPARISON: MRI brain 07/31/2022     TECHNIQUE: 3-D time-of-flight MRA [place] [facility] [place].     FINDINGS:   The basilar artery does not appear unusual. The visualized distal  portion internal carotid arteries seem patent. The anterior and middle  cerebral arteries as well as the right posterior cerebral artery are  grossly unremarkable. There is some diminished signal in the left  posterior cerebral artery. This is a change from the prior study. This  may relate to motion artifact on this exam as opposed to an abnormality  of the posterior cerebral artery. It does look like there is motion  artifact on the source images.       Impression:      1.  Some interval change in the left posterior cerebral artery that  could relate to motion artifact noted on source images. An abnormality  is not definitely identified within the territory of the left posterior  cerebral artery on the more recent MRI from the same day.     This report was finalized on 7/31/2022 7:56 PM by Robbie Jason MD.       MRI Brain Without Contrast [[id number]] Collected: 07/31/22 1943     Updated: 07/31/22 1949    Narrative:      DATE OF EXAM: 7/31/2022 6:25 PM     PROCEDURE: MRI BRAIN WO CONTRAST-     INDICATIONS: Delirium; R41.0-Disorientation, unspecified; N18.6-End  stage renal disease; Z99.2-Dependence on renal dialysis     COMPARISON: 07/30/2022      TECHNIQUE: Multiplanar multisequence images of the brain were performed  without contrast according to routine brain MRI protocol.      FINDINGS:   There is no restricted diffusion. There are some periventricular T2  signal changes as well as some signal in the madisyn as well as basal  ganglia that could reflect more chronic small vessel ischemic change.  The pituitary is not enlarged. No definite orbital abnormality is  appreciated. On susceptibility imaging, there is blooming in the left  thalamic area which could relate to old hemorrhagic process. This is  unchanged from the prior exam.       Impression:      1.  There are some T2 signal changes involving the madisyn, basal ganglia  and periventricular areas which could reflect more chronic small vessel  ischemic change.  2.  Susceptibility artifact left thalamic area that could relate to old  bleed.     Exam somewhat limited due to patient confusion and motion     This report was finalized on 7/31/2022 7:46 PM by Robbie Jason MD.       MRI Brain Without Contrast [[id number]] Collected: 07/30/22 2153     Updated: 07/30/22 2157    Narrative:      EXAMINATION: MR of the brain without contrast    DATE: 7/30/2022 9:04 PM    INDICATION: Altered mental status, ataxia    COMPARISON: MR brain 7/29/2022    TECHNIQUE: Multiplanar, multisequence imaging of the brain was obtained without the administration of contrast.    FINDINGS:    No diffusion restriction. Susceptibility in the left thalamic region consistent with a remote hemorrhagic insult.    Midline structures are intact. No cerebellar tonsillar ectopia. Normal marrow signal at the skull base. No acute abnormality in the visualized cervical spine.    Mild volume loss. Mild T2 prolongation white matter favoring chronic microvascular ischemic changes.    No midline shift or mass effect. No abnormal extra-axial fluid collection.     Ventricular size and configuration is within normal limits. Basal cisterns are  intact.    Visualized intracranial flow voids are grossly intact.    Globes and orbits are unremarkable. Mild mucosal thickening in the left maxillary sinus. Mastoid air cells are clear. No scalp soft tissue abnormality.        Impression:        1. No diffusion restriction to indicate acute infarct.    2. Sequela of remote hemorrhage in the left thalamus, unchanged from prior MRI.    3. Mild volume loss and mild chronic microvascular ischemic changes.    4. No significant interval change when compared with 7/29/2022.          Slot: 93    Electronically signed by:  Pedro Luis Izaguirre M.D.    7/30/2022 7:56 PM Mountain Time    MRI Angiogram Neck Without Contrast [[id number]] Collected: 07/30/22 2146     Updated: 07/30/22 2149    Narrative:      EXAMINATION: MR ANGIOGRAM OF THE NECK WITH AND WITHOUT CONTRAST    DATE: 7/30/2022 9:06 PM    INDICATION: Altered mental status, ataxia    COMPARISON: CT angiogram neck 5/23/2022    TECHNIQUE: An axial fat saturated T1 sequence was initially obtained, with axial 3-D time-of-flight MRA images and 3-dimensional reformatted images.  Additional maximum intensity projection reformatted images were created and viewed in 3-dimensional   rotatable forms.  There were no immediate complications.  Stenosis based on NASCET criteria.    FINDINGS:    Aorta: Imaged portions of the aortic arch are unremarkable.    Carotids:    Right: No hemodynamically significant stenosis or evidence of dissection. No significant atherosclerotic disease    Left:No hemodynamically significant stenosis or evidence of dissection. No significant atherosclerotic disease    Vertebrals:     Vertebral arteries are codominant.      No hemodynamically significant stenosis or dissection. No significant atherosclerotic disease.         Impression:        No hemodynamically significant stenosis or dissection within the vasculature of the neck.        Slot: 93         Electronically signed by:  Pedro Luis Izaguirre M.D.     7/30/2022 7:47 PM Mountain Time    MRI Angiogram Head Without Contrast [[id number]] Collected: 07/30/22 2142     Updated: 07/30/22 2147    Narrative:      EXAMINATION: MRI ANGIOGRAM HEAD WO CONTRAST    DATE: 7/30/2022 9:05 PM     INDICATION: Altered mental status, ataxia     COMPARISON: None available.     TECHNIQUE:  Axially acquired 3-D time of flight noncontrast MRA images were obtained through the head.  Multiple maximum intensity projection reformatted images were created and viewed in rotatable, 3-dimensional forms.  No contrast was administered.       FINDINGS:     DEEPTI:No hemodynamically significant stenosis or branch vessel occlusion. No aneurysm.    MCA:No hemodynamically significant stenosis or branch vessel occlusion. No aneurysm.    PCA:No hemodynamically significant stenosis or branch vessel occlusion. No aneurysm. Fetal disposition on the left and conventional on the right.    Visualized internal carotid arteries:No hemodynamically significant stenosis or dissection. Intact as visualized.    Basilar:No hemodynamically significant stenosis or dissection. Intact.    Visualized vertebral arteries:No hemodynamically significant stenosis or dissection. Intact as visualized.         Impression:         No large vessel occlusion.          Slot: 93    Electronically signed by:  Pedro Luis Izaguirre M.D.    7/30/2022 7:45 PM Mountain Time    IR LUMBAR PUNCTURE DIAGNOSTIC [[id number]] Collected: 07/29/22 1549     Updated: 07/29/22 2247    Narrative:      EXAMINATION: IR LUMBAR PUNCTURE DIAGNOSTIC-     INDICATION: Altered mental status, she has herpes zoster.;  R41.0-Disorientation, unspecified; N18.6-End stage renal disease;  Z99.2-Dependence on renal dialysis     TECHNIQUE: 18 seconds of fluoroscopic time was used for this procedure.  1 associated image was saved. The procedure was deemed emergent by Dr. Janes Gonzalez.     The patient was placed in the prone position on the table. The back was  prepped and draped in a  sterile fashion. 1% lidocaine with used as a  local anesthetic to the skin and subcutaneous tissues. Under  fluoroscopic guidance a 22-gauge spinal needle was advanced at the L4-L5  level to the CSF space. Approximately 8 cc of blood-tinged CSF was  returned and collected in 4 numbered vials. Sample vials were labeled  and sent to pathology for further analysis. The needle was removed.     COMPARISON: NONE     FINDINGS: The patient tolerated the procedure well, and no immediate  complications occurred.          Impression:      Successful fluoroscopic guided lumbar puncture as above with  no immediate complications.         This report was finalized on 7/29/2022 10:44 PM by Dr. Washington Valladares MD.       MRI Brain Without Contrast [[id number]] Collected: 07/29/22 1632     Updated: 07/29/22 1641    Narrative:      EXAMINATION: MRI BRAIN WO CONTRAST-     DATE OF EXAM: 7/29/2022 4:11 PM     INDICATION: Altered mental status, nontraumatic (Ped 0-17y);  R41.0-Disorientation, unspecified; N18.6-End stage renal disease;  Z99.2-Dependence on renal dialysis.     COMPARISON: Head CT dated 07/29/2022, MRI brain dated 05/23/2022     TECHNIQUE: Multiplanar multisequence images of the brain were performed  without contrast according to routine brain MRI protocol.     FINDINGS:  Many of the sequences are motion degraded due to patient clinical  condition.  There are no areas of restricted diffusion to suggest acute infarct. The  ventricles and sulci are proportional prominent collateral with global  cerebral volume loss. There is no mass effect or midline shift. There is  no acute intracranial hemorrhage. There is no abnormal extra-axial fluid  collection. There are no abnormal areas of intrinsic T1 hyperintense  signal abnormality. There are multiple foci of susceptibility within the  right occipital lobe which appear unchanged from prior examination.  There is susceptibility and likely sequelae of prior hemorrhage or  infarct involving  the left thalamus which appears stable from prior  examination.     There is mild periventricular white matter T2/FLAIR hyperintense signal  changes likely representing sequelae of chronic small vessel ischemic  disease. The midline structures including the pituitary appear within  normal limits. There is mild degenerative pannus formation posterior to  the dens. The visualized orbits and globes appear symmetric with  thinning of the lenses bilaterally. The mastoid air cells and middle  ears appear well aerated. There is no evidence of CP angle mass lesion.       Impression:      1. No evidence of acute infarct, hemorrhage, or mass.  2. Sequelae of prior hemorrhage or infarct within the left thalamus,  unchanged from prior MRI.  3. Mild periventricular white matter signal changes likely representing  sequelae of chronic small vessel ischemic disease.     This report was finalized on 7/29/2022 4:37 PM by Madan Florez MD.       XR Abdomen KUB [[id number]] Collected: 07/29/22 1521     Updated: 07/29/22 1533    Narrative:      DATE OF EXAM: 7/29/2022 3:00 PM     PROCEDURE: XR ABDOMEN KUB-     INDICATIONS: mri     COMPARISON: 10/10/2021 KUB     TECHNIQUE: Single radiographic view of the abdomen was obtained.     FINDINGS:  Previous posterior lumbar and interbody fusion is noted at L4-5-S1.  There is a mild thoracolumbar scoliosis, convex to left in the lower  thoracic spine. Bony structures elsewhere appear intact. There is a  suggestion of mild to moderate fecal stasis, and mildly and diffusely  increased small bowel gas but no dilated large or small bowel loops are  seen. No bowel wall edema or pneumatosis is seen. There is no evidence  of metallic shrapnel metallic foreign body other than the patient's  fusion hardware.        Impression:         1. Posterior lumbar fusion. No evidence of metallic foreign body  elsewhere.  2. Mild fecal stasis.     This report was finalized on 7/29/2022 3:30 PM by Dr. Washington Valladares  MD.       CT Head Without Contrast [[id number]] Collected: 07/29/22 0955     Updated: 07/29/22 1009    Narrative:      DATE OF EXAM: 7/29/2022 9:38 AM     PROCEDURE: CT HEAD WO CONTRAST-     INDICATIONS: ams     COMPARISON: Head CT 6/27/2022     TECHNIQUE: Routine transaxial and coronal reconstruction images were  obtained through the head without the administration of contrast.  Automated exposure control and iterative reconstruction methods were  used.     The radiation dose reduction device was turned on for each scan per the  ALARA (As Low as Reasonably Achievable) protocol.     FINDINGS:  No acute intracranial hemorrhage. No acute large territory infarct.  There are scattered subcortical and periventricular white matter  hypodensities which are nonspecific and can be seen in the setting of  chronic small vessel ischemic change.  No extra-axial collections.  No  midline shift or herniation.  Normal size and configuration of the  ventricles commensurate with degree of cerebral volume loss.   Unremarkable appearance of the orbits.  The mastoid air cells are clear.   Tiny mucous retention cyst in the left maxillary sinus with otherwise  clear paranasal sinuses. No acute osseous findings.       Impression:      No acute intracranial findings.     This report was finalized on 7/29/2022 10:06 AM by David Lugo MD.       XR Chest 1 View [[id number]] Collected: 07/29/22 0920     Updated: 07/29/22 0924    Narrative:      DATE OF EXAM: 7/29/2022 8:52 AM     PROCEDURE: XR CHEST 1 VW-     INDICATIONS: Weak/Dizzy/AMS triage protocol     COMPARISON: 6/27/2022     TECHNIQUE: Single radiographic AP view of the chest was obtained.     FINDINGS:  The heart size is normal. Pulmonary vascular markings are normal. There  is a right-sided tunneled dialysis catheter with the tip at the  cavoatrial junction. There is atelectasis in the right lung base but  this has decreased compared with the last study.        Impression:         1.  Atelectasis in the right lung base, decreased from the patient's  previous study.     This report was finalized on 7/29/2022 9:20 AM by Parviz Sabillon MD.             Results for orders placed during the hospital encounter of 10/05/21    Adult Transthoracic Echo Complete W/ Cont if Necessary Per Protocol    Interpretation Summary  · Left ventricular ejection fraction appears to be 61 - 65%. Left ventricular systolic function is normal.  · Left atrial volume is mildly increased.      Pending Labs     Order Current Status    Encephalitis CSF - Cerebrospinal Fluid, Lumbar Puncture In process    AFB Culture - Cerebrospinal Fluid, Lumbar Puncture Preliminary result          Discharge Details        Discharge Medications      New Medications      Instructions Start Date   epoetin allie-epbx 82785 UNIT/ML injection  Commonly known as: RETACRIT   10,000 Units, Subcutaneous, 3 Times Weekly   Start Date: August 5, 2022        Changes to Medications      Instructions Start Date   calcium acetate 667 MG capsule capsule  Commonly known as: PHOS BINDER)  What changed: when to take this   667 mg, Oral, 3 Times Daily With Meals         Continue These Medications      Instructions Start Date   acetaminophen 325 MG tablet  Commonly known as: TYLENOL   650 mg, Oral, Every 6 Hours PRN      amLODIPine 10 MG tablet  Commonly known as: NORVASC   10 mg, Oral, Every 24 Hours Scheduled      atorvastatin 80 MG tablet  Commonly known as: LIPITOR   80 mg, Oral, Nightly      B Complex-Vitamin C capsule   1 capsule, Oral, Daily      carvedilol 25 MG tablet  Commonly known as: COREG   25 mg, Oral, 2 Times Daily With Meals      hydrALAZINE 50 MG tablet  Commonly known as: APRESOLINE   100 mg, Oral, 3 Times Daily      ibuprofen 600 MG tablet  Commonly known as: ADVIL,MOTRIN   600 mg, Oral, Every 6 Hours PRN      Insulin Lispro 100 UNIT/ML injection  Commonly known as: humaLOG   0-7 Units, Subcutaneous, 3 Times Daily Before Meals      loperamide 2 MG  capsule  Commonly known as: IMODIUM   2 mg, Oral, 4 Times Daily PRN, Take 2 capsules by mouth daily times one, then 1 capsule by mouth after each loose stool as needed.  Max 4 capsules in 24 hours.      NovoLIN 70/30 FlexPen (70-30) 100 UNIT/ML suspension pen-injector  Generic drug: Insulin NPH Isophane & Regular   1 Units, Subcutaneous, 2 Times Daily Before Meals, 24 units before breakfast and 22 units before dinner      ondansetron 4 MG tablet  Commonly known as: ZOFRAN   4 mg, Oral, Every 6 Hours PRN      pantoprazole 40 MG EC tablet  Commonly known as: PROTONIX   40 mg, Oral, Every Morning      QUEtiapine 25 MG tablet  Commonly known as: SEROquel   25 mg, Oral, Nightly      sennosides-docusate 8.6-50 MG per tablet  Commonly known as: PERICOLACE   1 tablet, Oral, Daily      venlafaxine XR 75 MG 24 hr capsule  Commonly known as: EFFEXOR-XR   75 mg, Oral, Daily      vitamin D 1.25 MG (27803 UT) capsule capsule  Commonly known as: ERGOCALCIFEROL   50,000 Units, Oral, Weekly, Sunday         Stop These Medications    gabapentin 300 MG capsule  Commonly known as: NEURONTIN     methadone 10 MG tablet  Commonly known as: DOLOPHINE     valACYclovir 1000 MG tablet  Commonly known as: VALTREX            Allergies   Allergen Reactions   • Sulfa Antibiotics          Discharge Disposition:  Skilled Nursing Facility (DC - External)    Discharge Diet:  Diet Order   Procedures   • Diet Regular; Cardiac, Consistent Carbohydrate       Discharge Activity:  Activity Instructions     Activity as Tolerated              CODE STATUS:    Code Status and Medical Interventions:   Ordered at: 07/29/22 0278     Medical Intervention Limits:    NO intubation (DNI)     Code Status (Patient has no pulse and is not breathing):    No CPR (Do Not Attempt to Resuscitate)     Medical Interventions (Patient has pulse or is breathing):    Limited Support         Additional Instructions for the Follow-ups that You Need to Schedule     Discharge Follow-up  with PCP   As directed       Currently Documented PCP:    Provider, No Known    PCP Phone Number:    None     Follow Up Details: Follow-up with facility provider in 1 to 2 days         Discharge Follow-up with Specified Provider: Follow-up with nephrology on M [facility] in dialysis   As directed      To: Follow-up with nephrology on M [facility] in dialysis         Discharge Follow-up with Specified Provider: Neurology; 2 to 3 weeks; please schedule appointment prior to patient's discharge   As directed      To: Neurology; 2 to 3 weeks; please schedule appointment prior to patient's discharge               Electronically signed by MAYELIN Garcia, 08/04/22, 9:05 AM EDT.      Time Spent on Discharge: I spent  45  minutes on this discharge activity which included: face-to-face encounter with the patient, reviewing the data in the system, coordination of the care with the nursing staff as well as consultants, documentation, and entering orders.        Electronically signed by Washington Lazo MD at 08/04/22 0937

## 2022-08-04 NOTE — CASE MANAGEMENT/SOCIAL WORK
Case Management Discharge Note      Final Note: Mrs. Keita is being discharged back to [facility] today.  Nurse can call report to [phone] and discharge summary needs to be faxed to [phone].  Discharge summary also needs to be [place] [facility] at [phone].  I have already verified that she will be able to get her dialysis there on Friday as scheduled.  Patient was made meds to bed so they she can take her medication with her, the facility requested this.  I contacted spouse to inform him of plan and he stated that her sister in law Glendy would be here to transport her at noon.                      Final Discharge Disposition Code: 01 - home or self-care

## 2022-08-09 NOTE — PAYOR COMM NOTE
"Jeaneth Tatum (61 y.o. Female)     Auth#[id number]    Discharged 8/4/22    From:Galina Saucedo LPN, Utilization Review  Phone #[phone]  Fax #[phone]              Date of Birth   1960    Social Security Number       Address   [address] [place] [address]    Home Phone   [phone]    MRN   [id number]       Nondenominational   Confucianist    Marital Status                               Admission Date   7/29/22    Admission Type   Emergency    Admitting Provider   Washington Lazo MD    Attending Provider       Department, Room/Bed   [address], S327/1       Discharge Date   8/4/2022    Discharge Disposition   Skilled Nursing Facility (DC - External)    Discharge Destination                               Attending Provider: (none)   Allergies: Sulfa Antibiotics    Isolation: None   Infection: ESBL E coli (08/03/22)   Code Status: Prior   Advance Care Planning Activity    Ht: 167.6 cm (66\")   Wt: 77.1 kg (170 lb)    Admission Cmt: None   Principal Problem: AMS (altered mental status) [R41.82]                 Active Insurance as of 7/29/2022     Primary Coverage     Payor Plan Insurance Group Employer/Plan Group    [facility] BLUE [id number]     Payor Plan Address Payor Plan Phone Number Payor Plan Fax Number Effective Dates    [address]   1/1/2005 - None Entered    [place] [id number]       Subscriber Name Subscriber Birth Date Member ID       JEANETH TATUM 1960 [id number]                 Emergency Contacts      (Rel.) Home Phone Work Phone Mobile Phone    MELIDAREBECCACHLOÉ (Spouse) [phone] -- [phone]    HAILEY CANALES (Brother) [phone] -- [phone]    AICHA TREVIÑOSY (Sister) [phone] -- [phone]               Discharge Summary      Nazanin Helton APRN at 08/04/22 0904     Attestation signed by Washington Lazo MD at 08/04/22 0937    I have reviewed this documentation and agree.              "         [facility] Medicine Services  TRANSFER SUMMARY    Patient Name: Jeaneth Keita  : 1960  MRN: [id number]    Date of Admission: 2022  Date of Discharge: 2022  Length of Stay: 4  Primary Care Physician: Provider, No Known    Consults     Date and Time Order Name Status Description    2022 12:34 AM Inpatient Nephrology Consult Completed           Hospital Course   Presenting Problem:   AMS (altered mental status) [R41.82]  Disorientation [R41.0]    Active Hospital Problems    Diagnosis  POA   • Disorientation [R41.0]  Yes   • End stage renal disease on dialysis (HCC) [N18.6, Z99.2]  Not Applicable   • CVA (cerebral vascular accident) (HCC) [I63.9]  Yes   • Cirrhosis of liver (HCC) [K74.60]  Yes   • 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 female  a resident at [facility] with a past medical history significant for ESRD on HD, CVA,  liver cell carcinoma with bone metastasis, Hep C, cirrhosis, chronic anemia, T2DM,  HTN, and depression who presents to the ED due to AMS.     AMS  Hx MCI, old CVA  -She has been admitted several times over the last 2 months d/t similar symptoms. Previous neuro work-up negative. She does have some confusion at baseline per her POA, Parviz ([phone]).  -Neurology following, could be due to valtrex not being renally dosed, seems to be improving overall, but waxes/wanes  -CT head revealed no acute intracranial findings  -LP performed  -EEG WNL  -MRI brain no acute finding   -Hold recently started Gabapentin (started d/t recent Shingles); Did NOT restart at DC  --PT had patient doing various tasks  with assist x1  --Can return to facility if back at baseline with only assist x1     UTI  --IV Rocephin completed in Hospital  --Culture showed >100,000 gram negative bacilli     ESRD on HD  -Nephrology consulted for HD, tolerating well     Chronic Anemia   -Hgb 9.9 on admission, at baseline      T2DM  --24H glucose 121-180  --SSI     HTN  HLD  -continue Norvasc, Coreg, Lipitor      Liver CA with bone mets  Chronic pain  Hx Hep C  Cirrhosis   -Previously followed with [facility] Dr. Raul Berrios  -Her POA reported decision to pursue comfort measures, palliative consulted  -Palliative follow up at facility     Herpes Zoster  -Hold recently started gabapentin d/t above  -Continue Valtrex to complete 7 day regimen; needs to be renally doses  --May restart at facility but only with Herpes exacerbation     Patient will be discharged back to [place] today with family to transport.  Discharge follow-up recommendations and appointments are listed below.    Discharge Follow Up Recommendations for outpatient labs/diagnostics:  --Follow-up with facility provider 1 to 2 days  --Follow-up with kidney doctor on Monday, Wednesdays and Fridays at dialysis  --Take Retacrit at dialysis per nephrology  --Follow-up with Neurology in 2 to 3 weeks  --Palliative to Follow at facility  Day of Discharge   HPI:   Patient very happy today.  Speaking well and moving all extremities without any difficulty.  No complaints at this time..    Review of Systems  Gen- No fevers, chills  CV- No chest pain, palpitations  Resp- No cough, dyspnea  GI- No N/V/D, abd pain  All other systems have been reviewed and the pertinent positives and negatives are listed above in the HPI    Vital Signs:   Temp:  [98.1 °F (36.7 °C)-99 °F (37.2 °C)] 98.1 °F (36.7 °C)  Heart Rate:  [] 91  Resp:  [14-18] 18  BP: (101-125)/(66-82) 101/67     Physical Exam:  Constitutional: Alert, chronically ill-appearing female sitting up in bed in NAD eating breakfast  Eyes:  EOMI,  sclerae anicteric, no conjunctival injection  Head: NCAT  ENT: [place], moist mucous membranes   Respiratory: Nonlabored, symmetrical chest expansion, CTAB, 99% RA  Cardiovascular: RRR, HR 98, no M/R/G  Gastrointestinal: Soft, NT, ND +BS  Musculoskeletal: KRISHNAMURTHY; no LE edema; +left BKA  Neurologic: Oriented to self, hospital, strength symmetric in all extremities, follows all commands, speech clear  Skin: No rashes on exposed skin  Psychiatric: Pleasant and cooperative; normal affect  Pertinent Results     Results from last 7 days   Lab Units 08/03/22  0742 07/30/22  0831 07/29/22  0904   WBC 10*3/mm3 4.63 4.63 5.23   HEMOGLOBIN g/dL 8.8* 9.2* 9.9*   HEMATOCRIT % 24.3* 25.3* 27.1*   PLATELETS 10*3/mm3 120* 138* 156   SODIUM mmol/L 136 139 132*   POTASSIUM mmol/L 4.2 4.6 4.9   CHLORIDE mmol/L 100 103 96*   CO2 mmol/L 20.0* 22.0 24.0   BUN mg/dL 57* 56* 53*   CREATININE mg/dL 6.69* 7.29* 6.02*   GLUCOSE mg/dL 131* 133* 177*   CALCIUM mg/dL 9.0 9.4 9.3     Results from last 7 days   Lab Units 07/30/22  0831 07/29/22  0904   BILIRUBIN mg/dL 0.4 0.4   ALK PHOS U/L 79 107   ALT (SGPT) U/L 32 33   AST (SGOT) U/L 29 29           Invalid input(s): TG, LDLCALC, LDLREALC  Results from last 7 days   Lab Units 07/30/22  0831   TSH uIU/mL 1.100   HEMOGLOBIN A1C % 5.30     Brief Urine Lab Results  (Last result in the past 365 days)      Color   Clarity   Blood   Leuk Est   Nitrite   Protein   CREAT   Urine HCG        08/01/22 1438 Yellow   Clear   Negative   Small (1+)   Negative   >=300 mg/dL (3+)                 Microbiology Results Abnormal     Procedure Component Value - Date/Time    AFB Culture - Cerebrospinal Fluid, Lumbar Puncture [[id number]] Collected: 07/29/22 1549    Lab Status: Preliminary result Specimen: Cerebrospinal Fluid from Lumbar Puncture Updated: 08/03/22 1617     AFB Culture No AFB isolated at less than 1 week     AFB Stain No acid fast bacilli seen on direct smear    Blood Culture - Blood, Arm, Right [[id number]]   (Normal) Collected: 07/29/22 1019    Lab Status: Final result Specimen: Blood from Arm, Right Updated: 08/03/22 1102     Blood Culture No growth at 5 days    Blood Culture - Blood, Arm, Left [[id number]]  (Normal) Collected: 07/29/22 1015    Lab Status: Final result Specimen: Blood from Arm, Left Updated: 08/03/22 1102     Blood Culture No growth at 5 days    Culture, CSF - Cerebrospinal Fluid, Lumbar Puncture [[id number]] Collected: 07/29/22 1545    Lab Status: Final result Specimen: Cerebrospinal Fluid from Lumbar Puncture Updated: 08/01/22 0953     CSF Culture No growth at 3 days     Gram Stain Rare (1+) WBCs seen      No organisms seen    Cryptococcal AG, CSF - Cerebrospinal Fluid, Lumbar Puncture [[id number]]  (Normal) Collected: 07/29/22 1545    Lab Status: Final result Specimen: Cerebrospinal Fluid from Lumbar Puncture Updated: 07/30/22 0817     Cryptococcal Antigen, CSF Negative    Meningitis / Encephalitis Panel, PCR - Cerebrospinal Fluid, Lumbar Puncture [[id number]]  (Normal) Collected: 07/29/22 1545    Lab Status: Final result Specimen: Cerebrospinal Fluid from Lumbar Puncture Updated: 07/29/22 1753     ESCHERICHIA COLI K1, PCR Not Detected     HAEMOPHILUS INFLUENZAE, PCR Not Detected     LISTERIA MONOCYTOGENES, PCR Not Detected     NEISSERIA MENINGITIDIS, PCR Not Detected     STREPTOCOCCUS AGALACTIAE, PCR Not Detected     STREPTOCOCCUS PNEUMONIAE, PCR Not Detected     CYTOMEGALOVIRUS (CMV), PCR Not Detected     ENTEROVIRUS, PCR Not Detected     HERPES SIMPLEX VIRUS 1 (HSV-1), PCR Not Detected     HERPES SIMPLEX VIRUS 2 (HSV-2), PCR Not Detected     HUMAN PARECHOVIRUS, PCR Not Detected     VARICELLA ZOSTER VIRUS (VZV), PCR Not Detected     CRYPTOCOCCUS NEOFORMANS / GATTII, PCR Not Detected     HUMAN HERPES VIRUS 6 PCR Not Detected    COVID-19 and FLU A/B PCR - Swab, Nasopharynx [[id number]]  (Normal) Collected: 07/29/22 0937    Lab Status: Final result Specimen: Swab from Nasopharynx Updated: 07/29/22 1030      COVID19 Not Detected     Influenza A PCR Not Detected     Influenza B PCR Not Detected    Narrative:      Fact sheet for providers: https://www.fda.gov/media/375764/download    Fact sheet for patients: https://www.fda.gov/media/708000/download    Test performed by PCR.          Imaging Results (All)     Procedure Component Value Units Date/Time    MRI Angiogram Head Without Contrast [[id number]] Collected: 07/31/22 1952     Updated: 07/31/22 1959    Narrative:      DATE OF EXAM: 7/31/2022 6:25 PM     PROCEDURE: MRI ANGIOGRAM HEAD WO CONTRAST-     INDICATIONS: Neuro deficit, acute, stroke suspected;  R41.0-Disorientation, unspecified; N18.6-End stage renal disease;  Z99.2-Dependence on renal dialysis     COMPARISON: MRI brain 07/31/2022     TECHNIQUE: 3-D time-of-flight MRA [place].     FINDINGS:   The basilar artery does not appear unusual. The visualized distal  portion internal carotid arteries seem patent. The anterior and middle  cerebral arteries as well as the right posterior cerebral artery are  grossly unremarkable. There is some diminished signal in the left  posterior cerebral artery. This is a change from the prior study. This  may relate to motion artifact on this exam as opposed to an abnormality  of the posterior cerebral artery. It does look like there is motion  artifact on the source images.       Impression:      1.  Some interval change in the left posterior cerebral artery that  could relate to motion artifact noted on source images. An abnormality  is not definitely identified within the territory of the left posterior  cerebral artery on the more recent MRI from the same day.     This report was finalized on 7/31/2022 7:56 PM by Robbie Jason MD.       MRI Brain Without Contrast [[id number]] Collected: 07/31/22 1943     Updated: 07/31/22 1949    Narrative:      DATE OF EXAM: 7/31/2022 6:25 PM     PROCEDURE: MRI BRAIN WO CONTRAST-     INDICATIONS: Delirium; R41.0-Disorientation,  unspecified; N18.6-End  stage renal disease; Z99.2-Dependence on renal dialysis     COMPARISON: 07/30/2022     TECHNIQUE: Multiplanar multisequence images of the brain were performed  without contrast according to routine brain MRI protocol.      FINDINGS:   There is no restricted diffusion. There are some periventricular T2  signal changes as well as some signal in the madisyn as well as basal  ganglia that could reflect more chronic small vessel ischemic change.  The pituitary is not enlarged. No definite orbital abnormality is  appreciated. On susceptibility imaging, there is blooming in the left  thalamic area which could relate to old hemorrhagic process. This is  unchanged from the prior exam.       Impression:      1.  There are some T2 signal changes involving the madisyn, basal ganglia  and periventricular areas which could reflect more chronic small vessel  ischemic change.  2.  Susceptibility artifact left thalamic area that could relate to old  bleed.     Exam somewhat limited due to patient confusion and motion     This report was finalized on 7/31/2022 7:46 PM by Robbie Jason MD.       MRI Brain Without Contrast [[id number]] Collected: 07/30/22 2153     Updated: 07/30/22 2157    Narrative:      EXAMINATION: MR of the brain without contrast    DATE: 7/30/2022 9:04 PM    INDICATION: Altered mental status, ataxia    COMPARISON: MR brain 7/29/2022    TECHNIQUE: Multiplanar, multisequence imaging of the brain was obtained without the administration of contrast.    FINDINGS:    No diffusion restriction. Susceptibility in the left thalamic region consistent with a remote hemorrhagic insult.    Midline structures are intact. No cerebellar tonsillar ectopia. Normal marrow signal at the skull base. No acute abnormality in the visualized cervical spine.    Mild volume loss. Mild T2 prolongation white matter favoring chronic microvascular ischemic changes.    No midline shift or mass effect. No abnormal extra-axial fluid  collection.     Ventricular size and configuration is within normal limits. Basal cisterns are intact.    Visualized intracranial flow voids are grossly intact.    Globes and orbits are unremarkable. Mild mucosal thickening in the left maxillary sinus. Mastoid air cells are clear. No scalp soft tissue abnormality.        Impression:        1. No diffusion restriction to indicate acute infarct.    2. Sequela of remote hemorrhage in the left thalamus, unchanged from prior MRI.    3. Mild volume loss and mild chronic microvascular ischemic changes.    4. No significant interval change when compared with 7/29/2022.          Slot: 93    Electronically signed by:  Pedro Luis Izaguirre M.D.    7/30/2022 7:56 PM Mountain Time    MRI Angiogram Neck Without Contrast [[id number]] Collected: 07/30/22 2146     Updated: 07/30/22 2149    Narrative:      EXAMINATION: MR ANGIOGRAM OF THE NECK WITH AND WITHOUT CONTRAST    DATE: 7/30/2022 9:06 PM    INDICATION: Altered mental status, ataxia    COMPARISON: CT angiogram neck 5/23/2022    TECHNIQUE: An axial fat saturated T1 sequence was initially obtained, with axial 3-D time-of-flight MRA images and 3-dimensional reformatted images.  Additional maximum intensity projection reformatted images were created and viewed in 3-dimensional   rotatable forms.  There were no immediate complications.  Stenosis based on NASCET criteria.    FINDINGS:    Aorta: Imaged portions of the aortic arch are unremarkable.    Carotids:    Right: No hemodynamically significant stenosis or evidence of dissection. No significant atherosclerotic disease    Left:No hemodynamically significant stenosis or evidence of dissection. No significant atherosclerotic disease    Vertebrals:     Vertebral arteries are codominant.      No hemodynamically significant stenosis or dissection. No significant atherosclerotic disease.         Impression:        No hemodynamically significant stenosis or dissection within the vasculature  of the neck.        Slot: 93         Electronically signed by:  Pedro Luis Izaguirre M.D.    7/30/2022 7:47 PM Mountain Time    MRI Angiogram Head Without Contrast [[id number]] Collected: 07/30/22 2142     Updated: 07/30/22 2147    Narrative:      EXAMINATION: MRI ANGIOGRAM HEAD WO CONTRAST    DATE: 7/30/2022 9:05 PM     INDICATION: Altered mental status, ataxia     COMPARISON: None available.     TECHNIQUE:  Axially acquired 3-D time of flight noncontrast MRA images were obtained through the head.  Multiple maximum intensity projection reformatted images were created and viewed in rotatable, 3-dimensional forms.  No contrast was administered.       FINDINGS:     DEEPTI:No hemodynamically significant stenosis or branch vessel occlusion. No aneurysm.    MCA:No hemodynamically significant stenosis or branch vessel occlusion. No aneurysm.    PCA:No hemodynamically significant stenosis or branch vessel occlusion. No aneurysm. Fetal disposition on the left and conventional on the right.    Visualized internal carotid arteries:No hemodynamically significant stenosis or dissection. Intact as visualized.    Basilar:No hemodynamically significant stenosis or dissection. Intact.    Visualized vertebral arteries:No hemodynamically significant stenosis or dissection. Intact as visualized.         Impression:         No large vessel occlusion.          Slot: 93    Electronically signed by:  Pedro Luis Izaguirre M.D.    7/30/2022 7:45 PM Mountain Time    IR LUMBAR PUNCTURE DIAGNOSTIC [[id number]] Collected: 07/29/22 1549     Updated: 07/29/22 2247    Narrative:      EXAMINATION: IR LUMBAR PUNCTURE DIAGNOSTIC-     INDICATION: Altered mental status, she has herpes zoster.;  R41.0-Disorientation, unspecified; N18.6-End stage renal disease;  Z99.2-Dependence on renal dialysis     TECHNIQUE: 18 seconds of fluoroscopic time was used for this procedure.  1 associated image was saved. The procedure was deemed emergent by Dr. Janes Gonzalez.     The  patient was placed in the prone position on the table. The back was  prepped and draped in a sterile fashion. 1% lidocaine with used as a  local anesthetic to the skin and subcutaneous tissues. Under  fluoroscopic guidance a 22-gauge spinal needle was advanced at the L4-L5  level to the CSF space. Approximately 8 cc of blood-tinged CSF was  returned and collected in 4 numbered vials. Sample vials were labeled  and sent to pathology for further analysis. The needle was removed.     COMPARISON: NONE     FINDINGS: The patient tolerated the procedure well, and no immediate  complications occurred.          Impression:      Successful fluoroscopic guided lumbar puncture as above with  no immediate complications.         This report was finalized on 7/29/2022 10:44 PM by Dr. Washington Valladares MD.       MRI Brain Without Contrast [[id number]] Collected: 07/29/22 1632     Updated: 07/29/22 1641    Narrative:      EXAMINATION: MRI BRAIN WO CONTRAST-     DATE OF EXAM: 7/29/2022 4:11 PM     INDICATION: Altered mental status, nontraumatic (Ped 0-17y);  R41.0-Disorientation, unspecified; N18.6-End stage renal disease;  Z99.2-Dependence on renal dialysis.     COMPARISON: Head CT dated 07/29/2022, MRI brain dated 05/23/2022     TECHNIQUE: Multiplanar multisequence images of the brain were performed  without contrast according to routine brain MRI protocol.     FINDINGS:  Many of the sequences are motion degraded due to patient clinical  condition.  There are no areas of restricted diffusion to suggest acute infarct. The  ventricles and sulci are proportional prominent collateral with global  cerebral volume loss. There is no mass effect or midline shift. There is  no acute intracranial hemorrhage. There is no abnormal extra-axial fluid  collection. There are no abnormal areas of intrinsic T1 hyperintense  signal abnormality. There are multiple foci of susceptibility within the  right occipital lobe which appear unchanged from prior  examination.  There is susceptibility and likely sequelae of prior hemorrhage or  infarct involving the left thalamus which appears stable from prior  examination.     There is mild periventricular white matter T2/FLAIR hyperintense signal  changes likely representing sequelae of chronic small vessel ischemic  disease. The midline structures including the pituitary appear within  normal limits. There is mild degenerative pannus formation posterior to  the dens. The visualized orbits and globes appear symmetric with  thinning of the lenses bilaterally. The mastoid air cells and middle  ears appear well aerated. There is no evidence of CP angle mass lesion.       Impression:      1. No evidence of acute infarct, hemorrhage, or mass.  2. Sequelae of prior hemorrhage or infarct within the left thalamus,  unchanged from prior MRI.  3. Mild periventricular white matter signal changes likely representing  sequelae of chronic small vessel ischemic disease.     This report was finalized on 7/29/2022 4:37 PM by Madan Florez MD.       XR Abdomen KUB [[id number]] Collected: 07/29/22 1521     Updated: 07/29/22 1533    Narrative:      DATE OF EXAM: 7/29/2022 3:00 PM     PROCEDURE: XR ABDOMEN KUB-     INDICATIONS: mri     COMPARISON: 10/10/2021 KUB     TECHNIQUE: Single radiographic view of the abdomen was obtained.     FINDINGS:  Previous posterior lumbar and interbody fusion is noted at L4-5-S1.  There is a mild thoracolumbar scoliosis, convex to left in the lower  thoracic spine. Bony structures elsewhere appear intact. There is a  suggestion of mild to moderate fecal stasis, and mildly and diffusely  increased small bowel gas but no dilated large or small bowel loops are  seen. No bowel wall edema or pneumatosis is seen. There is no evidence  of metallic shrapnel metallic foreign body other than the patient's  fusion hardware.        Impression:         1. Posterior lumbar fusion. No evidence of metallic foreign  body  elsewhere.  2. Mild fecal stasis.     This report was finalized on 7/29/2022 3:30 PM by Dr. Washington Valladares MD.       CT Head Without Contrast [[id number]] Collected: 07/29/22 0955     Updated: 07/29/22 1009    Narrative:      DATE OF EXAM: 7/29/2022 9:38 AM     PROCEDURE: CT HEAD WO CONTRAST-     INDICATIONS: ams     COMPARISON: Head CT 6/27/2022     TECHNIQUE: Routine transaxial and coronal reconstruction images were  obtained through the head without the administration of contrast.  Automated exposure control and iterative reconstruction methods were  used.     The radiation dose reduction device was turned on for each scan per the  ALARA (As Low as Reasonably Achievable) protocol.     FINDINGS:  No acute intracranial hemorrhage. No acute large territory infarct.  There are scattered subcortical and periventricular white matter  hypodensities which are nonspecific and can be seen in the setting of  chronic small vessel ischemic change.  No extra-axial collections.  No  midline shift or herniation.  Normal size and configuration of the  ventricles commensurate with degree of cerebral volume loss.   Unremarkable appearance of the orbits.  The mastoid air cells are clear.   Tiny mucous retention cyst in the left maxillary sinus with otherwise  clear paranasal sinuses. No acute osseous findings.       Impression:      No acute intracranial findings.     This report was finalized on 7/29/2022 10:06 AM by David Lugo MD.       XR Chest 1 View [[id number]] Collected: 07/29/22 0920     Updated: 07/29/22 0924    Narrative:      DATE OF EXAM: 7/29/2022 8:52 AM     PROCEDURE: XR CHEST 1 VW-     INDICATIONS: Weak/Dizzy/AMS triage protocol     COMPARISON: 6/27/2022     TECHNIQUE: Single radiographic AP view of the chest was obtained.     FINDINGS:  The heart size is normal. Pulmonary vascular markings are normal. There  is a right-sided tunneled dialysis catheter with the tip at the  cavoatrial junction. There is  atelectasis in the right lung base but  this has decreased compared with the last study.        Impression:         1. Atelectasis in the right lung base, decreased from the patient's  previous study.     This report was finalized on 7/29/2022 9:20 AM by Parviz Sabillon MD.             Results for orders placed during the hospital encounter of 10/05/21    Adult Transthoracic Echo Complete W/ Cont if Necessary Per Protocol    Interpretation Summary  · Left ventricular ejection fraction appears to be 61 - 65%. Left ventricular systolic function is normal.  · Left atrial volume is mildly increased.      Pending Labs     Order Current Status    Encephalitis CSF - Cerebrospinal Fluid, Lumbar Puncture In process    AFB Culture - Cerebrospinal Fluid, Lumbar Puncture Preliminary result          Discharge Details        Discharge Medications      New Medications      Instructions Start Date   epoetin allie-epbx 28430 UNIT/ML injection  Commonly known as: RETACRIT   10,000 Units, Subcutaneous, 3 Times Weekly   Start Date: August 5, 2022        Changes to Medications      Instructions Start Date   calcium acetate 667 MG capsule capsule  Commonly known as: PHOS BINDER)  What changed: when to take this   667 mg, Oral, 3 Times Daily With Meals         Continue These Medications      Instructions Start Date   acetaminophen 325 MG tablet  Commonly known as: TYLENOL   650 mg, Oral, Every 6 Hours PRN      amLODIPine 10 MG tablet  Commonly known as: NORVASC   10 mg, Oral, Every 24 Hours Scheduled      atorvastatin 80 MG tablet  Commonly known as: LIPITOR   80 mg, Oral, Nightly      B Complex-Vitamin C capsule   1 capsule, Oral, Daily      carvedilol 25 MG tablet  Commonly known as: COREG   25 mg, Oral, 2 Times Daily With Meals      hydrALAZINE 50 MG tablet  Commonly known as: APRESOLINE   100 mg, Oral, 3 Times Daily      ibuprofen 600 MG tablet  Commonly known as: ADVIL,MOTRIN   600 mg, Oral, Every 6 Hours PRN      Insulin Lispro 100  UNIT/ML injection  Commonly known as: humaLOG   0-7 Units, Subcutaneous, 3 Times Daily Before Meals      loperamide 2 MG capsule  Commonly known as: IMODIUM   2 mg, Oral, 4 Times Daily PRN, Take 2 capsules by mouth daily times one, then 1 capsule by mouth after each loose stool as needed.  Max 4 capsules in 24 hours.      NovoLIN 70/30 FlexPen (70-30) 100 UNIT/ML suspension pen-injector  Generic drug: Insulin NPH Isophane & Regular   1 Units, Subcutaneous, 2 Times Daily Before Meals, 24 units before breakfast and 22 units before dinner      ondansetron 4 MG tablet  Commonly known as: ZOFRAN   4 mg, Oral, Every 6 Hours PRN      pantoprazole 40 MG EC tablet  Commonly known as: PROTONIX   40 mg, Oral, Every Morning      QUEtiapine 25 MG tablet  Commonly known as: SEROquel   25 mg, Oral, Nightly      sennosides-docusate 8.6-50 MG per tablet  Commonly known as: PERICOLACE   1 tablet, Oral, Daily      venlafaxine XR 75 MG 24 hr capsule  Commonly known as: EFFEXOR-XR   75 mg, Oral, Daily      vitamin D 1.25 MG (22106 UT) capsule capsule  Commonly known as: ERGOCALCIFEROL   50,000 Units, Oral, Weekly, Sunday         Stop These Medications    gabapentin 300 MG capsule  Commonly known as: NEURONTIN     methadone 10 MG tablet  Commonly known as: DOLOPHINE     valACYclovir 1000 MG tablet  Commonly known as: VALTREX            Allergies   Allergen Reactions   • Sulfa Antibiotics          Discharge Disposition:  Skilled Nursing Facility (DC - External)    Discharge Diet:  Diet Order   Procedures   • Diet Regular; Cardiac, Consistent Carbohydrate       Discharge Activity:  Activity Instructions     Activity as Tolerated              CODE STATUS:    Code Status and Medical Interventions:   Ordered at: 07/29/22 0298     Medical Intervention Limits:    NO intubation (DNI)     Code Status (Patient has no pulse and is not breathing):    No CPR (Do Not Attempt to Resuscitate)     Medical Interventions (Patient has pulse or is  breathing):    Limited Support         Additional Instructions for the Follow-ups that You Need to Schedule     Discharge Follow-up with PCP   As directed       Currently Documented PCP:    Provider, No Known    PCP Phone Number:    None     Follow Up Details: Follow-up with facility provider in 1 to 2 days         Discharge Follow-up with Specified Provider: Follow-up with nephrology on M WF in dialysis   As directed      To: Follow-up with nephrology on M WF in dialysis         Discharge Follow-up with Specified Provider: Neurology; 2 to 3 weeks; please schedule appointment prior to patient's discharge   As directed      To: Neurology; 2 to 3 weeks; please schedule appointment prior to patient's discharge               Electronically signed by MAYELIN Garcia, 08/04/22, 9:05 AM EDT.      Time Spent on Discharge: I spent  45  minutes on this discharge activity which included: face-to-face encounter with the patient, reviewing the data in the system, coordination of the care with the nursing staff as well as consultants, documentation, and entering orders.        Electronically signed by Washington Lazo MD at 08/04/22 0937

## 2022-08-22 ENCOUNTER — APPOINTMENT (OUTPATIENT)
Dept: GENERAL RADIOLOGY | Facility: HOSPITAL | Age: 62
End: 2022-08-22

## 2022-08-22 ENCOUNTER — APPOINTMENT (OUTPATIENT)
Dept: CT IMAGING | Facility: HOSPITAL | Age: 62
End: 2022-08-22

## 2022-08-22 ENCOUNTER — HOSPITAL ENCOUNTER (OUTPATIENT)
Facility: HOSPITAL | Age: 62
Setting detail: OBSERVATION
Discharge: HOME | End: 2022-08-24
Attending: EMERGENCY MEDICINE | Admitting: INTERNAL MEDICINE

## 2022-08-22 ENCOUNTER — APPOINTMENT (OUTPATIENT)
Dept: NEPHROLOGY | Facility: HOSPITAL | Age: 62
End: 2022-08-22

## 2022-08-22 DIAGNOSIS — N18.6 END-STAGE RENAL DISEASE ON HEMODIALYSIS: Primary | ICD-10-CM

## 2022-08-22 DIAGNOSIS — N19 UREMIA: ICD-10-CM

## 2022-08-22 DIAGNOSIS — E87.5 HYPERKALEMIA: ICD-10-CM

## 2022-08-22 DIAGNOSIS — R41.0 CONFUSION: ICD-10-CM

## 2022-08-22 DIAGNOSIS — Z99.2 END-STAGE RENAL DISEASE ON HEMODIALYSIS: Primary | ICD-10-CM

## 2022-08-22 DIAGNOSIS — D64.9 ANEMIA, UNSPECIFIED TYPE: ICD-10-CM

## 2022-08-22 DIAGNOSIS — R53.1 GENERALIZED WEAKNESS: ICD-10-CM

## 2022-08-22 PROBLEM — R41.82 ALTERED MENTAL STATUS: Status: ACTIVE | Noted: 2022-08-22

## 2022-08-22 LAB
ALBUMIN SERPL-MCNC: 4.1 G/DL (ref 3.5–5.2)
ALBUMIN/GLOB SERPL: 1.4 G/DL
ALP SERPL-CCNC: 81 U/L (ref 39–117)
ALT SERPL W P-5'-P-CCNC: 34 U/L (ref 1–33)
AMMONIA BLD-SCNC: 13 UMOL/L (ref 11–51)
AMPHET+METHAMPHET UR QL: NEGATIVE
AMPHETAMINES UR QL: NEGATIVE
ANION GAP SERPL CALCULATED.3IONS-SCNC: 14 MMOL/L (ref 5–15)
ANION GAP SERPL CALCULATED.3IONS-SCNC: 14 MMOL/L (ref 5–15)
APAP SERPL-MCNC: <5 MCG/ML (ref 0–30)
AST SERPL-CCNC: 27 U/L (ref 1–32)
ATMOSPHERIC PRESS: ABNORMAL MM[HG]
BACTERIA UR QL AUTO: ABNORMAL /HPF
BARBITURATES UR QL SCN: NEGATIVE
BASE EXCESS BLDV CALC-SCNC: -2.9 MMOL/L (ref -2–2)
BASOPHILS # BLD AUTO: 0.02 10*3/MM3 (ref 0–0.2)
BASOPHILS NFR BLD AUTO: 0.4 % (ref 0–1.5)
BDY SITE: ABNORMAL
BENZODIAZ UR QL SCN: NEGATIVE
BILIRUB SERPL-MCNC: 0.4 MG/DL (ref 0–1.2)
BILIRUB UR QL STRIP: NEGATIVE
BODY TEMPERATURE: 37 C
BUN BLDA-MCNC: 71 MG/DL (ref 8–26)
BUN SERPL-MCNC: 22 MG/DL (ref 8–23)
BUN SERPL-MCNC: 63 MG/DL (ref 8–23)
BUN/CREAT SERPL: 7.8 (ref 7–25)
BUN/CREAT SERPL: 9.1 (ref 7–25)
BUPRENORPHINE SERPL-MCNC: NEGATIVE NG/ML
CA-I BLDA-SCNC: 1.27 MMOL/L (ref 1.2–1.32)
CALCIUM SPEC-SCNC: 8.8 MG/DL (ref 8.6–10.5)
CALCIUM SPEC-SCNC: 9.5 MG/DL (ref 8.6–10.5)
CANNABINOIDS SERPL QL: NEGATIVE
CHLORIDE BLDA-SCNC: 103 MMOL/L (ref 98–109)
CHLORIDE SERPL-SCNC: 102 MMOL/L (ref 98–107)
CHLORIDE SERPL-SCNC: 98 MMOL/L (ref 98–107)
CLARITY UR: CLEAR
CO2 BLDA-SCNC: 20 MMOL/L (ref 24–29)
CO2 BLDA-SCNC: 21.7 MMOL/L (ref 22–33)
CO2 SERPL-SCNC: 20 MMOL/L (ref 22–29)
CO2 SERPL-SCNC: 23 MMOL/L (ref 22–29)
COCAINE UR QL: NEGATIVE
COHGB MFR BLD: 1.3 %
COLOR UR: YELLOW
CREAT BLDA-MCNC: 8.3 MG/DL (ref 0.6–1.3)
CREAT SERPL-MCNC: 2.83 MG/DL (ref 0.57–1)
CREAT SERPL-MCNC: 6.95 MG/DL (ref 0.57–1)
D-LACTATE SERPL-SCNC: 0.9 MMOL/L (ref 0.5–2)
DEPRECATED RDW RBC AUTO: 44.9 FL (ref 37–54)
EGFRCR SERPLBLD CKD-EPI 2021: 18.2 ML/MIN/1.73
EGFRCR SERPLBLD CKD-EPI 2021: 5 ML/MIN/1.73
EGFRCR SERPLBLD CKD-EPI 2021: 6.2 ML/MIN/1.73
EOSINOPHIL # BLD AUTO: 0.13 10*3/MM3 (ref 0–0.4)
EOSINOPHIL NFR BLD AUTO: 2.8 % (ref 0.3–6.2)
EPAP: 0
ERYTHROCYTE [DISTWIDTH] IN BLOOD BY AUTOMATED COUNT: 12.7 % (ref 12.3–15.4)
ETHANOL BLD-MCNC: <10 MG/DL (ref 0–10)
FLUAV RNA RESP QL NAA+PROBE: NOT DETECTED
FLUBV RNA RESP QL NAA+PROBE: NOT DETECTED
GLOBULIN UR ELPH-MCNC: 2.9 GM/DL
GLUCOSE BLDC GLUCOMTR-MCNC: 146 MG/DL (ref 70–130)
GLUCOSE BLDC GLUCOMTR-MCNC: 80 MG/DL (ref 70–130)
GLUCOSE SERPL-MCNC: 148 MG/DL (ref 65–99)
GLUCOSE SERPL-MCNC: 79 MG/DL (ref 65–99)
GLUCOSE UR STRIP-MCNC: ABNORMAL MG/DL
HCO3 BLDV-SCNC: 20.7 MMOL/L (ref 22–28)
HCT VFR BLD AUTO: 27.7 % (ref 34–46.6)
HCT VFR BLDA CALC: 28 % (ref 38–51)
HGB BLD-MCNC: 10 G/DL (ref 12–15.9)
HGB BLDA-MCNC: 10.4 G/DL (ref 14–18)
HGB BLDA-MCNC: 9.5 G/DL (ref 12–17)
HGB UR QL STRIP.AUTO: NEGATIVE
HYALINE CASTS UR QL AUTO: ABNORMAL /LPF
IMM GRANULOCYTES # BLD AUTO: 0.03 10*3/MM3 (ref 0–0.05)
IMM GRANULOCYTES NFR BLD AUTO: 0.6 % (ref 0–0.5)
INHALED O2 CONCENTRATION: 21 %
INR PPP: 1.04 (ref 0.84–1.13)
IPAP: 0
KETONES UR QL STRIP: NEGATIVE
LEUKOCYTE ESTERASE UR QL STRIP.AUTO: ABNORMAL
LYMPHOCYTES # BLD AUTO: 1.08 10*3/MM3 (ref 0.7–3.1)
LYMPHOCYTES NFR BLD AUTO: 22.9 % (ref 19.6–45.3)
MAGNESIUM SERPL-MCNC: 2.1 MG/DL (ref 1.6–2.4)
MCH RBC QN AUTO: 34.5 PG (ref 26.6–33)
MCHC RBC AUTO-ENTMCNC: 36.1 G/DL (ref 31.5–35.7)
MCV RBC AUTO: 95.5 FL (ref 79–97)
METHADONE UR QL SCN: NEGATIVE
METHGB BLD QL: 0.2 %
MODALITY: ABNORMAL
MONOCYTES # BLD AUTO: 0.29 10*3/MM3 (ref 0.1–0.9)
MONOCYTES NFR BLD AUTO: 6.2 % (ref 5–12)
NEUTROPHILS NFR BLD AUTO: 3.16 10*3/MM3 (ref 1.7–7)
NEUTROPHILS NFR BLD AUTO: 67.1 % (ref 42.7–76)
NITRITE UR QL STRIP: NEGATIVE
NOTE: ABNORMAL
NRBC BLD AUTO-RTO: 0 /100 WBC (ref 0–0.2)
NT-PROBNP SERPL-MCNC: 9761 PG/ML (ref 0–900)
OPIATES UR QL: NEGATIVE
OXYCODONE UR QL SCN: NEGATIVE
OXYHGB MFR BLDV: 72.7 %
PAW @ PEAK INSP FLOW SETTING VENT: 0 CMH2O
PCO2 BLDV: 31.1 MM HG (ref 41–51)
PCP UR QL SCN: NEGATIVE
PH BLDV: 7.43 PH UNITS (ref 7.31–7.41)
PH UR STRIP.AUTO: 7.5 [PH] (ref 5–8)
PLATELET # BLD AUTO: 183 10*3/MM3 (ref 140–450)
PMV BLD AUTO: 9.1 FL (ref 6–12)
PO2 BLDV: 37.1 MM HG (ref 27–53)
POTASSIUM BLDA-SCNC: 5 MMOL/L (ref 3.5–4.9)
POTASSIUM SERPL-SCNC: 3.3 MMOL/L (ref 3.5–5.2)
POTASSIUM SERPL-SCNC: 5.5 MMOL/L (ref 3.5–5.2)
PROCALCITONIN SERPL-MCNC: 0.18 NG/ML (ref 0–0.25)
PROPOXYPH UR QL: NEGATIVE
PROT SERPL-MCNC: 7 G/DL (ref 6–8.5)
PROT UR QL STRIP: ABNORMAL
PROTHROMBIN TIME: 13.5 SECONDS (ref 11.4–14.4)
QT INTERVAL: 376 MS
QTC INTERVAL: 449 MS
RBC # BLD AUTO: 2.9 10*6/MM3 (ref 3.77–5.28)
RBC # UR STRIP: ABNORMAL /HPF
REF LAB TEST METHOD: ABNORMAL
SALICYLATES SERPL-MCNC: <0.3 MG/DL
SARS-COV-2 RNA RESP QL NAA+PROBE: NOT DETECTED
SODIUM BLD-SCNC: 136 MMOL/L (ref 138–146)
SODIUM SERPL-SCNC: 135 MMOL/L (ref 136–145)
SODIUM SERPL-SCNC: 136 MMOL/L (ref 136–145)
SP GR UR STRIP: 1.01 (ref 1–1.03)
SQUAMOUS #/AREA URNS HPF: ABNORMAL /HPF
T4 FREE SERPL-MCNC: 1.46 NG/DL (ref 0.93–1.7)
TOTAL RATE: 0 BREATHS/MINUTE
TRICYCLICS UR QL SCN: NEGATIVE
TROPONIN T SERPL-MCNC: 0.04 NG/ML (ref 0–0.03)
TSH SERPL DL<=0.05 MIU/L-ACNC: 1.09 UIU/ML (ref 0.27–4.2)
UROBILINOGEN UR QL STRIP: ABNORMAL
WBC # UR STRIP: ABNORMAL /HPF
WBC NRBC COR # BLD: 4.71 10*3/MM3 (ref 3.4–10.8)

## 2022-08-22 PROCEDURE — 25010000002 ONDANSETRON PER 1 MG: Performed by: INTERNAL MEDICINE

## 2022-08-22 PROCEDURE — 83605 ASSAY OF LACTIC ACID: CPT | Performed by: EMERGENCY MEDICINE

## 2022-08-22 PROCEDURE — 84439 ASSAY OF FREE THYROXINE: CPT | Performed by: EMERGENCY MEDICINE

## 2022-08-22 PROCEDURE — 94640 AIRWAY INHALATION TREATMENT: CPT

## 2022-08-22 PROCEDURE — G0378 HOSPITAL OBSERVATION PER HR: HCPCS

## 2022-08-22 PROCEDURE — 87636 SARSCOV2 & INF A&B AMP PRB: CPT | Performed by: EMERGENCY MEDICINE

## 2022-08-22 PROCEDURE — 80047 BASIC METABLC PNL IONIZED CA: CPT

## 2022-08-22 PROCEDURE — 80048 BASIC METABOLIC PNL TOTAL CA: CPT | Performed by: EMERGENCY MEDICINE

## 2022-08-22 PROCEDURE — 36415 COLL VENOUS BLD VENIPUNCTURE: CPT

## 2022-08-22 PROCEDURE — 80179 DRUG ASSAY SALICYLATE: CPT | Performed by: EMERGENCY MEDICINE

## 2022-08-22 PROCEDURE — 80306 DRUG TEST PRSMV INSTRMNT: CPT | Performed by: EMERGENCY MEDICINE

## 2022-08-22 PROCEDURE — 25010000002 HEPARIN (PORCINE) PER 1000 UNITS: Performed by: INTERNAL MEDICINE

## 2022-08-22 PROCEDURE — 99220 PR INITIAL OBSERVATION CARE/DAY 70 MINUTES: CPT | Performed by: INTERNAL MEDICINE

## 2022-08-22 PROCEDURE — 82962 GLUCOSE BLOOD TEST: CPT

## 2022-08-22 PROCEDURE — 82077 ASSAY SPEC XCP UR&BREATH IA: CPT | Performed by: EMERGENCY MEDICINE

## 2022-08-22 PROCEDURE — 84145 PROCALCITONIN (PCT): CPT | Performed by: EMERGENCY MEDICINE

## 2022-08-22 PROCEDURE — 83880 ASSAY OF NATRIURETIC PEPTIDE: CPT | Performed by: EMERGENCY MEDICINE

## 2022-08-22 PROCEDURE — C9803 HOPD COVID-19 SPEC COLLECT: HCPCS

## 2022-08-22 PROCEDURE — 99285 EMERGENCY DEPT VISIT HI MDM: CPT

## 2022-08-22 PROCEDURE — 82140 ASSAY OF AMMONIA: CPT | Performed by: EMERGENCY MEDICINE

## 2022-08-22 PROCEDURE — 25010000002 PIPERACILLIN SOD-TAZOBACTAM PER 1 G: Performed by: EMERGENCY MEDICINE

## 2022-08-22 PROCEDURE — 71045 X-RAY EXAM CHEST 1 VIEW: CPT

## 2022-08-22 PROCEDURE — 96374 THER/PROPH/DIAG INJ IV PUSH: CPT

## 2022-08-22 PROCEDURE — P9612 CATHETERIZE FOR URINE SPEC: HCPCS

## 2022-08-22 PROCEDURE — 74018 RADEX ABDOMEN 1 VIEW: CPT

## 2022-08-22 PROCEDURE — 85610 PROTHROMBIN TIME: CPT | Performed by: EMERGENCY MEDICINE

## 2022-08-22 PROCEDURE — 85014 HEMATOCRIT: CPT

## 2022-08-22 PROCEDURE — 70450 CT HEAD/BRAIN W/O DYE: CPT

## 2022-08-22 PROCEDURE — 82805 BLOOD GASES W/O2 SATURATION: CPT

## 2022-08-22 PROCEDURE — 84484 ASSAY OF TROPONIN QUANT: CPT | Performed by: EMERGENCY MEDICINE

## 2022-08-22 PROCEDURE — 25010000002 VANCOMYCIN 10 G RECONSTITUTED SOLUTION: Performed by: EMERGENCY MEDICINE

## 2022-08-22 PROCEDURE — 96375 TX/PRO/DX INJ NEW DRUG ADDON: CPT

## 2022-08-22 PROCEDURE — G0257 UNSCHED DIALYSIS ESRD PT HOS: HCPCS

## 2022-08-22 PROCEDURE — 93005 ELECTROCARDIOGRAM TRACING: CPT | Performed by: EMERGENCY MEDICINE

## 2022-08-22 PROCEDURE — 80143 DRUG ASSAY ACETAMINOPHEN: CPT | Performed by: EMERGENCY MEDICINE

## 2022-08-22 PROCEDURE — 80050 GENERAL HEALTH PANEL: CPT | Performed by: EMERGENCY MEDICINE

## 2022-08-22 PROCEDURE — 87040 BLOOD CULTURE FOR BACTERIA: CPT | Performed by: EMERGENCY MEDICINE

## 2022-08-22 PROCEDURE — 96372 THER/PROPH/DIAG INJ SC/IM: CPT

## 2022-08-22 PROCEDURE — 63710000001 INSULIN REGULAR HUMAN PER 5 UNITS: Performed by: EMERGENCY MEDICINE

## 2022-08-22 PROCEDURE — 81001 URINALYSIS AUTO W/SCOPE: CPT | Performed by: EMERGENCY MEDICINE

## 2022-08-22 PROCEDURE — 83735 ASSAY OF MAGNESIUM: CPT | Performed by: EMERGENCY MEDICINE

## 2022-08-22 PROCEDURE — 25010000002 CALCIUM GLUCONATE-NACL 1-0.675 GM/50ML-% SOLUTION: Performed by: EMERGENCY MEDICINE

## 2022-08-22 RX ORDER — CALCIUM GLUCONATE 20 MG/ML
1 INJECTION, SOLUTION INTRAVENOUS ONCE
Status: COMPLETED | OUTPATIENT
Start: 2022-08-22 | End: 2022-08-22

## 2022-08-22 RX ORDER — PANTOPRAZOLE SODIUM 40 MG/1
40 TABLET, DELAYED RELEASE ORAL EVERY MORNING
Status: DISCONTINUED | OUTPATIENT
Start: 2022-08-23 | End: 2022-08-24 | Stop reason: HOSPADM

## 2022-08-22 RX ORDER — SODIUM CHLORIDE 0.9 % (FLUSH) 0.9 %
10 SYRINGE (ML) INJECTION AS NEEDED
Status: DISCONTINUED | OUTPATIENT
Start: 2022-08-22 | End: 2022-08-24 | Stop reason: HOSPADM

## 2022-08-22 RX ORDER — SODIUM CHLORIDE 0.9 % (FLUSH) 0.9 %
10 SYRINGE (ML) INJECTION EVERY 12 HOURS SCHEDULED
Status: DISCONTINUED | OUTPATIENT
Start: 2022-08-22 | End: 2022-08-24 | Stop reason: HOSPADM

## 2022-08-22 RX ORDER — DEXTROSE MONOHYDRATE 25 G/50ML
50 INJECTION, SOLUTION INTRAVENOUS ONCE
Status: COMPLETED | OUTPATIENT
Start: 2022-08-22 | End: 2022-08-22

## 2022-08-22 RX ORDER — LOPERAMIDE HYDROCHLORIDE 2 MG/1
2 CAPSULE ORAL 4 TIMES DAILY PRN
Status: DISCONTINUED | OUTPATIENT
Start: 2022-08-22 | End: 2022-08-23

## 2022-08-22 RX ORDER — HYDRALAZINE HYDROCHLORIDE 50 MG/1
100 TABLET, FILM COATED ORAL 3 TIMES DAILY
Status: DISCONTINUED | OUTPATIENT
Start: 2022-08-22 | End: 2022-08-24 | Stop reason: HOSPADM

## 2022-08-22 RX ORDER — VENLAFAXINE HYDROCHLORIDE 75 MG/1
75 CAPSULE, EXTENDED RELEASE ORAL DAILY
Status: DISCONTINUED | OUTPATIENT
Start: 2022-08-22 | End: 2022-08-24 | Stop reason: HOSPADM

## 2022-08-22 RX ORDER — ONDANSETRON 4 MG/1
4 TABLET, FILM COATED ORAL EVERY 6 HOURS PRN
Status: DISCONTINUED | OUTPATIENT
Start: 2022-08-22 | End: 2022-08-24 | Stop reason: HOSPADM

## 2022-08-22 RX ORDER — AMOXICILLIN 250 MG
2 CAPSULE ORAL 2 TIMES DAILY
Status: DISCONTINUED | OUTPATIENT
Start: 2022-08-22 | End: 2022-08-24 | Stop reason: HOSPADM

## 2022-08-22 RX ORDER — DEXTROSE MONOHYDRATE 25 G/50ML
25 INJECTION, SOLUTION INTRAVENOUS
Status: DISCONTINUED | OUTPATIENT
Start: 2022-08-22 | End: 2022-08-24 | Stop reason: HOSPADM

## 2022-08-22 RX ORDER — ATORVASTATIN CALCIUM 40 MG/1
80 TABLET, FILM COATED ORAL NIGHTLY
Status: DISCONTINUED | OUTPATIENT
Start: 2022-08-22 | End: 2022-08-24 | Stop reason: HOSPADM

## 2022-08-22 RX ORDER — HEPARIN SODIUM 5000 [USP'U]/ML
5000 INJECTION, SOLUTION INTRAVENOUS; SUBCUTANEOUS EVERY 8 HOURS SCHEDULED
Status: DISCONTINUED | OUTPATIENT
Start: 2022-08-22 | End: 2022-08-24 | Stop reason: HOSPADM

## 2022-08-22 RX ORDER — ALBUTEROL SULFATE 2.5 MG/3ML
10 SOLUTION RESPIRATORY (INHALATION) ONCE
Status: COMPLETED | OUTPATIENT
Start: 2022-08-22 | End: 2022-08-22

## 2022-08-22 RX ORDER — CARVEDILOL 12.5 MG/1
25 TABLET ORAL 2 TIMES DAILY WITH MEALS
Status: DISCONTINUED | OUTPATIENT
Start: 2022-08-22 | End: 2022-08-24 | Stop reason: HOSPADM

## 2022-08-22 RX ORDER — BISACODYL 10 MG
10 SUPPOSITORY, RECTAL RECTAL DAILY PRN
Status: DISCONTINUED | OUTPATIENT
Start: 2022-08-22 | End: 2022-08-24 | Stop reason: HOSPADM

## 2022-08-22 RX ORDER — NICOTINE POLACRILEX 4 MG
15 LOZENGE BUCCAL
Status: DISCONTINUED | OUTPATIENT
Start: 2022-08-22 | End: 2022-08-24 | Stop reason: HOSPADM

## 2022-08-22 RX ORDER — BISACODYL 5 MG/1
5 TABLET, DELAYED RELEASE ORAL DAILY PRN
Status: DISCONTINUED | OUTPATIENT
Start: 2022-08-22 | End: 2022-08-24 | Stop reason: HOSPADM

## 2022-08-22 RX ORDER — POLYETHYLENE GLYCOL 3350 17 G/17G
17 POWDER, FOR SOLUTION ORAL DAILY PRN
Status: DISCONTINUED | OUTPATIENT
Start: 2022-08-22 | End: 2022-08-24 | Stop reason: HOSPADM

## 2022-08-22 RX ORDER — AMLODIPINE BESYLATE 10 MG/1
10 TABLET ORAL
Status: DISCONTINUED | OUTPATIENT
Start: 2022-08-22 | End: 2022-08-24 | Stop reason: HOSPADM

## 2022-08-22 RX ORDER — ONDANSETRON 2 MG/ML
4 INJECTION INTRAMUSCULAR; INTRAVENOUS EVERY 6 HOURS PRN
Status: DISCONTINUED | OUTPATIENT
Start: 2022-08-22 | End: 2022-08-24 | Stop reason: HOSPADM

## 2022-08-22 RX ORDER — CALCIUM ACETATE 667 MG/1
667 CAPSULE ORAL
Status: DISCONTINUED | OUTPATIENT
Start: 2022-08-23 | End: 2022-08-24 | Stop reason: HOSPADM

## 2022-08-22 RX ORDER — INSULIN LISPRO 100 [IU]/ML
0-7 INJECTION, SOLUTION INTRAVENOUS; SUBCUTANEOUS
Status: DISCONTINUED | OUTPATIENT
Start: 2022-08-22 | End: 2022-08-24 | Stop reason: HOSPADM

## 2022-08-22 RX ORDER — ACETAMINOPHEN 325 MG/1
650 TABLET ORAL EVERY 6 HOURS PRN
Status: DISCONTINUED | OUTPATIENT
Start: 2022-08-22 | End: 2022-08-24 | Stop reason: HOSPADM

## 2022-08-22 RX ORDER — ALBUMIN (HUMAN) 12.5 G/50ML
12.5 SOLUTION INTRAVENOUS AS NEEDED
Status: ACTIVE | OUTPATIENT
Start: 2022-08-22 | End: 2022-08-23

## 2022-08-22 RX ADMIN — CARVEDILOL 25 MG: 12.5 TABLET, FILM COATED ORAL at 21:01

## 2022-08-22 RX ADMIN — CALCIUM GLUCONATE 1 G: 20 INJECTION, SOLUTION INTRAVENOUS at 13:56

## 2022-08-22 RX ADMIN — VANCOMYCIN HYDROCHLORIDE 1500 MG: 10 INJECTION, POWDER, LYOPHILIZED, FOR SOLUTION INTRAVENOUS at 17:34

## 2022-08-22 RX ADMIN — AMLODIPINE BESYLATE 10 MG: 10 TABLET ORAL at 21:01

## 2022-08-22 RX ADMIN — HEPARIN SODIUM 5000 UNITS: 5000 INJECTION INTRAVENOUS; SUBCUTANEOUS at 21:02

## 2022-08-22 RX ADMIN — ATORVASTATIN CALCIUM 80 MG: 40 TABLET, FILM COATED ORAL at 21:01

## 2022-08-22 RX ADMIN — ONDANSETRON 4 MG: 2 INJECTION INTRAMUSCULAR; INTRAVENOUS at 17:51

## 2022-08-22 RX ADMIN — HYDRALAZINE HYDROCHLORIDE 100 MG: 50 TABLET, FILM COATED ORAL at 21:01

## 2022-08-22 RX ADMIN — VENLAFAXINE HYDROCHLORIDE 75 MG: 75 CAPSULE, EXTENDED RELEASE ORAL at 21:01

## 2022-08-22 RX ADMIN — DOCUSATE SODIUM 50 MG AND SENNOSIDES 8.6 MG 2 TABLET: 8.6; 5 TABLET, FILM COATED ORAL at 21:01

## 2022-08-22 RX ADMIN — ALBUTEROL SULFATE 10 MG: 2.5 SOLUTION RESPIRATORY (INHALATION) at 15:00

## 2022-08-22 RX ADMIN — DEXTROSE MONOHYDRATE 50 ML: 25 INJECTION, SOLUTION INTRAVENOUS at 13:53

## 2022-08-22 RX ADMIN — INSULIN HUMAN 10 UNITS: 100 INJECTION, SOLUTION PARENTERAL at 13:55

## 2022-08-22 RX ADMIN — TAZOBACTAM SODIUM AND PIPERACILLIN SODIUM 3.38 G: 375; 3 INJECTION, SOLUTION INTRAVENOUS at 14:51

## 2022-08-22 NOTE — CASE MANAGEMENT/SOCIAL WORK
Discharge Planning Assessment  [facility]     Patient Name: Jeaneth Keita  MRN: [id number]  Today's Date: 8/22/2022    Admit Date: 8/22/2022     Discharge Needs Assessment     Row Name 08/22/22 1541       Living Environment    People in Home facility resident    Current Living Arrangements assisted living facility    Primary Care Provided by other (see comments)  [facility] staff    Provides Primary Care For no one, unable/limited ability to care for self    Family Caregiver if Needed other (see comments)  [facility] staff    Quality of Family Relationships helpful;involved;supportive    Able to Return to Prior Arrangements yes       Resource/Environmental Concerns    Resource/Environmental Concerns none    Transportation Concerns none       Transition Planning    Patient/Family Anticipates Transition to other (see comments)  [facility]    Patient/Family Anticipated Services at Transition ;rehabilitation services    Transportation Anticipated family or friend will provide       Discharge Needs Assessment    Readmission Within the Last 30 Days no previous admission in last 30 days    Current Outpatient/Agency/Support Group assisted living facility    Equipment Currently Used at Home wheelchair;shower chair;hospital bed    Concerns to be Addressed discharge planning    Anticipated Changes Related to Illness none    Current Discharge Risk chronically ill;dependent with mobility/activities of daily living               Discharge Plan     Row Name 08/22/22 1542       Plan    Plan Initial    Plan Comments CM spoke with patient's spouse via phone. Patient resides in [facility]. Patient is wheelchair bound and facility staff assists with her ADL's. Patient goes to outpatient dialysis MWF at [facility]. Spouse denies any current home health. Patient has medical insurance, prescription coverage and is able to afford/obtain medications without difficulty. Patient uses Wheels for  transportation. DC plan is back to [facility]. CM following.    Final Discharge Disposition Code 30 - still a patient              Continued Care and Services - Admitted Since 8/22/2022    Coordination has not been started for this encounter.          Demographic Summary     Row Name 08/22/22 1531       General Information    Arrived From other (see comments)  [facility]    Referral Source emergency department    Reason for Consult discharge planning    Preferred Language English       Contact Information    Contact Information Comments CHLOÉ TREVIÑO Spouse [phone]               Functional Status     Row Name 08/22/22 1537       Functional Status    Usual Activity Tolerance fair    Current Activity Tolerance fair  WC bound       Assessment of Health Literacy    How often do you have someone help you read hospital materials? Always    How often do you have problems learning about your medical condition because of difficulty understanding written information? Always    How often do you have a problem understanding what is told to you about your medical condition? Always    Health Literacy Fair       Functional Status, IADL    Medications completely dependent    Meal Preparation completely dependent    Housekeeping completely dependent    Laundry completely dependent    Shopping completely dependent       Mental Status    General Appearance WDL WDL       Mental Status Summary    Recent Changes in Mental Status/Cognitive Functioning unable to assess       Employment/    Employment Status disabled               Psychosocial    No documentation.                Abuse/Neglect    No documentation.                Legal    No documentation.                Substance Abuse    No documentation.                Patient Forms    No documentation.                   Chaparrita Higginbotham RN

## 2022-08-22 NOTE — H&P
[facility] Medicine Services  HISTORY AND PHYSICAL    Patient Name: Jeaneth Keita  : 1960  MRN: [id number]  Primary Care Physician: Feliberto, No Known  Date of admission: 2022      Subjective   Subjective     Chief Complaint:  Altered mentation    HPI:  Jeaneth Keita is a 61 y.o. female with h/o cirrhosis, T2DM, ESRD, HTN, previous CVA who presents from [facility] due to confusion. Patient reports to me that she does not recall why she is here or events leading up to her presentation. She knows her name and knows we are in a hospital. She reports that she typically does dialysis MWF however does not recall refusing last week as was reported by facility. Patient brought to ED for further evaluation given confusion- notably she is usually AOX4 at baseline. When asked, patient does endorse some urinary hesitancy and abd pain in comparison to her baseline. Reports that she has not had a BM in a few days which she reports is normal for her    ED workup notable for K 5.5, elevate BUN/CR compatible with ESRD, elevated proBNP/trop. CT H pending at time of eval. Patient will be admitted       Review of Systems   .UTO completely due to confusion  All other systems reviewed and are negative.     Personal History     Past Medical History:   Diagnosis Date   • Anxiety    • Cancer (HCC)     liver cell carcinoma   • DDD (degenerative disc disease), lumbar    • Depression    • Diabetes mellitus (HCC)    • Fibromyalgia    • Hemochromatosis    • Hep B w/o coma, chronic, w/o delta (HCC)    • Hep C w/o coma, chronic (HCC)    • Hypertension    • Stroke (HCC)    • Vertigo          Oncology Problem List:  Hepatocellular carcinoma metastatic to bone s/p RXT  (10/05/2021;   Status: Active)       Past Surgical History:   Procedure Laterality Date   • ARTERIOVENOUS FISTULA/SHUNT SURGERY Left 10/16/2021    Procedure: UPPER EXTREMITY ARTERIOVENOUS FISTULA FORMATION LEFT;  Surgeon: Onelia  Johnny Lozano MD;  Location: [facility];  Service: Vascular;  Laterality: Left;   • BACK SURGERY     • BELOW KNEE LEG AMPUTATION     •  SECTION     • FOOT SURGERY     • KNEE SURGERY     • ROTATOR CUFF REPAIR     • SPINAL CORD STIMULATOR IMPLANT         Family History:* family history includes Heart attack in her father; Heart disease in her father. Otherwise pertinent FHx was reviewed and unremarkable.     Social History:  reports that she has never smoked. She has never used smokeless tobacco. She reports that she does not drink alcohol and does not use drugs.  Social History     Social History Narrative     Pt ex  Parviz Keita is her POA       Medications:  Available home medication information reviewed.  (Not in a hospital admission)      Allergies   Allergen Reactions   • Sulfa Antibiotics        Objective   Objective     Vital Signs:   Temp:  [98.1 °F (36.7 °C)] 98.1 °F (36.7 °C)  Heart Rate:  [87-91] 90  Resp:  [16] 16  BP: (129-140)/(78-91) 140/90       Physical Exam   GEN: NAD, resting in bed, awake, confused  HEENT: on room air, atraumatic, normocephalic  NECK: supple, no masses  RESP: on room air, normal effort  CV: on tele, sinus rhythm  PSYCH: normal affect, appropriate  NEURO: awake, alert, oriented only to self place but no other focal def   MSK: no edema noted, L BKA noted   SKIN: no rashes noted     Result Review:  I have personally reviewed the results from the time of this admission to 2022 14:45 EDT and agree with these findings:  [x]  Laboratory list / accordion  [x]  Microbiology  [x]  Radiology  [x]  EKG/Telemetry   []  Cardiology/Vascular   []  Pathology  [x]  Old records  []  Other:  Most notable findings include:       See hpi        LAB RESULTS:      Lab 22  1159   WBC 4.71   HEMOGLOBIN 10.0*   HEMOGLOBIN, POC 9.5*   HEMATOCRIT 27.7*   HEMATOCRIT POC 28*   PLATELETS 183   NEUTROS ABS 3.16   IMMATURE GRANS (ABS) 0.03   LYMPHS ABS 1.08   MONOS ABS 0.29   EOS ABS 0.13    MCV 95.5   PROCALCITONIN 0.18   LACTATE 0.9   PROTIME 13.5   INR 1.04         Lab 08/22/22  1159   SODIUM 136   POTASSIUM 5.5*   CHLORIDE 102   CO2 20.0*   ANION GAP 14.0   BUN 63*   CREATININE 8.30*  6.95*   EGFR 6.2*  5.0*   GLUCOSE 148*   CALCIUM 9.5   MAGNESIUM 2.1   TSH 1.090         Lab 08/22/22  1159   TOTAL PROTEIN 7.0   ALBUMIN 4.10   GLOBULIN 2.9   ALT (SGPT) 34*   AST (SGOT) 27   BILIRUBIN 0.4   ALK PHOS 81         Lab 08/22/22  1159   PROBNP 9,761.0*   TROPONIN T 0.043*                 Lab 08/22/22  1234   FIO2 21   CARBOXYHEMOGLOBIN (VENOUS) 1.3     UA    Urinalysis 7/29/22 7/29/22 8/1/22 8/1/22 8/22/22 8/22/22    0915 0915 1438 1438 1209 1209   Squamous Epithelial Cells, UA  0-2  0-2  3-6 (A)   Specific [place], UA 1.012  1.013  1.012    Ketones, UA Negative  Negative  Negative    Blood, UA Negative  Negative  Negative    Leukocytes, UA Negative  Small (1+) (A)  Trace (A)    Nitrite, UA Negative  Negative  Negative    RBC, UA  None Seen  0-2  0-2   WBC, UA  None Seen  6-12 (A)  3-5 (A)   Bacteria, UA  None Seen  4+ (A)  None Seen   (A) Abnormal value       Comments are available for some flowsheets but are not being displayed.             Microbiology Results (last 10 days)     Procedure Component Value - Date/Time    COVID-19 and FLU A/B PCR - Swab, Nasopharynx [[id number]]  (Normal) Collected: 08/22/22 1212    Lab Status: Final result Specimen: Swab from Nasopharynx Updated: 08/22/22 1258     COVID19 Not Detected     Influenza A PCR Not Detected     Influenza B PCR Not Detected    Narrative:      Fact sheet for providers: https://www.fda.gov/media/604432/download    Fact sheet for patients: https://www.fda.gov/media/575156/download    Test performed by PCR.          XR Chest 1 View    Result Date: 8/22/2022  DATE OF EXAM: 8/22/2022 12:05 PM  PROCEDURE: XR CHEST 1 VW-  INDICATIONS: ams  COMPARISON: 7/29/2022  TECHNIQUE: Single radiographic AP view of the chest was obtained.  FINDINGS: The heart  size remains within normal limits. The left lung show some linear scarring. The patient has developed right perihilar and right basilar pulmonary infiltrates since the last study. A right-sided dialysis catheter terminates at the cavoatrial junction.      Impression: Interval development of right perihilar and basilar infiltrates. This could represent either pneumonia or could represent an atypical pattern of pulmonary edema. Correlate with patient's clinical findings.  This report was finalized on 8/22/2022 12:32 PM by Parviz Sabillon MD.        Results for orders placed during the hospital encounter of 10/05/21    Adult Transthoracic Echo Complete W/ Cont if Necessary Per Protocol    Interpretation Summary  · Left ventricular ejection fraction appears to be 61 - 65%. Left ventricular systolic function is normal.  · Left atrial volume is mildly increased.      Assessment & Plan   Assessment & Plan     Active Hospital Problems    Diagnosis  POA   • Altered mental status [R41.82]  Yes   • Cirrhosis of liver (HCC) [K74.60]  Yes   • ESRD (end stage renal disease) [N18.6]  Yes   • S/P left BKA [Z89.512]  Not Applicable   • Essential hypertension [I10]  Yes   • Type 2 diabetes mellitus (HCC) [E11.9]  Yes       Jeaneth Keita is a 61 y.o. female with h/o cirrhosis, T2DM, ESRD, HTN, previous CVA who presents from [facility] due to confusion. Patient reports to me that she does not recall why she is here or events leading up to her presentation. She knows her name and knows we are in a hospital. She reports that she typically does dialysis MWF however does not recall refusing last week as was reported by facility. Patient brought to ED for further evaluation given confusion- notably she is usually AOX4 at baseline. When asked, patient does endorse some urinary hesitancy and abd pain in comparison to her baseline. Reports that she has not had a BM in a few days which she reports is normal for her    Altered Mentation  Abd  pain/urinary hesitancy  Concern for uremia  --wide differential in setting of previous stroke, possible infectious cause given urinary/abdominal symptoms and uremia given missed dialysis  --ED has contacted [facility] and plan on HD today, correcting electrolytes including hyperK  --will send UA/KUB- consider CT imaging of abdomen pending KUB results  --vanc/zosyn given in ED- will hold further for now unless clear infectious etiology, would favor that symptoms related to uremia - will also send procal to help determine if infectious in nature     ESRD on HD with missed HD  Hyperkalemia  -- plan as above, appreciate MICHELLE assistance    T2DM  --70/30, SSI, monitor    HTN  --continue home medications     H/o Cirrhosis  --LFTs wnl, monitor, will check ammonia as well                DVT prophylaxis: heparin    CODE STATUS:   Code Status and Medical Interventions:   Ordered at: 08/22/22 1444     Code Status (Patient has no pulse and is not breathing):    CPR (Attempt to Resuscitate)     Medical Interventions (Patient has pulse or is breathing):    Full Support     Comments:    d/w patient         Sandra Villalta MD  08/22/22

## 2022-08-23 PROBLEM — N18.6 END-STAGE RENAL DISEASE ON HEMODIALYSIS: Status: ACTIVE | Noted: 2022-08-23

## 2022-08-23 PROBLEM — Z99.2 END-STAGE RENAL DISEASE ON HEMODIALYSIS: Status: ACTIVE | Noted: 2022-08-23

## 2022-08-23 LAB
ALBUMIN SERPL-MCNC: 3.8 G/DL (ref 3.5–5.2)
ALBUMIN/GLOB SERPL: 1.7 G/DL
ALP SERPL-CCNC: 67 U/L (ref 39–117)
ALT SERPL W P-5'-P-CCNC: 30 U/L (ref 1–33)
ANION GAP SERPL CALCULATED.3IONS-SCNC: 11 MMOL/L (ref 5–15)
AST SERPL-CCNC: 27 U/L (ref 1–32)
BASOPHILS # BLD AUTO: 0.01 10*3/MM3 (ref 0–0.2)
BASOPHILS NFR BLD AUTO: 0.2 % (ref 0–1.5)
BILIRUB SERPL-MCNC: 0.6 MG/DL (ref 0–1.2)
BUN SERPL-MCNC: 27 MG/DL (ref 8–23)
BUN/CREAT SERPL: 6.2 (ref 7–25)
CALCIUM SPEC-SCNC: 8.6 MG/DL (ref 8.6–10.5)
CHLORIDE SERPL-SCNC: 102 MMOL/L (ref 98–107)
CO2 SERPL-SCNC: 25 MMOL/L (ref 22–29)
CREAT SERPL-MCNC: 4.39 MG/DL (ref 0.57–1)
DEPRECATED RDW RBC AUTO: 43.7 FL (ref 37–54)
EGFRCR SERPLBLD CKD-EPI 2021: 10.7 ML/MIN/1.73
EOSINOPHIL # BLD AUTO: 0.06 10*3/MM3 (ref 0–0.4)
EOSINOPHIL NFR BLD AUTO: 1.2 % (ref 0.3–6.2)
ERYTHROCYTE [DISTWIDTH] IN BLOOD BY AUTOMATED COUNT: 12.6 % (ref 12.3–15.4)
GLOBULIN UR ELPH-MCNC: 2.3 GM/DL
GLUCOSE BLDC GLUCOMTR-MCNC: 134 MG/DL (ref 70–130)
GLUCOSE BLDC GLUCOMTR-MCNC: 140 MG/DL (ref 70–130)
GLUCOSE BLDC GLUCOMTR-MCNC: 78 MG/DL (ref 70–130)
GLUCOSE SERPL-MCNC: 138 MG/DL (ref 65–99)
HCT VFR BLD AUTO: 25.1 % (ref 34–46.6)
HGB BLD-MCNC: 9 G/DL (ref 12–15.9)
IMM GRANULOCYTES # BLD AUTO: 0.03 10*3/MM3 (ref 0–0.05)
IMM GRANULOCYTES NFR BLD AUTO: 0.6 % (ref 0–0.5)
LYMPHOCYTES # BLD AUTO: 0.37 10*3/MM3 (ref 0.7–3.1)
LYMPHOCYTES NFR BLD AUTO: 7.2 % (ref 19.6–45.3)
MCH RBC QN AUTO: 34.4 PG (ref 26.6–33)
MCHC RBC AUTO-ENTMCNC: 35.9 G/DL (ref 31.5–35.7)
MCV RBC AUTO: 95.8 FL (ref 79–97)
MONOCYTES # BLD AUTO: 0.16 10*3/MM3 (ref 0.1–0.9)
MONOCYTES NFR BLD AUTO: 3.1 % (ref 5–12)
NEUTROPHILS NFR BLD AUTO: 4.51 10*3/MM3 (ref 1.7–7)
NEUTROPHILS NFR BLD AUTO: 87.7 % (ref 42.7–76)
NRBC BLD AUTO-RTO: 0 /100 WBC (ref 0–0.2)
PLATELET # BLD AUTO: 181 10*3/MM3 (ref 140–450)
PMV BLD AUTO: 9.5 FL (ref 6–12)
POTASSIUM SERPL-SCNC: 4.2 MMOL/L (ref 3.5–5.2)
PROT SERPL-MCNC: 6.1 G/DL (ref 6–8.5)
RBC # BLD AUTO: 2.62 10*6/MM3 (ref 3.77–5.28)
SODIUM SERPL-SCNC: 138 MMOL/L (ref 136–145)
WBC NRBC COR # BLD: 5.14 10*3/MM3 (ref 3.4–10.8)

## 2022-08-23 PROCEDURE — 82962 GLUCOSE BLOOD TEST: CPT

## 2022-08-23 PROCEDURE — G0378 HOSPITAL OBSERVATION PER HR: HCPCS

## 2022-08-23 PROCEDURE — 85025 COMPLETE CBC W/AUTO DIFF WBC: CPT | Performed by: INTERNAL MEDICINE

## 2022-08-23 PROCEDURE — 25010000002 HEPARIN (PORCINE) PER 1000 UNITS: Performed by: INTERNAL MEDICINE

## 2022-08-23 PROCEDURE — 25010000002 ONDANSETRON PER 1 MG: Performed by: INTERNAL MEDICINE

## 2022-08-23 PROCEDURE — 99225 PR SBSQ OBSERVATION CARE/DAY 25 MINUTES: CPT | Performed by: INTERNAL MEDICINE

## 2022-08-23 PROCEDURE — 96376 TX/PRO/DX INJ SAME DRUG ADON: CPT

## 2022-08-23 PROCEDURE — 25010000002 EPOETIN ALFA-EPBX 10000 UNIT/ML SOLUTION: Performed by: INTERNAL MEDICINE

## 2022-08-23 PROCEDURE — 80053 COMPREHEN METABOLIC PANEL: CPT | Performed by: INTERNAL MEDICINE

## 2022-08-23 PROCEDURE — 96372 THER/PROPH/DIAG INJ SC/IM: CPT

## 2022-08-23 RX ORDER — POLYETHYLENE GLYCOL 3350 17 G/17G
17 POWDER, FOR SOLUTION ORAL DAILY PRN
COMMUNITY
End: 2022-08-24 | Stop reason: HOSPADM

## 2022-08-23 RX ORDER — QUETIAPINE FUMARATE 25 MG/1
12.5 TABLET, FILM COATED ORAL NIGHTLY
Status: DISCONTINUED | OUTPATIENT
Start: 2022-08-23 | End: 2022-08-24 | Stop reason: HOSPADM

## 2022-08-23 RX ORDER — ALBUMIN (HUMAN) 12.5 G/50ML
12.5 SOLUTION INTRAVENOUS AS NEEDED
Status: ACTIVE | OUTPATIENT
Start: 2022-08-23 | End: 2022-08-23

## 2022-08-23 RX ORDER — LIDOCAINE 50 MG/G
1 PATCH TOPICAL EVERY 24 HOURS
COMMUNITY
End: 2022-08-24 | Stop reason: HOSPADM

## 2022-08-23 RX ORDER — LACTULOSE 10 G/15ML
30 SOLUTION ORAL ONCE
Status: COMPLETED | OUTPATIENT
Start: 2022-08-23 | End: 2022-08-23

## 2022-08-23 RX ADMIN — VENLAFAXINE HYDROCHLORIDE 75 MG: 75 CAPSULE, EXTENDED RELEASE ORAL at 09:48

## 2022-08-23 RX ADMIN — HEPARIN SODIUM 5000 UNITS: 5000 INJECTION INTRAVENOUS; SUBCUTANEOUS at 14:53

## 2022-08-23 RX ADMIN — EPOETIN ALFA-EPBX 10000 UNITS: 10000 INJECTION, SOLUTION INTRAVENOUS; SUBCUTANEOUS at 01:44

## 2022-08-23 RX ADMIN — CARVEDILOL 25 MG: 12.5 TABLET, FILM COATED ORAL at 09:48

## 2022-08-23 RX ADMIN — Medication 10 ML: at 18:12

## 2022-08-23 RX ADMIN — CALCIUM ACETATE 667 MG: 667 CAPSULE ORAL at 09:48

## 2022-08-23 RX ADMIN — Medication 10 ML: at 23:08

## 2022-08-23 RX ADMIN — ATORVASTATIN CALCIUM 80 MG: 40 TABLET, FILM COATED ORAL at 22:40

## 2022-08-23 RX ADMIN — CALCIUM ACETATE 667 MG: 667 CAPSULE ORAL at 12:35

## 2022-08-23 RX ADMIN — QUETIAPINE FUMARATE 12.5 MG: 25 TABLET ORAL at 22:41

## 2022-08-23 RX ADMIN — HYDRALAZINE HYDROCHLORIDE 100 MG: 50 TABLET, FILM COATED ORAL at 18:12

## 2022-08-23 RX ADMIN — HEPARIN SODIUM 5000 UNITS: 5000 INJECTION INTRAVENOUS; SUBCUTANEOUS at 09:49

## 2022-08-23 RX ADMIN — ONDANSETRON 4 MG: 2 INJECTION INTRAMUSCULAR; INTRAVENOUS at 01:55

## 2022-08-23 RX ADMIN — CARVEDILOL 25 MG: 12.5 TABLET, FILM COATED ORAL at 18:12

## 2022-08-23 RX ADMIN — DOCUSATE SODIUM 50 MG AND SENNOSIDES 8.6 MG 2 TABLET: 8.6; 5 TABLET, FILM COATED ORAL at 09:48

## 2022-08-23 RX ADMIN — HYDRALAZINE HYDROCHLORIDE 100 MG: 50 TABLET, FILM COATED ORAL at 09:48

## 2022-08-23 RX ADMIN — CALCIUM ACETATE 667 MG: 667 CAPSULE ORAL at 18:12

## 2022-08-23 RX ADMIN — LACTULOSE 30 G: 20 SOLUTION ORAL at 09:49

## 2022-08-23 RX ADMIN — HEPARIN SODIUM 5000 UNITS: 5000 INJECTION INTRAVENOUS; SUBCUTANEOUS at 22:39

## 2022-08-23 RX ADMIN — AMLODIPINE BESYLATE 10 MG: 10 TABLET ORAL at 09:48

## 2022-08-23 RX ADMIN — PANTOPRAZOLE SODIUM 40 MG: 40 TABLET, DELAYED RELEASE ORAL at 06:28

## 2022-08-23 RX ADMIN — HYDRALAZINE HYDROCHLORIDE 100 MG: 50 TABLET, FILM COATED ORAL at 22:41

## 2022-08-23 NOTE — ED PROVIDER NOTES
"Subjective   61-year-old female with extensive past medical history presents for evaluation of generalized weakness and confusion.  Of note, the patient has a history of end-stage renal disease for which she is dialyzed 3 times per week.  However, she states that she has not been dialyzed now for a week as she has not felt well enough to go to dialysis.  Today, she was noted to be confused and generally weak when compared to her baseline at her nursing home so staff was concerned and subsequently called EMS.  The patient was then brought to the emergency department for evaluation.  The patient appears confused at this time but is following simple commands and answering most questions appropriately.  She states that she feels \"weak and tired.\"  She endorses mild abdominal pain that she currently rates at 3 out of 10 in severity.  She notes that she has not \"had the energy\" to go to dialysis.          Review of Systems   Constitutional: Positive for fatigue.   Neurological: Positive for weakness.   Psychiatric/Behavioral: Positive for confusion.   All other systems reviewed and are negative.      Past Medical History:   Diagnosis Date   • Anxiety    • Cancer (HCC)     liver cell carcinoma   • DDD (degenerative disc disease), lumbar    • Depression    • Diabetes mellitus (HCC)    • Fibromyalgia    • Hemochromatosis    • Hep B w/o coma, chronic, w/o delta (HCC)    • Hep C w/o coma, chronic (HCC)    • Hypertension    • Stroke (HCC)    • Vertigo        Allergies   Allergen Reactions   • Sulfa Antibiotics        Past Surgical History:   Procedure Laterality Date   • ARTERIOVENOUS FISTULA/SHUNT SURGERY Left 10/16/2021    Procedure: UPPER EXTREMITY ARTERIOVENOUS FISTULA FORMATION LEFT;  Surgeon: Johnny Rousseau MD;  Location: [facility];  Service: Vascular;  Laterality: Left;   • BACK SURGERY     • BELOW KNEE LEG AMPUTATION     •  SECTION     • FOOT SURGERY     • KNEE SURGERY     • ROTATOR CUFF REPAIR     • " SPINAL CORD STIMULATOR IMPLANT         Family History   Problem Relation Age of Onset   • Heart disease Father    • Heart attack Father        Social History     Socioeconomic History   • Marital status:    Tobacco Use   • Smoking status: Never Smoker   • Smokeless tobacco: Never Used   Vaping Use   • Vaping Use: Never used   Substance and Sexual Activity   • Alcohol use: No   • Drug use: No   • Sexual activity: Defer           Objective   Physical Exam  Vitals and nursing note reviewed.   Constitutional:       Appearance: She is well-developed. She is not diaphoretic.   HENT:      Head: Normocephalic and atraumatic.   Eyes:      Pupils: Pupils are equal, round, and reactive to light.   Neck:      Comments: No meningeal signs or nuchal rigidity  Cardiovascular:      Rate and Rhythm: Normal rate and regular rhythm.      Heart sounds: Normal heart sounds. No murmur heard.    No friction rub. No gallop.   Pulmonary:      Effort: Pulmonary effort is normal. No respiratory distress.      Breath sounds: Normal breath sounds. No wheezing or rales.   Abdominal:      General: Bowel sounds are normal. There is no distension.      Palpations: Abdomen is soft. There is no mass.      Tenderness: There is no abdominal tenderness. There is no guarding or rebound.      Comments: No focal abdominal tenderness, no peritoneal signs, no pain out of proportion to exam   Genitourinary:     Comments: No CVA tenderness present  Musculoskeletal:         General: Normal range of motion.      Cervical back: Neck supple.   Skin:     General: Skin is warm and dry.      Findings: No erythema or rash.   Neurological:      Mental Status: She is alert.      Comments: Appears mildly confused, alert to person and place but not year, 5 out of 5 strength in all fours, neurovascularly intact distally in all fours with bounding distal pulses and normal sensation, no asterixis   Psychiatric:      Comments: Unable to adequately evaluate          Procedures           ED Course  ED Course as of 08/22/22 2118   Mon Aug 22, 2022   1157 61-year-old female presents for evaluation of generalized weakness and confusion.  Of note, the patient has a history of end-stage renal disease for which she is dialyzed 3 times per week.  However, she has not been dialyzed now for a week as she has not felt well enough to go.  Today, she was noted to be confused and generally weak when compared to her baseline per her nursing home staff and was subsequently sent to the emergency department for evaluation via EMS.  On arrival, the patient is nontoxic-appearing.  She is mildly confused but has no other focal neurological deficits noted.  Broad differential diagnosis.  We will obtain labs and imaging, and we will reassess following initial interventions. [DD]   1157 EKG revealed normal sinus rhythm with a heart rate of 86 and no ST segments suggestive of or concerning for ischemia with normal MO and QT intervals and no QRS widening. [DD]   1421 Hyperkalemia treated medically with insulin, D50, albuterol, and calcium.  I discussed the patient's case with Dr. Pierson of nephrology who will see the patient in consult for dialysis today.  I discussed the patient's case with Dr. Schilling, and the patient will be admitted under her care for further evaluation and treatment.  The patient is aware/agreeable with the plan at this time. [DD]      ED Course User Index  [DD] Blake iLra MD                                       Recent Results (from the past 24 hour(s))   ECG 12 Lead    Collection Time: 08/22/22 11:57 AM   Result Value Ref Range    QT Interval 376 ms    QTC Interval 449 ms   Comprehensive Metabolic Panel    Collection Time: 08/22/22 11:59 AM    Specimen: Blood   Result Value Ref Range    Glucose 148 (H) 65 - 99 mg/dL    BUN 63 (H) 8 - 23 mg/dL    Creatinine 6.95 (H) 0.57 - 1.00 mg/dL    Sodium 136 136 - 145 mmol/L    Potassium 5.5 (H) 3.5 - 5.2 mmol/L     Chloride 102 98 - 107 mmol/L    CO2 20.0 (L) 22.0 - 29.0 mmol/L    Calcium 9.5 8.6 - 10.5 mg/dL    Total Protein 7.0 6.0 - 8.5 g/dL    Albumin 4.10 3.50 - 5.20 g/dL    ALT (SGPT) 34 (H) 1 - 33 U/L    AST (SGOT) 27 1 - 32 U/L    Alkaline Phosphatase 81 39 - 117 U/L    Total Bilirubin 0.4 0.0 - 1.2 mg/dL    Globulin 2.9 gm/dL    A/G Ratio 1.4 g/dL    BUN/Creatinine Ratio 9.1 7.0 - 25.0    Anion Gap 14.0 5.0 - 15.0 mmol/L    eGFR 6.2 (L) >60.0 mL/min/1.73   Protime-INR    Collection Time: 08/22/22 11:59 AM    Specimen: Blood   Result Value Ref Range    Protime 13.5 11.4 - 14.4 Seconds    INR 1.04 0.84 - 1.13   BNP    Collection Time: 08/22/22 11:59 AM    Specimen: Blood   Result Value Ref Range    proBNP 9,761.0 (H) 0.0 - 900.0 pg/mL   Troponin    Collection Time: 08/22/22 11:59 AM    Specimen: Blood   Result Value Ref Range    Troponin T 0.043 (C) 0.000 - 0.030 ng/mL   Procalcitonin    Collection Time: 08/22/22 11:59 AM    Specimen: Blood   Result Value Ref Range    Procalcitonin 0.18 0.00 - 0.25 ng/mL   Lactic Acid, Plasma    Collection Time: 08/22/22 11:59 AM    Specimen: Blood   Result Value Ref Range    Lactate 0.9 0.5 - 2.0 mmol/L   Acetaminophen Level    Collection Time: 08/22/22 11:59 AM    Specimen: Blood   Result Value Ref Range    Acetaminophen <5.0 0.0 - 30.0 mcg/mL   Ethanol    Collection Time: 08/22/22 11:59 AM    Specimen: Blood   Result Value Ref Range    Ethanol <10 0 - 10 mg/dL   Salicylate Level    Collection Time: 08/22/22 11:59 AM    Specimen: Blood   Result Value Ref Range    Salicylate <0.3 <=30.0 mg/dL   Ammonia    Collection Time: 08/22/22 11:59 AM    Specimen: Blood   Result Value Ref Range    Ammonia 13 11 - 51 umol/L   Magnesium    Collection Time: 08/22/22 11:59 AM    Specimen: Blood   Result Value Ref Range    Magnesium 2.1 1.6 - 2.4 mg/dL   TSH    Collection Time: 08/22/22 11:59 AM    Specimen: Blood   Result Value Ref Range    TSH 1.090 0.270 - 4.200 uIU/mL   T4, Free    Collection Time:  08/22/22 11:59 AM    Specimen: Blood   Result Value Ref Range    Free T4 1.46 0.93 - 1.70 ng/dL   CBC Auto Differential    Collection Time: 08/22/22 11:59 AM    Specimen: Blood   Result Value Ref Range    WBC 4.71 3.40 - 10.80 10*3/mm3    RBC 2.90 (L) 3.77 - 5.28 10*6/mm3    Hemoglobin 10.0 (L) 12.0 - 15.9 g/dL    Hematocrit 27.7 (L) 34.0 - 46.6 %    MCV 95.5 79.0 - 97.0 fL    MCH 34.5 (H) 26.6 - 33.0 pg    MCHC 36.1 (H) 31.5 - 35.7 g/dL    RDW 12.7 12.3 - 15.4 %    RDW-SD 44.9 37.0 - 54.0 fl    MPV 9.1 6.0 - 12.0 fL    Platelets 183 140 - 450 10*3/mm3    Neutrophil % 67.1 42.7 - 76.0 %    Lymphocyte % 22.9 19.6 - 45.3 %    Monocyte % 6.2 5.0 - 12.0 %    Eosinophil % 2.8 0.3 - 6.2 %    Basophil % 0.4 0.0 - 1.5 %    Immature Grans % 0.6 (H) 0.0 - 0.5 %    Neutrophils, Absolute 3.16 1.70 - 7.00 10*3/mm3    Lymphocytes, Absolute 1.08 0.70 - 3.10 10*3/mm3    Monocytes, Absolute 0.29 0.10 - 0.90 10*3/mm3    Eosinophils, Absolute 0.13 0.00 - 0.40 10*3/mm3    Basophils, Absolute 0.02 0.00 - 0.20 10*3/mm3    Immature Grans, Absolute 0.03 0.00 - 0.05 10*3/mm3    nRBC 0.0 0.0 - 0.2 /100 WBC   POC CHEM 8    Collection Time: 08/22/22 11:59 AM    Specimen: Blood   Result Value Ref Range    Glucose 146 (H) 70 - 130 mg/dL    BUN 71 (H) 8 - 26 mg/dL    Creatinine 8.30 (H) 0.60 - 1.30 mg/dL    Sodium 136 (L) 138 - 146 mmol/L    POC Potassium 5.0 (H) 3.5 - 4.9 mmol/L    Chloride 103 98 - 109 mmol/L    Total CO2 20 (L) 24 - 29 mmol/L    Hemoglobin 9.5 (L) 12.0 - 17.0 g/dL    Hematocrit 28 (L) 38 - 51 %    Ionized Calcium 1.27 1.20 - 1.32 mmol/L    eGFR 5.0 (L) >60.0 mL/min/1.73   Urinalysis With Culture If Indicated - Urine, Catheter    Collection Time: 08/22/22 12:09 PM    Specimen: Urine, Catheter   Result Value Ref Range    Color, UA Yellow Yellow, Straw    Appearance, UA Clear Clear    pH, UA 7.5 5.0 - 8.0    Specific Gravity, UA 1.012 1.001 - 1.030    Glucose,  mg/dL (Trace) (A) Negative    Ketones, UA Negative Negative     Bilirubin, UA Negative Negative    Blood, UA Negative Negative    Protein,  mg/dL (2+) (A) Negative    Leuk Esterase, UA Trace (A) Negative    Nitrite, UA Negative Negative    Urobilinogen, UA 0.2 E.U./dL 0.2 - 1.0 E.U./dL   Urine Drug Screen - Urine, Catheter    Collection Time: 08/22/22 12:09 PM    Specimen: Urine, Catheter   Result Value Ref Range    THC, Screen, Urine Negative Negative    Phencyclidine (PCP), Urine Negative Negative    Cocaine Screen, Urine Negative Negative    Methamphetamine, Ur Negative Negative    Opiate Screen Negative Negative    Amphetamine Screen, Urine Negative Negative    Benzodiazepine Screen, Urine Negative Negative    Tricyclic Antidepressants Screen Negative Negative    Methadone Screen, Urine Negative Negative    Barbiturates Screen, Urine Negative Negative    Oxycodone Screen, Urine Negative Negative    Propoxyphene Screen Negative Negative    Buprenorphine, Screen, Urine Negative Negative   Urinalysis, Microscopic Only - Urine, Catheter    Collection Time: 08/22/22 12:09 PM    Specimen: Urine, Catheter   Result Value Ref Range    RBC, UA 0-2 None Seen, 0-2 /HPF    WBC, UA 3-5 (A) None Seen, 0-2 /HPF    Bacteria, UA None Seen None Seen, Trace /HPF    Squamous Epithelial Cells, UA 3-6 (A) None Seen, 0-2 /HPF    Hyaline Casts, UA 0-6 0 - 6 /LPF    Methodology Automated Microscopy    COVID-19 and FLU A/B PCR - Swab, Nasopharynx    Collection Time: 08/22/22 12:12 PM    Specimen: Nasopharynx; Swab   Result Value Ref Range    COVID19 Not Detected Not Detected - Ref. Range    Influenza A PCR Not Detected Not Detected    Influenza B PCR Not Detected Not Detected   Blood Gas, Venous With Co-Ox    Collection Time: 08/22/22 12:34 PM    Specimen: Venous Blood   Result Value Ref Range    Site Nurse/Dr Draw     pH, Venous 7.431 (H) 7.310 - 7.410 pH Units    pCO2, Venous 31.1 (L) 41.0 - 51.0 mm Hg    pO2, Venous 37.1 27.0 - 53.0 mm Hg    HCO3, Venous 20.7 (L) 22.0 - 28.0 mmol/L    Base  Excess, Venous -2.9 (L) -2.0 - 2.0 mmol/L    Hemoglobin, Blood Gas 10.4 (L) 14 - 18 g/dL    Oxyhemoglobin Venous 72.7 %    Methemoglobin Venous 0.2 %    Carboxyhemoglobin Venous 1.3 %    CO2 Content 21.7 (L) 22 - 33 mmol/L    Temperature 37.0 C    Barometric Pressure for Blood Gas      Modality Room Air     FIO2 21 %    Rate 0 Breaths/minute    PIP 0 cmH2O    IPAP 0     EPAP 0     Note     POC Glucose Once    Collection Time: 08/22/22  6:58 PM    Specimen: Blood   Result Value Ref Range    Glucose 80 70 - 130 mg/dL   Basic Metabolic Panel    Collection Time: 08/22/22  7:00 PM    Specimen: Blood   Result Value Ref Range    Glucose 79 65 - 99 mg/dL    BUN 22 8 - 23 mg/dL    Creatinine 2.83 (H) 0.57 - 1.00 mg/dL    Sodium 135 (L) 136 - 145 mmol/L    Potassium 3.3 (L) 3.5 - 5.2 mmol/L    Chloride 98 98 - 107 mmol/L    CO2 23.0 22.0 - 29.0 mmol/L    Calcium 8.8 8.6 - 10.5 mg/dL    BUN/Creatinine Ratio 7.8 7.0 - 25.0    Anion Gap 14.0 5.0 - 15.0 mmol/L    eGFR 18.2 (L) >60.0 mL/min/1.73     Note: In addition to lab results from this visit, the labs listed above may include labs taken at another facility or during a different encounter within the last 24 hours. Please correlate lab times with ED admission and discharge times for further clarification of the services performed during this visit.    CT Head Without Contrast   Final Result   Age-related changes of the brain as above, otherwise without   evidence of acute intracranial abnormality.           This report was finalized on 8/22/2022 3:04 PM by Victor Manuel Arvizu.          XR Chest 1 View   Final Result   Interval development of right perihilar and basilar infiltrates. This   could represent either pneumonia or could represent an atypical pattern   of pulmonary edema. Correlate with patient's clinical findings.       This report was finalized on 8/22/2022 12:32 PM by Parviz Sabillon MD.          XR Abdomen KUB    (Results Pending)     Vitals:    08/22/22 1800 08/22/22  1830 08/22/22 1855 08/22/22 1900   BP: 149/86 144/88 142/73    BP Location:       Patient Position:       Pulse: 90 93 91 94   Resp:       Temp:       TempSrc:       SpO2: 98% 99% 98% 98%   Weight:       Height:         Medications   albumin human 25 % IV SOLN 12.5 g (has no administration in time range)   acetaminophen (TYLENOL) tablet 650 mg (has no administration in time range)   amLODIPine (NORVASC) tablet 10 mg (10 mg Oral Given 8/22/22 2101)   atorvastatin (LIPITOR) tablet 80 mg (80 mg Oral Given 8/22/22 2101)   calcium acetate (PHOS BINDER)) capsule 667 mg (has no administration in time range)   carvedilol (COREG) tablet 25 mg (25 mg Oral Given 8/22/22 2101)   epoetin allie-epbx (RETACRIT) injection 10,000 Units (has no administration in time range)   hydrALAZINE (APRESOLINE) tablet 100 mg (100 mg Oral Given 8/22/22 2101)   insulin lispro protamine-insulin lispro (humaLOG 75-25) injection 20 Units (has no administration in time range)   loperamide (IMODIUM) capsule 2 mg (has no administration in time range)   pantoprazole (PROTONIX) EC tablet 40 mg (has no administration in time range)   venlafaxine XR (EFFEXOR-XR) 24 hr capsule 75 mg (75 mg Oral Given 8/22/22 2101)   sodium chloride 0.9 % flush 10 mL (has no administration in time range)   sodium chloride 0.9 % flush 10 mL (has no administration in time range)   heparin (porcine) 5000 UNIT/ML injection 5,000 Units (5,000 Units Subcutaneous Given 8/22/22 2102)   sennosides-docusate (PERICOLACE) 8.6-50 MG per tablet 2 tablet (2 tablets Oral Given 8/22/22 2101)     And   polyethylene glycol (MIRALAX) packet 17 g (has no administration in time range)     And   bisacodyl (DULCOLAX) EC tablet 5 mg (has no administration in time range)     And   bisacodyl (DULCOLAX) suppository 10 mg (has no administration in time range)   ondansetron (ZOFRAN) tablet 4 mg (has no administration in time range)   dextrose (GLUTOSE) oral gel 15 g (has no administration in time range)    dextrose (D50W) (25 g/50 mL) IV injection 25 g (has no administration in time range)   glucagon (human recombinant) (GLUCAGEN DIAGNOSTIC) injection 1 mg (has no administration in time range)   Insulin Lispro (humaLOG) injection 0-7 Units (0 Units Subcutaneous Not Given 8/22/22 1902)   ondansetron (ZOFRAN) injection 4 mg (4 mg Intravenous Given 8/22/22 1751)   dextrose (D50W) (25 g/50 mL) IV injection 50 mL (50 mL Intravenous Given 8/22/22 1353)   insulin regular (humuLIN R,novoLIN R) injection 10 Units (10 Units Intravenous Given 8/22/22 1355)   calcium gluconate 1g/50ml 0.675% NaCl IV SOLN (0 g Intravenous Stopped 8/22/22 1419)   albuterol (PROVENTIL) nebulizer solution 0.083% 2.5 mg/3mL (10 mg Nebulization Given 8/22/22 1500)   vancomycin 1500 mg/500 mL 0.9% NS IVPB ([facility]) (1,500 mg Intravenous New Bag 8/22/22 1734)   piperacillin-tazobactam (ZOSYN) 3.375 g in iso-osmotic dextrose 50 ml (premix) (3.375 g Intravenous New Bag 8/22/22 1451)     ECG/EMG Results (last 24 hours)     Procedure Component Value Units Date/Time    ECG 12 Lead [[id number]] Collected: 08/22/22 1157     Updated: 08/22/22 1714     QT Interval 376 ms      QTC Interval 449 ms     Narrative:      Test Reason : ams  Blood Pressure :   */*   mmHG  Vent. Rate :  86 BPM     Atrial Rate :  86 BPM     P-R Int : 180 ms          QRS Dur :  58 ms      QT Int : 376 ms       P-R-T Axes :  74  30  35 degrees     QTc Int : 449 ms    ** Poor data quality, interpretation may be adversely affected  Normal sinus rhythm  Low voltage QRS  Borderline ECG  Confirmed by MD Soraya, Goldy ([id number]) on 8/22/2022 5:14:20 PM    Referred By: RAND           Confirmed By: Goldy Lira MD        ECG 12 Lead   Final Result   Test Reason : ams   Blood Pressure :   */*   mmHG   Vent. Rate :  86 BPM     Atrial Rate :  86 BPM      P-R Int : 180 ms          QRS Dur :  58 ms       QT Int : 376 ms       P-R-T Axes :  74  30  35 degrees      QTc Int : 449 ms      ** Poor data quality,  interpretation may be adversely affected   Normal sinus rhythm   Low voltage QRS   Borderline ECG   Confirmed by MD Lira Michael ([id number]) on 8/22/2022 5:14:20 PM      Referred By: EDMD           Confirmed By: Goldy Lira MD                 [facility]    Final diagnoses:   End-stage renal disease on hemodialysis (HCC)   Hyperkalemia   Uremia   Confusion   Generalized weakness   Anemia, unspecified type       ED Disposition  ED Disposition     ED Disposition   Decision to Admit    Condition   --    Comment   Level of Care: Telemetry [5]   Diagnosis: Altered mental status [780.97.ICD-9-CM]   Admitting Physician: MINNIE MALAGON [[id number]]   Attending Physician: MINNIE MALAGON [[id number]]   Certification: I Certify That Inpatient Hospital Services Are Medically Necessary For Greater Than 2 Midnights               No follow-up provider specified.       Medication List      No changes were made to your prescriptions during this visit.          Blake Lira MD  08/22/22 7778

## 2022-08-23 NOTE — PROGRESS NOTES
"   LOS: 1 day    Patient Care Team:  Provider, No Known as PCP - General    Chief Complaint: ESRD    Subjective     Interval History:     No acute events overnight. No new complaints       Review of Systems:   No CP or SOA , fever, chills, rigors, rash, N/V, Constipation.       Objective     Vital Sign Min/Max for last 24 hours  Temp  Min: 97.6 °F (36.4 °C)  Max: 98.1 °F (36.7 °C)   BP  Min: 112/68  Max: 149/86   Pulse  Min: 85  Max: 99   Resp  Min: 16  Max: 18   SpO2  Min: 94 %  Max: 100 %   No data recorded   Weight  Min: 74.8 kg (165 lb)  Max: 78.3 kg (172 lb 11.2 oz)     Flowsheet Rows    Flowsheet Row First Filed Value   Admission Height 172.7 cm (68\") Documented at 08/22/2022 1154   Admission Weight 74.8 kg (165 lb) Documented at 08/22/2022 1154          No intake/output data recorded.  I/O last 3 completed shifts:  In: -   Out: 2260 [Urine:250; Other:2010]    Physical Exam:    Gen: Alert, NAD   HENT: NC, AT, EOMI   NECK: Supple, no JVD, Trachea midline   LUNGS: CTA bilaterally, non labored respirtation   CVS: S1/S2 audible, RRR, no murmur   Abd: Soft, NT, ND, BS+   Ext: No pedal edema, no cyanosis   CNS: Alert, No focal deficit noted grossly  Psy: Cooperative  Skin: Warm, dry and intact      WBC WBC   Date Value Ref Range Status   08/23/2022 5.14 3.40 - 10.80 10*3/mm3 Final   08/22/2022 4.71 3.40 - 10.80 10*3/mm3 Final      HGB Hemoglobin   Date Value Ref Range Status   08/23/2022 9.0 (L) 12.0 - 15.9 g/dL Final   08/22/2022 10.0 (L) 12.0 - 15.9 g/dL Final   08/22/2022 9.5 (L) 12.0 - 17.0 g/dL Final     Comment:     Serial Number: [id number]:  [id number]      HCT Hematocrit   Date Value Ref Range Status   08/23/2022 25.1 (L) 34.0 - 46.6 % Final   08/22/2022 27.7 (L) 34.0 - 46.6 % Final   08/22/2022 28 (L) 38 - 51 % Final      Platlets No results found for: LABPLAT   MCV MCV   Date Value Ref Range Status   08/23/2022 95.8 79.0 - 97.0 fL Final   08/22/2022 95.5 79.0 - 97.0 fL Final          Sodium Sodium   Date " Value Ref Range Status   08/23/2022 138 136 - 145 mmol/L Final   08/22/2022 135 (L) 136 - 145 mmol/L Final   08/22/2022 136 136 - 145 mmol/L Final      Potassium Potassium   Date Value Ref Range Status   08/23/2022 4.2 3.5 - 5.2 mmol/L Final   08/22/2022 3.3 (L) 3.5 - 5.2 mmol/L Final   08/22/2022 5.5 (H) 3.5 - 5.2 mmol/L Final      Chloride Chloride   Date Value Ref Range Status   08/23/2022 102 98 - 107 mmol/L Final   08/22/2022 98 98 - 107 mmol/L Final   08/22/2022 102 98 - 107 mmol/L Final      CO2 CO2   Date Value Ref Range Status   08/23/2022 25.0 22.0 - 29.0 mmol/L Final   08/22/2022 23.0 22.0 - 29.0 mmol/L Final   08/22/2022 20.0 (L) 22.0 - 29.0 mmol/L Final      BUN BUN   Date Value Ref Range Status   08/23/2022 27 (H) 8 - 23 mg/dL Final   08/22/2022 22 8 - 23 mg/dL Final   08/22/2022 63 (H) 8 - 23 mg/dL Final      Creatinine Creatinine   Date Value Ref Range Status   08/23/2022 4.39 (H) 0.57 - 1.00 mg/dL Final   08/22/2022 2.83 (H) 0.57 - 1.00 mg/dL Final   08/22/2022 6.95 (H) 0.57 - 1.00 mg/dL Final   08/22/2022 8.30 (H) 0.60 - 1.30 mg/dL Final      Calcium Calcium   Date Value Ref Range Status   08/23/2022 8.6 8.6 - 10.5 mg/dL Final   08/22/2022 8.8 8.6 - 10.5 mg/dL Final   08/22/2022 9.5 8.6 - 10.5 mg/dL Final      PO4 No results found for: CAPO4   Albumin Albumin   Date Value Ref Range Status   08/23/2022 3.80 3.50 - 5.20 g/dL Final   08/22/2022 4.10 3.50 - 5.20 g/dL Final      Magnesium Magnesium   Date Value Ref Range Status   08/22/2022 2.1 1.6 - 2.4 mg/dL Final      Uric Acid No results found for: URICACID        Results Review:     I reviewed the patient's new clinical results.    amLODIPine, 10 mg, Oral, Q24H  atorvastatin, 80 mg, Oral, Nightly  calcium acetate, 667 mg, Oral, TID With Meals  carvedilol, 25 mg, Oral, BID With Meals  epoetin allie-epbx, 10,000 Units, Subcutaneous, Once per day on Mon Wed Fri  heparin (porcine), 5,000 Units, Subcutaneous, Q8H  hydrALAZINE, 100 mg, Oral, TID  insulin  lispro, 0-7 Units, Subcutaneous, TID AC  insulin lispro protamine-insulin lispro, 20 Units, Subcutaneous, BID With Meals  lactulose, 30 g, Oral, Once  pantoprazole, 40 mg, Oral, QAM  senna-docusate sodium, 2 tablet, Oral, BID  sodium chloride, 10 mL, Intravenous, Q12H  venlafaxine XR, 75 mg, Oral, Daily           Medication Review:     Assessment & Plan       Cirrhosis of liver (HCC)    Chronic Hep C     ESRD (end stage renal disease)    S/P left BKA    Essential hypertension    Type 2 diabetes mellitus (HCC)    Altered mental status      1- ESRD - MWF - [facility]   2- HTN   3- Mild hypokalemia   4- Anemia of chronic disease   5- Familial hemochromatosis   6- AMS      Plan:  - HD tomorrow per schedule. UF as tolerated.   - Renal diet.   - Transfuse for HGb less than 7.0   - ADRIEN with HD        Panchito Austin MD  08/23/22  09:11 EDT

## 2022-08-23 NOTE — CASE MANAGEMENT/SOCIAL WORK
Continued Stay Note   Frances     Patient Name: Jeaneth Keita  MRN: [id number]  Today's Date: 8/23/2022    Admit Date: 8/22/2022     Discharge Plan     Row Name 08/23/22 1141       Plan    Plan Home-[place] [email or web address]    Patient/Family in Agreement with Plan other (see comments)    Plan Comments Pt was discussed in MDR today. Dr. Rodrigues states that pt will be medically ready to D/C tomorrow or Thursday. Pt is having HD today, and possibly tomorrow per Nephrology note. Pt's RN/Rosetta states that pt is self transferring, has been stand/pivot/sit to [facility]. I spoke with Salma/DONNY at [place].  I discussed with Salma what pt has been doing here. She states that as long as pt is self transferring, she self propels at [place], feeds self, and is only assist x 1 that she is okay to return. I updated pt at bedside, Dr. Rodrigues through messaging, and RN by phone. Pt uses [employer] for transportation, SW will need to set this up for D/C. No other needs voiced or identified. D/C plan is home. CM will continue to follow.    1200- I spoke with Agatha/YUMIKO, she states that pt needs to have her own wheelchair here for transportation with [facility], if not then an ambulance will need to be scheduled for transport. I spoke with pt in the room, she does not have wheelchair. I spoke with [facility]/ ambulance, they have availability tomorrow.    1330- I sent ambulance request to [facility]/ ambulance. I will place PCS on chart once I have a time for transportation on 8/24/22.    1410-Pt's RN/Rosetta called, she states that pt's Sister In Law came in with pt's wheelchair and that she will transport pt home tomorrow. I called [facility]/ amb to let her know to cancel the ambulance.    Final Discharge Disposition Code 01 - home or self-care               Discharge Codes    No documentation.               Expected Discharge Date and Time     Expected Discharge Date Expected Discharge Time    Aug 26, 2022             Rosetta Owens RN

## 2022-08-23 NOTE — PROGRESS NOTES
[facility] Medicine Services  PROGRESS NOTE    Patient Name: Jeaneth Keita  : 1960  MRN: [id number]    Date of Admission: 2022  Primary Care Physician: Provider, No Known    Subjective   Subjective     CC:  Confusion, missed dialysis    HPI:  Somewhat more alert, but suspect some confusion at baseline.  Agreeable to taking meds for constipation.  She states that she never misses dialysis at her facility although previous notes states that she intermittently refuses.    ROS:  Gen- No fevers, chills  CV- No chest pain, palpitations  Resp- No cough, dyspnea      Objective   Objective     Vital Signs:   Temp:  [97.6 °F (36.4 °C)-98.1 °F (36.7 °C)] 98 °F (36.7 °C)  Heart Rate:  [85-99] 88  Resp:  [18] 18  BP: (112-149)/(68-88) 138/77     Physical Exam:  Constitutional: Confused, answer some questions appropriately, not agitated  HENT: NCAT, mucous membranes moist  Respiratory: Respiratory effort normal , on room air  Cardiovascular: Tachycardic but regular, tunneled temporary dialysis catheter  Gastrointestinal:  soft, mildly distended  Musculoskeletal: Left BKA  Psychiatric: Appropriate affect, cooperative   Neurologic: Oriented to self, speech clear but confused  skin: No rashes on exposed skin      Results Reviewed:  LAB RESULTS:      Lab 22  0526 22  1159   WBC 5.14 4.71   HEMOGLOBIN 9.0* 10.0*   HEMOGLOBIN, POC  --  9.5*   HEMATOCRIT 25.1* 27.7*   HEMATOCRIT POC  --  28*   PLATELETS 181 183   NEUTROS ABS 4.51 3.16   IMMATURE GRANS (ABS) 0.03 0.03   LYMPHS ABS 0.37* 1.08   MONOS ABS 0.16 0.29   EOS ABS 0.06 0.13   MCV 95.8 95.5   PROCALCITONIN  --  0.18   LACTATE  --  0.9   PROTIME  --  13.5         Lab 22  0526 22  1900 22  1159   SODIUM 138 135* 136   POTASSIUM 4.2 3.3* 5.5*   CHLORIDE 102 98 102   CO2 25.0 23.0 20.0*   ANION GAP 11.0 14.0 14.0   BUN 27* 22 63*   CREATININE 4.39* 2.83* 8.30*  6.95*   EGFR 10.7* 18.2* 6.2*  5.0*   GLUCOSE  138* 79 148*   CALCIUM 8.6 8.8 9.5   MAGNESIUM  --   --  2.1   TSH  --   --  1.090         Lab 08/23/22  0526 08/22/22  1159   TOTAL PROTEIN 6.1 7.0   ALBUMIN 3.80 4.10   GLOBULIN 2.3 2.9   ALT (SGPT) 30 34*   AST (SGOT) 27 27   BILIRUBIN 0.6 0.4   ALK PHOS 67 81         Lab 08/22/22  1159   PROBNP 9,761.0*   TROPONIN T 0.043*   PROTIME 13.5   INR 1.04                 Lab 08/22/22  1234   FIO2 21   CARBOXYHEMOGLOBIN (VENOUS) 1.3     Brief Urine Lab Results  (Last result in the past 365 days)      Color   Clarity   Blood   Leuk Est   Nitrite   Protein   CREAT   Urine HCG        08/22/22 1209 Yellow   Clear   Negative   Trace   Negative   100 mg/dL (2+)                 Microbiology Results Abnormal     Procedure Component Value - Date/Time    Blood Culture - Blood, Arm, Right [[id number]]  (Normal) Collected: 08/22/22 1202    Lab Status: Preliminary result Specimen: Blood from Arm, Right Updated: 08/23/22 1247     Blood Culture No growth at 24 hours    Blood Culture - Blood, Arm, Left [[id number]]  (Normal) Collected: 08/22/22 1202    Lab Status: Preliminary result Specimen: Blood from Arm, Left Updated: 08/23/22 1247     Blood Culture No growth at 24 hours    COVID-19 and FLU A/B PCR - Swab, Nasopharynx [[id number]]  (Normal) Collected: 08/22/22 1212    Lab Status: Final result Specimen: Swab from Nasopharynx Updated: 08/22/22 1258     COVID19 Not Detected     Influenza A PCR Not Detected     Influenza B PCR Not Detected    Narrative:      Fact sheet for providers: https://www.fda.gov/media/323848/download    Fact sheet for patients: https://www.fda.gov/media/995461/download    Test performed by PCR.          CT Head Without Contrast    Result Date: 8/22/2022  DATE OF EXAM: 8/22/2022 2:09 PM  PROCEDURE: CT HEAD WO CONTRAST-  INDICATIONS: ams  COMPARISON: 7/29/2022  TECHNIQUE: Routine transaxial and coronal reconstruction images were obtained through the head without the administration of contrast. Automated exposure  control and iterative reconstruction methods were used.  The radiation dose reduction device was turned on for each scan per the ALARA (As Low as Reasonably Achievable) protocol.  FINDINGS: Gray-white differentiation is maintained and there is no evidence of intracranial hemorrhage, mass or mass effect. Age-related changes of the brain are present including volume loss and typical periventricular sequela of chronic small vessel ischemia. There is otherwise no evidence of intracranial hemorrhage, mass or mass effect. The ventricles are normal in size and configuration accounting for surrounding volume loss. The orbits are normal and the paranasal sinuses are grossly clear.      Impression: Age-related changes of the brain as above, otherwise without evidence of acute intracranial abnormality.   This report was finalized on 8/22/2022 3:04 PM by Victor Manuel Arvizu.      XR Chest 1 View    Result Date: 8/22/2022  DATE OF EXAM: 8/22/2022 12:05 PM  PROCEDURE: XR CHEST 1 VW-  INDICATIONS: ams  COMPARISON: 7/29/2022  TECHNIQUE: Single radiographic AP view of the chest was obtained.  FINDINGS: The heart size remains within normal limits. The left lung show some linear scarring. The patient has developed right perihilar and right basilar pulmonary infiltrates since the last study. A right-sided dialysis catheter terminates at the cavoatrial junction.      Impression: Interval development of right perihilar and basilar infiltrates. This could represent either pneumonia or could represent an atypical pattern of pulmonary edema. Correlate with patient's clinical findings.  This report was finalized on 8/22/2022 12:32 PM by Parviz Sabillon MD.      XR Abdomen KUB    Result Date: 8/23/2022  DATE OF EXAM: 8/22/2022 8:04 PM  PROCEDURE: XR ABDOMEN KUB-  INDICATIONS: abd pain  COMPARISON: 7/29/2022  TECHNIQUE: Single radiographic view of the abdomen was obtained.  FINDINGS: Postoperative changes noted from prior 2 level lumbar fusion. There  is moderate fecal loading of the colon, similar to comparison, otherwise without evidence of small bowel distention to suggest mechanical obstruction. There is no overt pneumoperitoneum.      Impression: There is moderate fecal loading of the colon, similar to comparison, otherwise without evidence of small bowel distention to suggest mechanical obstruction. There is no overt pneumoperitoneum.  This report was finalized on 8/23/2022 9:06 AM by Victor Manuel Arvizu.        Results for orders placed during the hospital encounter of 10/05/21    Adult Transthoracic Echo Complete W/ Cont if Necessary Per Protocol    Interpretation Summary  · Left ventricular ejection fraction appears to be 61 - 65%. Left ventricular systolic function is normal.  · Left atrial volume is mildly increased.      I have reviewed the medications:  Scheduled Meds:amLODIPine, 10 mg, Oral, Q24H  atorvastatin, 80 mg, Oral, Nightly  calcium acetate, 667 mg, Oral, TID With Meals  carvedilol, 25 mg, Oral, BID With Meals  heparin (porcine), 5,000 Units, Subcutaneous, Q8H  hydrALAZINE, 100 mg, Oral, TID  insulin lispro, 0-7 Units, Subcutaneous, TID AC  insulin lispro protamine-insulin lispro, 20 Units, Subcutaneous, BID With Meals  pantoprazole, 40 mg, Oral, QAM  senna-docusate sodium, 2 tablet, Oral, BID  sodium chloride, 10 mL, Intravenous, Q12H  venlafaxine XR, 75 mg, Oral, Daily      Continuous Infusions:   PRN Meds:.•  acetaminophen  •  albumin human  •  albumin human  •  senna-docusate sodium **AND** polyethylene glycol **AND** bisacodyl **AND** bisacodyl  •  dextrose  •  dextrose  •  glucagon (human recombinant)  •  ondansetron  •  ondansetron  •  sodium chloride    Assessment & Plan   Assessment & Plan     Active Hospital Problems    Diagnosis  POA   • End-stage renal disease on hemodialysis (HCC) [N18.6, Z99.2]  Not Applicable   • Altered mental status [R41.82]  Yes   • Cirrhosis of liver (HCC) [K74.60]  Yes   • ESRD (end stage renal disease)  [N18.6]  Yes   • S/P left BKA [Z89.512]  Not Applicable   • Chronic Hep C  [B18.2]  Yes   • Essential hypertension [I10]  Yes   • Type 2 diabetes mellitus (HCC) [E11.9]  Yes      Resolved Hospital Problems   No resolved problems to display.        Brief Hospital Course to date:  Jeaneth Keita is a 61 y.o. female with history of cirrhosis, type 2 diabetes, ESRD, hypertension admitted for confusion, missed dialysis from ECF.    AMS, confusion, likely related to uremia/missed dialysis, improved  ESRD with previous MWF schedule  -Nephrology following, dialysis 8/22    History of liver carcinoma  -EPO discontinued      Abdominal pain  -KUB with significant constipation  -Lactulose x1  -Continue bowel regimen    Diabetes type 2  -Continue sliding scale    Hypertension  -overall controlled    History of cirrhosis      Expected Discharge Location and Transportation: [facility]  Expected Discharge Date: 8/24 after dialysis    DVT prophylaxis:  Medical DVT prophylaxis orders are present.          CODE STATUS:   Code Status and Medical Interventions:   Ordered at: 08/22/22 1444     Code Status (Patient has no pulse and is not breathing):    CPR (Attempt to Resuscitate)     Medical Interventions (Patient has pulse or is breathing):    Full Support     Comments:    d/w patient       Jennyfer Rodrigues, DO  08/23/22

## 2022-08-24 ENCOUNTER — APPOINTMENT (OUTPATIENT)
Dept: NEPHROLOGY | Facility: HOSPITAL | Age: 62
End: 2022-08-24

## 2022-08-24 VITALS
SYSTOLIC BLOOD PRESSURE: 130 MMHG | HEIGHT: 68 IN | OXYGEN SATURATION: 93 % | TEMPERATURE: 98.2 F | BODY MASS INDEX: 26.18 KG/M2 | RESPIRATION RATE: 16 BRPM | WEIGHT: 172.7 LBS | DIASTOLIC BLOOD PRESSURE: 75 MMHG | HEART RATE: 96 BPM

## 2022-08-24 LAB
GLUCOSE BLDC GLUCOMTR-MCNC: 145 MG/DL (ref 70–130)
GLUCOSE BLDC GLUCOMTR-MCNC: 86 MG/DL (ref 70–130)

## 2022-08-24 PROCEDURE — 25010000002 EPOETIN ALFA-EPBX 10000 UNIT/ML SOLUTION: Performed by: INTERNAL MEDICINE

## 2022-08-24 PROCEDURE — G0378 HOSPITAL OBSERVATION PER HR: HCPCS

## 2022-08-24 PROCEDURE — 25010000002 HEPARIN (PORCINE) PER 1000 UNITS: Performed by: INTERNAL MEDICINE

## 2022-08-24 PROCEDURE — 96372 THER/PROPH/DIAG INJ SC/IM: CPT

## 2022-08-24 PROCEDURE — G0257 UNSCHED DIALYSIS ESRD PT HOS: HCPCS

## 2022-08-24 PROCEDURE — 82962 GLUCOSE BLOOD TEST: CPT

## 2022-08-24 PROCEDURE — 99217 PR OBSERVATION CARE DISCHARGE MANAGEMENT: CPT | Performed by: INTERNAL MEDICINE

## 2022-08-24 RX ORDER — VENLAFAXINE HYDROCHLORIDE 75 MG/1
75 CAPSULE, EXTENDED RELEASE ORAL DAILY
Start: 2022-08-25

## 2022-08-24 RX ADMIN — HEPARIN SODIUM 5000 UNITS: 5000 INJECTION INTRAVENOUS; SUBCUTANEOUS at 06:10

## 2022-08-24 RX ADMIN — CARVEDILOL 25 MG: 12.5 TABLET, FILM COATED ORAL at 11:20

## 2022-08-24 RX ADMIN — AMLODIPINE BESYLATE 10 MG: 10 TABLET ORAL at 11:20

## 2022-08-24 RX ADMIN — HYDRALAZINE HYDROCHLORIDE 100 MG: 50 TABLET, FILM COATED ORAL at 11:20

## 2022-08-24 RX ADMIN — EPOETIN ALFA-EPBX 10000 UNITS: 10000 INJECTION, SOLUTION INTRAVENOUS; SUBCUTANEOUS at 10:51

## 2022-08-24 RX ADMIN — CALCIUM ACETATE 667 MG: 667 CAPSULE ORAL at 11:20

## 2022-08-24 RX ADMIN — VENLAFAXINE HYDROCHLORIDE 75 MG: 75 CAPSULE, EXTENDED RELEASE ORAL at 11:20

## 2022-08-24 RX ADMIN — PANTOPRAZOLE SODIUM 40 MG: 40 TABLET, DELAYED RELEASE ORAL at 06:10

## 2022-08-24 NOTE — DISCHARGE SUMMARY
[facility] Medicine Services  DISCHARGE SUMMARY    Patient Name: Jeaneth eKita  : 1960  MRN: [id number]    Date of Admission: 2022 11:50 AM  Date of Discharge:  2022  Primary Care Physician: Lulu Amos MD    Consults     Date and Time Order Name Status Description    2022 12:34 AM Inpatient Nephrology Consult Completed           Hospital Course     Presenting Problem:   Altered mental status [R41.82]  End-stage renal disease on hemodialysis (HCC) [N18.6, Z99.2]    Active Hospital Problems    Diagnosis  POA   • End-stage renal disease on hemodialysis (HCC) [N18.6, Z99.2]  Not Applicable   • Altered mental status [R41.82]  Yes   • Cirrhosis of liver (HCC) [K74.60]  Yes   • ESRD (end stage renal disease) [N18.6]  Yes   • S/P left BKA [Z89.512]  Not Applicable   • Chronic Hep C  [B18.2]  Yes   • Essential hypertension [I10]  Yes   • Type 2 diabetes mellitus (HCC) [E11.9]  Yes      Resolved Hospital Problems   No resolved problems to display.          Hospital Course:  Jeaneth Keita is a 61 y.o. female with history of cirrhosis, type 2 diabetes, ESRD, hypertension admitted for confusion, missed dialysis from ECF.     AMS, confusion, likely related to uremia/missed dialysis, improved  ESRD with previous MWF schedule  -Nephrology following, dialysis ,           Abdominal pain  -KUB with significant constipation  -improved with lactulose     Diabetes type 2  -resume home regiment     Hypertension  -overall controlled     History of cirrhosis      Discharge Follow Up Recommendations for outpatient labs/diagnostics:  PCP in 1 week  HD as scheduled    Day of Discharge     HPI:   Mental status improved but still confused.  States she has not had BM and 7 documented stools yesterday    Review of Systems  Gen- No fevers, chills  CV- No chest pain, palpitations  Resp- No cough, dyspnea  GI- No N/V/D, abd pain        Vital Signs:   Temp:  [96.2 °F (35.7  °C)-98.2 °F (36.8 °C)] 98.2 °F (36.8 °C)  Heart Rate:  [78-97] 95  Resp:  [16-18] 16  BP: (118-146)/(67-98) 130/75      Physical Exam:  Constitutional: No acute distress, awakens to voice, seen on HD  HENT: NCAT, mucous membranes moist  Respiratory: Clear to auscultation bilaterally, respiratory effort normal   Cardiovascular: RRR, no murmurs, rubs, or gallops, tunneled HD cath  Gastrointestinal: Soft, nontender, nondistended  Musculoskeletal: No bilateral ankle edema, L BKA  Psychiatric: Appropriate affect, cooperative  Neurologic: Oriented to self, speech clear  Skin: No rashes      Pertinent  and/or Most Recent Results     LAB RESULTS:      Lab 08/23/22  0526 08/22/22  1159   WBC 5.14 4.71   HEMOGLOBIN 9.0* 10.0*   HEMOGLOBIN, POC  --  9.5*   HEMATOCRIT 25.1* 27.7*   HEMATOCRIT POC  --  28*   PLATELETS 181 183   NEUTROS ABS 4.51 3.16   IMMATURE GRANS (ABS) 0.03 0.03   LYMPHS ABS 0.37* 1.08   MONOS ABS 0.16 0.29   EOS ABS 0.06 0.13   MCV 95.8 95.5   PROCALCITONIN  --  0.18   LACTATE  --  0.9   PROTIME  --  13.5         Lab 08/23/22  0526 08/22/22  1900 08/22/22  1159   SODIUM 138 135* 136   POTASSIUM 4.2 3.3* 5.5*   CHLORIDE 102 98 102   CO2 25.0 23.0 20.0*   ANION GAP 11.0 14.0 14.0   BUN 27* 22 63*   CREATININE 4.39* 2.83* 8.30*  6.95*   EGFR 10.7* 18.2* 6.2*  5.0*   GLUCOSE 138* 79 148*   CALCIUM 8.6 8.8 9.5   MAGNESIUM  --   --  2.1   TSH  --   --  1.090         Lab 08/23/22  0526 08/22/22  1159   TOTAL PROTEIN 6.1 7.0   ALBUMIN 3.80 4.10   GLOBULIN 2.3 2.9   ALT (SGPT) 30 34*   AST (SGOT) 27 27   BILIRUBIN 0.6 0.4   ALK PHOS 67 81         Lab 08/22/22  1159   PROBNP 9,761.0*   TROPONIN T 0.043*   PROTIME 13.5   INR 1.04                 Lab 08/22/22  1234   FIO2 21   CARBOXYHEMOGLOBIN (VENOUS) 1.3     Brief Urine Lab Results  (Last result in the past 365 days)      Color   Clarity   Blood   Leuk Est   Nitrite   Protein   CREAT   Urine HCG        08/22/22 1209 Yellow   Clear   Negative   Trace   Negative    100 mg/dL (2+)               Microbiology Results (last 10 days)     Procedure Component Value - Date/Time    COVID-19 and FLU A/B PCR - Swab, Nasopharynx [[id number]]  (Normal) Collected: 08/22/22 1212    Lab Status: Final result Specimen: Swab from Nasopharynx Updated: 08/22/22 1258     COVID19 Not Detected     Influenza A PCR Not Detected     Influenza B PCR Not Detected    Narrative:      Fact sheet for providers: https://www.fda.gov/media/584506/download    Fact sheet for patients: https://www.fda.gov/media/399129/download    Test performed by PCR.    Blood Culture - Blood, Arm, Right [[id number]]  (Normal) Collected: 08/22/22 1202    Lab Status: Preliminary result Specimen: Blood from Arm, Right Updated: 08/23/22 1247     Blood Culture No growth at 24 hours    Blood Culture - Blood, Arm, Left [[id number]]  (Normal) Collected: 08/22/22 1202    Lab Status: Preliminary result Specimen: Blood from Arm, Left Updated: 08/23/22 1247     Blood Culture No growth at 24 hours          CT Head Without Contrast    Result Date: 8/22/2022  DATE OF EXAM: 8/22/2022 2:09 PM  PROCEDURE: CT HEAD WO CONTRAST-  INDICATIONS: ams  COMPARISON: 7/29/2022  TECHNIQUE: Routine transaxial and coronal reconstruction images were obtained through the head without the administration of contrast. Automated exposure control and iterative reconstruction methods were used.  The radiation dose reduction device was turned on for each scan per the ALARA (As Low as Reasonably Achievable) protocol.  FINDINGS: Gray-white differentiation is maintained and there is no evidence of intracranial hemorrhage, mass or mass effect. Age-related changes of the brain are present including volume loss and typical periventricular sequela of chronic small vessel ischemia. There is otherwise no evidence of intracranial hemorrhage, mass or mass effect. The ventricles are normal in size and configuration accounting for surrounding volume loss. The orbits are normal and the  paranasal sinuses are grossly clear.      Age-related changes of the brain as above, otherwise without evidence of acute intracranial abnormality.   This report was finalized on 8/22/2022 3:04 PM by Victor Manuel Arvizu.      XR Chest 1 View    Result Date: 8/22/2022  DATE OF EXAM: 8/22/2022 12:05 PM  PROCEDURE: XR CHEST 1 VW-  INDICATIONS: ams  COMPARISON: 7/29/2022  TECHNIQUE: Single radiographic AP view of the chest was obtained.  FINDINGS: The heart size remains within normal limits. The left lung show some linear scarring. The patient has developed right perihilar and right basilar pulmonary infiltrates since the last study. A right-sided dialysis catheter terminates at the cavoatrial junction.      Interval development of right perihilar and basilar infiltrates. This could represent either pneumonia or could represent an atypical pattern of pulmonary edema. Correlate with patient's clinical findings.  This report was finalized on 8/22/2022 12:32 PM by Parviz Sabillon MD.      XR Abdomen KUB    Result Date: 8/23/2022  DATE OF EXAM: 8/22/2022 8:04 PM  PROCEDURE: XR ABDOMEN KUB-  INDICATIONS: abd pain  COMPARISON: 7/29/2022  TECHNIQUE: Single radiographic view of the abdomen was obtained.  FINDINGS: Postoperative changes noted from prior 2 level lumbar fusion. There is moderate fecal loading of the colon, similar to comparison, otherwise without evidence of small bowel distention to suggest mechanical obstruction. There is no overt pneumoperitoneum.      There is moderate fecal loading of the colon, similar to comparison, otherwise without evidence of small bowel distention to suggest mechanical obstruction. There is no overt pneumoperitoneum.  This report was finalized on 8/23/2022 9:06 AM by Victor Manuel Arvizu.        Results for orders placed during the hospital encounter of 10/05/21    Duplex Initial Vein Mapping Hemodialysis Access Unilateral Left or Right CAR    Interpretation Summary  · The left cephalic vein is  patent and of adequate size in the upper arm.  · The left cephalic vein is patent and of adequate size in the forearm.      Results for orders placed during the hospital encounter of 10/05/21    Duplex Initial Vein Mapping Hemodialysis Access Unilateral Left or Right CAR    Interpretation Summary  · The left cephalic vein is patent and of adequate size in the upper arm.  · The left cephalic vein is patent and of adequate size in the forearm.      Results for orders placed during the hospital encounter of 10/05/21    Adult Transthoracic Echo Complete W/ Cont if Necessary Per Protocol    Interpretation Summary  · Left ventricular ejection fraction appears to be 61 - 65%. Left ventricular systolic function is normal.  · Left atrial volume is mildly increased.      Plan for Follow-up of Pending Labs/Results: NGTD  Pending Labs     Order Current Status    Blood Culture - Blood, Arm, Left Preliminary result    Blood Culture - Blood, Arm, Right Preliminary result        Discharge Details        Discharge Medications      Changes to Medications      Instructions Start Date   calcium acetate 667 MG capsule capsule  Commonly known as: PHOS BINDER)  What changed: when to take this   667 mg, Oral, 3 Times Daily With Meals      venlafaxine XR 75 MG 24 hr capsule  Commonly known as: EFFEXOR-XR  What changed: how much to take   75 mg, Oral, Daily   Start Date: August 25, 2022        Continue These Medications      Instructions Start Date   acetaminophen 325 MG tablet  Commonly known as: TYLENOL   650 mg, Oral, Every 6 Hours PRN      amLODIPine 10 MG tablet  Commonly known as: NORVASC   10 mg, Oral, Every 24 Hours Scheduled      atorvastatin 80 MG tablet  Commonly known as: LIPITOR   80 mg, Oral, Nightly      carvedilol 25 MG tablet  Commonly known as: COREG   25 mg, Oral, 2 Times Daily With Meals      hydrALAZINE 50 MG tablet  Commonly known as: APRESOLINE   100 mg, Oral, 3 Times Daily      Insulin Lispro 100 UNIT/ML  injection  Commonly known as: humaLOG   0-7 Units, Subcutaneous, 3 Times Daily Before Meals      NovoLIN 70/30 FlexPen (70-30) 100 UNIT/ML suspension pen-injector  Generic drug: Insulin NPH Isophane & Regular   1 Units, Subcutaneous, 2 Times Daily Before Meals, 24 units before breakfast and 22 units before dinner      ondansetron 4 MG tablet  Commonly known as: ZOFRAN   4 mg, Oral, Every 6 Hours PRN      pantoprazole 40 MG EC tablet  Commonly known as: PROTONIX   Take 1 tablet by mouth Every Morning.      QUEtiapine 25 MG tablet  Commonly known as: SEROquel   25 mg, Oral, Nightly      sennosides-docusate 8.6-50 MG per tablet  Commonly known as: PERICOLACE   1 tablet, Oral, Daily      vitamin D 1.25 MG (62735 UT) capsule capsule  Commonly known as: ERGOCALCIFEROL   50,000 Units, Oral, Weekly, Sunday         Stop These Medications    epoetin allie-epbx 63376 UNIT/ML injection  Commonly known as: RETACRIT     ibuprofen 600 MG tablet  Commonly known as: ADVIL,MOTRIN     lidocaine 5 %  Commonly known as: LIDODERM     loperamide 2 MG capsule  Commonly known as: IMODIUM     MiraLax 17 g packet  Generic drug: polyethylene glycol            Allergies   Allergen Reactions   • Sulfa Antibiotics          Discharge Disposition:  Long Term Care (DC - External)    Diet:  Hospital:  Diet Order   Procedures   • Diet Regular; Renal       Activity:      Restrictions or Other Recommendations:         CODE STATUS:    Code Status and Medical Interventions:   Ordered at: 08/22/22 1444     Code Status (Patient has no pulse and is not breathing):    CPR (Attempt to Resuscitate)     Medical Interventions (Patient has pulse or is breathing):    Full Support     Comments:    d/w patient       Future Appointments   Date Time Provider Department Center   10/5/2022  1:30 PM Kary Vizcarra APRN [facility]       Additional Instructions for the Follow-ups that You Need to Schedule     Discharge Follow-up with PCP   As directed        Currently Documented PCP:    Lulu Amos MD    PCP Phone Number:    [phone]     Follow Up Details: 1 week                     Jennyfer Rodrigues DO  08/24/22      Time Spent on Discharge:  I spent  35 minutes on this discharge activity which included: face-to-face encounter with the patient, reviewing the data in the system, coordination of the care with the nursing staff as well as consultants, documentation, and entering orders.

## 2022-08-24 NOTE — PLAN OF CARE
Goal Outcome Evaluation:  Plan of Care Reviewed With: patient        Progress: no change  Outcome Evaluation: Patient was admitted with AMS and remains confused. Patient had not slept since admission. Seroquel reordered and patient rested well tonight. Vss, Sr, Ra. Patient is suppose to have Dialysis again today then return to [facility] today or tomorrow.

## 2022-08-24 NOTE — CASE MANAGEMENT/SOCIAL WORK
Continued Stay Note  [facility]     Patient Name: Jeaneth Keita  MRN: [id number]  Today's Date: 8/24/2022    Admit Date: 8/22/2022     Discharge Plan     Row Name 08/24/22 1409       Plan    Plan Home    Patient/Family in Agreement with Plan yes    Final Discharge Disposition Code 01 - home or self-care    Final Note Ms. Keita is being discharged home today. She will return to [facility]. She has her personal wheelchair at the bedside. Her sister in law will transport her home via private vehicle. No additional discharge needs identified.               Discharge Codes    No documentation.               Expected Discharge Date and Time     Expected Discharge Date Expected Discharge Time    Aug 24, 2022             Paul Stahl RN

## 2022-08-24 NOTE — PROGRESS NOTES
"   LOS: 1 day    Patient Care Team:  Lulu Amos MD as PCP - General (Hospice and Palliative Medicine)    Chief Complaint: ESRD    Subjective     Interval History:     No acute events overnight. No new complaints       Review of Systems:   No CP or SOA , fever, chills, rigors, rash, N/V, Constipation.       Objective     Vital Sign Min/Max for last 24 hours  Temp  Min: 96.2 °F (35.7 °C)  Max: 98.2 °F (36.8 °C)   BP  Min: 118/67  Max: 140/86   Pulse  Min: 78  Max: 96   Resp  Min: 18  Max: 18   SpO2  Min: 94 %  Max: 99 %   No data recorded   No data recorded     Flowsheet Rows    Flowsheet Row First Filed Value   Admission Height 172.7 cm (68\") Documented at 08/22/2022 1154   Admission Weight 74.8 kg (165 lb) Documented at 08/22/2022 1154          No intake/output data recorded.  I/O last 3 completed shifts:  In: -   Out: 250 [Urine:250]    Physical Exam:    Gen: Alert, NAD   HENT: NC, AT, EOMI   NECK: Supple, no JVD, Trachea midline   LUNGS: CTA bilaterally, non labored respirtation   CVS: S1/S2 audible, RRR, no murmur   Abd: Soft, NT, ND, BS+   Ext: No pedal edema, no cyanosis   CNS: Alert, No focal deficit noted grossly  Psy: Cooperative  Skin: Warm, dry and intact      WBC WBC   Date Value Ref Range Status   08/23/2022 5.14 3.40 - 10.80 10*3/mm3 Final   08/22/2022 4.71 3.40 - 10.80 10*3/mm3 Final      HGB Hemoglobin   Date Value Ref Range Status   08/23/2022 9.0 (L) 12.0 - 15.9 g/dL Final   08/22/2022 10.0 (L) 12.0 - 15.9 g/dL Final   08/22/2022 9.5 (L) 12.0 - 17.0 g/dL Final     Comment:     Serial Number: [id number]:  [id number]      HCT Hematocrit   Date Value Ref Range Status   08/23/2022 25.1 (L) 34.0 - 46.6 % Final   08/22/2022 27.7 (L) 34.0 - 46.6 % Final   08/22/2022 28 (L) 38 - 51 % Final      Platlets No results found for: LABPLAT   MCV MCV   Date Value Ref Range Status   08/23/2022 95.8 79.0 - 97.0 fL Final   08/22/2022 95.5 79.0 - 97.0 fL Final          Sodium Sodium   Date Value Ref Range " Status   08/23/2022 138 136 - 145 mmol/L Final   08/22/2022 135 (L) 136 - 145 mmol/L Final   08/22/2022 136 136 - 145 mmol/L Final      Potassium Potassium   Date Value Ref Range Status   08/23/2022 4.2 3.5 - 5.2 mmol/L Final   08/22/2022 3.3 (L) 3.5 - 5.2 mmol/L Final   08/22/2022 5.5 (H) 3.5 - 5.2 mmol/L Final      Chloride Chloride   Date Value Ref Range Status   08/23/2022 102 98 - 107 mmol/L Final   08/22/2022 98 98 - 107 mmol/L Final   08/22/2022 102 98 - 107 mmol/L Final      CO2 CO2   Date Value Ref Range Status   08/23/2022 25.0 22.0 - 29.0 mmol/L Final   08/22/2022 23.0 22.0 - 29.0 mmol/L Final   08/22/2022 20.0 (L) 22.0 - 29.0 mmol/L Final      BUN BUN   Date Value Ref Range Status   08/23/2022 27 (H) 8 - 23 mg/dL Final   08/22/2022 22 8 - 23 mg/dL Final   08/22/2022 63 (H) 8 - 23 mg/dL Final      Creatinine Creatinine   Date Value Ref Range Status   08/23/2022 4.39 (H) 0.57 - 1.00 mg/dL Final   08/22/2022 2.83 (H) 0.57 - 1.00 mg/dL Final   08/22/2022 6.95 (H) 0.57 - 1.00 mg/dL Final   08/22/2022 8.30 (H) 0.60 - 1.30 mg/dL Final      Calcium Calcium   Date Value Ref Range Status   08/23/2022 8.6 8.6 - 10.5 mg/dL Final   08/22/2022 8.8 8.6 - 10.5 mg/dL Final   08/22/2022 9.5 8.6 - 10.5 mg/dL Final      PO4 No results found for: CAPO4   Albumin Albumin   Date Value Ref Range Status   08/23/2022 3.80 3.50 - 5.20 g/dL Final   08/22/2022 4.10 3.50 - 5.20 g/dL Final      Magnesium Magnesium   Date Value Ref Range Status   08/22/2022 2.1 1.6 - 2.4 mg/dL Final      Uric Acid No results found for: URICACID        Results Review:     I reviewed the patient's new clinical results.    amLODIPine, 10 mg, Oral, Q24H  atorvastatin, 80 mg, Oral, Nightly  calcium acetate, 667 mg, Oral, TID With Meals  carvedilol, 25 mg, Oral, BID With Meals  heparin (porcine), 5,000 Units, Subcutaneous, Q8H  hydrALAZINE, 100 mg, Oral, TID  insulin lispro, 0-7 Units, Subcutaneous, TID AC  insulin lispro protamine-insulin lispro, 20 Units,  Subcutaneous, BID With Meals  pantoprazole, 40 mg, Oral, QAM  QUEtiapine, 12.5 mg, Oral, Nightly  senna-docusate sodium, 2 tablet, Oral, BID  sodium chloride, 10 mL, Intravenous, Q12H  venlafaxine XR, 75 mg, Oral, Daily           Medication Review:     Assessment & Plan       Cirrhosis of liver (HCC)    Chronic Hep C     ESRD (end stage renal disease)    S/P left BKA    Essential hypertension    Type 2 diabetes mellitus (HCC)    Altered mental status    End-stage renal disease on hemodialysis (HCC)      1- ESRD - MWF - [facility]   2- HTN   3- Mild hypokalemia   4- Anemia of chronic disease   5- Familial hemochromatosis   6- AMS      Plan:  - Patient seen on dialysis. UF as tolerated.   - Renal diet.   - Transfuse for HGb less than 7.0   - ADRIEN with HD        Panchito Austin MD  08/24/22  10:09 EDT

## 2022-08-27 ENCOUNTER — HOSPITAL ENCOUNTER (EMERGENCY)
Facility: HOSPITAL | Age: 62
Discharge: HOME | End: 2022-08-27
Attending: EMERGENCY MEDICINE | Admitting: EMERGENCY MEDICINE

## 2022-08-27 ENCOUNTER — APPOINTMENT (OUTPATIENT)
Dept: GENERAL RADIOLOGY | Facility: HOSPITAL | Age: 62
End: 2022-08-27

## 2022-08-27 VITALS
RESPIRATION RATE: 16 BRPM | OXYGEN SATURATION: 97 % | SYSTOLIC BLOOD PRESSURE: 128 MMHG | HEIGHT: 68 IN | HEART RATE: 93 BPM | BODY MASS INDEX: 26.07 KG/M2 | TEMPERATURE: 98.6 F | DIASTOLIC BLOOD PRESSURE: 70 MMHG | WEIGHT: 172 LBS

## 2022-08-27 DIAGNOSIS — R53.1 GENERALIZED WEAKNESS: ICD-10-CM

## 2022-08-27 DIAGNOSIS — Z99.2 CHRONIC KIDNEY DISEASE WITH END STAGE RENAL FAILURE ON DIALYSIS: ICD-10-CM

## 2022-08-27 DIAGNOSIS — R42 DIZZINESS: Primary | ICD-10-CM

## 2022-08-27 DIAGNOSIS — N18.6 CHRONIC KIDNEY DISEASE WITH END STAGE RENAL FAILURE ON DIALYSIS: ICD-10-CM

## 2022-08-27 LAB
ALBUMIN SERPL-MCNC: 4 G/DL (ref 3.5–5.2)
ALBUMIN/GLOB SERPL: 1.5 G/DL
ALP SERPL-CCNC: 105 U/L (ref 39–117)
ALT SERPL W P-5'-P-CCNC: 67 U/L (ref 1–33)
AMMONIA BLD-SCNC: 10 UMOL/L (ref 11–51)
ANION GAP SERPL CALCULATED.3IONS-SCNC: 9 MMOL/L (ref 5–15)
AST SERPL-CCNC: 44 U/L (ref 1–32)
BACTERIA SPEC AEROBE CULT: NORMAL
BACTERIA SPEC AEROBE CULT: NORMAL
BACTERIA UR QL AUTO: NORMAL /HPF
BASOPHILS # BLD AUTO: 0.02 10*3/MM3 (ref 0–0.2)
BASOPHILS NFR BLD AUTO: 0.3 % (ref 0–1.5)
BILIRUB SERPL-MCNC: 0.5 MG/DL (ref 0–1.2)
BILIRUB UR QL STRIP: NEGATIVE
BUN SERPL-MCNC: 17 MG/DL (ref 8–23)
BUN/CREAT SERPL: 5 (ref 7–25)
CALCIUM SPEC-SCNC: 9.3 MG/DL (ref 8.6–10.5)
CHLORIDE SERPL-SCNC: 97 MMOL/L (ref 98–107)
CLARITY UR: CLEAR
CO2 SERPL-SCNC: 31 MMOL/L (ref 22–29)
COLOR UR: YELLOW
CREAT SERPL-MCNC: 3.4 MG/DL (ref 0.57–1)
DEPRECATED RDW RBC AUTO: 45.1 FL (ref 37–54)
EGFRCR SERPLBLD CKD-EPI 2021: 14.6 ML/MIN/1.73
EOSINOPHIL # BLD AUTO: 0.15 10*3/MM3 (ref 0–0.4)
EOSINOPHIL NFR BLD AUTO: 2.6 % (ref 0.3–6.2)
ERYTHROCYTE [DISTWIDTH] IN BLOOD BY AUTOMATED COUNT: 13.1 % (ref 12.3–15.4)
FLUAV RNA RESP QL NAA+PROBE: NOT DETECTED
FLUBV RNA RESP QL NAA+PROBE: NOT DETECTED
GLOBULIN UR ELPH-MCNC: 2.7 GM/DL
GLUCOSE SERPL-MCNC: 313 MG/DL (ref 65–99)
GLUCOSE UR STRIP-MCNC: ABNORMAL MG/DL
HCT VFR BLD AUTO: 27.5 % (ref 34–46.6)
HGB BLD-MCNC: 9.6 G/DL (ref 12–15.9)
HGB UR QL STRIP.AUTO: NEGATIVE
HOLD SPECIMEN: NORMAL
HYALINE CASTS UR QL AUTO: NORMAL /LPF
IMM GRANULOCYTES # BLD AUTO: 0.02 10*3/MM3 (ref 0–0.05)
IMM GRANULOCYTES NFR BLD AUTO: 0.3 % (ref 0–0.5)
KETONES UR QL STRIP: NEGATIVE
LEUKOCYTE ESTERASE UR QL STRIP.AUTO: NEGATIVE
LYMPHOCYTES # BLD AUTO: 1.04 10*3/MM3 (ref 0.7–3.1)
LYMPHOCYTES NFR BLD AUTO: 18.2 % (ref 19.6–45.3)
MAGNESIUM SERPL-MCNC: 2.6 MG/DL (ref 1.6–2.4)
MCH RBC QN AUTO: 33.9 PG (ref 26.6–33)
MCHC RBC AUTO-ENTMCNC: 34.9 G/DL (ref 31.5–35.7)
MCV RBC AUTO: 97.2 FL (ref 79–97)
MONOCYTES # BLD AUTO: 0.52 10*3/MM3 (ref 0.1–0.9)
MONOCYTES NFR BLD AUTO: 9.1 % (ref 5–12)
NEUTROPHILS NFR BLD AUTO: 3.97 10*3/MM3 (ref 1.7–7)
NEUTROPHILS NFR BLD AUTO: 69.5 % (ref 42.7–76)
NITRITE UR QL STRIP: NEGATIVE
NRBC BLD AUTO-RTO: 0 /100 WBC (ref 0–0.2)
PH UR STRIP.AUTO: 8.5 [PH] (ref 5–8)
PLATELET # BLD AUTO: 181 10*3/MM3 (ref 140–450)
PMV BLD AUTO: 9.3 FL (ref 6–12)
POTASSIUM SERPL-SCNC: 3.7 MMOL/L (ref 3.5–5.2)
PROT SERPL-MCNC: 6.7 G/DL (ref 6–8.5)
PROT UR QL STRIP: ABNORMAL
QT INTERVAL: 388 MS
QTC INTERVAL: 479 MS
RBC # BLD AUTO: 2.83 10*6/MM3 (ref 3.77–5.28)
RBC # UR STRIP: NORMAL /HPF
REF LAB TEST METHOD: NORMAL
SARS-COV-2 RNA RESP QL NAA+PROBE: NOT DETECTED
SODIUM SERPL-SCNC: 137 MMOL/L (ref 136–145)
SP GR UR STRIP: 1.01 (ref 1–1.03)
SQUAMOUS #/AREA URNS HPF: NORMAL /HPF
TROPONIN T SERPL-MCNC: 0.05 NG/ML (ref 0–0.03)
UROBILINOGEN UR QL STRIP: ABNORMAL
WBC # UR STRIP: NORMAL /HPF
WBC NRBC COR # BLD: 5.72 10*3/MM3 (ref 3.4–10.8)
WHOLE BLOOD HOLD COAG: NORMAL
WHOLE BLOOD HOLD SPECIMEN: NORMAL

## 2022-08-27 PROCEDURE — 82140 ASSAY OF AMMONIA: CPT | Performed by: EMERGENCY MEDICINE

## 2022-08-27 PROCEDURE — 71045 X-RAY EXAM CHEST 1 VIEW: CPT

## 2022-08-27 PROCEDURE — 80053 COMPREHEN METABOLIC PANEL: CPT | Performed by: EMERGENCY MEDICINE

## 2022-08-27 PROCEDURE — 84484 ASSAY OF TROPONIN QUANT: CPT | Performed by: EMERGENCY MEDICINE

## 2022-08-27 PROCEDURE — 99284 EMERGENCY DEPT VISIT MOD MDM: CPT

## 2022-08-27 PROCEDURE — 93005 ELECTROCARDIOGRAM TRACING: CPT | Performed by: EMERGENCY MEDICINE

## 2022-08-27 PROCEDURE — 87636 SARSCOV2 & INF A&B AMP PRB: CPT | Performed by: EMERGENCY MEDICINE

## 2022-08-27 PROCEDURE — 85025 COMPLETE CBC W/AUTO DIFF WBC: CPT

## 2022-08-27 PROCEDURE — 81001 URINALYSIS AUTO W/SCOPE: CPT | Performed by: EMERGENCY MEDICINE

## 2022-08-27 PROCEDURE — 93005 ELECTROCARDIOGRAM TRACING: CPT

## 2022-08-27 PROCEDURE — 83735 ASSAY OF MAGNESIUM: CPT | Performed by: EMERGENCY MEDICINE

## 2022-08-27 RX ORDER — MECLIZINE HYDROCHLORIDE 25 MG/1
25 TABLET ORAL ONCE
Status: COMPLETED | OUTPATIENT
Start: 2022-08-27 | End: 2022-08-27

## 2022-08-27 RX ORDER — SODIUM CHLORIDE 0.9 % (FLUSH) 0.9 %
10 SYRINGE (ML) INJECTION AS NEEDED
Status: DISCONTINUED | OUTPATIENT
Start: 2022-08-27 | End: 2022-08-27 | Stop reason: HOSPADM

## 2022-08-27 RX ORDER — MECLIZINE HYDROCHLORIDE 25 MG/1
25 TABLET ORAL 3 TIMES DAILY PRN
Qty: 15 TABLET | Refills: 0 | Status: SHIPPED | OUTPATIENT
Start: 2022-08-27

## 2022-08-27 RX ADMIN — MECLIZINE HYDROCHLORIDE 25 MG: 25 TABLET ORAL at 13:30

## 2022-08-27 RX ADMIN — SODIUM CHLORIDE 500 ML: 9 INJECTION, SOLUTION INTRAVENOUS at 13:29

## 2022-09-09 LAB
MYCOBACTERIUM SPEC CULT: NORMAL
NIGHT BLUE STAIN TISS: NORMAL

## 2022-11-10 NOTE — PROGRESS NOTES
"   LOS: 3 days    Patient Care Team:  Provider, No Known as PCP - General  ESRD     Subjective     Interval History:   No acute events overnight. No new complaints       Review of Systems:   AMS, NO CP OR SOA    Objective     Vital Sign Min/Max for last 24 hours  Temp  Min: 97.5 °F (36.4 °C)  Max: 98.6 °F (37 °C)   BP  Min: 112/98  Max: 149/79   Pulse  Min: 91  Max: 100   Resp  Min: 16  Max: 20   SpO2  Min: 95 %  Max: 100 %   No data recorded   Weight  Min: 65 kg (143 lb 3.2 oz)  Max: 65 kg (143 lb 3.2 oz)     Flowsheet Rows      First Filed Value   Admission Height 172.7 cm (68\") Documented at 12/08/2021 1240   Admission Weight 72.6 kg (160 lb) Documented at 12/08/2021 1240          No intake/output data recorded.  I/O last 3 completed shifts:  In: 50 [IV Piggyback:50]  Out: 250 [Urine:250]    Physical Exam:    Gen: Alert, NAD   HENT: NC, AT, EOMI   NECK: Supple, no JVD, Trachea midline   LUNGS: CTA bilaterally, non labored respirtation   CVS: S1/S2 audible, RRR, no murmur   Abd: Soft, NT, ND, BS+   Ext: No pedal edema, no cyanosis   CNS: Alert, No focal deficit noted grossly  Psy: Cooperative  Skin: Warm, dry and intact      WBC WBC   Date Value Ref Range Status   12/10/2021 3.13 (L) 3.40 - 10.80 10*3/mm3 Final   12/09/2021 5.33 3.40 - 10.80 10*3/mm3 Final   12/08/2021 5.45 3.40 - 10.80 10*3/mm3 Final      HGB Hemoglobin   Date Value Ref Range Status   12/10/2021 8.2 (L) 12.0 - 15.9 g/dL Final   12/09/2021 8.8 (L) 12.0 - 15.9 g/dL Final   12/08/2021 10.3 (L) 12.0 - 15.9 g/dL Final      HCT Hematocrit   Date Value Ref Range Status   12/10/2021 23.3 (L) 34.0 - 46.6 % Final   12/09/2021 25.5 (L) 34.0 - 46.6 % Final   12/08/2021 28.7 (L) 34.0 - 46.6 % Final      Platlets No results found for: LABPLAT   MCV MCV   Date Value Ref Range Status   12/10/2021 93.2 79.0 - 97.0 fL Final   12/09/2021 93.8 79.0 - 97.0 fL Final   12/08/2021 90.8 79.0 - 97.0 fL Final          Sodium Sodium   Date Value Ref Range Status " Report received from NOC RN. Patient scheduled for GI early am today. In contact with Ped GI MD and Alva pre-op RN to prep patient for procedure. Per MD ok to drop order to r/o c-diff, patient ok to go to [facility] tower. Quick assessment completed p/t transport, report given to DARÍO Steinberg for pre-op.      12/10/2021 141 136 - 145 mmol/L Final   12/09/2021 142 136 - 145 mmol/L Final   12/08/2021 141 136 - 145 mmol/L Final      Potassium Potassium   Date Value Ref Range Status   12/10/2021 3.5 3.5 - 5.2 mmol/L Final   12/09/2021 3.2 (L) 3.5 - 5.2 mmol/L Final   12/08/2021 3.1 (L) 3.5 - 5.2 mmol/L Final     Comment:     Slight hemolysis detected by analyzer. Results may be affected.      Chloride Chloride   Date Value Ref Range Status   12/10/2021 107 98 - 107 mmol/L Final   12/09/2021 106 98 - 107 mmol/L Final   12/08/2021 101 98 - 107 mmol/L Final      CO2 CO2   Date Value Ref Range Status   12/10/2021 24.0 22.0 - 29.0 mmol/L Final   12/09/2021 24.0 22.0 - 29.0 mmol/L Final   12/08/2021 28.0 22.0 - 29.0 mmol/L Final      BUN BUN   Date Value Ref Range Status   12/10/2021 32 (H) 8 - 23 mg/dL Final   12/09/2021 24 (H) 8 - 23 mg/dL Final   12/08/2021 24 (H) 8 - 23 mg/dL Final      Creatinine Creatinine   Date Value Ref Range Status   12/10/2021 4.36 (H) 0.57 - 1.00 mg/dL Final   12/09/2021 3.73 (H) 0.57 - 1.00 mg/dL Final   12/08/2021 3.46 (H) 0.57 - 1.00 mg/dL Final      Calcium Calcium   Date Value Ref Range Status   12/10/2021 7.9 (L) 8.6 - 10.5 mg/dL Final   12/09/2021 7.4 (L) 8.6 - 10.5 mg/dL Final   12/08/2021 8.4 (L) 8.6 - 10.5 mg/dL Final      PO4 No results found for: CAPO4   Albumin Albumin   Date Value Ref Range Status   12/10/2021 3.00 (L) 3.50 - 5.20 g/dL Final   12/08/2021 3.60 3.50 - 5.20 g/dL Final      Magnesium Magnesium   Date Value Ref Range Status   12/09/2021 1.6 1.6 - 2.4 mg/dL Final   12/08/2021 1.9 1.6 - 2.4 mg/dL Final      Uric Acid No results found for: URICACID        Results Review:     I reviewed the patient's new clinical results.    amLODIPine, 10 mg, Oral, Q24H  atorvastatin, 80 mg, Oral, Daily  calcium acetate, 667 mg, Oral, TID With Meals  carvedilol, 25 mg, Oral, BID With Meals  cyanocobalamin, 1,000 mcg, Intramuscular, Q28 Days  doxycycline, 100 mg, Oral, Q12H  heparin (porcine),  5,000 Units, Subcutaneous, Q8H  hydrALAZINE, 100 mg, Oral, Q8H  insulin lispro, 0-7 Units, Subcutaneous, TID AC  ipratropium-albuterol, 3 mL, Nebulization, 4x Daily - RT  piperacillin-tazobactam, 3.375 g, Intravenous, Q12H  sodium chloride, 10 mL, Intravenous, Q12H           Medication Review:     Assessment/Plan       Right lower lobe pneumonia    Hepatocellular carcinoma metastatic to bone s/p RXT     Hemochromatosis    Cirrhosis of liver (HCC)    Chronic Hep C     ESRD (end stage renal disease)    Chronic ulcer of right foot    S/P left BKA    GERD (gastroesophageal reflux disease)    Chronic pain on Methadone    Essential hypertension    Type 2 diabetes mellitus (HCC)    AMS (altered mental status)    Colitis    Normocytic anemia    Hypokalemia      1- ESRD - MWF - [facility]   2- HTN   3- Mild hypokalemia   4- Anemia of chronic disease   5- Familial hemochromatosis   6- Hx of Metastatic hepatocellular carcinoma       Plan:  - Next HD Monday, UF as tolerated.   - Renal diet.   - Transfuse for HGb less than 7.0      I discussed the patients findings and my recommendations with nursing staff    Panchito Austin MD  12/11/21  09:56 EST

## 2023-02-06 ENCOUNTER — APPOINTMENT (OUTPATIENT)
Dept: GENERAL RADIOLOGY | Facility: HOSPITAL | Age: 63
End: 2023-02-06
Payer: COMMERCIAL

## 2023-02-06 ENCOUNTER — HOSPITAL ENCOUNTER (EMERGENCY)
Facility: HOSPITAL | Age: 63
Discharge: NURSING, REHAB OR LONG-TERM CARE | End: 2023-02-06
Attending: EMERGENCY MEDICINE | Admitting: EMERGENCY MEDICINE
Payer: COMMERCIAL

## 2023-02-06 VITALS
SYSTOLIC BLOOD PRESSURE: 147 MMHG | RESPIRATION RATE: 16 BRPM | OXYGEN SATURATION: 97 % | WEIGHT: 170 LBS | BODY MASS INDEX: 28.32 KG/M2 | DIASTOLIC BLOOD PRESSURE: 88 MMHG | HEART RATE: 84 BPM | HEIGHT: 65 IN | TEMPERATURE: 98.1 F

## 2023-02-06 DIAGNOSIS — R41.0 EPISODE OF CONFUSION: Primary | ICD-10-CM

## 2023-02-06 LAB
ALBUMIN SERPL-MCNC: 4.1 G/DL (ref 3.5–5.2)
ALBUMIN/GLOB SERPL: 1.4 G/DL
ALP SERPL-CCNC: 156 U/L (ref 39–117)
ALT SERPL W P-5'-P-CCNC: 36 U/L (ref 1–33)
AMMONIA BLD-SCNC: 15 UMOL/L (ref 11–51)
ANION GAP SERPL CALCULATED.3IONS-SCNC: 12 MMOL/L (ref 5–15)
APPEARANCE CSF: CLEAR
AST SERPL-CCNC: 30 U/L (ref 1–32)
BASOPHILS # BLD AUTO: 0.03 10*3/MM3 (ref 0–0.2)
BASOPHILS NFR BLD AUTO: 0.6 % (ref 0–1.5)
BILIRUB SERPL-MCNC: 0.4 MG/DL (ref 0–1.2)
BUN SERPL-MCNC: 46 MG/DL (ref 8–23)
BUN/CREAT SERPL: 10.2 (ref 7–25)
CALCIUM SPEC-SCNC: 9.3 MG/DL (ref 8.6–10.5)
CHLORIDE SERPL-SCNC: 101 MMOL/L (ref 98–107)
CO2 SERPL-SCNC: 23 MMOL/L (ref 22–29)
COLOR CSF: COLORLESS
COLOR SPUN CSF: COLORLESS
CREAT SERPL-MCNC: 4.52 MG/DL (ref 0.57–1)
D-LACTATE SERPL-SCNC: 1.2 MMOL/L (ref 0.5–2)
DEPRECATED RDW RBC AUTO: 39.2 FL (ref 37–54)
EGFRCR SERPLBLD CKD-EPI 2021: 10.3 ML/MIN/1.73
EOSINOPHIL # BLD AUTO: 0.21 10*3/MM3 (ref 0–0.4)
EOSINOPHIL NFR BLD AUTO: 4.4 % (ref 0.3–6.2)
ERYTHROCYTE [DISTWIDTH] IN BLOOD BY AUTOMATED COUNT: 11.4 % (ref 12.3–15.4)
GLOBULIN UR ELPH-MCNC: 2.9 GM/DL
GLUCOSE CSF-MCNC: 90 MG/DL (ref 40–70)
GLUCOSE SERPL-MCNC: 160 MG/DL (ref 65–99)
HCT VFR BLD AUTO: 26.8 % (ref 34–46.6)
HGB BLD-MCNC: 9.5 G/DL (ref 12–15.9)
HOLD SPECIMEN: NORMAL
IMM GRANULOCYTES # BLD AUTO: 0.01 10*3/MM3 (ref 0–0.05)
IMM GRANULOCYTES NFR BLD AUTO: 0.2 % (ref 0–0.5)
LIPASE SERPL-CCNC: 46 U/L (ref 13–60)
LYMPHOCYTES # BLD AUTO: 1.35 10*3/MM3 (ref 0.7–3.1)
LYMPHOCYTES NFR BLD AUTO: 28.4 % (ref 19.6–45.3)
MAGNESIUM SERPL-MCNC: 2 MG/DL (ref 1.6–2.4)
MCH RBC QN AUTO: 33.6 PG (ref 26.6–33)
MCHC RBC AUTO-ENTMCNC: 35.4 G/DL (ref 31.5–35.7)
MCV RBC AUTO: 94.7 FL (ref 79–97)
MONOCYTES # BLD AUTO: 0.33 10*3/MM3 (ref 0.1–0.9)
MONOCYTES NFR BLD AUTO: 6.9 % (ref 5–12)
NEUTROPHILS NFR BLD AUTO: 2.82 10*3/MM3 (ref 1.7–7)
NEUTROPHILS NFR BLD AUTO: 59.5 % (ref 42.7–76)
NRBC BLD AUTO-RTO: 0 /100 WBC (ref 0–0.2)
PLATELET # BLD AUTO: 132 10*3/MM3 (ref 140–450)
PMV BLD AUTO: 9.7 FL (ref 6–12)
POTASSIUM SERPL-SCNC: 4.5 MMOL/L (ref 3.5–5.2)
PROT CSF-MCNC: 31.8 MG/DL (ref 15–45)
PROT SERPL-MCNC: 7 G/DL (ref 6–8.5)
RBC # BLD AUTO: 2.83 10*6/MM3 (ref 3.77–5.28)
RBC # CSF MANUAL: 0 /MM3 (ref 0–5)
SODIUM SERPL-SCNC: 136 MMOL/L (ref 136–145)
SPECIMEN VOL CSF: 11.5 ML
TROPONIN T SERPL-MCNC: 0.02 NG/ML (ref 0–0.03)
TUBE # CSF: 4
WBC # CSF MANUAL: 1 /MM3 (ref 0–5)
WBC NRBC COR # BLD: 4.75 10*3/MM3 (ref 3.4–10.8)
WHOLE BLOOD HOLD COAG: NORMAL
WHOLE BLOOD HOLD SPECIMEN: NORMAL

## 2023-02-06 PROCEDURE — 82945 GLUCOSE OTHER FLUID: CPT | Performed by: EMERGENCY MEDICINE

## 2023-02-06 PROCEDURE — 36415 COLL VENOUS BLD VENIPUNCTURE: CPT

## 2023-02-06 PROCEDURE — 84157 ASSAY OF PROTEIN OTHER: CPT | Performed by: EMERGENCY MEDICINE

## 2023-02-06 PROCEDURE — 82140 ASSAY OF AMMONIA: CPT | Performed by: EMERGENCY MEDICINE

## 2023-02-06 PROCEDURE — 0 LIDOCAINE 1 % SOLUTION: Performed by: EMERGENCY MEDICINE

## 2023-02-06 PROCEDURE — 93005 ELECTROCARDIOGRAM TRACING: CPT | Performed by: EMERGENCY MEDICINE

## 2023-02-06 PROCEDURE — 85025 COMPLETE CBC W/AUTO DIFF WBC: CPT | Performed by: EMERGENCY MEDICINE

## 2023-02-06 PROCEDURE — 83735 ASSAY OF MAGNESIUM: CPT | Performed by: EMERGENCY MEDICINE

## 2023-02-06 PROCEDURE — 84484 ASSAY OF TROPONIN QUANT: CPT | Performed by: EMERGENCY MEDICINE

## 2023-02-06 PROCEDURE — 93005 ELECTROCARDIOGRAM TRACING: CPT

## 2023-02-06 PROCEDURE — 87040 BLOOD CULTURE FOR BACTERIA: CPT | Performed by: EMERGENCY MEDICINE

## 2023-02-06 PROCEDURE — 83690 ASSAY OF LIPASE: CPT | Performed by: EMERGENCY MEDICINE

## 2023-02-06 PROCEDURE — 87205 SMEAR GRAM STAIN: CPT | Performed by: EMERGENCY MEDICINE

## 2023-02-06 PROCEDURE — 99284 EMERGENCY DEPT VISIT MOD MDM: CPT

## 2023-02-06 PROCEDURE — 71045 X-RAY EXAM CHEST 1 VIEW: CPT

## 2023-02-06 PROCEDURE — 83605 ASSAY OF LACTIC ACID: CPT | Performed by: EMERGENCY MEDICINE

## 2023-02-06 PROCEDURE — 89050 BODY FLUID CELL COUNT: CPT | Performed by: EMERGENCY MEDICINE

## 2023-02-06 PROCEDURE — 87070 CULTURE OTHR SPECIMN AEROBIC: CPT | Performed by: EMERGENCY MEDICINE

## 2023-02-06 PROCEDURE — 80053 COMPREHEN METABOLIC PANEL: CPT | Performed by: EMERGENCY MEDICINE

## 2023-02-06 PROCEDURE — 87015 SPECIMEN INFECT AGNT CONCNTJ: CPT | Performed by: EMERGENCY MEDICINE

## 2023-02-06 RX ORDER — PANTOPRAZOLE SODIUM 40 MG/1
40 TABLET, DELAYED RELEASE ORAL DAILY
COMMUNITY

## 2023-02-06 RX ORDER — POLYETHYLENE GLYCOL 3350 17 G/17G
17 POWDER, FOR SOLUTION ORAL DAILY
COMMUNITY

## 2023-02-06 RX ORDER — LOPERAMIDE HYDROCHLORIDE 2 MG/1
2 CAPSULE ORAL 4 TIMES DAILY PRN
COMMUNITY

## 2023-02-06 RX ORDER — SODIUM CHLORIDE 0.9 % (FLUSH) 0.9 %
10 SYRINGE (ML) INJECTION AS NEEDED
Status: DISCONTINUED | OUTPATIENT
Start: 2023-02-06 | End: 2023-02-06 | Stop reason: HOSPADM

## 2023-02-06 RX ORDER — LIDOCAINE 50 MG/G
1 PATCH TOPICAL EVERY 24 HOURS
COMMUNITY

## 2023-02-06 RX ORDER — LIDOCAINE HYDROCHLORIDE 10 MG/ML
5 INJECTION, SOLUTION INFILTRATION; PERINEURAL ONCE
Status: COMPLETED | OUTPATIENT
Start: 2023-02-06 | End: 2023-02-06

## 2023-02-06 RX ADMIN — LIDOCAINE HYDROCHLORIDE 5 ML: 10 INJECTION, SOLUTION INFILTRATION; PERINEURAL at 11:47

## 2023-02-06 NOTE — DISCHARGE INSTRUCTIONS
Return if fever, significant headache, further confusion, or any other concerns.  Call your primary care provider and arrange follow-up for further evaluation.  
98.8

## 2023-02-06 NOTE — POST-PROCEDURE NOTE
Radiology Procedure                                        Pre-procedure: procedure, risks discussed with patient. Patient indicated understanding and consented to procedure.                Procedure Performed: lumbar puncture      IV Sedation and/or Anesthesia:  No     Complications: none     Preliminary Findings: pending     Specimen Removed: 11cc clear, colorless CSF     Estimated Blood Loss:  0ml     Post-Procedure Diagnosis: pending     Post-Procedure Plan: encourage fluids, bed rest x 2 hours     Standard Discharge Instructions Given:yes     Electronically signed by Elieser Morrison MD, 02/06/23, 11:58 AM EST.

## 2023-02-06 NOTE — ED PROVIDER NOTES
"Subjective   History of Present Illness  Mrs Keita presents by ambulance from [facility] with confusion.  When asked why she is here she tells me \"I feel confused\".  When I ask if she is hurting anywhere she tells me her right upper abdomen.  She has history of cirrhosis, hepatitis C and B.  She has end-stage renal disease and is on hemodialysis.  She does not know when her last dialysis was and does not know which days of the week she is supposed to go.  She denies fevers or chills.  When asked she does acknowledge a \"little bit\" of headache..  EMS notes a prehospital blood sugar of 160.        Review of Systems    Past Medical History:   Diagnosis Date   • Anxiety    • Cancer (HCC)     liver cell carcinoma   • DDD (degenerative disc disease), lumbar    • Depression    • Diabetes mellitus (HCC)    • Fibromyalgia    • Hemochromatosis    • Hep B w/o coma, chronic, w/o delta (HCC)    • Hep C w/o coma, chronic (HCC)    • Hypertension    • Stroke (HCC)    • Vertigo        Allergies   Allergen Reactions   • Sulfa Antibiotics        Past Surgical History:   Procedure Laterality Date   • ARTERIOVENOUS FISTULA/SHUNT SURGERY Left 10/16/2021    Procedure: UPPER EXTREMITY ARTERIOVENOUS FISTULA FORMATION LEFT;  Surgeon: Johnny Rousseau MD;  Location: [facility];  Service: Vascular;  Laterality: Left;   • BACK SURGERY     • BELOW KNEE LEG AMPUTATION     •  SECTION     • FOOT SURGERY     • KNEE SURGERY     • ROTATOR CUFF REPAIR     • SPINAL CORD STIMULATOR IMPLANT         Family History   Problem Relation Age of Onset   • Heart disease Father    • Heart attack Father        Social History     Socioeconomic History   • Marital status:    Tobacco Use   • Smoking status: Never   • Smokeless tobacco: Never   Vaping Use   • Vaping Use: Never used   Substance and Sexual Activity   • Alcohol use: No   • Drug use: No   • Sexual activity: Defer           Objective   Physical Exam  Vitals and nursing " note reviewed.   Constitutional:       General: She is not in acute distress.     Appearance: Normal appearance.      Comments: She is a very pleasant lady.  She is lying with her eyes closed.  When I speak to her she opens her eyes and looks at me.  She is able to maintain conversation and answer questions regarding her immediate symptoms.   HENT:      Head: Normocephalic and atraumatic.      Nose: Nose normal. No congestion or rhinorrhea.   Eyes:      General: No scleral icterus.     Conjunctiva/sclera: Conjunctivae normal.   Neck:      Comments: No JVD   Cardiovascular:      Rate and Rhythm: Normal rate and regular rhythm.      Heart sounds: No murmur heard.    No friction rub.   Pulmonary:      Effort: Pulmonary effort is normal.      Breath sounds: Normal breath sounds. No wheezing or rales.      Comments: She has a dialysis port in her right upper chest  Chest:      Chest wall: No tenderness.   Abdominal:      General: Bowel sounds are normal.      Palpations: Abdomen is soft.      Tenderness: There is abdominal tenderness. There is no guarding or rebound.      Comments: She is mildly tender across the upper abdomen   Musculoskeletal:         General: No tenderness.      Cervical back: Normal range of motion and neck supple.      Right lower leg: No edema.      Comments: She has a left below-knee amputation   Skin:     General: Skin is warm and dry.      Coloration: Skin is not pale.      Findings: No erythema.      Comments: Skin turgor is poor.  She has a single raised scabbed lesion on her right flank area.  It is consistent with shingles.  I do not see any other lesions or erythema.  She has been diagnosed with shingles recently.   Neurological:      General: No focal deficit present.      Mental Status: She is disoriented.      Motor: No weakness.      Coordination: Coordination normal.      Comments: She is a little bit drowsy but is able to engage in conversation.  When not engaged in conversation she  lies with her eyes closed   Psychiatric:         Mood and Affect: Mood normal.         Behavior: Behavior normal.         Thought Content: Thought content normal.         Procedures           ED Course  ED Course as of 02/06/23 1535   Mon Feb 06, 2023   0801 Labs and chest x-ray so far not revealing a cause for her altered mental status.  She has active herpes zoster.  We will need to perform LP to evaluate for herpes encephalitis. [DT]   0812 I spoke with her about risks and benefits of that and we will proceed with that [DT]   0820 Attempted LP at bedside.  It felt as though the needle entered the space by no fluid came out.  I performed 2 more passes with the needle and was unable to obtain fluid.  I have spoken with the radiologist and he will attempt a spinal tap under fluoroscopy. [DT]   0959 Ammonia level is low.  Radiology advises they will perform her spinal tap at 10:00. [DT]   1255 Called lab regarding cell count.  Was given verbal report that it showed 1 white blood cell.  Upon repeat exam she is fully awake and alert.  She tells me she is ready to go home.  I assisted her to a standing position.  She was able to bear weight on her right leg.  I repeated neurologic exam and finger-nose testing is intact and normal.  She denies headache.  I took her lunch.  She is now able to tell me what days she dialyzes.  She has missed her dialysis for today but will get it today after tomorrow.  I talked to her about admission for further evaluation and she tells me she feels back to normal and exam seems normal at this point.  She wants to go home and I will respect her wishes. [DT]      ED Course User Index  [DT] Servando Seals MD                                           Medical Decision Making  Please refer to emergency department course.  Initial differential diagnosis included encephalitis sepsis metabolic derangement and others.  She was observed in the emergency department for several hours.  Had lumbar  puncture due to her ongoing shingles.  This was performed by radiology and was negative.  I reexamined her multiple times in the emergency department and each time she was more awake and alert.  On final exam she was fully awake alert and an extremely pleasant lady asking to go home.    Episode of confusion: acute illness or injury that poses a threat to life or bodily functions  Amount and/or Complexity of Data Reviewed  External Data Reviewed: notes.  Labs: ordered. Decision-making details documented in ED Course.  Radiology: ordered. Decision-making details documented in ED Course.  ECG/medicine tests: ordered. Decision-making details documented in ED Course.      Risk  Prescription drug management.          Final diagnoses:   Episode of confusion       ED Disposition  ED Disposition     ED Disposition   Discharge    Condition   Stable    Comment   --             Lulu Amos MD  [address] [id number] [phone]               Medication List      Changed    calcium acetate 667 MG capsule capsule  Commonly known as: PHOS BINDER)  Take 1 capsule by mouth 3 (Three) Times a Day With Meals.  What changed: when to take this             Servando Seals MD  02/06/23 1915

## 2023-02-07 LAB
QT INTERVAL: 424 MS
QTC INTERVAL: 486 MS

## 2023-02-09 LAB
BACTERIA SPEC AEROBE CULT: NORMAL
GRAM STN SPEC: NORMAL

## 2023-02-11 LAB
BACTERIA SPEC AEROBE CULT: NORMAL
BACTERIA SPEC AEROBE CULT: NORMAL

## 2023-12-18 ENCOUNTER — APPOINTMENT (OUTPATIENT)
Dept: NEPHROLOGY | Facility: HOSPITAL | Age: 63
End: 2023-12-18
Payer: MEDICARE

## 2023-12-18 ENCOUNTER — APPOINTMENT (OUTPATIENT)
Dept: CT IMAGING | Facility: HOSPITAL | Age: 63
End: 2023-12-18
Payer: MEDICARE

## 2023-12-18 ENCOUNTER — HOSPITAL ENCOUNTER (INPATIENT)
Facility: HOSPITAL | Age: 63
LOS: 3 days | Discharge: NURSING, REHAB OR LONG-TERM CARE | End: 2023-12-21
Attending: EMERGENCY MEDICINE | Admitting: INTERNAL MEDICINE
Payer: MEDICARE

## 2023-12-18 ENCOUNTER — APPOINTMENT (OUTPATIENT)
Dept: GENERAL RADIOLOGY | Facility: HOSPITAL | Age: 63
End: 2023-12-18
Payer: MEDICARE

## 2023-12-18 DIAGNOSIS — C22.9 MALIGNANT NEOPLASM OF LIVER, UNSPECIFIED LIVER MALIGNANCY TYPE: ICD-10-CM

## 2023-12-18 DIAGNOSIS — J96.21 ACUTE ON CHRONIC RESPIRATORY FAILURE WITH HYPOXIA: Primary | ICD-10-CM

## 2023-12-18 DIAGNOSIS — N18.6 ESRD (END STAGE RENAL DISEASE): Chronic | ICD-10-CM

## 2023-12-18 DIAGNOSIS — R07.9 CHEST PAIN, UNSPECIFIED TYPE: ICD-10-CM

## 2023-12-18 DIAGNOSIS — K74.69 OTHER CIRRHOSIS OF LIVER: Chronic | ICD-10-CM

## 2023-12-18 DIAGNOSIS — Z79.4 TYPE 2 DIABETES MELLITUS WITH DIABETIC PERIPHERAL ANGIOPATHY WITHOUT GANGRENE, WITH LONG-TERM CURRENT USE OF INSULIN: ICD-10-CM

## 2023-12-18 DIAGNOSIS — Z99.2 CHRONIC KIDNEY DISEASE WITH END STAGE RENAL FAILURE ON DIALYSIS: ICD-10-CM

## 2023-12-18 DIAGNOSIS — E11.51 TYPE 2 DIABETES MELLITUS WITH DIABETIC PERIPHERAL ANGIOPATHY WITHOUT GANGRENE, WITH LONG-TERM CURRENT USE OF INSULIN: ICD-10-CM

## 2023-12-18 DIAGNOSIS — N18.6 CHRONIC KIDNEY DISEASE WITH END STAGE RENAL FAILURE ON DIALYSIS: ICD-10-CM

## 2023-12-18 DIAGNOSIS — C79.51 SECONDARY MALIGNANCY OF RIB: ICD-10-CM

## 2023-12-18 PROBLEM — R79.89 ELEVATED BRAIN NATRIURETIC PEPTIDE (BNP) LEVEL: Status: ACTIVE | Noted: 2023-12-18

## 2023-12-18 LAB
ALBUMIN SERPL-MCNC: 3.8 G/DL (ref 3.5–5.2)
ALBUMIN/GLOB SERPL: 1.3 G/DL
ALP SERPL-CCNC: 138 U/L (ref 39–117)
ALT SERPL W P-5'-P-CCNC: 45 U/L (ref 1–33)
AMMONIA BLD-SCNC: <10 UMOL/L (ref 11–51)
ANION GAP SERPL CALCULATED.3IONS-SCNC: 8 MMOL/L (ref 5–15)
ARTERIAL PATENCY WRIST A: ABNORMAL
AST SERPL-CCNC: 25 U/L (ref 1–32)
ATMOSPHERIC PRESS: ABNORMAL MM[HG]
BASE EXCESS BLDA CALC-SCNC: 4.3 MMOL/L (ref 0–2)
BASOPHILS # BLD AUTO: 0.03 10*3/MM3 (ref 0–0.2)
BASOPHILS NFR BLD AUTO: 0.6 % (ref 0–1.5)
BDY SITE: ABNORMAL
BILIRUB SERPL-MCNC: 0.5 MG/DL (ref 0–1.2)
BODY TEMPERATURE: 37
BUN SERPL-MCNC: 40 MG/DL (ref 8–23)
BUN/CREAT SERPL: 7.6 (ref 7–25)
CALCIUM SPEC-SCNC: 9.2 MG/DL (ref 8.6–10.5)
CHLORIDE SERPL-SCNC: 98 MMOL/L (ref 98–107)
CO2 BLDA-SCNC: 29.9 MMOL/L (ref 22–33)
CO2 SERPL-SCNC: 29 MMOL/L (ref 22–29)
COHGB MFR BLD: 1.1 % (ref 0–2)
CREAT SERPL-MCNC: 5.26 MG/DL (ref 0.57–1)
D-LACTATE SERPL-SCNC: 1.2 MMOL/L (ref 0.5–2)
DEPRECATED RDW RBC AUTO: 40.6 FL (ref 37–54)
EGFRCR SERPLBLD CKD-EPI 2021: 8.5 ML/MIN/1.73
EOSINOPHIL # BLD AUTO: 0.25 10*3/MM3 (ref 0–0.4)
EOSINOPHIL NFR BLD AUTO: 4.7 % (ref 0.3–6.2)
EPAP: 0
ERYTHROCYTE [DISTWIDTH] IN BLOOD BY AUTOMATED COUNT: 11.9 % (ref 12.3–15.4)
FLUAV RNA RESP QL NAA+PROBE: NOT DETECTED
FLUBV RNA RESP QL NAA+PROBE: NOT DETECTED
GEN 5 2HR TROPONIN T REFLEX: 33 NG/L
GLOBULIN UR ELPH-MCNC: 2.9 GM/DL
GLUCOSE BLDC GLUCOMTR-MCNC: 122 MG/DL (ref 70–130)
GLUCOSE BLDC GLUCOMTR-MCNC: 152 MG/DL (ref 70–130)
GLUCOSE SERPL-MCNC: 302 MG/DL (ref 65–99)
HAV IGM SERPL QL IA: ABNORMAL
HBV CORE IGM SERPL QL IA: ABNORMAL
HBV SURFACE AG SERPL QL IA: ABNORMAL
HCO3 BLDA-SCNC: 28.7 MMOL/L (ref 20–26)
HCT VFR BLD AUTO: 26.2 % (ref 34–46.6)
HCT VFR BLD CALC: 29 % (ref 38–51)
HCV AB SER DONR QL: REACTIVE
HGB BLD-MCNC: 9.3 G/DL (ref 12–15.9)
HGB BLDA-MCNC: 9.5 G/DL (ref 14–18)
HOLD SPECIMEN: NORMAL
IMM GRANULOCYTES # BLD AUTO: 0.03 10*3/MM3 (ref 0–0.05)
IMM GRANULOCYTES NFR BLD AUTO: 0.6 % (ref 0–0.5)
INHALED O2 CONCENTRATION: 36 %
IPAP: 0
LIPASE SERPL-CCNC: 33 U/L (ref 13–60)
LYMPHOCYTES # BLD AUTO: 1.31 10*3/MM3 (ref 0.7–3.1)
LYMPHOCYTES NFR BLD AUTO: 24.8 % (ref 19.6–45.3)
MCH RBC QN AUTO: 33.8 PG (ref 26.6–33)
MCHC RBC AUTO-ENTMCNC: 35.5 G/DL (ref 31.5–35.7)
MCV RBC AUTO: 95.3 FL (ref 79–97)
METHGB BLD QL: 0.2 % (ref 0–1.5)
MODALITY: ABNORMAL
MONOCYTES # BLD AUTO: 0.36 10*3/MM3 (ref 0.1–0.9)
MONOCYTES NFR BLD AUTO: 6.8 % (ref 5–12)
NEUTROPHILS NFR BLD AUTO: 3.31 10*3/MM3 (ref 1.7–7)
NEUTROPHILS NFR BLD AUTO: 62.5 % (ref 42.7–76)
NRBC BLD AUTO-RTO: 0 /100 WBC (ref 0–0.2)
NT-PROBNP SERPL-MCNC: 4626 PG/ML (ref 0–900)
OXYHGB MFR BLDV: 97.8 % (ref 94–99)
PAW @ PEAK INSP FLOW SETTING VENT: 0 CMH2O
PCO2 BLDA: 41.4 MM HG (ref 35–45)
PCO2 TEMP ADJ BLD: 41.4 MM HG (ref 35–45)
PH BLDA: 7.45 PH UNITS (ref 7.35–7.45)
PH, TEMP CORRECTED: 7.45 PH UNITS
PLATELET # BLD AUTO: 160 10*3/MM3 (ref 140–450)
PMV BLD AUTO: 10.1 FL (ref 6–12)
PO2 BLDA: 104 MM HG (ref 83–108)
PO2 TEMP ADJ BLD: 104 MM HG (ref 83–108)
POTASSIUM SERPL-SCNC: 4.9 MMOL/L (ref 3.5–5.2)
PROCALCITONIN SERPL-MCNC: 0.13 NG/ML (ref 0–0.25)
PROT SERPL-MCNC: 6.7 G/DL (ref 6–8.5)
QT INTERVAL: 380 MS
QT INTERVAL: 388 MS
QTC INTERVAL: 449 MS
QTC INTERVAL: 466 MS
RBC # BLD AUTO: 2.75 10*6/MM3 (ref 3.77–5.28)
SARS-COV-2 RNA RESP QL NAA+PROBE: NOT DETECTED
SODIUM SERPL-SCNC: 135 MMOL/L (ref 136–145)
TOTAL RATE: 0 BREATHS/MINUTE
TROPONIN T DELTA: 1 NG/L
TROPONIN T SERPL HS-MCNC: 32 NG/L
WBC NRBC COR # BLD AUTO: 5.29 10*3/MM3 (ref 3.4–10.8)
WHOLE BLOOD HOLD COAG: NORMAL
WHOLE BLOOD HOLD SPECIMEN: NORMAL

## 2023-12-18 PROCEDURE — 85025 COMPLETE CBC W/AUTO DIFF WBC: CPT | Performed by: EMERGENCY MEDICINE

## 2023-12-18 PROCEDURE — 93005 ELECTROCARDIOGRAM TRACING: CPT | Performed by: EMERGENCY MEDICINE

## 2023-12-18 PROCEDURE — 82948 REAGENT STRIP/BLOOD GLUCOSE: CPT

## 2023-12-18 PROCEDURE — 80053 COMPREHEN METABOLIC PANEL: CPT | Performed by: EMERGENCY MEDICINE

## 2023-12-18 PROCEDURE — 87636 SARSCOV2 & INF A&B AMP PRB: CPT | Performed by: EMERGENCY MEDICINE

## 2023-12-18 PROCEDURE — 71045 X-RAY EXAM CHEST 1 VIEW: CPT

## 2023-12-18 PROCEDURE — 36600 WITHDRAWAL OF ARTERIAL BLOOD: CPT

## 2023-12-18 PROCEDURE — 82375 ASSAY CARBOXYHB QUANT: CPT

## 2023-12-18 PROCEDURE — 5A1D70Z PERFORMANCE OF URINARY FILTRATION, INTERMITTENT, LESS THAN 6 HOURS PER DAY: ICD-10-PCS | Performed by: INTERNAL MEDICINE

## 2023-12-18 PROCEDURE — 87040 BLOOD CULTURE FOR BACTERIA: CPT | Performed by: EMERGENCY MEDICINE

## 2023-12-18 PROCEDURE — 70450 CT HEAD/BRAIN W/O DYE: CPT

## 2023-12-18 PROCEDURE — 80074 ACUTE HEPATITIS PANEL: CPT | Performed by: INTERNAL MEDICINE

## 2023-12-18 PROCEDURE — 36415 COLL VENOUS BLD VENIPUNCTURE: CPT

## 2023-12-18 PROCEDURE — 83605 ASSAY OF LACTIC ACID: CPT | Performed by: EMERGENCY MEDICINE

## 2023-12-18 PROCEDURE — 93005 ELECTROCARDIOGRAM TRACING: CPT

## 2023-12-18 PROCEDURE — 83880 ASSAY OF NATRIURETIC PEPTIDE: CPT | Performed by: EMERGENCY MEDICINE

## 2023-12-18 PROCEDURE — 83690 ASSAY OF LIPASE: CPT | Performed by: EMERGENCY MEDICINE

## 2023-12-18 PROCEDURE — 84145 PROCALCITONIN (PCT): CPT | Performed by: EMERGENCY MEDICINE

## 2023-12-18 PROCEDURE — 99285 EMERGENCY DEPT VISIT HI MDM: CPT

## 2023-12-18 PROCEDURE — 71250 CT THORAX DX C-: CPT

## 2023-12-18 PROCEDURE — 84484 ASSAY OF TROPONIN QUANT: CPT | Performed by: EMERGENCY MEDICINE

## 2023-12-18 PROCEDURE — 83050 HGB METHEMOGLOBIN QUAN: CPT

## 2023-12-18 PROCEDURE — 99222 1ST HOSP IP/OBS MODERATE 55: CPT | Performed by: FAMILY MEDICINE

## 2023-12-18 PROCEDURE — 82805 BLOOD GASES W/O2 SATURATION: CPT

## 2023-12-18 PROCEDURE — 82140 ASSAY OF AMMONIA: CPT | Performed by: FAMILY MEDICINE

## 2023-12-18 RX ORDER — DEXTROSE MONOHYDRATE 25 G/50ML
25 INJECTION, SOLUTION INTRAVENOUS
Status: DISCONTINUED | OUTPATIENT
Start: 2023-12-18 | End: 2023-12-21 | Stop reason: HOSPADM

## 2023-12-18 RX ORDER — ASPIRIN 81 MG/1
324 TABLET, CHEWABLE ORAL ONCE
Status: DISCONTINUED | OUTPATIENT
Start: 2023-12-18 | End: 2023-12-21 | Stop reason: HOSPADM

## 2023-12-18 RX ORDER — ONDANSETRON 2 MG/ML
4 INJECTION INTRAMUSCULAR; INTRAVENOUS EVERY 6 HOURS PRN
Status: DISCONTINUED | OUTPATIENT
Start: 2023-12-18 | End: 2023-12-21 | Stop reason: HOSPADM

## 2023-12-18 RX ORDER — SODIUM CHLORIDE 0.9 % (FLUSH) 0.9 %
10 SYRINGE (ML) INJECTION AS NEEDED
Status: DISCONTINUED | OUTPATIENT
Start: 2023-12-18 | End: 2023-12-21 | Stop reason: HOSPADM

## 2023-12-18 RX ORDER — CALCIUM ACETATE 667 MG/1
667 CAPSULE ORAL
Status: DISCONTINUED | OUTPATIENT
Start: 2023-12-18 | End: 2023-12-21 | Stop reason: HOSPADM

## 2023-12-18 RX ORDER — VENLAFAXINE HYDROCHLORIDE 75 MG/1
75 CAPSULE, EXTENDED RELEASE ORAL DAILY
Status: DISCONTINUED | OUTPATIENT
Start: 2023-12-19 | End: 2023-12-21 | Stop reason: HOSPADM

## 2023-12-18 RX ORDER — SODIUM CHLORIDE 9 MG/ML
40 INJECTION, SOLUTION INTRAVENOUS AS NEEDED
Status: DISCONTINUED | OUTPATIENT
Start: 2023-12-18 | End: 2023-12-21 | Stop reason: HOSPADM

## 2023-12-18 RX ORDER — IBUPROFEN 600 MG/1
1 TABLET ORAL
Status: DISCONTINUED | OUTPATIENT
Start: 2023-12-18 | End: 2023-12-21 | Stop reason: HOSPADM

## 2023-12-18 RX ORDER — CARVEDILOL 12.5 MG/1
25 TABLET ORAL 2 TIMES DAILY WITH MEALS
Status: DISCONTINUED | OUTPATIENT
Start: 2023-12-18 | End: 2023-12-21 | Stop reason: HOSPADM

## 2023-12-18 RX ORDER — PANTOPRAZOLE SODIUM 40 MG/1
40 TABLET, DELAYED RELEASE ORAL DAILY
Status: DISCONTINUED | OUTPATIENT
Start: 2023-12-19 | End: 2023-12-21 | Stop reason: HOSPADM

## 2023-12-18 RX ORDER — SODIUM CHLORIDE 0.9 % (FLUSH) 0.9 %
10 SYRINGE (ML) INJECTION EVERY 12 HOURS SCHEDULED
Status: DISCONTINUED | OUTPATIENT
Start: 2023-12-18 | End: 2023-12-21 | Stop reason: HOSPADM

## 2023-12-18 RX ORDER — ACETAMINOPHEN 325 MG/1
650 TABLET ORAL EVERY 4 HOURS PRN
Status: DISCONTINUED | OUTPATIENT
Start: 2023-12-18 | End: 2023-12-21 | Stop reason: HOSPADM

## 2023-12-18 RX ORDER — BISACODYL 5 MG/1
5 TABLET, DELAYED RELEASE ORAL DAILY PRN
Status: DISCONTINUED | OUTPATIENT
Start: 2023-12-18 | End: 2023-12-21 | Stop reason: HOSPADM

## 2023-12-18 RX ORDER — QUETIAPINE FUMARATE 25 MG/1
25 TABLET, FILM COATED ORAL NIGHTLY
Status: DISCONTINUED | OUTPATIENT
Start: 2023-12-18 | End: 2023-12-21 | Stop reason: HOSPADM

## 2023-12-18 RX ORDER — AMOXICILLIN 250 MG
2 CAPSULE ORAL 2 TIMES DAILY
Status: DISCONTINUED | OUTPATIENT
Start: 2023-12-18 | End: 2023-12-21 | Stop reason: HOSPADM

## 2023-12-18 RX ORDER — ATORVASTATIN CALCIUM 40 MG/1
80 TABLET, FILM COATED ORAL NIGHTLY
Status: DISCONTINUED | OUTPATIENT
Start: 2023-12-18 | End: 2023-12-21 | Stop reason: HOSPADM

## 2023-12-18 RX ORDER — NITROGLYCERIN 0.4 MG/1
0.4 TABLET SUBLINGUAL
Status: DISCONTINUED | OUTPATIENT
Start: 2023-12-18 | End: 2023-12-21 | Stop reason: HOSPADM

## 2023-12-18 RX ORDER — POLYETHYLENE GLYCOL 3350 17 G/17G
17 POWDER, FOR SOLUTION ORAL DAILY PRN
Status: DISCONTINUED | OUTPATIENT
Start: 2023-12-18 | End: 2023-12-21 | Stop reason: HOSPADM

## 2023-12-18 RX ORDER — BISACODYL 10 MG
10 SUPPOSITORY, RECTAL RECTAL DAILY PRN
Status: DISCONTINUED | OUTPATIENT
Start: 2023-12-18 | End: 2023-12-21 | Stop reason: HOSPADM

## 2023-12-18 RX ORDER — NICOTINE POLACRILEX 4 MG
15 LOZENGE BUCCAL
Status: DISCONTINUED | OUTPATIENT
Start: 2023-12-18 | End: 2023-12-21 | Stop reason: HOSPADM

## 2023-12-18 RX ADMIN — SENNOSIDES AND DOCUSATE SODIUM 2 TABLET: 8.6; 5 TABLET ORAL at 21:42

## 2023-12-18 RX ADMIN — CALCIUM ACETATE 667 MG: 667 CAPSULE ORAL at 21:43

## 2023-12-18 RX ADMIN — CARVEDILOL 25 MG: 12.5 TABLET, FILM COATED ORAL at 21:45

## 2023-12-18 RX ADMIN — QUETIAPINE FUMARATE 25 MG: 25 TABLET ORAL at 21:42

## 2023-12-18 RX ADMIN — ATORVASTATIN CALCIUM 80 MG: 40 TABLET, FILM COATED ORAL at 22:29

## 2023-12-18 RX ADMIN — Medication 10 ML: at 21:42

## 2023-12-18 NOTE — ED NOTES
Jeaneth Keita    Nursing Report ED to Floor:  Mental status: a&ox1  Ambulatory status: wheelchair  Oxygen Therapy:  4l  Cardiac Rhythm: sr  Admitted from: ed  Safety Concerns:  fall risk  Social Issues: none  ED Room #:  12    ED Nurse Phone Extension - [phone] or may call [phone].      HPI:   Chief Complaint   Patient presents with    Chest Pain    Altered Mental Status       Past Medical History:  Past Medical History:   Diagnosis Date    Anxiety     Cancer     liver cell carcinoma    DDD (degenerative disc disease), lumbar     Depression     Diabetes mellitus     Fibromyalgia     Hemochromatosis     Hep B w/o coma, chronic, w/o delta     Hep C w/o coma, chronic     Hypertension     Stroke     Vertigo         Past Surgical History:  Past Surgical History:   Procedure Laterality Date    ARTERIOVENOUS FISTULA/SHUNT SURGERY Left 10/16/2021    Procedure: UPPER EXTREMITY ARTERIOVENOUS FISTULA FORMATION LEFT;  Surgeon: Johnny Rousseau MD;  Location: [facility];  Service: Vascular;  Laterality: Left;    BACK SURGERY      BELOW KNEE LEG AMPUTATION       SECTION      FOOT SURGERY      KNEE SURGERY      ROTATOR CUFF REPAIR      SPINAL CORD STIMULATOR IMPLANT          Admitting Doctor:   LINA Lu DO    Consulting Provider(s):  Consults       No orders found from 2023 to 2023.             Admitting Diagnosis:   The primary encounter diagnosis was Acute on chronic respiratory failure with hypoxia. Diagnoses of Malignant neoplasm of liver, unspecified liver malignancy type, Secondary malignancy of rib, Chronic kidney disease with end stage renal failure on dialysis, and Chest pain, unspecified type were also pertinent to this visit.    Most Recent Vitals:   Vitals:    23 1302 23 1330 23 1400 23 1430   BP: 121/67 118/68 129/71 128/72   BP Location:       Patient Position:       Pulse: 82 82 83 83   Resp:       Temp:       TempSrc:       SpO2: 100% 100% 100% 99%   Weight:        Height:           Active LDAs/IV Access:   Lines, Drains & Airways       Active LDAs       Name Placement date Placement time Site Days    Peripheral IV 12/18/23 Right Antecubital 12/18/23  --  Antecubital  less than 1    Peripheral IV 12/18/23 Anterior;Right;Upper Arm 12/18/23  --  Arm  less than 1    Hemodialysis Cath Double --  --  Internal Jugular  --                    Labs (abnormal labs have a star):   Labs Reviewed   TROPONIN - Abnormal; Notable for the following components:       Result Value    HS Troponin T 32 (*)     All other components within normal limits    Narrative:     High Sensitive Troponin T Reference Range:  <14.0 ng/L- Negative Female for AMI  <22.0 ng/L- Negative Male for AMI  >=14 - Abnormal Female indicating possible myocardial injury.  >=22 - Abnormal Male indicating possible myocardial injury.   Clinicians would have to utilize clinical acumen, EKG, Troponin, and serial changes to determine if it is an Acute Myocardial Infarction or myocardial injury due to an underlying chronic condition.        COMPREHENSIVE METABOLIC PANEL - Abnormal; Notable for the following components:    Glucose 302 (*)     BUN 40 (*)     Creatinine 5.26 (*)     Sodium 135 (*)     ALT (SGPT) 45 (*)     Alkaline Phosphatase 138 (*)     eGFR 8.5 (*)     All other components within normal limits    Narrative:     GFR Normal >60  Chronic Kidney Disease <60  Kidney Failure <15     BNP (IN-HOUSE) - Abnormal; Notable for the following components:    proBNP 4,626.0 (*)     All other components within normal limits    Narrative:     This assay is used as an aid in the diagnosis of individuals suspected of having heart failure. It can be used as an aid in the diagnosis of acute decompensated heart failure (ADHF) in patients presenting with signs and symptoms of ADHF to the emergency department (ED). In addition, NT-proBNP of <300 pg/mL indicates ADHF is not likely.    Age Range Result Interpretation  NT-proBNP  Concentration (pg/mL:      <50             Positive            >450                   Gray                 300-450                    Negative             <300    50-75           Positive            >900                  Gray                300-900                  Negative            <300      >75             Positive            >1800                  Gray                300-1800                  Negative            <300   CBC WITH AUTO DIFFERENTIAL - Abnormal; Notable for the following components:    RBC 2.75 (*)     Hemoglobin 9.3 (*)     Hematocrit 26.2 (*)     MCH 33.8 (*)     RDW 11.9 (*)     Immature Grans % 0.6 (*)     All other components within normal limits   BLOOD GAS, ARTERIAL W/CO-OXIMETRY - Abnormal; Notable for the following components:    HCO3, Arterial 28.7 (*)     Base Excess, Arterial 4.3 (*)     Hemoglobin, Blood Gas 9.5 (*)     Hematocrit, Blood Gas 29.0 (*)     All other components within normal limits   HIGH SENSITIVITIY TROPONIN T 2HR - Abnormal; Notable for the following components:    HS Troponin T 33 (*)     All other components within normal limits    Narrative:     High Sensitive Troponin T Reference Range:  <14.0 ng/L- Negative Female for AMI  <22.0 ng/L- Negative Male for AMI  >=14 - Abnormal Female indicating possible myocardial injury.  >=22 - Abnormal Male indicating possible myocardial injury.   Clinicians would have to utilize clinical acumen, EKG, Troponin, and serial changes to determine if it is an Acute Myocardial Infarction or myocardial injury due to an underlying chronic condition.        COVID-19 AND FLU A/B, NP SWAB IN TRANSPORT MEDIA 1 HR TAT - Normal    Narrative:     Fact sheet for providers: https://www.fda.gov/media/226812/download    Fact sheet for patients: https://www.fda.gov/media/457799/download    Test performed by PCR.   LIPASE - Normal   LACTIC ACID, PLASMA - Normal   PROCALCITONIN - Normal    Narrative:     As a Marker for Sepsis (Non-Neonates):    1. <0.5  "ng/mL represents a low risk of severe sepsis and/or septic shock.  2. >2 ng/mL represents a high risk of severe sepsis and/or septic shock.    As a Marker for Lower Respiratory Tract Infections that require antibiotic therapy:    PCT on Admission    Antibiotic Therapy       6-12 Hrs later    >0.5                Strongly Recommended  >0.25 - <0.5        Recommended   0.1 - 0.25          Discouraged              Remeasure/reassess PCT  <0.1                Strongly Discouraged     Remeasure/reassess PCT    As 28 day mortality risk marker: \"Change in Procalcitonin Result\" (>80% or <=80%) if Day 0 (or Day 1) and Day 4 values are available. Refer to http://www.[facility]ct[facility]calculator[facility]    Change in PCT <=80%  A decrease of PCT levels below or equal to 80% defines a positive change in PCT test result representing a higher risk for 28-day all-cause mortality of patients diagnosed with severe sepsis for septic shock.    Change in PCT >80%  A decrease of PCT levels of more than 80% defines a negative change in PCT result representing a lower risk for 28-day all-cause mortality of patients diagnosed with severe sepsis or septic shock.      COVID PRE-OP / PRE-PROCEDURE SCREENING ORDER (NO ISOLATION)    Narrative:     The following orders were created for panel order COVID PRE-OP / PRE-PROCEDURE SCREENING ORDER (NO ISOLATION) - Swab, Nasopharynx.  Procedure                               Abnormality         Status                     ---------                               -----------         ------                     COVID-19 and FLU A/B PCR...[[id number]]  Normal              Final result                 Please view results for these tests on the individual orders.   BLOOD CULTURE   BLOOD CULTURE   RAINBOW DRAW    Narrative:     The following orders were created for panel order [place] Draw.  Procedure                               Abnormality         Status                     ---------                               " -----------         ------                     Green Top (Gel)[[id number]]                                  Final result               Lavender Top[[id number]]                                     Final result               Gold Top - SST[[id number]]                                   Final result               Gray Top[[id number]]                                         In process                 Light Blue Top[[id number]]                                   Final result                 Please view results for these tests on the individual orders.   BLOOD GAS, ARTERIAL   HEPATITIS PANEL, ACUTE   AMMONIA   CBC AND DIFFERENTIAL    Narrative:     The following orders were created for panel order CBC & Differential.  Procedure                               Abnormality         Status                     ---------                               -----------         ------                     CBC Auto Differential[[id number]]        Abnormal            Final result                 Please view results for these tests on the individual orders.   GREEN TOP   LAVENDER TOP   GOLD TOP - SST   LIGHT BLUE TOP   GRAY TOP       Meds Given in ED:   Medications   sodium chloride 0.9 % flush 10 mL (has no administration in time range)   aspirin chewable tablet 324 mg (324 mg Oral Not Given 12/18/23 1140)   sodium chloride 0.9 % flush 10 mL (has no administration in time range)   sodium chloride 0.9 % flush 10 mL (has no administration in time range)   sodium chloride 0.9 % infusion 40 mL (has no administration in time range)   sennosides-docusate (PERICOLACE) 8.6-50 MG per tablet 2 tablet (has no administration in time range)     And   polyethylene glycol (MIRALAX) packet 17 g (has no administration in time range)     And   bisacodyl (DULCOLAX) EC tablet 5 mg (has no administration in time range)     And   bisacodyl (DULCOLAX) suppository 10 mg (has no administration in time range)   nitroglycerin (NITROSTAT) SL tablet 0.4 mg (has no  administration in time range)   acetaminophen (TYLENOL) tablet 650 mg (has no administration in time range)   ondansetron (ZOFRAN) injection 4 mg (has no administration in time range)

## 2023-12-18 NOTE — ED PROVIDER NOTES
[facility]    EMERGENCY DEPARTMENT ENCOUNTER      Pt Name: Jeaneth Keita  MRN: [id number]  YOB: 1960  Date of evaluation: 12/18/2023  Provider: Rolando Ojeda DO    CHIEF COMPLAINT       Chief Complaint   Patient presents with    Chest Pain    Altered Mental Status     HPI  Stated Reason for Visit: coming from [facility] but was at a dialysis treatment. they did not start it. patient normally A&Ox4 but currently A&Ox1. last dialysis friday. feels pressure in her chest. pain 5/10. 324 aspirin and .4 nitro. History Obtained From: EMS     HISTORY OF PRESENT ILLNESS  (Location/Symptom, Timing/Onset, Context/Setting, Quality, Duration, Modifying Factors, Severity.)   Jeaneth Keita is a 63 y.o. female who presents to the emergency department for evaluation from [facility] secondary to missing her dialysis today with chest pain, shortness of breath, just overall not feeling well.  She last had a run of dialysis on Friday, usually Monday, Wednesday, Friday patient.  She notes 5 out of 10 chest pain, pressure, just overall not feeling well.  Has had mild cough and congestion, no fever or chills.  She has a history of underlying liver cancer, noting that she feels just weak in general.  She denies any fall, injury or head trauma, no abdominal pain, urinary/bowel complaints.  EMS notes oxygen saturation is in the mid 80s on room air.  She does not wear supplemental oxygen at home.      Nursing notes were reviewed.      PAST MEDICAL HISTORY     Past Medical History:   Diagnosis Date    Anxiety     Cancer     liver cell carcinoma    DDD (degenerative disc disease), lumbar     Depression     Diabetes mellitus     Fibromyalgia     Hemochromatosis     Hep B w/o coma, chronic, w/o delta     Hep C w/o coma, chronic     Hypertension     Stroke     Vertigo          SURGICAL HISTORY       Past Surgical History:   Procedure Laterality Date    ARTERIOVENOUS FISTULA/SHUNT SURGERY Left 10/16/2021     Procedure: UPPER EXTREMITY ARTERIOVENOUS FISTULA FORMATION LEFT;  Surgeon: Johnny Rousseau MD;  Location: [facility];  Service: Vascular;  Laterality: Left;    BACK SURGERY      BELOW KNEE LEG AMPUTATION       SECTION      FOOT SURGERY      KNEE SURGERY      ROTATOR CUFF REPAIR      SPINAL CORD STIMULATOR IMPLANT           CURRENT MEDICATIONS       Current Facility-Administered Medications:     acetaminophen (TYLENOL) tablet 650 mg, 650 mg, Oral, Q4H PRN, LINA Lu, DO    aspirin chewable tablet 324 mg, 324 mg, Oral, Once, Rolando Ojeda, DO    sennosides-docusate (PERICOLACE) 8.6-50 MG per tablet 2 tablet, 2 tablet, Oral, BID **AND** polyethylene glycol (MIRALAX) packet 17 g, 17 g, Oral, Daily PRN **AND** bisacodyl (DULCOLAX) EC tablet 5 mg, 5 mg, Oral, Daily PRN **AND** bisacodyl (DULCOLAX) suppository 10 mg, 10 mg, Rectal, Daily PRN, LINA Lu, DO    nitroglycerin (NITROSTAT) SL tablet 0.4 mg, 0.4 mg, Sublingual, Q5 Min PRN, LINA Lu, DO    ondansetron (ZOFRAN) injection 4 mg, 4 mg, Intravenous, Q6H PRN, LINA Lu, DO    sodium chloride 0.9 % flush 10 mL, 10 mL, Intravenous, PRN, Rolando Ojeda,     sodium chloride 0.9 % flush 10 mL, 10 mL, Intravenous, Q12H, LINA Lu, DO    sodium chloride 0.9 % flush 10 mL, 10 mL, Intravenous, PRN, LINA Lu, DO    sodium chloride 0.9 % infusion 40 mL, 40 mL, Intravenous, PRN, LINA Lu, DO    Current Outpatient Medications:     acetaminophen (TYLENOL) 325 MG tablet, Take 650 mg by mouth Every 6 (Six) Hours As Needed for Mild Pain ., Disp: , Rfl:     amLODIPine (NORVASC) 10 MG tablet, Take 1 tablet by mouth Daily., Disp: , Rfl:     atorvastatin (LIPITOR) 80 MG tablet, Take 80 mg by mouth Every Night., Disp: , Rfl:     calcium acetate (PHOS BINDER,) 667 MG capsule capsule, Take 1 capsule by mouth 3 (Three) Times a Day With Meals. (Patient taking differently: Take 667 mg by mouth 3 (Three) Times a Day  Before Meals.), Disp: 180 capsule, Rfl:     carvedilol (COREG) 25 MG tablet, Take 25 mg by mouth 2 (two) times a day with meals., Disp: , Rfl:     hydrALAZINE (APRESOLINE) 50 MG tablet, Take 100 mg by mouth 3 (Three) Times a Day., Disp: , Rfl:     Insulin Lispro (humaLOG) 100 UNIT/ML injection, Inject 0-7 Units under the skin into the appropriate area as directed 3 (Three) Times a Day Before Meals for 30 days., Disp: 630 Units, Rfl: 0    Insulin NPH Isophane & Regular (NovoLIN 70/30 FlexPen) (70-30) 100 UNIT/ML suspension pen-injector, Inject 1 Units under the skin into the appropriate area as directed 2 (Two) Times a Day Before Meals for 30 days. 24 units before breakfast and 22 units before dinner, Disp: 0.6 mL, Rfl: 0    lidocaine (LIDODERM) 5 %, Place 1 patch on the skin as directed by provider Daily. Remove & Discard patch within 12 hours or as directed by MD, Disp: , Rfl:     loperamide (IMODIUM) 2 MG capsule, Take 2 mg by mouth 4 (Four) Times a Day As Needed for Diarrhea., Disp: , Rfl:     meclizine (ANTIVERT) 25 MG tablet, Take 1 tablet by mouth 3 (Three) Times a Day As Needed for Dizziness., Disp: 15 tablet, Rfl: 0    ondansetron (ZOFRAN) 4 MG tablet, Take 4 mg by mouth Every 6 (Six) Hours As Needed for Nausea or Vomiting., Disp: , Rfl:     pantoprazole (PROTONIX) 40 MG EC tablet, Take 40 mg by mouth Daily., Disp: , Rfl:     polyethylene glycol (GaviLAX) 17 g packet, Take 17 g by mouth Daily., Disp: , Rfl:     QUEtiapine (SEROquel) 25 MG tablet, Take 1 tablet by mouth Every Night., Disp: 30 tablet, Rfl: 1    sennosides-docusate (PERICOLACE) 8.6-50 MG per tablet, Take 1 tablet by mouth Daily., Disp: , Rfl:     venlafaxine XR (EFFEXOR-XR) 75 MG 24 hr capsule, Take 1 capsule by mouth Daily., Disp: , Rfl:     VITAMIN B COMPLEX-C PO, Take 1 capsule by mouth Daily., Disp: , Rfl:     vitamin D (ERGOCALCIFEROL) 1.25 MG (37761 UT) capsule capsule, Take 50,000 Units by mouth 1 (One) Time Per Week. Sunday, Disp: ,  Rfl:     ALLERGIES     Sulfa antibiotics    FAMILY HISTORY       Family History   Problem Relation Age of Onset    Heart disease Father     Heart attack Father           SOCIAL HISTORY       Social History     Socioeconomic History    Marital status:    Tobacco Use    Smoking status: Never    Smokeless tobacco: Never   Vaping Use    Vaping Use: Never used   Substance and Sexual Activity    Alcohol use: No    Drug use: No    Sexual activity: Defer         PHYSICAL EXAM    (up to 7 for level 4, 8 or more for level 5)     Vitals:    12/18/23 1302 12/18/23 1330 12/18/23 1400 12/18/23 1430   BP: 121/67 118/68 129/71 128/72   BP Location:       Patient Position:       Pulse: 82 82 83 83   Resp:       Temp:       TempSrc:       SpO2: 100% 100% 100% 99%   Weight:       Height:           Physical Exam  General : Patient is awake, alert, oriented, in no acute distress, nontoxic appearing  HEENT: Pupils are equally round, EOMI, conjunctivae clear, there is no injection no icterus.  Oral mucosa is moist, uvula midline  Neck: Neck is supple, full range of motion, trachea midline  Cardiac: Heart regular rate, rhythm, no murmurs, rubs, or gallops  Lungs: Lungs decreased breath sound bilaterally, no acute respiratory distress, there is no wheezing or rhonchi on examination.  Initial saturations in the upper 90s, responded to 2 L nasal cannula.  Chest wall: There is no tenderness to palpation over the chest wall or over ribs  Abdomen: Abdomen is soft, nontender, nondistended. There are no firm or pulsatile masses, no rebound rigidity or guarding  Musculoskeletal: Trace peripheral edema, no focal muscle deficits are appreciated  Neuro: Motor intact, sensory intact, level of consciousness is normal  Dermatology: Skin is warm and dry      DIAGNOSTIC RESULTS     EKG:  All EKGs are interpreted by the Emergency Department Physician who either signs or Co-signs this chart in the absence of a cardiologist.    ECG 12 Lead ED Triage  Standing Order; Chest Pain   Final Result   Test Reason : ED Triage Standing Order~   Blood Pressure :   */*   mmHG   Vent. Rate :  87 BPM     Atrial Rate :  87 BPM      P-R Int : 194 ms          QRS Dur :  76 ms       QT Int : 388 ms       P-R-T Axes :  43  16  38 degrees      QTc Int : 466 ms      Normal sinus rhythm   Low voltage QRS   Abnormal ECG   When compared with ECG of 18-DEC-2023 11:39,   No significant change was found   Confirmed by CLAUDIA SPENCER MD ([id number]) on 12/18/2023 2:15:44 PM      Referred By: RAND           Confirmed By: CLAUDIA PSENCER MD      ECG 12 Lead ED Triage Standing Order; Chest Pain   Final Result   Test Reason : ED Triage Standing Order~   Blood Pressure :   */*   mmHG   Vent. Rate :  84 BPM     Atrial Rate :  84 BPM      P-R Int : 214 ms          QRS Dur :  70 ms       QT Int : 380 ms       P-R-T Axes :  25   9  23 degrees      QTc Int : 449 ms      Sinus rhythm with 1st degree AV block   Low voltage QRS   Cannot rule out Anterior infarct , age undetermined   Abnormal ECG   When compared with ECG of 06-FEB-2023 07:05,   Minimal criteria for Anterior infarct are now present   Confirmed by CLAUDIA SPENCER MD ([id number]) on 12/18/2023 11:42:46 AM      Referred By: RAND           Confirmed By: CLAUDIA SPENCER MD          RADIOLOGY:     [x] Radiologist's Report Reviewed:  CT Head Without Contrast   Final Result   Impression:   Brain atrophy with chronic small vessel ischemic changes      No acute intracranial abnormality            Electronically Signed: Pelon Hess MD     12/18/2023 1:02 PM EST     Workstation ID: [id number]      CT Chest Without Contrast Diagnostic   Final Result   Impression:      1. New expansile osseous lesion involving the posterior right seventh rib near the costovertebral junction with cortical destruction noted.   2. Rounded atelectasis at the medial right lung base. No acute cardiopulmonary process.   3. 3 mm nodule in the right upper lung.   4. Peripherally  calcified mass effacing the inferior vena cava potentially in the left caudate lobe unchanged.               Electronically Signed: Tameka Bravo MD     12/18/2023 12:14 PM CST     Workstation ID: [id number]      XR Chest 1 View   Final Result   Impression:   Bibasilar discoid atelectasis. Otherwise clear lungs         Electronically Signed: Pelon Hess MD     12/18/2023 12:14 PM EST     Workstation ID: [id number]          I ordered and independently reviewed the above noted radiographic studies.      I viewed images of chest x-ray which showed atelectasis bilaterally per my independent interpretation.    See radiologist's dictation for official interpretation.      ED BEDSIDE ULTRASOUND:   Performed by ED Physician - none    LABS:    I have reviewed and interpreted all of the currently available lab results from this visit (if applicable):  Results for orders placed or performed during the hospital encounter of 12/18/23   COVID-19 and FLU A/B PCR, 1 HR TAT - Swab, Nasopharynx    Specimen: Nasopharynx; Swab   Result Value Ref Range    COVID19 Not Detected Not Detected - Ref. Range    Influenza A PCR Not Detected Not Detected    Influenza B PCR Not Detected Not Detected   High Sensitivity Troponin T    Specimen: Blood   Result Value Ref Range    HS Troponin T 32 (H) <14 ng/L   Comprehensive Metabolic Panel    Specimen: Blood   Result Value Ref Range    Glucose 302 (H) 65 - 99 mg/dL    BUN 40 (H) 8 - 23 mg/dL    Creatinine 5.26 (H) 0.57 - 1.00 mg/dL    Sodium 135 (L) 136 - 145 mmol/L    Potassium 4.9 3.5 - 5.2 mmol/L    Chloride 98 98 - 107 mmol/L    CO2 29.0 22.0 - 29.0 mmol/L    Calcium 9.2 8.6 - 10.5 mg/dL    Total Protein 6.7 6.0 - 8.5 g/dL    Albumin 3.8 3.5 - 5.2 g/dL    ALT (SGPT) 45 (H) 1 - 33 U/L    AST (SGOT) 25 1 - 32 U/L    Alkaline Phosphatase 138 (H) 39 - 117 U/L    Total Bilirubin 0.5 0.0 - 1.2 mg/dL    Globulin 2.9 gm/dL    A/G Ratio 1.3 g/dL    BUN/Creatinine Ratio 7.6 7.0 - 25.0    Anion Gap 8.0  5.0 - 15.0 mmol/L    eGFR 8.5 (L) >60.0 mL/min/1.73   Lipase    Specimen: Blood   Result Value Ref Range    Lipase 33 13 - 60 U/L   BNP    Specimen: Blood   Result Value Ref Range    proBNP 4,626.0 (H) 0.0 - 900.0 pg/mL   CBC Auto Differential    Specimen: Blood   Result Value Ref Range    WBC 5.29 3.40 - 10.80 10*3/mm3    RBC 2.75 (L) 3.77 - 5.28 10*6/mm3    Hemoglobin 9.3 (L) 12.0 - 15.9 g/dL    Hematocrit 26.2 (L) 34.0 - 46.6 %    MCV 95.3 79.0 - 97.0 fL    MCH 33.8 (H) 26.6 - 33.0 pg    MCHC 35.5 31.5 - 35.7 g/dL    RDW 11.9 (L) 12.3 - 15.4 %    RDW-SD 40.6 37.0 - 54.0 fl    MPV 10.1 6.0 - 12.0 fL    Platelets 160 140 - 450 10*3/mm3    Neutrophil % 62.5 42.7 - 76.0 %    Lymphocyte % 24.8 19.6 - 45.3 %    Monocyte % 6.8 5.0 - 12.0 %    Eosinophil % 4.7 0.3 - 6.2 %    Basophil % 0.6 0.0 - 1.5 %    Immature Grans % 0.6 (H) 0.0 - 0.5 %    Neutrophils, Absolute 3.31 1.70 - 7.00 10*3/mm3    Lymphocytes, Absolute 1.31 0.70 - 3.10 10*3/mm3    Monocytes, Absolute 0.36 0.10 - 0.90 10*3/mm3    Eosinophils, Absolute 0.25 0.00 - 0.40 10*3/mm3    Basophils, Absolute 0.03 0.00 - 0.20 10*3/mm3    Immature Grans, Absolute 0.03 0.00 - 0.05 10*3/mm3    nRBC 0.0 0.0 - 0.2 /100 WBC   Lactic Acid, Plasma    Specimen: Blood   Result Value Ref Range    Lactate 1.2 0.5 - 2.0 mmol/L   Procalcitonin    Specimen: Blood   Result Value Ref Range    Procalcitonin 0.13 0.00 - 0.25 ng/mL   Blood Gas, Arterial With Co-Ox    Specimen: Arterial Blood   Result Value Ref Range    Site Right Radial     Gabe's Test N/A     pH, Arterial 7.449 7.350 - 7.450 pH units    pCO2, Arterial 41.4 35.0 - 45.0 mm Hg    pO2, Arterial 104.0 83.0 - 108.0 mm Hg    HCO3, Arterial 28.7 (H) 20.0 - 26.0 mmol/L    Base Excess, Arterial 4.3 (H) 0.0 - 2.0 mmol/L    Hemoglobin, Blood Gas 9.5 (L) 14 - 18 g/dL    Hematocrit, Blood Gas 29.0 (L) 38.0 - 51.0 %    Oxyhemoglobin 97.8 94 - 99 %    Methemoglobin 0.20 0.00 - 1.50 %    Carboxyhemoglobin 1.1 0 - 2 %    CO2 Content 29.9  22 - 33 mmol/L    Temperature 37.0     Barometric Pressure for Blood Gas      Modality Nasal Cannula     FIO2 36 %    Rate 0 Breaths/minute    PIP 0 cmH2O    IPAP 0     EPAP 0     pH, Temp Corrected 7.449 pH Units    pCO2, Temperature Corrected 41.4 35 - 45 mm Hg    pO2, Temperature Corrected 104 83 - 108 mm Hg   High Sensitivity Troponin T 2Hr    Specimen: Arm, Right; Blood   Result Value Ref Range    HS Troponin T 33 (H) <14 ng/L    Troponin T Delta 1 >=-4 - <+4 ng/L   ECG 12 Lead ED Triage Standing Order; Chest Pain   Result Value Ref Range    QT Interval 380 ms    QTC Interval 449 ms   ECG 12 Lead ED Triage Standing Order; Chest Pain   Result Value Ref Range    QT Interval 388 ms    QTC Interval 466 ms   Green Top (Gel)   Result Value Ref Range    Extra Tube Hold for add-ons.    Lavender Top   Result Value Ref Range    Extra Tube hold for add-on    Gold Top - SST   Result Value Ref Range    Extra Tube Hold for add-ons.    Light Blue Top   Result Value Ref Range    Extra Tube Hold for add-ons.         If labs were ordered, I independently reviewed the results and considered them in treating the patient.      EMERGENCY DEPARTMENT COURSE and DIFFERENTIAL DIAGNOSIS/MDM:   Vitals:  AS OF 15:19 EST    BP - 128/72  HR - 83  TEMP - 98.3 °F (36.8 °C) (Oral)  O2 SATS - 99%      Orders placed during this visit:  Orders Placed This Encounter   Procedures    COVID PRE-OP / PRE-PROCEDURE SCREENING ORDER (NO ISOLATION) - Swab, Nasopharynx    COVID-19 and FLU A/B PCR, 1 HR TAT - Swab, Nasopharynx    Blood Culture - Blood,    Blood Culture - Blood,    XR Chest 1 View    CT Head Without Contrast    CT Chest Without Contrast Diagnostic    [place] Draw    High Sensitivity Troponin T    Comprehensive Metabolic Panel    Lipase    BNP    CBC Auto Differential    Blood Gas, Arterial -With Co-Ox Panel: Yes    Lactic Acid, Plasma    Procalcitonin    Blood Gas, Arterial With Co-Ox    High Sensitivity Troponin T 2Hr    Hepatitis Panel,  Acute    Ammonia    CBC Auto Differential    Comprehensive Metabolic Panel    Magnesium    NPO Diet NPO Type: Strict NPO    Undress & Gown    Continuous Pulse Oximetry    Vital Signs    Intake & Output    Weigh Patient    Oral Care    Place Sequential Compression Device    Maintain Sequential Compression Device    Telemetry - Maintain IV Access    Telemetry - Place Orders & Notify Provider of Results When Patient Experiences Acute Chest Pain, Dysrhythmia or Respiratory Distress    May Be Off Telemetry for Tests    Nursing Dysphagia Screening (Complete Prior to Giving Anything By Mouth)    Code Status and Medical Interventions:    Inpatient Palliative Care MD Consult    Oxygen Therapy- Nasal Cannula; Titrate 1-6 LPM Per SpO2; 90 - 95%    Incentive Spirometry    ECG 12 Lead ED Triage Standing Order; Chest Pain    ECG 12 Lead ED Triage Standing Order; Chest Pain    Insert Peripheral IV    Insert Peripheral IV    Hemodialysis Inpatient    Inpatient Admission    ED Bed Request    CBC & Differential    Green Top (Gel)    Lavender Top    Gold Top - SST    Gray Top    Light Blue Top       All labs have been independently reviewed by me.  All radiology studies have been reviewed by me and the radiologist dictating the report.  All EKG's have been independently viewed and interpreted by me.      Discussion below represents my analysis of pertinent findings related to patient's condition, differential diagnosis, treatment plan and final disposition.    Differential diagnosis:  The differential diagnosis associated with the patient's presentation includes: Pneumonia, URI, viral process, COVID/influenza chronic renal failure, electrolyte abnormalities    Additional sources  Discussed/ obtained information from independent historians:   [] Spouse  [] Parent  [] Family member  [] Friend  [x] EMS   [] Other:    External (non-ED) record review:   [x] Inpatient record:   [] Office record:   [] Outpatient record:   [] Prior Outpatient  labs:   [] Prior Outpatient radiology:   [] Primary Care record:   [] Outside ED record:   [] Other:     Patient's care impacted by:   [] Diabetes  [] Hypertension  [] CHF  [] Hyperlipidemia  [] Coronary Artery Disease   [] COPD   [] Cancer   [] Tobacco Abuse   [] Substance Abuse    [x] Other: Chronic renal failure, on dialysis    Care significantly affected by Social Determinants of Health (housing and economic circumstances, unemployment)    [] Yes     [x] No   If yes, Patient's care significantly limited by Social Determinants of Health including:   [] Inadequate housing   [] Low income   [] Alcoholism and drug addiction in family   [] Problems related to primary support group   [] Unemployment   [] Problems related to employment   [] Other Social Determinants of Health:       MEDICATIONS ADMINISTERED IN ED:  Medications   sodium chloride 0.9 % flush 10 mL (has no administration in time range)   aspirin chewable tablet 324 mg (324 mg Oral Not Given 12/18/23 1140)   sodium chloride 0.9 % flush 10 mL (has no administration in time range)   sodium chloride 0.9 % flush 10 mL (has no administration in time range)   sodium chloride 0.9 % infusion 40 mL (has no administration in time range)   sennosides-docusate (PERICOLACE) 8.6-50 MG per tablet 2 tablet (has no administration in time range)     And   polyethylene glycol (MIRALAX) packet 17 g (has no administration in time range)     And   bisacodyl (DULCOLAX) EC tablet 5 mg (has no administration in time range)     And   bisacodyl (DULCOLAX) suppository 10 mg (has no administration in time range)   nitroglycerin (NITROSTAT) SL tablet 0.4 mg (has no administration in time range)   acetaminophen (TYLENOL) tablet 650 mg (has no administration in time range)   ondansetron (ZOFRAN) injection 4 mg (has no administration in time range)              63-year-old female with generalized weakness, cough, found to be hypoxic in the 80s with a known history of liver carcinoma.  She  is responding to supplemental oxygen upon arrival.  In general just feels weak and fatigued.  IV, labs and imaging obtained including CT head, chest for further assessment.  CT head is stable without acute abnormalities.  Her CT chest reveals no liver malignancy which appears to have metastasized along the right lung, costal rib region.  She does have history of chronic heart failure, will benefit from further treatment, workup and evaluation as she initially is hypoxic with respiratory failure, responding to supplemental oxygen which she does not wear at home.  No acute ischemic change on EKG, high-sensitivity troponin of 32, she does have a creatinine of 5.26, stable potassium of 4.9.  Also discussed with nephrology, Dr. Pierson as she will need dialysis while inpatient.  Will plan for admission to the hospitalist for further workup and treatment.  Case discussed with Dr. Lu, for admission.        PROCEDURES:  Procedures    CRITICAL CARE TIME    Total Critical Care time was 0 minutes, excluding separately reportable procedures.   There was a high probability of clinically significant/life threatening deterioration in the patient's condition which required my urgent intervention.      FINAL IMPRESSION      1. Acute on chronic respiratory failure with hypoxia    2. Malignant neoplasm of liver, unspecified liver malignancy type    3. Secondary malignancy of rib    4. Chronic kidney disease with end stage renal failure on dialysis    5. Chest pain, unspecified type          DISPOSITION/PLAN     ED Disposition       ED Disposition   Decision to Admit    Condition   --    Comment   Level of Care: Telemetry [5]   Diagnosis: Chest pain [[id number]]   Admitting Physician: LINA LU [[id number]]   Attending Physician: LINA LU [[id number]]                 PATIENT REFERRED TO:  No follow-up provider specified.    DISCHARGE MEDICATIONS:     Medication List        ASK your doctor about these medications      acetaminophen  325 MG tablet  Commonly known as: TYLENOL     amLODIPine 10 MG tablet  Commonly known as: NORVASC  Take 1 tablet by mouth Daily.     atorvastatin 80 MG tablet  Commonly known as: LIPITOR     calcium acetate 667 MG capsule capsule  Commonly known as: PHOS BINDER)  Take 1 capsule by mouth 3 (Three) Times a Day With Meals.     carvedilol 25 MG tablet  Commonly known as: COREG     GaviLAX 17 g packet  Generic drug: polyethylene glycol     hydrALAZINE 50 MG tablet  Commonly known as: APRESOLINE     Insulin Lispro 100 UNIT/ML injection  Commonly known as: humaLOG  Inject 0-7 Units under the skin into the appropriate area as directed 3 (Three) Times a Day Before Meals for 30 days.     lidocaine 5 %  Commonly known as: LIDODERM     loperamide 2 MG capsule  Commonly known as: IMODIUM     meclizine 25 MG tablet  Commonly known as: ANTIVERT  Take 1 tablet by mouth 3 (Three) Times a Day As Needed for Dizziness.     NovoLIN 70/30 FlexPen (70-30) 100 UNIT/ML suspension pen-injector  Generic drug: Insulin NPH Isophane & Regular  Inject 1 Units under the skin into the appropriate area as directed 2 (Two) Times a Day Before Meals for 30 days. 24 units before breakfast and 22 units before dinner     ondansetron 4 MG tablet  Commonly known as: ZOFRAN     pantoprazole 40 MG EC tablet  Commonly known as: PROTONIX     QUEtiapine 25 MG tablet  Commonly known as: SEROquel  Take 1 tablet by mouth Every Night.     sennosides-docusate 8.6-50 MG per tablet  Commonly known as: PERICOLACE     venlafaxine XR 75 MG 24 hr capsule  Commonly known as: EFFEXOR-XR  Take 1 capsule by mouth Daily.     VITAMIN B COMPLEX-C PO     vitamin D 1.25 MG (36874 UT) capsule capsule  Commonly known as: ERGOCALCIFEROL                  Comment: Please note this report has been produced using speech recognition software.      Rolando Ojeda DO  Attending Emergency Physician         Rolando Ojeda DO  12/18/23 [id number]

## 2023-12-18 NOTE — CONSULTS
Patient Care Team:  Lulu Amos MD as PCP - General (Hospice and Palliative Medicine)    Chief complaint: ESRD  Chest pain     HPI: Ms Rivera is a 62 yo lady with hx of ESRD on [facility], Hx of metastatic liver disease, ACD due to ESRD. She missed her dialysis treatment today as she was not feeling well. C/o chest pain on admission. She normally gets HD at [facility] at [facility] in [facility] She is under [facility] care. She has functioning AV fistula in left arm. Seen on HD today UF ~ 2 liter. Tolerating treatment well so far. Jacki any cp or sob.       Review of Systems   Constitutional: Negative.    HENT: Negative.     Respiratory: Negative.  Negative for shortness of breath.    Cardiovascular:  Positive for chest pain. Negative for palpitations and leg swelling.   Genitourinary: Negative.    Musculoskeletal: Negative.    Hematological: Negative.    Psychiatric/Behavioral: Negative.          Past Medical History:   Diagnosis Date    Anxiety     Cancer     liver cell carcinoma    DDD (degenerative disc disease), lumbar     Depression     Diabetes mellitus     Fibromyalgia     Hemochromatosis     Hep B w/o coma, chronic, w/o delta     Hep C w/o coma, chronic     Hypertension     Stroke     Vertigo    ,   Past Surgical History:   Procedure Laterality Date    ARTERIOVENOUS FISTULA/SHUNT SURGERY Left 10/16/2021    Procedure: UPPER EXTREMITY ARTERIOVENOUS FISTULA FORMATION LEFT;  Surgeon: Johnny Rousseau MD;  Location: [facility];  Service: Vascular;  Laterality: Left;    BACK SURGERY      BELOW KNEE LEG AMPUTATION       SECTION      FOOT SURGERY      KNEE SURGERY      ROTATOR CUFF REPAIR      SPINAL CORD STIMULATOR IMPLANT     ,   Family History   Problem Relation Age of Onset    Heart disease Father     Heart attack Father    ,   Social History     Socioeconomic History    Marital status:    Tobacco Use    Smoking status: Never    Smokeless tobacco: Never   Vaping Use    Vaping Use: Never used    Substance and Sexual Activity    Alcohol use: No    Drug use: No    Sexual activity: Defer     E-cigarette/Vaping    E-cigarette/Vaping Use Never User     Passive Exposure No     Counseling Given No      E-cigarette/Vaping Substances    Nicotine No     THC No     CBD No     Flavoring No      E-cigarette/Vaping Devices    Disposable No     Pre-filled or Refillable Cartridge No     Refillable Tank No     Pre-filled Pod No          , (Not in a hospital admission) , and Scheduled Meds:  aspirin, 324 mg, Oral, Once  senna-docusate sodium, 2 tablet, Oral, BID  sodium chloride, 10 mL, Intravenous, Q12H       Objective     Vital Signs  Temp:  [98.3 °F (36.8 °C)] 98.3 °F (36.8 °C)  Heart Rate:  [81-83] 83  Resp:  [16] 16  BP: (118-137)/(67-78) 128/72    No intake/output data recorded.  No intake/output data recorded.    Physical Exam  Constitutional:       General: She is not in acute distress.     Appearance: Normal appearance. She is not ill-appearing.   HENT:      Head: Normocephalic.      Nose: Nose normal.      Mouth/Throat:      Mouth: Mucous membranes are moist.   Eyes:      Pupils: Pupils are equal, round, and reactive to light.   Cardiovascular:      Rate and Rhythm: Normal rate.      Pulses: Normal pulses.      Heart sounds: No murmur heard.     No friction rub.   Pulmonary:      Effort: Pulmonary effort is normal. No respiratory distress.   Abdominal:      General: Abdomen is flat.   Musculoskeletal:      Cervical back: Normal range of motion.      Right lower leg: No edema.      Left lower leg: No edema.   Skin:     General: Skin is warm.   Neurological:      General: No focal deficit present.      Mental Status: She is alert and oriented to person, place, and time. Mental status is at baseline.         Results Review:    I reviewed the patient's new clinical results.    WBC WBC   Date Value Ref Range Status   12/18/2023 5.29 3.40 - 10.80 10*3/mm3 Final      HGB Hemoglobin   Date Value Ref Range Status  "  12/18/2023 9.3 (L) 12.0 - 15.9 g/dL Final      HCT Hematocrit   Date Value Ref Range Status   12/18/2023 26.2 (L) 34.0 - 46.6 % Final      Platlets No results found for: \"LABPLAT\"   MCV MCV   Date Value Ref Range Status   12/18/2023 95.3 79.0 - 97.0 fL Final          Sodium Sodium   Date Value Ref Range Status   12/18/2023 135 (L) 136 - 145 mmol/L Final      Potassium Potassium   Date Value Ref Range Status   12/18/2023 4.9 3.5 - 5.2 mmol/L Final      Chloride Chloride   Date Value Ref Range Status   12/18/2023 98 98 - 107 mmol/L Final      CO2 CO2   Date Value Ref Range Status   12/18/2023 29.0 22.0 - 29.0 mmol/L Final      BUN BUN   Date Value Ref Range Status   12/18/2023 40 (H) 8 - 23 mg/dL Final      Creatinine Creatinine   Date Value Ref Range Status   12/18/2023 5.26 (H) 0.57 - 1.00 mg/dL Final      Calcium Calcium   Date Value Ref Range Status   12/18/2023 9.2 8.6 - 10.5 mg/dL Final      PO4 No results found for: \"CAPO4\"   Albumin Albumin   Date Value Ref Range Status   12/18/2023 3.8 3.5 - 5.2 g/dL Final      Magnesium No results found for: \"MG\"   Uric Acid No results found for: \"URICACID\"         Assessment & Plan       Chest pain      Assessment & Plan    ESRD:  On MWF christie. Missed HD today due to chest pain. [facility] angel Shin. Under [facility] care.     Anemia: Hb stable. ADRIEN on hold due to hx of metastatic liver disease. On IV iron. Last Fe sat 85 % Ferritin in 800    Volume status. Not a large fluid vega. AWG 1.5-2 kg. No significant LE edema on this visit.     BMD: Phos/PTH stable based on outpatient labs for Nov 2023    I discussed the patients findings and my recommendations with patient    Kin Pierson MD  12/18/23  15:42 EST            "

## 2023-12-18 NOTE — H&P
[facility]  HISTORY AND PHYSICAL    Patient Name: Jeaneth Keita  : 1960  MRN: [id number]  Primary Care Physician: Lulu Amos MD  Date of admission: 2023      Subjective   Subjective     Chief Complaint:  Chest pain, missed HD    HPI:  Jeaneth Keita is a 63 y.o. female that presented to the ED today secondary to complaints of chest pain and AMS. Patient also had some issues with dyspnea upon arrival, now on 2L NC and mentation has improved, she is oriented to person and place, still having some issues with time but per her POA this is near her baseline at times. Patient missed HD today, plans are for HD this evening with nephrology. Patient on chronic HD, normally on MWF schedule. She denies any active chest pain at this time, no worsening SOA, no N/V. She does have some mild RUQ abdominal pain but she reports she has this frequently. She has a past medical history of metastatic liver cancer with mets to the lung and ribs.       Personal History     Past Medical History:   Diagnosis Date    Anxiety     Cancer     liver cell carcinoma    DDD (degenerative disc disease), lumbar     Depression     Diabetes mellitus     Fibromyalgia     Hemochromatosis     Hep B w/o coma, chronic, w/o delta     Hep C w/o coma, chronic     Hypertension     Stroke     Vertigo          Oncology Problem List:  Hepatocellular carcinoma metastatic to bone s/p RXT  (10/05/2021;   Status: Active)    Oncology/Hematology History       Past Surgical History:   Procedure Laterality Date    ARTERIOVENOUS FISTULA/SHUNT SURGERY Left 10/16/2021    Procedure: UPPER EXTREMITY ARTERIOVENOUS FISTULA FORMATION LEFT;  Surgeon: Johnny Rousseau MD;  Location: [facility];  Service: Vascular;  Laterality: Left;    BACK SURGERY      BELOW KNEE LEG AMPUTATION       SECTION      FOOT SURGERY      KNEE SURGERY      ROTATOR CUFF REPAIR      SPINAL CORD STIMULATOR IMPLANT          Family History: family history includes Heart attack in her father; Heart disease in her father.     Social History:  reports that she has never smoked. She has never used smokeless tobacco. She reports that she does not drink alcohol and does not use drugs.  Social History     Social History Narrative     Pt ex  Parviz Keita is her POA       Medications:  Available home medication information reviewed.  (Not in a hospital admission)      Allergies   Allergen Reactions    Sulfa Antibiotics        Objective   Objective     Vital Signs:   Temp:  [98.3 °F (36.8 °C)] 98.3 °F (36.8 °C)  Heart Rate:  [81-87] 87  Resp:  [16] 16  BP: (118-146)/(67-97) 143/97  Flow (L/min):  [2] 2       Physical Exam   Constitutional: No acute distress, awake, alert, oriented currently to person and place, difficulty with time   HENT: NCAT, nares patent, mucous membranes moist  Respiratory: Decreased BS bilaterally, no rhonchi, wheezing, or crackles, respiratory effort normal, no use of accessory muscles   Cardiovascular: RRR, no murmurs, rubs, or gallops  Gastrointestinal: Positive bowel sounds, soft, minor tenderness to RUQ palpation but not out of proportion, nondistended  Musculoskeletal: L sided BKA, no edema of RLE   Psychiatric: Appropriate affect, cooperative, cooperative   Neurologic: Oriented to person and place, difficulty with time, strength symmetric in all extremities, Cranial Nerves grossly intact to confrontation, speech clear  Skin: No rashes      Result Review:  I have personally reviewed the results from the time of this admission to 12/18/2023 16:37 EST and agree with these findings:  [x]  Laboratory list / accordion  []  Microbiology  [x]  Radiology  [x]  EKG/Telemetry   []  Cardiology/Vascular   []  Pathology  []  Old records  []  Other:  Most notable findings include:   Troponin 32-33   Cr 5.26   BNP 4626   Hgb 9.3   LA 1.2 Procal 0.13   Resp panel negative   7.45/41/104/28/97 on 36% FiO2   CT head- brain  atrophy with chronic small vessel ischemic changes   CT chest- new expansile osseous lesions involving the posterior right 7th rib, rounded atelectasis at the medial right lung base, no acute cardiopulmonary process, peripherally calcified mass effacing the inferior vena cava in the left caudate lobe, unchanged       LAB RESULTS:      Lab 12/18/23  1156   WBC 5.29   HEMOGLOBIN 9.3*   HEMATOCRIT 26.2*   PLATELETS 160   NEUTROS ABS 3.31   IMMATURE GRANS (ABS) 0.03   LYMPHS ABS 1.31   MONOS ABS 0.36   EOS ABS 0.25   MCV 95.3   PROCALCITONIN 0.13   LACTATE 1.2         Lab 12/18/23  1156   SODIUM 135*   POTASSIUM 4.9   CHLORIDE 98   CO2 29.0   ANION GAP 8.0   BUN 40*   CREATININE 5.26*   EGFR 8.5*   GLUCOSE 302*   CALCIUM 9.2         Lab 12/18/23  1156   TOTAL PROTEIN 6.7   ALBUMIN 3.8   GLOBULIN 2.9   ALT (SGPT) 45*   AST (SGOT) 25   BILIRUBIN 0.5   ALK PHOS 138*   LIPASE 33         Lab 12/18/23  1402 12/18/23  1156   PROBNP  --  4,626.0*   HSTROP T 33* 32*                 Lab 12/18/23  1222   PH, ARTERIAL 7.449   PCO2, ARTERIAL 41.4   PO2 .0   FIO2 36   HCO3 ART 28.7*   BASE EXCESS ART 4.3*   CARBOXYHEMOGLOBIN 1.1     UA          8/22/2023    13:43 10/17/2023    14:06   Urinalysis   Squamous Epithelial Cells, UA 0-2  0-2    Specific Gravity, UA 1.013  1.015    Ketones, UA Negative  Negative    Blood, UA Moderate (2+)  Negative    Leukocytes, UA Large (3+)  Negative    Nitrite, UA Negative  Negative    RBC, UA 0-2  3-5    WBC, UA Too Numerous to Count  0-2    Bacteria, UA 4+  None Seen        Microbiology Results (last 10 days)       Procedure Component Value - Date/Time    COVID PRE-OP / PRE-PROCEDURE SCREENING ORDER (NO ISOLATION) - Swab, Nasopharynx [[id number]]  (Normal) Collected: 12/18/23 1146    Lab Status: Final result Specimen: Swab from Nasopharynx Updated: 12/18/23 1226    Narrative:      The following orders were created for panel order COVID PRE-OP / PRE-PROCEDURE SCREENING ORDER (NO ISOLATION) -  Swab, Nasopharynx.  Procedure                               Abnormality         Status                     ---------                               -----------         ------                     COVID-19 and FLU A/B PCR...[[id number]]  Normal              Final result                 Please view results for these tests on the individual orders.    COVID-19 and FLU A/B PCR, 1 HR TAT - Swab, Nasopharynx [[id number]]  (Normal) Collected: 12/18/23 1146    Lab Status: Final result Specimen: Swab from Nasopharynx Updated: 12/18/23 1226     COVID19 Not Detected     Influenza A PCR Not Detected     Influenza B PCR Not Detected    Narrative:      Fact sheet for providers: https://www.fda.gov/media/932586/download    Fact sheet for patients: https://www.fda.gov/media/483097/download    Test performed by PCR.            CT Chest Without Contrast Diagnostic    Result Date: 12/18/2023  CT CHEST WO CONTRAST DIAGNOSTIC Date of Exam: 12/18/2023 11:23 AM CST Indication: Shortness of breath, hypoxia, renal failure. Comparison: Chest x-ray 12/18/2023 CT abdomen pelvis 6/27/2022 Technique: Axial CT images were obtained of the chest without contrast administration.  Reconstructed coronal and sagittal images were also obtained. Automated exposure control and iterative construction methods were used. Findings: Hilum and Mediastinum: No pathologically enlarged lymph nodes.  Normal heart size.   No pericardial effusion.  Unremarkable thoracic aorta and pulmonary arteries. There is coronary artery calcification. Mitral annulus calcification is noted. There are bilateral thyroid nodules measuring 1.7 x 1.8 cm on the right and 1.0 x 1.1 cm on the left. Lung Parenchyma and Pleura: Evaluation of lung parenchyma demonstrates consolidation at the medial right lung base which appears unchanged from prior studies likely rounded atelectasis and/or scarring. No new focal opacity identified. There is a 3 mm nodule in the right upper lung (image 34 of  series 2). Bibasilar atelectasis and/or scarring is noted. Upper Abdomen: Evaluation of the upper abdomen demonstrates a peripherally calcified lesion either within or effacing the inferior vena cava at the portal confluence (image 85 of series 2). This measures 3.3 x 3.5 cm and was present on previous examination felt to be within the caudate lobe. Additional findings include cholelithiasis. Soft tissues: Unremarkable. Osseous structures: There is an expansile soft tissue involving the posterior right seventh rib near the costovertebral junction (image 47 of series 4). Cortical destruction with soft tissue component is noted measuring 1.0 x 3.4 cm. This is new from prior CT dated 6/27/2022.     Impression: Impression: 1. New expansile osseous lesion involving the posterior right seventh rib near the costovertebral junction with cortical destruction noted. 2. Rounded atelectasis at the medial right lung base. No acute cardiopulmonary process. 3. 3 mm nodule in the right upper lung. 4. Peripherally calcified mass effacing the inferior vena cava potentially in the left caudate lobe unchanged. Electronically Signed: Tameka Bravo MD  12/18/2023 12:14 PM CST  Workstation ID: [id number]    CT Head Without Contrast    Result Date: 12/18/2023  CT HEAD WO CONTRAST Date of Exam: 12/18/2023 12:23 PM EST Indication: AMS. Comparison: None available. Technique: Axial CT images were obtained of the head without contrast administration.  Automated exposure control and iterative construction methods were used. Findings: There is no evidence of hemorrhage. There is no mass effect or midline shift. Diffuse brain atrophy. Periventricular hypodense areas compatible with chronic small vessel ischemia There is no extracerebral collection. Ventricles are normal in size and configuration for patient's stated age. Posterior fossa is within normal limits. Calvarium and skull base appear intact.  Visualized sinuses show no air fluid levels.  Visualized orbits are unremarkable.     Impression: Impression: Brain atrophy with chronic small vessel ischemic changes No acute intracranial abnormality Electronically Signed: Pelon Hess MD  12/18/2023 1:02 PM EST  Workstation ID: [id number]    XR Chest 1 View    Result Date: 12/18/2023  XR CHEST 1 VW Date of Exam: 12/18/2023 12:00 PM EST Indication: Chest Pain Triage Protocol Comparison: None available. Findings: There are no airspace consolidations. Mild bibasilar discoid atelectasis. No pleural fluid. No pneumothorax. The pulmonary vasculature appears within normal limits. The cardiac and mediastinal silhouette appear unremarkable. No acute osseous abnormality identified.     Impression: Impression: Bibasilar discoid atelectasis. Otherwise clear lungs Electronically Signed: Pelon Hess MD  12/18/2023 12:14 PM EST  Workstation ID: [id number]     Results for orders placed during the hospital encounter of 10/05/21    Adult Transthoracic Echo Complete W/ Cont if Necessary Per Protocol    Interpretation Summary  · Left ventricular ejection fraction appears to be 61 - 65%. Left ventricular systolic function is normal.  · Left atrial volume is mildly increased.      Assessment & Plan   Assessment & Plan     Active Hospital Problems    Diagnosis  POA    **Chest pain [R07.9]  Yes    Altered mental status [R41.82]  Yes       Jeaneth Keita is a 62 yo F that presented to the [facility] ED today with complaints of chest pain and AMS from local HD center. Patient was unable to have HD today and has history of ESRD. Patient also has a history of Liver Cancer with METS to the R lung/right rib, patient has been on palliative medicine and previously followed with [facility] oncology. Patient also had history of HTN, HLD, GERD, Liver Cirrhosis, T2DM, and previous CVA. Patient currently is not seeking any further treatment or care for her cancer. Discussed with patient MIKEY Rivera, plans to consult palliative medicine, with  what appears to be advancing cancer may be a possible candidate for hospice as an outpatient as well depending on continued goals of care    Chest pain   Dyspnea   ? Acute CHF exacerbation   - Troponin 32-33, no active chest pain at this time, s/p nitro in the ED  - BNP elevated at 4626, no recent echo, some atelectasis on CXR, may have contributed to some hypoxia on arrival, currently on 2L, plans to obtain repeat echo at this time, previous preserved EF on Echo from 2021   - Plans for HD this evening per nephrology, continue per their recommendations, on MWF schedule normally   - Continue to monitor patient's respiratory status, telemetry monitoring     AMS   Metastatic Liver cancer with mets to lung/rib  Liver Cirrhosis   - Per POA she has had multiple episodes like previously and is her baseline, previous neurology evaluation, negative CT head/MRI brain  - Obtain ammonia level with history of cirrhosis   - Consider neurology consultation but mentation already appears to be improving on my exam.    - Discussed with patient's POA Parviz Rivera this evening about plans for admission and HD, patient on palliative, with her cancer will consult inpatient palliative, may be appropriate for possible hospice as well in the near future with what appears to be advancing cancer.     ESRD on HD   - Continue HD per nephrology recommendations.     HTN  HLD   GERD   - Awaiting home medication reconciliation  - Restart patient home Lipitor   - Reordered patient's home Coreg    - Ordered patient's home PPI     Type II DM   - SSI with qachs coverage   - A1C in the AM       DVT prophylaxis:  SCDs         CODE STATUS:  DNR/DNI palliative   Code Status and Medical Interventions:   Ordered at: 12/18/23 1455     Medical Intervention Limits:    NO intubation (DNI)    NO cardioversion     Code Status (Patient has no pulse and is not breathing):    No CPR (Do Not Attempt to Resuscitate)     Medical Interventions (Patient has pulse or is  breathing):    Limited Support       Expected Discharge 12/21/2023  Expected Discharge Date: 12/21/2023; Expected Discharge Time:      FRANCK Lu DO  12/18/23

## 2023-12-18 NOTE — Clinical Note
Level of Care: Telemetry [5]   Diagnosis: Chest pain [652313]   Admitting Physician: LINA MARTIN [[id number]]   Attending Physician: LINA MARTIN [[id number]]   Certification: I Certify That Inpatient Hospital Services Are Medically Necessary For Greater Than 2 Midnights

## 2023-12-19 LAB
ALBUMIN SERPL-MCNC: 3.4 G/DL (ref 3.5–5.2)
ALBUMIN/GLOB SERPL: 1.3 G/DL
ALP SERPL-CCNC: 112 U/L (ref 39–117)
ALT SERPL W P-5'-P-CCNC: 43 U/L (ref 1–33)
ANION GAP SERPL CALCULATED.3IONS-SCNC: 9 MMOL/L (ref 5–15)
AST SERPL-CCNC: 25 U/L (ref 1–32)
BASOPHILS # BLD AUTO: 0.03 10*3/MM3 (ref 0–0.2)
BASOPHILS NFR BLD AUTO: 0.5 % (ref 0–1.5)
BILIRUB SERPL-MCNC: 0.4 MG/DL (ref 0–1.2)
BUN SERPL-MCNC: 24 MG/DL (ref 8–23)
BUN/CREAT SERPL: 5.5 (ref 7–25)
CALCIUM SPEC-SCNC: 8.9 MG/DL (ref 8.6–10.5)
CHLORIDE SERPL-SCNC: 101 MMOL/L (ref 98–107)
CO2 SERPL-SCNC: 25 MMOL/L (ref 22–29)
CREAT SERPL-MCNC: 4.36 MG/DL (ref 0.57–1)
DEPRECATED RDW RBC AUTO: 41.1 FL (ref 37–54)
EGFRCR SERPLBLD CKD-EPI 2021: 10.7 ML/MIN/1.73
EOSINOPHIL # BLD AUTO: 0.28 10*3/MM3 (ref 0–0.4)
EOSINOPHIL NFR BLD AUTO: 5.1 % (ref 0.3–6.2)
ERYTHROCYTE [DISTWIDTH] IN BLOOD BY AUTOMATED COUNT: 11.9 % (ref 12.3–15.4)
GLOBULIN UR ELPH-MCNC: 2.7 GM/DL
GLUCOSE BLDC GLUCOMTR-MCNC: 157 MG/DL (ref 70–130)
GLUCOSE BLDC GLUCOMTR-MCNC: 173 MG/DL (ref 70–130)
GLUCOSE BLDC GLUCOMTR-MCNC: 231 MG/DL (ref 70–130)
GLUCOSE BLDC GLUCOMTR-MCNC: 261 MG/DL (ref 70–130)
GLUCOSE SERPL-MCNC: 174 MG/DL (ref 65–99)
HBA1C MFR BLD: 5.8 % (ref 4.8–5.6)
HCT VFR BLD AUTO: 25.8 % (ref 34–46.6)
HGB BLD-MCNC: 9 G/DL (ref 12–15.9)
IMM GRANULOCYTES # BLD AUTO: 0.01 10*3/MM3 (ref 0–0.05)
IMM GRANULOCYTES NFR BLD AUTO: 0.2 % (ref 0–0.5)
LYMPHOCYTES # BLD AUTO: 1.32 10*3/MM3 (ref 0.7–3.1)
LYMPHOCYTES NFR BLD AUTO: 24 % (ref 19.6–45.3)
MAGNESIUM SERPL-MCNC: 2 MG/DL (ref 1.6–2.4)
MCH RBC QN AUTO: 33.2 PG (ref 26.6–33)
MCHC RBC AUTO-ENTMCNC: 34.9 G/DL (ref 31.5–35.7)
MCV RBC AUTO: 95.2 FL (ref 79–97)
MONOCYTES # BLD AUTO: 0.36 10*3/MM3 (ref 0.1–0.9)
MONOCYTES NFR BLD AUTO: 6.5 % (ref 5–12)
NEUTROPHILS NFR BLD AUTO: 3.51 10*3/MM3 (ref 1.7–7)
NEUTROPHILS NFR BLD AUTO: 63.7 % (ref 42.7–76)
NRBC BLD AUTO-RTO: 0 /100 WBC (ref 0–0.2)
PLATELET # BLD AUTO: 148 10*3/MM3 (ref 140–450)
PMV BLD AUTO: 9.3 FL (ref 6–12)
POTASSIUM SERPL-SCNC: 4.1 MMOL/L (ref 3.5–5.2)
PROT SERPL-MCNC: 6.1 G/DL (ref 6–8.5)
RBC # BLD AUTO: 2.71 10*6/MM3 (ref 3.77–5.28)
SODIUM SERPL-SCNC: 135 MMOL/L (ref 136–145)
WBC NRBC COR # BLD AUTO: 5.51 10*3/MM3 (ref 3.4–10.8)

## 2023-12-19 PROCEDURE — 63710000001 INSULIN REGULAR HUMAN PER 5 UNITS: Performed by: FAMILY MEDICINE

## 2023-12-19 PROCEDURE — 80053 COMPREHEN METABOLIC PANEL: CPT | Performed by: FAMILY MEDICINE

## 2023-12-19 PROCEDURE — 85025 COMPLETE CBC W/AUTO DIFF WBC: CPT | Performed by: FAMILY MEDICINE

## 2023-12-19 PROCEDURE — 97161 PT EVAL LOW COMPLEX 20 MIN: CPT

## 2023-12-19 PROCEDURE — 83735 ASSAY OF MAGNESIUM: CPT | Performed by: FAMILY MEDICINE

## 2023-12-19 PROCEDURE — 83036 HEMOGLOBIN GLYCOSYLATED A1C: CPT | Performed by: FAMILY MEDICINE

## 2023-12-19 PROCEDURE — 25010000002 ONDANSETRON PER 1 MG: Performed by: FAMILY MEDICINE

## 2023-12-19 PROCEDURE — 99232 SBSQ HOSP IP/OBS MODERATE 35: CPT | Performed by: INTERNAL MEDICINE

## 2023-12-19 PROCEDURE — 82948 REAGENT STRIP/BLOOD GLUCOSE: CPT

## 2023-12-19 RX ORDER — LIDOCAINE 4 G/G
1 PATCH TOPICAL
Status: DISCONTINUED | OUTPATIENT
Start: 2023-12-19 | End: 2023-12-21 | Stop reason: HOSPADM

## 2023-12-19 RX ORDER — LACTULOSE 10 G/15ML
30 SOLUTION ORAL 2 TIMES DAILY PRN
Status: DISCONTINUED | OUTPATIENT
Start: 2023-12-19 | End: 2023-12-21 | Stop reason: HOSPADM

## 2023-12-19 RX ADMIN — CALCIUM ACETATE 667 MG: 667 CAPSULE ORAL at 13:15

## 2023-12-19 RX ADMIN — INSULIN HUMAN 2 UNITS: 100 INJECTION, SOLUTION PARENTERAL at 17:58

## 2023-12-19 RX ADMIN — INSULIN HUMAN 4 UNITS: 100 INJECTION, SOLUTION PARENTERAL at 13:15

## 2023-12-19 RX ADMIN — RIFAXIMIN 550 MG: 550 TABLET ORAL at 20:30

## 2023-12-19 RX ADMIN — RIFAXIMIN 550 MG: 550 TABLET ORAL at 08:24

## 2023-12-19 RX ADMIN — Medication 10 ML: at 20:30

## 2023-12-19 RX ADMIN — LIDOCAINE 1 PATCH: 4 PATCH TOPICAL at 13:15

## 2023-12-19 RX ADMIN — CARVEDILOL 25 MG: 12.5 TABLET, FILM COATED ORAL at 08:24

## 2023-12-19 RX ADMIN — VENLAFAXINE HYDROCHLORIDE 75 MG: 75 CAPSULE, EXTENDED RELEASE ORAL at 08:24

## 2023-12-19 RX ADMIN — ATORVASTATIN CALCIUM 80 MG: 40 TABLET, FILM COATED ORAL at 20:30

## 2023-12-19 RX ADMIN — SENNOSIDES AND DOCUSATE SODIUM 2 TABLET: 8.6; 5 TABLET ORAL at 08:23

## 2023-12-19 RX ADMIN — ACETAMINOPHEN 650 MG: 325 TABLET ORAL at 09:41

## 2023-12-19 RX ADMIN — CALCIUM ACETATE 667 MG: 667 CAPSULE ORAL at 08:24

## 2023-12-19 RX ADMIN — Medication 10 ML: at 08:24

## 2023-12-19 RX ADMIN — INSULIN HUMAN 2 UNITS: 100 INJECTION, SOLUTION PARENTERAL at 08:24

## 2023-12-19 RX ADMIN — CARVEDILOL 25 MG: 12.5 TABLET, FILM COATED ORAL at 17:58

## 2023-12-19 RX ADMIN — PANTOPRAZOLE SODIUM 40 MG: 40 TABLET, DELAYED RELEASE ORAL at 08:24

## 2023-12-19 RX ADMIN — ONDANSETRON 4 MG: 2 INJECTION INTRAMUSCULAR; INTRAVENOUS at 09:41

## 2023-12-19 RX ADMIN — QUETIAPINE FUMARATE 25 MG: 25 TABLET ORAL at 20:30

## 2023-12-19 RX ADMIN — SENNOSIDES AND DOCUSATE SODIUM 2 TABLET: 8.6; 5 TABLET ORAL at 20:30

## 2023-12-19 RX ADMIN — CALCIUM ACETATE 667 MG: 667 CAPSULE ORAL at 17:58

## 2023-12-19 RX ADMIN — ACETAMINOPHEN 650 MG: 325 TABLET ORAL at 16:11

## 2023-12-19 NOTE — PLAN OF CARE
Goal Outcome Evaluation:  Plan of Care Reviewed With: patient           Outcome Evaluation: New palliative consult for assistance with symptom management and hospice discussion per Dr. Lu.  ACP on file names patient's ex- Parviz Keita as healthcare POA.  Palliative care introduced, palliative brochure and meal discount card provided.  Pt endorsed 5/10 aching abdominal pain and nausea.  RN notified and asked for prn medication.  No family at BS.  Pt. was disoriented to time and place.  APRN called [facility] to discuss GOC.  He would like for pt to have palliative care to follow when she discharges to [place].  Palliative referral emailed today.  Palliative care to continue to follow for support, POC and ongoing GOC.      Problem: Palliative Care  Goal: Enhanced Quality of Life  Intervention: Promote Advance Care Planning  Flowsheets (Taken 12/19/2023 1451)  Life Transition/Adjustment:   palliative care initiated   palliative care discussed

## 2023-12-19 NOTE — PLAN OF CARE
Goal Outcome Evaluation:                   Patient admitted overnight. VSS, RA, denies pain. RN called [facility] unable to reach staff to go over patient's medications. Continue POC.

## 2023-12-19 NOTE — PROGRESS NOTES
[facility] Medicine Services  PROGRESS NOTE    Patient Name: Jeaneth Keita  : 1960  MRN: [id number]    Date of Admission: 2023  Primary Care Physician: Lulu Amos MD    Subjective   Subjective     CC:  Follow-up for chest pain    HPI:  Patient seen and examined. Feeling better. Denied CP, SOB. C/O constipation and mild abdominal discomfort. Received HD yesterday    Objective   Objective     Vital Signs:   Temp:  [97.3 °F (36.3 °C)-98.3 °F (36.8 °C)] 97.5 °F (36.4 °C)  Heart Rate:  [81-93] 83  Resp:  [14-18] 18  BP: (118-146)/() 129/66  Flow (L/min):  [1-2] 1     Physical Exam:  General: Comfortable, not in distress, conversant and cooperative  Head: Atraumatic and normocephalic  Eyes: No Icterus. No pallor  Ears:  Ears appear intact with no abnormalities noted  Throat: No oral lesions, no thrush  Neck: Supple, trachea midline  Lungs: Clear to auscultation bilaterally, equal air entry, no wheezing or crackles  Heart:  Normal S1 and S2, no murmur, no gallop, No JVD, no lower extremity swelling  Abdomen:  Soft, no tenderness, no organomegaly, normal bowel sounds, no organomegaly  Extremities: pulses equal bilaterally  Skin: No bleeding, bruising or rash, normal skin turgor and elasticity  Neurologic: Cranial nerves appear intact with no evidence of facial asymmetry, normal motor and sensory functions in all 4 extremities  Psych: Alert and oriented x 3, normal mood    Results Reviewed:  LAB RESULTS:      Lab 23  0308 23  1156   WBC 5.51 5.29   HEMOGLOBIN 9.0* 9.3*   HEMATOCRIT 25.8* 26.2*   PLATELETS 148 160   NEUTROS ABS 3.51 3.31   IMMATURE GRANS (ABS) 0.01 0.03   LYMPHS ABS 1.32 1.31   MONOS ABS 0.36 0.36   EOS ABS 0.28 0.25   MCV 95.2 95.3   PROCALCITONIN  --  0.13   LACTATE  --  1.2         Lab 23  0308 23  1156   SODIUM 135* 135*   POTASSIUM 4.1 4.9   CHLORIDE 101 98   CO2 25.0 29.0   ANION GAP 9.0 8.0   BUN 24* 40*   CREATININE  4.36* 5.26*   EGFR 10.7* 8.5*   GLUCOSE 174* 302*   CALCIUM 8.9 9.2   MAGNESIUM 2.0  --    HEMOGLOBIN A1C 5.80*  --          Lab 12/19/23  0308 12/18/23  1156   TOTAL PROTEIN 6.1 6.7   ALBUMIN 3.4* 3.8   GLOBULIN 2.7 2.9   ALT (SGPT) 43* 45*   AST (SGOT) 25 25   BILIRUBIN 0.4 0.5   ALK PHOS 112 138*   LIPASE  --  33         Lab 12/18/23  1402 12/18/23  1156   PROBNP  --  4,626.0*   HSTROP T 33* 32*                 Lab 12/18/23  1222   PH, ARTERIAL 7.449   PCO2, ARTERIAL 41.4   PO2 .0   FIO2 36   HCO3 ART 28.7*   BASE EXCESS ART 4.3*   CARBOXYHEMOGLOBIN 1.1     Brief Urine Lab Results  (Last result in the past 365 days)        Color   Clarity   Blood   Leuk Est   Nitrite   Protein   CREAT   Urine HCG        10/17/23 1406 Yellow   Clear   Negative   Negative   Negative   >=300 mg/dL (3+)                   Microbiology Results Abnormal       Procedure Component Value - Date/Time    COVID PRE-OP / PRE-PROCEDURE SCREENING ORDER (NO ISOLATION) - Swab, Nasopharynx [[id number]]  (Normal) Collected: 12/18/23 1146    Lab Status: Final result Specimen: Swab from Nasopharynx Updated: 12/18/23 1226    Narrative:      The following orders were created for panel order COVID PRE-OP / PRE-PROCEDURE SCREENING ORDER (NO ISOLATION) - Swab, Nasopharynx.  Procedure                               Abnormality         Status                     ---------                               -----------         ------                     COVID-19 and FLU A/B PCR...[[id number]]  Normal              Final result                 Please view results for these tests on the individual orders.    COVID-19 and FLU A/B PCR, 1 HR TAT - Swab, Nasopharynx [[id number]]  (Normal) Collected: 12/18/23 1146    Lab Status: Final result Specimen: Swab from Nasopharynx Updated: 12/18/23 1226     COVID19 Not Detected     Influenza A PCR Not Detected     Influenza B PCR Not Detected    Narrative:      Fact sheet for providers:  https://www.fda.gov/media/047284/download    Fact sheet for patients: https://www.fda.gov/media/080314/download    Test performed by [facility].            CT Chest Without Contrast Diagnostic    Result Date: 12/18/2023  CT CHEST WO CONTRAST DIAGNOSTIC Date of Exam: 12/18/2023 11:23 AM CST Indication: Shortness of breath, hypoxia, renal failure. Comparison: Chest x-ray 12/18/2023 CT abdomen pelvis 6/27/2022 Technique: Axial CT images were obtained of the chest without contrast administration.  Reconstructed coronal and sagittal images were also obtained. Automated exposure control and iterative construction methods were used. Findings: Hilum and Mediastinum: No pathologically enlarged lymph nodes.  Normal heart size.   No pericardial effusion.  Unremarkable thoracic aorta and pulmonary arteries. There is coronary artery calcification. Mitral annulus calcification is noted. There are bilateral thyroid nodules measuring 1.7 x 1.8 cm on the right and 1.0 x 1.1 cm on the left. Lung Parenchyma and Pleura: Evaluation of lung parenchyma demonstrates consolidation at the medial right lung base which appears unchanged from prior studies likely rounded atelectasis and/or scarring. No new focal opacity identified. There is a 3 mm nodule in the right upper lung (image 34 of series 2). Bibasilar atelectasis and/or scarring is noted. Upper Abdomen: Evaluation of the upper abdomen demonstrates a peripherally calcified lesion either within or effacing the inferior vena cava at the portal confluence (image 85 of series 2). This measures 3.3 x 3.5 cm and was present on previous examination felt to be within the caudate lobe. Additional findings include cholelithiasis. Soft tissues: Unremarkable. Osseous structures: There is an expansile soft tissue involving the posterior right seventh rib near the costovertebral junction (image 47 of series 4). Cortical destruction with soft tissue component is noted measuring 1.0 x 3.4 cm. This is new  from prior CT dated 6/27/2022.     Impression: Impression: 1. New expansile osseous lesion involving the posterior right seventh rib near the costovertebral junction with cortical destruction noted. 2. Rounded atelectasis at the medial right lung base. No acute cardiopulmonary process. 3. 3 mm nodule in the right upper lung. 4. Peripherally calcified mass effacing the inferior vena cava potentially in the left caudate lobe unchanged. Electronically Signed: Tameka Bravo MD  12/18/2023 12:14 PM CST  Workstation ID: [id number]    CT Head Without Contrast    Result Date: 12/18/2023  CT HEAD WO CONTRAST Date of Exam: 12/18/2023 12:23 PM EST Indication: AMS. Comparison: None available. Technique: Axial CT images were obtained of the head without contrast administration.  Automated exposure control and iterative construction methods were used. Findings: There is no evidence of hemorrhage. There is no mass effect or midline shift. Diffuse brain atrophy. Periventricular hypodense areas compatible with chronic small vessel ischemia There is no extracerebral collection. Ventricles are normal in size and configuration for patient's stated age. Posterior fossa is within normal limits. Calvarium and skull base appear intact.  Visualized sinuses show no air fluid levels. Visualized orbits are unremarkable.     Impression: Impression: Brain atrophy with chronic small vessel ischemic changes No acute intracranial abnormality Electronically Signed: Pelon Hess MD  12/18/2023 1:02 PM EST  Workstation ID: [id number]    XR Chest 1 View    Result Date: 12/18/2023  XR CHEST 1 VW Date of Exam: 12/18/2023 12:00 PM EST Indication: Chest Pain Triage Protocol Comparison: None available. Findings: There are no airspace consolidations. Mild bibasilar discoid atelectasis. No pleural fluid. No pneumothorax. The pulmonary vasculature appears within normal limits. The cardiac and mediastinal silhouette appear unremarkable. No acute osseous  abnormality identified.     Impression: Impression: Bibasilar discoid atelectasis. Otherwise clear lungs Electronically Signed: Pelon Hess MD  12/18/2023 12:14 PM EST  Workstation ID: [id number]     Results for orders placed during the hospital encounter of 10/05/21    Adult Transthoracic Echo Complete W/ Cont if Necessary Per Protocol    Interpretation Summary  · Left ventricular ejection fraction appears to be 61 - 65%. Left ventricular systolic function is normal.  · Left atrial volume is mildly increased.      Current medications:  Scheduled Meds:aspirin, 324 mg, Oral, Once  atorvastatin, 80 mg, Oral, Nightly  calcium acetate, 667 mg, Oral, TID With Meals  carvedilol, 25 mg, Oral, BID With Meals  insulin regular, 2-9 Units, Subcutaneous, TID AC  pantoprazole, 40 mg, Oral, Daily  QUEtiapine, 25 mg, Oral, Nightly  senna-docusate sodium, 2 tablet, Oral, BID  sodium chloride, 10 mL, Intravenous, Q12H  venlafaxine XR, 75 mg, Oral, Daily      Continuous Infusions:   PRN Meds:.  acetaminophen    senna-docusate sodium **AND** polyethylene glycol **AND** bisacodyl **AND** bisacodyl    dextrose    dextrose    glucagon (human recombinant)    nitroglycerin    ondansetron    sodium chloride    sodium chloride    sodium chloride    Assessment & Plan   Assessment & Plan     Active Hospital Problems    Diagnosis  POA    **Chest pain [R07.9]  Yes    Elevated brain natriuretic peptide (BNP) level [R79.89]  Yes    End-stage renal disease on hemodialysis [N18.6, Z99.2]  Not Applicable    Altered mental status [R41.82]  Yes    Cirrhosis of liver [K74.60]  Yes    GERD (gastroesophageal reflux disease) [K21.9]  Yes    Essential hypertension [I10]  Yes    Type 2 diabetes mellitus [E11.9]  Yes      Resolved Hospital Problems   No resolved problems to display.        Brief Hospital Course to date:  Jeaneth Keita is a 63 y.o. female with past medical history of essential hypertension, dyslipidemia, type 2 diabetes, GERD, end-stage  renal disease on hemodialysis, liver cirrhosis, chronic hep C, hepatocellular carcinoma with bone metastasis who presented to the hospital with chest pain and altered mental state    Chest pain   Dyspnea   ? Acute CHF exacerbation   Patient presented with intermittent chest pain and orthopnea.  Troponin is mildly elevated and not evolving.  EKG with no acute skin changes.  proBNP is elevated  Chest x-ray with bilateral atelectasis  Felt that her symptoms are secondary to volume overload.  Received hemodialysis on admission with ultrafiltration of 2 L and symptoms improved  Echo from 2021 with normal ejection fraction  Repeat echo pending     AMS   Liver Cirrhosis   Chronic hep C  He had cell carcinoma with liver metastasis  Was mildly altered on admission with  Brain CT on admission with atrophic brain changes but no other acute intercurrent pathology  Start lactulose and rifaximin     ESRD on HD   Continue HD per nephrology recommendations.      Essential hypertension  HLD   GERD   Continue Coreg  Continue Lipitor  Continue PPI     Type II DM   A1c 5.8%  SSI      Goals of care  My partner discussed with the patient's POA the goals of care  Palliative care consult      Expected Discharge Location and Transportation: Home  Expected Discharge   Expected Discharge Date: 12/21/2023; Expected Discharge Time:      DVT prophylaxis:  Mechanical DVT prophylaxis orders are present.     AM-PAC 6 Clicks Score (PT): 11 (12/18/23 1941)    CODE STATUS:   Code Status and Medical Interventions:   Ordered at: 12/18/23 7115     Medical Intervention Limits:    NO intubation (DNI)    NO cardioversion     Code Status (Patient has no pulse and is not breathing):    No CPR (Do Not Attempt to Resuscitate)     Medical Interventions (Patient has pulse or is breathing):    Limited Support     Copied text in this note has been reviewed and is accurate as of 12/19/23.     Jacqueline Hill MD  12/19/23

## 2023-12-19 NOTE — PLAN OF CARE
Goal Outcome Evaluation:  Plan of Care Reviewed With: patient        Progress: no change  Outcome Evaluation: PT eval completed. Patient pleasantly confused and demo generalized weakness, mild standing balance deficits r/t L BKA, decreased activity tolerance, and is slightly below her functional baseline. Pt. completed bed mobility and transfers w/ CG/min A. Pt. would benefit from acute PT to promote return to PLOF. Recommend return to facility w/ SNF rehab.      Anticipated Discharge Disposition (PT): skilled nursing facility

## 2023-12-19 NOTE — PLAN OF CARE
Goal Outcome Evaluation:           Progress: no change  Outcome Evaluation: Pt remains on room air. NSR on monitor. Disoriented to time and situation today. Up in chair some/ transfers well. C/o some lower abdominal pain relieved with tylenol. Sister in law at bedside some today. Waiting TTE. VSS

## 2023-12-19 NOTE — PROGRESS NOTES
" LOS: 1 day   Patient Care Team:  Lulu Amos MD as PCP - General (Hospice and Palliative Medicine)    Chief Complaint: ESRD    Subjective     HD yesterday tolerated well. No active complaints.     Subjective    History taken from: patient    Objective     Vital Sign Min/Max for last 24 hours  Temp  Min: 96.4 °F (35.8 °C)  Max: 97.9 °F (36.6 °C)   BP  Min: 124/65  Max: 145/83   Pulse  Min: 83  Max: 93   Resp  Min: 14  Max: 18   SpO2  Min: 92 %  Max: 100 %   Flow (L/min)  Min: 1  Max: 2   Weight  Min: 77.1 kg (169 lb 14.4 oz)  Max: 77.1 kg (169 lb 14.4 oz)     Flowsheet Rows      Flowsheet Row First Filed Value   Admission Height 172.7 cm (68\") Documented at 12/18/2023 1141   Admission Weight 81.6 kg (180 lb) Documented at 12/18/2023 1141            I/O this shift:  In: 118 [P.O.:118]  Out: -   I/O last 3 completed shifts:  In: 240 [P.O.:240]  Out: 2440     Objective:  General Appearance:  Comfortable.    Vital signs: (most recent): Blood pressure 131/74, pulse 87, temperature 96.9 °F (36.1 °C), temperature source Axillary, resp. rate 18, height 172.7 cm (68\"), weight 77.1 kg (169 lb 14.4 oz), SpO2 96%.  Vital signs are normal.    Output: Producing urine.    HEENT: Normal HEENT exam.    Lungs:  Normal effort and normal respiratory rate.  Breath sounds clear to auscultation.  She is not in respiratory distress.  No decreased breath sounds or wheezes.    Heart: Normal rate.  Regular rhythm.  S1 normal and S2 normal.    Abdomen: Abdomen is soft.  Bowel sounds are normal.     Extremities: Normal range of motion.  There is no dependent edema or local swelling.    Pulses: Distal pulses are intact.    Neurological: Patient is alert and oriented to person, place and time.  Normal strength.    Pupils:  Pupils are equal, round, and reactive to light.                Results Review:     I reviewed the patient's new clinical results.    WBC WBC   Date Value Ref Range Status   12/19/2023 5.51 3.40 - 10.80 10*3/mm3 Final " "  12/18/2023 5.29 3.40 - 10.80 10*3/mm3 Final      HGB Hemoglobin   Date Value Ref Range Status   12/19/2023 9.0 (L) 12.0 - 15.9 g/dL Final   12/18/2023 9.3 (L) 12.0 - 15.9 g/dL Final      HCT Hematocrit   Date Value Ref Range Status   12/19/2023 25.8 (L) 34.0 - 46.6 % Final   12/18/2023 26.2 (L) 34.0 - 46.6 % Final      Platlets No results found for: \"LABPLAT\"   MCV MCV   Date Value Ref Range Status   12/19/2023 95.2 79.0 - 97.0 fL Final   12/18/2023 95.3 79.0 - 97.0 fL Final          Sodium Sodium   Date Value Ref Range Status   12/19/2023 135 (L) 136 - 145 mmol/L Final   12/18/2023 135 (L) 136 - 145 mmol/L Final      Potassium Potassium   Date Value Ref Range Status   12/19/2023 4.1 3.5 - 5.2 mmol/L Final   12/18/2023 4.9 3.5 - 5.2 mmol/L Final      Chloride Chloride   Date Value Ref Range Status   12/19/2023 101 98 - 107 mmol/L Final   12/18/2023 98 98 - 107 mmol/L Final      CO2 CO2   Date Value Ref Range Status   12/19/2023 25.0 22.0 - 29.0 mmol/L Final   12/18/2023 29.0 22.0 - 29.0 mmol/L Final      BUN BUN   Date Value Ref Range Status   12/19/2023 24 (H) 8 - 23 mg/dL Final   12/18/2023 40 (H) 8 - 23 mg/dL Final      Creatinine Creatinine   Date Value Ref Range Status   12/19/2023 4.36 (H) 0.57 - 1.00 mg/dL Final   12/18/2023 5.26 (H) 0.57 - 1.00 mg/dL Final      Calcium Calcium   Date Value Ref Range Status   12/19/2023 8.9 8.6 - 10.5 mg/dL Final   12/18/2023 9.2 8.6 - 10.5 mg/dL Final      PO4 No results found for: \"CAPO4\"   Albumin Albumin   Date Value Ref Range Status   12/19/2023 3.4 (L) 3.5 - 5.2 g/dL Final   12/18/2023 3.8 3.5 - 5.2 g/dL Final      Magnesium Magnesium   Date Value Ref Range Status   12/19/2023 2.0 1.6 - 2.4 mg/dL Final      Uric Acid No results found for: \"URICACID\"     Medication Review: yes    Assessment & Plan       Chest pain    Cirrhosis of liver    GERD (gastroesophageal reflux disease)    Essential hypertension    Type 2 diabetes mellitus    Altered mental status    End-stage " renal disease on hemodialysis    Elevated brain natriuretic peptide (BNP) level      Assessment & Plan      ESRD:  On MWF christie. Missed HD on 12/18/23, due to chest pain. [facility]. Under [facility] care.      Anemia: Hb stable. ADRIEN on hold due to hx of metastatic liver disease. On IV iron. Last Fe sat 85 % Ferritin in 800     Volume status. Not a large fluid vega. AWG 1.5-2 kg. No significant LE edema on this visit.      BMD: Phos/PTH stable based on outpatient labs for Nov 2023     I discussed the patients findings and my recommendations with patient      Kin Pierson MD  12/19/23  15:30 EST

## 2023-12-19 NOTE — CASE MANAGEMENT/SOCIAL WORK
Discharge Planning Assessment  [facility]     Patient Name: Jeaneth Keita  MRN: [id number]  Today's Date: 12/19/2023    Admit Date: 12/18/2023    Plan: [facility]   Discharge Needs Assessment       Row Name 12/19/23 1356       Living Environment    People in Home other (see comments)  Personal Care at [facility]    Current Living Arrangements home  [facility] Care at [facility]    Potentially Unsafe Housing Conditions none    Primary Care Provided by self    Provides Primary Care For no one, unable/limited ability to care for self    Family Caregiver if Needed other (see comments)  Parviz Keita, ex-spouse, POA; Personal Care at [facility]    Quality of Family Relationships unable to assess    Able to Return to Prior Arrangements yes       Resource/Environmental Concerns    Resource/Environmental Concerns none    Transportation Concerns none       Transition Planning    Patient/Family Anticipates Transition to home  [facility] Care at [facility]    Patient/Family Anticipated Services at Transition     Transportation Anticipated health plan transportation       Discharge Needs Assessment    Readmission Within the Last 30 Days no previous admission in last 30 days    Equipment Currently Used at Home wheelchair    Concerns to be Addressed discharge planning    Anticipated Changes Related to Illness none    Equipment Needed After Discharge other (see comments)  TBD                   Discharge Plan       Row Name 12/19/23 1356       Plan    Plan [facility]    Patient/Family in Agreement with Plan yes    Plan Comments Spoke to patient in room and spoke to Felisa with [facility] to initiate discharge planning. Patient lives at home at [facility]. Prior to admission, she mobilized with wheelchair on her own. She goes to the dining room for meals. Staff assists with meds & bathing. Sister-in-law assists Ms. Keita 3 days a week. She does HD at [facility] on  [address] MWF with Wheels transportation. She is not current with home health. No home oxygen. Her PCP is Lulu Amos. She has drug coverage. Verified Medicare A[employer]B and [employer]. Her goal is back to [facility] at discharge. CM will continue to follow.    Final Discharge Disposition Code 01 - home or self-care                  Continued Care and Services - Admitted Since 12/18/2023    Coordination has not been started for this encounter.       Expected Discharge Date and Time       Expected Discharge Date Expected Discharge Time    Dec 21, 2023            Demographic Summary       Row Name 12/19/23 1352       General Information    Admission Type inpatient    Arrived From emergency department    Referral Source admission list    Reason for Consult discharge planning    Preferred Language English                   Functional Status       Row Name 12/19/23 1351       Functional Status    Usual Activity Tolerance fair    Current Activity Tolerance fair                   Psychosocial    No documentation.                  Abuse/Neglect    No documentation.                  Legal    No documentation.                  Substance Abuse    No documentation.                  Patient Forms    No documentation.                     Fernanda Padron, RN

## 2023-12-19 NOTE — PLAN OF CARE
Goal Outcome Evaluation: Pt came from ED. Pt tolerated tx well. Goal reached. Blood returned/re infused. Called report to grant Back

## 2023-12-19 NOTE — THERAPY EVALUATION
Patient Name: Jeaneth Keita  : 1960    MRN: [id number]                              Today's Date: 2023       Admit Date: 2023    Visit Dx:     ICD-10-CM ICD-9-CM   1. Acute on chronic respiratory failure with hypoxia  J96.21 518.84     799.02   2. Malignant neoplasm of liver, unspecified liver malignancy type  C22.9 155.2   3. Secondary malignancy of rib  C79.51 198.5   4. Chronic kidney disease with end stage renal failure on dialysis  N18.6 585.6    Z99.2 V45.11   5. Chest pain, unspecified type  R07.9 786.50     Patient Active Problem List   Diagnosis    ICH (intracerebral hemorrhage)    Hepatocellular carcinoma metastatic to bone s/p RXT     Hemochromatosis    Cirrhosis of liver    Chronic Hep C     ESRD (end stage renal disease)    Chronic ulcer of right foot    S/P left BKA    GERD (gastroesophageal reflux disease)    Chronic pain on Methadone    Essential hypertension    Type 2 diabetes mellitus    Right lower lobe pneumonia    Colitis    Normocytic anemia    Hypokalemia    CVA (cerebral vascular accident)    Stroke-like symptoms    History of hypoglycemia    Hypoglycemia    Chest pain    End stage renal disease on dialysis    Disorientation    Altered mental status    End-stage renal disease on hemodialysis    Elevated brain natriuretic peptide (BNP) level     Past Medical History:   Diagnosis Date    Anxiety     Cancer     liver cell carcinoma    DDD (degenerative disc disease), lumbar     Depression     Diabetes mellitus     Fibromyalgia     Hemochromatosis     Hep B w/o coma, chronic, w/o delta     Hep C w/o coma, chronic     Hypertension     Stroke     Vertigo      Past Surgical History:   Procedure Laterality Date    ARTERIOVENOUS FISTULA/SHUNT SURGERY Left 10/16/2021    Procedure: UPPER EXTREMITY ARTERIOVENOUS FISTULA FORMATION LEFT;  Surgeon: Johnny Rousseau MD;  Location: [facility];  Service: Vascular;  Laterality: Left;    BACK SURGERY      BELOW KNEE LEG AMPUTATION        SECTION      FOOT SURGERY      KNEE SURGERY      ROTATOR CUFF REPAIR      SPINAL CORD STIMULATOR IMPLANT        General Information       Row Name 23 0844          Physical Therapy Time and Intention    Document Type evaluation  -MB     Mode of Treatment physical therapy  -MB       Row Name 2344          General Information    Patient Profile Reviewed yes  -MB     Prior Level of Function independent:;bed mobility;ADL's;transfer;w/c or scooter  -MB     Existing Precautions/Restrictions oxygen therapy device and L/min;other (see comments)  remote L BKA, pleasantly confused  -MB     Barriers to Rehab medically complex;previous functional deficit;cognitive status  -MB       Row Name 2344          Living Environment    People in Home facility resident  -MB       Row Name 2344          Home Main Entrance    Number of Stairs, Main Entrance none  -MB       Row Name 2344          Stairs Within Home, Primary    Number of Stairs, Within Home, Primary none  -MB       Row Name 2344          Cognition    Orientation Status (Cognition) oriented to;person;verbal cues/prompts needed for orientation;place  -MB       Row Name 2344          Safety Issues, Functional Mobility    Safety Issues Affecting Function (Mobility) insight into deficits/self-awareness;safety precaution awareness  -MB     Impairments Affecting Function (Mobility) balance;endurance/activity tolerance;pain;strength  -MB               User Key  (r) = Recorded By, (t) = Taken By, (c) = Cosigned By      Initials Name Provider Type    Lupe Diaz, PT Physical Therapist                   Mobility       Row Name 23 1111          Bed Mobility    Bed Mobility supine-sit  -MB     Supine-Sit [place] (Bed Mobility) contact guard  -MB     Assistive Device (Bed Mobility) bed rails;head of bed elevated  -MB     Comment, (Bed Mobility) Pt. advanced to EOB w/ brief assist to raise  trunk/shoulders and VCs for sequencing.  -MB       Row Name 12/19/23 1111          Transfers    Comment, (Transfers) SPT to chair w/ assist to boost to standing. Pt. demo safe hand placement and transfer technique.  -MB       Row Name 12/19/23 1111          Bed-Chair Transfer    Bed-Chair [place] (Transfers) minimum assist (75% patient effort);verbal cues  -MB     Assistive Device (Bed-Chair Transfers) other (see comments)  UE support  -MB       Row Name 12/19/23 1111          Sit-Stand Transfer    Sit-Stand [place] (Transfers) minimum assist (75% patient effort);verbal cues  -MB     Assistive Device (Sit-Stand Transfers) walker, front-wheeled  -MB       Row Name 12/19/23 1111          Gait/Stairs (Locomotion)    [place] Level (Gait) not tested  -MB     Comment, (Gait/Stairs) Pt. non-ambulatory at baseline (does not wear prosthesis).  -MB               User Key  (r) = Recorded By, (t) = Taken By, (c) = Cosigned By      Initials Name Provider Type    Lupe Diaz, PT Physical Therapist                   Obj/Interventions       Row Name 12/19/23 1114          Range of Motion Comprehensive    General Range of Motion no range of motion deficits identified  -MB       Row Name 12/19/23 1114          Strength Comprehensive (MMT)    General Manual Muscle Testing (MMT) Assessment lower extremity strength deficits identified;upper extremity strength deficits identified  -MB     Comment, General Manual Muscle Testing (MMT) Assessment BLEs and BUEs grossly 4-/5  -MB       Row Name 12/19/23 1114          Motor Skills    Therapeutic Exercise hip;knee;ankle  -MB       Row Name 12/19/23 1114          Hip (Therapeutic Exercise)    Hip (Therapeutic Exercise) isometric exercises;strengthening exercise  -MB     Hip Isometrics (Therapeutic Exercise) bilateral;gluteal sets  -MB     Hip Strengthening (Therapeutic Exercise) bilateral;aBduction;aDduction  -MB       Row Name 12/19/23 1114          Knee (Therapeutic  Exercise)    Knee (Therapeutic Exercise) strengthening exercise  -MB     Knee Strengthening (Therapeutic Exercise) right;LAQ (long arc quad);10 repetitions  -MB       Row Name 12/19/23 1114          Ankle (Therapeutic Exercise)    Ankle (Therapeutic Exercise) AROM (active range of motion)  -MB     Ankle AROM (Therapeutic Exercise) right;dorsiflexion;plantarflexion;10 repetitions  -MB       Row Name 12/19/23 1114          Balance    Balance Assessment sitting static balance;standing static balance;standing dynamic balance  -MB     Static Sitting Balance independent  -MB     Position, Sitting Balance unsupported;sitting edge of bed  -MB     Static Standing Balance contact guard  -MB     Dynamic Standing Balance minimal assist  -MB     Position/Device Used, Standing Balance supported;other (see comments)  BUE support  -MB     Balance Interventions standing;sit to stand;weight shifting activity  -MB       Row Name 12/19/23 1114          Sensory Assessment (Somatosensory)    Sensory Assessment (Somatosensory) right LE;UE sensation intact  -MB     Right LE Sensory Assessment light touch awareness;intact  -MB               User Key  (r) = Recorded By, (t) = Taken By, (c) = Cosigned By      Initials Name Provider Type    Lupe Diaz, PT Physical Therapist                   Goals/Plan       Row Name 12/19/23 1124          Bed Mobility Goal 1 (PT)    Activity/Assistive Device (Bed Mobility Goal 1, PT) sit to supine/supine to sit  -MB     [place] Level/Cues Needed (Bed Mobility Goal 1, PT) independent  -MB     Time Frame (Bed Mobility Goal 1, PT) 10 days  -MB       Row Name 12/19/23 1124          Transfer Goal 1 (PT)    Activity/Assistive Device (Transfer Goal 1, PT) sit-to-stand/stand-to-sit;bed-to-chair/chair-to-bed  -MB     [place] Level/Cues Needed (Transfer Goal 1, PT) independent  -MB     Time Frame (Transfer Goal 1, PT) 10 days  -MB       Row Name 12/19/23 1124          Patient Education Goal (PT)     Activity (Patient Education Goal, PT) HEP  -MB     [place]/Cues/Accuracy (Memory Goal 2, PT) demonstrates adequately  -MB     Time Frame (Patient Education Goal, PT) 10 days  -MB       Row Name 12/19/23 1124          Therapy Assessment/Plan (PT)    Planned Therapy Interventions (PT) balance training;bed mobility training;home exercise program;patient/family education;postural re-education;strengthening;transfer training;wheelchair management/propulsion training  -MB               User Key  (r) = Recorded By, (t) = Taken By, (c) = Cosigned By      Initials Name Provider Type    Lupe Diaz, PT Physical Therapist                   Clinical Impression       Row Name 12/19/23 1118          Pain    Pretreatment Pain Rating 5/10  -MB     Posttreatment Pain Rating 5/10  -MB     Pain Location - abdomen  -MB     Pre/Posttreatment Pain Comment RN aware and managing.  -MB     Pain Intervention(s) Ambulation/increased activity;Repositioned  -MB       Row Name 12/19/23 1118          Plan of Care Review    Plan of Care Reviewed With patient  -MB     Progress no change  -MB     Outcome Evaluation PT eval completed. Patient pleasantly confused and demo generalized weakness, mild standing balance deficits r/t L BKA, decreased activity tolerance, and is slightly below her functional baseline. Pt. completed bed mobility and transfers w/ CG/min A. Pt. would benefit from acute PT to promote return to PLOF. Recommend return to facility w/ SNF rehab.  -MB       Row Name 12/19/23 1112          Therapy Assessment/Plan (PT)    Patient/Family Therapy Goals Statement (PT) Pt. u/a to state.  -MB     Rehab Potential (PT) good, to achieve stated therapy goals  -MB     Criteria for Skilled Interventions Met (PT) yes;meets criteria;skilled treatment is necessary  -MB     Therapy Frequency (PT) daily  -MB       Row Name 12/19/23 1118          Vital Signs    Pre Systolic BP Rehab 140  -MB     Pre Treatment Diastolic BP 71  -MB      Pretreatment Heart Rate (beats/min) 87  -MB     Pre SpO2 (%) 97  -MB     O2 Delivery Pre Treatment nasal cannula  -MB     O2 Delivery Intra Treatment nasal cannula  -MB     Post SpO2 (%) 97  -MB     O2 Delivery Post Treatment nasal cannula  -MB     Pre Patient Position Supine  -MB     Intra Patient Position Standing  -MB     Post Patient Position Sitting  -MB       Row Name 12/19/23 1118          Positioning and Restraints    Pre-Treatment Position in bed  -MB     Post Treatment Position chair  -MB     In Chair notified nsg;reclined;call light within reach;encouraged to call for assist;exit alarm on;waffle cushion;on mechanical lift sling;legs elevated;R heel elevated  -MB               User Key  (r) = Recorded By, (t) = Taken By, (c) = Cosigned By      Initials Name Provider Type    Lupe Diaz, PT Physical Therapist                   Outcome Measures       Row Name 12/19/23 1125          How much help from another person do you currently need...    Turning from your back to your side while in flat bed without using bedrails? 4  -MB     Moving from lying on back to sitting on the side of a flat bed without bedrails? 3  -MB     Moving to and from a bed to a chair (including a wheelchair)? 3  -MB     Standing up from a chair using your arms (e.g., wheelchair, bedside chair)? 3  -MB     Climbing 3-5 steps with a railing? 1  -MB     To walk in hospital room? 2  -MB     AM-PAC 6 Clicks Score (PT) 16  -MB     Highest Level of Mobility Goal 5 --> Static standing  -MB       Row Name 12/19/23 1125          Functional Assessment    Outcome Measure Options AM-PAC 6 Clicks Basic Mobility (PT)  -MB               User Key  (r) = Recorded By, (t) = Taken By, (c) = Cosigned By      Initials Name Provider Type    Lupe Diaz, PT Physical Therapist                                 Physical Therapy Education       Title: PT OT SLP Therapies (In Progress)       Topic: Physical Therapy (In Progress)       Point:  Mobility training (Done)       Learning Progress Summary             Patient Acceptance, E,D, VU,NR by MB at 12/19/2023 1125                         Point: Home exercise program (Not Started)       Learner Progress:  Not documented in this visit.              Point: Body mechanics (Done)       Learning Progress Summary             Patient Acceptance, E,D, VU,NR by MB at 12/19/2023 1125                         Point: Precautions (Done)       Learning Progress Summary             Patient Acceptance, E,D, VU,NR by MB at 12/19/2023 1125                                         User Key       Initials Effective Dates Name Provider Type Discipline    MB 06/16/21 -  Lupe Linares, PT Physical Therapist PT                  PT Recommendation and Plan  Planned Therapy Interventions (PT): balance training, bed mobility training, home exercise program, patient/family education, postural re-education, strengthening, transfer training, wheelchair management/propulsion training  Plan of Care Reviewed With: patient  Progress: no change  Outcome Evaluation: PT eval completed. Patient pleasantly confused and demo generalized weakness, mild standing balance deficits r/t L BKA, decreased activity tolerance, and is slightly below her functional baseline. Pt. completed bed mobility and transfers w/ CG/min A. Pt. would benefit from acute PT to promote return to PLOF. Recommend return to facility w/ SNF rehab.     Time Calculation:   PT Evaluation Complexity  History, PT Evaluation Complexity: 1-2 personal factors and/or comorbidities  Examination of Body Systems (PT Eval Complexity): total of 3 or more elements  Clinical Presentation (PT Evaluation Complexity): evolving  Clinical Decision Making (PT Evaluation Complexity): low complexity  Overall Complexity (PT Evaluation Complexity): low complexity     PT Charges       Row Name 12/19/23 1126             Time Calculation    Start Time 0844  -MB      PT Received On 12/19/23  -MB       PT Goal Re-Cert Due Date 12/29/23  -MB         Untimed Charges    PT Eval/Re-eval Minutes 50  -MB         Total Minutes    Untimed Charges Total Minutes 50  -MB       Total Minutes 50  -MB                User Key  (r) = Recorded By, (t) = Taken By, (c) = Cosigned By      Initials Name Provider Type    Lupe Diaz, PT Physical Therapist                  Therapy Charges for Today       Code Description Service Date Service Provider Modifiers Qty    27085715711 HC PT EVAL LOW COMPLEXITY 4 12/19/2023 Lupe Linares, PT  1            PT G-Codes  Outcome Measure Options: AM-PAC 6 Clicks Basic Mobility (PT)  AM-PAC 6 Clicks Score (PT): 16  PT Discharge Summary  Anticipated Discharge Disposition (PT): skilled nursing facility    Lupe Linares, PT  12/19/2023

## 2023-12-19 NOTE — CONSULTS
"Palliative Care Initial Consult   Attending Physician: Jacqueline Hill MD  Referring Provider: Dr. Jourdan Lu    Reason for Referral:  assistance with clarification of goals of care    Code Status:   Code Status and Medical Interventions:   Ordered at: 12/18/23 2519     Medical Intervention Limits:    NO intubation (DNI)    NO cardioversion     Code Status (Patient has no pulse and is not breathing):    No CPR (Do Not Attempt to Resuscitate)     Medical Interventions (Patient has pulse or is breathing):    Limited Support      Advanced Directives: Advance Directive Status: Patient has advance directive, copy requested   Family/Support: Parviz Keita (HCS/POA), Bob Prescott (brother), Glendy Almonteadelaida (sisters)  Goals of Care: TBD.    HPI: Jeaneth Keita is a 63 y.o. female with PMH significant for DDD, T2DM, HTN, CVA, LBKA, anxiety, depression, ESRD on HD MWF, liver cirrhosis due to hemochromatosis, metastatic liver cancer with mets to right lung and right rib s/p chemo and radiation in 2021. Patient presented to [facility] ED on 12/18 due to dyspnea, chest pain, and AMS. Work up revealed elevated BNP, continuing HD with Nephrology following. Palliative Care consulted for GOC in the context of complex medical decision making.   Patient previously followed by Home Primary at [place], last visit 11/7/2023. Review of medication list from Home Primary visit with no current opioid use. Taking Acetaminophen prn and using Lidocaine patch for chronic back pain (previously on Methadone, discontinued 2021).   Patient is alert and oriented to self, she knows that she is in a hospital but unable to state which city. She is a poor historian and has difficulty recounting her symptoms. When asked if she has any chronic pain, patient reported, \"I don't know of any\". Patient reports low abdominal pain that is a constant ache, she believes it has been going on for months, she believes it is relieved with medication but is unsure " what medications she takes for pain. Denies chest pain. Patient does confirm history of constipation and is unsure when she had her last bowel movement.     ROS: +pain, abdominal, bilateral low quadrant pain, constant ache, she believes has been ongoing for months, she is unsure what makes pain worse or better. +nausea, intermittent. +debility, uses wheelchair due to LBKA, able to transfer by herself. +constipation, unsure of LBM. Denies anxiety, chest pain, headache, vomiting, weight loss.       Past Medical History:   Diagnosis Date    Anxiety     Cancer     liver cell carcinoma    DDD (degenerative disc disease), lumbar     Depression     Diabetes mellitus     Fibromyalgia     Hemochromatosis     Hep B w/o coma, chronic, w/o delta     Hep C w/o coma, chronic     Hypertension     Stroke     Vertigo      Past Surgical History:   Procedure Laterality Date    ARTERIOVENOUS FISTULA/SHUNT SURGERY Left 10/16/2021    Procedure: UPPER EXTREMITY ARTERIOVENOUS FISTULA FORMATION LEFT;  Surgeon: Johnny Rousseau MD;  Location: [facility];  Service: Vascular;  Laterality: Left;    BACK SURGERY      BELOW KNEE LEG AMPUTATION       SECTION      FOOT SURGERY      KNEE SURGERY      ROTATOR CUFF REPAIR      SPINAL CORD STIMULATOR IMPLANT       Social History     Socioeconomic History    Marital status:    Tobacco Use    Smoking status: Never    Smokeless tobacco: Never   Vaping Use    Vaping Use: Never used   Substance and Sexual Activity    Alcohol use: No    Drug use: No    Sexual activity: Defer     Family History   Problem Relation Age of Onset    Heart disease Father     Heart attack Father        Allergies   Allergen Reactions    Sulfa Antibiotics        Current medication reviewed for route, type, dose and frequency and are current per MAR at time of dictation.    Palliative Performance Scale Score:  40%    /71 (BP Location: Right arm, Patient Position: Sitting)   Pulse 87   Temp 96.4 °F (35.8  "°C) (Axillary)   Resp 16   Ht 172.7 cm (68\")   Wt 77.1 kg (169 lb 14.4 oz)   SpO2 97%   BMI 25.83 kg/m²     Intake/Output Summary (Last 24 hours) at 12/19/2023 0841  Last data filed at 12/18/2023 2230  Gross per 24 hour   Intake 240 ml   Output 2440 ml   Net -2200 ml       Physical Exam:    General Appearance:    Patient sitting up in chair, awake, alert,  chronically ill appearing, cooperative, NAD   HEENT:    NC/AT, EOMI, anicteric, MMM, face relaxed   Neck:   supple, trachea midline, no JVD   Lungs:     CTA bilat, diminished in bases; respirations regular, even and unlabored; RR 16-18 on exam    Heart:    RRR, normal S1 and S2, no M/R/G   Abdomen:     Hypoactive bowel sounds, soft, nontender, distended   G/U:   Deferred   MSK/Extremities:  LBKA, no edema   Pulses:   Pulses palpable and equal bilaterally   Skin:   Warm, dry   Neurologic:   A/Ox1-2, cooperative,    Psych:   Calm, appropriate         Labs:   Results from last 7 days   Lab Units 12/19/23  0308   WBC 10*3/mm3 5.51   HEMOGLOBIN g/dL 9.0*   HEMATOCRIT % 25.8*   PLATELETS 10*3/mm3 148     Results from last 7 days   Lab Units 12/19/23  0308   SODIUM mmol/L 135*   POTASSIUM mmol/L 4.1   CHLORIDE mmol/L 101   CO2 mmol/L 25.0   BUN mg/dL 24*   CREATININE mg/dL 4.36*   GLUCOSE mg/dL 174*   CALCIUM mg/dL 8.9     Results from last 7 days   Lab Units 12/19/23  0308   SODIUM mmol/L 135*   POTASSIUM mmol/L 4.1   CHLORIDE mmol/L 101   CO2 mmol/L 25.0   BUN mg/dL 24*   CREATININE mg/dL 4.36*   CALCIUM mg/dL 8.9   BILIRUBIN mg/dL 0.4   ALK PHOS U/L 112   ALT (SGPT) U/L 43*   AST (SGOT) U/L 25   GLUCOSE mg/dL 174*     Imaging Results (Last 72 Hours)       Procedure Component Value Units Date/Time    CT Chest Without Contrast Diagnostic [[id number]] Collected: 12/18/23 1257     Updated: 12/18/23 1317    Narrative:      CT CHEST WO CONTRAST DIAGNOSTIC    Date of Exam: 12/18/2023 11:23 AM CST    Indication: Shortness of breath, hypoxia, renal failure.    Comparison: " Chest x-ray 12/18/2023 CT abdomen pelvis 6/27/2022    Technique: Axial CT images were obtained of the chest without contrast administration.  Reconstructed coronal and sagittal images were also obtained. Automated exposure control and iterative construction methods were used.      Findings:  Hilum and Mediastinum: No pathologically enlarged lymph nodes.  Normal heart size.   No pericardial effusion.  Unremarkable thoracic aorta and pulmonary arteries. There is coronary artery calcification. Mitral annulus calcification is noted. There are   bilateral thyroid nodules measuring 1.7 x 1.8 cm on the right and 1.0 x 1.1 cm on the left.    Lung Parenchyma and Pleura: Evaluation of lung parenchyma demonstrates consolidation at the medial right lung base which appears unchanged from prior studies likely rounded atelectasis and/or scarring. No new focal opacity identified.    There is a 3 mm nodule in the right upper lung (image 34 of series 2). Bibasilar atelectasis and/or scarring is noted.    Upper Abdomen: Evaluation of the upper abdomen demonstrates a peripherally calcified lesion either within or effacing the inferior vena cava at the portal confluence (image 85 of series 2). This measures 3.3 x 3.5 cm and was present on previous   examination felt to be within the caudate lobe. Additional findings include cholelithiasis.    Soft tissues: Unremarkable.    Osseous structures: There is an expansile soft tissue involving the posterior right seventh rib near the costovertebral junction (image 47 of series 4). Cortical destruction with soft tissue component is noted measuring 1.0 x 3.4 cm. This is new from   prior CT dated 6/27/2022.      Impression:      Impression:    1. New expansile osseous lesion involving the posterior right seventh rib near the costovertebral junction with cortical destruction noted.  2. Rounded atelectasis at the medial right lung base. No acute cardiopulmonary process.  3. 3 mm nodule in the right  upper lung.  4. Peripherally calcified mass effacing the inferior vena cava potentially in the left caudate lobe unchanged.          Electronically Signed: Tameka Bravo MD    12/18/2023 12:14 PM CST    Workstation ID: [id number]    CT Head Without Contrast [[id number]] Collected: 12/18/23 1240     Updated: 12/18/23 1305    Narrative:      CT HEAD WO CONTRAST    Date of Exam: 12/18/2023 12:23 PM EST    Indication: AMS.    Comparison: None available.    Technique: Axial CT images were obtained of the head without contrast administration.  Automated exposure control and iterative construction methods were used.      Findings:  There is no evidence of hemorrhage. There is no mass effect or midline shift. Diffuse brain atrophy. Periventricular hypodense areas compatible with chronic small vessel ischemia    There is no extracerebral collection.    Ventricles are normal in size and configuration for patient's stated age.     Posterior fossa is within normal limits.    Calvarium and skull base appear intact.  Visualized sinuses show no air fluid levels. Visualized orbits are unremarkable.      Impression:      Impression:  Brain atrophy with chronic small vessel ischemic changes    No acute intracranial abnormality        Electronically Signed: Pelon Hess MD    12/18/2023 1:02 PM EST    Workstation ID: [id number]    XR Chest 1 View [[id number]] Collected: 12/18/23 1213     Updated: 12/18/23 1217    Narrative:      XR CHEST 1 VW    Date of Exam: 12/18/2023 12:00 PM EST    Indication: Chest Pain Triage Protocol    Comparison: None available.    Findings:  There are no airspace consolidations. Mild bibasilar discoid atelectasis. No pleural fluid. No pneumothorax. The pulmonary vasculature appears within normal limits. The cardiac and mediastinal silhouette appear unremarkable. No acute osseous abnormality   identified.      Impression:      Impression:  Bibasilar discoid atelectasis. Otherwise clear  lungs      Electronically Signed: Pelon Hess MD    12/18/2023 12:14 PM EST    Workstation ID: [id number]              Lab 12/19/23  0308   HEMOGLOBIN A1C 5.80*         Diagnostics: Reviewed    A:   Chest pain    Cirrhosis of liver    GERD (gastroesophageal reflux disease)    Essential hypertension    Type 2 diabetes mellitus    Altered mental status    End-stage renal disease on hemodialysis    Elevated brain natriuretic peptide (BNP) level     63 y.o. female with PMH significant for cirrhosis of liver, metastatic HCC, T2DM, ESRD on HD.   S/S:   Pain -abdominal pain, lower bilateral quadrants, aching (history of constipation)  -continue Tylenol 650mg PO q 4 hours prn mild pain    2. Nausea -continue Zofran 4mg IV q 6 hours prn N/V    3. Debility -PT/OT following    4. GOC -DNR/DNI -per review of chart  -called and spoke with Parviz HOGAN,   -Parviz with goal to continue HD at this time, return to hospital as needed  -not seeking further treatment for HCC, no significant decline in the past year from HCC  -Parviz agreeable to Palliative referral as goals are to return to hospital and continue dialysis    P: Introduced Palliative Care and services to patient. Patient with difficulty recalling medical history and describing symptoms. Called and spoke to SAMIRA Jj, who verbalizes understanding of patient's multiple co-morbidities. He reports goal is to continue HD and return to hospital. They are not seeking further treatment for HCC, no significant decline in the past year from HCC. If patient were to decline further HD for ESRD then patient would be eligible for hospice services. Parviz is agreeable to Palliative referral to follow patient at [facility] in order to support patient and family, continue to discuss GOC/hospice eligibility. All questions and concerns addressed. Will continue to follow for ongoing GOC discussion.  Thank you for this consult and allowing us to participate in patient's plan of care. Palliative  Care Team will continue to follow patient. Please do not hesitate to contact us regarding further symptom management or goals of care needs.  Time: 60 minutes spent reviewing medical and medication records, assessing and examining patient, discussing with family, answering questions, providing some guidance about a plan and documentation of care, and coordinating care with other healthcare members, with > 50% time spent face to face.         Kelsy Pena, APRN  12/19/2023

## 2023-12-19 NOTE — NURSING NOTE
Patient is from [facility]. Called to get updated medications and information faxed to us. Spoke with Trina at [phone]   Medications

## 2023-12-20 ENCOUNTER — APPOINTMENT (OUTPATIENT)
Dept: CARDIOLOGY | Facility: HOSPITAL | Age: 63
End: 2023-12-20
Payer: MEDICARE

## 2023-12-20 ENCOUNTER — APPOINTMENT (OUTPATIENT)
Dept: NEPHROLOGY | Facility: HOSPITAL | Age: 63
End: 2023-12-20
Payer: MEDICARE

## 2023-12-20 LAB
ALBUMIN SERPL-MCNC: 3.5 G/DL (ref 3.5–5.2)
ANION GAP SERPL CALCULATED.3IONS-SCNC: 12 MMOL/L (ref 5–15)
BH CV ECHO MEAS - AO MAX PG: 9.6 MMHG
BH CV ECHO MEAS - AO MEAN PG: 5 MMHG
BH CV ECHO MEAS - AO ROOT DIAM: 3.2 CM
BH CV ECHO MEAS - AO V2 MAX: 155 CM/SEC
BH CV ECHO MEAS - AO V2 VTI: 23.9 CM
BH CV ECHO MEAS - AVA(I,D): 2.5 CM2
BH CV ECHO MEAS - EDV(CUBED): 110.6 ML
BH CV ECHO MEAS - EDV(MOD-SP2): 61.9 ML
BH CV ECHO MEAS - EDV(MOD-SP4): 69.6 ML
BH CV ECHO MEAS - EF(MOD-BP): 63 %
BH CV ECHO MEAS - EF(MOD-SP2): 65.3 %
BH CV ECHO MEAS - EF(MOD-SP4): 61.9 %
BH CV ECHO MEAS - ESV(CUBED): 29.8 ML
BH CV ECHO MEAS - ESV(MOD-SP2): 21.5 ML
BH CV ECHO MEAS - ESV(MOD-SP4): 26.5 ML
BH CV ECHO MEAS - FS: 35.4 %
BH CV ECHO MEAS - IVS/LVPW: 0.7 CM
BH CV ECHO MEAS - IVSD: 0.7 CM
BH CV ECHO MEAS - LA DIMENSION: 3.7 CM
BH CV ECHO MEAS - LAT PEAK E' VEL: 9.7 CM/SEC
BH CV ECHO MEAS - LV DIASTOLIC VOL/BSA (35-75): 36.7 CM2
BH CV ECHO MEAS - LV MASS(C)D: 137.1 GRAMS
BH CV ECHO MEAS - LV MAX PG: 6.3 MMHG
BH CV ECHO MEAS - LV MEAN PG: 3 MMHG
BH CV ECHO MEAS - LV SYSTOLIC VOL/BSA (12-30): 14 CM2
BH CV ECHO MEAS - LV V1 MAX: 125 CM/SEC
BH CV ECHO MEAS - LV V1 VTI: 19.3 CM
BH CV ECHO MEAS - LVIDD: 4.8 CM
BH CV ECHO MEAS - LVIDS: 3.1 CM
BH CV ECHO MEAS - LVOT AREA: 3.1 CM2
BH CV ECHO MEAS - LVOT DIAM: 2 CM
BH CV ECHO MEAS - LVPWD: 1 CM
BH CV ECHO MEAS - MED PEAK E' VEL: 8.1 CM/SEC
BH CV ECHO MEAS - MV A MAX VEL: 102 CM/SEC
BH CV ECHO MEAS - MV DEC SLOPE: 662 CM/SEC2
BH CV ECHO MEAS - MV DEC TIME: 0.08 SEC
BH CV ECHO MEAS - MV E MAX VEL: 50.9 CM/SEC
BH CV ECHO MEAS - MV E/A: 0.5
BH CV ECHO MEAS - MV MAX PG: 5.9 MMHG
BH CV ECHO MEAS - MV MEAN PG: 2 MMHG
BH CV ECHO MEAS - MV V2 VTI: 16.4 CM
BH CV ECHO MEAS - MVA(VTI): 3.7 CM2
BH CV ECHO MEAS - PA ACC TIME: 0.1 SEC
BH CV ECHO MEAS - PA V2 MAX: 93.1 CM/SEC
BH CV ECHO MEAS - SI(MOD-SP2): 21.3 ML/M2
BH CV ECHO MEAS - SI(MOD-SP4): 22.7 ML/M2
BH CV ECHO MEAS - SV(LVOT): 60.6 ML
BH CV ECHO MEAS - SV(MOD-SP2): 40.4 ML
BH CV ECHO MEAS - SV(MOD-SP4): 43.1 ML
BH CV ECHO MEAS - TAPSE (>1.6): 1.9 CM
BH CV ECHO MEASUREMENTS AVERAGE E/E' RATIO: 5.72
BH CV VAS BP RIGHT ARM: NORMAL MMHG
BH CV XLRA - RV BASE: 3 CM
BH CV XLRA - RV LENGTH: 7.3 CM
BH CV XLRA - RV MID: 2.7 CM
BH CV XLRA - TDI S': 16.6 CM/SEC
BUN SERPL-MCNC: 45 MG/DL (ref 8–23)
BUN/CREAT SERPL: 8.3 (ref 7–25)
CALCIUM SPEC-SCNC: 8.9 MG/DL (ref 8.6–10.5)
CHLORIDE SERPL-SCNC: 102 MMOL/L (ref 98–107)
CO2 SERPL-SCNC: 23 MMOL/L (ref 22–29)
CREAT SERPL-MCNC: 5.43 MG/DL (ref 0.57–1)
EGFRCR SERPLBLD CKD-EPI 2021: 8.2 ML/MIN/1.73
GLUCOSE BLDC GLUCOMTR-MCNC: 166 MG/DL (ref 70–130)
GLUCOSE BLDC GLUCOMTR-MCNC: 188 MG/DL (ref 70–130)
GLUCOSE BLDC GLUCOMTR-MCNC: 248 MG/DL (ref 70–130)
GLUCOSE SERPL-MCNC: 140 MG/DL (ref 65–99)
IVRT: 108 MS
LEFT ATRIUM VOLUME INDEX: 21.6 ML/M2
PHOSPHATE SERPL-MCNC: 4.3 MG/DL (ref 2.5–4.5)
POTASSIUM SERPL-SCNC: 4.7 MMOL/L (ref 3.5–5.2)
SODIUM SERPL-SCNC: 137 MMOL/L (ref 136–145)

## 2023-12-20 PROCEDURE — 93306 TTE W/DOPPLER COMPLETE: CPT | Performed by: INTERNAL MEDICINE

## 2023-12-20 PROCEDURE — 80069 RENAL FUNCTION PANEL: CPT | Performed by: INTERNAL MEDICINE

## 2023-12-20 PROCEDURE — 82948 REAGENT STRIP/BLOOD GLUCOSE: CPT

## 2023-12-20 PROCEDURE — 63710000001 INSULIN REGULAR HUMAN PER 5 UNITS: Performed by: FAMILY MEDICINE

## 2023-12-20 PROCEDURE — 99232 SBSQ HOSP IP/OBS MODERATE 35: CPT | Performed by: INTERNAL MEDICINE

## 2023-12-20 PROCEDURE — 93306 TTE W/DOPPLER COMPLETE: CPT

## 2023-12-20 RX ORDER — ALBUMIN (HUMAN) 12.5 G/50ML
12.5 SOLUTION INTRAVENOUS AS NEEDED
Status: DISCONTINUED | OUTPATIENT
Start: 2023-12-20 | End: 2023-12-21 | Stop reason: HOSPADM

## 2023-12-20 RX ORDER — ALBUMIN (HUMAN) 12.5 G/50ML
12.5 SOLUTION INTRAVENOUS AS NEEDED
Status: ACTIVE | OUTPATIENT
Start: 2023-12-20 | End: 2023-12-20

## 2023-12-20 RX ADMIN — VENLAFAXINE HYDROCHLORIDE 75 MG: 75 CAPSULE, EXTENDED RELEASE ORAL at 12:56

## 2023-12-20 RX ADMIN — Medication 10 ML: at 20:16

## 2023-12-20 RX ADMIN — SENNOSIDES AND DOCUSATE SODIUM 2 TABLET: 8.6; 5 TABLET ORAL at 12:56

## 2023-12-20 RX ADMIN — RIFAXIMIN 550 MG: 550 TABLET ORAL at 12:55

## 2023-12-20 RX ADMIN — CALCIUM ACETATE 667 MG: 667 CAPSULE ORAL at 17:30

## 2023-12-20 RX ADMIN — LIDOCAINE 1 PATCH: 4 PATCH TOPICAL at 12:56

## 2023-12-20 RX ADMIN — ATORVASTATIN CALCIUM 80 MG: 40 TABLET, FILM COATED ORAL at 20:16

## 2023-12-20 RX ADMIN — SENNOSIDES AND DOCUSATE SODIUM 2 TABLET: 8.6; 5 TABLET ORAL at 20:16

## 2023-12-20 RX ADMIN — Medication 10 ML: at 12:57

## 2023-12-20 RX ADMIN — CALCIUM ACETATE 667 MG: 667 CAPSULE ORAL at 12:55

## 2023-12-20 RX ADMIN — INSULIN HUMAN 4 UNITS: 100 INJECTION, SOLUTION PARENTERAL at 13:07

## 2023-12-20 RX ADMIN — PANTOPRAZOLE SODIUM 40 MG: 40 TABLET, DELAYED RELEASE ORAL at 12:55

## 2023-12-20 RX ADMIN — CARVEDILOL 25 MG: 12.5 TABLET, FILM COATED ORAL at 17:30

## 2023-12-20 RX ADMIN — INSULIN HUMAN 2 UNITS: 100 INJECTION, SOLUTION PARENTERAL at 17:07

## 2023-12-20 RX ADMIN — QUETIAPINE FUMARATE 25 MG: 25 TABLET ORAL at 20:16

## 2023-12-20 RX ADMIN — RIFAXIMIN 550 MG: 550 TABLET ORAL at 20:16

## 2023-12-20 NOTE — DISCHARGE PLACEMENT REQUEST
"Jeaneth Tatum (63 y.o. Female)     Fernanda Padron RN  [phone]        Date of Birth   1960    Social Security Number       Address   [address] [place], [place] [address]    Home Phone   [phone]    MRN   [id number]       [place]   [employer]    Marital Status                               Admission Date   12/18/23    Admission Type   Emergency    Admitting Provider   Roslyn Hernandez DO    Attending Provider   Roslyn Hernandez DO    Department, Room/Bed   [facility] 6B, N625/1       Discharge Date       Discharge Disposition       Discharge Destination                                 Attending Provider: Roslyn Hernandez DO    Allergies: Sulfa Antibiotics    Isolation: None   Infection: None   Code Status: No CPR    Ht: 172.7 cm (68\")   Wt: 77.1 kg (169 lb 14.4 oz)    Admission Cmt: None   Principal Problem: Chest pain [R07.9]                   Active Insurance as of 12/18/2023       Primary Coverage       Payor Plan Insurance Group Employer/Plan Group    MEDICARE MEDICARE A & B        Payor Plan Address Payor Plan Phone Number Payor Plan Fax Number Effective Dates    [address] [phone]  11/1/2023 - None Entered    [facility] [id number]         Subscriber Name Subscriber Birth Date Member ID       JEANETH TATUM 1960 [id number]               Secondary Coverage       Payor Plan Insurance Group Employer/Plan Group    [facility] [id number]       Payor Plan Address Payor Plan Phone Number Payor Plan Fax Number Effective Dates    [address]   1/1/2005 - None Entered    [facility] [id number]         Subscriber Name Subscriber Birth Date Member ID       JEANETH TATUM 1960 [id number]                     Emergency Contacts        (Rel.) Home Phone Work Phone Mobile Phone    CHLOÉ TREVIÑO(POA) (Other) [phone] -- [phone]    HAILEY ROJO (Brother) [phone] -- [phone]    NORMA TREVIÑO (Sister) [phone] -- " "[phone]                 Physician Progress Notes (most recent note)        Bob Mae MD at 12/20/23 1107             LOS: 2 days    Patient Care Team:  Lulu Amos MD as PCP - General (Hospice and Palliative Medicine)    Reason For Visit:    Subjective     On HD    Objective     Vital Signs:  Blood pressure 135/96, pulse 103, temperature 97.8 °F (36.6 °C), temperature source Oral, resp. rate 18, height 172.7 cm (68\"), weight 77.1 kg (169 lb 14.4 oz), SpO2 98%.    Flowsheet Rows      Flowsheet Row First Filed Value   Admission Height 172.7 cm (68\") Documented at 12/18/2023 1141   Admission Weight 81.6 kg (180 lb) Documented at 12/18/2023 1141            12/19 0701 - 12/20 0700  In: 118 [P.O.:118]  Out: 400 [Urine:400]    Physical Exam:     Seen on HD    Labs:    Results from last 7 days   Lab Units 12/19/23  0308 12/18/23  1156   WBC 10*3/mm3 5.51 5.29   HEMOGLOBIN g/dL 9.0* 9.3*   PLATELETS 10*3/mm3 148 160     Results from last 7 days   Lab Units 12/20/23  0749 12/19/23  0308 12/18/23  1156   SODIUM mmol/L 137 135* 135*   POTASSIUM mmol/L 4.7 4.1 4.9   CHLORIDE mmol/L 102 101 98   CO2 mmol/L 23.0 25.0 29.0   BUN mg/dL 45* 24* 40*   CREATININE mg/dL 5.43* 4.36* 5.26*   CALCIUM mg/dL 8.9 8.9 9.2   PHOSPHORUS mg/dL 4.3  --   --    MAGNESIUM mg/dL  --  2.0  --    ALBUMIN g/dL 3.5 3.4* 3.8     Results from last 7 days   Lab Units 12/20/23  0749   GLUCOSE mg/dL 140*     Results from last 7 days   Lab Units 12/19/23  0308   ALK PHOS U/L 112   BILIRUBIN mg/dL 0.4   ALT (SGPT) U/L 43*   AST (SGOT) U/L 25     Results from last 7 days   Lab Units 12/18/23  1222   PH, ARTERIAL pH units 7.449   PO2 ART mm Hg 104.0   PCO2, ARTERIAL mm Hg 41.4   HCO3 ART mmol/L 28.7*           A/P:      Patient seen on dialysis.  Tolerating well.  No complaints.          Bob Mae MD  12/20/23  11:07 EST          Electronically signed by Bob Mae MD at 12/20/23 1108          Consult Notes " (most recent note)        Kelsy Pena DAYAN, APRN at 12/19/23 0841        Consult Orders    1. Inpatient Palliative Care MD Consult [[id number]] ordered by LINA Lu DO at 12/18/23 1516                 Palliative Care Initial Consult   Attending Physician: Jacqueline Hill MD  Referring Provider: Dr. Jourdan Lu    Reason for Referral:  assistance with clarification of goals of care    Code Status:   Code Status and Medical Interventions:   Ordered at: 12/18/23 1455     Medical Intervention Limits:    NO intubation (DNI)    NO cardioversion     Code Status (Patient has no pulse and is not breathing):    No CPR (Do Not Attempt to Resuscitate)     Medical Interventions (Patient has pulse or is breathing):    Limited Support      Advanced Directives: Advance Directive Status: Patient has advance directive, copy requested   Family/Support: Parviz Almonteelialka (HCS/POA), Bob Prescott (brother), Glendy Neela (sisters)  Goals of Care: TBD.    HPI: Jeaneth Keita is a 63 y.o. female with PMH significant for DDD, T2DM, HTN, CVA, LBKA, anxiety, depression, ESRD on HD MWF, liver cirrhosis due to hemochromatosis, metastatic liver cancer with mets to right lung and right rib s/p chemo and radiation in 2021. Patient presented to [facility] ED on 12/18 due to dyspnea, chest pain, and AMS. Work up revealed elevated BNP, continuing HD with Nephrology following. Palliative Care consulted for GOC in the context of complex medical decision making.   Patient previously followed by Home Primary at [place], last visit 11/7/2023. Review of medication list from Home Primary visit with no current opioid use. Taking Acetaminophen prn and using Lidocaine patch for chronic back pain (previously on Methadone, discontinued 2021).   Patient is alert and oriented to self, she knows that she is in a hospital but unable to state which city. She is a poor historian and has difficulty recounting her symptoms. When asked if she has any chronic pain,  "patient reported, \"I don't know of any\". Patient reports low abdominal pain that is a constant ache, she believes it has been going on for months, she believes it is relieved with medication but is unsure what medications she takes for pain. Denies chest pain. Patient does confirm history of constipation and is unsure when she had her last bowel movement.     ROS: +pain, abdominal, bilateral low quadrant pain, constant ache, she believes has been ongoing for months, she is unsure what makes pain worse or better. +nausea, intermittent. +debility, uses wheelchair due to LBKA, able to transfer by herself. +constipation, unsure of LBM. Denies anxiety, chest pain, headache, vomiting, weight loss.       Past Medical History:   Diagnosis Date    Anxiety     Cancer     liver cell carcinoma    DDD (degenerative disc disease), lumbar     Depression     Diabetes mellitus     Fibromyalgia     Hemochromatosis     Hep B w/o coma, chronic, w/o delta     Hep C w/o coma, chronic     Hypertension     Stroke     Vertigo      Past Surgical History:   Procedure Laterality Date    ARTERIOVENOUS FISTULA/SHUNT SURGERY Left 10/16/2021    Procedure: UPPER EXTREMITY ARTERIOVENOUS FISTULA FORMATION LEFT;  Surgeon: Johnyn Rousseau MD;  [facility];  Service: Vascular;  Laterality: Left;    BACK SURGERY      BELOW KNEE LEG AMPUTATION       SECTION      FOOT SURGERY      KNEE SURGERY      ROTATOR CUFF REPAIR      SPINAL CORD STIMULATOR IMPLANT       Social History     Socioeconomic History    Marital status:    Tobacco Use    Smoking status: Never    Smokeless tobacco: Never   Vaping Use    Vaping Use: Never used   Substance and Sexual Activity    Alcohol use: No    Drug use: No    Sexual activity: Defer     Family History   Problem Relation Age of Onset    Heart disease Father     Heart attack Father        Allergies   Allergen Reactions    Sulfa Antibiotics        Current medication reviewed for route, type, dose " "and frequency and are current per MAR at time of dictation.    Palliative Performance Scale Score:  40%    /71 (BP Location: Right arm, Patient Position: Sitting)   Pulse 87   Temp 96.4 °F (35.8 °C) (Axillary)   Resp 16   Ht 172.7 cm (68\")   Wt 77.1 kg (169 lb 14.4 oz)   SpO2 97%   BMI 25.83 kg/m²     Intake/Output Summary (Last 24 hours) at 12/19/2023 0841  Last data filed at 12/18/2023 2230  Gross per 24 hour   Intake 240 ml   Output 2440 ml   Net -2200 ml       Physical Exam:    General Appearance:    Patient sitting up in chair, awake, alert,  chronically ill appearing, cooperative, NAD   HEENT:    NC/AT, EOMI, anicteric, MMM, face relaxed   Neck:   supple, trachea midline, no JVD   Lungs:     CTA bilat, diminished in bases; respirations regular, even and unlabored; RR 16-18 on exam    Heart:    RRR, normal S1 and S2, no M/R/G   Abdomen:     Hypoactive bowel sounds, soft, nontender, distended   G/U:   Deferred   MSK/Extremities:  LBKA, no edema   Pulses:   Pulses palpable and equal bilaterally   Skin:   Warm, dry   Neurologic:   A/Ox1-2, cooperative,    Psych:   Calm, appropriate         Labs:   Results from last 7 days   Lab Units 12/19/23  0308   WBC 10*3/mm3 5.51   HEMOGLOBIN g/dL 9.0*   HEMATOCRIT % 25.8*   PLATELETS 10*3/mm3 148     Results from last 7 days   Lab Units 12/19/23  0308   SODIUM mmol/L 135*   POTASSIUM mmol/L 4.1   CHLORIDE mmol/L 101   CO2 mmol/L 25.0   BUN mg/dL 24*   CREATININE mg/dL 4.36*   GLUCOSE mg/dL 174*   CALCIUM mg/dL 8.9     Results from last 7 days   Lab Units 12/19/23  0308   SODIUM mmol/L 135*   POTASSIUM mmol/L 4.1   CHLORIDE mmol/L 101   CO2 mmol/L 25.0   BUN mg/dL 24*   CREATININE mg/dL 4.36*   CALCIUM mg/dL 8.9   BILIRUBIN mg/dL 0.4   ALK PHOS U/L 112   ALT (SGPT) U/L 43*   AST (SGOT) U/L 25   GLUCOSE mg/dL 174*     Imaging Results (Last 72 Hours)       Procedure Component Value Units Date/Time    CT Chest Without Contrast Diagnostic [[id number]] Collected: " 12/18/23 1257     Updated: 12/18/23 1317    Narrative:      CT CHEST WO CONTRAST DIAGNOSTIC    Date of Exam: 12/18/2023 11:23 AM CST    Indication: Shortness of breath, hypoxia, renal failure.    Comparison: Chest x-ray 12/18/2023 CT abdomen pelvis 6/27/2022    Technique: Axial CT images were obtained of the chest without contrast administration.  Reconstructed coronal and sagittal images were also obtained. Automated exposure control and iterative construction methods were used.      Findings:  Hilum and Mediastinum: No pathologically enlarged lymph nodes.  Normal heart size.   No pericardial effusion.  Unremarkable thoracic aorta and pulmonary arteries. There is coronary artery calcification. Mitral annulus calcification is noted. There are   bilateral thyroid nodules measuring 1.7 x 1.8 cm on the right and 1.0 x 1.1 cm on the left.    Lung Parenchyma and Pleura: Evaluation of lung parenchyma demonstrates consolidation at the medial right lung base which appears unchanged from prior studies likely rounded atelectasis and/or scarring. No new focal opacity identified.    There is a 3 mm nodule in the right upper lung (image 34 of series 2). Bibasilar atelectasis and/or scarring is noted.    Upper Abdomen: Evaluation of the upper abdomen demonstrates a peripherally calcified lesion either within or effacing the inferior vena cava at the portal confluence (image 85 of series 2). This measures 3.3 x 3.5 cm and was present on previous   examination felt to be within the caudate lobe. Additional findings include cholelithiasis.    Soft tissues: Unremarkable.    Osseous structures: There is an expansile soft tissue involving the posterior right seventh rib near the costovertebral junction (image 47 of series 4). Cortical destruction with soft tissue component is noted measuring 1.0 x 3.4 cm. This is new from   prior CT dated 6/27/2022.      Impression:      Impression:    1. New expansile osseous lesion involving the  posterior right seventh rib near the costovertebral junction with cortical destruction noted.  2. Rounded atelectasis at the medial right lung base. No acute cardiopulmonary process.  3. 3 mm nodule in the right upper lung.  4. Peripherally calcified mass effacing the inferior vena cava potentially in the left caudate lobe unchanged.          Electronically Signed: Tameka Bravo MD    12/18/2023 12:14 PM CST    Workstation ID: [id number]    CT Head Without Contrast [[id number]] Collected: 12/18/23 1240     Updated: 12/18/23 1305    Narrative:      CT HEAD WO CONTRAST    Date of Exam: 12/18/2023 12:23 PM EST    Indication: AMS.    Comparison: None available.    Technique: Axial CT images were obtained of the head without contrast administration.  Automated exposure control and iterative construction methods were used.      Findings:  There is no evidence of hemorrhage. There is no mass effect or midline shift. Diffuse brain atrophy. Periventricular hypodense areas compatible with chronic small vessel ischemia    There is no extracerebral collection.    Ventricles are normal in size and configuration for patient's stated age.     Posterior fossa is within normal limits.    Calvarium and skull base appear intact.  Visualized sinuses show no air fluid levels. Visualized orbits are unremarkable.      Impression:      Impression:  Brain atrophy with chronic small vessel ischemic changes    No acute intracranial abnormality        Electronically Signed: Pelon Hess MD    12/18/2023 1:02 PM EST    Workstation ID: [id number]    XR Chest 1 View [[id number]] Collected: 12/18/23 1213     Updated: 12/18/23 1217    Narrative:      XR CHEST 1 VW    Date of Exam: 12/18/2023 12:00 PM EST    Indication: Chest Pain Triage Protocol    Comparison: None available.    Findings:  There are no airspace consolidations. Mild bibasilar discoid atelectasis. No pleural fluid. No pneumothorax. The pulmonary vasculature appears within normal  limits. The cardiac and mediastinal silhouette appear unremarkable. No acute osseous abnormality   identified.      Impression:      Impression:  Bibasilar discoid atelectasis. Otherwise clear lungs      Electronically Signed: Pelon Hess MD    12/18/2023 12:14 PM EST    Workstation ID: [id number]              Lab 12/19/23  0308   HEMOGLOBIN A1C 5.80*         Diagnostics: Reviewed    A:   Chest pain    Cirrhosis of liver    GERD (gastroesophageal reflux disease)    Essential hypertension    Type 2 diabetes mellitus    Altered mental status    End-stage renal disease on hemodialysis    Elevated brain natriuretic peptide (BNP) level     63 y.o. female with PMH significant for cirrhosis of liver, metastatic HCC, T2DM, ESRD on HD.   S/S:   Pain -abdominal pain, lower bilateral quadrants, aching (history of constipation)  -continue Tylenol 650mg PO q 4 hours prn mild pain    2. Nausea -continue Zofran 4mg IV q 6 hours prn N/V    3. Debility -PT/OT following    4. GOC -DNR/DNI -per review of chart  -called and spoke with Parviz HOGAN,   -Parviz with goal to continue HD at this time, return to hospital as needed  -not seeking further treatment for HCC, no significant decline in the past year from HCC  -Parviz agreeable to Palliative referral as goals are to return to hospital and continue dialysis    P: Introduced Palliative Care and services to patient. Patient with difficulty recalling medical history and describing symptoms. Called and spoke to SAMIRA Jj, who verbalizes understanding of patient's multiple co-morbidities. He reports goal is to continue HD and return to hospital. They are not seeking further treatment for HCC, no significant decline in the past year from HCC. If patient were to decline further HD for ESRD then patient would be eligible for hospice services. Parviz is agreeable to Palliative referral to follow patient at [facility] in order to support patient and family, continue to discuss GOC/hospice  eligibility. All questions and concerns addressed. Will continue to follow for ongoing GOC discussion.  Thank you for this consult and allowing us to participate in patient's plan of care. Palliative Care Team will continue to follow patient. Please do not hesitate to contact us regarding further symptom management or goals of care needs.  Time: 60 minutes spent reviewing medical and medication records, assessing and examining patient, discussing with family, answering questions, providing some guidance about a plan and documentation of care, and coordinating care with other healthcare members, with > 50% time spent face to face.         MAYELIN Dumont  2023      Electronically signed by Kelsy Pena APRN at 23 1434          Physical Therapy Notes (most recent note)        Lupe Linares, PT at 23 0844  Version 1 of 1         Patient Name: Jeaneth Keita  : 1960    MRN: [id number]                              Today's Date: 2023       Admit Date: 2023    Visit Dx:     ICD-10-CM ICD-9-CM   1. Acute on chronic respiratory failure with hypoxia  J96.21 518.84     799.02   2. Malignant neoplasm of liver, unspecified liver malignancy type  C22.9 155.2   3. Secondary malignancy of rib  C79.51 198.5   4. Chronic kidney disease with end stage renal failure on dialysis  N18.6 585.6    Z99.2 V45.11   5. Chest pain, unspecified type  R07.9 786.50     Patient Active Problem List   Diagnosis    ICH (intracerebral hemorrhage)    Hepatocellular carcinoma metastatic to bone s/p RXT     Hemochromatosis    Cirrhosis of liver    Chronic Hep C     ESRD (end stage renal disease)    Chronic ulcer of right foot    S/P left BKA    GERD (gastroesophageal reflux disease)    Chronic pain on Methadone    Essential hypertension    Type 2 diabetes mellitus    Right lower lobe pneumonia    Colitis    Normocytic anemia    Hypokalemia    CVA (cerebral vascular accident)    Stroke-like symptoms     History of hypoglycemia    Hypoglycemia    Chest pain    End stage renal disease on dialysis    Disorientation    Altered mental status    End-stage renal disease on hemodialysis    Elevated brain natriuretic peptide (BNP) level     Past Medical History:   Diagnosis Date    Anxiety     Cancer     liver cell carcinoma    DDD (degenerative disc disease), lumbar     Depression     Diabetes mellitus     Fibromyalgia     Hemochromatosis     Hep B w/o coma, chronic, w/o delta     Hep C w/o coma, chronic     Hypertension     Stroke     Vertigo      Past Surgical History:   Procedure Laterality Date    ARTERIOVENOUS FISTULA/SHUNT SURGERY Left 10/16/2021    Procedure: UPPER EXTREMITY ARTERIOVENOUS FISTULA FORMATION LEFT;  Surgeon: Johnny Rousseau MD;  Location: [facility];  Service: Vascular;  Laterality: Left;    BACK SURGERY      BELOW KNEE LEG AMPUTATION       SECTION      FOOT SURGERY      KNEE SURGERY      ROTATOR CUFF REPAIR      SPINAL CORD STIMULATOR IMPLANT        General Information       Row Name 2341          Physical Therapy Time and Intention    Document Type evaluation  -MB     Mode of Treatment physical therapy  -MB       Row Name 2334          General Information    Patient Profile Reviewed yes  -MB     Prior Level of Function independent:;bed mobility;ADL's;transfer;w/c or scooter  -MB     Existing Precautions/Restrictions oxygen therapy device and L/min;other (see comments)  remote L BKA, pleasantly confused  -MB     Barriers to Rehab medically complex;previous functional deficit;cognitive status  -MB       Row Name 2343          Living Environment    People in Home facility resident  -MB       Row Name 2344          Home Main Entrance    Number of Stairs, Main Entrance none  -MB       Row Name 2344          Stairs Within Home, Primary    Number of Stairs, Within Home, Primary none  -MB       Row Name 2390          Cognition     Orientation Status (Cognition) oriented to;person;verbal cues/prompts needed for orientation;place  -MB       Row Name 12/19/23 0844          Safety Issues, Functional Mobility    Safety Issues Affecting Function (Mobility) insight into deficits/self-awareness;safety precaution awareness  -MB     Impairments Affecting Function (Mobility) balance;endurance/activity tolerance;pain;strength  -MB               User Key  (r) = Recorded By, (t) = Taken By, (c) = Cosigned By      Initials Name Provider Type    Lupe Diaz, PT Physical Therapist                   Mobility       Row Name 12/19/23 1111          Bed Mobility    Bed Mobility supine-sit  -MB     Supine-Sit [place] (Bed Mobility) contact guard  -MB     Assistive Device (Bed Mobility) bed rails;head of bed elevated  -MB     Comment, (Bed Mobility) Pt. advanced to EOB w/ brief assist to raise trunk/shoulders and VCs for sequencing.  -MB       Row Name 12/19/23 1111          Transfers    Comment, (Transfers) SPT to chair w/ assist to boost to standing. Pt. demo safe hand placement and transfer technique.  -MB       Row Name 12/19/23 1111          Bed-Chair Transfer    Bed-Chair [place] (Transfers) minimum assist (75% patient effort);verbal cues  -MB     Assistive Device (Bed-Chair Transfers) other (see comments)  UE support  -MB       Row Name 12/19/23 1111          Sit-Stand Transfer    Sit-Stand [place] (Transfers) minimum assist (75% patient effort);verbal cues  -MB     Assistive Device (Sit-Stand Transfers) walker, front-wheeled  -MB       Row Name 12/19/23 1111          Gait/Stairs (Locomotion)    [place] Level (Gait) not tested  -MB     Comment, (Gait/Stairs) Pt. non-ambulatory at baseline (does not wear prosthesis).  -MB               User Key  (r) = Recorded By, (t) = Taken By, (c) = Cosigned By      Initials Name Provider Type    Lupe Diaz, PT Physical Therapist                   Obj/Interventions       Row Name  12/19/23 1114          Range of Motion Comprehensive    General Range of Motion no range of motion deficits identified  -MB       Row Name 12/19/23 1114          Strength Comprehensive (MMT)    General Manual Muscle Testing (MMT) Assessment lower extremity strength deficits identified;upper extremity strength deficits identified  -MB     Comment, General Manual Muscle Testing (MMT) Assessment BLEs and BUEs grossly 4-/5  -MB       Row Name 12/19/23 1114          Motor Skills    Therapeutic Exercise hip;knee;ankle  -MB       Row Name 12/19/23 1114          Hip (Therapeutic Exercise)    Hip (Therapeutic Exercise) isometric exercises;strengthening exercise  -MB     Hip Isometrics (Therapeutic Exercise) bilateral;gluteal sets  -MB     Hip Strengthening (Therapeutic Exercise) bilateral;aBduction;aDduction  -MB       Row Name 12/19/23 1114          Knee (Therapeutic Exercise)    Knee (Therapeutic Exercise) strengthening exercise  -MB     Knee Strengthening (Therapeutic Exercise) right;LAQ (long arc quad);10 repetitions  -MB       Row Name 12/19/23 1114          Ankle (Therapeutic Exercise)    Ankle (Therapeutic Exercise) AROM (active range of motion)  -MB     Ankle AROM (Therapeutic Exercise) right;dorsiflexion;plantarflexion;10 repetitions  -MB       Row Name 12/19/23 1114          Balance    Balance Assessment sitting static balance;standing static balance;standing dynamic balance  -MB     Static Sitting Balance independent  -MB     Position, Sitting Balance unsupported;sitting edge of bed  -MB     Static Standing Balance contact guard  -MB     Dynamic Standing Balance minimal assist  -MB     Position/Device Used, Standing Balance supported;other (see comments)  BUE support  -MB     Balance Interventions standing;sit to stand;weight shifting activity  -MB       Row Name 12/19/23 1114          Sensory Assessment (Somatosensory)    Sensory Assessment (Somatosensory) right LE;UE sensation intact  -MB     Right LE Sensory  Assessment light touch awareness;intact  -MB               User Key  (r) = Recorded By, (t) = Taken By, (c) = Cosigned By      Initials Name Provider Type    Lupe Diaz, PT Physical Therapist                   Goals/Plan       Row Name 12/19/23 1124          Bed Mobility Goal 1 (PT)    Activity/Assistive Device (Bed Mobility Goal 1, PT) sit to supine/supine to sit  -MB     [place] Level/Cues Needed (Bed Mobility Goal 1, PT) independent  -MB     Time Frame (Bed Mobility Goal 1, PT) 10 days  -MB       Row Name 12/19/23 1124          Transfer Goal 1 (PT)    Activity/Assistive Device (Transfer Goal 1, PT) sit-to-stand/stand-to-sit;bed-to-chair/chair-to-bed  -MB     [place] Level/Cues Needed (Transfer Goal 1, PT) independent  -MB     Time Frame (Transfer Goal 1, PT) 10 days  -MB       Row Name 12/19/23 1124          Patient Education Goal (PT)    Activity (Patient Education Goal, PT) HEP  -MB     [place]/Cues/Accuracy (Memory Goal 2, PT) demonstrates adequately  -MB     Time Frame (Patient Education Goal, PT) 10 days  -MB       Row Name 12/19/23 1124          Therapy Assessment/Plan (PT)    Planned Therapy Interventions (PT) balance training;bed mobility training;home exercise program;patient/family education;postural re-education;strengthening;transfer training;wheelchair management/propulsion training  -MB               User Key  (r) = Recorded By, (t) = Taken By, (c) = Cosigned By      Initials Name Provider Type    Lupe Diaz, PT Physical Therapist                   Clinical Impression       Row Name 12/19/23 1118          Pain    Pretreatment Pain Rating 5/10  -MB     Posttreatment Pain Rating 5/10  -MB     Pain Location - abdomen  -MB     Pre/Posttreatment Pain Comment RN aware and managing.  -MB     Pain Intervention(s) Ambulation/increased activity;Repositioned  -MB       Row Name 12/19/23 1118          Plan of Care Review    Plan of Care Reviewed With patient  -MB      Progress no change  -MB     Outcome Evaluation PT eval completed. Patient pleasantly confused and demo generalized weakness, mild standing balance deficits r/t L BKA, decreased activity tolerance, and is slightly below her functional baseline. Pt. completed bed mobility and transfers w/ CG/min A. Pt. would benefit from acute PT to promote return to PLOF. Recommend return to facility w/ SNF rehab.  -MB       Row Name 12/19/23 1118          Therapy Assessment/Plan (PT)    Patient/Family Therapy Goals Statement (PT) Pt. u/a to state.  -MB     Rehab Potential (PT) good, to achieve stated therapy goals  -MB     Criteria for Skilled Interventions Met (PT) yes;meets criteria;skilled treatment is necessary  -MB     Therapy Frequency (PT) daily  -MB       Row Name 12/19/23 1118          Vital Signs    Pre Systolic BP Rehab 140  -MB     Pre Treatment Diastolic BP 71  -MB     Pretreatment Heart Rate (beats/min) 87  -MB     Pre SpO2 (%) 97  -MB     O2 Delivery Pre Treatment nasal cannula  -MB     O2 Delivery Intra Treatment nasal cannula  -MB     Post SpO2 (%) 97  -MB     O2 Delivery Post Treatment nasal cannula  -MB     Pre Patient Position Supine  -MB     Intra Patient Position Standing  -MB     Post Patient Position Sitting  -MB       Row Name 12/19/23 1118          Positioning and Restraints    Pre-Treatment Position in bed  -MB     Post Treatment Position chair  -MB     In Chair notified nsg;reclined;call light within reach;encouraged to call for assist;exit alarm on;waffle cushion;on mechanical lift sling;legs elevated;R heel elevated  -MB               User Key  (r) = Recorded By, (t) = Taken By, (c) = Cosigned By      Initials Name Provider Type    Lupe Diaz, PT Physical Therapist                   Outcome Measures       Row Name 12/19/23 1125          How much help from another person do you currently need...    Turning from your back to your side while in flat bed without using bedrails? 4  -MB     Moving  from lying on back to sitting on the side of a flat bed without bedrails? 3  -MB     Moving to and from a bed to a chair (including a wheelchair)? 3  -MB     Standing up from a chair using your arms (e.g., wheelchair, bedside chair)? 3  -MB     Climbing 3-5 steps with a railing? 1  -MB     To walk in hospital room? 2  -MB     AM-PAC 6 Clicks Score (PT) 16  -MB     Highest Level of Mobility Goal 5 --> Static standing  -MB       Row Name 12/19/23 1125          Functional Assessment    Outcome Measure Options AM-PAC 6 Clicks Basic Mobility (PT)  -MB               User Key  (r) = Recorded By, (t) = Taken By, (c) = Cosigned By      Initials Name Provider Type    Lupe Diaz, PT Physical Therapist                                 Physical Therapy Education       Title: PT OT SLP Therapies (In Progress)       Topic: Physical Therapy (In Progress)       Point: Mobility training (Done)       Learning Progress Summary             Patient Acceptance, E,D, VU,NR by MB at 12/19/2023 1125                         Point: Home exercise program (Not Started)       Learner Progress:  Not documented in this visit.              Point: Body mechanics (Done)       Learning Progress Summary             Patient Acceptance, E,D, VU,NR by MB at 12/19/2023 1125                         Point: Precautions (Done)       Learning Progress Summary             Patient Acceptance, E,D, VU,NR by MB at 12/19/2023 1125                                         User Key       Initials Effective Dates Name Provider Type Discipline    MB 06/16/21 -  Lupe Linares, PT Physical Therapist PT                  PT Recommendation and Plan  Planned Therapy Interventions (PT): balance training, bed mobility training, home exercise program, patient/family education, postural re-education, strengthening, transfer training, wheelchair management/propulsion training  Plan of Care Reviewed With: patient  Progress: no change  Outcome Evaluation: PT shreya  completed. Patient pleasantly confused and demo generalized weakness, mild standing balance deficits r/t L BKA, decreased activity tolerance, and is slightly below her functional baseline. Pt. completed bed mobility and transfers w/ CG/min A. Pt. would benefit from acute PT to promote return to PLOF. Recommend return to facility w/ SNF rehab.     Time Calculation:   PT Evaluation Complexity  History, PT Evaluation Complexity: 1-2 personal factors and/or comorbidities  Examination of Body Systems (PT Eval Complexity): total of 3 or more elements  Clinical Presentation (PT Evaluation Complexity): evolving  Clinical Decision Making (PT Evaluation Complexity): low complexity  Overall Complexity (PT Evaluation Complexity): low complexity     PT Charges       Row Name 12/19/23 1126             Time Calculation    Start Time 0844  -MB      PT Received On 12/19/23  -MB      PT Goal Re-Cert Due Date 12/29/23  -MB         Untimed Charges    PT Eval/Re-eval Minutes 50  -MB         Total Minutes    Untimed Charges Total Minutes 50  -MB       Total Minutes 50  -MB                User Key  (r) = Recorded By, (t) = Taken By, (c) = Cosigned By      Initials Name Provider Type    Lupe Diaz, PT Physical Therapist                  Therapy Charges for Today       Code Description Service Date Service Provider Modifiers Qty    [id number]  PT EVAL LOW COMPLEXITY 4 12/19/2023 Lupe Linares, PT  1            PT G-Codes  Outcome Measure Options: AM-PAC 6 Clicks Basic Mobility (PT)  AM-PAC 6 Clicks Score (PT): 16  PT Discharge Summary  Anticipated Discharge Disposition (PT): skilled nursing facility    Lupe Linares PT  12/19/2023      Electronically signed by Lupe Linares, PT at 12/19/23 1129

## 2023-12-20 NOTE — CASE MANAGEMENT/SOCIAL WORK
Continued Stay Note  [facility]     Patient Name: Jeaneth Keita  MRN: [id number]  Today's Date: 12/20/2023    Admit Date: 12/18/2023    Plan: [facility]   Discharge Plan       Row Name 12/20/23 1453       Plan    Plan [facility]    Patient/Family in Agreement with Plan yes    Plan Comments Discussed patient in MDR. Possible discharge tomorrow, 12/21. Updated Felisa with [facility] at [phone]. Faxed therapy notes and progress note to [phone]. CM will continue to follow.    Final Discharge Disposition Code 01 - home or self-care                   Discharge Codes    No documentation.                 Expected Discharge Date and Time       Expected Discharge Date Expected Discharge Time    Dec 21, 2023               Fernanda Padron RN

## 2023-12-20 NOTE — PLAN OF CARE
Problem: Device-Related Complication Risk (Hemodialysis)  Goal: Safe, Effective Therapy Delivery  Outcome: Ongoing, Progressing  Intervention: Optimize Device Care and Function  Recent Flowsheet Documentation  Taken 12/20/2023 1115 by Mamie Salas, RN  Medication Review/Management:   medications reviewed   high-risk medications identified  Taken 12/20/2023 0745 by Mamie Salas, RN  Medication Review/Management:   medications reviewed   high-risk medications identified     Problem: Hemodynamic Instability (Hemodialysis)  Goal: Effective Tissue Perfusion  Outcome: Ongoing, Progressing     Problem: Infection (Hemodialysis)  Goal: Absence of Infection Signs and Symptoms  Outcome: Ongoing, Progressing   Goal Outcome Evaluation:            HD complete, blood reinfused, report given to primary RN Emily

## 2023-12-20 NOTE — PROGRESS NOTES
"   LOS: 2 days    Patient Care Team:  Lulu Amos MD as PCP - General (Hospice and Palliative Medicine)    Reason For Visit:    Subjective     On HD    Objective     Vital Signs:  Blood pressure 135/96, pulse 103, temperature 97.8 °F (36.6 °C), temperature source Oral, resp. rate 18, height 172.7 cm (68\"), weight 77.1 kg (169 lb 14.4 oz), SpO2 98%.    Flowsheet Rows      Flowsheet Row First Filed Value   Admission Height 172.7 cm (68\") Documented at 12/18/2023 1141   Admission Weight 81.6 kg (180 lb) Documented at 12/18/2023 1141            12/19 0701 - 12/20 0700  In: 118 [P.O.:118]  Out: 400 [Urine:400]    Physical Exam:     Seen on HD    Labs:    Results from last 7 days   Lab Units 12/19/23  0308 12/18/23  1156   WBC 10*3/mm3 5.51 5.29   HEMOGLOBIN g/dL 9.0* 9.3*   PLATELETS 10*3/mm3 148 160     Results from last 7 days   Lab Units 12/20/23  0749 12/19/23  0308 12/18/23  1156   SODIUM mmol/L 137 135* 135*   POTASSIUM mmol/L 4.7 4.1 4.9   CHLORIDE mmol/L 102 101 98   CO2 mmol/L 23.0 25.0 29.0   BUN mg/dL 45* 24* 40*   CREATININE mg/dL 5.43* 4.36* 5.26*   CALCIUM mg/dL 8.9 8.9 9.2   PHOSPHORUS mg/dL 4.3  --   --    MAGNESIUM mg/dL  --  2.0  --    ALBUMIN g/dL 3.5 3.4* 3.8     Results from last 7 days   Lab Units 12/20/23  0749   GLUCOSE mg/dL 140*     Results from last 7 days   Lab Units 12/19/23  0308   ALK PHOS U/L 112   BILIRUBIN mg/dL 0.4   ALT (SGPT) U/L 43*   AST (SGOT) U/L 25     Results from last 7 days   Lab Units 12/18/23  1222   PH, ARTERIAL pH units 7.449   PO2 ART mm Hg 104.0   PCO2, ARTERIAL mm Hg 41.4   HCO3 ART mmol/L 28.7*           A/P:      Patient seen on dialysis.  Tolerating well.  No complaints.          Bob Mae MD  12/20/23  11:07 EST        "

## 2023-12-20 NOTE — PLAN OF CARE
Goal Outcome Evaluation:  Plan of Care Reviewed With: patient        Progress: improving  Outcome Evaluation: Patient resting in bed with no signs and symptoms of distress noted. RA. NSR. No acute changes and needs met all throughout the shift.

## 2023-12-20 NOTE — PLAN OF CARE
"  Problem: Palliative Care  Goal: Enhanced Quality of Life  Intervention: Promote Advance Care Planning  Recent Flowsheet Documentation  Taken 12/20/2023 1300 by Ester Berman \"Dayanara\" DAMIEN MILLS  Life Transition/Adjustment: (Palliative IDT: MD, APRN, DAMIEN, RN, MDiv) --  Patient off floor to dialysis at time of attempted visit.  Tentative d/c plan is to continue dialysis, return to [facility] with community palliative care referral.       "

## 2023-12-20 NOTE — PROGRESS NOTES
[facility] Medicine Services  PROGRESS NOTE    Patient Name: Jeaneth Keita  : 1960  MRN: [id number]    Date of Admission: 2023  Primary Care Physician: Lulu Amos MD    Subjective   Subjective     CC:  AMS    HPI:  No acute events. States she feels ok. Able to tell me her name and that she is in the hospital.       Objective   Objective     Vital Signs:   Temp:  [97.4 °F (36.3 °C)-98.1 °F (36.7 °C)] 98.1 °F (36.7 °C)  Heart Rate:  [] 102  Resp:  [18] 18  BP: (101-166)/() 139/79     Physical Exam:  Constitutional: No acute distress, awake, alert  HENT: NCAT, mucous membranes moist  Respiratory: Clear to auscultation bilaterally, respiratory effort normal   Cardiovascular: RRR, no murmurs, rubs, or gallops  Gastrointestinal: Positive bowel sounds, soft, nontender, nondistended  Musculoskeletal: No bilateral ankle edema  Psychiatric: flat affect, cooperative  Neurologic: Oriented x 1, strength symmetric in all extremities, Cranial Nerves grossly intact to confrontation, speech clear  Skin: No rashes      Results Reviewed:  LAB RESULTS:      Lab 23  0308 23  1156   WBC 5.51 5.29   HEMOGLOBIN 9.0* 9.3*   HEMATOCRIT 25.8* 26.2*   PLATELETS 148 160   NEUTROS ABS 3.51 3.31   IMMATURE GRANS (ABS) 0.01 0.03   LYMPHS ABS 1.32 1.31   MONOS ABS 0.36 0.36   EOS ABS 0.28 0.25   MCV 95.2 95.3   PROCALCITONIN  --  0.13   LACTATE  --  1.2         Lab 23  0749 23  0308 23  1156   SODIUM 137 135* 135*   POTASSIUM 4.7 4.1 4.9   CHLORIDE 102 101 98   CO2 23.0 25.0 29.0   ANION GAP 12.0 9.0 8.0   BUN 45* 24* 40*   CREATININE 5.43* 4.36* 5.26*   EGFR 8.2* 10.7* 8.5*   GLUCOSE 140* 174* 302*   CALCIUM 8.9 8.9 9.2   MAGNESIUM  --  2.0  --    PHOSPHORUS 4.3  --   --    HEMOGLOBIN A1C  --  5.80*  --          Lab 23  0749 23  0308 23  1156   TOTAL PROTEIN  --  6.1 6.7   ALBUMIN 3.5 3.4* 3.8   GLOBULIN  --  2.7 2.9   ALT (SGPT)  --   43* 45*   AST (SGOT)  --  25 25   BILIRUBIN  --  0.4 0.5   ALK PHOS  --  112 138*   LIPASE  --   --  33         Lab 12/18/23  1402 12/18/23  1156   PROBNP  --  4,626.0*   HSTROP T 33* 32*                 Lab 12/18/23  1222   PH, ARTERIAL 7.449   PCO2, ARTERIAL 41.4   PO2 .0   FIO2 36   HCO3 ART 28.7*   BASE EXCESS ART 4.3*   CARBOXYHEMOGLOBIN 1.1     Brief Urine Lab Results  (Last result in the past 365 days)        Color   Clarity   Blood   Leuk Est   Nitrite   Protein   CREAT   Urine HCG        10/17/23 1406 Yellow   Clear   Negative   Negative   Negative   >=300 mg/dL (3+)                   Microbiology Results Abnormal       Procedure Component Value - Date/Time    Blood Culture - Blood, Hand, Right [[id number]]  (Normal) Collected: 12/18/23 1202    Lab Status: Preliminary result Specimen: Blood from Hand, Right Updated: 12/20/23 1246     Blood Culture No growth at 2 days    Blood Culture - Blood, Arm, Right [[id number]]  (Normal) Collected: 12/18/23 1156    Lab Status: Preliminary result Specimen: Blood from Arm, Right Updated: 12/20/23 1230     Blood Culture No growth at 2 days    COVID PRE-OP / PRE-PROCEDURE SCREENING ORDER (NO ISOLATION) - Swab, Nasopharynx [[id number]]  (Normal) Collected: 12/18/23 1146    Lab Status: Final result Specimen: Swab from Nasopharynx Updated: 12/18/23 1226    Narrative:      The following orders were created for panel order COVID PRE-OP / PRE-PROCEDURE SCREENING ORDER (NO ISOLATION) - Swab, Nasopharynx.  Procedure                               Abnormality         Status                     ---------                               -----------         ------                     COVID-19 and FLU A/B PCR...[[id number]]  Normal              Final result                 Please view results for these tests on the individual orders.    COVID-19 and FLU A/B PCR, 1 HR TAT - Swab, Nasopharynx [[id number]]  (Normal) Collected: 12/18/23 1146    Lab Status: Final result Specimen: Swab from  Nasopharynx Updated: 12/18/23 1226     COVID19 Not Detected     Influenza A PCR Not Detected     Influenza B PCR Not Detected    Narrative:      Fact sheet for providers: https://www.fda.gov/media/380129/download    Fact sheet for patients: https://www.fda.gov/media/087977/download    Test performed by PCR.            No radiology results from the last 24 hrs    Results for orders placed during the hospital encounter of 10/05/21    Adult Transthoracic Echo Complete W/ Cont if Necessary Per Protocol    Interpretation Summary  · Left ventricular ejection fraction appears to be 61 - 65%. Left ventricular systolic function is normal.  · Left atrial volume is mildly increased.      Current medications:  Scheduled Meds:aspirin, 324 mg, Oral, Once  atorvastatin, 80 mg, Oral, Nightly  calcium acetate, 667 mg, Oral, TID With Meals  carvedilol, 25 mg, Oral, BID With Meals  insulin regular, 2-9 Units, Subcutaneous, TID AC  Lidocaine, 1 patch, Transdermal, Q24H  pantoprazole, 40 mg, Oral, Daily  QUEtiapine, 25 mg, Oral, Nightly  rifAXIMin, 550 mg, Oral, Q12H  senna-docusate sodium, 2 tablet, Oral, BID  sodium chloride, 10 mL, Intravenous, Q12H  venlafaxine XR, 75 mg, Oral, Daily      Continuous Infusions:   PRN Meds:.  acetaminophen    albumin human    albumin human    senna-docusate sodium **AND** polyethylene glycol **AND** bisacodyl **AND** bisacodyl    dextrose    dextrose    glucagon (human recombinant)    lactulose    nitroglycerin    ondansetron    sodium chloride    sodium chloride    sodium chloride    Assessment & Plan   Assessment & Plan     Active Hospital Problems    Diagnosis  POA    **Chest pain [R07.9]  Yes    Elevated brain natriuretic peptide (BNP) level [R79.89]  Yes    End-stage renal disease on hemodialysis [N18.6, Z99.2]  Not Applicable    Altered mental status [R41.82]  Yes    Cirrhosis of liver [K74.60]  Yes    GERD (gastroesophageal reflux disease) [K21.9]  Yes    Essential hypertension [I10]  Yes     Type 2 diabetes mellitus [E11.9]  Yes      Resolved Hospital Problems   No resolved problems to display.        Brief Hospital Course to date:  Jeaneth Keita is a 63 y.o. female with past medical history of essential hypertension, dyslipidemia, type 2 diabetes, GERD, end-stage renal disease on hemodialysis, liver cirrhosis, chronic hep C, hepatocellular carcinoma with bone metastasis who presented to the hospital with chest pain and altered mental state    Chest pain   Dyspnea   ? Acute CHF exacerbation   Patient presented with intermittent chest pain and orthopnea.  Troponin is mildly elevated and not evolving.  EKG with no acute skin changes.  proBNP is elevated  Chest x-ray with bilateral atelectasis  Felt that her symptoms are secondary to volume overload.  Received hemodialysis on admission with ultrafiltration of 2 L with improvement.   Echo from 2021 with normal ejection fraction  Repeat echo pending     AMS   Liver Cirrhosis   Chronic hep C  Hepatocellular carcinoma with mets with lung/bone  Brain CT on admission with atrophic brain changes but no other acute intercurrent pathology  Started lactulose and rifaximin     ESRD on HD   Continue HD per nephrology recommendations.      Essential hypertension  HLD   GERD   Continue Coreg  Continue Lipitor  Continue PPI     Type II DM   A1c 5.8%  SSI      Goals of care  My partner discussed with the patient's POA the goals of care  Palliative care consulted     Expected Discharge Location and Transportation: [place]   Expected Discharge   Expected Discharge Date: 12/21/2023; Expected Discharge Time:      DVT prophylaxis:  Mechanical DVT prophylaxis orders are present.     AM-PAC 6 Clicks Score (PT): 16 (12/19/23 1125)    CODE STATUS:   Code Status and Medical Interventions:   Ordered at: 12/18/23 1454     Medical Intervention Limits:    NO intubation (DNI)    NO cardioversion     Code Status (Patient has no pulse and is not breathing):    No CPR (Do Not  Attempt to Resuscitate)     Medical Interventions (Patient has pulse or is breathing):    Limited Support       Roslyn Hernandez DO  12/20/23

## 2023-12-20 NOTE — PLAN OF CARE
Goal Outcome Evaluation:           Progress: improving  Outcome Evaluation: Pt remains on room air. NSR on monitor. C/o pain in abdomen relieved with lidocaine patch. Dialysis today. Pt disoriented to time and situation. VSS

## 2023-12-21 VITALS
DIASTOLIC BLOOD PRESSURE: 83 MMHG | SYSTOLIC BLOOD PRESSURE: 141 MMHG | WEIGHT: 169.9 LBS | BODY MASS INDEX: 25.75 KG/M2 | HEART RATE: 89 BPM | TEMPERATURE: 98 F | RESPIRATION RATE: 16 BRPM | OXYGEN SATURATION: 96 % | HEIGHT: 68 IN

## 2023-12-21 LAB
ANION GAP SERPL CALCULATED.3IONS-SCNC: 12 MMOL/L (ref 5–15)
BUN SERPL-MCNC: 41 MG/DL (ref 8–23)
BUN/CREAT SERPL: 9.9 (ref 7–25)
CALCIUM SPEC-SCNC: 9 MG/DL (ref 8.6–10.5)
CHLORIDE SERPL-SCNC: 102 MMOL/L (ref 98–107)
CO2 SERPL-SCNC: 22 MMOL/L (ref 22–29)
CREAT SERPL-MCNC: 4.14 MG/DL (ref 0.57–1)
DEPRECATED RDW RBC AUTO: 41.7 FL (ref 37–54)
EGFRCR SERPLBLD CKD-EPI 2021: 11.4 ML/MIN/1.73
ERYTHROCYTE [DISTWIDTH] IN BLOOD BY AUTOMATED COUNT: 12 % (ref 12.3–15.4)
GLUCOSE BLDC GLUCOMTR-MCNC: 186 MG/DL (ref 70–130)
GLUCOSE BLDC GLUCOMTR-MCNC: 295 MG/DL (ref 70–130)
GLUCOSE SERPL-MCNC: 140 MG/DL (ref 65–99)
HCT VFR BLD AUTO: 28.6 % (ref 34–46.6)
HGB BLD-MCNC: 10 G/DL (ref 12–15.9)
MCH RBC QN AUTO: 33.2 PG (ref 26.6–33)
MCHC RBC AUTO-ENTMCNC: 35 G/DL (ref 31.5–35.7)
MCV RBC AUTO: 95 FL (ref 79–97)
PLATELET # BLD AUTO: 161 10*3/MM3 (ref 140–450)
PMV BLD AUTO: 9.7 FL (ref 6–12)
POTASSIUM SERPL-SCNC: 4.4 MMOL/L (ref 3.5–5.2)
RBC # BLD AUTO: 3.01 10*6/MM3 (ref 3.77–5.28)
SODIUM SERPL-SCNC: 136 MMOL/L (ref 136–145)
WBC NRBC COR # BLD AUTO: 6.24 10*3/MM3 (ref 3.4–10.8)

## 2023-12-21 PROCEDURE — 99239 HOSP IP/OBS DSCHRG MGMT >30: CPT | Performed by: NURSE PRACTITIONER

## 2023-12-21 PROCEDURE — 85027 COMPLETE CBC AUTOMATED: CPT | Performed by: INTERNAL MEDICINE

## 2023-12-21 PROCEDURE — 80048 BASIC METABOLIC PNL TOTAL CA: CPT | Performed by: INTERNAL MEDICINE

## 2023-12-21 PROCEDURE — 82948 REAGENT STRIP/BLOOD GLUCOSE: CPT

## 2023-12-21 PROCEDURE — 63710000001 INSULIN REGULAR HUMAN PER 5 UNITS: Performed by: FAMILY MEDICINE

## 2023-12-21 RX ORDER — ALBUMIN (HUMAN) 12.5 G/50ML
12.5 SOLUTION INTRAVENOUS AS NEEDED
Status: CANCELLED | OUTPATIENT
Start: 2023-12-22 | End: 2023-12-22

## 2023-12-21 RX ORDER — ASPIRIN 81 MG/1
324 TABLET, CHEWABLE ORAL ONCE
Qty: 4 TABLET | Refills: 0 | Status: SHIPPED | OUTPATIENT
Start: 2023-12-21 | End: 2023-12-22

## 2023-12-21 RX ORDER — LACTULOSE 10 G/15ML
30 SOLUTION ORAL 2 TIMES DAILY PRN
Qty: 500 ML | Refills: 1 | Status: SHIPPED | OUTPATIENT
Start: 2023-12-21 | End: 2024-01-20

## 2023-12-21 RX ADMIN — CARVEDILOL 25 MG: 12.5 TABLET, FILM COATED ORAL at 08:52

## 2023-12-21 RX ADMIN — PANTOPRAZOLE SODIUM 40 MG: 40 TABLET, DELAYED RELEASE ORAL at 08:52

## 2023-12-21 RX ADMIN — CALCIUM ACETATE 667 MG: 667 CAPSULE ORAL at 12:00

## 2023-12-21 RX ADMIN — SENNOSIDES AND DOCUSATE SODIUM 2 TABLET: 8.6; 5 TABLET ORAL at 08:52

## 2023-12-21 RX ADMIN — Medication 10 ML: at 08:53

## 2023-12-21 RX ADMIN — VENLAFAXINE HYDROCHLORIDE 75 MG: 75 CAPSULE, EXTENDED RELEASE ORAL at 08:52

## 2023-12-21 RX ADMIN — INSULIN HUMAN 6 UNITS: 100 INJECTION, SOLUTION PARENTERAL at 12:00

## 2023-12-21 RX ADMIN — LIDOCAINE 1 PATCH: 4 PATCH TOPICAL at 08:52

## 2023-12-21 RX ADMIN — RIFAXIMIN 550 MG: 550 TABLET ORAL at 08:52

## 2023-12-21 RX ADMIN — INSULIN HUMAN 2 UNITS: 100 INJECTION, SOLUTION PARENTERAL at 08:51

## 2023-12-21 RX ADMIN — CALCIUM ACETATE 667 MG: 667 CAPSULE ORAL at 08:52

## 2023-12-21 NOTE — CASE MANAGEMENT/SOCIAL WORK
Case Management Discharge Note      Final Note: Patient is discharging today, 12/21. She is returning to [facility].Spoke to patient at bedside and Parviz (POA) & brother via phone. Patient and family agreeable with plan. Nurse to call report to [phone]. Per Felisa at [facility], patient may return without an evaluation (updated notes were faxed on 12/20 to [phone] per Felisa's request). A home health referral was faxed & accepted by April for SN, PT/OT. Patient's brother will transport via private vehicle. Discharge summary was faxed to Felisa at [phone]. No other needs noted.         Selected Continued Care - Admitted Since 12/18/2023       Destination    No services have been selected for the patient.                Durable Medical Equipment    No services have been selected for the patient.                Dialysis/Infusion    No services have been selected for the patient.                Home Medical Care       Service Provider Selected Services Address Phone Fax Patient Preferred    [facility] ,  Home Rehabilitation [address] [place] [address], [facility] [address] [address] [phone] --       Internal Comment last updated by Fernanda Padron RN 12/21/2023 1427    Accepted per April at [phone] @1422                         Therapy    No services have been selected for the patient.                Community Resources    No services have been selected for the patient.                Community & DME    No services have been selected for the patient.                    Transportation Services  Private: Car    Final Discharge Disposition Code: 01 - home or self-care

## 2023-12-21 NOTE — PROGRESS NOTES
" LOS: 3 days   Patient Care Team:  Lulu Amos MD as PCP - General (Hospice and Palliative Medicine)    Chief Complaint: ESRD    Subjective     Plan for HD in AM. Doing well otherwise. Jacki any cp or sob. No LE edema.    Subjective:  Symptoms:  Stable.  No shortness of breath or chest pain.        History taken from: patient    Objective     Vital Sign Min/Max for last 24 hours  Temp  Min: 97.4 °F (36.3 °C)  Max: 98.9 °F (37.2 °C)   BP  Min: 125/68  Max: 148/89   Pulse  Min: 89  Max: 115   Resp  Min: 16  Max: 18   SpO2  Min: 92 %  Max: 97 %   No data recorded   No data recorded     Flowsheet Rows      Flowsheet Row First Filed Value   Admission Height 172.7 cm (68\") Documented at 12/18/2023 1141   Admission Weight 81.6 kg (180 lb) Documented at 12/18/2023 1141            I/O this shift:  In: 525 [P.O.:525]  Out: -   I/O last 3 completed shifts:  In: 428 [P.O.:118; I.V.:310]  Out: 2970 [Urine:650]    Objective:  General Appearance:  Comfortable.    Vital signs: (most recent): Blood pressure 141/83, pulse 89, temperature 98 °F (36.7 °C), temperature source Oral, resp. rate 16, height 172.7 cm (68\"), weight 77.1 kg (169 lb 14.4 oz), SpO2 96%.  Vital signs are normal.    Output: Producing urine.    HEENT: Normal HEENT exam.    Lungs:  Normal effort and normal respiratory rate.  Breath sounds clear to auscultation.  She is not in respiratory distress.  No decreased breath sounds or wheezes.    Heart: Normal rate.  Regular rhythm.  S1 normal and S2 normal.    Abdomen: Abdomen is soft.  Bowel sounds are normal.     Extremities: Normal range of motion.  There is no dependent edema or local swelling.    Pulses: Distal pulses are intact.    Neurological: Patient is alert and oriented to person, place and time.  Normal strength.    Pupils:  Pupils are equal, round, and reactive to light.                Results Review:     I reviewed the patient's new clinical results.    WBC WBC   Date Value Ref Range Status " "  12/21/2023 6.24 3.40 - 10.80 10*3/mm3 Final   12/19/2023 5.51 3.40 - 10.80 10*3/mm3 Final      HGB Hemoglobin   Date Value Ref Range Status   12/21/2023 10.0 (L) 12.0 - 15.9 g/dL Final   12/19/2023 9.0 (L) 12.0 - 15.9 g/dL Final      HCT Hematocrit   Date Value Ref Range Status   12/21/2023 28.6 (L) 34.0 - 46.6 % Final   12/19/2023 25.8 (L) 34.0 - 46.6 % Final      Platlets No results found for: \"LABPLAT\"   MCV MCV   Date Value Ref Range Status   12/21/2023 95.0 79.0 - 97.0 fL Final   12/19/2023 95.2 79.0 - 97.0 fL Final          Sodium Sodium   Date Value Ref Range Status   12/21/2023 136 136 - 145 mmol/L Final   12/20/2023 137 136 - 145 mmol/L Final   12/19/2023 135 (L) 136 - 145 mmol/L Final      Potassium Potassium   Date Value Ref Range Status   12/21/2023 4.4 3.5 - 5.2 mmol/L Final     Comment:     Slight hemolysis detected by analyzer. Result may be falsely elevated.   12/20/2023 4.7 3.5 - 5.2 mmol/L Final   12/19/2023 4.1 3.5 - 5.2 mmol/L Final      Chloride Chloride   Date Value Ref Range Status   12/21/2023 102 98 - 107 mmol/L Final   12/20/2023 102 98 - 107 mmol/L Final   12/19/2023 101 98 - 107 mmol/L Final      CO2 CO2   Date Value Ref Range Status   12/21/2023 22.0 22.0 - 29.0 mmol/L Final   12/20/2023 23.0 22.0 - 29.0 mmol/L Final   12/19/2023 25.0 22.0 - 29.0 mmol/L Final      BUN BUN   Date Value Ref Range Status   12/21/2023 41 (H) 8 - 23 mg/dL Final   12/20/2023 45 (H) 8 - 23 mg/dL Final   12/19/2023 24 (H) 8 - 23 mg/dL Final      Creatinine Creatinine   Date Value Ref Range Status   12/21/2023 4.14 (H) 0.57 - 1.00 mg/dL Final   12/20/2023 5.43 (H) 0.57 - 1.00 mg/dL Final   12/19/2023 4.36 (H) 0.57 - 1.00 mg/dL Final      Calcium Calcium   Date Value Ref Range Status   12/21/2023 9.0 8.6 - 10.5 mg/dL Final   12/20/2023 8.9 8.6 - 10.5 mg/dL Final   12/19/2023 8.9 8.6 - 10.5 mg/dL Final      PO4 No results found for: \"CAPO4\"   Albumin Albumin   Date Value Ref Range Status   12/20/2023 3.5 3.5 - " "5.2 g/dL Final   12/19/2023 3.4 (L) 3.5 - 5.2 g/dL Final      Magnesium Magnesium   Date Value Ref Range Status   12/19/2023 2.0 1.6 - 2.4 mg/dL Final      Uric Acid No results found for: \"URICACID\"     Medication Review: yes    Assessment & Plan       Chest pain    Cirrhosis of liver    GERD (gastroesophageal reflux disease)    Essential hypertension    Type 2 diabetes mellitus    Altered mental status    End-stage renal disease on hemodialysis    Elevated brain natriuretic peptide (BNP) level      Assessment & Plan      ESRD:  On MWF christie. Missed HD on 12/18/23, due to chest pain. [facility] [address] Under [facility] care.      Anemia: Hb stable. ADRIEN on hold due to hx of metastatic liver disease. On IV iron. Last Fe sat 85 % Ferritin in 800     Volume status. Not a large fluid vega. AWG 1.5-2 kg. No significant LE edema on this visit.      BMD: Phos/PTH stable based on outpatient labs for Nov 2023     I discussed the patients findings and my recommendations with patient      Kin Pierson MD  12/21/23  13:43 EST            "

## 2023-12-21 NOTE — DISCHARGE SUMMARY
[facility] Medicine Services  DISCHARGE SUMMARY    Patient Name: Jeaneth Keita  : 1960  MRN: [id number]    Date of Admission: 2023 11:35 AM  Date of Discharge:  2023  Primary Care Physician: Lulu Amos MD    Consults       Date and Time Order Name Status Description    2023  3:16 PM Inpatient Palliative Care MD Consult Completed             Hospital Course       Active Hospital Problems    Diagnosis  POA    **Chest pain [R07.9]  Yes    Elevated brain natriuretic peptide (BNP) level [R79.89]  Yes    End-stage renal disease on hemodialysis [N18.6, Z99.2]  Not Applicable    Altered mental status [R41.82]  Yes    Cirrhosis of liver [K74.60]  Yes    GERD (gastroesophageal reflux disease) [K21.9]  Yes    Essential hypertension [I10]  Yes    Type 2 diabetes mellitus [E11.9]  Yes      Resolved Hospital Problems   No resolved problems to display.          Hospital Course:  Jeaneth Keita is a 63 y.o. female   with past medical history of essential hypertension, dyslipidemia, type 2 diabetes, GERD, end-stage renal disease on hemodialysis, liver cirrhosis, chronic hep C, hepatocellular carcinoma with bone metastasis who presented to the hospital with chest pain and altered mental state    Chest pain   Dyspnea   Patient presented with intermittent chest pain and orthopnea.  Troponin is mildly elevated and not evolving.  EKG with no acute skin changes.  proBNP is elevated  Chest x-ray with bilateral atelectasis  Felt that her symptoms are secondary to volume overload.  Received hemodialysis on admission with ultrafiltration of 2 L with improvement. Missed HD on  due to symptoms   Echo from  with normal ejection fraction  Repeat echo similar, no acute heart failure seen      AMS   Liver Cirrhosis   Chronic hep C  Hepatocellular carcinoma with mets with lung/bone  Brain CT on admission with atrophic brain changes but no other acute intercurrent  pathology  Started lactulose and rifaximin  Mentation back to baseline      ESRD on HD   Continue HD per nephrology recommendations.      Essential hypertension  HLD   GERD   Continue Coreg  Continue Lipitor  Continue PPI     Type II DM   A1c 5.8%  SSI      Goals of care  My partner discussed with the patient's POA the goals of care  Palliative care was unable to complete consultation this admission     Discharge Follow Up Recommendations for outpatient labs/diagnostics:   PCP 1 week   Nephrology as scheduled       Day of Discharge     HPI:   Doing well, no new concerns and very eager to go home today. No f/c, n/v/d, soa or cp. No pain. Having daily BM's. Clear mentation, at baseline.     Review of Systems  All other systems negative     Vital Signs:   Temp:  [97.4 °F (36.3 °C)-98.9 °F (37.2 °C)] 98 °F (36.7 °C)  Heart Rate:  [] 89  Resp:  [16-18] 16  BP: (125-148)/() 141/83      Physical Exam:  Constitutional: No acute distress, awake, alert  HENT: NCAT, mucous membranes moist  Respiratory: Clear to auscultation bilaterally, respiratory effort normal   Cardiovascular: RRR, no murmurs, rubs, or gallops  Gastrointestinal: Positive bowel sounds, soft, nontender, nondistended  Musculoskeletal: No right ankle edema; left BKA  Psychiatric: Appropriate affect, cooperative  Neurologic: Oriented x 3, KRISHNAMURTHY, speech clear  Skin: No rashes     Pertinent  and/or Most Recent Results     LAB RESULTS:      Lab 12/21/23  0431 12/19/23  0308 12/18/23  1156   WBC 6.24 5.51 5.29   HEMOGLOBIN 10.0* 9.0* 9.3*   HEMATOCRIT 28.6* 25.8* 26.2*   PLATELETS 161 148 160   NEUTROS ABS  --  3.51 3.31   IMMATURE GRANS (ABS)  --  0.01 0.03   LYMPHS ABS  --  1.32 1.31   MONOS ABS  --  0.36 0.36   EOS ABS  --  0.28 0.25   MCV 95.0 95.2 95.3   PROCALCITONIN  --   --  0.13   LACTATE  --   --  1.2         Lab 12/21/23  0431 12/20/23  0749 12/19/23  0308 12/18/23  1156   SODIUM 136 137 135* 135*   POTASSIUM 4.4 4.7 4.1 4.9   CHLORIDE 102 102  101 98   CO2 22.0 23.0 25.0 29.0   ANION GAP 12.0 12.0 9.0 8.0   BUN 41* 45* 24* 40*   CREATININE 4.14* 5.43* 4.36* 5.26*   EGFR 11.4* 8.2* 10.7* 8.5*   GLUCOSE 140* 140* 174* 302*   CALCIUM 9.0 8.9 8.9 9.2   MAGNESIUM  --   --  2.0  --    PHOSPHORUS  --  4.3  --   --    HEMOGLOBIN A1C  --   --  5.80*  --          Lab 12/20/23  0749 12/19/23  0308 12/18/23  1156   TOTAL PROTEIN  --  6.1 6.7   ALBUMIN 3.5 3.4* 3.8   GLOBULIN  --  2.7 2.9   ALT (SGPT)  --  43* 45*   AST (SGOT)  --  25 25   BILIRUBIN  --  0.4 0.5   ALK PHOS  --  112 138*   LIPASE  --   --  33         Lab 12/18/23  1402 12/18/23  1156   PROBNP  --  4,626.0*   HSTROP T 33* 32*                 Lab 12/18/23  1222   PH, ARTERIAL 7.449   PCO2, ARTERIAL 41.4   PO2 .0   FIO2 36   HCO3 ART 28.7*   BASE EXCESS ART 4.3*   CARBOXYHEMOGLOBIN 1.1     Brief Urine Lab Results  (Last result in the past 365 days)        Color   Clarity   Blood   Leuk Est   Nitrite   Protein   CREAT   Urine HCG        10/17/23 1406 Yellow   Clear   Negative   Negative   Negative   >=300 mg/dL (3+)                 Microbiology Results (last 10 days)       Procedure Component Value - Date/Time    Blood Culture - Blood, Hand, Right [[id number]]  (Normal) Collected: 12/18/23 1202    Lab Status: Preliminary result Specimen: Blood from Hand, Right Updated: 12/21/23 1245     Blood Culture No growth at 3 days    Blood Culture - Blood, Arm, Right [[id number]]  (Normal) Collected: 12/18/23 1156    Lab Status: Preliminary result Specimen: Blood from Arm, Right Updated: 12/21/23 1231     Blood Culture No growth at 3 days    COVID PRE-OP / PRE-PROCEDURE SCREENING ORDER (NO ISOLATION) - Swab, Nasopharynx [[id number]]  (Normal) Collected: 12/18/23 1146    Lab Status: Final result Specimen: Swab from Nasopharynx Updated: 12/18/23 1226    Narrative:      The following orders were created for panel order COVID PRE-OP / PRE-PROCEDURE SCREENING ORDER (NO ISOLATION) - Swab, Nasopharynx.  Procedure                                Abnormality         Status                     ---------                               -----------         ------                     COVID-19 and FLU A/B PCR...[[id number]]  Normal              Final result                 Please view results for these tests on the individual orders.    COVID-19 and FLU A/B PCR, 1 HR TAT - Swab, Nasopharynx [[id number]]  (Normal) Collected: 12/18/23 1146    Lab Status: Final result Specimen: Swab from Nasopharynx Updated: 12/18/23 1226     COVID19 Not Detected     Influenza A PCR Not Detected     Influenza B PCR Not Detected    Narrative:      Fact sheet for providers: https://www.fda.gov/media/354548/download    Fact sheet for patients: https://www.fda.gov/media/505451/download    Test performed by PCR.            Adult Transthoracic Echo Complete W/ Cont if Necessary Per Protocol    Result Date: 12/20/2023    Left ventricular systolic function is normal. Calculated left ventricular EF of 63%   Left ventricular diastolic function was normal.   Normal right ventricular size and systolic function   No significant valvular abnormality   No pericardial effusion   Compared to prior echocardiogram from 10/6/2021, there is no significant change     CT Chest Without Contrast Diagnostic    Result Date: 12/18/2023  CT CHEST WO CONTRAST DIAGNOSTIC Date of Exam: 12/18/2023 11:23 AM CST Indication: Shortness of breath, hypoxia, renal failure. Comparison: Chest x-ray 12/18/2023 CT abdomen pelvis 6/27/2022 Technique: Axial CT images were obtained of the chest without contrast administration.  Reconstructed coronal and sagittal images were also obtained. Automated exposure control and iterative construction methods were used. Findings: Hilum and Mediastinum: No pathologically enlarged lymph nodes.  Normal heart size.   No pericardial effusion.  Unremarkable thoracic aorta and pulmonary arteries. There is coronary artery calcification. Mitral annulus calcification is noted.  There are bilateral thyroid nodules measuring 1.7 x 1.8 cm on the right and 1.0 x 1.1 cm on the left. Lung Parenchyma and Pleura: Evaluation of lung parenchyma demonstrates consolidation at the medial right lung base which appears unchanged from prior studies likely rounded atelectasis and/or scarring. No new focal opacity identified. There is a 3 mm nodule in the right upper lung (image 34 of series 2). Bibasilar atelectasis and/or scarring is noted. Upper Abdomen: Evaluation of the upper abdomen demonstrates a peripherally calcified lesion either within or effacing the inferior vena cava at the portal confluence (image 85 of series 2). This measures 3.3 x 3.5 cm and was present on previous examination felt to be within the caudate lobe. Additional findings include cholelithiasis. Soft tissues: Unremarkable. Osseous structures: There is an expansile soft tissue involving the posterior right seventh rib near the costovertebral junction (image 47 of series 4). Cortical destruction with soft tissue component is noted measuring 1.0 x 3.4 cm. This is new from prior CT dated 6/27/2022.     Impression: 1. New expansile osseous lesion involving the posterior right seventh rib near the costovertebral junction with cortical destruction noted. 2. Rounded atelectasis at the medial right lung base. No acute cardiopulmonary process. 3. 3 mm nodule in the right upper lung. 4. Peripherally calcified mass effacing the inferior vena cava potentially in the left caudate lobe unchanged. Electronically Signed: Tameka Bravo MD  12/18/2023 12:14 PM CST  Workstation ID: [id number]    CT Head Without Contrast    Result Date: 12/18/2023  CT HEAD WO CONTRAST Date of Exam: 12/18/2023 12:23 PM EST Indication: AMS. Comparison: None available. Technique: Axial CT images were obtained of the head without contrast administration.  Automated exposure control and iterative construction methods were used. Findings: There is no evidence of  hemorrhage. There is no mass effect or midline shift. Diffuse brain atrophy. Periventricular hypodense areas compatible with chronic small vessel ischemia There is no extracerebral collection. Ventricles are normal in size and configuration for patient's stated age. Posterior fossa is within normal limits. Calvarium and skull base appear intact.  Visualized sinuses show no air fluid levels. Visualized orbits are unremarkable.     Impression: Brain atrophy with chronic small vessel ischemic changes No acute intracranial abnormality Electronically Signed: Pelon Hess MD  12/18/2023 1:02 PM EST  Workstation ID: [id number]    XR Chest 1 View    Result Date: 12/18/2023  XR CHEST 1 VW Date of Exam: 12/18/2023 12:00 PM EST Indication: Chest Pain Triage Protocol Comparison: None available. Findings: There are no airspace consolidations. Mild bibasilar discoid atelectasis. No pleural fluid. No pneumothorax. The pulmonary vasculature appears within normal limits. The cardiac and mediastinal silhouette appear unremarkable. No acute osseous abnormality identified.     Impression: Bibasilar discoid atelectasis. Otherwise clear lungs Electronically Signed: Pelon Hess MD  12/18/2023 12:14 PM EST  Workstation ID: [id number]     Results for orders placed during the hospital encounter of 10/05/21    Duplex Initial Vein Mapping Hemodialysis Access Unilateral Left or Right CAR    Interpretation Summary  · The left cephalic vein is patent and of adequate size in the upper arm.  · The left cephalic vein is patent and of adequate size in the forearm.      Results for orders placed during the hospital encounter of 10/05/21    Duplex Initial Vein Mapping Hemodialysis Access Unilateral Left or Right CAR    Interpretation Summary  · The left cephalic vein is patent and of adequate size in the upper arm.  · The left cephalic vein is patent and of adequate size in the forearm.      Results for orders placed during the hospital encounter of  12/18/23    Adult Transthoracic Echo Complete W/ Cont if Necessary Per Protocol    Interpretation Summary    Left ventricular systolic function is normal. Calculated left ventricular EF of 63%    Left ventricular diastolic function was normal.    Normal right ventricular size and systolic function    No significant valvular abnormality    No pericardial effusion    Compared to prior echocardiogram from 10/6/2021, there is no significant change      Plan for Follow-up of Pending Labs/Results: PCP   Pending Labs       Order Current Status    Blood Culture - Blood, Arm, Right Preliminary result    Blood Culture - Blood, Hand, Right Preliminary result          Discharge Details        Discharge Medications        New Medications        Instructions Start Date   aspirin 81 MG chewable tablet   324 mg, Oral, Once      lactulose 10 GM/15ML solution  Commonly known as: CHRONULAC   30 g, Oral, 2 Times Daily PRN      riFAXIMin 550 MG tablet  Commonly known as: XIFAXAN   550 mg, Oral, Every 12 Hours Scheduled             Changes to Medications        Instructions Start Date   calcium acetate 667 MG capsule capsule  Commonly known as: PHOS BINDER)  What changed: when to take this   667 mg, Oral, 3 Times Daily With Meals      venlafaxine XR 75 MG 24 hr capsule  Commonly known as: EFFEXOR-XR  What changed: how much to take   75 mg, Oral, Daily             Continue These Medications        Instructions Start Date   acetaminophen 325 MG tablet  Commonly known as: TYLENOL   650 mg, Oral, Every 6 Hours PRN      atorvastatin 80 MG tablet  Commonly known as: LIPITOR   80 mg, Oral, Nightly      carvedilol 25 MG tablet  Commonly known as: COREG   25 mg, Oral, 2 Times Daily With Meals      pantoprazole 40 MG EC tablet  Commonly known as: PROTONIX   40 mg, Oral, Daily      QUEtiapine 25 MG tablet  Commonly known as: SEROquel   25 mg, Oral, Nightly      vitamin D 1.25 MG (53087 UT) capsule capsule  Commonly known as: ERGOCALCIFEROL   50,000  Units, Oral, Weekly, Sunday             Stop These Medications      amLODIPine 10 MG tablet  Commonly known as: NORVASC     GaviLAX 17 g packet  Generic drug: polyethylene glycol     Vitamin B Complex-C capsule              Allergies   Allergen Reactions    Sulfa Antibiotics          Discharge Disposition:  Skilled Nursing Facility (DC - External)    Diet:  Hospital:  Diet Order   Procedures    Diet: Renal Diets; Low Phosphorus; Texture: Regular Texture (IDDSI 7); Fluid Consistency: Thin (IDDSI 0)               CODE STATUS:    Code Status and Medical Interventions:   Ordered at: 12/18/23 1455     Medical Intervention Limits:    NO intubation (DNI)    NO cardioversion     Code Status (Patient has no pulse and is not breathing):    No CPR (Do Not Attempt to Resuscitate)     Medical Interventions (Patient has pulse or is breathing):    Limited Support       No future appointments.    Additional Instructions for the Follow-ups that You Need to Schedule       Ambulatory Referral to Home Health   As directed      Face to Face Visit Date: 12/21/2023   Follow-up provider for Plan of Care?: I treated the patient in an acute care facility and will not continue treatment after discharge.   Follow-up provider: SAFIA JOSE [[id number]]   Reason/Clinical Findings: ESRD, HTN, DM type 2, cirrhosis of the liver   Describe mobility limitations that make leaving home difficult: Wheelchair bound. Impaired functional mobility, balance, gait and endurance   Nursing/Therapeutic Services Requested: Skilled Nursing Physical Therapy Occupational Therapy   Skilled nursing orders: Cardiopulmonary assessments Neurovascular assessments Medication education   PT orders: Therapeutic exercise Transfer training Strengthening Home safety assessment   Occupational orders: Activities of daily living Energy conservation Strengthening Home safety assessment   Frequency: 1 Week 1                      MAYELIN Lee  12/21/23      Time Spent on  Discharge:  I spent  45  minutes on this discharge activity which included: face-to-face encounter with the patient, reviewing the data in the system, coordination of the care with the nursing staff as well as consultants, documentation, and entering orders.

## 2023-12-21 NOTE — DISCHARGE PLACEMENT REQUEST
"Jeaneth Tatum (63 y.o. Female)     Fernanda Padron RN  [phone]      Date of Birth   1960    Social Security Number       Address   [address] [place], [place] [address]    Home Phone   [phone]    MRN   [id number]       Tenriism   Alevism    Marital Status                               Admission Date   12/18/23    Admission Type   Emergency    Admitting Provider   Roslyn Hernandez DO    Attending Provider   Roslyn Hernandez DO    Department, Room/Bed   [facility] 6B, N625/1       Discharge Date       Discharge Disposition   Skilled Nursing Facility (DC - External)    Discharge Destination                                 Attending Provider: Roslyn Hernandez DO    Allergies: Sulfa Antibiotics    Isolation: None   Infection: None   Code Status: No CPR    Ht: 172.7 cm (68\")   Wt: 77.1 kg (169 lb 14.4 oz)    Admission Cmt: None   Principal Problem: Chest pain [R07.9]                   Active Insurance as of 12/18/2023       Primary Coverage       Payor Plan Insurance Group Employer/Plan Group    MEDICARE MEDICARE A & B        Payor Plan Address Payor Plan Phone Number Payor Plan Fax Number Effective Dates    [address] [phone]  11/1/2023 - None Entered    [facility] [id number]         Subscriber Name Subscriber Birth Date Member ID       JEANETH TATUM 1960 [id number]               Secondary Coverage       Payor Plan Insurance Group Employer/Plan Group    [facility] [id number]       Payor Plan Address Payor Plan Phone Number Payor Plan Fax Number Effective Dates    [address]   1/1/2005 - None Entered    [facility] [id number]         Subscriber Name Subscriber Birth Date Member ID       JEANETH TATUM 1960 [id number]                     Emergency Contacts        (Rel.) Home Phone Work Phone Mobile Phone    CHLOÉ TREVIÑO(POA) (Other) [phone] -- [phone]    HAILEY ROJO (Brother) [phone] -- [phone]    " NORMA TREVIÑO (Sister) [phone] -- [phone]                 Discharge Summary        Shanita Thompson, APRN at 23 1401              [facility]  DISCHARGE SUMMARY    Patient Name: Jeaneth Keita  : 1960  MRN: [id number]    Date of Admission: 2023 11:35 AM  Date of Discharge:  2023  Primary Care Physician: Lulu Amos MD    Consults       Date and Time Order Name Status Description    2023  3:16 PM Inpatient Palliative Care MD Consult Completed             Hospital Course       Active Hospital Problems    Diagnosis  POA    **Chest pain [R07.9]  Yes    Elevated brain natriuretic peptide (BNP) level [R79.89]  Yes    End-stage renal disease on hemodialysis [N18.6, Z99.2]  Not Applicable    Altered mental status [R41.82]  Yes    Cirrhosis of liver [K74.60]  Yes    GERD (gastroesophageal reflux disease) [K21.9]  Yes    Essential hypertension [I10]  Yes    Type 2 diabetes mellitus [E11.9]  Yes      Resolved Hospital Problems   No resolved problems to display.          Hospital Course:  Jeaneth Keita is a 63 y.o. female   with past medical history of essential hypertension, dyslipidemia, type 2 diabetes, GERD, end-stage renal disease on hemodialysis, liver cirrhosis, chronic hep C, hepatocellular carcinoma with bone metastasis who presented to the hospital with chest pain and altered mental state    Chest pain   Dyspnea   Patient presented with intermittent chest pain and orthopnea.  Troponin is mildly elevated and not evolving.  EKG with no acute skin changes.  proBNP is elevated  Chest x-ray with bilateral atelectasis  Felt that her symptoms are secondary to volume overload.  Received hemodialysis on admission with ultrafiltration of 2 L with improvement. Missed HD on  due to symptoms   Echo from  with normal ejection fraction  Repeat echo similar, no acute heart failure seen      AMS   Liver Cirrhosis   Chronic hep  C  Hepatocellular carcinoma with mets with lung/bone  Brain CT on admission with atrophic brain changes but no other acute intercurrent pathology  Started lactulose and rifaximin  Mentation back to baseline      ESRD on HD   Continue HD per nephrology recommendations.      Essential hypertension  HLD   GERD   Continue Coreg  Continue Lipitor  Continue PPI     Type II DM   A1c 5.8%  SSI      Goals of care  My partner discussed with the patient's POA the goals of care  Palliative care was unable to complete consultation this admission     Discharge Follow Up Recommendations for outpatient labs/diagnostics:   PCP 1 week   Nephrology as scheduled       Day of Discharge     HPI:   Doing well, no new concerns and very eager to go home today. No f/c, n/v/d, soa or cp. No pain. Having daily BM's. Clear mentation, at baseline.     Review of Systems  All other systems negative     Vital Signs:   Temp:  [97.4 °F (36.3 °C)-98.9 °F (37.2 °C)] 98 °F (36.7 °C)  Heart Rate:  [] 89  Resp:  [16-18] 16  BP: (125-148)/() 141/83      Physical Exam:  Constitutional: No acute distress, awake, alert  HENT: NCAT, mucous membranes moist  Respiratory: Clear to auscultation bilaterally, respiratory effort normal   Cardiovascular: RRR, no murmurs, rubs, or gallops  Gastrointestinal: Positive bowel sounds, soft, nontender, nondistended  Musculoskeletal: No right ankle edema; left BKA  Psychiatric: Appropriate affect, cooperative  Neurologic: Oriented x 3, KRISHNAMURTHY, speech clear  Skin: No rashes     Pertinent  and/or Most Recent Results     LAB RESULTS:      Lab 12/21/23  0431 12/19/23  0308 12/18/23  1156   WBC 6.24 5.51 5.29   HEMOGLOBIN 10.0* 9.0* 9.3*   HEMATOCRIT 28.6* 25.8* 26.2*   PLATELETS 161 148 160   NEUTROS ABS  --  3.51 3.31   IMMATURE GRANS (ABS)  --  0.01 0.03   LYMPHS ABS  --  1.32 1.31   MONOS ABS  --  0.36 0.36   EOS ABS  --  0.28 0.25   MCV 95.0 95.2 95.3   PROCALCITONIN  --   --  0.13   LACTATE  --   --  1.2         Lab  12/21/23  0431 12/20/23  0749 12/19/23  0308 12/18/23  1156   SODIUM 136 137 135* 135*   POTASSIUM 4.4 4.7 4.1 4.9   CHLORIDE 102 102 101 98   CO2 22.0 23.0 25.0 29.0   ANION GAP 12.0 12.0 9.0 8.0   BUN 41* 45* 24* 40*   CREATININE 4.14* 5.43* 4.36* 5.26*   EGFR 11.4* 8.2* 10.7* 8.5*   GLUCOSE 140* 140* 174* 302*   CALCIUM 9.0 8.9 8.9 9.2   MAGNESIUM  --   --  2.0  --    PHOSPHORUS  --  4.3  --   --    HEMOGLOBIN A1C  --   --  5.80*  --          Lab 12/20/23  0749 12/19/23  0308 12/18/23  1156   TOTAL PROTEIN  --  6.1 6.7   ALBUMIN 3.5 3.4* 3.8   GLOBULIN  --  2.7 2.9   ALT (SGPT)  --  43* 45*   AST (SGOT)  --  25 25   BILIRUBIN  --  0.4 0.5   ALK PHOS  --  112 138*   LIPASE  --   --  33         Lab 12/18/23  1402 12/18/23  1156   PROBNP  --  4,626.0*   HSTROP T 33* 32*                 Lab 12/18/23  1222   PH, ARTERIAL 7.449   PCO2, ARTERIAL 41.4   PO2 .0   FIO2 36   HCO3 ART 28.7*   BASE EXCESS ART 4.3*   CARBOXYHEMOGLOBIN 1.1     Brief Urine Lab Results  (Last result in the past 365 days)        Color   Clarity   Blood   Leuk Est   Nitrite   Protein   CREAT   Urine HCG        10/17/23 1406 Yellow   Clear   Negative   Negative   Negative   >=300 mg/dL (3+)                 Microbiology Results (last 10 days)       Procedure Component Value - Date/Time    Blood Culture - Blood, Hand, Right [[id number]]  (Normal) Collected: 12/18/23 1202    Lab Status: Preliminary result Specimen: Blood from Hand, Right Updated: 12/21/23 1245     Blood Culture No growth at 3 days    Blood Culture - Blood, Arm, Right [[id number]]  (Normal) Collected: 12/18/23 1156    Lab Status: Preliminary result Specimen: Blood from Arm, Right Updated: 12/21/23 1231     Blood Culture No growth at 3 days    COVID PRE-OP / PRE-PROCEDURE SCREENING ORDER (NO ISOLATION) - Swab, Nasopharynx [[id number]]  (Normal) Collected: 12/18/23 1146    Lab Status: Final result Specimen: Swab from Nasopharynx Updated: 12/18/23 1226    Narrative:      The following  orders were created for panel order COVID PRE-OP / PRE-PROCEDURE SCREENING ORDER (NO ISOLATION) - Swab, Nasopharynx.  Procedure                               Abnormality         Status                     ---------                               -----------         ------                     COVID-19 and FLU A/B PCR...[[id number]]  Normal              Final result                 Please view results for these tests on the individual orders.    COVID-19 and FLU A/B PCR, 1 HR TAT - Swab, Nasopharynx [[id number]]  (Normal) Collected: 12/18/23 1146    Lab Status: Final result Specimen: Swab from Nasopharynx Updated: 12/18/23 1226     COVID19 Not Detected     Influenza A PCR Not Detected     Influenza B PCR Not Detected    Narrative:      Fact sheet for providers: https://www.fda.gov/media/777965/download    Fact sheet for patients: https://www.fda.gov/media/973864/download    Test performed by PCR.            Adult Transthoracic Echo Complete W/ Cont if Necessary Per Protocol    Result Date: 12/20/2023    Left ventricular systolic function is normal. Calculated left ventricular EF of 63%   Left ventricular diastolic function was normal.   Normal right ventricular size and systolic function   No significant valvular abnormality   No pericardial effusion   Compared to prior echocardiogram from 10/6/2021, there is no significant change     CT Chest Without Contrast Diagnostic    Result Date: 12/18/2023  CT CHEST WO CONTRAST DIAGNOSTIC Date of Exam: 12/18/2023 11:23 AM CST Indication: Shortness of breath, hypoxia, renal failure. Comparison: Chest x-ray 12/18/2023 CT abdomen pelvis 6/27/2022 Technique: Axial CT images were obtained of the chest without contrast administration.  Reconstructed coronal and sagittal images were also obtained. Automated exposure control and iterative construction methods were used. Findings: Hilum and Mediastinum: No pathologically enlarged lymph nodes.  Normal heart size.   No pericardial effusion.   Unremarkable thoracic aorta and pulmonary arteries. There is coronary artery calcification. Mitral annulus calcification is noted. There are bilateral thyroid nodules measuring 1.7 x 1.8 cm on the right and 1.0 x 1.1 cm on the left. Lung Parenchyma and Pleura: Evaluation of lung parenchyma demonstrates consolidation at the medial right lung base which appears unchanged from prior studies likely rounded atelectasis and/or scarring. No new focal opacity identified. There is a 3 mm nodule in the right upper lung (image 34 of series 2). Bibasilar atelectasis and/or scarring is noted. Upper Abdomen: Evaluation of the upper abdomen demonstrates a peripherally calcified lesion either within or effacing the inferior vena cava at the portal confluence (image 85 of series 2). This measures 3.3 x 3.5 cm and was present on previous examination felt to be within the caudate lobe. Additional findings include cholelithiasis. Soft tissues: Unremarkable. Osseous structures: There is an expansile soft tissue involving the posterior right seventh rib near the costovertebral junction (image 47 of series 4). Cortical destruction with soft tissue component is noted measuring 1.0 x 3.4 cm. This is new from prior CT dated 6/27/2022.     Impression: 1. New expansile osseous lesion involving the posterior right seventh rib near the costovertebral junction with cortical destruction noted. 2. Rounded atelectasis at the medial right lung base. No acute cardiopulmonary process. 3. 3 mm nodule in the right upper lung. 4. Peripherally calcified mass effacing the inferior vena cava potentially in the left caudate lobe unchanged. Electronically Signed: Tameka Bravo MD  12/18/2023 12:14 PM CST  Workstation ID: [id number]    CT Head Without Contrast    Result Date: 12/18/2023  CT HEAD WO CONTRAST Date of Exam: 12/18/2023 12:23 PM EST Indication: AMS. Comparison: None available. Technique: Axial CT images were obtained of the head without contrast  administration.  Automated exposure control and iterative construction methods were used. Findings: There is no evidence of hemorrhage. There is no mass effect or midline shift. Diffuse brain atrophy. Periventricular hypodense areas compatible with chronic small vessel ischemia There is no extracerebral collection. Ventricles are normal in size and configuration for patient's stated age. Posterior fossa is within normal limits. Calvarium and skull base appear intact.  Visualized sinuses show no air fluid levels. Visualized orbits are unremarkable.     Impression: Brain atrophy with chronic small vessel ischemic changes No acute intracranial abnormality Electronically Signed: Pelon Hess MD  12/18/2023 1:02 PM EST  Workstation ID: [id number]    XR Chest 1 View    Result Date: 12/18/2023  XR CHEST 1 VW Date of Exam: 12/18/2023 12:00 PM EST Indication: Chest Pain Triage Protocol Comparison: None available. Findings: There are no airspace consolidations. Mild bibasilar discoid atelectasis. No pleural fluid. No pneumothorax. The pulmonary vasculature appears within normal limits. The cardiac and mediastinal silhouette appear unremarkable. No acute osseous abnormality identified.     Impression: Bibasilar discoid atelectasis. Otherwise clear lungs Electronically Signed: Pelon Hess MD  12/18/2023 12:14 PM EST  Workstation ID: [id number]     Results for orders placed during the hospital encounter of 10/05/21    Duplex Initial Vein Mapping Hemodialysis Access Unilateral Left or Right CAR    Interpretation Summary  · The left cephalic vein is patent and of adequate size in the upper arm.  · The left cephalic vein is patent and of adequate size in the forearm.      Results for orders placed during the hospital encounter of 10/05/21    Duplex Initial Vein Mapping Hemodialysis Access Unilateral Left or Right CAR    Interpretation Summary  · The left cephalic vein is patent and of adequate size in the upper arm.  · The left  cephalic vein is patent and of adequate size in the forearm.      Results for orders placed during the hospital encounter of 12/18/23    Adult Transthoracic Echo Complete W/ Cont if Necessary Per Protocol    Interpretation Summary    Left ventricular systolic function is normal. Calculated left ventricular EF of 63%    Left ventricular diastolic function was normal.    Normal right ventricular size and systolic function    No significant valvular abnormality    No pericardial effusion    Compared to prior echocardiogram from 10/6/2021, there is no significant change      Plan for Follow-up of Pending Labs/Results: PCP   Pending Labs       Order Current Status    Blood Culture - Blood, Arm, Right Preliminary result    Blood Culture - Blood, Hand, Right Preliminary result          Discharge Details        Discharge Medications        New Medications        Instructions Start Date   aspirin 81 MG chewable tablet   324 mg, Oral, Once      lactulose 10 GM/15ML solution  Commonly known as: CHRONULAC   30 g, Oral, 2 Times Daily PRN      riFAXIMin 550 MG tablet  Commonly known as: XIFAXAN   550 mg, Oral, Every 12 Hours Scheduled             Changes to Medications        Instructions Start Date   calcium acetate 667 MG capsule capsule  Commonly known as: PHOS BINDER)  What changed: when to take this   667 mg, Oral, 3 Times Daily With Meals      venlafaxine XR 75 MG 24 hr capsule  Commonly known as: EFFEXOR-XR  What changed: how much to take   75 mg, Oral, Daily             Continue These Medications        Instructions Start Date   acetaminophen 325 MG tablet  Commonly known as: TYLENOL   650 mg, Oral, Every 6 Hours PRN      atorvastatin 80 MG tablet  Commonly known as: LIPITOR   80 mg, Oral, Nightly      carvedilol 25 MG tablet  Commonly known as: COREG   25 mg, Oral, 2 Times Daily With Meals      pantoprazole 40 MG EC tablet  Commonly known as: PROTONIX   40 mg, Oral, Daily      QUEtiapine 25 MG tablet  Commonly known as:  SEROquel   25 mg, Oral, Nightly      vitamin D 1.25 MG (09611 UT) capsule capsule  Commonly known as: ERGOCALCIFEROL   50,000 Units, Oral, Weekly, Sunday             Stop These Medications      amLODIPine 10 MG tablet  Commonly known as: NORVASC     GaviLAX 17 g packet  Generic drug: polyethylene glycol     Vitamin B Complex-C capsule              Allergies   Allergen Reactions    Sulfa Antibiotics          Discharge Disposition:  Skilled Nursing Facility (DC - External)    Diet:  Hospital:  Diet Order   Procedures    Diet: Renal Diets; Low Phosphorus; Texture: Regular Texture (IDDSI 7); Fluid Consistency: Thin (IDDSI 0)               CODE STATUS:    Code Status and Medical Interventions:   Ordered at: 12/18/23 1455     Medical Intervention Limits:    NO intubation (DNI)    NO cardioversion     Code Status (Patient has no pulse and is not breathing):    No CPR (Do Not Attempt to Resuscitate)     Medical Interventions (Patient has pulse or is breathing):    Limited Support       No future appointments.    Additional Instructions for the Follow-ups that You Need to Schedule       Ambulatory Referral to Home Health   As directed      Face to Face Visit Date: 12/21/2023   Follow-up provider for Plan of Care?: I treated the patient in an acute care facility and will not continue treatment after discharge.   Follow-up provider: SAFIA JOSE [[id number]]   Reason/Clinical Findings: ESRD, HTN, DM type 2, cirrhosis of the liver   Describe mobility limitations that make leaving home difficult: Wheelchair bound. Impaired functional mobility, balance, gait and endurance   Nursing/Therapeutic Services Requested: Skilled Nursing Physical Therapy Occupational Therapy   Skilled nursing orders: Cardiopulmonary assessments Neurovascular assessments Medication education   PT orders: Therapeutic exercise Transfer training Strengthening Home safety assessment   Occupational orders: Activities of daily living Energy conservation  Strengthening Home safety assessment   Frequency: 1 Week 1                      MAYELIN Lee  12/21/23      Time Spent on Discharge:  I spent  45  minutes on this discharge activity which included: face-to-face encounter with the patient, reviewing the data in the system, coordination of the care with the nursing staff as well as consultants, documentation, and entering orders.            Electronically signed by Shanita Thompson APRN at 12/21/23 4041

## 2023-12-21 NOTE — DISCHARGE PLACEMENT REQUEST
"JeramineJeaneth rucker (63 y.o. Female)     BOYD FREDERICK RN CASE MANAGER-PLEASE CALL IF REFERRAL ACCEPTED OR DECLINED. DISCHARGING TODAY TO [facility]. THANKS  [phone]          Date of Birth   1960    Social Security Number       Address   [address] [place], [place] [address]    Home Phone   [phone]    MRN   [id number]       [employer]    Marital Status                               Admission Date   12/18/23    Admission Type   Emergency    Admitting Provider   Roslyn Hernandez DO    Attending Provider   Roslyn Hernandez DO    Department, Room/Bed   [facility] 6B, N625/1       Discharge Date       Discharge Disposition   Skilled Nursing Facility (DC - External)    Discharge Destination                                 Attending Provider: Roslyn Hernandze DO    Allergies: Sulfa Antibiotics    Isolation: None   Infection: None   Code Status: No CPR    Ht: 172.7 cm (68\")   Wt: 77.1 kg (169 lb 14.4 oz)    Admission Cmt: None   Principal Problem: Chest pain [R07.9]                   Active Insurance as of 12/18/2023       Primary Coverage       Payor Plan Insurance Group Employer/Plan Group    MEDICARE MEDICARE A & B        Payor Plan Address Payor Plan Phone Number Payor Plan Fax Number Effective Dates    [address] [phone]  11/1/2023 - None Entered    [facility] [id number]         Subscriber Name Subscriber Birth Date Member ID       JEANETH TATUM 1960 [id number]               Secondary Coverage       Payor Plan Insurance Group Employer/Plan Group    [facility] [id number]       Payor Plan Address Payor Plan Phone Number Payor Plan Fax Number Effective Dates    [address]   1/1/2005 - None Entered    [facility] [id number]         Subscriber Name Subscriber Birth Date Member ID       JEANETH TATUM 1960 [id number]                     Emergency Contacts        (Rel.) Home Phone Work Phone Mobile Phone    " CHLOÉ TREVIÑO(POA) (Other) [phone] -- [phone]    HAILEY ROJO (Brother) [phone] -- [phone]    NORMA TREVIÑO (Sister) [phone] -- [phone]                 History & Physical        LINA Lu DO at 23 1513           [address]  [address] [facility] [address]  Phone:  [phone]  Fax:  [phone] Date: Dec 21, 2023      Ambulatory Referral to Home Health     Patient:  Jeaneth Keita MRN:  [id number]   [address] [place] [id number] :  1960  SSN:    Phone: [phone] Sex:  F      INSURANCE PAYOR PLAN GROUP # SUBSCRIBER ID   Primary:  Secondary:    MEDICARE ANTHEM BLUE CROSS [id number]  [id number]    111 [id number]  [id number]      Referring Provider Information:  MADY VELÁSQUEZ Phone: [phone] Fax: [phone]       Referral Information:   # Visits:  999 Referral Type: Home Health [42]   Urgency:  Routine Referral Reason: Specialty Services Required   Start Date: Dec 21, 2023 End Date:  To be determined by Insurer   Diagnosis: Acute on chronic respiratory failure with hypoxia (J96.21 [ICD-10-CM] 518.84,799.02 [ICD-9-CM])  Chronic kidney disease with end stage renal failure on dialysis (N18.6,Z99.2 [ICD-10-CM] 585.6,V45.11 [ICD-9-CM])  ESRD (end stage renal disease) (N18.6 [ICD-10-CM] 585.6 [ICD-9-CM])  Type 2 diabetes mellitus with diabetic peripheral angiopathy without gangrene, with long-term current use of insulin (E11.51,Z79.4 [ICD-10-CM] 250.70,443.81,V58.67 [ICD-9-CM])  Other cirrhosis of liver (K74.69 [ICD-10-CM] 571.5 [ICD-9-CM])      Refer to Dept:   Refer to Provider:   Refer to Provider Phone:   Refer to Facility:       Face to Face Visit Date: 2023  Follow-up provider for Plan of Care? I treated the patient in an acute care facility and will not continue treatment after discharge.  Follow-up provider: SAFIA JOSE [[id number]]  Reason/Clinical Findings: ESRD, HTN, DM type 2, cirrhosis of the  liver  Describe mobility limitations that make leaving home difficult: Wheelchair bound. Impaired functional mobility, balance, gait and endurance  Nursing/Therapeutic Services Requested: Skilled Nursing  Nursing/Therapeutic Services Requested: Physical Therapy  Nursing/Therapeutic Services Requested: Occupational Therapy  Skilled nursing orders: Cardiopulmonary assessments  Skilled nursing orders: Neurovascular assessments  Skilled nursing orders: Medication education  PT orders: Therapeutic exercise  PT orders: Transfer training  PT orders: Strengthening  PT orders: Home safety assessment  Occupational orders: Activities of daily living  Occupational orders: Energy conservation  Occupational orders: Strengthening  Occupational orders: Home safety assessment  Frequency: 1 Week 1     This document serves as a request of services and does not constitute Insurance authorization or approval of services.  To determine eligibility, please contact the members Insurance carrier to verify and review coverage.     If you have medical questions regarding this request for services. Please contact [address] at [phone] during normal business hours.        Verbal Order Mode: Verbal with readback   Authorizing Provider: Roslyn Hernandez DO  Authorizing Provider's NPI: [id number]     Order Entered By: Fernanda Padron RN 2023 12:50 PM     Electronically signed by:  Roslyn Hernandez DO                 [facility] Medicine Services  HISTORY AND PHYSICAL    Patient Name: Jeaneth Keita  : 1960  MRN: [id number]  Primary Care Physician: Lulu Amos MD  Date of admission: 2023      Subjective  Subjective     Chief Complaint:  Chest pain, missed HD    HPI:  Jeaneth Keita is a 63 y.o. female that presented to the ED today secondary to complaints of chest pain and AMS. Patient also had some issues with dyspnea upon arrival, now on 2L NC and mentation has  improved, she is oriented to person and place, still having some issues with time but per her POA this is near her baseline at times. Patient missed HD today, plans are for HD this evening with nephrology. Patient on chronic HD, normally on MWF schedule. She denies any active chest pain at this time, no worsening SOA, no N/V. She does have some mild RUQ abdominal pain but she reports she has this frequently. She has a past medical history of metastatic liver cancer with mets to the lung and ribs.       Personal History     Past Medical History:   Diagnosis Date    Anxiety     Cancer     liver cell carcinoma    DDD (degenerative disc disease), lumbar     Depression     Diabetes mellitus     Fibromyalgia     Hemochromatosis     Hep B w/o coma, chronic, w/o delta     Hep C w/o coma, chronic     Hypertension     Stroke     Vertigo          Oncology Problem List:  Hepatocellular carcinoma metastatic to bone s/p RXT  (10/05/2021;   Status: Active)    Oncology/Hematology History       Past Surgical History:   Procedure Laterality Date    ARTERIOVENOUS FISTULA/SHUNT SURGERY Left 10/16/2021    Procedure: UPPER EXTREMITY ARTERIOVENOUS FISTULA FORMATION LEFT;  Surgeon: Johnny Rousseau MD;  Location: [facility];  Service: Vascular;  Laterality: Left;    BACK SURGERY      BELOW KNEE LEG AMPUTATION       SECTION      FOOT SURGERY      KNEE SURGERY      ROTATOR CUFF REPAIR      SPINAL CORD STIMULATOR IMPLANT         Family History: family history includes Heart attack in her father; Heart disease in her father.     Social History:  reports that she has never smoked. She has never used smokeless tobacco. She reports that she does not drink alcohol and does not use drugs.  Social History     Social History Narrative     Pt ex  Parviz Keita is her POA       Medications:  Available home medication information reviewed.  (Not in a hospital admission)      Allergies   Allergen Reactions    Sulfa Antibiotics         Objective  Objective     Vital Signs:   Temp:  [98.3 °F (36.8 °C)] 98.3 °F (36.8 °C)  Heart Rate:  [81-87] 87  Resp:  [16] 16  BP: (118-146)/(67-97) 143/97  Flow (L/min):  [2] 2       Physical Exam   Constitutional: No acute distress, awake, alert, oriented currently to person and place, difficulty with time   HENT: NCAT, nares patent, mucous membranes moist  Respiratory: Decreased BS bilaterally, no rhonchi, wheezing, or crackles, respiratory effort normal, no use of accessory muscles   Cardiovascular: RRR, no murmurs, rubs, or gallops  Gastrointestinal: Positive bowel sounds, soft, minor tenderness to RUQ palpation but not out of proportion, nondistended  Musculoskeletal: L sided BKA, no edema of RLE   Psychiatric: Appropriate affect, cooperative, cooperative   Neurologic: Oriented to person and place, difficulty with time, strength symmetric in all extremities, Cranial Nerves grossly intact to confrontation, speech clear  Skin: No rashes      Result Review:  I have personally reviewed the results from the time of this admission to 12/18/2023 16:37 EST and agree with these findings:  [x]  Laboratory list / accordion  []  Microbiology  [x]  Radiology  [x]  EKG/Telemetry   []  Cardiology/Vascular   []  Pathology  []  Old records  []  Other:  Most notable findings include:   Troponin 32-33   Cr 5.26   BNP 4626   Hgb 9.3   LA 1.2 Procal 0.13   Resp panel negative   7.45/41/104/28/97 on 36% FiO2   CT head- brain atrophy with chronic small vessel ischemic changes   CT chest- new expansile osseous lesions involving the posterior right 7th rib, rounded atelectasis at the medial right lung base, no acute cardiopulmonary process, peripherally calcified mass effacing the inferior vena cava in the left caudate lobe, unchanged       LAB RESULTS:      Lab 12/18/23  1156   WBC 5.29   HEMOGLOBIN 9.3*   HEMATOCRIT 26.2*   PLATELETS 160   NEUTROS ABS 3.31   IMMATURE GRANS (ABS) 0.03   LYMPHS ABS 1.31   MONOS ABS 0.36   EOS  ABS 0.25   MCV 95.3   PROCALCITONIN 0.13   LACTATE 1.2         Lab 12/18/23  1156   SODIUM 135*   POTASSIUM 4.9   CHLORIDE 98   CO2 29.0   ANION GAP 8.0   BUN 40*   CREATININE 5.26*   EGFR 8.5*   GLUCOSE 302*   CALCIUM 9.2         Lab 12/18/23  1156   TOTAL PROTEIN 6.7   ALBUMIN 3.8   GLOBULIN 2.9   ALT (SGPT) 45*   AST (SGOT) 25   BILIRUBIN 0.5   ALK PHOS 138*   LIPASE 33         Lab 12/18/23  1402 12/18/23  1156   PROBNP  --  4,626.0*   HSTROP T 33* 32*                 Lab 12/18/23  1222   PH, ARTERIAL 7.449   PCO2, ARTERIAL 41.4   PO2 .0   FIO2 36   HCO3 ART 28.7*   BASE EXCESS ART 4.3*   CARBOXYHEMOGLOBIN 1.1     UA          8/22/2023    13:43 10/17/2023    14:06   Urinalysis   Squamous Epithelial Cells, UA 0-2  0-2    Specific Gravity, UA 1.013  1.015    Ketones, UA Negative  Negative    Blood, UA Moderate (2+)  Negative    Leukocytes, UA Large (3+)  Negative    Nitrite, UA Negative  Negative    RBC, UA 0-2  3-5    WBC, UA Too Numerous to Count  0-2    Bacteria, UA 4+  None Seen        Microbiology Results (last 10 days)       Procedure Component Value - Date/Time    COVID PRE-OP / PRE-PROCEDURE SCREENING ORDER (NO ISOLATION) - Swab, Nasopharynx [[id number]]  (Normal) Collected: 12/18/23 1146    Lab Status: Final result Specimen: Swab from Nasopharynx Updated: 12/18/23 1226    Narrative:      The following orders were created for panel order COVID PRE-OP / PRE-PROCEDURE SCREENING ORDER (NO ISOLATION) - Swab, Nasopharynx.  Procedure                               Abnormality         Status                     ---------                               -----------         ------                     COVID-19 and FLU A/B PCR...[[id number]]  Normal              Final result                 Please view results for these tests on the individual orders.    COVID-19 and FLU A/B PCR, 1 HR TAT - Swab, Nasopharynx [[id number]]  (Normal) Collected: 12/18/23 1146    Lab Status: Final result Specimen: Swab from Nasopharynx  Updated: 12/18/23 1226     COVID19 Not Detected     Influenza A PCR Not Detected     Influenza B PCR Not Detected    Narrative:      Fact sheet for providers: https://www.fda.gov/media/351478/download    Fact sheet for patients: https://www.fda.gov/media/931407/download    Test performed by PCR.            CT Chest Without Contrast Diagnostic    Result Date: 12/18/2023  CT CHEST WO CONTRAST DIAGNOSTIC Date of Exam: 12/18/2023 11:23 AM CST Indication: Shortness of breath, hypoxia, renal failure. Comparison: Chest x-ray 12/18/2023 CT abdomen pelvis 6/27/2022 Technique: Axial CT images were obtained of the chest without contrast administration.  Reconstructed coronal and sagittal images were also obtained. Automated exposure control and iterative construction methods were used. Findings: Hilum and Mediastinum: No pathologically enlarged lymph nodes.  Normal heart size.   No pericardial effusion.  Unremarkable thoracic aorta and pulmonary arteries. There is coronary artery calcification. Mitral annulus calcification is noted. There are bilateral thyroid nodules measuring 1.7 x 1.8 cm on the right and 1.0 x 1.1 cm on the left. Lung Parenchyma and Pleura: Evaluation of lung parenchyma demonstrates consolidation at the medial right lung base which appears unchanged from prior studies likely rounded atelectasis and/or scarring. No new focal opacity identified. There is a 3 mm nodule in the right upper lung (image 34 of series 2). Bibasilar atelectasis and/or scarring is noted. Upper Abdomen: Evaluation of the upper abdomen demonstrates a peripherally calcified lesion either within or effacing the inferior vena cava at the portal confluence (image 85 of series 2). This measures 3.3 x 3.5 cm and was present on previous examination felt to be within the caudate lobe. Additional findings include cholelithiasis. Soft tissues: Unremarkable. Osseous structures: There is an expansile soft tissue involving the posterior right  seventh rib near the costovertebral junction (image 47 of series 4). Cortical destruction with soft tissue component is noted measuring 1.0 x 3.4 cm. This is new from prior CT dated 6/27/2022.     Impression: Impression: 1. New expansile osseous lesion involving the posterior right seventh rib near the costovertebral junction with cortical destruction noted. 2. Rounded atelectasis at the medial right lung base. No acute cardiopulmonary process. 3. 3 mm nodule in the right upper lung. 4. Peripherally calcified mass effacing the inferior vena cava potentially in the left caudate lobe unchanged. Electronically Signed: Tameka Bravo MD  12/18/2023 12:14 PM CST  Workstation ID: [id number]    CT Head Without Contrast    Result Date: 12/18/2023  CT HEAD WO CONTRAST Date of Exam: 12/18/2023 12:23 PM EST Indication: AMS. Comparison: None available. Technique: Axial CT images were obtained of the head without contrast administration.  Automated exposure control and iterative construction methods were used. Findings: There is no evidence of hemorrhage. There is no mass effect or midline shift. Diffuse brain atrophy. Periventricular hypodense areas compatible with chronic small vessel ischemia There is no extracerebral collection. Ventricles are normal in size and configuration for patient's stated age. Posterior fossa is within normal limits. Calvarium and skull base appear intact.  Visualized sinuses show no air fluid levels. Visualized orbits are unremarkable.     Impression: Impression: Brain atrophy with chronic small vessel ischemic changes No acute intracranial abnormality Electronically Signed: Pelon Hess MD  12/18/2023 1:02 PM EST  Workstation ID: [id number]    XR Chest 1 View    Result Date: 12/18/2023  XR CHEST 1 VW Date of Exam: 12/18/2023 12:00 PM EST Indication: Chest Pain Triage Protocol Comparison: None available. Findings: There are no airspace consolidations. Mild bibasilar discoid atelectasis. No pleural  fluid. No pneumothorax. The pulmonary vasculature appears within normal limits. The cardiac and mediastinal silhouette appear unremarkable. No acute osseous abnormality identified.     Impression: Impression: Bibasilar discoid atelectasis. Otherwise clear lungs Electronically Signed: Pelon Hess MD  12/18/2023 12:14 PM EST  Workstation ID: [id number]     Results for orders placed during the hospital encounter of 10/05/21    Adult Transthoracic Echo Complete W/ Cont if Necessary Per Protocol    Interpretation Summary  · Left ventricular ejection fraction appears to be 61 - 65%. Left ventricular systolic function is normal.  · Left atrial volume is mildly increased.      Assessment & Plan  Assessment & Plan     Active Hospital Problems    Diagnosis  POA    **Chest pain [R07.9]  Yes    Altered mental status [R41.82]  Yes       Jeaneth Keita is a 62 yo F that presented to the [facility] ED today with complaints of chest pain and AMS from local HD center. Patient was unable to have HD today and has history of ESRD. Patient also has a history of Liver Cancer with METS to the R lung/right rib, patient has been on palliative medicine and previously followed with [facility] oncology. Patient also had history of HTN, HLD, GERD, Liver Cirrhosis, T2DM, and previous CVA. Patient currently is not seeking any further treatment or care for her cancer. Discussed with patient MIKEY Rivera, plans to consult palliative medicine, with what appears to be advancing cancer may be a possible candidate for hospice as an outpatient as well depending on continued goals of care    Chest pain   Dyspnea   ? Acute CHF exacerbation   - Troponin 32-33, no active chest pain at this time, s/p nitro in the ED  - BNP elevated at 4626, no recent echo, some atelectasis on CXR, may have contributed to some hypoxia on arrival, currently on 2L, plans to obtain repeat echo at this time, previous preserved EF on Echo from 2021   - Plans for HD this evening per  nephrology, continue per their recommendations, on MWF schedule normally   - Continue to monitor patient's respiratory status, telemetry monitoring     AMS   Metastatic Liver cancer with mets to lung/rib  Liver Cirrhosis   - Per POA she has had multiple episodes like previously and is her baseline, previous neurology evaluation, negative CT head/MRI brain  - Obtain ammonia level with history of cirrhosis   - Consider neurology consultation but mentation already appears to be improving on my exam.    - Discussed with patient's POA Parviz Rivera this evening about plans for admission and HD, patient on palliative, with her cancer will consult inpatient palliative, may be appropriate for possible hospice as well in the near future with what appears to be advancing cancer.     ESRD on HD   - Continue HD per nephrology recommendations.     HTN  HLD   GERD   - Awaiting home medication reconciliation  - Restart patient home Lipitor   - Reordered patient's home Coreg    - Ordered patient's home PPI     Type II DM   - SSI with qachs coverage   - A1C in the AM       DVT prophylaxis:  SCDs         CODE STATUS:  DNR/DNI palliative   Code Status and Medical Interventions:   Ordered at: 12/18/23 1455     Medical Intervention Limits:    NO intubation (DNI)    NO cardioversion     Code Status (Patient has no pulse and is not breathing):    No CPR (Do Not Attempt to Resuscitate)     Medical Interventions (Patient has pulse or is breathing):    Limited Support       Expected Discharge 12/21/2023  Expected Discharge Date: 12/21/2023; Expected Discharge Time:      FRANCK Lu DO  12/18/23      Electronically signed by LINA Lu DO at 12/18/23 1639       Current Facility-Administered Medications   Medication Dose Route Frequency Provider Last Rate Last Admin    acetaminophen (TYLENOL) tablet 650 mg  650 mg Oral Q4H PRN LINA Lu DO   650 mg at 12/19/23 1611    albumin human 25 % IV SOLN 12.5 g  12.5 g Intravenous PRN  Kin Pierson MD        aspirin chewable tablet 324 mg  324 mg Oral Once Rolando Ojeda,         atorvastatin (LIPITOR) tablet 80 mg  80 mg Oral Nightly LINA Lu DO   80 mg at 12/20/23 2016    sennosides-docusate (PERICOLACE) 8.6-50 MG per tablet 2 tablet  2 tablet Oral BID LINA Lu DO   2 tablet at 12/21/23 0852    And    polyethylene glycol (MIRALAX) packet 17 g  17 g Oral Daily PRN LINA Lu DO        And    bisacodyl (DULCOLAX) EC tablet 5 mg  5 mg Oral Daily PRN LINA Lu DO        And    bisacodyl (DULCOLAX) suppository 10 mg  10 mg Rectal Daily PRN LINA Lu DO        calcium acetate (PHOS BINDER)) capsule 667 mg  667 mg Oral TID With Meals LINA Lu DO   667 mg at 12/21/23 1200    carvedilol (COREG) tablet 25 mg  25 mg Oral BID With Meals LINA Lu DO   25 mg at 12/21/23 0852    dextrose (D50W) (25 g/50 mL) IV injection 25 g  25 g Intravenous Q15 Min PRN LINA Lu DO        dextrose (GLUTOSE) oral gel 15 g  15 g Oral Q15 Min PRN LINA Lu DO        glucagon (GLUCAGEN) injection 1 mg  1 mg Intramuscular Q15 Min PRN LINA Lu DO        insulin regular (humuLIN R,novoLIN R) injection 2-9 Units  2-9 Units Subcutaneous TID AC LINA Lu DO   6 Units at 12/21/23 1200    lactulose (CHRONULAC) 10 GM/15ML solution 30 g  30 g Oral BID PRN Jacqueline Hill MD        Lidocaine 4 % 1 patch  1 patch Transdermal Q24H Kelsy Pena APRN   1 patch at 12/21/23 0852    nitroglycerin (NITROSTAT) SL tablet 0.4 mg  0.4 mg Sublingual Q5 Min PRN LINA Lu DO        ondansetron (ZOFRAN) injection 4 mg  4 mg Intravenous Q6H PRN LINA Lu DO   4 mg at 12/19/23 0941    pantoprazole (PROTONIX) EC tablet 40 mg  40 mg Oral Daily LINA Lu DO   40 mg at 12/21/23 0852    QUEtiapine (SEROquel) tablet 25 mg  25 mg Oral Nightly LINA Lu DO   25 mg at 12/20/23 2016    riFAXIMin (XIFAXAN) tablet 550 mg  550 mg Oral Q12H  Jacqueline Hill MD   550 mg at 23 0852    sodium chloride 0.9 % flush 10 mL  10 mL Intravenous PRN Rolando Ojeda,         sodium chloride 0.9 % flush 10 mL  10 mL Intravenous Q12H LINA Lu, DO   10 mL at 23 0853    sodium chloride 0.9 % flush 10 mL  10 mL Intravenous PRN LINA Lu,         sodium chloride 0.9 % infusion 40 mL  40 mL Intravenous PRN LINA Lu DO        venlafaxine XR (EFFEXOR-XR) 24 hr capsule 75 mg  75 mg Oral Daily LINA Lu,    75 mg at 23 0852        Physician Progress Notes (most recent note)        Roslyn Hernandez DO at 23 1447              [facility] Medicine Services  PROGRESS NOTE    Patient Name: Jeaneth Keita  : 1960  MRN: [id number]    Date of Admission: 2023  Primary Care Physician: Lulu Amos MD    Subjective   Subjective     CC:  AMS    HPI:  No acute events. States she feels ok. Able to tell me her name and that she is in the hospital.       Objective   Objective     Vital Signs:   Temp:  [97.4 °F (36.3 °C)-98.1 °F (36.7 °C)] 98.1 °F (36.7 °C)  Heart Rate:  [] 102  Resp:  [18] 18  BP: (101-166)/() 139/79     Physical Exam:  Constitutional: No acute distress, awake, alert  HENT: NCAT, mucous membranes moist  Respiratory: Clear to auscultation bilaterally, respiratory effort normal   Cardiovascular: RRR, no murmurs, rubs, or gallops  Gastrointestinal: Positive bowel sounds, soft, nontender, nondistended  Musculoskeletal: No bilateral ankle edema  Psychiatric: flat affect, cooperative  Neurologic: Oriented x 1, strength symmetric in all extremities, Cranial Nerves grossly intact to confrontation, speech clear  Skin: No rashes      Results Reviewed:  LAB RESULTS:      Lab 23  0308 23  1156   WBC 5.51 5.29   HEMOGLOBIN 9.0* 9.3*   HEMATOCRIT 25.8* 26.2*   PLATELETS 148 160   NEUTROS ABS 3.51 3.31   IMMATURE GRANS (ABS) 0.01 0.03   LYMPHS ABS  1.32 1.31   MONOS ABS 0.36 0.36   EOS ABS 0.28 0.25   MCV 95.2 95.3   PROCALCITONIN  --  0.13   LACTATE  --  1.2         Lab 12/20/23  0749 12/19/23  0308 12/18/23  1156   SODIUM 137 135* 135*   POTASSIUM 4.7 4.1 4.9   CHLORIDE 102 101 98   CO2 23.0 25.0 29.0   ANION GAP 12.0 9.0 8.0   BUN 45* 24* 40*   CREATININE 5.43* 4.36* 5.26*   EGFR 8.2* 10.7* 8.5*   GLUCOSE 140* 174* 302*   CALCIUM 8.9 8.9 9.2   MAGNESIUM  --  2.0  --    PHOSPHORUS 4.3  --   --    HEMOGLOBIN A1C  --  5.80*  --          Lab 12/20/23  0749 12/19/23  0308 12/18/23  1156   TOTAL PROTEIN  --  6.1 6.7   ALBUMIN 3.5 3.4* 3.8   GLOBULIN  --  2.7 2.9   ALT (SGPT)  --  43* 45*   AST (SGOT)  --  25 25   BILIRUBIN  --  0.4 0.5   ALK PHOS  --  112 138*   LIPASE  --   --  33         Lab 12/18/23  1402 12/18/23  1156   PROBNP  --  4,626.0*   HSTROP T 33* 32*                 Lab 12/18/23  1222   PH, ARTERIAL 7.449   PCO2, ARTERIAL 41.4   PO2 .0   FIO2 36   HCO3 ART 28.7*   BASE EXCESS ART 4.3*   CARBOXYHEMOGLOBIN 1.1     Brief Urine Lab Results  (Last result in the past 365 days)        Color   Clarity   Blood   Leuk Est   Nitrite   Protein   CREAT   Urine HCG        10/17/23 1406 Yellow   Clear   Negative   Negative   Negative   >=300 mg/dL (3+)                   Microbiology Results Abnormal       Procedure Component Value - Date/Time    Blood Culture - Blood, Hand, Right [[id number]]  (Normal) Collected: 12/18/23 1202    Lab Status: Preliminary result Specimen: Blood from Hand, Right Updated: 12/20/23 1246     Blood Culture No growth at 2 days    Blood Culture - Blood, Arm, Right [[id number]]  (Normal) Collected: 12/18/23 1156    Lab Status: Preliminary result Specimen: Blood from Arm, Right Updated: 12/20/23 1230     Blood Culture No growth at 2 days    COVID PRE-OP / PRE-PROCEDURE SCREENING ORDER (NO ISOLATION) - Swab, Nasopharynx [[id number]]  (Normal) Collected: 12/18/23 1146    Lab Status: Final result Specimen: Swab from Nasopharynx Updated:  12/18/23 1226    Narrative:      The following orders were created for panel order COVID PRE-OP / PRE-PROCEDURE SCREENING ORDER (NO ISOLATION) - Swab, Nasopharynx.  Procedure                               Abnormality         Status                     ---------                               -----------         ------                     COVID-19 and FLU A/B PCR...[[id number]]  Normal              Final result                 Please view results for these tests on the individual orders.    COVID-19 and FLU A/B PCR, 1 HR TAT - Swab, Nasopharynx [[id number]]  (Normal) Collected: 12/18/23 1146    Lab Status: Final result Specimen: Swab from Nasopharynx Updated: 12/18/23 1226     COVID19 Not Detected     Influenza A PCR Not Detected     Influenza B PCR Not Detected    Narrative:      Fact sheet for providers: https://www.fda.gov/media/614324/download    Fact sheet for patients: https://www.fda.gov/media/262915/download    Test performed by PCR.            No radiology results from the last 24 hrs    Results for orders placed during the hospital encounter of 10/05/21    Adult Transthoracic Echo Complete W/ Cont if Necessary Per Protocol    Interpretation Summary  · Left ventricular ejection fraction appears to be 61 - 65%. Left ventricular systolic function is normal.  · Left atrial volume is mildly increased.      Current medications:  Scheduled Meds:aspirin, 324 mg, Oral, Once  atorvastatin, 80 mg, Oral, Nightly  calcium acetate, 667 mg, Oral, TID With Meals  carvedilol, 25 mg, Oral, BID With Meals  insulin regular, 2-9 Units, Subcutaneous, TID AC  Lidocaine, 1 patch, Transdermal, Q24H  pantoprazole, 40 mg, Oral, Daily  QUEtiapine, 25 mg, Oral, Nightly  rifAXIMin, 550 mg, Oral, Q12H  senna-docusate sodium, 2 tablet, Oral, BID  sodium chloride, 10 mL, Intravenous, Q12H  venlafaxine XR, 75 mg, Oral, Daily      Continuous Infusions:   PRN Meds:.  acetaminophen    albumin human    albumin human    senna-docusate sodium  **AND** polyethylene glycol **AND** bisacodyl **AND** bisacodyl    dextrose    dextrose    glucagon (human recombinant)    lactulose    nitroglycerin    ondansetron    sodium chloride    sodium chloride    sodium chloride    Assessment & Plan   Assessment & Plan     Active Hospital Problems    Diagnosis  POA    **Chest pain [R07.9]  Yes    Elevated brain natriuretic peptide (BNP) level [R79.89]  Yes    End-stage renal disease on hemodialysis [N18.6, Z99.2]  Not Applicable    Altered mental status [R41.82]  Yes    Cirrhosis of liver [K74.60]  Yes    GERD (gastroesophageal reflux disease) [K21.9]  Yes    Essential hypertension [I10]  Yes    Type 2 diabetes mellitus [E11.9]  Yes      Resolved Hospital Problems   No resolved problems to display.        Brief Hospital Course to date:  Jeaneth Keita is a 63 y.o. female with past medical history of essential hypertension, dyslipidemia, type 2 diabetes, GERD, end-stage renal disease on hemodialysis, liver cirrhosis, chronic hep C, hepatocellular carcinoma with bone metastasis who presented to the hospital with chest pain and altered mental state    Chest pain   Dyspnea   ? Acute CHF exacerbation   Patient presented with intermittent chest pain and orthopnea.  Troponin is mildly elevated and not evolving.  EKG with no acute skin changes.  proBNP is elevated  Chest x-ray with bilateral atelectasis  Felt that her symptoms are secondary to volume overload.  Received hemodialysis on admission with ultrafiltration of 2 L with improvement.   Echo from 2021 with normal ejection fraction  Repeat echo pending     AMS   Liver Cirrhosis   Chronic hep C  Hepatocellular carcinoma with mets with lung/bone  Brain CT on admission with atrophic brain changes but no other acute intercurrent pathology  Started lactulose and rifaximin     ESRD on HD   Continue HD per nephrology recommendations.      Essential hypertension  HLD   GERD   Continue Coreg  Continue Lipitor  Continue PPI     Type  II DM   A1c 5.8%  SSI      Goals of care  My partner discussed with the patient's POA the goals of care  Palliative care consulted     Expected Discharge Location and Transportation: [place]   Expected Discharge   Expected Discharge Date: 2023; Expected Discharge Time:      DVT prophylaxis:  Mechanical DVT prophylaxis orders are present.     AM-PAC 6 Clicks Score (PT): 16 (23 1125)    CODE STATUS:   Code Status and Medical Interventions:   Ordered at: 23 1455     Medical Intervention Limits:    NO intubation (DNI)    NO cardioversion     Code Status (Patient has no pulse and is not breathing):    No CPR (Do Not Attempt to Resuscitate)     Medical Interventions (Patient has pulse or is breathing):    Limited Support       Roslyn Hernandez DO  23        Electronically signed by Roslyn Hernandez DO at 23 1509          Physical Therapy Notes (most recent note)        Lupe Linares, PT at 23 0844  Version 1 of 1         Patient Name: Jeaneth Keita  : 1960    MRN: [id number]                              Today's Date: 2023       Admit Date: 2023    Visit Dx:     ICD-10-CM ICD-9-CM   1. Acute on chronic respiratory failure with hypoxia  J96.21 518.84     799.02   2. Malignant neoplasm of liver, unspecified liver malignancy type  C22.9 155.2   3. Secondary malignancy of rib  C79.51 198.5   4. Chronic kidney disease with end stage renal failure on dialysis  N18.6 585.6    Z99.2 V45.11   5. Chest pain, unspecified type  R07.9 786.50     Patient Active Problem List   Diagnosis    ICH (intracerebral hemorrhage)    Hepatocellular carcinoma metastatic to bone s/p RXT     Hemochromatosis    Cirrhosis of liver    Chronic Hep C     ESRD (end stage renal disease)    Chronic ulcer of right foot    S/P left BKA    GERD (gastroesophageal reflux disease)    Chronic pain on Methadone    Essential hypertension    Type 2 diabetes mellitus    Right lower lobe pneumonia     Colitis    Normocytic anemia    Hypokalemia    CVA (cerebral vascular accident)    Stroke-like symptoms    History of hypoglycemia    Hypoglycemia    Chest pain    End stage renal disease on dialysis    Disorientation    Altered mental status    End-stage renal disease on hemodialysis    Elevated brain natriuretic peptide (BNP) level     Past Medical History:   Diagnosis Date    Anxiety     Cancer     liver cell carcinoma    DDD (degenerative disc disease), lumbar     Depression     Diabetes mellitus     Fibromyalgia     Hemochromatosis     Hep B w/o coma, chronic, w/o delta     Hep C w/o coma, chronic     Hypertension     Stroke     Vertigo      Past Surgical History:   Procedure Laterality Date    ARTERIOVENOUS FISTULA/SHUNT SURGERY Left 10/16/2021    Procedure: UPPER EXTREMITY ARTERIOVENOUS FISTULA FORMATION LEFT;  Surgeon: Johnny Rousseau MD;  [facility];  Service: Vascular;  Laterality: Left;    BACK SURGERY      BELOW KNEE LEG AMPUTATION       SECTION      FOOT SURGERY      KNEE SURGERY      ROTATOR CUFF REPAIR      SPINAL CORD STIMULATOR IMPLANT        General Information       Row Name 23 4244          Physical Therapy Time and Intention    Document Type evaluation  -MB     Mode of Treatment physical therapy  -MB       Row Name 23 0844          General Information    Patient Profile Reviewed yes  -MB     Prior Level of Function independent:;bed mobility;ADL's;transfer;w/c or scooter  -MB     Existing Precautions/Restrictions oxygen therapy device and L/min;other (see comments)  remote L BKA, pleasantly confused  -MB     Barriers to Rehab medically complex;previous functional deficit;cognitive status  -MB       Row Name 23 3044          Living Environment    People in Home facility resident  -MB       Row Name 23 0844          Home Main Entrance    Number of Stairs, Main Entrance none  -MB       Row Name 23 0844          Stairs Within Home, Primary     Number of Stairs, Within Home, Primary none  -MB       Row Name 12/19/23 0844          Cognition    Orientation Status (Cognition) oriented to;person;verbal cues/prompts needed for orientation;place  -MB       Row Name 12/19/23 0844          Safety Issues, Functional Mobility    Safety Issues Affecting Function (Mobility) insight into deficits/self-awareness;safety precaution awareness  -MB     Impairments Affecting Function (Mobility) balance;endurance/activity tolerance;pain;strength  -MB               User Key  (r) = Recorded By, (t) = Taken By, (c) = Cosigned By      Initials Name Provider Type    Lupe Diaz, PT Physical Therapist                   Mobility       Row Name 12/19/23 1111          Bed Mobility    Bed Mobility supine-sit  -MB     Supine-Sit [place] (Bed Mobility) contact guard  -MB     Assistive Device (Bed Mobility) bed rails;head of bed elevated  -MB     Comment, (Bed Mobility) Pt. advanced to EOB w/ brief assist to raise trunk/shoulders and VCs for sequencing.  -MB       Row Name 12/19/23 1111          Transfers    Comment, (Transfers) SPT to chair w/ assist to boost to standing. Pt. demo safe hand placement and transfer technique.  -MB       Row Name 12/19/23 1111          Bed-Chair Transfer    Bed-Chair [place] (Transfers) minimum assist (75% patient effort);verbal cues  -MB     Assistive Device (Bed-Chair Transfers) other (see comments)  UE support  -MB       Row Name 12/19/23 1111          Sit-Stand Transfer    Sit-Stand [place] (Transfers) minimum assist (75% patient effort);verbal cues  -MB     Assistive Device (Sit-Stand Transfers) walker, front-wheeled  -MB       Row Name 12/19/23 1111          Gait/Stairs (Locomotion)    [place] Level (Gait) not tested  -MB     Comment, (Gait/Stairs) Pt. non-ambulatory at baseline (does not wear prosthesis).  -MB               User Key  (r) = Recorded By, (t) = Taken By, (c) = Cosigned By      Initials Name Provider Type     Lupe Diaz, PT Physical Therapist                   Obj/Interventions       Row Name 12/19/23 1114          Range of Motion Comprehensive    General Range of Motion no range of motion deficits identified  -MB       Row Name 12/19/23 1114          Strength Comprehensive (MMT)    General Manual Muscle Testing (MMT) Assessment lower extremity strength deficits identified;upper extremity strength deficits identified  -MB     Comment, General Manual Muscle Testing (MMT) Assessment BLEs and BUEs grossly 4-/5  -MB       Row Name 12/19/23 1114          Motor Skills    Therapeutic Exercise hip;knee;ankle  -[facility] Name 12/19/23 1114          Hip (Therapeutic Exercise)    Hip (Therapeutic Exercise) isometric exercises;strengthening exercise  -MB     Hip Isometrics (Therapeutic Exercise) bilateral;gluteal sets  -MB     Hip Strengthening (Therapeutic Exercise) bilateral;aBduction;aDduction  -[facility] Name 12/19/23 1114          Knee (Therapeutic Exercise)    Knee (Therapeutic Exercise) strengthening exercise  -MB     Knee Strengthening (Therapeutic Exercise) right;LAQ (long arc quad);10 repetitions  -[facility] Name 12/19/23 1114          Ankle (Therapeutic Exercise)    Ankle (Therapeutic Exercise) AROM (active range of motion)  -MB     Ankle AROM (Therapeutic Exercise) right;dorsiflexion;plantarflexion;10 repetitions  -[facility] Name 12/19/23 1114          Balance    Balance Assessment sitting static balance;standing static balance;standing dynamic balance  -MB     Static Sitting Balance independent  -MB     Position, Sitting Balance unsupported;sitting edge of bed  -MB     Static Standing Balance contact guard  -MB     Dynamic Standing Balance minimal assist  -MB     Position/Device Used, Standing Balance supported;other (see comments)  BUE support  -MB     Balance Interventions standing;sit to stand;weight shifting activity  -MB       Row Name 12/19/23 1114          Sensory Assessment  (Somatosensory)    Sensory Assessment (Somatosensory) right LE;UE sensation intact  -MB     Right LE Sensory Assessment light touch awareness;intact  -MB               User Key  (r) = Recorded By, (t) = Taken By, (c) = Cosigned By      Initials Name Provider Type    Lupe Diaz, PT Physical Therapist                   Goals/Plan       Row Name 12/19/23 1124          Bed Mobility Goal 1 (PT)    Activity/Assistive Device (Bed Mobility Goal 1, PT) sit to supine/supine to sit  -MB     [place] Level/Cues Needed (Bed Mobility Goal 1, PT) independent  -MB     Time Frame (Bed Mobility Goal 1, PT) 10 days  -MB       Row Name 12/19/23 1124          Transfer Goal 1 (PT)    Activity/Assistive Device (Transfer Goal 1, PT) sit-to-stand/stand-to-sit;bed-to-chair/chair-to-bed  -MB     [place] Level/Cues Needed (Transfer Goal 1, PT) independent  -MB     Time Frame (Transfer Goal 1, PT) 10 days  -MB       Row Name 12/19/23 1124          Patient Education Goal (PT)    Activity (Patient Education Goal, PT) HEP  -MB     [place]/Cues/Accuracy (Memory Goal 2, PT) demonstrates adequately  -MB     Time Frame (Patient Education Goal, PT) 10 days  -MB       Row Name 12/19/23 1124          Therapy Assessment/Plan (PT)    Planned Therapy Interventions (PT) balance training;bed mobility training;home exercise program;patient/family education;postural re-education;strengthening;transfer training;wheelchair management/propulsion training  -MB               User Key  (r) = Recorded By, (t) = Taken By, (c) = Cosigned By      Initials Name Provider Type    Lupe Diaz, PT Physical Therapist                   Clinical Impression       Row Name 12/19/23 1118          Pain    Pretreatment Pain Rating 5/10  -MB     Posttreatment Pain Rating 5/10  -MB     Pain Location - abdomen  -MB     Pre/Posttreatment Pain Comment RN aware and managing.  -MB     Pain Intervention(s) Ambulation/increased activity;Repositioned  -MB        Row Name 12/19/23 1118          Plan of Care Review    Plan of Care Reviewed With patient  -MB     Progress no change  -MB     Outcome Evaluation PT eval completed. Patient pleasantly confused and demo generalized weakness, mild standing balance deficits r/t L BKA, decreased activity tolerance, and is slightly below her functional baseline. Pt. completed bed mobility and transfers w/ CG/min A. Pt. would benefit from acute PT to promote return to PLOF. Recommend return to facility w/ SNF rehab.  -MB       Row Name 12/19/23 1118          Therapy Assessment/Plan (PT)    Patient/Family Therapy Goals Statement (PT) Pt. u/a to state.  -MB     Rehab Potential (PT) good, to achieve stated therapy goals  -MB     Criteria for Skilled Interventions Met (PT) yes;meets criteria;skilled treatment is necessary  -MB     Therapy Frequency (PT) daily  -MB       Row Name 12/19/23 1118          Vital Signs    Pre Systolic BP Rehab 140  -MB     Pre Treatment Diastolic BP 71  -MB     Pretreatment Heart Rate (beats/min) 87  -MB     Pre SpO2 (%) 97  -MB     O2 Delivery Pre Treatment nasal cannula  -MB     O2 Delivery Intra Treatment nasal cannula  -MB     Post SpO2 (%) 97  -MB     O2 Delivery Post Treatment nasal cannula  -MB     Pre Patient Position Supine  -MB     Intra Patient Position Standing  -MB     Post Patient Position Sitting  -MB       Row Name 12/19/23 1118          Positioning and Restraints    Pre-Treatment Position in bed  -MB     Post Treatment Position chair  -MB     In Chair notified nsg;reclined;call light within reach;encouraged to call for assist;exit alarm on;waffle cushion;on mechanical lift sling;legs elevated;R heel elevated  -MB               User Key  (r) = Recorded By, (t) = Taken By, (c) = Cosigned By      Initials Name Provider Type    Lupe Diaz, PT Physical Therapist                   Outcome Measures       Row Name 12/19/23 1124          How much help from another person do you currently  need...    Turning from your back to your side while in flat bed without using bedrails? 4  -MB     Moving from lying on back to sitting on the side of a flat bed without bedrails? 3  -MB     Moving to and from a bed to a chair (including a wheelchair)? 3  -MB     Standing up from a chair using your arms (e.g., wheelchair, bedside chair)? 3  -MB     Climbing 3-5 steps with a railing? 1  -MB     To walk in hospital room? 2  -MB     AM-PAC 6 Clicks Score (PT) 16  -MB     Highest Level of Mobility Goal 5 --> Static standing  -MB       Row Name 12/19/23 1125          Functional Assessment    Outcome Measure Options AM-PAC 6 Clicks Basic Mobility (PT)  -MB               User Key  (r) = Recorded By, (t) = Taken By, (c) = Cosigned By      Initials Name Provider Type    Lupe Diaz, PT Physical Therapist                                 Physical Therapy Education       Title: PT OT SLP Therapies (In Progress)       Topic: Physical Therapy (In Progress)       Point: Mobility training (Done)       Learning Progress Summary             Patient Acceptance, E,D, VU,NR by MB at 12/19/2023 1125                         Point: Home exercise program (Not Started)       Learner Progress:  Not documented in this visit.              Point: Body mechanics (Done)       Learning Progress Summary             Patient Acceptance, E,D, VU,NR by MB at 12/19/2023 1125                         Point: Precautions (Done)       Learning Progress Summary             Patient Acceptance, E,D, VU,NR by MB at 12/19/2023 1125                                         User Key       Initials Effective Dates Name Provider Type Discipline    MB 06/16/21 -  Lupe Linares, PT Physical Therapist PT                  PT Recommendation and Plan  Planned Therapy Interventions (PT): balance training, bed mobility training, home exercise program, patient/family education, postural re-education, strengthening, transfer training, wheelchair  management/propulsion training  Plan of Care Reviewed With: patient  Progress: no change  Outcome Evaluation: PT eval completed. Patient pleasantly confused and demo generalized weakness, mild standing balance deficits r/t L BKA, decreased activity tolerance, and is slightly below her functional baseline. Pt. completed bed mobility and transfers w/ CG/min A. Pt. would benefit from acute PT to promote return to PLOF. Recommend return to facility w/ SNF rehab.     Time Calculation:   PT Evaluation Complexity  History, PT Evaluation Complexity: 1-2 personal factors and/or comorbidities  Examination of Body Systems (PT Eval Complexity): total of 3 or more elements  Clinical Presentation (PT Evaluation Complexity): evolving  Clinical Decision Making (PT Evaluation Complexity): low complexity  Overall Complexity (PT Evaluation Complexity): low complexity     PT Charges       Row Name 12/19/23 1126             Time Calculation    Start Time 0844  -MB      PT Received On 12/19/23  -MB      PT Goal Re-Cert Due Date 12/29/23  -MB         Untimed Charges    PT Eval/Re-eval Minutes 50  -MB         Total Minutes    Untimed Charges Total Minutes 50  -MB       Total Minutes 50  -MB                User Key  (r) = Recorded By, (t) = Taken By, (c) = Cosigned By      Initials Name Provider Type    Lupe Diaz, PT Physical Therapist                  Therapy Charges for Today       Code Description Service Date Service Provider Modifiers Qty    [id number]  PT EVAL LOW COMPLEXITY 4 12/19/2023 Lupe Linares, PT  1            PT G-Codes  Outcome Measure Options: AM-PAC 6 Clicks Basic Mobility (PT)  AM-PAC 6 Clicks Score (PT): 16  PT Discharge Summary  Anticipated Discharge Disposition (PT): skilled nursing facility    Lupe Linares PT  12/19/2023      Electronically signed by Lupe Linares, PT at 12/19/23 1127       Occupational Therapy Notes (most recent note)    No notes exist for this encounter.

## 2023-12-21 NOTE — DISCHARGE PLACEMENT REQUEST
"Jeaneth Tatum (63 y.o. Female)     Fernanda Padron RN Case Manager  [phone]        Date of Birth   1960    Social Security Number       Address   [address] [place], [place] [address]    Home Phone   [phone]    MRN   [id number]       Anabaptism   Zoroastrian    Marital Status                               Admission Date   12/18/23    Admission Type   Emergency    Admitting Provider   Roslyn Hernandez DO    Attending Provider   Roslyn Hernandez DO    Department, Room/Bed   [facility] 6B, N625/1       Discharge Date       Discharge Disposition   Skilled Nursing Facility (DC - External)    Discharge Destination                                 Attending Provider: Roslyn Hernandez DO    Allergies: Sulfa Antibiotics    Isolation: None   Infection: None   Code Status: No CPR    Ht: 172.7 cm (68\")   Wt: 77.1 kg (169 lb 14.4 oz)    Admission Cmt: None   Principal Problem: Chest pain [R07.9]                   Active Insurance as of 12/18/2023       Primary Coverage       Payor Plan Insurance Group Employer/Plan Group    MEDICARE MEDICARE A & B        Payor Plan Address Payor Plan Phone Number Payor Plan Fax Number Effective Dates    [address] [phone]  11/1/2023 - None Entered    [facility] [id number]         Subscriber Name Subscriber Birth Date Member ID       JEANETH TATUM 1960 [id number]               Secondary Coverage       Payor Plan Insurance Group Employer/Plan Group    [id number]       Payor Plan Address Payor Plan Phone Number Payor Plan Fax Number Effective Dates    [address]   1/1/2005 - None Entered    [facility] [id number]         Subscriber Name Subscriber Birth Date Member ID       JEANETH TATUM 1960 [id number]                     Emergency Contacts        (Rel.) Home Phone Work Phone Mobile Phone    CHLOÉ TREVIÑO(POA) (Other) [phone] -- [phone]    HAILEY ROJO (Brother) [phone] -- " [phone]    NORMA TREVIÑO (Sister) [phone] -- [phone]             [address]  [address] [facility] [address]  Phone:  [phone]  Fax:  [phone] Date: Dec 21, 2023      Ambulatory Referral to Home Health     Patient:  Jeaneth Keita MRN:  [id number]   [facility] [address] :  1960  SSN:    Phone: [phone] Sex:  F      INSURANCE PAYOR PLAN GROUP # SUBSCRIBER ID   Primary:  Secondary:    MEDICARE  [facility] [employer] CROSS [id number]  [id number]    111 [id number]  [id number]      Referring Provider Information:  MADY VELÁSQUEZ Phone: [phone] Fax: [phone]       Referral Information:   # Visits:  999 Referral Type: Home Health [42]   Urgency:  Routine Referral Reason: Specialty Services Required   Start Date: Dec 21, 2023 End Date:  To be determined by Insurer   Diagnosis: Acute on chronic respiratory failure with hypoxia (J96.21 [ICD-10-CM] 518.84,799.02 [ICD-9-CM])  Chronic kidney disease with end stage renal failure on dialysis (N18.6,Z99.2 [ICD-10-CM] 585.6,V45.11 [ICD-9-CM])  ESRD (end stage renal disease) (N18.6 [ICD-10-CM] 585.6 [ICD-9-CM])  Type 2 diabetes mellitus with diabetic peripheral angiopathy without gangrene, with long-term current use of insulin (E11.51,Z79.4 [ICD-10-CM] 250.70,443.81,V58.67 [ICD-9-CM])  Other cirrhosis of liver (K74.69 [ICD-10-CM] 571.5 [ICD-9-CM])      Refer to Dept:   Refer to Provider:   Refer to Provider Phone:   Refer to Facility:       Face to Face Visit Date: 2023  Follow-up provider for Plan of Care? I treated the patient in an acute care facility and will not continue treatment after discharge.  Follow-up provider: SAFIA JOSE [[id number]]  Reason/Clinical Findings: ESRD, HTN, DM type 2, cirrhosis of the liver  Describe mobility limitations that make leaving home difficult: Wheelchair bound. Impaired functional mobility, balance, gait and endurance  Nursing/Therapeutic  Services Requested: Skilled Nursing  Nursing/Therapeutic Services Requested: Physical Therapy  Nursing/Therapeutic Services Requested: Occupational Therapy  Skilled nursing orders: Cardiopulmonary assessments  Skilled nursing orders: Neurovascular assessments  Skilled nursing orders: Medication education  PT orders: Therapeutic exercise  PT orders: Transfer training  PT orders: Strengthening  PT orders: Home safety assessment  Occupational orders: Activities of daily living  Occupational orders: Energy conservation  Occupational orders: Strengthening  Occupational orders: Home safety assessment  Frequency: 1 Week 1     This document serves as a request of services and does not constitute Insurance authorization or approval of services.  To determine eligibility, please contact the members Insurance carrier to verify and review coverage.     If you have medical questions regarding this request for services. Please contact [facility] at [phone] during normal business hours.        Verbal Order Mode: Verbal with readback   Authorizing Provider: Roslyn Hernandez DO  Authorizing Provider's NPI: [id number]     Order Entered By: Fernanda Padron RN 2023 12:50 PM     Electronically signed by:  Roslyn Hernandez DO          Discharge Summary        Shanita Thompson APRN at 23 1401              [facility] Medicine Services  DISCHARGE SUMMARY    Patient Name: Jeaneth Keita  : 1960  MRN: [id number]    Date of Admission: 2023 11:35 AM  Date of Discharge:  2023  Primary Care Physician: Lulu Amos MD    Consults       Date and Time Order Name Status Description    2023  3:16 PM Inpatient Palliative Care MD Consult Completed             Hospital Course       Active Hospital Problems    Diagnosis  POA    **Chest pain [R07.9]  Yes    Elevated brain natriuretic peptide (BNP) level [R79.89]  Yes    End-stage renal disease on hemodialysis  [N18.6, Z99.2]  Not Applicable    Altered mental status [R41.82]  Yes    Cirrhosis of liver [K74.60]  Yes    GERD (gastroesophageal reflux disease) [K21.9]  Yes    Essential hypertension [I10]  Yes    Type 2 diabetes mellitus [E11.9]  Yes      Resolved Hospital Problems   No resolved problems to display.          Hospital Course:  Jeaneth Keita is a 63 y.o. female   with past medical history of essential hypertension, dyslipidemia, type 2 diabetes, GERD, end-stage renal disease on hemodialysis, liver cirrhosis, chronic hep C, hepatocellular carcinoma with bone metastasis who presented to the hospital with chest pain and altered mental state    Chest pain   Dyspnea   Patient presented with intermittent chest pain and orthopnea.  Troponin is mildly elevated and not evolving.  EKG with no acute skin changes.  proBNP is elevated  Chest x-ray with bilateral atelectasis  Felt that her symptoms are secondary to volume overload.  Received hemodialysis on admission with ultrafiltration of 2 L with improvement. Missed HD on 12/18 due to symptoms   Echo from 2021 with normal ejection fraction  Repeat echo similar, no acute heart failure seen      AMS   Liver Cirrhosis   Chronic hep C  Hepatocellular carcinoma with mets with lung/bone  Brain CT on admission with atrophic brain changes but no other acute intercurrent pathology  Started lactulose and rifaximin  Mentation back to baseline      ESRD on HD   Continue HD per nephrology recommendations.      Essential hypertension  HLD   GERD   Continue Coreg  Continue Lipitor  Continue PPI     Type II DM   A1c 5.8%  SSI      Goals of care  My partner discussed with the patient's POA the goals of care  Palliative care was unable to complete consultation this admission     Discharge Follow Up Recommendations for outpatient labs/diagnostics:   PCP 1 week   Nephrology as scheduled       Day of Discharge     HPI:   Doing well, no new concerns and very eager to go home today. No f/c,  n/v/d, soa or cp. No pain. Having daily BM's. Clear mentation, at baseline.     Review of Systems  All other systems negative     Vital Signs:   Temp:  [97.4 °F (36.3 °C)-98.9 °F (37.2 °C)] 98 °F (36.7 °C)  Heart Rate:  [] 89  Resp:  [16-18] 16  BP: (125-148)/() 141/83      Physical Exam:  Constitutional: No acute distress, awake, alert  HENT: NCAT, mucous membranes moist  Respiratory: Clear to auscultation bilaterally, respiratory effort normal   Cardiovascular: RRR, no murmurs, rubs, or gallops  Gastrointestinal: Positive bowel sounds, soft, nontender, nondistended  Musculoskeletal: No right ankle edema; left BKA  Psychiatric: Appropriate affect, cooperative  Neurologic: Oriented x 3, KRISHNAMURTHY, speech clear  Skin: No rashes     Pertinent  and/or Most Recent Results     LAB RESULTS:      Lab 12/21/23  0431 12/19/23  0308 12/18/23  1156   WBC 6.24 5.51 5.29   HEMOGLOBIN 10.0* 9.0* 9.3*   HEMATOCRIT 28.6* 25.8* 26.2*   PLATELETS 161 148 160   NEUTROS ABS  --  3.51 3.31   IMMATURE GRANS (ABS)  --  0.01 0.03   LYMPHS ABS  --  1.32 1.31   MONOS ABS  --  0.36 0.36   EOS ABS  --  0.28 0.25   MCV 95.0 95.2 95.3   PROCALCITONIN  --   --  0.13   LACTATE  --   --  1.2         Lab 12/21/23  0431 12/20/23  0749 12/19/23  0308 12/18/23  1156   SODIUM 136 137 135* 135*   POTASSIUM 4.4 4.7 4.1 4.9   CHLORIDE 102 102 101 98   CO2 22.0 23.0 25.0 29.0   ANION GAP 12.0 12.0 9.0 8.0   BUN 41* 45* 24* 40*   CREATININE 4.14* 5.43* 4.36* 5.26*   EGFR 11.4* 8.2* 10.7* 8.5*   GLUCOSE 140* 140* 174* 302*   CALCIUM 9.0 8.9 8.9 9.2   MAGNESIUM  --   --  2.0  --    PHOSPHORUS  --  4.3  --   --    HEMOGLOBIN A1C  --   --  5.80*  --          Lab 12/20/23  0749 12/19/23  0308 12/18/23  1156   TOTAL PROTEIN  --  6.1 6.7   ALBUMIN 3.5 3.4* 3.8   GLOBULIN  --  2.7 2.9   ALT (SGPT)  --  43* 45*   AST (SGOT)  --  25 25   BILIRUBIN  --  0.4 0.5   ALK PHOS  --  112 138*   LIPASE  --   --  33         Lab 12/18/23  1402 12/18/23  1156   PROBNP  --   4,626.0*   HSTROP T 33* 32*                 Lab 12/18/23  1222   PH, ARTERIAL 7.449   PCO2, ARTERIAL 41.4   PO2 .0   FIO2 36   HCO3 ART 28.7*   BASE EXCESS ART 4.3*   CARBOXYHEMOGLOBIN 1.1     Brief Urine Lab Results  (Last result in the past 365 days)        Color   Clarity   Blood   Leuk Est   Nitrite   Protein   CREAT   Urine HCG        10/17/23 1406 Yellow   Clear   Negative   Negative   Negative   >=300 mg/dL (3+)                 Microbiology Results (last 10 days)       Procedure Component Value - Date/Time    Blood Culture - Blood, Hand, Right [[id number]]  (Normal) Collected: 12/18/23 1202    Lab Status: Preliminary result Specimen: Blood from Hand, Right Updated: 12/21/23 1245     Blood Culture No growth at 3 days    Blood Culture - Blood, Arm, Right [[id number]]  (Normal) Collected: 12/18/23 1156    Lab Status: Preliminary result Specimen: Blood from Arm, Right Updated: 12/21/23 1231     Blood Culture No growth at 3 days    COVID PRE-OP / PRE-PROCEDURE SCREENING ORDER (NO ISOLATION) - Swab, Nasopharynx [[id number]]  (Normal) Collected: 12/18/23 1146    Lab Status: Final result Specimen: Swab from Nasopharynx Updated: 12/18/23 1226    Narrative:      The following orders were created for panel order COVID PRE-OP / PRE-PROCEDURE SCREENING ORDER (NO ISOLATION) - Swab, Nasopharynx.  Procedure                               Abnormality         Status                     ---------                               -----------         ------                     COVID-19 and FLU A/B PCR...[[id number]]  Normal              Final result                 Please view results for these tests on the individual orders.    COVID-19 and FLU A/B PCR, 1 HR TAT - Swab, Nasopharynx [[id number]]  (Normal) Collected: 12/18/23 1146    Lab Status: Final result Specimen: Swab from Nasopharynx Updated: 12/18/23 1226     COVID19 Not Detected     Influenza A PCR Not Detected     Influenza B PCR Not Detected    Narrative:      Fact sheet  for providers: https://www.fda.gov/media/377566/download    Fact sheet for patients: https://www.fda.gov/media/183986/download    Test performed by PCR.            Adult Transthoracic Echo Complete W/ Cont if Necessary Per Protocol    Result Date: 12/20/2023    Left ventricular systolic function is normal. Calculated left ventricular EF of 63%   Left ventricular diastolic function was normal.   Normal right ventricular size and systolic function   No significant valvular abnormality   No pericardial effusion   Compared to prior echocardiogram from 10/6/2021, there is no significant change     CT Chest Without Contrast Diagnostic    Result Date: 12/18/2023  CT CHEST WO CONTRAST DIAGNOSTIC Date of Exam: 12/18/2023 11:23 AM CST Indication: Shortness of breath, hypoxia, renal failure. Comparison: Chest x-ray 12/18/2023 CT abdomen pelvis 6/27/2022 Technique: Axial CT images were obtained of the chest without contrast administration.  Reconstructed coronal and sagittal images were also obtained. Automated exposure control and iterative construction methods were used. Findings: Hilum and Mediastinum: No pathologically enlarged lymph nodes.  Normal heart size.   No pericardial effusion.  Unremarkable thoracic aorta and pulmonary arteries. There is coronary artery calcification. Mitral annulus calcification is noted. There are bilateral thyroid nodules measuring 1.7 x 1.8 cm on the right and 1.0 x 1.1 cm on the left. Lung Parenchyma and Pleura: Evaluation of lung parenchyma demonstrates consolidation at the medial right lung base which appears unchanged from prior studies likely rounded atelectasis and/or scarring. No new focal opacity identified. There is a 3 mm nodule in the right upper lung (image 34 of series 2). Bibasilar atelectasis and/or scarring is noted. Upper Abdomen: Evaluation of the upper abdomen demonstrates a peripherally calcified lesion either within or effacing the inferior vena cava at the portal  confluence (image 85 of series 2). This measures 3.3 x 3.5 cm and was present on previous examination felt to be within the caudate lobe. Additional findings include cholelithiasis. Soft tissues: Unremarkable. Osseous structures: There is an expansile soft tissue involving the posterior right seventh rib near the costovertebral junction (image 47 of series 4). Cortical destruction with soft tissue component is noted measuring 1.0 x 3.4 cm. This is new from prior CT dated 6/27/2022.     Impression: 1. New expansile osseous lesion involving the posterior right seventh rib near the costovertebral junction with cortical destruction noted. 2. Rounded atelectasis at the medial right lung base. No acute cardiopulmonary process. 3. 3 mm nodule in the right upper lung. 4. Peripherally calcified mass effacing the inferior vena cava potentially in the left caudate lobe unchanged. Electronically Signed: Tameka Bravo MD  12/18/2023 12:14 PM CST  Workstation ID: [id number]    CT Head Without Contrast    Result Date: 12/18/2023  CT HEAD WO CONTRAST Date of Exam: 12/18/2023 12:23 PM EST Indication: AMS. Comparison: None available. Technique: Axial CT images were obtained of the head without contrast administration.  Automated exposure control and iterative construction methods were used. Findings: There is no evidence of hemorrhage. There is no mass effect or midline shift. Diffuse brain atrophy. Periventricular hypodense areas compatible with chronic small vessel ischemia There is no extracerebral collection. Ventricles are normal in size and configuration for patient's stated age. Posterior fossa is within normal limits. Calvarium and skull base appear intact.  Visualized sinuses show no air fluid levels. Visualized orbits are unremarkable.     Impression: Brain atrophy with chronic small vessel ischemic changes No acute intracranial abnormality Electronically Signed: Pelon Hess MD  12/18/2023 1:02 PM EST  Workstation ID:  [id number]    XR Chest 1 View    Result Date: 12/18/2023  XR CHEST 1 VW Date of Exam: 12/18/2023 12:00 PM EST Indication: Chest Pain Triage Protocol Comparison: None available. Findings: There are no airspace consolidations. Mild bibasilar discoid atelectasis. No pleural fluid. No pneumothorax. The pulmonary vasculature appears within normal limits. The cardiac and mediastinal silhouette appear unremarkable. No acute osseous abnormality identified.     Impression: Bibasilar discoid atelectasis. Otherwise clear lungs Electronically Signed: Pelon Hess MD  12/18/2023 12:14 PM EST  Workstation ID: [id number]     Results for orders placed during the hospital encounter of 10/05/21    Duplex Initial Vein Mapping Hemodialysis Access Unilateral Left or Right CAR    Interpretation Summary  · The left cephalic vein is patent and of adequate size in the upper arm.  · The left cephalic vein is patent and of adequate size in the forearm.      Results for orders placed during the hospital encounter of 10/05/21    Duplex Initial Vein Mapping Hemodialysis Access Unilateral Left or Right CAR    Interpretation Summary  · The left cephalic vein is patent and of adequate size in the upper arm.  · The left cephalic vein is patent and of adequate size in the forearm.      Results for orders placed during the hospital encounter of 12/18/23    Adult Transthoracic Echo Complete W/ Cont if Necessary Per Protocol    Interpretation Summary    Left ventricular systolic function is normal. Calculated left ventricular EF of 63%    Left ventricular diastolic function was normal.    Normal right ventricular size and systolic function    No significant valvular abnormality    No pericardial effusion    Compared to prior echocardiogram from 10/6/2021, there is no significant change      Plan for Follow-up of Pending Labs/Results: PCP   Pending Labs       Order Current Status    Blood Culture - Blood, Arm, Right Preliminary result    Blood Culture -  Blood, Hand, Right Preliminary result          Discharge Details        Discharge Medications        New Medications        Instructions Start Date   aspirin 81 MG chewable tablet   324 mg, Oral, Once      lactulose 10 GM/15ML solution  Commonly known as: CHRONULAC   30 g, Oral, 2 Times Daily PRN      riFAXIMin 550 MG tablet  Commonly known as: XIFAXAN   550 mg, Oral, Every 12 Hours Scheduled             Changes to Medications        Instructions Start Date   calcium acetate 667 MG capsule capsule  Commonly known as: PHOS BINDER)  What changed: when to take this   667 mg, Oral, 3 Times Daily With Meals      venlafaxine XR 75 MG 24 hr capsule  Commonly known as: EFFEXOR-XR  What changed: how much to take   75 mg, Oral, Daily             Continue These Medications        Instructions Start Date   acetaminophen 325 MG tablet  Commonly known as: TYLENOL   650 mg, Oral, Every 6 Hours PRN      atorvastatin 80 MG tablet  Commonly known as: LIPITOR   80 mg, Oral, Nightly      carvedilol 25 MG tablet  Commonly known as: COREG   25 mg, Oral, 2 Times Daily With Meals      pantoprazole 40 MG EC tablet  Commonly known as: PROTONIX   40 mg, Oral, Daily      QUEtiapine 25 MG tablet  Commonly known as: SEROquel   25 mg, Oral, Nightly      vitamin D 1.25 MG (54370 UT) capsule capsule  Commonly known as: ERGOCALCIFEROL   50,000 Units, Oral, Weekly, Sunday             Stop These Medications      amLODIPine 10 MG tablet  Commonly known as: NORVASC     GaviLAX 17 g packet  Generic drug: polyethylene glycol     Vitamin B Complex-C capsule              Allergies   Allergen Reactions    Sulfa Antibiotics          Discharge Disposition:  Skilled Nursing Facility (DC - External)    Diet:  Hospital:  Diet Order   Procedures    Diet: Renal Diets; Low Phosphorus; Texture: Regular Texture (IDDSI 7); Fluid Consistency: Thin (IDDSI 0)               CODE STATUS:    Code Status and Medical Interventions:   Ordered at: 12/18/23 5203     Medical  Intervention Limits:    NO intubation (DNI)    NO cardioversion     Code Status (Patient has no pulse and is not breathing):    No CPR (Do Not Attempt to Resuscitate)     Medical Interventions (Patient has pulse or is breathing):    Limited Support       No future appointments.    Additional Instructions for the Follow-ups that You Need to Schedule       Ambulatory Referral to Home Health   As directed      Face to Face Visit Date: 12/21/2023   Follow-up provider for Plan of Care?: I treated the patient in an acute care facility and will not continue treatment after discharge.   Follow-up provider: SAFIA JOSE [[id number]]   Reason/Clinical Findings: ESRD, HTN, DM type 2, cirrhosis of the liver   Describe mobility limitations that make leaving home difficult: Wheelchair bound. Impaired functional mobility, balance, gait and endurance   Nursing/Therapeutic Services Requested: Skilled Nursing Physical Therapy Occupational Therapy   Skilled nursing orders: Cardiopulmonary assessments Neurovascular assessments Medication education   PT orders: Therapeutic exercise Transfer training Strengthening Home safety assessment   Occupational orders: Activities of daily living Energy conservation Strengthening Home safety assessment   Frequency: 1 Week 1                      MAYELIN Lee  12/21/23      Time Spent on Discharge:  I spent  45  minutes on this discharge activity which included: face-to-face encounter with the patient, reviewing the data in the system, coordination of the care with the nursing staff as well as consultants, documentation, and entering orders.            Electronically signed by Shanita Thompson APRN at 12/21/23 6853

## 2023-12-23 LAB
BACTERIA SPEC AEROBE CULT: NORMAL
BACTERIA SPEC AEROBE CULT: NORMAL

## 2024-01-19 ENCOUNTER — APPOINTMENT (OUTPATIENT)
Dept: GENERAL RADIOLOGY | Facility: HOSPITAL | Age: 64
End: 2024-01-19
Payer: MEDICARE

## 2024-01-19 ENCOUNTER — HOSPITAL ENCOUNTER (EMERGENCY)
Facility: HOSPITAL | Age: 64
Discharge: HOME | End: 2024-01-19
Attending: EMERGENCY MEDICINE
Payer: MEDICARE

## 2024-01-19 ENCOUNTER — APPOINTMENT (OUTPATIENT)
Dept: CT IMAGING | Facility: HOSPITAL | Age: 64
End: 2024-01-19
Payer: MEDICARE

## 2024-01-19 VITALS
SYSTOLIC BLOOD PRESSURE: 135 MMHG | TEMPERATURE: 98.2 F | WEIGHT: 170 LBS | OXYGEN SATURATION: 99 % | HEIGHT: 68 IN | DIASTOLIC BLOOD PRESSURE: 73 MMHG | RESPIRATION RATE: 16 BRPM | BODY MASS INDEX: 25.76 KG/M2 | HEART RATE: 93 BPM

## 2024-01-19 DIAGNOSIS — R55 NEAR SYNCOPE: ICD-10-CM

## 2024-01-19 DIAGNOSIS — Z99.2 ESRD ON HEMODIALYSIS: ICD-10-CM

## 2024-01-19 DIAGNOSIS — E87.6 HYPOKALEMIA: ICD-10-CM

## 2024-01-19 DIAGNOSIS — R42 DIZZINESS: Primary | ICD-10-CM

## 2024-01-19 DIAGNOSIS — N18.6 ESRD ON HEMODIALYSIS: ICD-10-CM

## 2024-01-19 LAB
ALBUMIN SERPL-MCNC: 3.7 G/DL (ref 3.5–5.2)
ALBUMIN/GLOB SERPL: 1.3 G/DL
ALP SERPL-CCNC: 119 U/L (ref 39–117)
ALT SERPL W P-5'-P-CCNC: 58 U/L (ref 1–33)
ANION GAP SERPL CALCULATED.3IONS-SCNC: 10 MMOL/L (ref 5–15)
AST SERPL-CCNC: 35 U/L (ref 1–32)
BASOPHILS # BLD AUTO: 0.02 10*3/MM3 (ref 0–0.2)
BASOPHILS NFR BLD AUTO: 0.4 % (ref 0–1.5)
BILIRUB SERPL-MCNC: 0.5 MG/DL (ref 0–1.2)
BUN SERPL-MCNC: 13 MG/DL (ref 8–23)
BUN/CREAT SERPL: 5.9 (ref 7–25)
CALCIUM SPEC-SCNC: 8.8 MG/DL (ref 8.6–10.5)
CHLORIDE SERPL-SCNC: 96 MMOL/L (ref 98–107)
CO2 SERPL-SCNC: 34 MMOL/L (ref 22–29)
CREAT SERPL-MCNC: 2.2 MG/DL (ref 0.57–1)
D DIMER PPP FEU-MCNC: 0.86 MCGFEU/ML (ref 0–0.65)
DEPRECATED RDW RBC AUTO: 40.8 FL (ref 37–54)
EGFRCR SERPLBLD CKD-EPI 2021: 24.3 ML/MIN/1.73
EOSINOPHIL # BLD AUTO: 0.16 10*3/MM3 (ref 0–0.4)
EOSINOPHIL NFR BLD AUTO: 3.4 % (ref 0.3–6.2)
ERYTHROCYTE [DISTWIDTH] IN BLOOD BY AUTOMATED COUNT: 11.9 % (ref 12.3–15.4)
GLOBULIN UR ELPH-MCNC: 2.8 GM/DL
GLUCOSE SERPL-MCNC: 183 MG/DL (ref 65–99)
HCT VFR BLD AUTO: 26.9 % (ref 34–46.6)
HGB BLD-MCNC: 9.5 G/DL (ref 12–15.9)
IMM GRANULOCYTES # BLD AUTO: 0.01 10*3/MM3 (ref 0–0.05)
IMM GRANULOCYTES NFR BLD AUTO: 0.2 % (ref 0–0.5)
INR PPP: 0.97 (ref 0.89–1.12)
LYMPHOCYTES # BLD AUTO: 1.39 10*3/MM3 (ref 0.7–3.1)
LYMPHOCYTES NFR BLD AUTO: 29.9 % (ref 19.6–45.3)
MAGNESIUM SERPL-MCNC: 1.8 MG/DL (ref 1.6–2.4)
MCH RBC QN AUTO: 33.1 PG (ref 26.6–33)
MCHC RBC AUTO-ENTMCNC: 35.3 G/DL (ref 31.5–35.7)
MCV RBC AUTO: 93.7 FL (ref 79–97)
MONOCYTES # BLD AUTO: 0.34 10*3/MM3 (ref 0.1–0.9)
MONOCYTES NFR BLD AUTO: 7.3 % (ref 5–12)
NEUTROPHILS NFR BLD AUTO: 2.73 10*3/MM3 (ref 1.7–7)
NEUTROPHILS NFR BLD AUTO: 58.8 % (ref 42.7–76)
NRBC BLD AUTO-RTO: 0 /100 WBC (ref 0–0.2)
NT-PROBNP SERPL-MCNC: 2615 PG/ML (ref 0–900)
PLATELET # BLD AUTO: 156 10*3/MM3 (ref 140–450)
PMV BLD AUTO: 9.7 FL (ref 6–12)
POTASSIUM SERPL-SCNC: 3.4 MMOL/L (ref 3.5–5.2)
PROT SERPL-MCNC: 6.5 G/DL (ref 6–8.5)
PROTHROMBIN TIME: 13 SECONDS (ref 12.2–14.5)
RBC # BLD AUTO: 2.87 10*6/MM3 (ref 3.77–5.28)
SODIUM SERPL-SCNC: 140 MMOL/L (ref 136–145)
WBC NRBC COR # BLD AUTO: 4.65 10*3/MM3 (ref 3.4–10.8)

## 2024-01-19 PROCEDURE — 70450 CT HEAD/BRAIN W/O DYE: CPT

## 2024-01-19 PROCEDURE — 36415 COLL VENOUS BLD VENIPUNCTURE: CPT

## 2024-01-19 PROCEDURE — 83735 ASSAY OF MAGNESIUM: CPT | Performed by: EMERGENCY MEDICINE

## 2024-01-19 PROCEDURE — 83880 ASSAY OF NATRIURETIC PEPTIDE: CPT | Performed by: EMERGENCY MEDICINE

## 2024-01-19 PROCEDURE — 71045 X-RAY EXAM CHEST 1 VIEW: CPT

## 2024-01-19 PROCEDURE — 71275 CT ANGIOGRAPHY CHEST: CPT

## 2024-01-19 PROCEDURE — 25510000001 IOPAMIDOL PER 1 ML: Performed by: EMERGENCY MEDICINE

## 2024-01-19 PROCEDURE — 85379 FIBRIN DEGRADATION QUANT: CPT | Performed by: EMERGENCY MEDICINE

## 2024-01-19 PROCEDURE — 85025 COMPLETE CBC W/AUTO DIFF WBC: CPT | Performed by: EMERGENCY MEDICINE

## 2024-01-19 PROCEDURE — 93005 ELECTROCARDIOGRAM TRACING: CPT | Performed by: EMERGENCY MEDICINE

## 2024-01-19 PROCEDURE — 99285 EMERGENCY DEPT VISIT HI MDM: CPT

## 2024-01-19 PROCEDURE — 85610 PROTHROMBIN TIME: CPT | Performed by: EMERGENCY MEDICINE

## 2024-01-19 PROCEDURE — 80053 COMPREHEN METABOLIC PANEL: CPT | Performed by: EMERGENCY MEDICINE

## 2024-01-19 RX ORDER — POTASSIUM CHLORIDE 1.5 G/1.58G
20 POWDER, FOR SOLUTION ORAL ONCE
Status: COMPLETED | OUTPATIENT
Start: 2024-01-19 | End: 2024-01-19

## 2024-01-19 RX ORDER — POTASSIUM CHLORIDE 750 MG/1
10 TABLET, FILM COATED, EXTENDED RELEASE ORAL 2 TIMES DAILY
Qty: 6 TABLET | Refills: 0 | Status: SHIPPED | OUTPATIENT
Start: 2024-01-19 | End: 2024-01-19 | Stop reason: SDUPTHER

## 2024-01-19 RX ORDER — POTASSIUM CHLORIDE 750 MG/1
10 TABLET, FILM COATED, EXTENDED RELEASE ORAL 2 TIMES DAILY
Qty: 6 TABLET | Refills: 0 | Status: SHIPPED | OUTPATIENT
Start: 2024-01-19 | End: 2024-01-22

## 2024-01-19 RX ADMIN — POTASSIUM CHLORIDE 20 MEQ: 1.5 POWDER, FOR SOLUTION ORAL at 15:31

## 2024-01-19 RX ADMIN — IOPAMIDOL 80 ML: 755 INJECTION, SOLUTION INTRAVENOUS at 15:39

## 2024-01-19 NOTE — CASE MANAGEMENT/SOCIAL WORK
Continued Stay Note  [facility]     Patient Name: Jeaneth Keita  MRN: [id number]  Today's Date: 1/19/2024    Admit Date: 1/19/2024    Plan: [facility]   Discharge Plan       Row Name 01/19/24 1619       Plan    Plan [facility]    Plan Comments MSW contacted by RN regarding transportation assistance. Pt has personal WC with her; MSW spoke to [facility] who reported they can transport in about 30min. MSW updated RN.    Final Discharge Disposition Code 01 - home or self-care                   Discharge Codes    No documentation.                       SURJIT Saucedo

## 2024-01-19 NOTE — ED PROVIDER NOTES
"Subjective   History of Present Illness  63-year-old female sent to the emergency department via EMS from hemodialysis to be evaluated for dizziness and near syncope.  Today, the patient was being dialyzed when she became acutely dizzy and nearly \"passed out.\"  They were able to complete her dialysis session and gave her a 500 cc crystalloid bolus before sending her to the ED to be evaluated.  She notes that she currently feels much better.  She denies any accompanying chest pain, shortness of breath, or palpitations.  She denies any family history of sudden cardiac death.      Review of Systems   Neurological:  Positive for dizziness.        Near syncope   All other systems reviewed and are negative.      Past Medical History:   Diagnosis Date    Anxiety     Cancer     liver cell carcinoma    DDD (degenerative disc disease), lumbar     Depression     Diabetes mellitus     Fibromyalgia     Hemochromatosis     Hep B w/o coma, chronic, w/o delta     Hep C w/o coma, chronic     Hypertension     Stroke     Vertigo        Allergies   Allergen Reactions    Sulfa Antibiotics        Past Surgical History:   Procedure Laterality Date    ARTERIOVENOUS FISTULA/SHUNT SURGERY Left 10/16/2021    Procedure: UPPER EXTREMITY ARTERIOVENOUS FISTULA FORMATION LEFT;  Surgeon: Johnny Rousseau MD;  Location: [facility];  Service: Vascular;  Laterality: Left;    BACK SURGERY      BELOW KNEE LEG AMPUTATION       SECTION      FOOT SURGERY      KNEE SURGERY      ROTATOR CUFF REPAIR      SPINAL CORD STIMULATOR IMPLANT         Family History   Problem Relation Age of Onset    Heart disease Father     Heart attack Father        Social History     Socioeconomic History    Marital status:    Tobacco Use    Smoking status: Never    Smokeless tobacco: Never   Vaping Use    Vaping Use: Never used   Substance and Sexual Activity    Alcohol use: No    Drug use: No    Sexual activity: Defer           Objective   Physical " "Exam  Vitals and nursing note reviewed.   Constitutional:       General: She is not in acute distress.     Appearance: She is well-developed. She is not diaphoretic.      Comments: Nontoxic-appearing female   HENT:      Head: Normocephalic and atraumatic.   Eyes:      Pupils: Pupils are equal, round, and reactive to light.   Cardiovascular:      Rate and Rhythm: Normal rate and regular rhythm.      Heart sounds: Normal heart sounds. No murmur heard.     No friction rub. No gallop.   Pulmonary:      Effort: Pulmonary effort is normal. No respiratory distress.      Breath sounds: Normal breath sounds. No wheezing or rales.   Abdominal:      General: Bowel sounds are normal. There is no distension.      Palpations: Abdomen is soft. There is no mass.      Tenderness: There is no abdominal tenderness. There is no guarding or rebound.   Musculoskeletal:         General: Normal range of motion.   Skin:     General: Skin is warm and dry.      Findings: No erythema or rash.   Neurological:      Mental Status: She is alert.      Comments: Awake, alert, and oriented x3, clear and fluent speech, no dysmetria, neurovascularly intact distally in all fours with bounding distal pulses and normal sensation, 5 out of 5 strength in all fours, no cranial nerve deficits noted with cranial nerves II through XII grossly intact   Psychiatric:         Mood and Affect: Mood normal.         Thought Content: Thought content normal.         Judgment: Judgment normal.         Procedures           ED Course  ED Course as of 01/19/24 1611   Fri Jan 19, 2024   1431 63-year-old female sent to the emergency department via EMS from hemodialysis to be evaluated for dizziness and near syncope.  The patient was being dialyzed today when she became acutely dizzy and nearly \"passed out.\"  They were able to complete her dialysis and gave her a 500 cc crystalloid bolus and sent her to the ED to be evaluated.  On arrival, the patient is nontoxic-appearing.  " Vital signs are reassuring.  Differential diagnosis is quite broad.  We will obtain labs and imaging, and we will reassess following initial interventions. [DD]   1454 I personally and independently reviewed the patient's CT images and findings, and I am in agreement with the radiologist regarding CT interpretation--particularly, there is no emergent intracranial process present. [DD]   1525 I personally and independently viewed the patient's x-ray images myself, and I am in agreement with the radiologist's reading for final interpretation--particularly there is no pneumonia present. [DD]   1525 Labs remarkable only for mild hypokalemia.  Labs otherwise at baseline.  Potassium replaced orally.  D-dimer is elevated.  We will obtain a chest CTA. [DD]   1601 I personally and independently reviewed the patient's CT images and findings, and I am in agreement with the radiologist regarding CT interpretation--particularly, there is no emergent cardiothoracic process present. [DD]   1601 Upon reevaluation, the patient looks and feels well. [DD]   1601   Vital signs reassuring.  She feels at baseline.  I feel that she can be managed on an outpatient basis at this point.  I have referred her to our heart and valve clinic for prompt outpatient follow-up within the next 72 hours given her near syncopal episode.  She will follow-up as directed.  Agreeable with plan and given appropriate strict return precautions. [DD]   1601 Prescription for oral potassium replacement over the next 3 days as well. [DD]      ED Course User Index  [DD] Blake Lira MD                                 Recent Results (from the past 24 hour(s))   Comprehensive Metabolic Panel    Collection Time: 01/19/24  2:32 PM    Specimen: Blood   Result Value Ref Range    Glucose 183 (H) 65 - 99 mg/dL    BUN 13 8 - 23 mg/dL    Creatinine 2.20 (H) 0.57 - 1.00 mg/dL    Sodium 140 136 - 145 mmol/L    Potassium 3.4 (L) 3.5 - 5.2 mmol/L    Chloride 96 (L) 98 -  107 mmol/L    CO2 34.0 (H) 22.0 - 29.0 mmol/L    Calcium 8.8 8.6 - 10.5 mg/dL    Total Protein 6.5 6.0 - 8.5 g/dL    Albumin 3.7 3.5 - 5.2 g/dL    ALT (SGPT) 58 (H) 1 - 33 U/L    AST (SGOT) 35 (H) 1 - 32 U/L    Alkaline Phosphatase 119 (H) 39 - 117 U/L    Total Bilirubin 0.5 0.0 - 1.2 mg/dL    Globulin 2.8 gm/dL    A/G Ratio 1.3 g/dL    BUN/Creatinine Ratio 5.9 (L) 7.0 - 25.0    Anion Gap 10.0 5.0 - 15.0 mmol/L    eGFR 24.3 (L) >60.0 mL/min/1.73   Protime-INR    Collection Time: 01/19/24  2:32 PM    Specimen: Blood   Result Value Ref Range    Protime 13.0 12.2 - 14.5 Seconds    INR 0.97 0.89 - 1.12   BNP    Collection Time: 01/19/24  2:32 PM    Specimen: Blood   Result Value Ref Range    proBNP 2,615.0 (H) 0.0 - 900.0 pg/mL   D-dimer, Quantitative    Collection Time: 01/19/24  2:32 PM    Specimen: Blood   Result Value Ref Range    D-Dimer, Quantitative 0.86 (H) 0.00 - 0.65 MCGFEU/mL   Magnesium    Collection Time: 01/19/24  2:32 PM    Specimen: Blood   Result Value Ref Range    Magnesium 1.8 1.6 - 2.4 mg/dL   CBC Auto Differential    Collection Time: 01/19/24  2:32 PM    Specimen: Blood   Result Value Ref Range    WBC 4.65 3.40 - 10.80 10*3/mm3    RBC 2.87 (L) 3.77 - 5.28 10*6/mm3    Hemoglobin 9.5 (L) 12.0 - 15.9 g/dL    Hematocrit 26.9 (L) 34.0 - 46.6 %    MCV 93.7 79.0 - 97.0 fL    MCH 33.1 (H) 26.6 - 33.0 pg    MCHC 35.3 31.5 - 35.7 g/dL    RDW 11.9 (L) 12.3 - 15.4 %    RDW-SD 40.8 37.0 - 54.0 fl    MPV 9.7 6.0 - 12.0 fL    Platelets 156 140 - 450 10*3/mm3    Neutrophil % 58.8 42.7 - 76.0 %    Lymphocyte % 29.9 19.6 - 45.3 %    Monocyte % 7.3 5.0 - 12.0 %    Eosinophil % 3.4 0.3 - 6.2 %    Basophil % 0.4 0.0 - 1.5 %    Immature Grans % 0.2 0.0 - 0.5 %    Neutrophils, Absolute 2.73 1.70 - 7.00 10*3/mm3    Lymphocytes, Absolute 1.39 0.70 - 3.10 10*3/mm3    Monocytes, Absolute 0.34 0.10 - 0.90 10*3/mm3    Eosinophils, Absolute 0.16 0.00 - 0.40 10*3/mm3    Basophils, Absolute 0.02 0.00 - 0.20 10*3/mm3    Immature  Grans, Absolute 0.01 0.00 - 0.05 10*3/mm3    nRBC 0.0 0.0 - 0.2 /100 WBC   ECG 12 Lead Syncope    Collection Time: 01/19/24  3:07 PM   Result Value Ref Range    QT Interval 404 ms    QTC Interval 505 ms     Note: In addition to lab results from this visit, the labs listed above may include labs taken at another facility or during a different encounter within the last 24 hours. Please correlate lab times with ED admission and discharge times for further clarification of the services performed during this visit.    CT Angiogram Chest   Final Result   Impression:      1. Stable area of round atelectasis in the medial right lower lobe. No acute cardiopulmonary process.. No pulmonary embolus.   2. Expansile lesion involving the posterior right seventh rib near the costo vertebral junction is again noted, unchanged. There is rim calcified 3 x 3.5 cm mass in the liver also previously described. This patient has a reported history of    hepatocellular carcinoma with bone metastases.   3. Multiple hypodensities in the thyroid gland measuring up to 2.7 cm likely multinodular goiter,. Consider further evaluation with a nonemergent thyroid ultrasound if this has not been previously evaluated..            Electronically Signed: Keon Ramirez DO     1/19/2024 3:56 PM EST     Workstation ID: [id number]      XR Chest 1 View   Final Result   Impression:   1.No acute radiographic abnormality is identified.      Electronically Signed: Shawn Gomez MD     1/19/2024 2:53 PM EST     Workstation ID: [id number]      CT Head Without Contrast   Final Result   1.No evidence for acute intracranial abnormality.   2.Nonspecific white matter changes are noted with associated diffuse volume loss. These findings are likely related to chronic small vessel ischemic changes and/or age-related changes.            Electronically Signed: Maxwell Louie MD     1/19/2024 2:42 PM EST     Workstation ID: [id number]        Vitals:    01/19/24 1429 01/19/24  "1500   BP: 148/89 145/80   Pulse: 92 94   Resp: 16    Temp: 98.2 °F (36.8 °C)    TempSrc: Oral    SpO2: 97% 97%   Weight: 77.1 kg (170 lb)    Height: 172.7 cm (68\")      Medications   potassium chloride (KLOR-CON) packet 20 mEq (20 mEq Oral Given 1/19/24 1531)   iopamidol (ISOVUE-370) 76 % injection 100 mL (80 mL Intravenous Given 1/19/24 1539)     ECG/EMG Results (last 24 hours)       Procedure Component Value Units Date/Time    ECG 12 Lead Syncope [[id number]] Collected: 01/19/24 1507     Updated: 01/19/24 1508     QT Interval 404 ms      QTC Interval 505 ms     Narrative:      Test Reason : Syncope  Blood Pressure :   */*   mmHG  Vent. Rate :  94 BPM     Atrial Rate :  94 BPM     P-R Int : 162 ms          QRS Dur :  70 ms      QT Int : 404 ms       P-R-T Axes :  20   5  22 degrees     QTc Int : 505 ms    Normal sinus rhythm  Low voltage QRS  Prolonged QT  Abnormal ECG  When compared with ECG of 18-DEC-2023 13:52,  No significant change was found    Referred By: EDMD           Confirmed By:           ECG 12 Lead Syncope   Preliminary Result   Test Reason : Syncope   Blood Pressure :   */*   mmHG   Vent. Rate :  94 BPM     Atrial Rate :  94 BPM      P-R Int : 162 ms          QRS Dur :  70 ms       QT Int : 404 ms       P-R-T Axes :  20   5  22 degrees      QTc Int : 505 ms      Normal sinus rhythm   Low voltage QRS   Prolonged QT   Abnormal ECG   When compared with ECG of 18-DEC-2023 13:52,   No significant change was found      Referred By: EDMD           Confirmed By:                       Medical Decision Making  Problems Addressed:  Dizziness: complicated acute illness or injury  ESRD on hemodialysis: complicated acute illness or injury  Hypokalemia: complicated acute illness or injury  Near syncope: complicated acute illness or injury    Amount and/or Complexity of Data Reviewed  Labs: ordered.  Radiology: ordered.  ECG/medicine tests: ordered.    Risk  Prescription drug management.        Final diagnoses: "   Dizziness   Near syncope   ESRD on hemodialysis   Hypokalemia       ED Disposition  ED Disposition       ED Disposition   Discharge    Condition   Stable    Comment   --               [facility] CARDIOLOG[facility]  [address]  [address]  [place] [id number] [phone]  In 3 days           Medication List        New Prescriptions      potassium chloride 10 MEQ CR tablet  Take 1 tablet by mouth 2 (Two) Times a Day for 3 days.            Changed      calcium acetate 667 MG capsule capsule  Commonly known as: PHOS BINDER)  Take 1 capsule by mouth 3 (Three) Times a Day With Meals.  What changed: when to take this               Where to Get Your Medications        These medications were sent to [facility]/pharmacy #[id number] - [place], [place] - [address] - [phone]  - [phone]   [address] [place] [address]      Phone: [phone]   potassium chloride 10 MEQ CR tablet            Blake Lira MD  01/19/24 8872

## 2024-01-19 NOTE — CASE MANAGEMENT/SOCIAL WORK
Continued Stay Note  [facility]     Patient Name: Jeaneth Keita  MRN: [id number]  Today's Date: 1/19/2024    Admit Date: 1/19/2024    Plan: [facility]   Discharge Plan       Row Name 01/19/24 1735       Plan    Plan [facility]    Plan Comments MSW received call from [facility] who reported incorrect address. MSW reviewed Pt’s previous chart, MSW spoke to James Sanchez who confirmed Pt’s who is a resident. MSW updated [facility].      Row Name 01/19/24 1619                                         Discharge Codes    No documentation.                       SURJIT Saucedo

## 2024-01-22 LAB
QT INTERVAL: 404 MS
QTC INTERVAL: 505 MS

## 2024-01-24 ENCOUNTER — APPOINTMENT (OUTPATIENT)
Dept: CT IMAGING | Facility: HOSPITAL | Age: 64
End: 2024-01-24
Payer: MEDICARE

## 2024-01-24 ENCOUNTER — HOSPITAL ENCOUNTER (EMERGENCY)
Facility: HOSPITAL | Age: 64
Discharge: NURSING, REHAB OR LONG-TERM CARE | End: 2024-01-24
Attending: EMERGENCY MEDICINE | Admitting: EMERGENCY MEDICINE
Payer: MEDICARE

## 2024-01-24 VITALS
OXYGEN SATURATION: 95 % | SYSTOLIC BLOOD PRESSURE: 143 MMHG | WEIGHT: 170 LBS | DIASTOLIC BLOOD PRESSURE: 69 MMHG | TEMPERATURE: 98.5 F | HEIGHT: 68 IN | BODY MASS INDEX: 25.76 KG/M2 | RESPIRATION RATE: 17 BRPM | HEART RATE: 89 BPM

## 2024-01-24 DIAGNOSIS — Z99.2 END-STAGE RENAL DISEASE ON HEMODIALYSIS: ICD-10-CM

## 2024-01-24 DIAGNOSIS — R10.9 NONSPECIFIC ABDOMINAL PAIN: Primary | ICD-10-CM

## 2024-01-24 DIAGNOSIS — N18.6 END-STAGE RENAL DISEASE ON HEMODIALYSIS: ICD-10-CM

## 2024-01-24 LAB
ALBUMIN SERPL-MCNC: 3.6 G/DL (ref 3.5–5.2)
ALBUMIN/GLOB SERPL: 1.3 G/DL
ALP SERPL-CCNC: 137 U/L (ref 39–117)
ALT SERPL W P-5'-P-CCNC: 38 U/L (ref 1–33)
ANION GAP SERPL CALCULATED.3IONS-SCNC: 8 MMOL/L (ref 5–15)
AST SERPL-CCNC: 23 U/L (ref 1–32)
BASOPHILS # BLD AUTO: 0.03 10*3/MM3 (ref 0–0.2)
BASOPHILS NFR BLD AUTO: 0.6 % (ref 0–1.5)
BILIRUB SERPL-MCNC: 0.6 MG/DL (ref 0–1.2)
BUN SERPL-MCNC: 32 MG/DL (ref 8–23)
BUN/CREAT SERPL: 6.9 (ref 7–25)
CALCIUM SPEC-SCNC: 8.4 MG/DL (ref 8.6–10.5)
CHLORIDE SERPL-SCNC: 95 MMOL/L (ref 98–107)
CO2 SERPL-SCNC: 31 MMOL/L (ref 22–29)
CREAT SERPL-MCNC: 4.61 MG/DL (ref 0.57–1)
D-LACTATE SERPL-SCNC: 1.8 MMOL/L (ref 0.5–2)
DEPRECATED RDW RBC AUTO: 42.6 FL (ref 37–54)
EGFRCR SERPLBLD CKD-EPI 2021: 10 ML/MIN/1.73
EOSINOPHIL # BLD AUTO: 0.26 10*3/MM3 (ref 0–0.4)
EOSINOPHIL NFR BLD AUTO: 4.9 % (ref 0.3–6.2)
ERYTHROCYTE [DISTWIDTH] IN BLOOD BY AUTOMATED COUNT: 11.9 % (ref 12.3–15.4)
GLOBULIN UR ELPH-MCNC: 2.7 GM/DL
GLUCOSE SERPL-MCNC: 339 MG/DL (ref 65–99)
HCT VFR BLD AUTO: 24.9 % (ref 34–46.6)
HGB BLD-MCNC: 8.4 G/DL (ref 12–15.9)
HOLD SPECIMEN: NORMAL
IMM GRANULOCYTES # BLD AUTO: 0.01 10*3/MM3 (ref 0–0.05)
IMM GRANULOCYTES NFR BLD AUTO: 0.2 % (ref 0–0.5)
LIPASE SERPL-CCNC: 53 U/L (ref 13–60)
LYMPHOCYTES # BLD AUTO: 1.13 10*3/MM3 (ref 0.7–3.1)
LYMPHOCYTES NFR BLD AUTO: 21.3 % (ref 19.6–45.3)
MCH RBC QN AUTO: 33.1 PG (ref 26.6–33)
MCHC RBC AUTO-ENTMCNC: 33.7 G/DL (ref 31.5–35.7)
MCV RBC AUTO: 98 FL (ref 79–97)
MONOCYTES # BLD AUTO: 0.47 10*3/MM3 (ref 0.1–0.9)
MONOCYTES NFR BLD AUTO: 8.9 % (ref 5–12)
NEUTROPHILS NFR BLD AUTO: 3.4 10*3/MM3 (ref 1.7–7)
NEUTROPHILS NFR BLD AUTO: 64.1 % (ref 42.7–76)
NRBC BLD AUTO-RTO: 0 /100 WBC (ref 0–0.2)
PLATELET # BLD AUTO: 160 10*3/MM3 (ref 140–450)
PMV BLD AUTO: 10.2 FL (ref 6–12)
POTASSIUM SERPL-SCNC: 4.9 MMOL/L (ref 3.5–5.2)
PROT SERPL-MCNC: 6.3 G/DL (ref 6–8.5)
QT INTERVAL: 360 MS
QTC INTERVAL: 464 MS
RBC # BLD AUTO: 2.54 10*6/MM3 (ref 3.77–5.28)
SODIUM SERPL-SCNC: 134 MMOL/L (ref 136–145)
TROPONIN T SERPL HS-MCNC: 33 NG/L
WBC NRBC COR # BLD AUTO: 5.3 10*3/MM3 (ref 3.4–10.8)
WHOLE BLOOD HOLD COAG: NORMAL
WHOLE BLOOD HOLD SPECIMEN: NORMAL

## 2024-01-24 PROCEDURE — 74176 CT ABD & PELVIS W/O CONTRAST: CPT

## 2024-01-24 PROCEDURE — 96374 THER/PROPH/DIAG INJ IV PUSH: CPT

## 2024-01-24 PROCEDURE — 25010000002 HYDROMORPHONE PER 4 MG: Performed by: EMERGENCY MEDICINE

## 2024-01-24 PROCEDURE — 84484 ASSAY OF TROPONIN QUANT: CPT | Performed by: EMERGENCY MEDICINE

## 2024-01-24 PROCEDURE — 85025 COMPLETE CBC W/AUTO DIFF WBC: CPT | Performed by: EMERGENCY MEDICINE

## 2024-01-24 PROCEDURE — 93005 ELECTROCARDIOGRAM TRACING: CPT | Performed by: EMERGENCY MEDICINE

## 2024-01-24 PROCEDURE — 99284 EMERGENCY DEPT VISIT MOD MDM: CPT

## 2024-01-24 PROCEDURE — 80053 COMPREHEN METABOLIC PANEL: CPT | Performed by: EMERGENCY MEDICINE

## 2024-01-24 PROCEDURE — 83690 ASSAY OF LIPASE: CPT | Performed by: EMERGENCY MEDICINE

## 2024-01-24 PROCEDURE — 83605 ASSAY OF LACTIC ACID: CPT | Performed by: PHYSICIAN ASSISTANT

## 2024-01-24 RX ORDER — HYDROMORPHONE HYDROCHLORIDE 1 MG/ML
0.5 INJECTION, SOLUTION INTRAMUSCULAR; INTRAVENOUS; SUBCUTANEOUS ONCE
Status: COMPLETED | OUTPATIENT
Start: 2024-01-24 | End: 2024-01-24

## 2024-01-24 RX ORDER — SODIUM CHLORIDE 0.9 % (FLUSH) 0.9 %
10 SYRINGE (ML) INJECTION AS NEEDED
Status: DISCONTINUED | OUTPATIENT
Start: 2024-01-24 | End: 2024-01-24 | Stop reason: HOSPADM

## 2024-01-24 RX ADMIN — HYDROMORPHONE HYDROCHLORIDE 0.5 MG: 1 INJECTION, SOLUTION INTRAMUSCULAR; INTRAVENOUS; SUBCUTANEOUS at 02:32

## 2024-01-24 NOTE — DISCHARGE INSTRUCTIONS
Symptomatic care is recommended. Take all medications as prescribed and instructed. Follow up as scheduled for your dialysis tomorrow and with your primary care as directed or return to Emergency Department with worsening of symptoms.

## 2024-01-24 NOTE — ED PROVIDER NOTES
EMERGENCY DEPARTMENT ENCOUNTER    Pt Name: Jeaneth Keita  MRN: [id number]  Pt :   1960  Room Number:  MACK/MACK  Date of encounter:  2024  PCP: Lulu Amos MD  ED Provider: BARI Samano    Historian: Patient    HPI:  Chief Complaint: Abdominal Pain     Context: Jeaneth Keita is a 63 y.o. female who presents to the ED c/o abdominal pain.  Patient with past medical history significant for hypertension, hyperlipidemia, diabetes, GERD, end-stage renal disease on hemodialysis, liver cirrhosis, chronic hepatitis C and hepatocellular carcinoma with bone metastasis who presents to the ER with upper abdominal pain that started earlier this evening.  Patient reports that she has been having pain in her upper abdomen.  She denies any nausea or vomiting.  She denies any diarrhea or constipation.  She has not had a fever.  She reports that she has not urinated in quite some time.  She reports no prior abdominal surgeries.  HPI     REVIEW OF SYSTEMS  A chief complaint appropriate review of systems was completed and is negative except as noted in the HPI.     PAST MEDICAL HISTORY  Past Medical History:   Diagnosis Date    Anxiety     Cancer     liver cell carcinoma    DDD (degenerative disc disease), lumbar     Depression     Diabetes mellitus     Fibromyalgia     Hemochromatosis     Hep B w/o coma, chronic, w/o delta     Hep C w/o coma, chronic     Hypertension     Stroke     Vertigo        PAST SURGICAL HISTORY  Past Surgical History:   Procedure Laterality Date    ARTERIOVENOUS FISTULA/SHUNT SURGERY Left 10/16/2021    Procedure: UPPER EXTREMITY ARTERIOVENOUS FISTULA FORMATION LEFT;  Surgeon: Johnny Rousseau MD;  Location: [facility];  Service: Vascular;  Laterality: Left;    BACK SURGERY      BELOW KNEE LEG AMPUTATION       SECTION      FOOT SURGERY      KNEE SURGERY      ROTATOR CUFF REPAIR      SPINAL CORD STIMULATOR IMPLANT         FAMILY HISTORY  Family History   Problem  Relation Age of Onset    Heart disease Father     Heart attack Father        SOCIAL HISTORY  Social History     Socioeconomic History    Marital status:    Tobacco Use    Smoking status: Never    Smokeless tobacco: Never   Vaping Use    Vaping Use: Never used   Substance and Sexual Activity    Alcohol use: No    Drug use: No    Sexual activity: Defer       ALLERGIES  Sulfa antibiotics    PHYSICAL EXAM  Physical Exam  Vitals and nursing note reviewed.   Constitutional:       General: She is not in acute distress.     Appearance: Normal appearance. She is not ill-appearing or toxic-appearing.   HENT:      Head: Normocephalic and atraumatic.      Nose: Nose normal.      Mouth/Throat:      Mouth: Mucous membranes are moist.   Eyes:      Extraocular Movements: Extraocular movements intact.   Cardiovascular:      Rate and Rhythm: Normal rate.   Pulmonary:      Effort: Pulmonary effort is normal.   Abdominal:      General: There is no distension.      Tenderness: There is generalized abdominal tenderness and tenderness in the epigastric area. There is no guarding or rebound.   Musculoskeletal:         General: Normal range of motion.      Cervical back: Normal range of motion and neck supple.   Skin:     General: Skin is warm and dry.   Neurological:      General: No focal deficit present.      Mental Status: She is alert.   Psychiatric:         Mood and Affect: Mood normal.         Behavior: Behavior normal.       LAB RESULTS  Results for orders placed or performed during the hospital encounter of 01/24/24   Comprehensive Metabolic Panel    Specimen: Blood   Result Value Ref Range    Glucose 339 (H) 65 - 99 mg/dL    BUN 32 (H) 8 - 23 mg/dL    Creatinine 4.61 (H) 0.57 - 1.00 mg/dL    Sodium 134 (L) 136 - 145 mmol/L    Potassium 4.9 3.5 - 5.2 mmol/L    Chloride 95 (L) 98 - 107 mmol/L    CO2 31.0 (H) 22.0 - 29.0 mmol/L    Calcium 8.4 (L) 8.6 - 10.5 mg/dL    Total Protein 6.3 6.0 - 8.5 g/dL    Albumin 3.6 3.5 - 5.2  g/dL    ALT (SGPT) 38 (H) 1 - 33 U/L    AST (SGOT) 23 1 - 32 U/L    Alkaline Phosphatase 137 (H) 39 - 117 U/L    Total Bilirubin 0.6 0.0 - 1.2 mg/dL    Globulin 2.7 gm/dL    A/G Ratio 1.3 g/dL    BUN/Creatinine Ratio 6.9 (L) 7.0 - 25.0    Anion Gap 8.0 5.0 - 15.0 mmol/L    eGFR 10.0 (L) >60.0 mL/min/1.73   Lipase    Specimen: Blood   Result Value Ref Range    Lipase 53 13 - 60 U/L   Single High Sensitivity Troponin T    Specimen: Blood   Result Value Ref Range    HS Troponin T 33 (H) <14 ng/L   CBC Auto Differential    Specimen: Blood   Result Value Ref Range    WBC 5.30 3.40 - 10.80 10*3/mm3    RBC 2.54 (L) 3.77 - 5.28 10*6/mm3    Hemoglobin 8.4 (L) 12.0 - 15.9 g/dL    Hematocrit 24.9 (L) 34.0 - 46.6 %    MCV 98.0 (H) 79.0 - 97.0 fL    MCH 33.1 (H) 26.6 - 33.0 pg    MCHC 33.7 31.5 - 35.7 g/dL    RDW 11.9 (L) 12.3 - 15.4 %    RDW-SD 42.6 37.0 - 54.0 fl    MPV 10.2 6.0 - 12.0 fL    Platelets 160 140 - 450 10*3/mm3    Neutrophil % 64.1 42.7 - 76.0 %    Lymphocyte % 21.3 19.6 - 45.3 %    Monocyte % 8.9 5.0 - 12.0 %    Eosinophil % 4.9 0.3 - 6.2 %    Basophil % 0.6 0.0 - 1.5 %    Immature Grans % 0.2 0.0 - 0.5 %    Neutrophils, Absolute 3.40 1.70 - 7.00 10*3/mm3    Lymphocytes, Absolute 1.13 0.70 - 3.10 10*3/mm3    Monocytes, Absolute 0.47 0.10 - 0.90 10*3/mm3    Eosinophils, Absolute 0.26 0.00 - 0.40 10*3/mm3    Basophils, Absolute 0.03 0.00 - 0.20 10*3/mm3    Immature Grans, Absolute 0.01 0.00 - 0.05 10*3/mm3    nRBC 0.0 0.0 - 0.2 /100 WBC   Lactic Acid, Plasma    Specimen: Blood   Result Value Ref Range    Lactate 1.8 0.5 - 2.0 mmol/L   ECG 12 Lead ED Triage Standing Order; Abdominal Pain (Upper)   Result Value Ref Range    QT Interval 360 ms    QTC Interval 464 ms   Green Top (Gel)   Result Value Ref Range    Extra Tube Hold for add-ons.    Lavender Top   Result Value Ref Range    Extra Tube hold for add-on    Gold Top - SST   Result Value Ref Range    Extra Tube Hold for add-ons.    Gray Top   Result Value Ref  Range    Extra Tube Hold for add-ons.    Light Blue Top   Result Value Ref Range    Extra Tube Hold for add-ons.        If labs were ordered, I independently reviewed the results and considered them in treating the patient.    RADIOLOGY  CT Abdomen Pelvis Without Contrast   Final Result   Impression:   1.No acute abdominal or pelvic abnormality.   2.29 mm hypodense mass in the left hepatic lobe could reflect for metastatic disease.   3.A large destructive mass at the right iliac wing extending into the gluteal musculature lateral to the ileum and medial to the ileum. This has significantly progressed since 2022 and is consistent with metastasis.   4.A stable rim calcified hypodense mass in the medial liver measuring 4.4 cm diameter, which could relate to patient's known history of liver carcinoma.   5.Cholelithiasis without evidence of acute cholecystitis.   6.Small hiatal hernia.   7.Coronary artery disease.   8.Posterior/interbody fusion construct extending from L4 down to S1.            Electronically Signed: Jesus Washington MD     1/24/2024 2:06 AM EST     Workstation ID: [id number]        [x] Radiologist's Report Reviewed:  I ordered and independently interpreted the above noted radiographic studies.  See radiologist's dictation for official interpretation.      PROCEDURES    Procedures    ECG 12 Lead ED Triage Standing Order; Abdominal Pain (Upper)   Final Result   Test Reason : ED Triage Standing Order~   Blood Pressure :   */*   mmHG   Vent. Rate : 100 BPM     Atrial Rate : 100 BPM      P-R Int : 164 ms          QRS Dur :  64 ms       QT Int : 360 ms       P-R-T Axes :  33  24  25 degrees      QTc Int : 464 ms      ** Poor data quality, interpretation may be adversely affected   Normal sinus rhythm   Low voltage QRS   Abnormal ECG   When compared with ECG of 19-JAN-2024 15:07,   Septal infarct is now present   Nonspecific T wave abnormality now evident in Anterior leads   Confirmed by Philip Pemberton ([id number]) on  1/24/2024 8:08:13 AM      Referred By: EDMD           Confirmed By: Philip Pemberton          MEDICATIONS GIVEN IN ER    Medications   HYDROmorphone (DILAUDID) injection 0.5 mg (0.5 mg Intravenous Given 1/24/24 0232)       MEDICAL DECISION MAKING, PROGRESS, and CONSULTS   Medical Decision Making  Problems Addressed:  End-stage renal disease on hemodialysis: complicated acute illness or injury  Nonspecific abdominal pain: complicated acute illness or injury    Amount and/or Complexity of Data Reviewed  Labs: ordered.  Radiology: ordered.  ECG/medicine tests: ordered.    Risk  Prescription drug management.        All labs have been independently reviewed by me.  All radiology studies have been interpreted by me and the radiologist dictating the report.  All EKG's have been independently interpreted by me as well as and overseeing attending physician.    [] Discussed with radiology regarding test interpretation:    Discussion below represents my analysis of pertinent findings related to patient's condition, differential diagnosis, treatment plan and final disposition.    Differential diagnosis:  The differential diagnosis associated with the patient's presentation includes: Dyspepsia, viral gastroenteritis, constipation, medication side effects, psychiatric illness, irritable bowel syndrome, abdominal wall pain, gallbladder disease, pancreatitis, diverticulitis, appendicitis, kidney stone, UTI, hernia, gastroparesis, food poisoning, DKA, hepatitis, bowel obstruction, colitis and others.      Additional sources  Discussed/ obtained information from independent historians:   [] Spouse  [] Parent  [] Family member  [] Friend  [] EMS   [] Other:  External (non-ED) record review:   [x] Inpatient record: Reviewed inpatient admission from 12/18/23 where patient was admitted for acute on chronic respiratory failure with hypoxia   [] Office record:   [] Outpatient record:   [] Prior Outpatient labs:   [] Prior Outpatient  radiology:   [] Primary Care record:   [x] Outside ED record: Personally reviewed 10/22/23 ED visit for dialysis access complication   [] Other:   Patient's care impacted by:   [x] Diabetes  [x] Hypertension  [] Hyperlipidemia  [] Hypothyroidism   [] Coronary Artery Disease   [] COPD   [] Cancer   [] Obesity  [x] GERD   [] Tobacco Abuse   [] Substance Abuse    [] Anxiety   [] Depression   [x] Other: Cirrhosis of liver, ESRD, Chronic pain on methadone,   Care significantly affected by Social Determinants of Health (housing and economic circumstances, unemployment)    [] Yes     [x] No   If yes, Patient's care significantly limited by  Social Determinants of Health including:   [] Inadequate housing   [] Low income   [] Alcoholism and drug addiction in family   [] Problems related to primary support group   [] Unemployment   [] Problems related to employment   [] Other Social Determinants of Health:     Shared decision making:  I had a discussion with the patient/family regarding diagnosis, diagnostic results, treatment plan, and medications.  The patient/family indicated understanding of these instructions.  I spent adequate time at the bedside preceding discharge necessary to personally discuss the aftercare instructions, giving patient education, providing explanations of the results of our evaluations/findings, and my decision making to assure that the patient/family understand the plan of care.  Time was allotted to answer questions at that time and throughout the ED course.  Emphasis was placed on timely follow-up after discharge.  I also discussed the potential for the development of an acute emergent condition requiring further evaluation, admission, or even surgical intervention. I discussed that we found nothing during the visit today indicating the need for further workup, admission, or the presence of an unstable medical condition.  I encouraged the patient to return to the emergency department immediately  for ANY concerns, worsening, new complaints, or if symptoms persist and unable to seek follow-up in a timely fashion.  The patient/family expressed understanding and agreement with this plan.  The patient will follow-up with primary care and nursing home for reevaluation.      Orders placed during this visit:  Orders Placed This Encounter   Procedures    CT Abdomen Pelvis Without Contrast    [place] Draw    Comprehensive Metabolic Panel    Lipase    Single High Sensitivity Troponin T    CBC Auto Differential    Lactic Acid, Plasma    Undress & Gown    Continuous Pulse Oximetry    Vital Signs    ECG 12 Lead ED Triage Standing Order; Abdominal Pain (Upper)    CBC & Differential    Green Top (Gel)    Lavender Top    Gold Top - SST    Gray Top    Light Blue Top     ED Course:    ED Course as of 01/28/24 1823   Wed Jan 24, 2024   0309 On reassessment at bedside patient resting a position of comfort in no acute distress.  Did inquire about her last dialysis and she was unaware when she was dialyzed last.  She did note that they do provide this for her at her nursing home. [JG]   0323 Notified by nursing staff that they have communicated with her facility and note that patient does have Monday Wednesday Friday dialysis at 11 AM. [JG]   0324 In summary this is a 63 year old female with end stage renal disease on hemodialysis who presents to the ED with complaints of abdominal pain. No acute or emergent findings demonstrated on physical exam. No evidence of acute abdomen on physical exam. No rebound or guarding. No peritoneal signs. Labs without acute or emergent findings and indicative of ESRD patient requiring dialysis which patient is scheduled for. CT scan of abdomen and pelvis personally interpreted by myself with official read provided by radiology demonstrates no acute abdominal or pelvic abnormality. Patient not in need of emergent medical intervention. Will be discharged from ED back to her facility for scheduled  dialysis and outpatient follow up. At time of discharge disposition patient is afebrile, nontoxic appearing, vital signs stable and able to maintain O2 sats of 95% on room air. Patient will be discharged home with symptomatic care and outpatient follow up. [JG]      ED Course User Index  [JG] Rishi Ceron PA            DIAGNOSIS  Final diagnoses:   Nonspecific abdominal pain   End-stage renal disease on hemodialysis       DISPOSITION    ED Disposition       ED Disposition   Discharge    Condition   Stable    Comment   --               Please note that portions of this document were completed with voice recognition software.        Rishi Ceron PA  01/28/24 3048

## 2024-01-26 ENCOUNTER — APPOINTMENT (OUTPATIENT)
Dept: CT IMAGING | Facility: HOSPITAL | Age: 64
End: 2024-01-26
Payer: MEDICARE

## 2024-01-26 ENCOUNTER — HOSPITAL ENCOUNTER (INPATIENT)
Facility: HOSPITAL | Age: 64
LOS: 3 days | Discharge: HOME | End: 2024-01-31
Attending: EMERGENCY MEDICINE | Admitting: STUDENT IN AN ORGANIZED HEALTH CARE EDUCATION/TRAINING PROGRAM
Payer: MEDICARE

## 2024-01-26 ENCOUNTER — APPOINTMENT (OUTPATIENT)
Dept: GENERAL RADIOLOGY | Facility: HOSPITAL | Age: 64
End: 2024-01-26
Payer: COMMERCIAL

## 2024-01-26 ENCOUNTER — APPOINTMENT (OUTPATIENT)
Dept: NEPHROLOGY | Facility: HOSPITAL | Age: 64
End: 2024-01-26
Payer: MEDICARE

## 2024-01-26 DIAGNOSIS — Z99.2 STAGE 5 CHRONIC KIDNEY DISEASE ON CHRONIC DIALYSIS: ICD-10-CM

## 2024-01-26 DIAGNOSIS — E87.6 HYPOKALEMIA: ICD-10-CM

## 2024-01-26 DIAGNOSIS — J20.5 RSV BRONCHITIS: ICD-10-CM

## 2024-01-26 DIAGNOSIS — N18.6 STAGE 5 CHRONIC KIDNEY DISEASE ON CHRONIC DIALYSIS: ICD-10-CM

## 2024-01-26 DIAGNOSIS — B33.8 RSV (RESPIRATORY SYNCYTIAL VIRUS INFECTION): ICD-10-CM

## 2024-01-26 DIAGNOSIS — R41.82 ALTERED MENTAL STATUS, UNSPECIFIED ALTERED MENTAL STATUS TYPE: Primary | ICD-10-CM

## 2024-01-26 DIAGNOSIS — L97.511 CHRONIC ULCER OF RIGHT FOOT LIMITED TO BREAKDOWN OF SKIN: Chronic | ICD-10-CM

## 2024-01-26 DIAGNOSIS — Z89.512 S/P BKA (BELOW KNEE AMPUTATION), LEFT: ICD-10-CM

## 2024-01-26 PROBLEM — G93.40 ACUTE ENCEPHALOPATHY: Status: ACTIVE | Noted: 2024-01-26

## 2024-01-26 LAB
ALBUMIN SERPL-MCNC: 3.7 G/DL (ref 3.5–5.2)
ALBUMIN/GLOB SERPL: 1.2 G/DL
ALP SERPL-CCNC: 145 U/L (ref 39–117)
ALT SERPL W P-5'-P-CCNC: 39 U/L (ref 1–33)
AMMONIA BLD-SCNC: 20 UMOL/L (ref 11–51)
AMPHET+METHAMPHET UR QL: NEGATIVE
AMPHETAMINES UR QL: NEGATIVE
ANION GAP SERPL CALCULATED.3IONS-SCNC: 11 MMOL/L (ref 5–15)
ARTERIAL PATENCY WRIST A: ABNORMAL
AST SERPL-CCNC: 23 U/L (ref 1–32)
ATMOSPHERIC PRESS: ABNORMAL MM[HG]
B PARAPERT DNA SPEC QL NAA+PROBE: NOT DETECTED
B PERT DNA SPEC QL NAA+PROBE: NOT DETECTED
BACTERIA UR QL AUTO: ABNORMAL /HPF
BARBITURATES UR QL SCN: NEGATIVE
BASE EXCESS BLDA CALC-SCNC: 7.2 MMOL/L (ref 0–2)
BASOPHILS # BLD AUTO: 0.02 10*3/MM3 (ref 0–0.2)
BASOPHILS NFR BLD AUTO: 0.5 % (ref 0–1.5)
BDY SITE: ABNORMAL
BENZODIAZ UR QL SCN: NEGATIVE
BILIRUB SERPL-MCNC: 0.7 MG/DL (ref 0–1.2)
BILIRUB UR QL STRIP: NEGATIVE
BODY TEMPERATURE: 37
BUN SERPL-MCNC: 40 MG/DL (ref 8–23)
BUN/CREAT SERPL: 7.3 (ref 7–25)
BUPRENORPHINE SERPL-MCNC: NEGATIVE NG/ML
C PNEUM DNA NPH QL NAA+NON-PROBE: NOT DETECTED
CALCIUM SPEC-SCNC: 9.1 MG/DL (ref 8.6–10.5)
CANNABINOIDS SERPL QL: NEGATIVE
CHLORIDE SERPL-SCNC: 93 MMOL/L (ref 98–107)
CLARITY UR: CLEAR
CO2 BLDA-SCNC: 30.9 MMOL/L (ref 22–33)
CO2 SERPL-SCNC: 30 MMOL/L (ref 22–29)
COCAINE UR QL: NEGATIVE
COHGB MFR BLD: 1.4 % (ref 0–2)
COLOR UR: YELLOW
CREAT SERPL-MCNC: 5.46 MG/DL (ref 0.57–1)
D-LACTATE SERPL-SCNC: 1.1 MMOL/L (ref 0.5–2)
DEPRECATED RDW RBC AUTO: 42.2 FL (ref 37–54)
EGFRCR SERPLBLD CKD-EPI 2021: 8.2 ML/MIN/1.73
EOSINOPHIL # BLD AUTO: 0.16 10*3/MM3 (ref 0–0.4)
EOSINOPHIL NFR BLD AUTO: 3.7 % (ref 0.3–6.2)
EPAP: 0
ERYTHROCYTE [DISTWIDTH] IN BLOOD BY AUTOMATED COUNT: 11.9 % (ref 12.3–15.4)
ETHANOL BLD-MCNC: <10 MG/DL (ref 0–10)
FENTANYL UR-MCNC: NEGATIVE NG/ML
FLUAV SUBTYP SPEC NAA+PROBE: NOT DETECTED
FLUBV RNA ISLT QL NAA+PROBE: NOT DETECTED
GEN 5 2HR TROPONIN T REFLEX: 39 NG/L
GLOBULIN UR ELPH-MCNC: 3.1 GM/DL
GLUCOSE BLDC GLUCOMTR-MCNC: 224 MG/DL (ref 70–130)
GLUCOSE SERPL-MCNC: 350 MG/DL (ref 65–99)
GLUCOSE UR STRIP-MCNC: ABNORMAL MG/DL
HADV DNA SPEC NAA+PROBE: NOT DETECTED
HCO3 BLDA-SCNC: 29.9 MMOL/L (ref 20–26)
HCOV 229E RNA SPEC QL NAA+PROBE: NOT DETECTED
HCOV HKU1 RNA SPEC QL NAA+PROBE: NOT DETECTED
HCOV NL63 RNA SPEC QL NAA+PROBE: NOT DETECTED
HCOV OC43 RNA SPEC QL NAA+PROBE: NOT DETECTED
HCT VFR BLD AUTO: 27.5 % (ref 34–46.6)
HCT VFR BLD CALC: 27.7 % (ref 38–51)
HGB BLD-MCNC: 9.5 G/DL (ref 12–15.9)
HGB BLDA-MCNC: 9.1 G/DL (ref 14–18)
HGB UR QL STRIP.AUTO: NEGATIVE
HMPV RNA NPH QL NAA+NON-PROBE: NOT DETECTED
HPIV1 RNA ISLT QL NAA+PROBE: NOT DETECTED
HPIV2 RNA SPEC QL NAA+PROBE: NOT DETECTED
HPIV3 RNA NPH QL NAA+PROBE: NOT DETECTED
HPIV4 P GENE NPH QL NAA+PROBE: NOT DETECTED
HYALINE CASTS UR QL AUTO: ABNORMAL /LPF
IMM GRANULOCYTES # BLD AUTO: 0.01 10*3/MM3 (ref 0–0.05)
IMM GRANULOCYTES NFR BLD AUTO: 0.2 % (ref 0–0.5)
INHALED O2 CONCENTRATION: 21 %
IPAP: 0
KETONES UR QL STRIP: NEGATIVE
LEUKOCYTE ESTERASE UR QL STRIP.AUTO: ABNORMAL
LYMPHOCYTES # BLD AUTO: 0.83 10*3/MM3 (ref 0.7–3.1)
LYMPHOCYTES NFR BLD AUTO: 19 % (ref 19.6–45.3)
M PNEUMO IGG SER IA-ACNC: NOT DETECTED
MCH RBC QN AUTO: 33.7 PG (ref 26.6–33)
MCHC RBC AUTO-ENTMCNC: 34.5 G/DL (ref 31.5–35.7)
MCV RBC AUTO: 97.5 FL (ref 79–97)
METHADONE UR QL SCN: NEGATIVE
METHGB BLD QL: 0.3 % (ref 0–1.5)
MODALITY: ABNORMAL
MONOCYTES # BLD AUTO: 0.38 10*3/MM3 (ref 0.1–0.9)
MONOCYTES NFR BLD AUTO: 8.7 % (ref 5–12)
NEUTROPHILS NFR BLD AUTO: 2.96 10*3/MM3 (ref 1.7–7)
NEUTROPHILS NFR BLD AUTO: 67.9 % (ref 42.7–76)
NITRITE UR QL STRIP: NEGATIVE
NRBC BLD AUTO-RTO: 0 /100 WBC (ref 0–0.2)
OPIATES UR QL: NEGATIVE
OXYCODONE UR QL SCN: NEGATIVE
OXYHGB MFR BLDV: 98.5 % (ref 94–99)
PAW @ PEAK INSP FLOW SETTING VENT: 0 CMH2O
PCO2 BLDA: 34 MM HG (ref 35–45)
PCO2 TEMP ADJ BLD: 34 MM HG (ref 35–45)
PCP UR QL SCN: NEGATIVE
PH BLDA: 7.55 PH UNITS (ref 7.35–7.45)
PH UR STRIP.AUTO: >=9 [PH] (ref 5–8)
PH, TEMP CORRECTED: 7.55 PH UNITS
PLATELET # BLD AUTO: 167 10*3/MM3 (ref 140–450)
PMV BLD AUTO: 10.1 FL (ref 6–12)
PO2 BLDA: 142 MM HG (ref 83–108)
PO2 TEMP ADJ BLD: 142 MM HG (ref 83–108)
POTASSIUM SERPL-SCNC: 4.9 MMOL/L (ref 3.5–5.2)
PROT SERPL-MCNC: 6.8 G/DL (ref 6–8.5)
PROT UR QL STRIP: ABNORMAL
RBC # BLD AUTO: 2.82 10*6/MM3 (ref 3.77–5.28)
RBC # UR STRIP: ABNORMAL /HPF
REF LAB TEST METHOD: ABNORMAL
RHINOVIRUS RNA SPEC NAA+PROBE: NOT DETECTED
RSV RNA NPH QL NAA+NON-PROBE: DETECTED
SARS-COV-2 RNA NPH QL NAA+NON-PROBE: NOT DETECTED
SODIUM SERPL-SCNC: 134 MMOL/L (ref 136–145)
SP GR UR STRIP: 1.01 (ref 1–1.03)
SQUAMOUS #/AREA URNS HPF: ABNORMAL /HPF
TOTAL RATE: 0 BREATHS/MINUTE
TRICYCLICS UR QL SCN: NEGATIVE
TROPONIN T DELTA: -1 NG/L
TROPONIN T SERPL HS-MCNC: 40 NG/L
UROBILINOGEN UR QL STRIP: ABNORMAL
WBC # UR STRIP: ABNORMAL /HPF
WBC NRBC COR # BLD AUTO: 4.36 10*3/MM3 (ref 3.4–10.8)

## 2024-01-26 PROCEDURE — 82077 ASSAY SPEC XCP UR&BREATH IA: CPT | Performed by: EMERGENCY MEDICINE

## 2024-01-26 PROCEDURE — 84484 ASSAY OF TROPONIN QUANT: CPT | Performed by: EMERGENCY MEDICINE

## 2024-01-26 PROCEDURE — 63710000001 INSULIN LISPRO (HUMAN) PER 5 UNITS: Performed by: NURSE PRACTITIONER

## 2024-01-26 PROCEDURE — G0378 HOSPITAL OBSERVATION PER HR: HCPCS

## 2024-01-26 PROCEDURE — 83050 HGB METHEMOGLOBIN QUAN: CPT

## 2024-01-26 PROCEDURE — 83605 ASSAY OF LACTIC ACID: CPT | Performed by: EMERGENCY MEDICINE

## 2024-01-26 PROCEDURE — 36600 WITHDRAWAL OF ARTERIAL BLOOD: CPT

## 2024-01-26 PROCEDURE — 82948 REAGENT STRIP/BLOOD GLUCOSE: CPT

## 2024-01-26 PROCEDURE — 80307 DRUG TEST PRSMV CHEM ANLYZR: CPT | Performed by: EMERGENCY MEDICINE

## 2024-01-26 PROCEDURE — 5A1D70Z PERFORMANCE OF URINARY FILTRATION, INTERMITTENT, LESS THAN 6 HOURS PER DAY: ICD-10-PCS | Performed by: INTERNAL MEDICINE

## 2024-01-26 PROCEDURE — 99222 1ST HOSP IP/OBS MODERATE 55: CPT | Performed by: NURSE PRACTITIONER

## 2024-01-26 PROCEDURE — 82805 BLOOD GASES W/O2 SATURATION: CPT

## 2024-01-26 PROCEDURE — 0202U NFCT DS 22 TRGT SARS-COV-2: CPT | Performed by: EMERGENCY MEDICINE

## 2024-01-26 PROCEDURE — 36415 COLL VENOUS BLD VENIPUNCTURE: CPT

## 2024-01-26 PROCEDURE — 82375 ASSAY CARBOXYHB QUANT: CPT

## 2024-01-26 PROCEDURE — 87086 URINE CULTURE/COLONY COUNT: CPT | Performed by: EMERGENCY MEDICINE

## 2024-01-26 PROCEDURE — 71045 X-RAY EXAM CHEST 1 VIEW: CPT

## 2024-01-26 PROCEDURE — 93005 ELECTROCARDIOGRAM TRACING: CPT | Performed by: EMERGENCY MEDICINE

## 2024-01-26 PROCEDURE — P9612 CATHETERIZE FOR URINE SPEC: HCPCS

## 2024-01-26 PROCEDURE — 80053 COMPREHEN METABOLIC PANEL: CPT | Performed by: EMERGENCY MEDICINE

## 2024-01-26 PROCEDURE — 70450 CT HEAD/BRAIN W/O DYE: CPT

## 2024-01-26 PROCEDURE — 74176 CT ABD & PELVIS W/O CONTRAST: CPT

## 2024-01-26 PROCEDURE — 85025 COMPLETE CBC W/AUTO DIFF WBC: CPT | Performed by: EMERGENCY MEDICINE

## 2024-01-26 PROCEDURE — 99285 EMERGENCY DEPT VISIT HI MDM: CPT

## 2024-01-26 PROCEDURE — 81001 URINALYSIS AUTO W/SCOPE: CPT | Performed by: EMERGENCY MEDICINE

## 2024-01-26 PROCEDURE — 82140 ASSAY OF AMMONIA: CPT | Performed by: NURSE PRACTITIONER

## 2024-01-26 RX ORDER — ONDANSETRON 2 MG/ML
4 INJECTION INTRAMUSCULAR; INTRAVENOUS EVERY 6 HOURS PRN
Status: DISCONTINUED | OUTPATIENT
Start: 2024-01-26 | End: 2024-01-31 | Stop reason: HOSPADM

## 2024-01-26 RX ORDER — BENZONATATE 100 MG/1
200 CAPSULE ORAL 3 TIMES DAILY PRN
Status: DISCONTINUED | OUTPATIENT
Start: 2024-01-26 | End: 2024-01-31 | Stop reason: HOSPADM

## 2024-01-26 RX ORDER — DEXTROSE MONOHYDRATE 25 G/50ML
25 INJECTION, SOLUTION INTRAVENOUS
Status: DISCONTINUED | OUTPATIENT
Start: 2024-01-26 | End: 2024-01-28

## 2024-01-26 RX ORDER — AMOXICILLIN 250 MG
2 CAPSULE ORAL 2 TIMES DAILY
Status: DISCONTINUED | OUTPATIENT
Start: 2024-01-26 | End: 2024-01-30

## 2024-01-26 RX ORDER — NITROGLYCERIN 0.4 MG/1
0.4 TABLET SUBLINGUAL
Status: DISCONTINUED | OUTPATIENT
Start: 2024-01-26 | End: 2024-01-31 | Stop reason: HOSPADM

## 2024-01-26 RX ORDER — CARVEDILOL 6.25 MG/1
25 TABLET ORAL 2 TIMES DAILY WITH MEALS
Status: DISCONTINUED | OUTPATIENT
Start: 2024-01-26 | End: 2024-01-31 | Stop reason: HOSPADM

## 2024-01-26 RX ORDER — QUETIAPINE FUMARATE 25 MG/1
25 TABLET, FILM COATED ORAL NIGHTLY
Status: DISCONTINUED | OUTPATIENT
Start: 2024-01-26 | End: 2024-01-31 | Stop reason: HOSPADM

## 2024-01-26 RX ORDER — ATORVASTATIN CALCIUM 40 MG/1
80 TABLET, FILM COATED ORAL NIGHTLY
Status: DISCONTINUED | OUTPATIENT
Start: 2024-01-26 | End: 2024-01-31 | Stop reason: HOSPADM

## 2024-01-26 RX ORDER — LACTULOSE 10 G/15ML
30 SOLUTION ORAL 2 TIMES DAILY
Status: DISCONTINUED | OUTPATIENT
Start: 2024-01-26 | End: 2024-01-31 | Stop reason: HOSPADM

## 2024-01-26 RX ORDER — SODIUM CHLORIDE 9 MG/ML
40 INJECTION, SOLUTION INTRAVENOUS AS NEEDED
Status: DISCONTINUED | OUTPATIENT
Start: 2024-01-26 | End: 2024-01-31 | Stop reason: HOSPADM

## 2024-01-26 RX ORDER — SODIUM CHLORIDE 0.9 % (FLUSH) 0.9 %
10 SYRINGE (ML) INJECTION EVERY 12 HOURS SCHEDULED
Status: DISCONTINUED | OUTPATIENT
Start: 2024-01-26 | End: 2024-01-31 | Stop reason: HOSPADM

## 2024-01-26 RX ORDER — PANTOPRAZOLE SODIUM 40 MG/1
40 TABLET, DELAYED RELEASE ORAL DAILY
Status: DISCONTINUED | OUTPATIENT
Start: 2024-01-27 | End: 2024-01-31 | Stop reason: HOSPADM

## 2024-01-26 RX ORDER — SODIUM CHLORIDE 0.9 % (FLUSH) 0.9 %
10 SYRINGE (ML) INJECTION AS NEEDED
Status: DISCONTINUED | OUTPATIENT
Start: 2024-01-26 | End: 2024-01-31 | Stop reason: HOSPADM

## 2024-01-26 RX ORDER — LACTULOSE 10 G/15ML
30 SOLUTION ORAL 3 TIMES DAILY
Status: ON HOLD | COMMUNITY
End: 2024-01-31 | Stop reason: SDUPTHER

## 2024-01-26 RX ORDER — CALCIUM ACETATE 667 MG/1
667 CAPSULE ORAL
Status: DISCONTINUED | OUTPATIENT
Start: 2024-01-27 | End: 2024-01-31 | Stop reason: HOSPADM

## 2024-01-26 RX ORDER — NICOTINE POLACRILEX 4 MG
15 LOZENGE BUCCAL
Status: DISCONTINUED | OUTPATIENT
Start: 2024-01-26 | End: 2024-01-28

## 2024-01-26 RX ORDER — IBUPROFEN 600 MG/1
1 TABLET ORAL
Status: DISCONTINUED | OUTPATIENT
Start: 2024-01-26 | End: 2024-01-28

## 2024-01-26 RX ORDER — BISACODYL 10 MG
10 SUPPOSITORY, RECTAL RECTAL DAILY PRN
Status: DISCONTINUED | OUTPATIENT
Start: 2024-01-26 | End: 2024-01-30

## 2024-01-26 RX ORDER — INSULIN LISPRO 100 [IU]/ML
2-7 INJECTION, SOLUTION INTRAVENOUS; SUBCUTANEOUS
Status: DISCONTINUED | OUTPATIENT
Start: 2024-01-26 | End: 2024-01-28

## 2024-01-26 RX ORDER — ACETAMINOPHEN 650 MG/1
650 SUPPOSITORY RECTAL EVERY 4 HOURS PRN
Status: DISCONTINUED | OUTPATIENT
Start: 2024-01-26 | End: 2024-01-31 | Stop reason: HOSPADM

## 2024-01-26 RX ORDER — ACETAMINOPHEN 160 MG/5ML
650 SOLUTION ORAL EVERY 4 HOURS PRN
Status: DISCONTINUED | OUTPATIENT
Start: 2024-01-26 | End: 2024-01-31 | Stop reason: HOSPADM

## 2024-01-26 RX ORDER — POLYETHYLENE GLYCOL 3350 17 G/17G
17 POWDER, FOR SOLUTION ORAL DAILY PRN
Status: DISCONTINUED | OUTPATIENT
Start: 2024-01-26 | End: 2024-01-30

## 2024-01-26 RX ORDER — GUAIFENESIN 600 MG/1
1200 TABLET, EXTENDED RELEASE ORAL EVERY 12 HOURS SCHEDULED
Status: DISCONTINUED | OUTPATIENT
Start: 2024-01-26 | End: 2024-01-29

## 2024-01-26 RX ORDER — BISACODYL 5 MG/1
5 TABLET, DELAYED RELEASE ORAL DAILY PRN
Status: DISCONTINUED | OUTPATIENT
Start: 2024-01-26 | End: 2024-01-30

## 2024-01-26 RX ORDER — ACETAMINOPHEN 325 MG/1
650 TABLET ORAL EVERY 4 HOURS PRN
Status: DISCONTINUED | OUTPATIENT
Start: 2024-01-26 | End: 2024-01-31 | Stop reason: HOSPADM

## 2024-01-26 RX ORDER — DEXTROMETHORPHAN POLISTIREX 30 MG/5ML
60 SUSPENSION ORAL EVERY 12 HOURS SCHEDULED
Status: DISCONTINUED | OUTPATIENT
Start: 2024-01-26 | End: 2024-01-29

## 2024-01-26 RX ORDER — TRAMADOL HYDROCHLORIDE 50 MG/1
50 TABLET ORAL EVERY 12 HOURS PRN
Status: DISCONTINUED | OUTPATIENT
Start: 2024-01-26 | End: 2024-01-29

## 2024-01-26 RX ORDER — VENLAFAXINE HYDROCHLORIDE 75 MG/1
75 CAPSULE, EXTENDED RELEASE ORAL DAILY
Status: DISCONTINUED | OUTPATIENT
Start: 2024-01-27 | End: 2024-01-28

## 2024-01-26 RX ORDER — ONDANSETRON 4 MG/1
4 TABLET, ORALLY DISINTEGRATING ORAL EVERY 6 HOURS PRN
Status: DISCONTINUED | OUTPATIENT
Start: 2024-01-26 | End: 2024-01-31 | Stop reason: HOSPADM

## 2024-01-26 RX ORDER — IPRATROPIUM BROMIDE AND ALBUTEROL SULFATE 2.5; .5 MG/3ML; MG/3ML
3 SOLUTION RESPIRATORY (INHALATION) EVERY 6 HOURS PRN
Status: DISCONTINUED | OUTPATIENT
Start: 2024-01-26 | End: 2024-01-31 | Stop reason: HOSPADM

## 2024-01-26 RX ADMIN — CARVEDILOL 25 MG: 6.25 TABLET, FILM COATED ORAL at 21:56

## 2024-01-26 RX ADMIN — GUAIFENESIN 1200 MG: 600 TABLET, EXTENDED RELEASE ORAL at 21:57

## 2024-01-26 RX ADMIN — INSULIN LISPRO 3 UNITS: 100 INJECTION, SOLUTION INTRAVENOUS; SUBCUTANEOUS at 21:57

## 2024-01-26 RX ADMIN — Medication 10 ML: at 21:57

## 2024-01-26 RX ADMIN — ATORVASTATIN CALCIUM 80 MG: 40 TABLET, FILM COATED ORAL at 21:57

## 2024-01-26 RX ADMIN — LACTULOSE 30 G: 20 SOLUTION ORAL at 22:16

## 2024-01-26 RX ADMIN — RIFAXIMIN 550 MG: 550 TABLET ORAL at 22:00

## 2024-01-26 RX ADMIN — TRAMADOL HYDROCHLORIDE 50 MG: 50 TABLET, COATED ORAL at 20:02

## 2024-01-26 NOTE — Clinical Note
Level of Care: Telemetry [5]   Diagnosis: Acute encephalopathy [372267]   Admitting Physician: EMMANUEL FAUST [[id number]]   Attending Physician: EMMANUEL FAUST [[id number]]   Bed Request Comments: tele

## 2024-01-26 NOTE — ED NOTES
" Jeaneth Keita    Nursing Report ED to Floor:  Mental status: AO3  Ambulatory status: WHEELCHAIR, L BKA  Oxygen Therapy:  RA  Cardiac Rhythm: NSR  Admitted from: ED  Safety Concerns:  FALL RISK  Social Issues: NONE  ED Room #:  16    ED Nurse Phone Extension - [phone] or may call [phone].      HPI:   Chief Complaint   Patient presents with    Hyperglycemia       Past Medical History:  Past Medical History:   Diagnosis Date    Anxiety     Cancer     liver cell carcinoma    DDD (degenerative disc disease), lumbar     Depression     Diabetes mellitus     Fibromyalgia     Hemochromatosis     Hep B w/o coma, chronic, w/o delta     Hep C w/o coma, chronic     Hypertension     Stroke     Vertigo         Past Surgical History:  Past Surgical History:   Procedure Laterality Date    ARTERIOVENOUS FISTULA/SHUNT SURGERY Left 10/16/2021    Procedure: UPPER EXTREMITY ARTERIOVENOUS FISTULA FORMATION LEFT;  Surgeon: Johnny Ruosseau MD;  Location: [facility];  Service: Vascular;  Laterality: Left;    BACK SURGERY      BELOW KNEE LEG AMPUTATION       SECTION      FOOT SURGERY      KNEE SURGERY      ROTATOR CUFF REPAIR      SPINAL CORD STIMULATOR IMPLANT          Admitting Doctor:   Florencio Ryan MD    Consulting Provider(s):  Consults       No orders found from 2023 to 2024.             Admitting Diagnosis:   The primary encounter diagnosis was Altered mental status, unspecified altered mental status type. Diagnoses of RSV bronchitis and Stage 5 chronic kidney disease on chronic dialysis were also pertinent to this visit.    Most Recent Vitals:   Vitals:    24 1207 24 1218 24 1219 24 1400   BP: 165/87   147/70   BP Location: Left arm      Patient Position: Lying      Pulse: 87   97   Resp: 17      Temp: 99 °F (37.2 °C)      TempSrc: Oral      SpO2: 97%   95%   Weight:   77.1 kg (170 lb)    Height:  172.7 cm (68\")         Active LDAs/IV Access:   Lines, Drains & Airways       Active " LDAs       Name Placement date Placement time Site Days    Peripheral IV 01/26/24 1212 Right Antecubital 01/26/24  1212  Antecubital  less than 1    Hemodialysis Cath Double --  --  Internal Jugular  --                    Labs (abnormal labs have a star):   Labs Reviewed   RESPIRATORY PANEL PCR W/ COVID-19 (SARS-COV-2), NP SWAB IN UTM/VTP, 2 HR TAT - Abnormal; Notable for the following components:       Result Value    RSV, PCR Detected (*)     All other components within normal limits    Narrative:     In the setting of a positive respiratory panel with a viral infection PLUS a negative procalcitonin without other underlying concern for bacterial infection, consider observing off antibiotics or discontinuation of antibiotics and continue supportive care. If the respiratory panel is positive for atypical bacterial infection (Bordetella pertussis, Chlamydophila pneumoniae, or Mycoplasma pneumoniae), consider antibiotic de-escalation to target atypical bacterial infection.   COMPREHENSIVE METABOLIC PANEL - Abnormal; Notable for the following components:    Glucose 350 (*)     BUN 40 (*)     Creatinine 5.46 (*)     Sodium 134 (*)     Chloride 93 (*)     CO2 30.0 (*)     ALT (SGPT) 39 (*)     Alkaline Phosphatase 145 (*)     eGFR 8.2 (*)     All other components within normal limits    Narrative:     GFR Normal >60  Chronic Kidney Disease <60  Kidney Failure <15     URINALYSIS W/ CULTURE IF INDICATED - Abnormal; Notable for the following components:    pH, UA >=9.0 (*)     Glucose,  mg/dL (1+) (*)     Protein, UA >=300 mg/dL (3+) (*)     Leuk Esterase, UA Trace (*)     All other components within normal limits    Narrative:     In absence of clinical symptoms, the presence of pyuria, bacteria, and/or nitrites on the urinalysis result does not correlate with infection.   TROPONIN - Abnormal; Notable for the following components:    HS Troponin T 40 (*)     All other components within normal limits    Narrative:      High Sensitive Troponin T Reference Range:  <14.0 ng/L- Negative Female for AMI  <22.0 ng/L- Negative Male for AMI  >=14 - Abnormal Female indicating possible myocardial injury.  >=22 - Abnormal Male indicating possible myocardial injury.   Clinicians would have to utilize clinical acumen, EKG, Troponin, and serial changes to determine if it is an Acute Myocardial Infarction or myocardial injury due to an underlying chronic condition.        CBC WITH AUTO DIFFERENTIAL - Abnormal; Notable for the following components:    RBC 2.82 (*)     Hemoglobin 9.5 (*)     Hematocrit 27.5 (*)     MCV 97.5 (*)     MCH 33.7 (*)     RDW 11.9 (*)     Lymphocyte % 19.0 (*)     All other components within normal limits   HIGH SENSITIVITIY TROPONIN T 2HR - Abnormal; Notable for the following components:    HS Troponin T 39 (*)     All other components within normal limits    Narrative:     High Sensitive Troponin T Reference Range:  <14.0 ng/L- Negative Female for AMI  <22.0 ng/L- Negative Male for AMI  >=14 - Abnormal Female indicating possible myocardial injury.  >=22 - Abnormal Male indicating possible myocardial injury.   Clinicians would have to utilize clinical acumen, EKG, Troponin, and serial changes to determine if it is an Acute Myocardial Infarction or myocardial injury due to an underlying chronic condition.        BLOOD GAS, ARTERIAL W/CO-OXIMETRY - Abnormal; Notable for the following components:    pH, Arterial 7.552 (*)     pCO2, Arterial 34.0 (*)     pO2, Arterial 142.0 (*)     HCO3, Arterial 29.9 (*)     Base Excess, Arterial 7.2 (*)     Hemoglobin, Blood Gas 9.1 (*)     Hematocrit, Blood Gas 27.7 (*)     pCO2, Temperature Corrected 34.0 (*)     pO2, Temperature Corrected 142 (*)     All other components within normal limits   URINALYSIS, MICROSCOPIC ONLY - Abnormal; Notable for the following components:    WBC, UA 21-50 (*)     All other components within normal limits   URINE DRUG SCREEN - Normal    Narrative:      Cutoff For Drugs Screened:    Amphetamines               500 ng/ml  Barbiturates               200 ng/ml  Benzodiazepines            150 ng/ml  Cocaine                    150 ng/ml  Methadone                  200 ng/ml  Opiates                    100 ng/ml  Phencyclidine               25 ng/ml  THC                         50 ng/ml  Methamphetamine            500 ng/ml  Tricyclic Antidepressants  300 ng/ml  Oxycodone                  100 ng/ml  Buprenorphine               10 ng/ml    The normal value for all drugs tested is negative. This report includes unconfirmed screening results, with the cutoff values listed, to be used for medical treatment purposes only.  Unconfirmed results must not be used for non-medical purposes such as employment or legal testing.  Clinical consideration should be applied to any drug of abuse test, particularly when unconfirmed results are used.     ETHANOL - Normal    Narrative:     Elevated lactic acid concentration and lactate dehydrogenase(LD) activity may falsely elevate enzymatically determined ethanol levels. Not for legal purposes.    LACTIC ACID, PLASMA - Normal   FENTANYL, URINE - Normal    Narrative:     Negative Threshold:      Fentanyl 5 ng/mL     The normal value for the drug tested is negative. This report includes final unconfirmed screening results to be used for medical treatment purposes only. Unconfirmed results must not be used for non-medical purposes such as employment or legal testing. Clinical consideration should be applied to any drug of abuse test, particularly when unconfirmed results are used.          COVID PRE-OP / PRE-PROCEDURE SCREENING ORDER (NO ISOLATION)    Narrative:     The following orders were created for panel order COVID PRE-OP / PRE-PROCEDURE SCREENING ORDER (NO ISOLATION) - Swab, Nasopharynx.  Procedure                               Abnormality         Status                     ---------                               -----------          ------                     Respiratory Panel PCR w/...[[id number]]  Abnormal            Final result                 Please view results for these tests on the individual orders.   URINE CULTURE   BLOOD GAS, ARTERIAL   CBC AND DIFFERENTIAL    Narrative:     The following orders were created for panel order CBC & Differential.  Procedure                               Abnormality         Status                     ---------                               -----------         ------                     CBC Auto Differential[[id number]]        Abnormal            Final result                 Please view results for these tests on the individual orders.       Meds Given in ED:   Medications   sodium chloride 0.9 % flush 10 mL (has no administration in time range)

## 2024-01-26 NOTE — H&P
[facility] Medicine Services  HISTORY AND PHYSICAL    Patient Name: Jeaneth Keita  : 1960  MRN: [id number]  Primary Care Physician: Lulu Amos MD  Date of admission: 2024    Subjective   Subjective     Chief Complaint:  Lethargy     HPI:  Jeaneth Keita is a 63 y.o. female with past medical history significant for ESRD on hemodialysis (MWF), CVA, vertigo, HTN, HLD, liver cirrhosis, and T2DM who presents to the ED from outpatient dialysis clinic due to confusion. She presented to the dialysis clinic this morning for routine dialysis but staff was concerned about increased lethargy and confusion. EMS was called and she was brought to the ED for further evaluation. The patient remains confused at time of exam. No family at bedside.     In the ED, chest x-ray revealed no acute cardiopulmonary disease.  CT head revealed chronic appearing changes of the aging brain with no new intracranial disease seen.  CT of the abdomen/pelvis revealed stable appearance of a 3 cm hypodense mass in the left hepatic lobe with a large destructive lesion centered in the right iliac wing as previously described with no acute findings and no adverse change since 2024.  Labs were reviewed and significant for pH 7.55, sodium 134, CO2 30, creatinine 5.46, EGFR 8.2, and hemoglobin 9.5.  Vital signs were reviewed and significant for temp of 99 and blood pressure of 165/87 on presentation.  Respiratory PCR was positive for RSV. The patient will be admitted by hospital medicine for further evaluation and treatment.    Personal History     Past Medical History:   Diagnosis Date   • Anxiety    • Cancer     liver cell carcinoma   • DDD (degenerative disc disease), lumbar    • Depression    • Diabetes mellitus    • Fibromyalgia    • Hemochromatosis    • Hep B w/o coma, chronic, w/o delta    • Hep C w/o coma, chronic    • Hypertension    • Stroke    • Vertigo          Oncology Problem  List:  Hepatocellular carcinoma metastatic to bone s/p RXT  (10/05/2021;   Status: Active)    Oncology/Hematology History       Past Surgical History:   Procedure Laterality Date   • ARTERIOVENOUS FISTULA/SHUNT SURGERY Left 10/16/2021    Procedure: UPPER EXTREMITY ARTERIOVENOUS FISTULA FORMATION LEFT;  Surgeon: Johnny Rousseau MD;  Location: [facility];  Service: Vascular;  Laterality: Left;   • BACK SURGERY     • BELOW KNEE LEG AMPUTATION     •  SECTION     • FOOT SURGERY     • KNEE SURGERY     • ROTATOR CUFF REPAIR     • SPINAL CORD STIMULATOR IMPLANT         Family History: family history includes Heart attack in her father; Heart disease in her father.     Social History:  reports that she has never smoked. She has never used smokeless tobacco. She reports that she does not drink alcohol and does not use drugs.  Social History     Social History Narrative     Pt ex  Parviz Keita is her POA       Medications:  QUEtiapine, acetaminophen, atorvastatin, calcium acetate, carvedilol, lactulose, pantoprazole, riFAXIMin, venlafaxine XR, and vitamin D    Allergies   Allergen Reactions   • Sulfa Antibiotics        Objective   Objective     Vital Signs:   Temp:  [99 °F (37.2 °C)] 99 °F (37.2 °C)  Heart Rate:  [] 99  Resp:  [17] 17  BP: (147-165)/(69-87) 160/78    Physical Exam   Constitutional: Awake, alert, chronically ill appearing   Eyes: PERRLA, sclerae anicteric, no conjunctival injection  HENT: NCAT, mucous membranes moist  Neck: Supple, no thyromegaly, no lymphadenopathy, trachea midline  Respiratory: Course, diminished in bases, nonlabored respirations on RA  Cardiovascular: RRR, no murmurs, rubs, or gallops  Gastrointestinal: Positive bowel sounds, soft, nontender, nondistended  Musculoskeletal: s/p left BKA, trace RLE edema  Psychiatric: flat affect, cooperative  Neurologic: Oriented to self only, confused, moves all extremities, speech clear  Skin: warm, dry, no visible rash    Result  Review:  I have personally reviewed the results from the time of this admission to 1/26/2024 17:39 EST and agree with these findings:  [x]  Laboratory list / accordion  [x]  Microbiology  [x]  Radiology  [x]  EKG/Telemetry   []  Cardiology/Vascular   []  Pathology  [x]  Old records  []  Other:  Most notable findings include:     LAB RESULTS:      Lab 01/26/24  1212 01/24/24  0127   WBC 4.36 5.30   HEMOGLOBIN 9.5* 8.4*   HEMATOCRIT 27.5* 24.9*   PLATELETS 167 160   NEUTROS ABS 2.96 3.40   IMMATURE GRANS (ABS) 0.01 0.01   LYMPHS ABS 0.83 1.13   MONOS ABS 0.38 0.47   EOS ABS 0.16 0.26   MCV 97.5* 98.0*   LACTATE 1.1 1.8         Lab 01/26/24  1212 01/24/24  0127   SODIUM 134* 134*   POTASSIUM 4.9 4.9   CHLORIDE 93* 95*   CO2 30.0* 31.0*   ANION GAP 11.0 8.0   BUN 40* 32*   CREATININE 5.46* 4.61*   EGFR 8.2* 10.0*   GLUCOSE 350* 339*   CALCIUM 9.1 8.4*         Lab 01/26/24  1212 01/24/24  0127   TOTAL PROTEIN 6.8 6.3   ALBUMIN 3.7 3.6   GLOBULIN 3.1 2.7   ALT (SGPT) 39* 38*   AST (SGOT) 23 23   BILIRUBIN 0.7 0.6   ALK PHOS 145* 137*   LIPASE  --  53         Lab 01/26/24  1420 01/26/24  1212 01/24/24  0127   HSTROP T 39* 40* 33*                 Lab 01/26/24  1253   PH, ARTERIAL 7.552*   PCO2, ARTERIAL 34.0*   PO2 .0*   FIO2 21   HCO3 ART 29.9*   BASE EXCESS ART 7.2*   CARBOXYHEMOGLOBIN 1.4     Brief Urine Lab Results  (Last result in the past 365 days)        Color   Clarity   Blood   Leuk Est   Nitrite   Protein   CREAT   Urine HCG        01/26/24 1233 Yellow   Clear   Negative   Trace   Negative   >=300 mg/dL (3+)                 Microbiology Results (last 10 days)       Procedure Component Value - Date/Time    COVID PRE-OP / PRE-PROCEDURE SCREENING ORDER (NO ISOLATION) - Swab, Nasopharynx [[id number]]  (Abnormal) Collected: 01/26/24 1213    Lab Status: Final result Specimen: Swab from Nasopharynx Updated: 01/26/24 1328    Narrative:      The following orders were created for panel order COVID PRE-OP /  PRE-PROCEDURE SCREENING ORDER (NO ISOLATION) - Swab, Nasopharynx.  Procedure                               Abnormality         Status                     ---------                               -----------         ------                     Respiratory Panel PCR w/...[[id number]]  Abnormal            Final result                 Please view results for these tests on the individual orders.    Respiratory Panel PCR w/COVID-19(SARS-CoV-2) RUBINA/MANDY/MARK/PAD/COR/HORACE In-House, NP Swab in UTM/VTM, 2 HR TAT - Swab, Nasopharynx [[id number]]  (Abnormal) Collected: 01/26/24 1213    Lab Status: Final result Specimen: Swab from Nasopharynx Updated: 01/26/24 1328     ADENOVIRUS, PCR Not Detected     Coronavirus 229E Not Detected     Coronavirus HKU1 Not Detected     Coronavirus NL63 Not Detected     Coronavirus OC43 Not Detected     COVID19 Not Detected     Human Metapneumovirus Not Detected     Human Rhinovirus/Enterovirus Not Detected     Influenza A PCR Not Detected     Influenza B PCR Not Detected     Parainfluenza Virus 1 Not Detected     Parainfluenza Virus 2 Not Detected     Parainfluenza Virus 3 Not Detected     Parainfluenza Virus 4 Not Detected     RSV, PCR Detected     Bordetella pertussis pcr Not Detected     Bordetella parapertussis PCR Not Detected     Chlamydophila pneumoniae PCR Not Detected     Mycoplasma pneumo by PCR Not Detected    Narrative:      In the setting of a positive respiratory panel with a viral infection PLUS a negative procalcitonin without other underlying concern for bacterial infection, consider observing off antibiotics or discontinuation of antibiotics and continue supportive care. If the respiratory panel is positive for atypical bacterial infection (Bordetella pertussis, Chlamydophila pneumoniae, or Mycoplasma pneumoniae), consider antibiotic de-escalation to target atypical bacterial infection.            CT Head Without Contrast    Result Date: 1/26/2024  CT HEAD WO CONTRAST Date of Exam:  1/26/2024 12:56 PM EST Indication: confusion. Comparison: 1/19/2024 Technique: Axial CT images were obtained of the head without contrast administration.  Automated exposure control and iterative construction methods were used. Findings: The calvarium appears intact. Included paranasal sinuses and mastoids clear except for mild ethmoid sinus disease. As on prior study, there is moderately advanced generalized cerebral atrophy and there are chronic appearing central white matter changes. There is no evidence of new edema/infarct, intracranial hemorrhage, mass or mass effect, hydrocephalus, or abnormal extra-axial collection.     Impression: Impression: Chronic appearing changes of the aging brain. No new intracranial disease is seen. Electronically Signed: Washington Valladares MD  1/26/2024 1:40 PM EST  Workstation ID: [id number]    CT Abdomen Pelvis Without Contrast    Result Date: 1/26/2024  CT ABDOMEN PELVIS WO CONTRAST Date of Exam: 1/26/2024 12:56 PM EST Indication: abdominal pain. Comparison: CT abdomen and pelvis without contrast 1/24/2024 Technique: Axial CT images were obtained of the abdomen and pelvis without the administration of contrast. Reconstructed coronal and sagittal images were also obtained. Automated exposure control and iterative construction methods were used. Findings: LUNG BASES: Stable bandlike scarring in the posteromedial right lower lobe. Small hiatal hernia. Coronary artery and mitral annular calcifications are present. LIVER: Approximately 4 cm peripherally calcified hypodense mass in the medial aspect of the liver is again noted. Subtle round hypodense mass within the left hepatic lobe is unchanged. Liver contour is lobulated. BILIARY/GALLBLADDER: Hyperdensity within the gallbladder is stable. SPLEEN:  Unremarkable PANCREAS:  Unremarkable ADRENAL:  Unremarkable KIDNEYS:  Unremarkable parenchyma with no solid mass identified. No obstruction.  The kidneys are mildly atrophic. Punctate  calcifications are seen in both kidneys. There is no hydronephrosis. GASTROINTESTINAL/MESENTERY: Small hiatal hernia. Small bowel loops normal in caliber without mucosal thickening or evidence for obstruction. Normal appendix. Retained contrast and stool in the colon without inflammatory change. AORTA/IVC:  Normal caliber. RETROPERITONEUM/LYMPH NODES:  Unremarkable REPRODUCTIVE: The uterus is unremarkable. There is a 2.6 cm right adnexal cystic structure with calcification, similar to 6/27/2022. BLADDER:  Unremarkable OSSEUS STRUCTURES: Degenerative changes of the sacroiliac joints. Erosion of the right posteromedial seventh rib. Posterior and interbody fusion from L4-S1. Large destructive mass of the right iliac wing extending into the gluteal musculature lateral to the ileum in the iliac is muscle medial to the ileum which is similar to the most recent comparison study.     Impression: Impression: 1. No acute findings, and no adverse change since 1/24/2024. 2. Stable appearance of a 3 cm hypodense mass in the left hepatic lobe. Calcified hypodense mass in the medial liver is unchanged. 3. Large destructive lesion centered in the right iliac wing as previously described. 4. Cholelithiasis/gallbladder sludge. Electronically Signed: Freda Agee MD  1/26/2024 1:21 PM EST  Workstation ID: [id number]    XR Chest 1 View    Result Date: 1/26/2024  XR CHEST 1 VW COMPARISON: 1/19/2024 HISTORY: altered mental status. Difficulty processing  ams. FINDINGS: Technical adequacy: Adequate Central airways: Unremarkable Heart: Cardiomegaly, likely accentuated by the technique Great vessels: Unremarkable Mediastinum: Unremarkable Lungs: Unremarkable Pulmonary trupti:Unremarkable Pleura: Unremarkable Skeletal structures: Degenerative changes Extra thoracic soft tissues:Unremarkable Additional comments: Left upper arm likely stent seen, unchanged.     Impression: Impression: As above. No acute cardiopulmonary disease. Electronically  Signed: Ba Hallman MD  1/26/2024 1:05 PM EST  Workstation ID: [id number]     Results for orders placed during the hospital encounter of 12/18/23    Adult Transthoracic Echo Complete W/ Cont if Necessary Per Protocol    Interpretation Summary  •  Left ventricular systolic function is normal. Calculated left ventricular EF of 63%  •  Left ventricular diastolic function was normal.  •  Normal right ventricular size and systolic function  •  No significant valvular abnormality  •  No pericardial effusion  •  Compared to prior echocardiogram from 10/6/2021, there is no significant change      Assessment & Plan   Assessment & Plan       Acute encephalopathy    Hepatocellular carcinoma metastatic to bone s/p RXT     Cirrhosis of liver    Chronic Hep C     ESRD (end stage renal disease)    S/P left BKA    GERD (gastroesophageal reflux disease)    Essential hypertension    Type 2 diabetes mellitus    RSV (respiratory syncytial virus infection)    Jeaneth Keita is a 63 y.o. female with past medical history significant for ESRD on hemodialysis (MWF), CVA, vertigo, HTN, HLD, liver cirrhosis, and T2DM who presents to the ED from outpatient dialysis clinic due to confusion.     Acute encephalopathy  -CT head revealed chronic appearing changes with no new intracranial disease  -Chest x-ray revealed no acute cardiopulmonary disease.  -UA with no evidence of UTI  -check Ammonia d/t history of liver cirrhosis   -Fall precautions  -AM labs     RSV+  -Respiratory PCR positive for RSV  -Temp 99 on admission  -CXR chest x-ray revealed no acute cardiopulmonary disease  -Currently on room air  -Supportive care    ESRD  -on HD MWF as outpatient  -continue phos binder  -Nephrology consulted, Dr. Bates is aware. Tentative plan for HD tonight.    Liver cirrhosis  Chronic hep C  Hepatocellular carcinoma with metastasis to lung/bone  -check Ammonia  -Continue Lactulose, Rifaximin    T2DM  - on presentation  -Hgb A1C 5.8 on  12/19/2023  -not on antidiabetic medications at home  -Low dose SSI for now    Anemia of chronic disease  -Hgb 9.5 on presentation  -Iron profile, ferritin, B12, folate, retic pending  -CBC in AM     HTN  HLD  -continue Coreg, Lipitor     GERD  -Protonix     Mood disorder  -Continue Seroquel, Effexor     GOC  -Pt was DNR last admission, unable to confirm code status at this time  -will keep as full code for now  -attempted to call patient's POA but no answer  -Palliative consult in AM     DVT prophylaxis:  [facility] for now     CODE STATUS:    Code Status (Patient has no pulse and is not breathing): CPR (Attempt to Resuscitate)  Medical Interventions (Patient has pulse or is breathing): Full Support      Expected Discharge  Expected Discharge Date: 1/29/2024; Expected Discharge Time:         Signature: Electronically signed by MAYELIN James, 01/26/24, 5:39 PM EST

## 2024-01-26 NOTE — ED PROVIDER NOTES
Subjective   History of Present Illness  63-year-old female who has dialysis dependent renal failure who receives dialysis on Monday Wednesday Friday who presents from dialysis due to confusion.  Upon presenting to dialysis today she was felt to be weaker than usual and more confused.  EMS was contacted.  The patient does have underlying diabetes.  Her blood sugar was 476.  She has been brought here for further workup.  She appears somnolent.  She complains of abdominal pain.  She denies upper respiratory congestion, rhinorrhea, sore throat or other respiratory symptoms.  She denies fever or infectious symptoms.  She denies sick contacts.  No chest pain.  No new medications.  No alcohol or illicit drug use.  No other acute complaints.      Review of Systems   Constitutional:  Positive for fatigue. Negative for chills and fever.   HENT:  Negative for congestion, ear pain, postnasal drip, sinus pressure and sore throat.    Eyes:  Negative for pain, redness and visual disturbance.   Respiratory:  Negative for cough, chest tightness and shortness of breath.    Cardiovascular:  Negative for chest pain, palpitations and leg swelling.   Gastrointestinal:  Positive for abdominal pain. Negative for anal bleeding, blood in stool, diarrhea, nausea and vomiting.   Endocrine: Negative for polydipsia and polyuria.   Genitourinary:  Negative for difficulty urinating, dysuria, frequency and urgency.   Musculoskeletal:  Negative for arthralgias, back pain and neck pain.   Skin:  Negative for pallor and rash.   Allergic/Immunologic: Negative for environmental allergies and immunocompromised state.   Neurological:  Negative for dizziness, weakness and headaches.   Hematological:  Negative for adenopathy.   Psychiatric/Behavioral:  Positive for decreased concentration. Negative for confusion, self-injury and suicidal ideas. The patient is not nervous/anxious.    All other systems reviewed and are negative.      Past Medical History:    Diagnosis Date    Anxiety     Cancer     liver cell carcinoma    DDD (degenerative disc disease), lumbar     Depression     Diabetes mellitus     Fibromyalgia     Hemochromatosis     Hep B w/o coma, chronic, w/o delta     Hep C w/o coma, chronic     Hypertension     Stroke     Vertigo        Allergies   Allergen Reactions    Sulfa Antibiotics        Past Surgical History:   Procedure Laterality Date    ARTERIOVENOUS FISTULA/SHUNT SURGERY Left 10/16/2021    Procedure: UPPER EXTREMITY ARTERIOVENOUS FISTULA FORMATION LEFT;  Surgeon: Jonhny Rousseau MD;  Location: [facility];  Service: Vascular;  Laterality: Left;    BACK SURGERY      BELOW KNEE LEG AMPUTATION       SECTION      FOOT SURGERY      KNEE SURGERY      ROTATOR CUFF REPAIR      SPINAL CORD STIMULATOR IMPLANT         Family History   Problem Relation Age of Onset    Heart disease Father     Heart attack Father        Social History     Socioeconomic History    Marital status:    Tobacco Use    Smoking status: Never    Smokeless tobacco: Never   Vaping Use    Vaping Use: Never used   Substance and Sexual Activity    Alcohol use: No    Drug use: No    Sexual activity: Defer           Objective   Physical Exam  Vitals and nursing note reviewed.   Constitutional:       General: She is not in acute distress.     Appearance: Normal appearance. She is well-developed. She is not toxic-appearing or diaphoretic.   HENT:      Head: Normocephalic and atraumatic.      Right Ear: External ear normal.      Left Ear: External ear normal.      Nose: Nose normal.   Eyes:      General: Lids are normal.      Pupils: Pupils are equal, round, and reactive to light.   Neck:      Trachea: No tracheal deviation.   Cardiovascular:      Rate and Rhythm: Normal rate and regular rhythm.      Pulses: No decreased pulses.      Heart sounds: Normal heart sounds. No murmur heard.     No friction rub. No gallop.   Pulmonary:      Effort: Pulmonary effort is normal. No  respiratory distress.      Breath sounds: Normal breath sounds. No decreased breath sounds, wheezing, rhonchi or rales.   Abdominal:      General: Bowel sounds are normal.      Palpations: Abdomen is soft.      Tenderness: There is no abdominal tenderness. There is no guarding or rebound.   Musculoskeletal:         General: No deformity. Normal range of motion.      Cervical back: Normal range of motion and neck supple.   Lymphadenopathy:      Cervical: No cervical adenopathy.   Skin:     General: Skin is warm and dry.      Findings: No rash.   Neurological:      Mental Status: She is lethargic, disoriented and confused.      GCS: GCS eye subscore is 4. GCS verbal subscore is 4. GCS motor subscore is 6.      Cranial Nerves: No cranial nerve deficit.      Sensory: No sensory deficit.   Psychiatric:         Speech: Speech normal.         Behavior: Behavior normal.         Thought Content: Thought content normal.         Judgment: Judgment normal.         Procedures           ED Course  ED Course as of 01/26/24 1837 Fri Jan 26, 2024   1615 The patient went to dialysis today but did not receive any dialysis.  She receives dialysis on Monday Wednesday Friday.  She was noted to be confused and somnolent and therefore was sent here.  She is positive for RSV at this given time.  No acute electrolyte abnormalities.  She continues to be confused and attempting to climb out of the bed and does not appear safe or appropriate to go home.  Her oxygen saturations are good.  Vital signs are otherwise not acutely concerning.  I have consulted Dr. Plascencia for nephrology evaluation. [NS]      ED Course User Index  [NS] Jacques Giordano MD                                             Medical Decision Making  Differential diagnosis includes viral illness, pneumonia, electrolyte abnormality, uremia, other unspecified etiology.    Viral respiratory panel is positive for RSV.    Labs show elevated creatinine consistent with dialysis  minute renal failure with mildly elevated BUN at 40.  The patient also has hyperglycemia but does not show signs of DKA.    White count is normal, H&H consistent with expected baseline with no loss of blood reported.    Vital signs have remained stable.    The patient does have a borderline fever.  Lactic acid level is normal.  CT scan of the head without contrast, chest x-ray, and CT scan of the abdomen pelvis without contrast showed no acute abnormalities.    Ultimately patient appears agitated and is attempting to get out of bed.  Due to this current acute confusion and commendation with missing dialysis today and her not being able to take care of herself I feel admission for nephrology evaluation and dialysis is appropriate.    I discussed the patient with a nephrologist, Dr. Bates, who will consult the patient.    Discussed the patient with the hospitalist, Dr. Ryan, who will consult on the patient to determine status of admission.    Problems Addressed:  Altered mental status, unspecified altered mental status type: complicated acute illness or injury  RSV bronchitis: complicated acute illness or injury  Stage 5 chronic kidney disease on chronic dialysis: complicated acute illness or injury    Amount and/or Complexity of Data Reviewed  External Data Reviewed: labs, radiology, ECG and notes.  Labs: ordered. Decision-making details documented in ED Course.  Radiology: ordered and independent interpretation performed. Decision-making details documented in ED Course.  ECG/medicine tests: ordered and independent interpretation performed.    Risk  Prescription drug management.  Decision regarding hospitalization.        Final diagnoses:   Altered mental status, unspecified altered mental status type   RSV bronchitis   Stage 5 chronic kidney disease on chronic dialysis       ED Disposition  ED Disposition       ED Disposition   Decision to Admit    Condition   --    Comment   Level of Care: Telemetry [5]    Diagnosis: Acute encephalopathy [[id number]]   Admitting Physician: EMMANUEL FAUST [[id number]]   Attending Physician: EMMANUEL FAUST [[id number]]   Bed Request Comments: tele                 No follow-up provider specified.       Medication List        Changed      calcium acetate 667 MG capsule capsule  Commonly known as: PHOS BINDER)  Take 1 capsule by mouth 3 (Three) Times a Day With Meals.  What changed: when to take this                 Jacques Giordano MD  01/26/24 3010

## 2024-01-26 NOTE — CONSULTS
Referring Provider: Dr. Giordano  Reason for Consultation: ESRD and complications    Subjective     Chief complaint presented with confusion, history of ESRD    History of present illness:    63-year-old with ESRD on dialysis MWF, patient went for dialysis today was found to be confused and some generalized weakness, she was transferred to [facility] on arrival she was somnolent, complains of abdominal pain, blood sugar was 476.  Patient has a history of metastatic liver disease, she has a functional AV fistula in left upper arm.  Other medical problem includes diabetes, fibromyalgia, anemia, hypertension.  She denies any epistaxis, hemoptysis, nausea, vomiting, diarrhea, constipation, dysuria, fever or chills.    History  Past Medical History:   Diagnosis Date    Anxiety     Cancer     liver cell carcinoma    DDD (degenerative disc disease), lumbar     Depression     Diabetes mellitus     Fibromyalgia     Hemochromatosis     Hep B w/o coma, chronic, w/o delta     Hep C w/o coma, chronic     Hypertension     Stroke     Vertigo    ,   Past Surgical History:   Procedure Laterality Date    ARTERIOVENOUS FISTULA/SHUNT SURGERY Left 10/16/2021    Procedure: UPPER EXTREMITY ARTERIOVENOUS FISTULA FORMATION LEFT;  Surgeon: Johnny Rousseau MD;  Location: [facility];  Service: Vascular;  Laterality: Left;    BACK SURGERY      BELOW KNEE LEG AMPUTATION       SECTION      FOOT SURGERY      KNEE SURGERY      ROTATOR CUFF REPAIR      SPINAL CORD STIMULATOR IMPLANT     ,   Family History   Problem Relation Age of Onset    Heart disease Father     Heart attack Father    ,   Social History     Socioeconomic History    Marital status:    Tobacco Use    Smoking status: Never    Smokeless tobacco: Never   Vaping Use    Vaping Use: Never used   Substance and Sexual Activity    Alcohol use: No    Drug use: No    Sexual activity: Defer     E-cigarette/Vaping    E-cigarette/Vaping Use Never User      Passive Exposure No     Counseling Given No      E-cigarette/Vaping Substances    Nicotine No     THC No     CBD No     Flavoring No      E-cigarette/Vaping Devices    Disposable No     Pre-filled or Refillable Cartridge No     Refillable Tank No     Pre-filled Pod No          , (Not in a hospital admission) , Scheduled Meds:   , Continuous Infusions:   , PRN Meds:    [COMPLETED] Insert Peripheral IV **AND** sodium chloride, and Allergies:  Sulfa antibiotics    Review of Systems  Pertinent items are noted in HPI, all other systems reviewed and negative    Objective     Vital Signs  Temp:  [98.8 °F (37.1 °C)-99 °F (37.2 °C)] 98.8 °F (37.1 °C)  Heart Rate:  [] 105  Resp:  [16-17] 16  BP: (147-165)/(69-89) 152/89    No intake/output data recorded.  No intake/output data recorded.    Physical Exam:  General appearance  female awake alert.  HEENT: Atraumatic normocephalic head, eyes pupil reactive, extraocular muscle intact, nose no bleed, oropharynx clear, neck is supple, no JVD, no lymph node enlargement, trachea midline.  Lungs: Clear to auscultation, equal chest movement, no crepitation.  Heart: Normal S1, S2, no gallop, murmur, RRR.  Abdomen: Soft, nontender, positive bowel sounds, no organomegaly.  Extremities: Left BKA no edema, no cyanosis, no joint swelling.  Neuro: Alert, oriented x4, no focal deficit.  Psych: Mood and affect are normal and appropriate.  Skin: Skin is warm and dry.  : No suprapubic fullness or tenderness, no Sanchez catheter.  Functional AV fistula left upper arm  Results Review:   I reviewed the patient's new clinical results.    WBC WBC   Date Value Ref Range Status   01/26/2024 4.36 3.40 - 10.80 10*3/mm3 Final   01/24/2024 5.30 3.40 - 10.80 10*3/mm3 Final      HGB Hemoglobin   Date Value Ref Range Status   01/26/2024 9.5 (L) 12.0 - 15.9 g/dL Final   01/24/2024 8.4 (L) 12.0 - 15.9 g/dL Final      HCT Hematocrit   Date Value Ref Range Status   01/26/2024 27.5 (L) 34.0 - 46.6 %  "Final   01/24/2024 24.9 (L) 34.0 - 46.6 % Final      Platlets No results found for: \"LABPLAT\"   MCV MCV   Date Value Ref Range Status   01/26/2024 97.5 (H) 79.0 - 97.0 fL Final   01/24/2024 98.0 (H) 79.0 - 97.0 fL Final          Sodium Sodium   Date Value Ref Range Status   01/26/2024 134 (L) 136 - 145 mmol/L Final   01/24/2024 134 (L) 136 - 145 mmol/L Final      Potassium Potassium   Date Value Ref Range Status   01/26/2024 4.9 3.5 - 5.2 mmol/L Final   01/24/2024 4.9 3.5 - 5.2 mmol/L Final      Chloride Chloride   Date Value Ref Range Status   01/26/2024 93 (L) 98 - 107 mmol/L Final   01/24/2024 95 (L) 98 - 107 mmol/L Final      CO2 CO2   Date Value Ref Range Status   01/26/2024 30.0 (H) 22.0 - 29.0 mmol/L Final   01/24/2024 31.0 (H) 22.0 - 29.0 mmol/L Final      BUN BUN   Date Value Ref Range Status   01/26/2024 40 (H) 8 - 23 mg/dL Final   01/24/2024 32 (H) 8 - 23 mg/dL Final      Creatinine Creatinine   Date Value Ref Range Status   01/26/2024 5.46 (H) 0.57 - 1.00 mg/dL Final   01/24/2024 4.61 (H) 0.57 - 1.00 mg/dL Final      Calcium Calcium   Date Value Ref Range Status   01/26/2024 9.1 8.6 - 10.5 mg/dL Final   01/24/2024 8.4 (L) 8.6 - 10.5 mg/dL Final      PO4 No results found for: \"CAPO4\"   Albumin Albumin   Date Value Ref Range Status   01/26/2024 3.7 3.5 - 5.2 g/dL Final   01/24/2024 3.6 3.5 - 5.2 g/dL Final      Magnesium No results found for: \"MG\"   Uric Acid No results found for: \"URICACID\"                 Assessment & Plan       Acute encephalopathy    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  ESRD: Dialysis Monday Wednesday Friday.  Patient missed her dialysis today due to confusion and generalized weakness, she was sent to the hospital for evaluation.  " Dialysis unit at [facility] under care of MICHELLE.  2.  Confusion: Improved at this time  3.  RSV infection.  4.  Anemia: Of chronic kidney disease.  No ADRIEN due to metastatic liver disease.  5.  Volume: No significant volume overload noted.  6.  BMD will monitor phosphorus and PTH.  Recommendations plan.  Dialysis orders given.  Dialysis have been started.  Patient tolerating dialysis at this time.  Okay to start home medication.  Renal diet.  Fluid restriction less than 1500 mL/day.  Protein supplement can be given.      I discussed the patients findings and my recommendations with patient and nursing staff    Staci Bates MD  01/26/24  @NOW

## 2024-01-27 LAB
ALBUMIN SERPL-MCNC: 3.7 G/DL (ref 3.5–5.2)
ALBUMIN/GLOB SERPL: 1.4 G/DL
ALP SERPL-CCNC: 100 U/L (ref 39–117)
ALT SERPL W P-5'-P-CCNC: 38 U/L (ref 1–33)
ANION GAP SERPL CALCULATED.3IONS-SCNC: 15 MMOL/L (ref 5–15)
AST SERPL-CCNC: 23 U/L (ref 1–32)
BACTERIA SPEC AEROBE CULT: NO GROWTH
BASOPHILS # BLD AUTO: 0.05 10*3/MM3 (ref 0–0.2)
BASOPHILS NFR BLD AUTO: 1 % (ref 0–1.5)
BILIRUB SERPL-MCNC: 1 MG/DL (ref 0–1.2)
BUN SERPL-MCNC: 23 MG/DL (ref 8–23)
BUN/CREAT SERPL: 6.1 (ref 7–25)
CALCIUM SPEC-SCNC: 8.8 MG/DL (ref 8.6–10.5)
CHLORIDE SERPL-SCNC: 96 MMOL/L (ref 98–107)
CO2 SERPL-SCNC: 23 MMOL/L (ref 22–29)
CREAT SERPL-MCNC: 3.8 MG/DL (ref 0.57–1)
DEPRECATED RDW RBC AUTO: 42.5 FL (ref 37–54)
EGFRCR SERPLBLD CKD-EPI 2021: 12.6 ML/MIN/1.73
EOSINOPHIL # BLD AUTO: 0.13 10*3/MM3 (ref 0–0.4)
EOSINOPHIL NFR BLD AUTO: 2.5 % (ref 0.3–6.2)
ERYTHROCYTE [DISTWIDTH] IN BLOOD BY AUTOMATED COUNT: 11.9 % (ref 12.3–15.4)
FERRITIN SERPL-MCNC: 1393 NG/ML (ref 13–150)
FOLATE SERPL-MCNC: 8.78 NG/ML (ref 4.78–24.2)
GLOBULIN UR ELPH-MCNC: 2.6 GM/DL
GLUCOSE BLDC GLUCOMTR-MCNC: 227 MG/DL (ref 70–130)
GLUCOSE BLDC GLUCOMTR-MCNC: 237 MG/DL (ref 70–130)
GLUCOSE BLDC GLUCOMTR-MCNC: 260 MG/DL (ref 70–130)
GLUCOSE BLDC GLUCOMTR-MCNC: 263 MG/DL (ref 70–130)
GLUCOSE SERPL-MCNC: 228 MG/DL (ref 65–99)
HBA1C MFR BLD: 6.3 % (ref 4.8–5.6)
HCT VFR BLD AUTO: 26.5 % (ref 34–46.6)
HGB BLD-MCNC: 8.9 G/DL (ref 12–15.9)
IMM GRANULOCYTES # BLD AUTO: 0.01 10*3/MM3 (ref 0–0.05)
IMM GRANULOCYTES NFR BLD AUTO: 0.2 % (ref 0–0.5)
IRON 24H UR-MRATE: 119 MCG/DL (ref 37–145)
IRON SATN MFR SERPL: 56 % (ref 20–50)
LYMPHOCYTES # BLD AUTO: 1.21 10*3/MM3 (ref 0.7–3.1)
LYMPHOCYTES NFR BLD AUTO: 23.7 % (ref 19.6–45.3)
MAGNESIUM SERPL-MCNC: 1.9 MG/DL (ref 1.6–2.4)
MCH RBC QN AUTO: 33.3 PG (ref 26.6–33)
MCHC RBC AUTO-ENTMCNC: 33.6 G/DL (ref 31.5–35.7)
MCV RBC AUTO: 99.3 FL (ref 79–97)
MONOCYTES # BLD AUTO: 0.46 10*3/MM3 (ref 0.1–0.9)
MONOCYTES NFR BLD AUTO: 9 % (ref 5–12)
NEUTROPHILS NFR BLD AUTO: 3.24 10*3/MM3 (ref 1.7–7)
NEUTROPHILS NFR BLD AUTO: 63.6 % (ref 42.7–76)
NRBC BLD AUTO-RTO: 0 /100 WBC (ref 0–0.2)
PHOSPHATE SERPL-MCNC: 3.6 MG/DL (ref 2.5–4.5)
PLATELET # BLD AUTO: 171 10*3/MM3 (ref 140–450)
PMV BLD AUTO: 10 FL (ref 6–12)
POTASSIUM SERPL-SCNC: 4.9 MMOL/L (ref 3.5–5.2)
PROT SERPL-MCNC: 6.3 G/DL (ref 6–8.5)
RBC # BLD AUTO: 2.67 10*6/MM3 (ref 3.77–5.28)
RETICS # AUTO: 0.08 10*6/MM3 (ref 0.02–0.13)
RETICS/RBC NFR AUTO: 3.11 % (ref 0.7–1.9)
SODIUM SERPL-SCNC: 134 MMOL/L (ref 136–145)
TIBC SERPL-MCNC: 212 MCG/DL (ref 298–536)
TRANSFERRIN SERPL-MCNC: 142 MG/DL (ref 200–360)
TSH SERPL DL<=0.05 MIU/L-ACNC: 1.93 UIU/ML (ref 0.27–4.2)
VIT B12 BLD-MCNC: 326 PG/ML (ref 211–946)
WBC NRBC COR # BLD AUTO: 5.1 10*3/MM3 (ref 3.4–10.8)

## 2024-01-27 PROCEDURE — 84443 ASSAY THYROID STIM HORMONE: CPT | Performed by: NURSE PRACTITIONER

## 2024-01-27 PROCEDURE — 97110 THERAPEUTIC EXERCISES: CPT

## 2024-01-27 PROCEDURE — 82607 VITAMIN B-12: CPT | Performed by: NURSE PRACTITIONER

## 2024-01-27 PROCEDURE — 99232 SBSQ HOSP IP/OBS MODERATE 35: CPT | Performed by: STUDENT IN AN ORGANIZED HEALTH CARE EDUCATION/TRAINING PROGRAM

## 2024-01-27 PROCEDURE — 63710000001 INSULIN LISPRO (HUMAN) PER 5 UNITS: Performed by: NURSE PRACTITIONER

## 2024-01-27 PROCEDURE — 85025 COMPLETE CBC W/AUTO DIFF WBC: CPT | Performed by: NURSE PRACTITIONER

## 2024-01-27 PROCEDURE — 82728 ASSAY OF FERRITIN: CPT | Performed by: NURSE PRACTITIONER

## 2024-01-27 PROCEDURE — 84100 ASSAY OF PHOSPHORUS: CPT | Performed by: INTERNAL MEDICINE

## 2024-01-27 PROCEDURE — 80053 COMPREHEN METABOLIC PANEL: CPT | Performed by: NURSE PRACTITIONER

## 2024-01-27 PROCEDURE — 85045 AUTOMATED RETICULOCYTE COUNT: CPT | Performed by: NURSE PRACTITIONER

## 2024-01-27 PROCEDURE — G0378 HOSPITAL OBSERVATION PER HR: HCPCS

## 2024-01-27 PROCEDURE — 82746 ASSAY OF FOLIC ACID SERUM: CPT | Performed by: NURSE PRACTITIONER

## 2024-01-27 PROCEDURE — 83036 HEMOGLOBIN GLYCOSYLATED A1C: CPT | Performed by: NURSE PRACTITIONER

## 2024-01-27 PROCEDURE — 82948 REAGENT STRIP/BLOOD GLUCOSE: CPT

## 2024-01-27 PROCEDURE — 97530 THERAPEUTIC ACTIVITIES: CPT

## 2024-01-27 PROCEDURE — 83735 ASSAY OF MAGNESIUM: CPT | Performed by: NURSE PRACTITIONER

## 2024-01-27 PROCEDURE — 84466 ASSAY OF TRANSFERRIN: CPT | Performed by: NURSE PRACTITIONER

## 2024-01-27 PROCEDURE — 83540 ASSAY OF IRON: CPT | Performed by: NURSE PRACTITIONER

## 2024-01-27 PROCEDURE — 97162 PT EVAL MOD COMPLEX 30 MIN: CPT

## 2024-01-27 RX ADMIN — INSULIN LISPRO 3 UNITS: 100 INJECTION, SOLUTION INTRAVENOUS; SUBCUTANEOUS at 12:04

## 2024-01-27 RX ADMIN — GUAIFENESIN 1200 MG: 600 TABLET, EXTENDED RELEASE ORAL at 20:30

## 2024-01-27 RX ADMIN — INSULIN LISPRO 4 UNITS: 100 INJECTION, SOLUTION INTRAVENOUS; SUBCUTANEOUS at 20:29

## 2024-01-27 RX ADMIN — PANTOPRAZOLE SODIUM 40 MG: 40 TABLET, DELAYED RELEASE ORAL at 04:50

## 2024-01-27 RX ADMIN — CALCIUM ACETATE 667 MG: 667 CAPSULE ORAL at 16:51

## 2024-01-27 RX ADMIN — CALCIUM ACETATE 667 MG: 667 CAPSULE ORAL at 12:03

## 2024-01-27 RX ADMIN — DEXTROMETHORPHAN POLISTIREX 60 MG: 30 SUSPENSION ORAL at 09:16

## 2024-01-27 RX ADMIN — INSULIN LISPRO 3 UNITS: 100 INJECTION, SOLUTION INTRAVENOUS; SUBCUTANEOUS at 08:50

## 2024-01-27 RX ADMIN — VENLAFAXINE HYDROCHLORIDE 75 MG: 75 CAPSULE, EXTENDED RELEASE ORAL at 08:50

## 2024-01-27 RX ADMIN — ACETAMINOPHEN 650 MG: 325 TABLET ORAL at 22:53

## 2024-01-27 RX ADMIN — CARVEDILOL 25 MG: 6.25 TABLET, FILM COATED ORAL at 16:50

## 2024-01-27 RX ADMIN — RIFAXIMIN 550 MG: 550 TABLET ORAL at 08:51

## 2024-01-27 RX ADMIN — QUETIAPINE FUMARATE 25 MG: 25 TABLET ORAL at 20:30

## 2024-01-27 RX ADMIN — CALCIUM ACETATE 667 MG: 667 CAPSULE ORAL at 08:51

## 2024-01-27 RX ADMIN — TRAMADOL HYDROCHLORIDE 50 MG: 50 TABLET, COATED ORAL at 13:04

## 2024-01-27 RX ADMIN — ATORVASTATIN CALCIUM 80 MG: 40 TABLET, FILM COATED ORAL at 20:30

## 2024-01-27 RX ADMIN — DEXTROMETHORPHAN POLISTIREX 60 MG: 30 SUSPENSION ORAL at 20:29

## 2024-01-27 RX ADMIN — LACTULOSE 30 G: 20 SOLUTION ORAL at 08:49

## 2024-01-27 RX ADMIN — GUAIFENESIN 1200 MG: 600 TABLET, EXTENDED RELEASE ORAL at 08:50

## 2024-01-27 RX ADMIN — INSULIN LISPRO 4 UNITS: 100 INJECTION, SOLUTION INTRAVENOUS; SUBCUTANEOUS at 16:50

## 2024-01-27 RX ADMIN — SENNOSIDES AND DOCUSATE SODIUM 2 TABLET: 8.6; 5 TABLET ORAL at 20:30

## 2024-01-27 RX ADMIN — Medication 10 ML: at 20:30

## 2024-01-27 RX ADMIN — Medication 10 ML: at 08:51

## 2024-01-27 RX ADMIN — LACTULOSE 30 G: 20 SOLUTION ORAL at 20:30

## 2024-01-27 RX ADMIN — RIFAXIMIN 550 MG: 550 TABLET ORAL at 20:30

## 2024-01-27 RX ADMIN — CARVEDILOL 25 MG: 6.25 TABLET, FILM COATED ORAL at 08:50

## 2024-01-27 NOTE — CONSULTS
"                  Clinical Nutrition   Nutrition Support Assessment  Reason for Visit: Consult/MSA      Patient Name: Jeaneth Keita  YOB: 1960  MRN: [id number]  Date of Encounter: 24 15:20 EST  Admission date: 2024    Comments:    Pt does not meet criteria for malnutrition at this time.   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    PSH:  She  has a past surgical history that includes Back surgery; Rotator cuff repair;  section; Knee surgery; Foot surgery; Spinal cord stimulator implant; Below knee leg amputation; and arteriovenous fistula/shunt surgery (Left, 10/16/2021).    Applicable Nutrition Concerns:   Skin:  Oral:  GI:    Applicable Interval History:         Reported/Observed/Food/Nutrition Related History:     RN states pt appropriate for nutrition interview. Pt up in chair during visit, finishing lunch tray. Pt reports adequate po intake and appetite PTA, no changes noted. Pt also reports stable wt. Pt does not drink ONS usually and declines need at this time. Pt does not have any nutrition concerns to report. NKFA.     Anthropometrics     Flowsheet Rows      Flowsheet Row First Filed Value   Admission Height 172.7 cm (68\") Documented at 2024 1218   Admission Weight 77.1 kg (170 lb) Documented at 2024 1219      " "    Height: Height: 172.7 cm (68\")  Last Filed Weight: Weight: 77.1 kg (170 lb) (01/26/24 1219)  Method: Weight Method: Stated  BMI: BMI (Calculated): 25.9  BMI classification: Overweight: 25.0-29.9kg/m2   IBW:      UBW:     Weight      Weight (kg) Weight (lbs) Weight Method   2/6/2023 77.111 kg  170 lb  Estimated    7/12/2023 81.647 kg  180 lb  Stated    12/18/2023 77.066 kg  169 lb 14.4 oz  Bed scale     81.647 kg  180 lb  Stated    1/19/2024 77.111 kg  170 lb     1/24/2024 77.111 kg  170 lb  Estimated    1/26/2024 77.111 kg  170 lb  Stated      Weight change:   N/A    Nutrition Focused Physical Exam     Date:         NFPE completed, patient does not meet criteria for malnutrition diagnosis at this time.     Current Nutrition Prescription   PO: Diet: Cardiac Diets, Diabetic Diets; Healthy Heart (2-3 Na+); Consistent Carbohydrate; Texture: Regular Texture (IDDSI 7); Fluid Consistency: Thin (IDDSI 0)  Oral Nutrition Supplement:   Intake: Insufficient data      Nutrition Diagnosis   Date:  1/27            Updated:    Problem No nutrition diagnosis at this time   Etiology    Signs/Symptoms    Status:    Goal:   General: Maintain nutrition  PO: Establish PO  EN/PN: N/A    Nutrition Intervention      Follow treatment progress, Care plan reviewed, Supplement offered/refused        Monitoring/Evaluation:   Per protocol, PO intake      Safia Duque, MS,RD,LD  Time Spent: 20  "

## 2024-01-27 NOTE — PROGRESS NOTES
[facility] Medicine Services  PROGRESS NOTE    Patient Name: Jeaneth Keita  : 1960  MRN: [id number]    Date of Admission: 2024  Primary Care Physician: Lulu Amos MD    Subjective   Subjective     CC:  Lethargy    HPI:  Patient reports improvement in her overall fatigue and confusion after dialysis yesterday.  She denies chest pain or shortness of breath.  Denies fevers, chills, sweats.      Objective   Objective     Vital Signs:   Temp:  [98 °F (36.7 °C)-99 °F (37.2 °C)] 98.3 °F (36.8 °C)  Heart Rate:  [] 97  Resp:  [16-20] 20  BP: (105-165)/(64-89) 127/67     Physical Exam:  General appearance: alert, awake, oriented, no acute distress   Cardiovascular: RRR, no murmurs or rubs, radial pulse full 2/4 BL   Respiratory: CTAB, no crackles or wheezes   Abdomen: soft, non-tender, no organomegaly, bowel sounds normoactive    MSK: L BKA   Neuro/CNS: alert and oriented x3, normal speech    Results Reviewed:  LAB RESULTS:      Lab 24  0515 24  1212 24  0127   WBC 5.10 4.36 5.30   HEMOGLOBIN 8.9* 9.5* 8.4*   HEMATOCRIT 26.5* 27.5* 24.9*   PLATELETS 171 167 160   NEUTROS ABS 3.24 2.96 3.40   IMMATURE GRANS (ABS) 0.01 0.01 0.01   LYMPHS ABS 1.21 0.83 1.13   MONOS ABS 0.46 0.38 0.47   EOS ABS 0.13 0.16 0.26   MCV 99.3* 97.5* 98.0*   LACTATE  --  1.1 1.8         Lab 24  0515 24  1212 24  0127   SODIUM 134* 134* 134*   POTASSIUM 4.9 4.9 4.9   CHLORIDE 96* 93* 95*   CO2 23.0 30.0* 31.0*   ANION GAP 15.0 11.0 8.0   BUN 23 40* 32*   CREATININE 3.80* 5.46* 4.61*   EGFR 12.6* 8.2* 10.0*   GLUCOSE 228* 350* 339*   CALCIUM 8.8 9.1 8.4*   MAGNESIUM 1.9  --   --    PHOSPHORUS 3.6  --   --    HEMOGLOBIN A1C 6.30*  --   --    TSH 1.930  --   --          Lab 24  0515 24  1212 24  0127   TOTAL PROTEIN 6.3 6.8 6.3   ALBUMIN 3.7 3.7 3.6   GLOBULIN 2.6 3.1 2.7   ALT (SGPT) 38* 39* 38*   AST (SGOT) 23 23 23   BILIRUBIN 1.0 0.7 0.6    ALK PHOS 100 145* 137*   LIPASE  --   --  53         Lab 01/26/24  1420 01/26/24  1212 01/24/24  0127   HSTROP T 39* 40* 33*                 Lab 01/26/24  1253   PH, ARTERIAL 7.552*   PCO2, ARTERIAL 34.0*   PO2 .0*   FIO2 21   HCO3 ART 29.9*   BASE EXCESS ART 7.2*   CARBOXYHEMOGLOBIN 1.4     Brief Urine Lab Results  (Last result in the past 365 days)        Color   Clarity   Blood   Leuk Est   Nitrite   Protein   CREAT   Urine HCG        01/26/24 1233 Yellow   Clear   Negative   Trace   Negative   >=300 mg/dL (3+)                   Microbiology Results Abnormal       None            CT Head Without Contrast    Result Date: 1/26/2024  CT HEAD WO CONTRAST Date of Exam: 1/26/2024 12:56 PM EST Indication: confusion. Comparison: 1/19/2024 Technique: Axial CT images were obtained of the head without contrast administration.  Automated exposure control and iterative construction methods were used. Findings: The calvarium appears intact. Included paranasal sinuses and mastoids clear except for mild ethmoid sinus disease. As on prior study, there is moderately advanced generalized cerebral atrophy and there are chronic appearing central white matter changes. There is no evidence of new edema/infarct, intracranial hemorrhage, mass or mass effect, hydrocephalus, or abnormal extra-axial collection.     Impression: Impression: Chronic appearing changes of the aging brain. No new intracranial disease is seen. Electronically Signed: Washington Valladares MD  1/26/2024 1:40 PM EST  Workstation ID: [id number]    CT Abdomen Pelvis Without Contrast    Result Date: 1/26/2024  CT ABDOMEN PELVIS WO CONTRAST Date of Exam: 1/26/2024 12:56 PM EST Indication: abdominal pain. Comparison: CT abdomen and pelvis without contrast 1/24/2024 Technique: Axial CT images were obtained of the abdomen and pelvis without the administration of contrast. Reconstructed coronal and sagittal images were also obtained. Automated exposure control and iterative  construction methods were used. Findings: LUNG BASES: Stable bandlike scarring in the posteromedial right lower lobe. Small hiatal hernia. Coronary artery and mitral annular calcifications are present. LIVER: Approximately 4 cm peripherally calcified hypodense mass in the medial aspect of the liver is again noted. Subtle round hypodense mass within the left hepatic lobe is unchanged. Liver contour is lobulated. BILIARY/GALLBLADDER: Hyperdensity within the gallbladder is stable. SPLEEN:  Unremarkable PANCREAS:  Unremarkable ADRENAL:  Unremarkable KIDNEYS:  Unremarkable parenchyma with no solid mass identified. No obstruction.  The kidneys are mildly atrophic. Punctate calcifications are seen in both kidneys. There is no hydronephrosis. GASTROINTESTINAL/MESENTERY: Small hiatal hernia. Small bowel loops normal in caliber without mucosal thickening or evidence for obstruction. Normal appendix. Retained contrast and stool in the colon without inflammatory change. AORTA/IVC:  Normal caliber. RETROPERITONEUM/LYMPH NODES:  Unremarkable REPRODUCTIVE: The uterus is unremarkable. There is a 2.6 cm right adnexal cystic structure with calcification, similar to 6/27/2022. BLADDER:  Unremarkable OSSEUS STRUCTURES: Degenerative changes of the sacroiliac joints. Erosion of the right posteromedial seventh rib. Posterior and interbody fusion from L4-S1. Large destructive mass of the right iliac wing extending into the gluteal musculature lateral to the ileum in the iliac is muscle medial to the ileum which is similar to the most recent comparison study.     Impression: Impression: 1. No acute findings, and no adverse change since 1/24/2024. 2. Stable appearance of a 3 cm hypodense mass in the left hepatic lobe. Calcified hypodense mass in the medial liver is unchanged. 3. Large destructive lesion centered in the right iliac wing as previously described. 4. Cholelithiasis/gallbladder sludge. Electronically Signed: Freda Agee MD   1/26/2024 1:21 PM EST  Workstation ID: [id number]    XR Chest 1 View    Result Date: 1/26/2024  XR CHEST 1 VW COMPARISON: 1/19/2024 HISTORY: altered mental status. Difficulty processing  ams. FINDINGS: Technical adequacy: Adequate Central airways: Unremarkable Heart: Cardiomegaly, likely accentuated by the technique Great vessels: Unremarkable Mediastinum: Unremarkable Lungs: Unremarkable Pulmonary trupti:Unremarkable Pleura: Unremarkable Skeletal structures: Degenerative changes Extra thoracic soft tissues:Unremarkable Additional comments: Left upper arm likely stent seen, unchanged.     Impression: Impression: As above. No acute cardiopulmonary disease. Electronically Signed: Ba Hallman MD  1/26/2024 1:05 PM EST  Workstation ID: [id number]     Results for orders placed during the hospital encounter of 12/18/23    Adult Transthoracic Echo Complete W/ Cont if Necessary Per Protocol    Interpretation Summary    Left ventricular systolic function is normal. Calculated left ventricular EF of 63%    Left ventricular diastolic function was normal.    Normal right ventricular size and systolic function    No significant valvular abnormality    No pericardial effusion    Compared to prior echocardiogram from 10/6/2021, there is no significant change      Current medications:  Scheduled Meds:atorvastatin, 80 mg, Oral, Nightly  calcium acetate, 667 mg, Oral, TID With Meals  carvedilol, 25 mg, Oral, BID With Meals  dextromethorphan polistirex ER, 60 mg, Oral, Q12H  guaiFENesin, 1,200 mg, Oral, Q12H  insulin lispro, 2-7 Units, Subcutaneous, 4x Daily AC & at Bedtime  lactulose, 30 g, Oral, BID  pantoprazole, 40 mg, Oral, Daily  QUEtiapine, 25 mg, Oral, Nightly  riFAXIMin, 550 mg, Oral, BID  senna-docusate sodium, 2 tablet, Oral, BID  sodium chloride, 10 mL, Intravenous, Q12H  venlafaxine XR, 75 mg, Oral, Daily      Continuous Infusions:   PRN Meds:.  acetaminophen **OR** acetaminophen **OR** acetaminophen    benzonatate     senna-docusate sodium **AND** polyethylene glycol **AND** bisacodyl **AND** bisacodyl    Calcium Replacement - Follow Nurse / BPA Driven Protocol    dextrose    dextrose    glucagon (human recombinant)    ipratropium-albuterol    Magnesium Standard Dose Replacement - Follow Nurse / BPA Driven Protocol    nitroglycerin    ondansetron ODT **OR** ondansetron    Phosphorus Replacement - Follow Nurse / BPA Driven Protocol    Potassium Replacement - Follow Nurse / BPA Driven Protocol    [COMPLETED] Insert Peripheral IV **AND** sodium chloride    sodium chloride    sodium chloride    traMADol    Assessment & Plan   Assessment & Plan     Active Hospital Problems    Diagnosis  POA    **Acute encephalopathy [G93.40]  Yes    RSV (respiratory syncytial virus infection) [B33.8]  Yes    Cirrhosis of liver [K74.60]  Yes    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        Brief Hospital Course to date:  Jeaneth Keita is a 63 y.o. female with past medical history significant for ESRD on hemodialysis, CVA, vertigo, cirrhosis, type 2 diabetes mellitus, hypertension, was admitted for acute encephalopathy.      Acute encephalopathy, metabolic  ESRD on HD  -Improved after undergoing dialysis on admission, continue per nephrology  -PT/OT  -BMP in AM     RSV+  -Unclear if this contributed to encephalopathy or not, continue supportive care    Liver cirrhosis  Chronic hep C  Hepatocellular carcinoma with metastasis to lung/bone  -Continue Lactulose, Rifaximin     T2DM  -Hgb A1C 5.8 on 12/19/2023  -Accu-Cheks ACHS with sign scale insulin     Anemia of chronic disease  -Stable       HTN  HLD  -continue Coreg, Lipitor      GERD  -Protonix      Mood disorder  -Continue Seroquel, Effexor     Expected Discharge Location and Transportation: TBD  Expected Discharge   Expected Discharge Date: 1/29/2024; Expected Discharge Time:      DVT prophylaxis:  Mechanical DVT prophylaxis orders are present.         AM-PAC 6 Clicks Score (PT): 14 (01/27/24 0800)    CODE STATUS:   Code Status and Medical Interventions:   Ordered at: 01/26/24 5027     Code Status (Patient has no pulse and is not breathing):    CPR (Attempt to Resuscitate)     Medical Interventions (Patient has pulse or is breathing):    Full Support       Johnny Boyd, DO  01/27/24

## 2024-01-27 NOTE — THERAPY EVALUATION
Patient Name: Jeaneth Keita  : 1960    MRN: [id number]                              Today's Date: 2024       Admit Date: 2024    Visit Dx:     ICD-10-CM ICD-9-CM   1. Altered mental status, unspecified altered mental status type  R41.82 780.97   2. RSV bronchitis  J20.5 466.0     079.6   3. Stage 5 chronic kidney disease on chronic dialysis  N18.6 585.6    Z99.2 V45.11     Patient Active Problem List   Diagnosis    ICH (intracerebral hemorrhage)    Hepatocellular carcinoma metastatic to bone s/p RXT     Hemochromatosis    Cirrhosis of liver    Chronic Hep C     ESRD (end stage renal disease)    Chronic ulcer of right foot    S/P left BKA    GERD (gastroesophageal reflux disease)    Chronic pain on Methadone    Essential hypertension    Type 2 diabetes mellitus    Right lower lobe pneumonia    Colitis    Normocytic anemia    Hypokalemia    CVA (cerebral vascular accident)    Stroke-like symptoms    History of hypoglycemia    Hypoglycemia    Chest pain    End stage renal disease on dialysis    Disorientation    Altered mental status    End-stage renal disease on hemodialysis    Elevated brain natriuretic peptide (BNP) level    Acute encephalopathy    RSV (respiratory syncytial virus infection)     Past Medical History:   Diagnosis Date    Anxiety     Cancer     liver cell carcinoma    DDD (degenerative disc disease), lumbar     Depression     Diabetes mellitus     Fibromyalgia     Hemochromatosis     Hep B w/o coma, chronic, w/o delta     Hep C w/o coma, chronic     Hypertension     Stroke     Vertigo      Past Surgical History:   Procedure Laterality Date    ARTERIOVENOUS FISTULA/SHUNT SURGERY Left 10/16/2021    Procedure: UPPER EXTREMITY ARTERIOVENOUS FISTULA FORMATION LEFT;  Surgeon: Johnny Rousseau MD;  Location: [facility];  Service: Vascular;  Laterality: Left;    BACK SURGERY      BELOW KNEE LEG AMPUTATION       SECTION      FOOT SURGERY      KNEE SURGERY      ROTATOR CUFF  "REPAIR      SPINAL CORD STIMULATOR IMPLANT        General Information       Row Name 24 1210          Physical Therapy Time and Intention    Document Type evaluation  -DM     Mode of Treatment physical therapy  -DM       Row Name 24 1210          General Information    Patient Profile Reviewed yes  -DM     Prior Level of Function independent:;bed mobility;transfer;w/c or scooter;dependent:;gait;driving;shopping  indep SPT bed to manual w/c (has L BKA stump support); non-amb;resides in personal care home (poor historian d/t conf. level; O x 1); repeating \"can't remember\" when asked PLOF or if any further equip.  -DM     Existing Precautions/Restrictions fall;other (see comments)  Ac.encepahlop; RSV (contact);Hep. B,C; fibromyal; h/o L BKA, S.C. stim implant, liver CA/mets to bone, & XRT;ESRD/HD MWF;Vertigo; CVA-like sympt   -DM     Barriers to Rehab medically complex;previous functional deficit;cognitive status;ineffective coping  A/D  -DM       Row Name 24 1210          Living Environment    People in Home facility resident  personal care home ([facility]); sist.in law checks on  -DM       Row Name 24 1210          Home Main Entrance    Number of Stairs, Main Entrance none  -DM       Row Name 24 1210          Cognition    Orientation Status (Cognition) oriented to;person;other (see comments);verbal cues/prompts needed for orientation  O x 1 & ;can't recall mo./yr, loc. ( when told she was in hosp, was able to recall for Dr Arreguin when assessed during PT session)  -DM       Row Name 24 1210          Safety Issues, Functional Mobility    Safety Issues Affecting Function (Mobility) ability to follow commands;awareness of need for assistance;insight into deficits/self-awareness;judgment;safety precaution awareness;safety precautions follow-through/compliance;sequencing abilities  -DM     Impairments Affecting Function (Mobility) balance;cognition;endurance/activity " tolerance;pain;postural/trunk control;shortness of breath;strength  -DM     Cognitive Impairments, Mobility Safety/Performance attention;awareness, need for assistance;insight into deficits/self-awareness;safety precaution awareness;safety precaution follow-through;sequencing abilities  -DM               User Key  (r) = Recorded By, (t) = Taken By, (c) = Cosigned By      Initials Name Provider Type    DM Gabby Lee, PT Physical Therapist                   Mobility       Row Name 01/27/24 1210          Bed Mobility    Bed Mobility rolling left;rolling right;scooting/bridging;supine-sit  -DM     Rolling Left [place] (Bed Mobility) verbal cues;maximum assist (25% patient effort);nonverbal cues (demo/gesture)  rolled x 2 for hygiene/pad change s/p incont.diarrhea  -DM     Rolling Right [place] (Bed Mobility) verbal cues;nonverbal cues (demo/gesture);maximum assist (25% patient effort)  rolled x 3 (hygiene s/p diarrhea, then to transf sitting EOB  using draw sheet & R bedrail  -DM     Scooting/Bridging [place] (Bed Mobility) verbal cues;dependent (less than 25% patient effort);2 person assist  scooted to HOB w/ draw sheet s/p hygiene;RLE positioned in hook lying but unable to perform half bridge to help push  -DM     Supine-Sit [place] (Bed Mobility) verbal cues;nonverbal cues (demo/gesture);maximum assist (25% patient effort)  -DM     Assistive Device (Bed Mobility) bed rails;draw sheet;head of bed elevated  -DM     Comment, (Bed Mobility) issued lift [place], [place]ve bates, ch.alarm, gt belt; nsg req. sister in law remain present while pt.UIC, d/t conf.level/enceph; onset diarrhea during LE exer;Nsg & intern assisted PT w/ hygiene s/p nsg administered tramadol for abdom pain 8/10;instructed in PLB exer;opted to defer w/c transf d/t cognit.status, & focus on bed to chair transf; tennis shoe placed R foot; pt does not have a L BKA prosth.  -DM       Row Name 01/27/24 1210          Transfers     Comment, (Transfers) STS trials x 2 (def. R wx use,to allow close guarding & stabilizing of R knee during transit.mvmt), w/ max 2A; R knee buckling;pt c/o fatigue & sat EOB x3 min.; 3rd person (nsg) present for 2nd STS trial (req. max3A for SPT to chair adjacent to EOB;pt utilizing armrest,then transit R arm to PT's arm for UE supp.);nsg supporting LUE, & 2nd nurse guided B Hips from EOB to recliner  -DM       Row Name 01/27/24 1210          Bed-Chair Transfer    Bed-Chair [place] (Transfers) verbal cues;nonverbal cues (demo/gesture);maximum assist (25% patient effort);2 person assist;other (see comments)  3rd person guiding hips  -DM     Assistive Device (Bed-Chair Transfers) other (see comments)  BUE supp  -DM       Row Name 01/27/24 1210          Sit-Stand Transfer    Sit-Stand [place] (Transfers) verbal cues;nonverbal cues (demo/gesture);maximum assist (25% patient effort);2 person assist  -DM     Assistive Device (Sit-Stand Transfers) other (see comments)  BUE supp, & chair armrest  -DM       Row Name 01/27/24 1210          Gait/Stairs (Locomotion)    [place] Level (Gait) unable to assess  -DM               User Key  (r) = Recorded By, (t) = Taken By, (c) = Cosigned By      Initials Name Provider Type    DM Gabby Lee, PT Physical Therapist                   Obj/Interventions       Row Name 01/27/24 1210          Range of Motion Comprehensive    General Range of Motion lower extremity range of motion deficits identified  -DM     Comment, General Range of Motion R Hip [place]. 15-20% d/t crepitus/pain w/ mvmt, & incr abdom discomf; L hip [place]. 10% d/t incr.chr. back pain when BKA stump moved  -DM       Row Name 01/27/24 1210          Strength Comprehensive (MMT)    General Manual Muscle Testing (MMT) Assessment lower extremity strength deficits identified  -DM     Comment, General Manual Muscle Testing (MMT) Assessment tico LE's grossly 3- to 4-/5  -DM       Row Name 01/27/24 1210          Motor  Skills    Therapeutic Exercise shoulder;hip;knee;ankle  performed ther exer sup, EOB & UIC w/ CK present, who will cont to encourage pt to exer while sitting  -DM       Row Name 01/27/24 1210          Shoulder (Therapeutic Exercise)    Shoulder (Therapeutic Exercise) AROM (active range of motion)  -DM     Shoulder AROM (Therapeutic Exercise) bilateral;extension;aDduction;supine;10 repetitions;other (see comments);scapular elevation  grav asst sh. ext & add; act.biceps curls x 15  -DM     Shoulder AAROM (Therapeutic Exercise) bilateral;flexion;aBduction;horizontal aBduction/aDduction;supine;10 repetitions  -DM       Row Name 01/27/24 1210          Hip (Therapeutic Exercise)    Hip (Therapeutic Exercise) AROM (active range of motion);AAROM (active assistive range of motion);isometric exercises  -DM     Hip AROM (Therapeutic Exercise) bilateral;extension;external rotation;internal rotation;sitting;supine;10 repetitions  grav asst hip ext in sitting; R BKFO in sup, & L hip circles (CW, CCW);attempted half bridge RLE, but unable to perform  -DM     Hip AAROM (Therapeutic Exercise) bilateral;flexion;supine;aBduction;aDduction;sitting;10 repetitions  Min A for hip flex L BKA stump; mod A for R sitting marches, & B hip abd/add in sup  -DM     Hip Isometrics (Therapeutic Exercise) bilateral;gluteal sets;supine;10 repetitions  -DM       Row Name 01/27/24 1210          Knee (Therapeutic Exercise)    Knee (Therapeutic Exercise) AROM (active range of motion)  -DM     Knee AROM (Therapeutic Exercise) flexion;extension;SAQ (short arc quad);SLR (straight leg raise);LAQ (long arc quad);heel slides;right;sitting;supine;10 repetitions;other (see comments)  also L K to C, SLR w/ resid. limb LLE  -DM       Row Name 01/27/24 1210          Ankle (Therapeutic Exercise)    Ankle (Therapeutic Exercise) AROM (active range of motion);AAROM (active assistive range of motion)  -DM     Ankle AROM (Therapeutic Exercise)  right;dorsiflexion;plantarflexion;supine;10 repetitions  Pt. conf & asking if she should exer.L Ankle (is BKA); Pt then stating she has phantom limb sens.occ  -DM     Ankle AAROM (Therapeutic Exercise) right;supine;other (see comments);10 repetitions  AC  -DM       Row Name 01/27/24 1210          Balance    Balance Assessment sitting static balance;sitting dynamic balance;standing static balance;standing dynamic balance  -DM     Static Sitting Balance verbal cues;contact guard  -DM     Dynamic Sitting Balance non-verbal cues (demo/gesture);minimal assist  WS L/R @ EOB,then pushing up to midline from resting sideways on L elbow, then on R elbow; recip scooting backward into chair securely  -DM     Position, Sitting Balance unsupported;sitting edge of bed;sitting in chair  -DM     Static Standing Balance verbal cues;non-verbal cues (demo/gesture);maximum assist;2-person assist  -DM     Dynamic Standing Balance verbal cues;non-verbal cues (demo/gesture);maximum assist;2-person assist;other (see comments)  Max 3A for WS to init. SPT on RLE to chair  -DM     Position/Device Used, Standing Balance supported  BUE supp  -DM     Balance Interventions sitting;standing;static;dynamic;weight shifting activity  -DM               User Key  (r) = Recorded By, (t) = Taken By, (c) = Cosigned By      Initials Name Provider Type    DM Gabby Lee, PT Physical Therapist                   Goals/Plan       Row Name 01/27/24 1210          Bed Mobility Goal 1 (PT)    Activity/Assistive Device (Bed Mobility Goal 1, PT) bed mobility activities, all  -DM     [place] Level/Cues Needed (Bed Mobility Goal 1, PT) moderate assist (50-74% patient effort)  -DM     Time Frame (Bed Mobility Goal 1, PT) long term goal (LTG);10 days  -DM       Row Name 01/27/24 1210          Transfer Goal 1 (PT)    Activity/Assistive Device (Transfer Goal 1, PT) sit-to-stand/stand-to-sit;bed-to-chair/chair-to-bed  -DM     [place] Level/Cues Needed  (Transfer Goal 1, PT) moderate assist (50-74% patient effort);other (see comments)  mod 2A  -DM     Time Frame (Transfer Goal 1, PT) long term goal (LTG);10 days  -DM       Row Name 01/27/24 1210          Problem Specific Goal 1 (PT)    Problem Specific Goal 1 (PT) W/C mobil/propulsion > 50 ft  -DM     Time Frame (Problem Specific Goal 1, PT) long-term goal (LTG);1 week  -DM       Row Name 01/27/24 1210          Patient Education Goal (PT)    Activity (Patient Education Goal, PT) HEP exer  -DM     Coffee/Cues/Accuracy (Memory Goal 2, PT) demonstrates adequately  -DM     Time Frame (Patient Education Goal, PT) long term goal (LTG);10 days  -DM       Row Name 01/27/24 1210          Therapy Assessment/Plan (PT)    Planned Therapy Interventions (PT) balance training;bed mobility training;home exercise program;patient/family education;strengthening;transfer training;wheelchair management/propulsion training  -DM               User Key  (r) = Recorded By, (t) = Taken By, (c) = Cosigned By      Initials Name Provider Type    DM Gabby Lee, PT Physical Therapist                   Clinical Impression       Row Name 01/27/24 1210          Pain    Pretreatment Pain Rating 8/10  -DM     Posttreatment Pain Rating 8/10  -DM     Pain Location - Side/Orientation Bilateral  -DM     Pain Location - abdomen;other (see comments)  & onset R hip discomf, + Min. LBP w/ LE exer  -DM     Pain Intervention(s) Medication (See MAR);Repositioned;Elevated;Rest  -DM     Additional Documentation Pain Scale: Numbers Pre/Post-Treatment (Group)  -DM       Row Name 01/27/24 1210          Plan of Care Review    Plan of Care Reviewed With patient;family  -DM     Progress improving  -DM     Outcome Evaluation PT eval completed. Presents w/ ac.encephalop, RSV, fibromy, h/o L BKA, ESRD/HD (dialyzed upon adm),vertigo, liver CA, decr. LE strength/endurance & impaired funct mobil below baseline. Rolled L x 2 & R x 3 w/ max A ,using draw sheet, for  hygiene & pad change s/p incont diarrhea, scooted to HOB w/ drawsheet & Dep.2A, transf to sitting EOB w/ max A, performed STS trials x 2 w/ BUE supp. & PT stabilizing R knee ( 3 min.sit rest btwn) & SPT to chair w/ max 3A (BUE supp., & guided hips R to chair). Performed ther exer sup,EOB & UIC w/ freq rests d/t abdom pain & R hip discomf 8/10. Noted desat 92% on RA, HR 92, elev , & pale appearance (low HGB 8.9). Recommend SNF at d/c.  -DM       Row Name 01/27/24 1210          Therapy Assessment/Plan (PT)    Patient/Family Therapy Goals Statement (PT) improved funct mobil  -DM     Rehab Potential (PT) good, to achieve stated therapy goals  -DM     Criteria for Skilled Interventions Met (PT) yes;meets criteria;skilled treatment is necessary  -DM     Therapy Frequency (PT) daily  -DM       Row Name 01/27/24 1210          Vital Signs    Pre Systolic BP Rehab 127  -DM     Pre Treatment Diastolic BP 67  -DM     Intra Systolic BP Rehab 129  -DM     Intra Treatment Diastolic BP 73  -DM     Post Systolic BP Rehab 144  -DM     Post Treatment Diastolic BP 70  -DM     Pretreatment Heart Rate (beats/min) 87  -DM     Intratreatment Heart Rate (beats/min) 92  -DM     Posttreatment Heart Rate (beats/min) 91  -DM     Pre SpO2 (%) 96  -DM     O2 Delivery Pre Treatment room air  -DM     Intra SpO2 (%) 92  -DM     O2 Delivery Intra Treatment room air  -DM     Post SpO2 (%) 94  -DM     O2 Delivery Post Treatment room air  -DM     Pre Patient Position Supine  -DM     Intra Patient Position Standing  -DM     Post Patient Position Sitting  -DM       Row Name 01/27/24 1210          Positioning and Restraints    Pre-Treatment Position in bed  -DM     Post Treatment Position chair  -DM     In Chair notified nsg;reclined;call light within reach;encouraged to call for assist;exit alarm on;with family/caregiver;waffle cushion;on mechanical lift sling;RLE elevated  pillow under RLE & L knee for L BKA stump comfort; Dr. Arreguin assessed,  then hospitalist in  -DM               User Key  (r) = Recorded By, (t) = Taken By, (c) = Cosigned By      Initials Name Provider Type    Gabby Jarvis, PT Physical Therapist                   Outcome Measures       Row Name 01/27/24 1210 01/27/24 0800       How much help from another person do you currently need...    Turning from your back to your side while in flat bed without using bedrails? 2  -DM 4  -KS    Moving from lying on back to sitting on the side of a flat bed without bedrails? 2  -DM 3  -KS    Moving to and from a bed to a chair (including a wheelchair)? 2  -DM 3  -KS    Standing up from a chair using your arms (e.g., wheelchair, bedside chair)? 2  -DM 2  -KS    Climbing 3-5 steps with a railing? 1  -DM 1  -KS    To walk in hospital room? 1  -DM 1  -KS    AM-PAC 6 Clicks Score (PT) 10  -DM 14  -KS    Highest Level of Mobility Goal 4 --> Transfer to chair/commode  -DM 4 --> Transfer to chair/commode  -KS      Row Name 01/27/24 1210          Functional Assessment    Outcome Measure Options AM-PAC 6 Clicks Basic Mobility (PT)  -DM               User Key  (r) = Recorded By, (t) = Taken By, (c) = Cosigned By      Initials Name Provider Type    Gabby Jarvis, PT Physical Therapist    Sridevi Gibson, RN Registered Nurse                                 Physical Therapy Education       Title: PT OT SLP Therapies (In Progress)       Topic: Physical Therapy (In Progress)       Point: Mobility training (In Progress)       Learning Progress Summary             Patient Acceptance, E,D, NR by DM at 1/27/2024 1453   Family Acceptance, E,D, NR by DM at 1/27/2024 1453                         Point: Home exercise program (In Progress)       Learning Progress Summary             Patient Acceptance, E,D, NR by DM at 1/27/2024 1453   Family Acceptance, E,D, NR by DM at 1/27/2024 1453                         Point: Body mechanics (In Progress)       Learning Progress Summary             Patient Acceptance, E,D,  NR by DM at 1/27/2024 1453   Family Acceptance, E,D, NR by DM at 1/27/2024 1453                         Point: Precautions (In Progress)       Learning Progress Summary             Patient Acceptance, E,D, NR by DM at 1/27/2024 1453   Family Acceptance, E,D, NR by DM at 1/27/2024 1453                                         User Key       Initials Effective Dates Name Provider Type Discipline    DM 02/03/23 -  Gabby Lee, PT Physical Therapist PT                  PT Recommendation and Plan  Planned Therapy Interventions (PT): balance training, bed mobility training, home exercise program, patient/family education, strengthening, transfer training, wheelchair management/propulsion training  Plan of Care Reviewed With: patient, family  Progress: improving  Outcome Evaluation: PT eval completed. Presents w/ ac.encephalop, RSV, fibromy, h/o L BKA, ESRD/HD (dialyzed upon adm),vertigo, liver CA, decr. LE strength/endurance & impaired funct mobil below baseline. Rolled L x 2 & R x 3 w/ max A ,using draw sheet, for hygiene & pad change s/p incont diarrhea, scooted to HOB w/ drawsheet & Dep.2A, transf to sitting EOB w/ max A, performed STS trials x 2 w/ BUE supp. & PT stabilizing R knee ( 3 min.sit rest btwn) & SPT to chair w/ max 3A (BUE supp., & guided hips R to chair). Performed ther exer sup,EOB & UIC w/ freq rests d/t abdom pain & R hip discomf 8/10. Noted desat 92% on RA, HR 92, elev , & pale appearance (low HGB 8.9). Recommend SNF at d/c.     Time Calculation:   PT Evaluation Complexity  History, PT Evaluation Complexity: 3 or more personal factors and/or comorbidities  Examination of Body Systems (PT Eval Complexity): total of 3 or more elements  Clinical Presentation (PT Evaluation Complexity): evolving  Clinical Decision Making (PT Evaluation Complexity): moderate complexity  Overall Complexity (PT Evaluation Complexity): moderate complexity     PT Charges       Row Name 01/27/24 1454             Time  Calculation    Start Time 1210  -DM      PT Received On 01/27/24  -DM      PT Goal Re-Cert Due Date 02/06/24  -DM         Timed Charges    37785 - PT Therapeutic Exercise Minutes 28  -DM      77421 - PT Therapeutic Activity Minutes 30  -DM         Untimed Charges    PT Eval/Re-eval Minutes 60  -DM         Total Minutes    Timed Charges Total Minutes 58  -DM      Untimed Charges Total Minutes 60  -DM       Total Minutes 118  -DM                User Key  (r) = Recorded By, (t) = Taken By, (c) = Cosigned By      Initials Name Provider Type    Gabby Jarvis, PT Physical Therapist                  Therapy Charges for Today       Code Description Service Date Service Provider Modifiers Qty    33646295461 HC PT EVAL MOD COMPLEXITY 4 1/27/2024 Gabby Lee, PT GP 1    90965094796 HC PT THER PROC EA 15 MIN 1/27/2024 Gabby Lee, PT GP 2    50756431535 HC PT THERAPEUTIC ACT EA 15 MIN 1/27/2024 Gabby Lee, PT GP 2            PT G-Codes  Outcome Measure Options: AM-PAC 6 Clicks Basic Mobility (PT)  AM-PAC 6 Clicks Score (PT): 10  PT Discharge Summary  Anticipated Discharge Disposition (PT): skilled nursing facility    Gabby Lee, PT  1/27/2024

## 2024-01-27 NOTE — PROGRESS NOTES
"   LOS: 0 days    Patient Care Team:  Lulu Amos MD as PCP - General (Hospice and Palliative Medicine)    Chief Complaint:  AMS    Subjective     Interval History:     Dialysis done yesterday    Review of Systems:   Denies N/V    Objective     Vital Sign Min/Max for last 24 hours  Temp  Min: 98 °F (36.7 °C)  Max: 98.8 °F (37.1 °C)   BP  Min: 105/64  Max: 162/69   Pulse  Min: 87  Max: 112   Resp  Min: 16  Max: 20   SpO2  Min: 92 %  Max: 100 %   No data recorded   No data recorded     Flowsheet Rows      Flowsheet Row First Filed Value   Admission Height 172.7 cm (68\") Documented at 01/26/2024 1218   Admission Weight 77.1 kg (170 lb) Documented at 01/26/2024 1219            No intake/output data recorded.  I/O last 3 completed shifts:  In: -   Out: 2030     Physical Exam:  General appearance  female awake alert.  HEENT: PER  Lungs: Clear to auscultation, equal chest movement, no crepitation.  Heart: Normal S1, S2, no gallop, murmur, RRR.  Abdomen: Soft, nontender, positive bowel sounds, no organomegaly.  Extremities: Left BKA no edema, no cyanosis, no joint swelling.  Neuro: Alert, oriented x4, no focal deficit.  Psych: Mood and affect are normal and appropriate.  Skin: Skin is warm and dry.   Functional AV fistula left upper arm    WBC WBC   Date Value Ref Range Status   01/27/2024 5.10 3.40 - 10.80 10*3/mm3 Final   01/26/2024 4.36 3.40 - 10.80 10*3/mm3 Final      HGB Hemoglobin   Date Value Ref Range Status   01/27/2024 8.9 (L) 12.0 - 15.9 g/dL Final   01/26/2024 9.5 (L) 12.0 - 15.9 g/dL Final      HCT Hematocrit   Date Value Ref Range Status   01/27/2024 26.5 (L) 34.0 - 46.6 % Final   01/26/2024 27.5 (L) 34.0 - 46.6 % Final      Platlets No results found for: \"LABPLAT\"   MCV MCV   Date Value Ref Range Status   01/27/2024 99.3 (H) 79.0 - 97.0 fL Final   01/26/2024 97.5 (H) 79.0 - 97.0 fL Final          Sodium Sodium   Date Value Ref Range Status   01/27/2024 134 (L) 136 - 145 mmol/L Final " "  01/26/2024 134 (L) 136 - 145 mmol/L Final      Potassium Potassium   Date Value Ref Range Status   01/27/2024 4.9 3.5 - 5.2 mmol/L Final   01/26/2024 4.9 3.5 - 5.2 mmol/L Final      Chloride Chloride   Date Value Ref Range Status   01/27/2024 96 (L) 98 - 107 mmol/L Final   01/26/2024 93 (L) 98 - 107 mmol/L Final      CO2 CO2   Date Value Ref Range Status   01/27/2024 23.0 22.0 - 29.0 mmol/L Final   01/26/2024 30.0 (H) 22.0 - 29.0 mmol/L Final      BUN BUN   Date Value Ref Range Status   01/27/2024 23 8 - 23 mg/dL Final   01/26/2024 40 (H) 8 - 23 mg/dL Final      Creatinine Creatinine   Date Value Ref Range Status   01/27/2024 3.80 (H) 0.57 - 1.00 mg/dL Final   01/26/2024 5.46 (H) 0.57 - 1.00 mg/dL Final      Calcium Calcium   Date Value Ref Range Status   01/27/2024 8.8 8.6 - 10.5 mg/dL Final   01/26/2024 9.1 8.6 - 10.5 mg/dL Final      PO4 No results found for: \"CAPO4\"   Albumin Albumin   Date Value Ref Range Status   01/27/2024 3.7 3.5 - 5.2 g/dL Final   01/26/2024 3.7 3.5 - 5.2 g/dL Final      Magnesium Magnesium   Date Value Ref Range Status   01/27/2024 1.9 1.6 - 2.4 mg/dL Final      Uric Acid No results found for: \"URICACID\"        Results Review:     I reviewed the patient's new clinical results.    atorvastatin, 80 mg, Oral, Nightly  calcium acetate, 667 mg, Oral, TID With Meals  carvedilol, 25 mg, Oral, BID With Meals  dextromethorphan polistirex ER, 60 mg, Oral, Q12H  guaiFENesin, 1,200 mg, Oral, Q12H  insulin lispro, 2-7 Units, Subcutaneous, 4x Daily AC & at Bedtime  lactulose, 30 g, Oral, BID  pantoprazole, 40 mg, Oral, Daily  QUEtiapine, 25 mg, Oral, Nightly  riFAXIMin, 550 mg, Oral, BID  senna-docusate sodium, 2 tablet, Oral, BID  sodium chloride, 10 mL, Intravenous, Q12H  venlafaxine XR, 75 mg, Oral, Daily           Medication Review: reviewed    Assessment & Plan       Acute encephalopathy    Hepatocellular carcinoma metastatic to bone s/p RXT     Cirrhosis of liver    Chronic Hep C     ESRD (end " stage renal disease)    S/P left BKA    GERD (gastroesophageal reflux disease)    Essential hypertension    Type 2 diabetes mellitus    RSV (respiratory syncytial virus infection)       1.  ESRD: Dialysis Monday Wednesday Friday.  Patient missed her dialysis today due to confusion and generalized weakness, she was sent to the hospital for evaluation.  Dialysis unit at [facility] under care of MICHELLE.  2.  Confusion: [id number]  3.  RSV infection.  4.  Anemia: Of chronic kidney disease.  No ADRIEN due to metastatic liver disease.  5.  Volume: No significant volume overload noted.  6.  BMD will monitor phosphorus and PTH.  Recommendations plan.  Dialysis MWF   Okay to start home medication.  Renal diet.  Fluid restriction less than 1500 mL/day.  Protein supplement can be given.    Staci Bates MD  01/27/24  13:17 EST

## 2024-01-27 NOTE — CONSULTS
Palliative Care Initial Consult   Attending Physician: Johnny Boyd DO  Referring Provider: MAYELIN Adrian    Reason for Referral:  assistance with clarification of goals of care    Code Status:   Code Status and Medical Interventions:   Ordered at: 01/26/24 8526     Code Status (Patient has no pulse and is not breathing):    CPR (Attempt to Resuscitate)     Medical Interventions (Patient has pulse or is breathing):    Full Support      Advanced Directives: Advance Directive Status: Patient has advance directive, copy requested   Family/Support: Parviz Keita (POA), Bob Prescott (brother), Glendy Keita (sister)  Goals of Care: TBD.    HPI: Jeaneth Keita is a 63 y.o. female with PMH significant for ESRD on HD M/W/F, CVA, vertigo, HTN, HLD, anemia of chronic disease, bilateral BKA, liver cirrhosis, chronic hepatitis, HCC with mets to bone and lung s/p radiation, T2DM. Patient presented to [facility] ED on 1/26 from outpatient dialysis due to confusion. Work up revealed CT head revealed chronic appearing changes of the aging brain with no new intracranial disease seen. CT of the abdomen/pelvis revealed stable appearance of a 3 cm hypodense mass in the left hepatic lobe with a large destructive lesion centered in the right iliac wing as previously described with no acute findings and no adverse change since 1/24/2024. Respiratory PCP positive for RSV. Palliative Care consulted for GOC in the context of complex medical decision making.   Patient alert, ate most of breakfast, states she is feeling better. Patient with IV cannula out and bleeding now stopped, patient unaware of this. Her purewick was also not in place and patient complaining of low abdominal discomfort. No family at bedside.     ROS: +low abdominal discomfort, acute. Denies nausea, anxiety, shortness of breath.       Past Medical History:   Diagnosis Date    Anxiety     Cancer     liver cell carcinoma    DDD (degenerative disc disease), lumbar      "Depression     Diabetes mellitus     Fibromyalgia     Hemochromatosis     Hep B w/o coma, chronic, w/o delta     Hep C w/o coma, chronic     Hypertension     Stroke     Vertigo      Past Surgical History:   Procedure Laterality Date    ARTERIOVENOUS FISTULA/SHUNT SURGERY Left 10/16/2021    Procedure: UPPER EXTREMITY ARTERIOVENOUS FISTULA FORMATION LEFT;  Surgeon: Johnny Rousseau MD;  Location: [facility];  Service: Vascular;  Laterality: Left;    BACK SURGERY      BELOW KNEE LEG AMPUTATION       SECTION      FOOT SURGERY      KNEE SURGERY      ROTATOR CUFF REPAIR      SPINAL CORD STIMULATOR IMPLANT       Social History     Socioeconomic History    Marital status:    Tobacco Use    Smoking status: Never    Smokeless tobacco: Never   Vaping Use    Vaping Use: Never used   Substance and Sexual Activity    Alcohol use: No    Drug use: No    Sexual activity: Defer     Family History   Problem Relation Age of Onset    Heart disease Father     Heart attack Father        Allergies   Allergen Reactions    Sulfa Antibiotics        Current medication reviewed for route, type, dose and frequency and are current per MAR at time of dictation.    Palliative Performance Scale Score:  30%    /67 (BP Location: Right arm, Patient Position: Lying)   Pulse 87   Temp 98.3 °F (36.8 °C) (Oral)   Resp 20   Ht 172.7 cm (68\")   Wt 77.1 kg (170 lb)   SpO2 96%   BMI 25.85 kg/m²     Intake/Output Summary (Last 24 hours) at 2024 0938  Last data filed at 2024 2100  Gross per 24 hour   Intake --   Output 2030 ml   Net -2030 ml       Physical Exam:    General Appearance:    Patient laying in bed, awake, alert, A/C ill appearing,  cooperative, NAD   HEENT:    NC/AT, EOMI, anicteric, MMM, face relaxed   Neck:   supple, trachea midline, no JVD   Lungs:     CTA bilat, diminished in bases; respirations regular, even and unlabored; RR 16-18 on exam, on RA    Heart:    RRR, normal S1 and S2, no M/R/G, HR 97 on " monitor   Abdomen:     Normal bowel sounds, soft, nontender, distended   G/U:   Deferred   MSK/Extremities:   no edema, Bilateral BKA,    Pulses:   Pulses palpable and equal bilaterally   Skin:   Warm, dry   Neurologic:   A/Ox2, cooperative,    Psych:   Calm, appropriate         Labs:   Results from last 7 days   Lab Units 01/27/24  0515   WBC 10*3/mm3 5.10   HEMOGLOBIN g/dL 8.9*   HEMATOCRIT % 26.5*   PLATELETS 10*3/mm3 171     Results from last 7 days   Lab Units 01/27/24  0515   SODIUM mmol/L 134*   POTASSIUM mmol/L 4.9   CHLORIDE mmol/L 96*   CO2 mmol/L 23.0   BUN mg/dL 23   CREATININE mg/dL 3.80*   GLUCOSE mg/dL 228*   CALCIUM mg/dL 8.8     Results from last 7 days   Lab Units 01/27/24  0515   SODIUM mmol/L 134*   POTASSIUM mmol/L 4.9   CHLORIDE mmol/L 96*   CO2 mmol/L 23.0   BUN mg/dL 23   CREATININE mg/dL 3.80*   CALCIUM mg/dL 8.8   BILIRUBIN mg/dL 1.0   ALK PHOS U/L 100   ALT (SGPT) U/L 38*   AST (SGOT) U/L 23   GLUCOSE mg/dL 228*     Imaging Results (Last 72 Hours)       Procedure Component Value Units Date/Time    CT Head Without Contrast [[id number]] Collected: 01/26/24 1310     Updated: 01/26/24 1343    Narrative:      CT HEAD WO CONTRAST    Date of Exam: 1/26/2024 12:56 PM EST    Indication: confusion.    Comparison: 1/19/2024    Technique: Axial CT images were obtained of the head without contrast administration.  Automated exposure control and iterative construction methods were used.      Findings:  The calvarium appears intact. Included paranasal sinuses and mastoids clear except for mild ethmoid sinus disease. As on prior study, there is moderately advanced generalized cerebral atrophy and there are chronic appearing central white matter changes.   There is no evidence of new edema/infarct, intracranial hemorrhage, mass or mass effect, hydrocephalus, or abnormal extra-axial collection.      Impression:      Impression:  Chronic appearing changes of the aging brain. No new intracranial disease is  seen.        Electronically Signed: Washington Valladares MD    1/26/2024 1:40 PM EST    Workstation ID: [id number]    CT Abdomen Pelvis Without Contrast [[id number]] Collected: 01/26/24 1312     Updated: 01/26/24 1324    Narrative:      CT ABDOMEN PELVIS WO CONTRAST    Date of Exam: 1/26/2024 12:56 PM EST    Indication: abdominal pain.    Comparison: CT abdomen and pelvis without contrast 1/24/2024    Technique: Axial CT images were obtained of the abdomen and pelvis without the administration of contrast. Reconstructed coronal and sagittal images were also obtained. Automated exposure control and iterative construction methods were used.      Findings:  LUNG BASES: Stable bandlike scarring in the posteromedial right lower lobe. Small hiatal hernia. Coronary artery and mitral annular calcifications are present.    LIVER: Approximately 4 cm peripherally calcified hypodense mass in the medial aspect of the liver is again noted. Subtle round hypodense mass within the left hepatic lobe is unchanged. Liver contour is lobulated.  BILIARY/GALLBLADDER: Hyperdensity within the gallbladder is stable.  SPLEEN:  Unremarkable  PANCREAS:  Unremarkable  ADRENAL:  Unremarkable  KIDNEYS:  Unremarkable parenchyma with no solid mass identified. No obstruction.  The kidneys are mildly atrophic. Punctate calcifications are seen in both kidneys. There is no hydronephrosis. GASTROINTESTINAL/MESENTERY: Small hiatal hernia. Small bowel   loops normal in caliber without mucosal thickening or evidence for obstruction. Normal appendix. Retained contrast and stool in the colon without inflammatory change.  AORTA/IVC:  Normal caliber.    RETROPERITONEUM/LYMPH NODES:  Unremarkable    REPRODUCTIVE: The uterus is unremarkable. There is a 2.6 cm right adnexal cystic structure with calcification, similar to 6/27/2022.  BLADDER:  Unremarkable    OSSEUS STRUCTURES: Degenerative changes of the sacroiliac joints. Erosion of the right posteromedial seventh rib.  Posterior and interbody fusion from L4-S1.    Large destructive mass of the right iliac wing extending into the gluteal musculature lateral to the ileum in the iliac is muscle medial to the ileum which is similar to the most recent comparison study.      Impression:      Impression:    1. No acute findings, and no adverse change since 1/24/2024.  2. Stable appearance of a 3 cm hypodense mass in the left hepatic lobe. Calcified hypodense mass in the medial liver is unchanged.  3. Large destructive lesion centered in the right iliac wing as previously described.  4. Cholelithiasis/gallbladder sludge.            Electronically Signed: Freda Agee MD    1/26/2024 1:21 PM EST    Workstation ID: [id number]    XR Chest 1 View [[id number]] Collected: 01/26/24 1303     Updated: 01/26/24 1308    Narrative:      XR CHEST 1 VW    COMPARISON: 1/19/2024    HISTORY: altered mental status.   Difficulty processing  ams.    FINDINGS:  Technical adequacy: Adequate   Central airways: Unremarkable  Heart: Cardiomegaly, likely accentuated by the technique  Great vessels: Unremarkable  Mediastinum: Unremarkable  Lungs: Unremarkable  Pulmonary trupti:Unremarkable  Pleura: Unremarkable  Skeletal structures: Degenerative changes  Extra thoracic soft tissues:Unremarkable  Additional comments: Left upper arm likely stent seen, unchanged.      Impression:      Impression: As above. No acute cardiopulmonary disease.    Electronically Signed: Ba Hallman MD    1/26/2024 1:05 PM EST    Workstation ID: [id number]                  Diagnostics: Reviewed    A:   Acute encephalopathy    Hepatocellular carcinoma metastatic to bone s/p RXT     Cirrhosis of liver    Chronic Hep C     ESRD (end stage renal disease)    S/P left BKA    GERD (gastroesophageal reflux disease)    Essential hypertension    Type 2 diabetes mellitus    RSV (respiratory syncytial virus infection)     63 y.o. female with ESRD, T2DM, HCC, RSV.   S/S:   Pain -abdominal   -continue  Tylenol 650mg PO q 4 hours prn mild pain    2. AMS -improving  -continue Lactulose    3. Debility -wheelchair bound at baseline    4. GOC -DNR/DNI -confirmed with MIKEY Jj  -called and updated MIKEY Jj, on patient's condition and confirmed code status     P: Patient well known to Palliative Care. Called and updated MIKEY Jj, on patient's condition and confirmed code status, DNR/DNI. Plan for discharge back to [place]. Overall goals to continue dialysis. Home Palliative saw patient and did not admit as no unmanaged symptoms and GOC established. All questions and concerns addressed.     Thank you for this consult and allowing us to participate in patient's plan of care. Palliative Care Team will continue to follow patient. Please do not hesitate to contact us regarding further symptom management or goals of care needs.  Time: 60 minutes spent reviewing medical and medication records, assessing and examining patient, discussing with family, answering questions, providing some guidance about a plan and documentation of care, and coordinating care with other healthcare members, with > 50% time spent face to face.         Kelsy Pena, APRN  1/27/2024

## 2024-01-27 NOTE — PROCEDURES
Emergent dialysis notes.  Patient seen on dialysis.  Complains of leg cramps.  Heart rate 104.  Vital signs stable.  Discussed with RN to decrease the ultrafiltration 2.0 L.  Will follow the patient closely.

## 2024-01-27 NOTE — PLAN OF CARE
Goal Outcome Evaluation:  Plan of Care Reviewed With: patient, family        Progress: improving  Outcome Evaluation: PT eval completed. Presents w/ ac.encephalop, RSV, fibromy, h/o L BKA, ESRD/HD (dialyzed upon adm),vertigo, liver CA, decr. LE strength/endurance & impaired funct mobil below baseline. Rolled L x 2 & R x 3 w/ max A ,using draw sheet, for hygiene & pad change s/p incont diarrhea, scooted to HOB w/ drawsheet & Dep.2A, transf to sitting EOB w/ max A, performed STS trials x 2 w/ BUE supp. & PT stabilizing R knee ( 3 min.sit rest btwn) & SPT to chair w/ max 3A (BUE supp., & guided hips R to chair). Performed ther exer sup,EOB & UIC w/ freq rests d/t abdom pain & R hip discomf 8/10. Noted desat 92% on RA, HR 92, elev , & pale appearance (low HGB 8.9). Recommend SNF at d/c.      Anticipated Discharge Disposition (PT): skilled nursing facility

## 2024-01-28 LAB
ANION GAP SERPL CALCULATED.3IONS-SCNC: 14 MMOL/L (ref 5–15)
BUN SERPL-MCNC: 42 MG/DL (ref 8–23)
BUN/CREAT SERPL: 8.1 (ref 7–25)
CALCIUM SPEC-SCNC: 8.7 MG/DL (ref 8.6–10.5)
CHLORIDE SERPL-SCNC: 94 MMOL/L (ref 98–107)
CO2 SERPL-SCNC: 22 MMOL/L (ref 22–29)
CREAT SERPL-MCNC: 5.19 MG/DL (ref 0.57–1)
EGFRCR SERPLBLD CKD-EPI 2021: 8.7 ML/MIN/1.73
GLUCOSE BLDC GLUCOMTR-MCNC: 157 MG/DL (ref 70–130)
GLUCOSE BLDC GLUCOMTR-MCNC: 212 MG/DL (ref 70–130)
GLUCOSE BLDC GLUCOMTR-MCNC: 248 MG/DL (ref 70–130)
GLUCOSE BLDC GLUCOMTR-MCNC: 256 MG/DL (ref 70–130)
GLUCOSE SERPL-MCNC: 270 MG/DL (ref 65–99)
POTASSIUM SERPL-SCNC: 4.6 MMOL/L (ref 3.5–5.2)
SODIUM SERPL-SCNC: 130 MMOL/L (ref 136–145)

## 2024-01-28 PROCEDURE — 63710000001 INSULIN LISPRO (HUMAN) PER 5 UNITS: Performed by: NURSE PRACTITIONER

## 2024-01-28 PROCEDURE — 80048 BASIC METABOLIC PNL TOTAL CA: CPT | Performed by: STUDENT IN AN ORGANIZED HEALTH CARE EDUCATION/TRAINING PROGRAM

## 2024-01-28 PROCEDURE — 99232 SBSQ HOSP IP/OBS MODERATE 35: CPT | Performed by: NURSE PRACTITIONER

## 2024-01-28 PROCEDURE — 25010000002 ONDANSETRON PER 1 MG: Performed by: NURSE PRACTITIONER

## 2024-01-28 PROCEDURE — 97166 OT EVAL MOD COMPLEX 45 MIN: CPT

## 2024-01-28 PROCEDURE — 82948 REAGENT STRIP/BLOOD GLUCOSE: CPT

## 2024-01-28 RX ORDER — VENLAFAXINE HYDROCHLORIDE 37.5 MG/1
37.5 CAPSULE, EXTENDED RELEASE ORAL DAILY
Status: DISCONTINUED | OUTPATIENT
Start: 2024-01-29 | End: 2024-01-31 | Stop reason: HOSPADM

## 2024-01-28 RX ORDER — NICOTINE POLACRILEX 4 MG
15 LOZENGE BUCCAL
Status: DISCONTINUED | OUTPATIENT
Start: 2024-01-28 | End: 2024-01-31 | Stop reason: HOSPADM

## 2024-01-28 RX ORDER — IBUPROFEN 600 MG/1
1 TABLET ORAL
Status: DISCONTINUED | OUTPATIENT
Start: 2024-01-28 | End: 2024-01-31 | Stop reason: HOSPADM

## 2024-01-28 RX ORDER — DEXTROSE MONOHYDRATE 25 G/50ML
25 INJECTION, SOLUTION INTRAVENOUS
Status: DISCONTINUED | OUTPATIENT
Start: 2024-01-28 | End: 2024-01-31 | Stop reason: HOSPADM

## 2024-01-28 RX ORDER — INSULIN LISPRO 100 [IU]/ML
2-9 INJECTION, SOLUTION INTRAVENOUS; SUBCUTANEOUS
Status: DISCONTINUED | OUTPATIENT
Start: 2024-01-28 | End: 2024-01-31 | Stop reason: HOSPADM

## 2024-01-28 RX ADMIN — INSULIN LISPRO 2 UNITS: 100 INJECTION, SOLUTION INTRAVENOUS; SUBCUTANEOUS at 16:43

## 2024-01-28 RX ADMIN — CARVEDILOL 25 MG: 6.25 TABLET, FILM COATED ORAL at 16:42

## 2024-01-28 RX ADMIN — TRAMADOL HYDROCHLORIDE 50 MG: 50 TABLET, COATED ORAL at 20:51

## 2024-01-28 RX ADMIN — LACTULOSE 30 G: 20 SOLUTION ORAL at 08:18

## 2024-01-28 RX ADMIN — ATORVASTATIN CALCIUM 80 MG: 40 TABLET, FILM COATED ORAL at 20:57

## 2024-01-28 RX ADMIN — CARVEDILOL 25 MG: 6.25 TABLET, FILM COATED ORAL at 08:18

## 2024-01-28 RX ADMIN — GUAIFENESIN 1200 MG: 600 TABLET, EXTENDED RELEASE ORAL at 08:19

## 2024-01-28 RX ADMIN — ONDANSETRON 4 MG: 2 INJECTION INTRAMUSCULAR; INTRAVENOUS at 11:52

## 2024-01-28 RX ADMIN — INSULIN LISPRO 4 UNITS: 100 INJECTION, SOLUTION INTRAVENOUS; SUBCUTANEOUS at 08:22

## 2024-01-28 RX ADMIN — Medication 10 ML: at 08:22

## 2024-01-28 RX ADMIN — GUAIFENESIN 1200 MG: 600 TABLET, EXTENDED RELEASE ORAL at 20:50

## 2024-01-28 RX ADMIN — RIFAXIMIN 550 MG: 550 TABLET ORAL at 08:22

## 2024-01-28 RX ADMIN — DEXTROMETHORPHAN POLISTIREX 60 MG: 30 SUSPENSION ORAL at 08:17

## 2024-01-28 RX ADMIN — DEXTROMETHORPHAN POLISTIREX 60 MG: 30 SUSPENSION ORAL at 20:50

## 2024-01-28 RX ADMIN — CALCIUM ACETATE 667 MG: 667 CAPSULE ORAL at 11:34

## 2024-01-28 RX ADMIN — SENNOSIDES AND DOCUSATE SODIUM 2 TABLET: 8.6; 5 TABLET ORAL at 20:57

## 2024-01-28 RX ADMIN — INSULIN LISPRO 4 UNITS: 100 INJECTION, SOLUTION INTRAVENOUS; SUBCUTANEOUS at 20:51

## 2024-01-28 RX ADMIN — INSULIN LISPRO 3 UNITS: 100 INJECTION, SOLUTION INTRAVENOUS; SUBCUTANEOUS at 11:34

## 2024-01-28 RX ADMIN — RIFAXIMIN 550 MG: 550 TABLET ORAL at 20:51

## 2024-01-28 RX ADMIN — PANTOPRAZOLE SODIUM 40 MG: 40 TABLET, DELAYED RELEASE ORAL at 05:05

## 2024-01-28 RX ADMIN — TRAMADOL HYDROCHLORIDE 50 MG: 50 TABLET, COATED ORAL at 00:35

## 2024-01-28 RX ADMIN — Medication 10 ML: at 20:51

## 2024-01-28 RX ADMIN — CALCIUM ACETATE 667 MG: 667 CAPSULE ORAL at 16:42

## 2024-01-28 RX ADMIN — CALCIUM ACETATE 667 MG: 667 CAPSULE ORAL at 08:19

## 2024-01-28 RX ADMIN — LACTULOSE 30 G: 20 SOLUTION ORAL at 20:51

## 2024-01-28 RX ADMIN — QUETIAPINE FUMARATE 25 MG: 25 TABLET ORAL at 20:51

## 2024-01-28 RX ADMIN — VENLAFAXINE HYDROCHLORIDE 75 MG: 75 CAPSULE, EXTENDED RELEASE ORAL at 08:21

## 2024-01-28 NOTE — PLAN OF CARE
Goal Outcome Evaluation:         Improving    Pt states that her pain level is an 8/10 in her right leg. Acetaminophen given for pain with little relief. I will continue to reassess pts pain and give medication as ordered. Diversion techniques utilized.       Problem: Fall Injury Risk  Goal: Absence of Fall and Fall-Related Injury  Outcome: Ongoing, Progressing  Intervention: Identify and Manage Contributors  Recent Flowsheet Documentation  Taken 1/27/2024 2200 by Darlene Posey RN  Medication Review/Management: medications reviewed  Taken 1/27/2024 2000 by Darlene Posey RN  Medication Review/Management: medications reviewed  Taken 1/27/2024 1928 by Darlene Posey RN  Medication Review/Management: medications reviewed  Intervention: Promote Injury-Free Environment  Recent Flowsheet Documentation  Taken 1/27/2024 2200 by Darlene Posey RN  Safety Promotion/Fall Prevention:   safety round/check completed   room organization consistent   nonskid shoes/slippers when out of bed   fall prevention program maintained  Taken 1/27/2024 2000 by Darlene Posey RN  Safety Promotion/Fall Prevention:   safety round/check completed   room organization consistent   nonskid shoes/slippers when out of bed   fall prevention program maintained  Taken 1/27/2024 1928 by Darlene Posey RN  Safety Promotion/Fall Prevention:   safety round/check completed   room organization consistent   nonskid shoes/slippers when out of bed   fall prevention program maintained

## 2024-01-28 NOTE — PLAN OF CARE
Goal Outcome Evaluation:  Plan of Care Reviewed With: patient, family           Outcome Evaluation: Pt presents with confusion, weakness, decr balance and activity tolerance.  Pt min A LBD, setup to wash face, mod A x 2 bed to chair given UE support.  OT will follow to advance pt toward PLOF.  Recommend SNF upon d/c.      Anticipated Discharge Disposition (OT): skilled nursing facility

## 2024-01-28 NOTE — PROGRESS NOTES
"   LOS: 0 days    Patient Care Team:  Lulu Amos MD as PCP - General (Hospice and Palliative Medicine)    Chief Complaint:  AMS    Subjective     Interval History:   ESRD  Somewhat confused today.  Diarrhea improved  Review of Systems:   Denies N/V no chest pain or shortness of breath.    Objective     Vital Sign Min/Max for last 24 hours  Temp  Min: 97.9 °F (36.6 °C)  Max: 98.9 °F (37.2 °C)   BP  Min: 114/70  Max: 138/71   Pulse  Min: 77  Max: 97   Resp  Min: 16  Max: 18   SpO2  Min: 92 %  Max: 95 %   No data recorded   Weight  Min: 77.4 kg (170 lb 9.6 oz)  Max: 77.4 kg (170 lb 9.6 oz)     Flowsheet Rows      Flowsheet Row First Filed Value   Admission Height 172.7 cm (68\") Documented at 01/26/2024 1218   Admission Weight 77.1 kg (170 lb) Documented at 01/26/2024 1219            I/O this shift:  In: 240 [P.O.:240]  Out: -   I/O last 3 completed shifts:  In: -   Out: 2030     Physical Exam:  General appearance  female she is awake alert confused at times.  HEENT: PER  Lungs: Clear to auscultation, equal chest movement, no crepitation.  Heart: Normal S1, S2, no gallop, murmur, RRR.  Abdomen: Soft, nontender, positive bowel sounds, no organomegaly.  Extremities: Left BKA no edema, no cyanosis, no joint swelling.  Neuro: Alert, oriented x4, no focal deficit.  Psych: Mood and affect are normal and appropriate.  Skin: Skin is warm and dry.   Functional AV fistula left upper arm    WBC WBC   Date Value Ref Range Status   01/27/2024 5.10 3.40 - 10.80 10*3/mm3 Final   01/26/2024 4.36 3.40 - 10.80 10*3/mm3 Final      HGB Hemoglobin   Date Value Ref Range Status   01/27/2024 8.9 (L) 12.0 - 15.9 g/dL Final   01/26/2024 9.5 (L) 12.0 - 15.9 g/dL Final      HCT Hematocrit   Date Value Ref Range Status   01/27/2024 26.5 (L) 34.0 - 46.6 % Final   01/26/2024 27.5 (L) 34.0 - 46.6 % Final      Platlets No results found for: \"LABPLAT\"   MCV MCV   Date Value Ref Range Status   01/27/2024 99.3 (H) 79.0 - 97.0 fL Final " "  01/26/2024 97.5 (H) 79.0 - 97.0 fL Final          Sodium Sodium   Date Value Ref Range Status   01/28/2024 130 (L) 136 - 145 mmol/L Final   01/27/2024 134 (L) 136 - 145 mmol/L Final   01/26/2024 134 (L) 136 - 145 mmol/L Final      Potassium Potassium   Date Value Ref Range Status   01/28/2024 4.6 3.5 - 5.2 mmol/L Final   01/27/2024 4.9 3.5 - 5.2 mmol/L Final   01/26/2024 4.9 3.5 - 5.2 mmol/L Final      Chloride Chloride   Date Value Ref Range Status   01/28/2024 94 (L) 98 - 107 mmol/L Final   01/27/2024 96 (L) 98 - 107 mmol/L Final   01/26/2024 93 (L) 98 - 107 mmol/L Final      CO2 CO2   Date Value Ref Range Status   01/28/2024 22.0 22.0 - 29.0 mmol/L Final   01/27/2024 23.0 22.0 - 29.0 mmol/L Final   01/26/2024 30.0 (H) 22.0 - 29.0 mmol/L Final      BUN BUN   Date Value Ref Range Status   01/28/2024 42 (H) 8 - 23 mg/dL Final   01/27/2024 23 8 - 23 mg/dL Final   01/26/2024 40 (H) 8 - 23 mg/dL Final      Creatinine Creatinine   Date Value Ref Range Status   01/28/2024 5.19 (H) 0.57 - 1.00 mg/dL Final   01/27/2024 3.80 (H) 0.57 - 1.00 mg/dL Final   01/26/2024 5.46 (H) 0.57 - 1.00 mg/dL Final      Calcium Calcium   Date Value Ref Range Status   01/28/2024 8.7 8.6 - 10.5 mg/dL Final   01/27/2024 8.8 8.6 - 10.5 mg/dL Final   01/26/2024 9.1 8.6 - 10.5 mg/dL Final      PO4 No results found for: \"CAPO4\"   Albumin Albumin   Date Value Ref Range Status   01/27/2024 3.7 3.5 - 5.2 g/dL Final   01/26/2024 3.7 3.5 - 5.2 g/dL Final      Magnesium Magnesium   Date Value Ref Range Status   01/27/2024 1.9 1.6 - 2.4 mg/dL Final      Uric Acid No results found for: \"URICACID\"        Results Review:     I reviewed the patient's new clinical results.    atorvastatin, 80 mg, Oral, Nightly  calcium acetate, 667 mg, Oral, TID With Meals  carvedilol, 25 mg, Oral, BID With Meals  dextromethorphan polistirex ER, 60 mg, Oral, Q12H  guaiFENesin, 1,200 mg, Oral, Q12H  insulin lispro, 2-7 Units, Subcutaneous, 4x Daily AC & at Bedtime  lactulose, " 30 g, Oral, BID  pantoprazole, 40 mg, Oral, Daily  QUEtiapine, 25 mg, Oral, Nightly  riFAXIMin, 550 mg, Oral, BID  senna-docusate sodium, 2 tablet, Oral, BID  sodium chloride, 10 mL, Intravenous, Q12H  venlafaxine XR, 75 mg, Oral, Daily           Medication Review: reviewed    Assessment & Plan       Acute encephalopathy    Hepatocellular carcinoma metastatic to bone s/p RXT     Cirrhosis of liver    Chronic Hep C     ESRD (end stage renal disease)    S/P left BKA    GERD (gastroesophageal reflux disease)    Essential hypertension    Type 2 diabetes mellitus    RSV (respiratory syncytial virus infection)       1.  ESRD: Dialysis Monday Wednesday Friday.  Patient missed her dialysis today due to confusion and generalized weakness, she was sent to the hospital for evaluation.  Dialysis unit at [facility] under care of MICHELLE.  2.  Confusion:    3.  RSV infection.  4.  Anemia: Of chronic kidney disease.  No ADRIEN due to metastatic liver disease.  5.  Volume: No significant volume overload noted.  6.  BMD will monitor phosphorus and PTH    Recommendation.  Dialysis days Monday Wednesday Friday.  Dialysis orders written  Renal diet with fluid restriction.  High-protein diet needed.  RSV precautions taken.  Confusion ongoing.  Could be related to medications including Effexor XR  Staci Bates MD  01/28/24  09:43 EST

## 2024-01-28 NOTE — THERAPY EVALUATION
Patient Name: Jeaneth Keita  : 1960    MRN: [id number]                              Today's Date: 2024       Admit Date: 2024    Visit Dx:     ICD-10-CM ICD-9-CM   1. Altered mental status, unspecified altered mental status type  R41.82 780.97   2. RSV bronchitis  J20.5 466.0     079.6   3. Stage 5 chronic kidney disease on chronic dialysis  N18.6 585.6    Z99.2 V45.11     Patient Active Problem List   Diagnosis    ICH (intracerebral hemorrhage)    Hepatocellular carcinoma metastatic to bone s/p RXT     Hemochromatosis    Cirrhosis of liver    Chronic Hep C     ESRD (end stage renal disease)    Chronic ulcer of right foot    S/P left BKA    GERD (gastroesophageal reflux disease)    Chronic pain on Methadone    Essential hypertension    Type 2 diabetes mellitus    Right lower lobe pneumonia    Colitis    Normocytic anemia    Hypokalemia    CVA (cerebral vascular accident)    Stroke-like symptoms    History of hypoglycemia    Hypoglycemia    Chest pain    End stage renal disease on dialysis    Disorientation    Altered mental status    End-stage renal disease on hemodialysis    Elevated brain natriuretic peptide (BNP) level    Acute encephalopathy    RSV (respiratory syncytial virus infection)     Past Medical History:   Diagnosis Date    Anxiety     Cancer     liver cell carcinoma    DDD (degenerative disc disease), lumbar     Depression     Diabetes mellitus     Fibromyalgia     Hemochromatosis     Hep B w/o coma, chronic, w/o delta     Hep C w/o coma, chronic     Hypertension     Stroke     Vertigo      Past Surgical History:   Procedure Laterality Date    ARTERIOVENOUS FISTULA/SHUNT SURGERY Left 10/16/2021    Procedure: UPPER EXTREMITY ARTERIOVENOUS FISTULA FORMATION LEFT;  Surgeon: Johnny Rousseau MD;  Location: [facility];  Service: Vascular;  Laterality: Left;    BACK SURGERY      BELOW KNEE LEG AMPUTATION       SECTION      FOOT SURGERY      KNEE SURGERY      ROTATOR CUFF  REPAIR      SPINAL CORD STIMULATOR IMPLANT        General Information       Row Name 01/28/24 1115          OT Time and Intention    Document Type evaluation  -     Mode of Treatment occupational therapy  -       Row Name 01/28/24 1115          General Information    Patient Profile Reviewed yes  -     Prior Level of Function independent:;transfer;w/c or scooter;ADL's  unsure of accuracy - pt with confusion  -     Existing Precautions/Restrictions --  L BKA, confused  -     Barriers to Rehab medically complex;previous functional deficit;cognitive status  -       Row Name 01/28/24 1115          Living Environment    People in Home facility resident  Joan  -       Row Name 01/28/24 1115          Home Main Entrance    Number of Stairs, Main Entrance none  -       Row Name 01/28/24 1115          Cognition    Orientation Status (Cognition) oriented to;person;verbal cues/prompts needed for orientation;place;disoriented to;time  -       Row Name 01/28/24 1115          Safety Issues, Functional Mobility    Safety Issues Affecting Function (Mobility) awareness of need for assistance;insight into deficits/self-awareness;safety precaution awareness;sequencing abilities  -     Impairments Affecting Function (Mobility) balance;cognition;endurance/activity tolerance;pain;postural/trunk control;strength  -     Cognitive Impairments, Mobility Safety/Performance awareness, need for assistance;insight into deficits/self-awareness;safety precaution awareness;sequencing abilities  -               User Key  (r) = Recorded By, (t) = Taken By, (c) = Cosigned By      Initials Name Provider Type    AC Sheri Mccoy OT Occupational Therapist                     Mobility/ADL's       Row Name 01/28/24 1155          Bed Mobility    Bed Mobility supine-sit  -     Supine-Sit [place] (Bed Mobility) verbal cues;minimum assist (75% patient effort)  -     Bed Mobility, Safety Issues decreased use of legs for  bridging/pushing  -     Assistive Device (Bed Mobility) bed rails;head of bed elevated  -       Row Name 01/28/24 1155          Transfers    Transfers sit-stand transfer  -       Row Name 01/28/24 1155          Bed-Chair Transfer    Bed-Chair [place] (Transfers) verbal cues;moderate assist (50% patient effort);2 person assist  2nd person to guide hips  -AC     Assistive Device (Bed-Chair Transfers) other (see comments)  BUE support  -       Row Name 01/28/24 1155          Sit-Stand Transfer    Sit-Stand [place] (Transfers) verbal cues;nonverbal cues (demo/gesture);moderate assist (50% patient effort);1 person assist  -       Row Name 01/28/24 1155          Activities of Daily Living    BADL Assessment/Intervention lower body dressing;grooming  -       Row Name 01/28/24 1155          Lower Body Dressing Assessment/Training    [place] Level (Lower Body Dressing) don;socks;minimum assist (75% patient effort)  R sock  -AC     Position (Lower Body Dressing) edge of bed sitting  -       Row Name 01/28/24 1155          Grooming Assessment/Training    [place] Level (Grooming) wash face, hands;set up  -AC     Position (Grooming) supported sitting  -AC               User Key  (r) = Recorded By, (t) = Taken By, (c) = Cosigned By      Initials Name Provider Type    Sheri Fernández OT Occupational Therapist                   Obj/Interventions       Row Name 01/28/24 1157          Sensory Assessment (Somatosensory)    Sensory Assessment (Somatosensory) UE sensation intact  -       Row Name 01/28/24 1157          Range of Motion Comprehensive    General Range of Motion upper extremity range of motion deficits identified  -       Row Name 01/28/24 1157          Strength Comprehensive (MMT)    Comment, General Manual Muscle Testing (MMT) Assessment BUE grossly 4/5  -       Row Name 01/28/24 1157          Balance    Balance Assessment sitting static balance;sitting dynamic balance;standing  static balance;standing dynamic balance  -AC     Static Sitting Balance contact guard  -AC     Dynamic Sitting Balance minimal assist  -AC     Position, Sitting Balance unsupported;sitting edge of bed  -AC     Static Standing Balance verbal cues;moderate assist  -AC     Dynamic Standing Balance verbal cues;moderate assist;2-person assist  -AC     Position/Device Used, Standing Balance supported  -AC               User Key  (r) = Recorded By, (t) = Taken By, (c) = Cosigned By      Initials Name Provider Type    AC Sheri Mccoy, OT Occupational Therapist                   Goals/Plan       Row Name 01/28/24 1201          Bed Mobility Goal 1 (OT)    Activity/Assistive Device (Bed Mobility Goal 1, OT) sit to supine;supine to sit  -AC     [place] Level/Cues Needed (Bed Mobility Goal 1, OT) contact guard required  -AC     Time Frame (Bed Mobility Goal 1, OT) by discharge  -AC     Progress/Outcomes (Bed Mobility Goal 1, OT) new goal  -AC       Row Name 01/28/24 1201          Transfer Goal 1 (OT)    Activity/Assistive Device (Transfer Goal 1, OT) bed-to-chair/chair-to-bed;toilet;walker, rolling  -AC     [place] Level/Cues Needed (Transfer Goal 1, OT) minimum assist (75% or more patient effort)  -AC     Time Frame (Transfer Goal 1, OT) by discharge  -AC     Progress/Outcome (Transfer Goal 1, OT) new goal;goal ongoing  -AC       Row Name 01/28/24 1201          Dressing Goal 1 (OT)    Activity/Device (Dressing Goal 1, OT) lower body dressing  -AC     [place]/Cues Needed (Dressing Goal 1, OT) contact guard required  -AC     Time Frame (Dressing Goal 1, OT) by discharge  -AC     Strategies/Barriers (Dressing Goal 1, OT) don/doff R sock  -AC     Progress/Outcome (Dressing Goal 1, OT) new goal;goal ongoing  -AC       Row Name 01/28/24 1201          Toileting Goal 1 (OT)    Activity/Device (Toileting Goal 1, OT) adjust/manage clothing;perform perineal hygiene  -AC     [place] Level/Cues Needed (Toileting Goal  1, OT) moderate assist (50-74% patient effort)  -AC     Time Frame (Toileting Goal 1, OT) by discharge  -AC     Progress/Outcome (Toileting Goal 1, OT) new goal;goal ongoing  -       Row Name 01/28/24 1201          Therapy Assessment/Plan (OT)    Planned Therapy Interventions (OT) activity tolerance training;BADL retraining;functional balance retraining;occupation/activity based interventions;patient/caregiver education/training;transfer/mobility retraining;strengthening exercise  -               User Key  (r) = Recorded By, (t) = Taken By, (c) = Cosigned By      Initials Name Provider Type    AC Sheri Mccoy, OT Occupational Therapist                   Clinical Impression       Row Name 01/28/24 1158          Pain Assessment    Pretreatment Pain Rating 6/10  -AC     Posttreatment Pain Rating 6/10  -AC     Pain Location - abdomen  -     Pain Intervention(s) Repositioned  -       Row Name 01/28/24 1151          Plan of Care Review    Plan of Care Reviewed With patient;family  -     Outcome Evaluation Pt presents with confusion, weakness, decr balance and activity tolerance.  Pt min A LBD, setup to wash face, mod A x 2 bed to chair given UE support.  OT will follow to advance pt toward PLOF.  Recommend SNF upon d/c.  -       Row Name 01/28/24 1153          Therapy Assessment/Plan (OT)    Rehab Potential (OT) good, to achieve stated therapy goals  -     Criteria for Skilled Therapeutic Interventions Met (OT) yes;skilled treatment is necessary  -     Therapy Frequency (OT) daily  -       Row Name 01/28/24 1150          Therapy Plan Review/Discharge Plan (OT)    Anticipated Discharge Disposition (OT) skilled nursing facility  -       Row Name 01/28/24 1155          Vital Signs    Pre Systolic BP Rehab 124  -AC     Pre Treatment Diastolic BP 92  -AC     Posttreatment Heart Rate (beats/min) 89  -AC     Post SpO2 (%) 97  -AC     O2 Delivery Post Treatment room air  -AC     Pre Patient Position Supine   -AC     Post Patient Position Sitting  -AC       Row Name 01/28/24 1158          Positioning and Restraints    Pre-Treatment Position in bed  -AC     Post Treatment Position chair  -AC     In Chair notified nsg;reclined;call light within reach;encouraged to call for assist;exit alarm on;waffle cushion  -AC               User Key  (r) = Recorded By, (t) = Taken By, (c) = Cosigned By      Initials Name Provider Type    Sheri Fernández, OT Occupational Therapist                   Outcome Measures       Row Name 01/28/24 1202          How much help from another is currently needed...    Putting on and taking off regular lower body clothing? 3  -AC     Bathing (including washing, rinsing, and drying) 2  -AC     Toileting (which includes using toilet bed pan or urinal) 2  -AC     Putting on and taking off regular upper body clothing 3  -AC     Taking care of personal grooming (such as brushing teeth) 3  -AC     Eating meals 4  -AC     AM-PAC 6 Clicks Score (OT) 17  -AC       Row Name 01/28/24 0818          How much help from another person do you currently need...    Turning from your back to your side while in flat bed without using bedrails? 4  -KS     Moving from lying on back to sitting on the side of a flat bed without bedrails? 3  -KS     Moving to and from a bed to a chair (including a wheelchair)? 3  -KS     Standing up from a chair using your arms (e.g., wheelchair, bedside chair)? 2  -KS     Climbing 3-5 steps with a railing? 1  -KS     To walk in hospital room? 1  -KS     AM-PAC 6 Clicks Score (PT) 14  -KS     Highest Level of Mobility Goal 4 --> Transfer to chair/commode  -KS       Row Name 01/28/24 1202          Functional Assessment    Outcome Measure Options AM-PAC 6 Clicks Daily Activity (OT)  -AC               User Key  (r) = Recorded By, (t) = Taken By, (c) = Cosigned By      Initials Name Provider Type    Sheri Fernández, OT Occupational Therapist    Sridevi Gibson RN Registered Nurse                     Occupational Therapy Education       Title: PT OT SLP Therapies (In Progress)       Topic: Occupational Therapy (In Progress)       Point: ADL training (In Progress)       Description:   Instruct learner(s) on proper safety adaptation and remediation techniques during self care or transfers.   Instruct in proper use of assistive devices.                  Learning Progress Summary             Patient Acceptance, E, NR by  at 1/28/2024 4915                         Point: Home exercise program (Not Started)       Description:   Instruct learner(s) on appropriate technique for monitoring, assisting and/or progressing therapeutic exercises/activities.                  Learner Progress:  Not documented in this visit.              Point: Precautions (Not Started)       Description:   Instruct learner(s) on prescribed precautions during self-care and functional transfers.                  Learner Progress:  Not documented in this visit.              Point: Body mechanics (Not Started)       Description:   Instruct learner(s) on proper positioning and spine alignment during self-care, functional mobility activities and/or exercises.                  Learner Progress:  Not documented in this visit.                              User Key       Initials Effective Dates Name Provider Type Discipline     02/03/23 -  Sheri Mccoy, OT Occupational Therapist OT                  OT Recommendation and Plan  Planned Therapy Interventions (OT): activity tolerance training, BADL retraining, functional balance retraining, occupation/activity based interventions, patient/caregiver education/training, transfer/mobility retraining, strengthening exercise  Therapy Frequency (OT): daily  Plan of Care Review  Plan of Care Reviewed With: patient, family  Outcome Evaluation: Pt presents with confusion, weakness, decr balance and activity tolerance.  Pt min A LBD, setup to wash face, mod A x 2 bed to chair given UE support.  OT will  follow to advance pt toward PLOF.  Recommend SNF upon d/c.     Time Calculation:   Evaluation Complexity (OT)  Review Occupational Profile/Medical/Therapy History Complexity: expanded/moderate complexity  Assessment, Occupational Performance/Identification of Deficit Complexity: 3-5 performance deficits  Clinical Decision Making Complexity (OT): detailed assessment/moderate complexity  Overall Complexity of Evaluation (OT): moderate complexity     Time Calculation- OT       Row Name 01/28/24 1115             Time Calculation- OT    OT Start Time 1115  -AC      OT Received On 01/28/24  -AC      OT Goal Re-Cert Due Date 02/07/24  -AC         Untimed Charges    OT Eval/Re-eval Minutes 50  -AC         Total Minutes    Untimed Charges Total Minutes 50  -AC       Total Minutes 50  -AC                User Key  (r) = Recorded By, (t) = Taken By, (c) = Cosigned By      Initials Name Provider Type    AC Sheri Mccoy, OT Occupational Therapist                  Therapy Charges for Today       Code Description Service Date Service Provider Modifiers Qty    [id number] HC OT EVAL MOD COMPLEXITY 4 1/28/2024 Sheri Mccoy OT GO 1                 Sheri Mccoy OT  1/28/2024

## 2024-01-28 NOTE — PROGRESS NOTES
[facility] Medicine Services  PROGRESS NOTE    Patient Name: Jeaneth Keita  : 1960  MRN: [id number]    Date of Admission: 2024  Primary Care Physician: Lulu Amos MD    Subjective   Subjective     CC:  Lethargy    HPI:  Pt sitting up in bed A&O to person. She reports to be in the hospital at [facility]. She states it is March and she does not know the year, nor does she know the president. Nephrology reporting memory issues may be related to effexor. Will taper off effexor by decreasing dose to 37.5 mg daily. Glucose is running high, will adjust insulin.     Copied text in this note has been reviewed and is accurate as of 24.         Objective   Objective     Vital Signs:   Temp:  [97.9 °F (36.6 °C)-98.9 °F (37.2 °C)] 98.4 °F (36.9 °C)  Heart Rate:  [77-89] 83  Resp:  [16-18] 16  BP: (114-138)/(65-92) 124/92     Physical Exam:  Constitutional: Awake, alert, NAD  HENT: NCAT, mucous membranes moist  Respiratory: Clear to auscultation bilaterally, nonlabored respirations   Cardiovascular: RRR, no murmurs, rubs, or gallops  Gastrointestinal: Positive bowel sounds, soft, nontender, nondistended  Musculoskeletal: Left BKA  Psychiatric: Appropriate affect, cooperative  Neurologic: Oriented to self and situation only, KRISHNAMURTHY, speech clear  Skin: No rashes. AV fistula left upper arm    Results Reviewed:  LAB RESULTS:      Lab 24  0515 24  1212 24  0127   WBC 5.10 4.36 5.30   HEMOGLOBIN 8.9* 9.5* 8.4*   HEMATOCRIT 26.5* 27.5* 24.9*   PLATELETS 171 167 160   NEUTROS ABS 3.24 2.96 3.40   IMMATURE GRANS (ABS) 0.01 0.01 0.01   LYMPHS ABS 1.21 0.83 1.13   MONOS ABS 0.46 0.38 0.47   EOS ABS 0.13 0.16 0.26   MCV 99.3* 97.5* 98.0*   LACTATE  --  1.1 1.8         Lab 24  0527 24  0515 24  1212 24  0127   SODIUM 130* 134* 134* 134*   POTASSIUM 4.6 4.9 4.9 4.9   CHLORIDE 94* 96* 93* 95*   CO2 22.0 23.0 30.0* 31.0*   ANION GAP 14.0 15.0 11.0  8.0   BUN 42* 23 40* 32*   CREATININE 5.19* 3.80* 5.46* 4.61*   EGFR 8.7* 12.6* 8.2* 10.0*   GLUCOSE 270* 228* 350* 339*   CALCIUM 8.7 8.8 9.1 8.4*   MAGNESIUM  --  1.9  --   --    PHOSPHORUS  --  3.6  --   --    HEMOGLOBIN A1C  --  6.30*  --   --    TSH  --  1.930  --   --          Lab 01/27/24  0515 01/26/24  1212 01/24/24  0127   TOTAL PROTEIN 6.3 6.8 6.3   ALBUMIN 3.7 3.7 3.6   GLOBULIN 2.6 3.1 2.7   ALT (SGPT) 38* 39* 38*   AST (SGOT) 23 23 23   BILIRUBIN 1.0 0.7 0.6   ALK PHOS 100 145* 137*   LIPASE  --   --  53         Lab 01/26/24  1420 01/26/24  1212 01/24/24  0127   HSTROP T 39* 40* 33*             Lab 01/27/24  0515   IRON 119   IRON SATURATION (TSAT) 56*   TIBC 212*   TRANSFERRIN 142*   FERRITIN 1,393.00*   FOLATE 8.78   VITAMIN B 12 326         Lab 01/26/24  1253   PH, ARTERIAL 7.552*   PCO2, ARTERIAL 34.0*   PO2 .0*   FIO2 21   HCO3 ART 29.9*   BASE EXCESS ART 7.2*   CARBOXYHEMOGLOBIN 1.4     Brief Urine Lab Results  (Last result in the past 365 days)        Color   Clarity   Blood   Leuk Est   Nitrite   Protein   CREAT   Urine HCG        01/26/24 1233 Yellow   Clear   Negative   Trace   Negative   >=300 mg/dL (3+)                   Microbiology Results Abnormal       Procedure Component Value - Date/Time    Urine Culture - Urine, Straight Cath [[id number]]  (Normal) Collected: 01/26/24 1233    Lab Status: Final result Specimen: Urine from Straight Cath Updated: 01/27/24 1502     Urine Culture No growth            No radiology results from the last 24 hrs    Results for orders placed during the hospital encounter of 12/18/23    Adult Transthoracic Echo Complete W/ Cont if Necessary Per Protocol    Interpretation Summary    Left ventricular systolic function is normal. Calculated left ventricular EF of 63%    Left ventricular diastolic function was normal.    Normal right ventricular size and systolic function    No significant valvular abnormality    No pericardial effusion    Compared to prior  echocardiogram from 10/6/2021, there is no significant change      Current medications:  Scheduled Meds:atorvastatin, 80 mg, Oral, Nightly  calcium acetate, 667 mg, Oral, TID With Meals  carvedilol, 25 mg, Oral, BID With Meals  dextromethorphan polistirex ER, 60 mg, Oral, Q12H  guaiFENesin, 1,200 mg, Oral, Q12H  insulin lispro, 2-7 Units, Subcutaneous, 4x Daily AC & at Bedtime  lactulose, 30 g, Oral, BID  pantoprazole, 40 mg, Oral, Daily  QUEtiapine, 25 mg, Oral, Nightly  riFAXIMin, 550 mg, Oral, BID  senna-docusate sodium, 2 tablet, Oral, BID  sodium chloride, 10 mL, Intravenous, Q12H  venlafaxine XR, 75 mg, Oral, Daily      Continuous Infusions:   PRN Meds:.  acetaminophen **OR** acetaminophen **OR** acetaminophen    benzonatate    senna-docusate sodium **AND** polyethylene glycol **AND** bisacodyl **AND** bisacodyl    Calcium Replacement - Follow Nurse / BPA Driven Protocol    dextrose    dextrose    glucagon (human recombinant)    ipratropium-albuterol    Magnesium Low Dose Replacement - Follow Nurse / BPA Driven Protocol    nitroglycerin    ondansetron ODT **OR** ondansetron    Phosphorus Replacement - Follow Nurse / BPA Driven Protocol    Potassium Replacement - Follow Nurse / BPA Driven Protocol    [COMPLETED] Insert Peripheral IV **AND** sodium chloride    sodium chloride    sodium chloride    traMADol    Assessment & Plan   Assessment & Plan     Active Hospital Problems    Diagnosis  POA    **Acute encephalopathy [G93.40]  Yes    RSV (respiratory syncytial virus infection) [B33.8]  Yes    Cirrhosis of liver [K74.60]  Yes    Hepatocellular carcinoma metastatic to bone s/p RXT  [C79.51, C22.0]  Yes    Chronic Hep C  [B18.2]  Yes    ESRD (end stage renal disease) [N18.6]  Yes    GERD (gastroesophageal reflux disease) [K21.9]  Yes    S/P left BKA [Z89.512]  Not Applicable    Essential hypertension [I10]  Yes    Type 2 diabetes mellitus [E11.9]  Yes      Resolved Hospital Problems   No resolved problems to  display.     Brief Hospital Course to date:  Jeaneth Keita is a 63 y.o. female with past medical history significant for ESRD on hemodialysis, CVA, vertigo, cirrhosis, type 2 diabetes mellitus, hypertension, was admitted for acute encephalopathy.      Acute encephalopathy, metabolic  ESRD on HD  -Pt still having ongoing issues with confusion. Nephrology suggest effexor may be contributing. Will taper off effexor over the next few week. Will decrease to 37.5 mg daily from 75 mg daily.  -PT/OT  -BMP   -phos binder     RSV+  -Unclear if this contributed to encephalopathy or not, continue supportive care    Liver cirrhosis  Chronic hep C  Hepatocellular carcinoma with metastasis to lung/bone  -Continue Lactulose, Rifaximin     T2DM  -Hgb A1C 5.8 on 12/19/2023  -Accu-Cheks ACHS with sign scale insulin  -1/28 increase SSI to moderate scale avg 24 glucose (212-270)     Anemia of chronic disease  -Stable      HTN  HLD  -continue Coreg, Lipitor      GERD  -Protonix      Mood disorder  -Continue Seroquel, Effexor     Expected Discharge Location and Transportation: TBD  Expected Discharge   Expected Discharge Date: 1/29/2024; Expected Discharge Time:      DVT prophylaxis:  Mechanical DVT prophylaxis orders are present.         AM-PAC 6 Clicks Score (PT): 14 (01/28/24 0826)    CODE STATUS:   Code Status and Medical Interventions:   Ordered at: 01/27/24 1058     Medical Intervention Limits:    NO intubation (DNI)     Level Of Support Discussed With:    Health Care Surrogate     Code Status (Patient has no pulse and is not breathing):    No CPR (Do Not Attempt to Resuscitate)     Medical Interventions (Patient has pulse or is breathing):    Limited Support       Ambika Monique, MAYELIN  01/28/24

## 2024-01-29 ENCOUNTER — APPOINTMENT (OUTPATIENT)
Dept: NEPHROLOGY | Facility: HOSPITAL | Age: 64
End: 2024-01-29
Payer: MEDICARE

## 2024-01-29 LAB
ANION GAP SERPL CALCULATED.3IONS-SCNC: 14 MMOL/L (ref 5–15)
BASOPHILS # BLD AUTO: 0.04 10*3/MM3 (ref 0–0.2)
BASOPHILS NFR BLD AUTO: 0.7 % (ref 0–1.5)
BUN SERPL-MCNC: 59 MG/DL (ref 8–23)
BUN/CREAT SERPL: 9.5 (ref 7–25)
CALCIUM SPEC-SCNC: 8.6 MG/DL (ref 8.6–10.5)
CHLORIDE SERPL-SCNC: 96 MMOL/L (ref 98–107)
CO2 SERPL-SCNC: 23 MMOL/L (ref 22–29)
CREAT SERPL-MCNC: 6.23 MG/DL (ref 0.57–1)
DEPRECATED RDW RBC AUTO: 40.9 FL (ref 37–54)
EGFRCR SERPLBLD CKD-EPI 2021: 7 ML/MIN/1.73
EOSINOPHIL # BLD AUTO: 0.38 10*3/MM3 (ref 0–0.4)
EOSINOPHIL NFR BLD AUTO: 6.2 % (ref 0.3–6.2)
ERYTHROCYTE [DISTWIDTH] IN BLOOD BY AUTOMATED COUNT: 11.7 % (ref 12.3–15.4)
GLUCOSE BLDC GLUCOMTR-MCNC: 144 MG/DL (ref 70–130)
GLUCOSE BLDC GLUCOMTR-MCNC: 161 MG/DL (ref 70–130)
GLUCOSE BLDC GLUCOMTR-MCNC: 197 MG/DL (ref 70–130)
GLUCOSE BLDC GLUCOMTR-MCNC: 229 MG/DL (ref 70–130)
GLUCOSE SERPL-MCNC: 131 MG/DL (ref 65–99)
HCT VFR BLD AUTO: 24.8 % (ref 34–46.6)
HGB BLD-MCNC: 8.6 G/DL (ref 12–15.9)
IMM GRANULOCYTES # BLD AUTO: 0.03 10*3/MM3 (ref 0–0.05)
IMM GRANULOCYTES NFR BLD AUTO: 0.5 % (ref 0–0.5)
LYMPHOCYTES # BLD AUTO: 1.65 10*3/MM3 (ref 0.7–3.1)
LYMPHOCYTES NFR BLD AUTO: 26.8 % (ref 19.6–45.3)
MCH RBC QN AUTO: 33.5 PG (ref 26.6–33)
MCHC RBC AUTO-ENTMCNC: 34.7 G/DL (ref 31.5–35.7)
MCV RBC AUTO: 96.5 FL (ref 79–97)
MONOCYTES # BLD AUTO: 0.44 10*3/MM3 (ref 0.1–0.9)
MONOCYTES NFR BLD AUTO: 7.2 % (ref 5–12)
NEUTROPHILS NFR BLD AUTO: 3.61 10*3/MM3 (ref 1.7–7)
NEUTROPHILS NFR BLD AUTO: 58.6 % (ref 42.7–76)
NRBC BLD AUTO-RTO: 0 /100 WBC (ref 0–0.2)
PLATELET # BLD AUTO: 178 10*3/MM3 (ref 140–450)
PMV BLD AUTO: 10.2 FL (ref 6–12)
POTASSIUM SERPL-SCNC: 4.9 MMOL/L (ref 3.5–5.2)
RBC # BLD AUTO: 2.57 10*6/MM3 (ref 3.77–5.28)
SODIUM SERPL-SCNC: 133 MMOL/L (ref 136–145)
WBC NRBC COR # BLD AUTO: 6.15 10*3/MM3 (ref 3.4–10.8)

## 2024-01-29 PROCEDURE — 85025 COMPLETE CBC W/AUTO DIFF WBC: CPT | Performed by: NURSE PRACTITIONER

## 2024-01-29 PROCEDURE — 25010000002 HYDROMORPHONE PER 4 MG

## 2024-01-29 PROCEDURE — 63710000001 INSULIN LISPRO (HUMAN) PER 5 UNITS: Performed by: NURSE PRACTITIONER

## 2024-01-29 PROCEDURE — 82948 REAGENT STRIP/BLOOD GLUCOSE: CPT

## 2024-01-29 PROCEDURE — 25010000002 ONDANSETRON PER 1 MG: Performed by: NURSE PRACTITIONER

## 2024-01-29 PROCEDURE — 80048 BASIC METABOLIC PNL TOTAL CA: CPT | Performed by: NURSE PRACTITIONER

## 2024-01-29 PROCEDURE — 99232 SBSQ HOSP IP/OBS MODERATE 35: CPT | Performed by: PHYSICIAN ASSISTANT

## 2024-01-29 RX ORDER — GUAIFENESIN 600 MG/1
600 TABLET, EXTENDED RELEASE ORAL EVERY 12 HOURS SCHEDULED
Status: DISCONTINUED | OUTPATIENT
Start: 2024-01-29 | End: 2024-01-31 | Stop reason: HOSPADM

## 2024-01-29 RX ORDER — TRAMADOL HYDROCHLORIDE 50 MG/1
50 TABLET ORAL EVERY 8 HOURS PRN
Status: DISCONTINUED | OUTPATIENT
Start: 2024-01-29 | End: 2024-01-30

## 2024-01-29 RX ORDER — FOLIC ACID/VIT B COMPLEX AND C 0.8 MG
1 TABLET ORAL DAILY
Status: DISCONTINUED | OUTPATIENT
Start: 2024-01-29 | End: 2024-01-31 | Stop reason: HOSPADM

## 2024-01-29 RX ORDER — HYDROMORPHONE HYDROCHLORIDE 1 MG/ML
0.25 INJECTION, SOLUTION INTRAMUSCULAR; INTRAVENOUS; SUBCUTANEOUS ONCE
Status: COMPLETED | OUTPATIENT
Start: 2024-01-29 | End: 2024-01-29

## 2024-01-29 RX ORDER — OXYCODONE HYDROCHLORIDE 5 MG/1
2.5 TABLET ORAL ONCE
Status: COMPLETED | OUTPATIENT
Start: 2024-01-29 | End: 2024-01-29

## 2024-01-29 RX ORDER — DEXTROMETHORPHAN POLISTIREX 30 MG/5ML
30 SUSPENSION ORAL EVERY 12 HOURS SCHEDULED
Status: DISCONTINUED | OUTPATIENT
Start: 2024-01-29 | End: 2024-01-31 | Stop reason: HOSPADM

## 2024-01-29 RX ADMIN — DEXTROMETHORPHAN 30 MG: 30 SUSPENSION, EXTENDED RELEASE ORAL at 21:42

## 2024-01-29 RX ADMIN — RIFAXIMIN 550 MG: 550 TABLET ORAL at 21:42

## 2024-01-29 RX ADMIN — GUAIFENESIN 1200 MG: 600 TABLET, EXTENDED RELEASE ORAL at 12:06

## 2024-01-29 RX ADMIN — PANTOPRAZOLE SODIUM 40 MG: 40 TABLET, DELAYED RELEASE ORAL at 05:33

## 2024-01-29 RX ADMIN — CARVEDILOL 25 MG: 6.25 TABLET, FILM COATED ORAL at 12:06

## 2024-01-29 RX ADMIN — CALCIUM ACETATE 667 MG: 667 CAPSULE ORAL at 16:53

## 2024-01-29 RX ADMIN — DEXTROMETHORPHAN POLISTIREX 60 MG: 30 SUSPENSION ORAL at 12:06

## 2024-01-29 RX ADMIN — CARVEDILOL 25 MG: 6.25 TABLET, FILM COATED ORAL at 16:52

## 2024-01-29 RX ADMIN — RIFAXIMIN 550 MG: 550 TABLET ORAL at 12:06

## 2024-01-29 RX ADMIN — OXYCODONE HYDROCHLORIDE 2.5 MG: 5 TABLET ORAL at 01:56

## 2024-01-29 RX ADMIN — LACTULOSE 30 G: 20 SOLUTION ORAL at 21:42

## 2024-01-29 RX ADMIN — LACTULOSE 30 G: 20 SOLUTION ORAL at 12:08

## 2024-01-29 RX ADMIN — INSULIN LISPRO 4 UNITS: 100 INJECTION, SOLUTION INTRAVENOUS; SUBCUTANEOUS at 16:52

## 2024-01-29 RX ADMIN — VENLAFAXINE HYDROCHLORIDE 37.5 MG: 37.5 CAPSULE, EXTENDED RELEASE ORAL at 12:07

## 2024-01-29 RX ADMIN — QUETIAPINE FUMARATE 25 MG: 25 TABLET ORAL at 21:42

## 2024-01-29 RX ADMIN — Medication 10 ML: at 12:10

## 2024-01-29 RX ADMIN — HYDROMORPHONE HYDROCHLORIDE 0.25 MG: 1 INJECTION, SOLUTION INTRAMUSCULAR; INTRAVENOUS; SUBCUTANEOUS at 14:45

## 2024-01-29 RX ADMIN — Medication 1 TABLET: at 12:07

## 2024-01-29 RX ADMIN — INSULIN LISPRO 2 UNITS: 100 INJECTION, SOLUTION INTRAVENOUS; SUBCUTANEOUS at 12:09

## 2024-01-29 RX ADMIN — CALCIUM ACETATE 667 MG: 667 CAPSULE ORAL at 12:06

## 2024-01-29 RX ADMIN — ATORVASTATIN CALCIUM 80 MG: 40 TABLET, FILM COATED ORAL at 21:42

## 2024-01-29 RX ADMIN — GUAIFENESIN 600 MG: 600 TABLET, EXTENDED RELEASE ORAL at 21:42

## 2024-01-29 RX ADMIN — ONDANSETRON 4 MG: 2 INJECTION INTRAMUSCULAR; INTRAVENOUS at 13:59

## 2024-01-29 NOTE — PROGRESS NOTES
"   LOS: 1 day    Patient Care Team:  Lulu Amos MD as PCP - General (Hospice and Palliative Medicine)    Subjective     Stable overnight    Objective     Vital Signs:  Blood pressure 128/67, pulse 76, temperature 97.8 °F (36.6 °C), temperature source Oral, resp. rate 18, height 172.7 cm (68\"), weight 78.7 kg (173 lb 8 oz), SpO2 92%.      Intake/Output Summary (Last 24 hours) at 1/29/2024 0925  Last data filed at 1/28/2024 1500  Gross per 24 hour   Intake --   Output 100 ml   Net -100 ml        01/28 0701 - 01/29 0700  In: 240 [P.O.:240]  Out: 100 [Urine:100]    Physical Exam:        GENERAL: WD WF NAD  NEURO: Sleeping but awakens  PSYCHIATRIC: Drowsy, cooperative with PE  CV: No edema  LUNGS:  Quiet,  Nonlabored resp.    ABDOMEN: Nondistended  : No Sanchez  SKIN: Warm and dry without rash  L UE AVF    Labs:  Results from last 7 days   Lab Units 01/29/24  0643 01/27/24  0515 01/26/24  1212   WBC 10*3/mm3 6.15 5.10 4.36   HEMOGLOBIN g/dL 8.6* 8.9* 9.5*   PLATELETS 10*3/mm3 178 171 167     Results from last 7 days   Lab Units 01/29/24  0643 01/28/24  0527 01/27/24  0515 01/26/24  1212 01/24/24  0127   SODIUM mmol/L 133* 130* 134* 134* 134*   POTASSIUM mmol/L 4.9 4.6 4.9 4.9 4.9   CHLORIDE mmol/L 96* 94* 96* 93* 95*   CO2 mmol/L 23.0 22.0 23.0 30.0* 31.0*   BUN mg/dL 59* 42* 23 40* 32*   CREATININE mg/dL 6.23* 5.19* 3.80* 5.46* 4.61*   CALCIUM mg/dL 8.6 8.7 8.8 9.1 8.4*   PHOSPHORUS mg/dL  --   --  3.6  --   --    MAGNESIUM mg/dL  --   --  1.9  --   --    ALBUMIN g/dL  --   --  3.7 3.7 3.6     Results from last 7 days   Lab Units 01/27/24  0515   ALK PHOS U/L 100   BILIRUBIN mg/dL 1.0   ALT (SGPT) U/L 38*   AST (SGOT) U/L 23     Results from last 7 days   Lab Units 01/26/24  1253   PH, ARTERIAL pH units 7.552*   PO2 ART mm Hg 142.0*   PCO2, ARTERIAL mm Hg 34.0*   HCO3 ART mmol/L 29.9*               Estimated Creatinine Clearance: 9.9 mL/min (A) (by C-G formula based on SCr of 6.23 mg/dL (H)).     "     A/P:    ESRD: On HD [facility] with NAL..  Continue current outpatient schedule.    HTN: Blood pressure stable.    Hyponatremia: Sodium levels low.  Restrict free water intake.  Adjust with HD.    Metabolic/respiratory alkalosis: Initially bicarb elevated with low pCO2..  Improved with lower bicarb dialysate.  Adjust with HD.    Anemia: Hemoglobin below goal.  No ADRIEN with liver mass.  Transfuse as needed for Hgb <7.    Volume: Stable.  UF as tolerated with HD.    Hyperphosphatemia: Phosphorus stable 3.6.  Patient on binders.  Poor p.o. intake.  Monitor for now.    Bone mineralization: Phosphorus stable.  Calcium stable.  Patient is not on any bone modulating medications.  Monitor for now.    RSV: Respiratory status appears stable.  Getting supportive care only for now.    Mental status changes: Weaning possible contributing agents.  Continue to monitor.      Bob Mae MD  01/29/24  09:25 EST

## 2024-01-29 NOTE — PLAN OF CARE
Problem: Device-Related Complication Risk (Hemodialysis)  Goal: Safe, Effective Therapy Delivery  Outcome: Ongoing, Progressing  Intervention: Optimize Device Care and Function  Recent Flowsheet Documentation  Taken 1/29/2024 0805 by Bob Lucas RN  Medication Review/Management:   medications reviewed   high-risk medications identified     Problem: Hemodynamic Instability (Hemodialysis)  Goal: Effective Tissue Perfusion  Outcome: Ongoing, Progressing     Problem: Skin Injury Risk Increased  Goal: Skin Health and Integrity  Outcome: Ongoing, Progressing   Goal Outcome Evaluation:               HD completed. UF goal reached. Tolerated well. Brief episode of low BP but short amount of min UF resolved. Blood returned. Report to DONNY Gusman

## 2024-01-29 NOTE — PLAN OF CARE
Goal Outcome Evaluation:         Improving    Pt slept overnight. Pt stated that she has pain 4/10. Medication given per orders. Pt resting in bed, call light within reach and bed alarm on.       Problem: Fall Injury Risk  Goal: Absence of Fall and Fall-Related Injury  Outcome: Ongoing, Progressing  Intervention: Identify and Manage Contributors  Recent Flowsheet Documentation  Taken 1/29/2024 0000 by Darlene Posey RN  Medication Review/Management: medications reviewed  Taken 1/28/2024 2200 by Darlene Posey RN  Medication Review/Management: medications reviewed  Taken 1/28/2024 2000 by Darlene Posey RN  Medication Review/Management: medications reviewed  Intervention: Promote Injury-Free Environment  Recent Flowsheet Documentation  Taken 1/29/2024 0000 by Darlene Posey RN  Safety Promotion/Fall Prevention:   safety round/check completed   room organization consistent   nonskid shoes/slippers when out of bed   fall prevention program maintained  Taken 1/28/2024 2200 by Darlene Posey RN  Safety Promotion/Fall Prevention:   safety round/check completed   room organization consistent   nonskid shoes/slippers when out of bed   fall prevention program maintained  Taken 1/28/2024 2000 by Darlene Posey RN  Safety Promotion/Fall Prevention:   safety round/check completed   room organization consistent   nonskid shoes/slippers when out of bed   fall prevention program maintained     Problem: Infection (Hemodialysis)  Goal: Absence of Infection Signs and Symptoms  Outcome: Met

## 2024-01-29 NOTE — PLAN OF CARE
Goal Outcome Evaluation:  Plan of Care Reviewed With: patient       Problem: Palliative Care  Goal: Enhanced Quality of Life  Intervention: Promote Advance Care Planning  1/29/2024 1423 by Alyssa Hendricks RN  Flowsheets  Taken 1/29/2024 1423  Life Transition/Adjustment:   palliative care discussed   palliative care initiated  Taken 1/29/2024 1418  Life Transition/Adjustment:   palliative care initiated   palliative care discussed  1/29/2024 1215 by Alyssa Hendricks RN  Flowsheets (Taken 1/29/2024 1215)  Life Transition/Adjustment:   palliative care initiated   palliative care discussed  Goal: Enhanced Quality of Life  Intervention: Promote Advance Care Planning  Flowsheets  Taken 1/29/2024 1423  Life Transition/Adjustment:   palliative care discussed   palliative care initiated  Taken 1/29/2024 1418  Life Transition/Adjustment:   palliative care initiated   palliative care discussed  Taken 1/29/2024 1215  Life Transition/Adjustment:   palliative care initiated   palliative care discussed  Goal: Enhanced Quality of Life  Intervention: Promote Advance Care Planning  Recent Flowsheet Documentation  Taken 1/29/2024 1423 by Alyssa Hendricks RN  Life Transition/Adjustment:   palliative care discussed   palliative care initiated  Taken 1/29/2024 1418 by Alyssa Hendricks RN  Life Transition/Adjustment:   palliative care initiated   palliative care discussed     Problem: Adult Inpatient Plan of Care  Goal: Plan of Care Review  1/29/2024 1423 by Alyssa Hendricks RN  Flowsheets (Taken 1/29/2024 1418)  Progress: no change  Plan of Care Reviewed With: patient  1/29/2024 1218 by Alyssa Hendricks RN  Flowsheets (Taken 1/29/2024 1216)  Progress: no change

## 2024-01-29 NOTE — PLAN OF CARE
"Goal Outcome Evaluation:  Plan of Care Reviewed With: patient        Progress: no change  Outcome Evaluation: New palliative consult for assistance with GOC per MAYELIN Samuel.  ACP on file names ex- Parviz Keita as HCS.  Pt. known to palliative service from previous admission.  MAYELIN Quiñonez saw pt for initial onsult on 1/27 and clarified code status as No CPR limited support.  She spoke with Parviz who stated the plan is for pt to return to [place] and continue with HD.  Today during palliative nursing visit, pt endorsed low abdominal pain that she stated is \"very bad\".  RN was re-establishing IV access then planning to administer zofran for nausea.  Pt. Stated she feels a little short of breath on RA.  Pt. Had HD earlier this morning.  It is documented that pt is consuming about half of her meals.  LBM 1/28.  MAYELIN Quiñonez saw pt today and spoke with family to update them on patient's condition and further GOC conversation.  Palliative care to continue to follow along for support, POC and ongoing GOC.                             "

## 2024-01-29 NOTE — PROGRESS NOTES
"Palliative Care Daily Progress Note     C/C: Patient complaining of abdominal pain and dizziness.     S: Medical record reviewed. Follow up visit for GOC in the context of complex medical decision making. Events noted. Patient oriented to self only, does not know where she is, year, or why she is at the hospital. States LBM several days ago, checking with RN, patient had multiple BM's yesterday. RN reports patient with new complaint of pain to legs overnight, took Oxycodone 2.5mg PO x1 dose last night. Received Tramadol 50mg PO x1 dose last night as well.     ROS: +pain, abdominal, intermittent. +nausea, increased since eating lunch. Denies vomiting. ROS limited by AMS.     O: Code Status:   Code Status and Medical Interventions:   Ordered at: 01/27/24 1058     Medical Intervention Limits:    NO intubation (DNI)     Level Of Support Discussed With:    Health Care Surrogate     Code Status (Patient has no pulse and is not breathing):    No CPR (Do Not Attempt to Resuscitate)     Medical Interventions (Patient has pulse or is breathing):    Limited Support      Advanced Directives: Advance Directive Status: Patient has advance directive, copy in chart   Goals of Care: Ongoing.   Palliative Performance Scale Score:   30%    /63   Pulse 86   Temp 97.3 °F (36.3 °C) (Oral)   Resp 16   Ht 172.7 cm (68\")   Wt 78.7 kg (173 lb 8 oz)   SpO2 94%   BMI 26.38 kg/m²     Intake/Output Summary (Last 24 hours) at 1/29/2024 1354  Last data filed at 1/29/2024 1130  Gross per 24 hour   Intake --   Output 2080 ml   Net -2080 ml       PE:  General Appearance:    Patient laying in bed, awake, alert, A/C ill appearing,  cooperative, NAD   HEENT:    NC/AT, EOMI, anicteric, MMM, face relaxed   Neck:   supple, trachea midline, no JVD   Lungs:     CTA bilat, diminished in bases; respirations regular, even and unlabored; RR 16-18 on exam, on RA    Heart:    RRR, normal S1 and S2, no M/R/G, HR 86 on monitor   Abdomen:     Normal " bowel sounds, soft, nontender, distended   G/U:   Deferred   MSK/Extremities:   no edema, Left BKA,    Pulses:   Pulses palpable and equal bilaterally   Skin:   Warm, dry   Neurologic:   A/Ox1, cooperative,    Psych:   Calm, appropriate     Meds: Reviewed and changes noted    Labs:   Results from last 7 days   Lab Units 01/29/24  0643   WBC 10*3/mm3 6.15   HEMOGLOBIN g/dL 8.6*   HEMATOCRIT % 24.8*   PLATELETS 10*3/mm3 178     Results from last 7 days   Lab Units 01/29/24  0643   SODIUM mmol/L 133*   POTASSIUM mmol/L 4.9   CHLORIDE mmol/L 96*   CO2 mmol/L 23.0   BUN mg/dL 59*   CREATININE mg/dL 6.23*   GLUCOSE mg/dL 131*   CALCIUM mg/dL 8.6     Results from last 7 days   Lab Units 01/29/24  0643 01/28/24  0527 01/27/24  0515   SODIUM mmol/L 133*   < > 134*   POTASSIUM mmol/L 4.9   < > 4.9   CHLORIDE mmol/L 96*   < > 96*   CO2 mmol/L 23.0   < > 23.0   BUN mg/dL 59*   < > 23   CREATININE mg/dL 6.23*   < > 3.80*   CALCIUM mg/dL 8.6   < > 8.8   BILIRUBIN mg/dL  --   --  1.0   ALK PHOS U/L  --   --  100   ALT (SGPT) U/L  --   --  38*   AST (SGOT) U/L  --   --  23   GLUCOSE mg/dL 131*   < > 228*    < > = values in this interval not displayed.     Imaging Results (Last 72 Hours)       ** No results found for the last 72 hours. **              Lab 01/27/24  0515   HEMOGLOBIN A1C 6.30*         Diagnostics: Reviewed    A:   Acute encephalopathy    Hepatocellular carcinoma metastatic to bone s/p RXT     Cirrhosis of liver    Chronic Hep C     ESRD (end stage renal disease)    S/P left BKA    GERD (gastroesophageal reflux disease)    Essential hypertension    Type 2 diabetes mellitus    RSV (respiratory syncytial virus infection)     63 y.o. female with ESRD, T2DM, HCC, RSV.   S/S:   Pain -chronic abdominal pain, right hip MSK/bone lesion  -continue Tylenol 650mg PO q 4 hours prn mild pain  -increased Tramadol 50mg to q 8 hours prn moderate pain (consider change to oxycodone 5mg PO q 4 hours prn moderate pain if Tramadol not  effective)  -one time dose Hydromorphone 0.25mg IV x1 now     2. AMS -at baseline oriented to self, confirmed with family  -continue Lactulose/Rifaxin     3. Debility -wheelchair bound at baseline  -PT/OT following     4. GOC -DNR/DNI -confirmed with MIKEY Jj  -called and spoke to Glendy Keita (sister-in-law) who assists in patient's care at [facility], patient oriented to self only at baseline    P: Follow up visit for symptom management. Patient reports abdominal pain, nausea, and feeling overall poorly. She has eaten part of her lunch tray after returning from dialysis. Patient at baseline mental status, oriented x1 per discussion with family. Called and spoke to Glendy Almonteelialka (sister-in-law) with permission from patient and POA. Glendy provides additional care to patient at [facility]. Discussed patient's current condition and reviewed patient's functional status at baseline. Glendy has noticed patient with increasing incontinence in the last month but no other significant changes. Will continue to follow for GOC discussion and symptom management. All questions and concerns addressed.   Palliative Care Team will continue to follow patient. Please do not hesitate to contact us regarding further sx mgmt or GOC needs.  Kelsy Pena, APRN  1/29/2024  Time spent: 35 minutes

## 2024-01-29 NOTE — CASE MANAGEMENT/SOCIAL WORK
Discharge Planning Assessment  [facility]     Patient Name: Jeaneth Keita  MRN: [id number]  Today's Date: 1/29/2024    Admit Date: 1/26/2024    Plan: ONGOING   Discharge Needs Assessment    No documentation.                  Discharge Plan       Row Name 01/29/24 1445       Plan    Plan ONGOING    Plan Comments Pt confused but able to inform CM that she lives at [facility].  CM left a VM for POA, Parviz, for DCP.  Awaiting callback.  In the interim, CM spoke with Lovely at [facility] and confirmed pt is in PCU there.  Per Lovely, pt is able to transfer herself or requires only min assistance at baseline.  Pt is current with [facility] (SN, PT, OT;  Dx: J96.21).  Lovely also advised that pt dialyzes on MWF at [facility] ([address]) and uses [facility] to transport.  Pt must be independent or require no more than one person assist to return to [facility].  CM will complete further DCP once POA is available to assist.    Final Discharge Disposition Code 30 - still a patient                  Continued Care and Services - Admitted Since 1/26/2024       Dialysis/Infusion       Service Provider Request Status Selected Services Address Phone Fax Patient Preferred    [facility]  Selected In-Center Hemodialysis [address], [place] [id number] [phone] --                  Selected Continued Care - Prior Encounters Includes continued care and service providers with selected services from prior encounters from 10/28/2023 to 1/29/2024      Discharged on 12/21/2023 Admission date: 12/18/2023 - Discharge disposition: Home or Self Care      Home Medical Care       Service Provider Selected Services Address Phone Fax Patient Preferred    [facility] ,  [facility] [facility], [address], [facility] [address] [phone] --       Internal Comment last updated by Fernanda Padron, RN 12/21/2023 1427    Accepted per April at  [phone] @1422                                     Expected Discharge Date and Time       Expected Discharge Date Expected Discharge Time    Jan 30, 2024            Demographic Summary    No documentation.                  Functional Status    No documentation.                  Psychosocial    No documentation.                  Abuse/Neglect    No documentation.                  Legal    No documentation.                  Substance Abuse    No documentation.                  Patient Forms    No documentation.                     Marie Roach RN

## 2024-01-29 NOTE — PROGRESS NOTES
[facility] Medicine Services  PROGRESS NOTE    Patient Name: Jeaneth Keita  : 1960  MRN: [id number]    Date of Admission: 2024  Primary Care Physician: Lulu Amos MD    Subjective     CC: f/u AMS     HPI:  Sitting up in bed during HD. Feeling okay today, notes some mild/chronic abdominal pain. Denies chest pain, dyspnea, nausea or vomiting. Oriented to self. Knows she is in a hospital in [place], [place] but not sure which. Knows it's cold outside but cannot tell me month/year, president or reason for her hospitalization.     Objective     Vital Signs:   Temp:  [96.5 °F (35.8 °C)-98.5 °F (36.9 °C)] 97.8 °F (36.6 °C)  Heart Rate:  [76-88] 76  Resp:  [16-23] 18  BP: (102-137)/(53-92) 128/67     Physical Exam:  Constitutional: No acute distress, awake, alert and conversational. Chronically ill appearing   HENT: NCAT, mucous membranes moist  Respiratory: Clear to auscultation bilaterally, respiratory effort normal on room air   Cardiovascular: RRR, no murmurs, rubs, or gallops  Gastrointestinal: Positive bowel sounds, soft, nontender, nondistended  Musculoskeletal: No R ankle edema, L AKA stump intact   Psychiatric: Appropriate affect, cooperative  Neurologic: Oriented x 2, moves all extremities spontaneously without focal deficits, speech clear  Skin: No rashes to exposed surfces     Results Reviewed:  LAB RESULTS:      Lab 24  0643 24  0515 24  1212 24  0127   WBC 6.15 5.10 4.36 5.30   HEMOGLOBIN 8.6* 8.9* 9.5* 8.4*   HEMATOCRIT 24.8* 26.5* 27.5* 24.9*   PLATELETS 178 171 167 160   NEUTROS ABS 3.61 3.24 2.96 3.40   IMMATURE GRANS (ABS) 0.03 0.01 0.01 0.01   LYMPHS ABS 1.65 1.21 0.83 1.13   MONOS ABS 0.44 0.46 0.38 0.47   EOS ABS 0.38 0.13 0.16 0.26   MCV 96.5 99.3* 97.5* 98.0*   LACTATE  --   --  1.1 1.8         Lab 24  0643 24  0527 24  0515 24  1212 24  0127   SODIUM 133* 130* 134* 134* 134*   POTASSIUM 4.9 4.6  4.9 4.9 4.9   CHLORIDE 96* 94* 96* 93* 95*   CO2 23.0 22.0 23.0 30.0* 31.0*   ANION GAP 14.0 14.0 15.0 11.0 8.0   BUN 59* 42* 23 40* 32*   CREATININE 6.23* 5.19* 3.80* 5.46* 4.61*   EGFR 7.0* 8.7* 12.6* 8.2* 10.0*   GLUCOSE 131* 270* 228* 350* 339*   CALCIUM 8.6 8.7 8.8 9.1 8.4*   MAGNESIUM  --   --  1.9  --   --    PHOSPHORUS  --   --  3.6  --   --    HEMOGLOBIN A1C  --   --  6.30*  --   --    TSH  --   --  1.930  --   --          Lab 01/27/24  0515 01/26/24  1212 01/24/24  0127   TOTAL PROTEIN 6.3 6.8 6.3   ALBUMIN 3.7 3.7 3.6   GLOBULIN 2.6 3.1 2.7   ALT (SGPT) 38* 39* 38*   AST (SGOT) 23 23 23   BILIRUBIN 1.0 0.7 0.6   ALK PHOS 100 145* 137*   LIPASE  --   --  53         Lab 01/26/24  1420 01/26/24  1212 01/24/24  0127   HSTROP T 39* 40* 33*         Lab 01/27/24  0515   IRON 119   IRON SATURATION (TSAT) 56*   TIBC 212*   TRANSFERRIN 142*   FERRITIN 1,393.00*   FOLATE 8.78   VITAMIN B 12 326         Lab 01/26/24  1253   PH, ARTERIAL 7.552*   PCO2, ARTERIAL 34.0*   PO2 .0*   FIO2 21   HCO3 ART 29.9*   BASE EXCESS ART 7.2*   CARBOXYHEMOGLOBIN 1.4     Brief Urine Lab Results  (Last result in the past 365 days)        Color   Clarity   Blood   Leuk Est   Nitrite   Protein   CREAT   Urine HCG        01/26/24 1233 Yellow   Clear   Negative   Trace   Negative   >=300 mg/dL (3+)                 Microbiology Results Abnormal       Procedure Component Value - Date/Time    Urine Culture - Urine, Straight Cath [[id number]]  (Normal) Collected: 01/26/24 1233    Lab Status: Final result Specimen: Urine from Straight Cath Updated: 01/27/24 1502     Urine Culture No growth          No radiology results from the last 24 hrs    Results for orders placed during the hospital encounter of 12/18/23    Adult Transthoracic Echo Complete W/ Cont if Necessary Per Protocol    Interpretation Summary    Left ventricular systolic function is normal. Calculated left ventricular EF of 63%    Left ventricular diastolic function was  normal.    Normal right ventricular size and systolic function    No significant valvular abnormality    No pericardial effusion    Compared to prior echocardiogram from 10/6/2021, there is no significant change    Current medications:  Scheduled Meds:atorvastatin, 80 mg, Oral, Nightly  calcium acetate, 667 mg, Oral, TID With Meals  carvedilol, 25 mg, Oral, BID With Meals  dextromethorphan polistirex ER, 60 mg, Oral, Q12H  guaiFENesin, 1,200 mg, Oral, Q12H  insulin lispro, 2-9 Units, Subcutaneous, 4x Daily AC & at Bedtime  lactulose, 30 g, Oral, BID  pantoprazole, 40 mg, Oral, Daily  QUEtiapine, 25 mg, Oral, Nightly  riFAXIMin, 550 mg, Oral, BID  senna-docusate sodium, 2 tablet, Oral, BID  sodium chloride, 10 mL, Intravenous, Q12H  venlafaxine XR, 37.5 mg, Oral, Daily      Continuous Infusions:   PRN Meds:.  acetaminophen **OR** acetaminophen **OR** acetaminophen    benzonatate    senna-docusate sodium **AND** polyethylene glycol **AND** bisacodyl **AND** bisacodyl    Calcium Replacement - Follow Nurse / BPA Driven Protocol    dextrose    dextrose    glucagon (human recombinant)    ipratropium-albuterol    Magnesium Low Dose Replacement - Follow Nurse / BPA Driven Protocol    nitroglycerin    ondansetron ODT **OR** ondansetron    Phosphorus Replacement - Follow Nurse / BPA Driven Protocol    Potassium Replacement - Follow Nurse / BPA Driven Protocol    [COMPLETED] Insert Peripheral IV **AND** sodium chloride    sodium chloride    sodium chloride    traMADol    Assessment & Plan     Active Hospital Problems    Diagnosis  POA    **Acute encephalopathy [G93.40]  Yes    RSV (respiratory syncytial virus infection) [B33.8]  Yes    Cirrhosis of liver [K74.60]  Yes    Hepatocellular carcinoma metastatic to bone s/p RXT  [C79.51, C22.0]  Yes    Chronic Hep C  [B18.2]  Yes    ESRD (end stage renal disease) [N18.6]  Yes    GERD (gastroesophageal reflux disease) [K21.9]  Yes    S/P left BKA [Z89.512]  Not Applicable     Essential hypertension [I10]  Yes    Type 2 diabetes mellitus [E11.9]  Yes      Resolved Hospital Problems   No resolved problems to display.     Brief Hospital Course to date:  Jeaneth Keita is a 63 y.o. female with PMH significant for HTN, HLD, DMII, ESRD on HD, liver cirrhosis, hepatocellular carcinoma with lung/bone metastasis, chronic hepatitis C and prior L BKA. Multiple hospital admissions for AMS over the preceding 2 years. Last admitted to [facility] 12/18-12/21/23 for chest pain and AMS. Chest pain work up was unremarkable. She was started on Lactulose / Rifaxamin for hepatic encephalopathy with improvement in mental status. She was brought to [facility] ED from her HD clinic on 1/26/24 for altered mental status.     Acute metabolic encephalopathy  - CXR, CT head and and CT abdomen/pelvis with no acute findings  - Ammonia level 20 at time of presentation  - UA unremarkable   - Cause of encephalopathy not clear. Tapering Effexor dose - has been on this medication since at least 2/2023, however so unclear how much it is contributing     Acute RSV infection   - (+) RSV on admission. Asymptomatic. Unclear if contributing to above issues  - Droplet precautions     ESRD on HD  Anemia of chronic disease   - Nephrology following. Continue MWF HD   - Continue phos binders   - Hgb stable     Liver cirrhosis  Chronic hepatitis C  Hepatocellular carcinoma with lung/bone metastasis  - Ammonia 20 on admission. Continue Lactulose / Rifaxamin     DMII  - Hgb A1c 6.3%  - Good control on SSI - continue     Hypertension, continue Carvedilol. Home Amlodipine on hold   Hyperlipidemia, continue statin.    Mood disorder, tapering Effexor due to concerns it may be contributing to encephalopathy. Continue Seroquel 25mg QHS     Expected Discharge Location and Transportation: TBD  Expected Discharge Expected Discharge Date: 1/29/2024; Expected Discharge Time:      DVT prophylaxis: Mechanical DVT prophylaxis orders are  present.    AM-PAC 6 Clicks Score (PT): 11 (01/28/24 2000)    CODE STATUS:   Code Status and Medical Interventions:   Ordered at: 01/27/24 1058     Medical Intervention Limits:    NO intubation (DNI)     Level Of Support Discussed With:    Health Care Surrogate     Code Status (Patient has no pulse and is not breathing):    No CPR (Do Not Attempt to Resuscitate)     Medical Interventions (Patient has pulse or is breathing):    Limited Support     Elisha Kim PA-C  01/29/24

## 2024-01-30 LAB
GLUCOSE BLDC GLUCOMTR-MCNC: 109 MG/DL (ref 70–130)
GLUCOSE BLDC GLUCOMTR-MCNC: 150 MG/DL (ref 70–130)
GLUCOSE BLDC GLUCOMTR-MCNC: 197 MG/DL (ref 70–130)
GLUCOSE BLDC GLUCOMTR-MCNC: 303 MG/DL (ref 70–130)

## 2024-01-30 PROCEDURE — 63710000001 ONDANSETRON ODT 4 MG TABLET DISPERSIBLE: Performed by: NURSE PRACTITIONER

## 2024-01-30 PROCEDURE — 63710000001 INSULIN LISPRO (HUMAN) PER 5 UNITS: Performed by: NURSE PRACTITIONER

## 2024-01-30 PROCEDURE — 94640 AIRWAY INHALATION TREATMENT: CPT

## 2024-01-30 PROCEDURE — 82948 REAGENT STRIP/BLOOD GLUCOSE: CPT

## 2024-01-30 PROCEDURE — 97110 THERAPEUTIC EXERCISES: CPT

## 2024-01-30 PROCEDURE — 92610 EVALUATE SWALLOWING FUNCTION: CPT

## 2024-01-30 PROCEDURE — 99232 SBSQ HOSP IP/OBS MODERATE 35: CPT | Performed by: PHYSICIAN ASSISTANT

## 2024-01-30 PROCEDURE — 97530 THERAPEUTIC ACTIVITIES: CPT

## 2024-01-30 RX ORDER — OXYCODONE HYDROCHLORIDE 5 MG/1
5 TABLET ORAL EVERY 8 HOURS PRN
Status: DISCONTINUED | OUTPATIENT
Start: 2024-01-30 | End: 2024-01-31 | Stop reason: HOSPADM

## 2024-01-30 RX ORDER — BISACODYL 10 MG
10 SUPPOSITORY, RECTAL RECTAL DAILY PRN
Status: DISCONTINUED | OUTPATIENT
Start: 2024-01-30 | End: 2024-01-31 | Stop reason: HOSPADM

## 2024-01-30 RX ORDER — POLYETHYLENE GLYCOL 3350 17 G/17G
17 POWDER, FOR SOLUTION ORAL DAILY PRN
Status: DISCONTINUED | OUTPATIENT
Start: 2024-01-30 | End: 2024-01-31 | Stop reason: HOSPADM

## 2024-01-30 RX ORDER — AMOXICILLIN 250 MG
2 CAPSULE ORAL 2 TIMES DAILY PRN
Status: DISCONTINUED | OUTPATIENT
Start: 2024-01-30 | End: 2024-01-31 | Stop reason: HOSPADM

## 2024-01-30 RX ORDER — BISACODYL 5 MG/1
5 TABLET, DELAYED RELEASE ORAL DAILY PRN
Status: DISCONTINUED | OUTPATIENT
Start: 2024-01-30 | End: 2024-01-31 | Stop reason: HOSPADM

## 2024-01-30 RX ORDER — OXYCODONE HYDROCHLORIDE 5 MG/1
5 TABLET ORAL EVERY 12 HOURS
Status: DISCONTINUED | OUTPATIENT
Start: 2024-01-30 | End: 2024-01-31 | Stop reason: HOSPADM

## 2024-01-30 RX ADMIN — GUAIFENESIN 600 MG: 600 TABLET, EXTENDED RELEASE ORAL at 20:00

## 2024-01-30 RX ADMIN — VENLAFAXINE HYDROCHLORIDE 37.5 MG: 37.5 CAPSULE, EXTENDED RELEASE ORAL at 08:48

## 2024-01-30 RX ADMIN — INSULIN LISPRO 7 UNITS: 100 INJECTION, SOLUTION INTRAVENOUS; SUBCUTANEOUS at 17:57

## 2024-01-30 RX ADMIN — LACTULOSE 30 G: 20 SOLUTION ORAL at 08:48

## 2024-01-30 RX ADMIN — DEXTROMETHORPHAN 30 MG: 30 SUSPENSION, EXTENDED RELEASE ORAL at 20:03

## 2024-01-30 RX ADMIN — IPRATROPIUM BROMIDE AND ALBUTEROL SULFATE 3 ML: .5; 3 SOLUTION RESPIRATORY (INHALATION) at 04:31

## 2024-01-30 RX ADMIN — INSULIN LISPRO 2 UNITS: 100 INJECTION, SOLUTION INTRAVENOUS; SUBCUTANEOUS at 08:47

## 2024-01-30 RX ADMIN — CALCIUM ACETATE 667 MG: 667 CAPSULE ORAL at 17:57

## 2024-01-30 RX ADMIN — CALCIUM ACETATE 667 MG: 667 CAPSULE ORAL at 08:48

## 2024-01-30 RX ADMIN — Medication 1 TABLET: at 08:48

## 2024-01-30 RX ADMIN — ATORVASTATIN CALCIUM 80 MG: 40 TABLET, FILM COATED ORAL at 20:00

## 2024-01-30 RX ADMIN — ONDANSETRON 4 MG: 4 TABLET, ORALLY DISINTEGRATING ORAL at 10:52

## 2024-01-30 RX ADMIN — CALCIUM ACETATE 667 MG: 667 CAPSULE ORAL at 12:05

## 2024-01-30 RX ADMIN — TRAMADOL HYDROCHLORIDE 50 MG: 50 TABLET ORAL at 08:49

## 2024-01-30 RX ADMIN — PANTOPRAZOLE SODIUM 40 MG: 40 TABLET, DELAYED RELEASE ORAL at 04:38

## 2024-01-30 RX ADMIN — INSULIN LISPRO 2 UNITS: 100 INJECTION, SOLUTION INTRAVENOUS; SUBCUTANEOUS at 12:05

## 2024-01-30 RX ADMIN — RIFAXIMIN 550 MG: 550 TABLET ORAL at 08:49

## 2024-01-30 RX ADMIN — CARVEDILOL 25 MG: 6.25 TABLET, FILM COATED ORAL at 08:48

## 2024-01-30 RX ADMIN — LACTULOSE 30 G: 20 SOLUTION ORAL at 20:00

## 2024-01-30 RX ADMIN — Medication 10 ML: at 20:01

## 2024-01-30 RX ADMIN — QUETIAPINE FUMARATE 25 MG: 25 TABLET ORAL at 20:00

## 2024-01-30 RX ADMIN — CARVEDILOL 25 MG: 6.25 TABLET, FILM COATED ORAL at 17:57

## 2024-01-30 RX ADMIN — RIFAXIMIN 550 MG: 550 TABLET ORAL at 20:00

## 2024-01-30 RX ADMIN — OXYCODONE HYDROCHLORIDE 5 MG: 5 TABLET ORAL at 20:00

## 2024-01-30 NOTE — PLAN OF CARE
Goal Outcome Evaluation:  Plan of Care Reviewed With: patient        Progress: no change  Outcome Evaluation: Pt continued to present below baseline function with decreased independence with transfer Mod A x2 with BUE support with cues for sequencing. Pt gave good effort with BLE therex however limited d/t R hip pain. Pt presented below baseline function warranting IPPT POC. d/c rec SNF.      Anticipated Discharge Disposition (PT): skilled nursing facility

## 2024-01-30 NOTE — CASE MANAGEMENT/SOCIAL WORK
Discharge Planning Assessment  [facility]     Patient Name: Jeaneth Keita  MRN: [id number]  Today's Date: 1/30/2024    Admit Date: 1/26/2024    Plan: [facility]   Discharge Needs Assessment    No documentation.                  Discharge Plan       Row Name 01/30/24 1545       Plan    Plan [facility]    Patient/Family in Agreement with Plan yes    Plan Comments CM spoke with Cherelle at [facility].  She made an on-site visit today and feels pt is at her functional baseline and that pt can return to [facility] (this is POAs preference) on Wed 1/31/24 with [facility].  CM will arrange transportation in am.    Final Discharge Disposition Code 06 - home with home health care                  Continued Care and Services - Admitted Since 1/26/2024       Dialysis/Infusion       Service Provider Request Status Selected Services Address Phone Fax Patient Preferred    [facility]  Selected In-Center Hemodialysis [address] [id number] [phone] --              Home Medical Care       Service Provider Request Status Selected Services Address Phone Fax Patient Preferred    [facility]  Selected Home Nursing ,  Home Rehabilitation [address] [place], [place] [address], [address] [phone] --                  Selected Continued Care - Prior Encounters Includes continued care and service providers with selected services from prior encounters from 10/28/2023 to 1/30/2024      Discharged on 12/21/2023 Admission date: 12/18/2023 - Discharge disposition: Home or Self Care      Home Medical Care       Service Provider Selected Services Address Phone Fax Patient Preferred    [facility] Home Nursing ,  Home Rehabilitation [address] [place], [place] [address], [address] [phone] --       Internal Comment last updated by Fernanda Padron RN 12/21/2023 1427    Accepted per April at [phone] @1422                                     Expected Discharge  Date and Time       Expected Discharge Date Expected Discharge Time    Jan 31, 2024            Demographic Summary    No documentation.                  Functional Status    No documentation.                  Psychosocial    No documentation.                  Abuse/Neglect    No documentation.                  Legal    No documentation.                  Substance Abuse    No documentation.                  Patient Forms    No documentation.                     Marie Roach RN

## 2024-01-30 NOTE — PLAN OF CARE
Goal Outcome Evaluation:  Plan of Care Reviewed With: (P) patient                  Anticipated Discharge Disposition (SLP): (P) unknown          SLP Swallowing Diagnosis: (P) swallow WFL/no suspected pharyngeal impairment (01/30/24 0725)

## 2024-01-30 NOTE — DISCHARGE PLACEMENT REQUEST
"LORENA LIRA, RN    P:  [phone]  F:  [phone]                    Jeaneth Tatum (63 y.o. Female)       Date of Birth   1960    Social Security Number       Address   [address] [place] [address]    Home Phone   [phone]    MRN   [id number]       [employer]    Marital Status                               Admission Date   1/26/24    Admission Type   Emergency    Admitting Provider   Johnny Boyd DO    Attending Provider   Johnny Boyd DO    Department, Room/Bed   [facility] 4G, S449/1       Discharge Date       Discharge Disposition       Discharge Destination                                 Attending Provider: Johnny Boyd DO    Allergies: Sulfa Antibiotics    Isolation: Contact   Infection: RSV (01/26/24)   Code Status: No CPR    Ht: 172.7 cm (68\")   Wt: 78.7 kg (173 lb 8 oz)    Admission Cmt: None   Principal Problem: Acute encephalopathy [G93.40]                   Active Insurance as of 1/26/2024       Primary Coverage       Payor Plan Insurance Group Employer/Plan Group    MEDICARE MEDICARE A & B        Payor Plan Address Payor Plan Phone Number Payor Plan Fax Number Effective Dates    [address] [phone]  11/1/2023 - None Entered    Formerly [facility] [id number]         Subscriber Name Subscriber Birth Date Member ID       JEANETH TATUM 1960 [id number]               Secondary Coverage       Payor Plan Insurance Group Employer/Plan Group    [id number]       Payor Plan Address Payor Plan Phone Number Payor Plan Fax Number Effective Dates    [address]   1/1/2005 - None Entered    [facility] [id number]         Subscriber Name Subscriber Birth Date Member ID       JEANETH TATUM 1960 [id number]                     Emergency Contacts        (Rel.) Home Phone Work Phone Mobile Phone    DEEPTI (MIKEY)CHLOÉ (Other) [phone] -- [phone]    HAILEY ROJO " "(Brother) [phone] -- [phone]    NORMA TREVIÑO (Sister) [phone] -- [phone]                                 Physician Progress Notes (most recent note)        Bob Mae MD at 01/30/24 1047             LOS: 2 days    Patient Care Team:  Lulu Amos MD as PCP - General (Hospice and Palliative Medicine)    Subjective     Stable overnight.    Objective     Vital Signs:  Blood pressure 111/65, pulse 92, temperature 98.1 °F (36.7 °C), temperature source Oral, resp. rate 18, height 172.7 cm (68\"), weight 78.7 kg (173 lb 8 oz), SpO2 94%.      Intake/Output Summary (Last 24 hours) at 1/30/2024 1047  Last data filed at 1/29/2024 1130  Gross per 24 hour   Intake --   Output 1980 ml   Net -1980 ml        01/29 0701 - 01/30 0700  In: -   Out: 1980     Physical Exam:        GENERAL: WD WF NAD  NEURO: Sleeping but awakens  PSYCHIATRIC: Drowsy, cooperative with PE  CV: No edema  LUNGS:  Quiet,  Nonlabored resp.    ABDOMEN: Nondistended  : No Sanchez  SKIN: Warm and dry without rash  L UE AVF    Labs:  Results from last 7 days   Lab Units 01/29/24  0643 01/27/24  0515 01/26/24  1212   WBC 10*3/mm3 6.15 5.10 4.36   HEMOGLOBIN g/dL 8.6* 8.9* 9.5*   PLATELETS 10*3/mm3 178 171 167     Results from last 7 days   Lab Units 01/29/24  0643 01/28/24  0527 01/27/24  0515 01/26/24  1212 01/24/24  0127   SODIUM mmol/L 133* 130* 134* 134* 134*   POTASSIUM mmol/L 4.9 4.6 4.9 4.9 4.9   CHLORIDE mmol/L 96* 94* 96* 93* 95*   CO2 mmol/L 23.0 22.0 23.0 30.0* 31.0*   BUN mg/dL 59* 42* 23 40* 32*   CREATININE mg/dL 6.23* 5.19* 3.80* 5.46* 4.61*   CALCIUM mg/dL 8.6 8.7 8.8 9.1 8.4*   PHOSPHORUS mg/dL  --   --  3.6  --   --    MAGNESIUM mg/dL  --   --  1.9  --   --    ALBUMIN g/dL  --   --  3.7 3.7 3.6     Results from last 7 days   Lab Units 01/27/24  0515   ALK PHOS U/L 100   BILIRUBIN mg/dL 1.0   ALT (SGPT) U/L 38*   AST (SGOT) U/L 23     Results from last 7 days   Lab Units 01/26/24  1253   PH, ARTERIAL pH " units 7.552*   PO2 ART mm Hg 142.0*   PCO2, ARTERIAL mm Hg 34.0*   HCO3 ART mmol/L 29.9*               Estimated Creatinine Clearance: 9.9 mL/min (A) (by C-G formula based on SCr of 6.23 mg/dL (H)).        A/P:    ESRD: On HD [facility] with NAL..  Continue current outpatient schedule.    HTN: Blood pressure stable.    Hyponatremia: Sodium levels low.  Restrict free water intake.  Adjust with HD.    Metabolic/respiratory alkalosis: Initially bicarb elevated with low pCO2..  Improved with lower bicarb dialysate.  Adjust with HD.    Anemia: Hemoglobin below goal.  No ADRIEN with liver mass.  Transfuse as needed for Hgb <7.    Volume: Stable.  UF as tolerated with HD.    Hyperphosphatemia: Phosphorus stable 3.6.  Patient on binders.  Poor p.o. intake.  Monitor for now.    Bone mineralization: Phosphorus stable.  Calcium stable.  Patient is not on any bone modulating medications.  Monitor for now.    RSV: Respiratory status appears stable.  Getting supportive care only for now.    Mental status changes: Improved      Bob Mae MD  24  10:47 EST          Electronically signed by Bob Mae MD at 24 1049          Physical Therapy Notes (most recent note)        Yamileth Hernandez, PT at 24 1050  Version 1 of 1         Patient Name: Jeaneth Keita  : 1960    MRN: [id number]                              Today's Date: 2024       Admit Date: 2024    Visit Dx:     ICD-10-CM ICD-9-CM   1. Altered mental status, unspecified altered mental status type  R41.82 780.97   2. RSV bronchitis  J20.5 466.0     079.6   3. Stage 5 chronic kidney disease on chronic dialysis  N18.6 585.6    Z99.2 V45.11     Patient Active Problem List   Diagnosis    ICH (intracerebral hemorrhage)    Hepatocellular carcinoma metastatic to bone s/p RXT     Hemochromatosis    Cirrhosis of liver    Chronic Hep C     ESRD (end stage renal disease)    Chronic ulcer of right foot    S/P left BKA    GERD  (gastroesophageal reflux disease)    Chronic pain on Methadone    Essential hypertension    Type 2 diabetes mellitus    Right lower lobe pneumonia    Colitis    Normocytic anemia    Hypokalemia    CVA (cerebral vascular accident)    Stroke-like symptoms    History of hypoglycemia    Hypoglycemia    Chest pain    End stage renal disease on dialysis    Disorientation    Altered mental status    End-stage renal disease on hemodialysis    Elevated brain natriuretic peptide (BNP) level    Acute encephalopathy    RSV (respiratory syncytial virus infection)     Past Medical History:   Diagnosis Date    Anxiety     Cancer     liver cell carcinoma    DDD (degenerative disc disease), lumbar     Depression     Diabetes mellitus     Fibromyalgia     Hemochromatosis     Hep B w/o coma, chronic, w/o delta     Hep C w/o coma, chronic     Hypertension     Stroke     Vertigo      Past Surgical History:   Procedure Laterality Date    ARTERIOVENOUS FISTULA/SHUNT SURGERY Left 10/16/2021    Procedure: UPPER EXTREMITY ARTERIOVENOUS FISTULA FORMATION LEFT;  Surgeon: Johnny Rousseau MD;  Location: [facility];  Service: Vascular;  Laterality: Left;    BACK SURGERY      BELOW KNEE LEG AMPUTATION       SECTION      FOOT SURGERY      KNEE SURGERY      ROTATOR CUFF REPAIR      SPINAL CORD STIMULATOR IMPLANT        General Information       Row Name 24 3424          Physical Therapy Time and Intention    Document Type therapy note (daily note)  -     Mode of Treatment physical therapy  -       Row Name 24 5878          General Information    Patient Profile Reviewed yes  -     Existing Precautions/Restrictions fall;other (see comments)  L BKA, confused  -     Barriers to Rehab medically complex;previous functional deficit;cognitive status  -       Row Name 24 0760          Cognition    Orientation Status (Cognition) oriented to;person;disoriented to;place;situation;time  -       Row Name 24 4182           Safety Issues, Functional Mobility    Safety Issues Affecting Function (Mobility) awareness of need for assistance;insight into deficits/self-awareness;safety precaution awareness;safety precautions follow-through/compliance;sequencing abilities  -     Impairments Affecting Function (Mobility) balance;cognition;endurance/activity tolerance;pain;postural/trunk control;strength  -HM     Cognitive Impairments, Mobility Safety/Performance awareness, need for assistance;safety precaution awareness;insight into deficits/self-awareness;safety precaution follow-through;sequencing abilities  -               User Key  (r) = Recorded By, (t) = Taken By, (c) = Cosigned By      Initials Name Provider Type     Yamileth Hernandez, PT Physical Therapist                   Mobility       Row Name 01/30/24 1334          Bed Mobility    Bed Mobility supine-sit  -     Supine-Sit [place] (Bed Mobility) verbal cues;minimum assist (75% patient effort)  -     Assistive Device (Bed Mobility) bed rails;head of bed elevated  -     Comment, (Bed Mobility) Min A for bed mobility to assist to upright trunk, cues for sequencing  -       Row Name 01/30/24 4014          Bed-Chair Transfer    Bed-Chair [place] (Transfers) verbal cues;moderate assist (50% patient effort);2 person assist  -     Assistive Device (Bed-Chair Transfers) other (see comments)  BUE support  -     Comment, (Bed-Chair Transfer) cues for sequencing, transferred towards R side, increased difficulty with pivoting with RLE. Once UI pt stated dizziness with BP stable, RN aware  -       Row Name 01/30/24 9010          Sit-Stand Transfer    Sit-Stand [place] (Transfers) verbal cues;nonverbal cues (demo/gesture);moderate assist (50% patient effort);1 person assist  -     Assistive Device (Sit-Stand Transfers) other (see comments)  BUE support  -     Comment, (Sit-Stand Transfer) cues for sequencing, pt completed partial stand for pericare  in chair with cues to maintain contact with R foot on ground as pt was noted to have decreased WB on forefoot  -               User Key  (r) = Recorded By, (t) = Taken By, (c) = Cosigned By      Initials Name Provider Type     Yamileth Hernandez PT Physical Therapist                   Obj/Interventions       Row Name 01/30/24 1334          Motor Skills    Therapeutic Exercise other (see comments)  BLE LAQ, seated marches (deferred on R d/t pain), SLR on L and quad sets on RLE, hip ab/ad, APs on R and knee flex/ext x 10 reps, intermittent assist on RLE d/t pain  -       Row Name 01/30/24 7352          Balance    Balance Assessment sitting static balance;sitting dynamic balance;sit to stand dynamic balance;standing static balance;standing dynamic balance  -     Static Sitting Balance contact guard  -     Dynamic Sitting Balance minimal assist  -     Position, Sitting Balance sitting edge of bed;unsupported  -     Sit to Stand Dynamic Balance moderate assist;2-person assist  -     Static Standing Balance moderate assist  -     Dynamic Standing Balance moderate assist;2-person assist;verbal cues  -HM     Position/Device Used, Standing Balance supported  -     Balance Interventions standing;dynamic;occupation based/functional task  -               User Key  (r) = Recorded By, (t) = Taken By, (c) = Cosigned By      Initials Name Provider Type     Yamileth Hernandez PT Physical Therapist                   Goals/Plan    No documentation.                  Clinical Impression       Row Name 01/30/24 3413          Pain    Additional Documentation Pain Scale: FACES Pre/Post-Treatment (Group)  -       Row Name 01/30/24 6039          Pain Scale: FACES Pre/Post-Treatment    Pain: FACES Scale, Pretreatment 2-->hurts little bit  -     Posttreatment Pain Rating 2-->hurts little bit  -     Pain Location - Side/Orientation Right  -     Pain Location - hip  -       Row Name 01/30/24 3538          Plan of  Care Review    Plan of Care Reviewed With patient  -     Progress no change  -     Outcome Evaluation Pt continued to present below baseline function with decreased independence with transfer Mod A x2 with BUE support with cues for sequencing. Pt gave good effort with BLE therex however limited d/t R hip pain. Pt presented below baseline function warranting IPPT POC. d/c rec SNF.  -       Row Name 01/30/24 1338          Vital Signs    Post Systolic BP Rehab 135  -HM     Post Treatment Diastolic BP 76  -HM     Pre SpO2 (%) 92  -HM     O2 Delivery Pre Treatment room air  -HM     O2 Delivery Intra Treatment room air  -HM     O2 Delivery Post Treatment room air  -HM     Pre Patient Position Supine  -     Intra Patient Position Standing  -     Post Patient Position Sitting  -       Row Name 01/30/24 1338          Positioning and Restraints    Pre-Treatment Position in bed  -     Post Treatment Position chair  -     In Chair notified nsg;reclined;sitting;call light within reach;encouraged to call for assist;exit alarm on;legs elevated;waffle cushion  -               User Key  (r) = Recorded By, (t) = Taken By, (c) = Cosigned By      Initials Name Provider Type     Yamileth Hernandez, PT Physical Therapist                   Outcome Measures       Row Name 01/30/24 1342 01/30/24 0848       How much help from another person do you currently need...    Turning from your back to your side while in flat bed without using bedrails? 3  - 3  -DF    Moving from lying on back to sitting on the side of a flat bed without bedrails? 3  - 2  -DF    Moving to and from a bed to a chair (including a wheelchair)? 2  - 2  -DF    Standing up from a chair using your arms (e.g., wheelchair, bedside chair)? 2  - 2  -DF    Climbing 3-5 steps with a railing? 1  - 1  -DF    To walk in hospital room? 1  - 1  -DF    AM-PAC 6 Clicks Score (PT) 12  - 11  -DF    Highest Level of Mobility Goal 4 --> Transfer to  chair/commode  - 4 --> Transfer to chair/commode  -      Row Name 01/30/24 1342          Functional Assessment    Outcome Measure Options AM-PAC 6 Clicks Basic Mobility (PT)  -               User Key  (r) = Recorded By, (t) = Taken By, (c) = Cosigned By      Initials Name Provider Type    DF Jackie Naidu, RN Registered Nurse     Yamileth Hernandez, PT Physical Therapist                                 Physical Therapy Education       Title: PT OT SLP Therapies (In Progress)       Topic: Physical Therapy (In Progress)       Point: Mobility training (In Progress)       Learning Progress Summary             Patient Acceptance, E,TB, NR by  at 1/30/2024 1343    Acceptance, E,D, NR by DM at 1/27/2024 1453   Family Acceptance, E,D, NR by DM at 1/27/2024 1453                         Point: Home exercise program (In Progress)       Learning Progress Summary             Patient Acceptance, E,TB, NR by  at 1/30/2024 1343    Acceptance, E,D, NR by DM at 1/27/2024 1453   Family Acceptance, E,D, NR by DM at 1/27/2024 1453                         Point: Body mechanics (In Progress)       Learning Progress Summary             Patient Acceptance, E,TB, NR by  at 1/30/2024 1343    Acceptance, E,D, NR by DM at 1/27/2024 1453   Family Acceptance, E,D, NR by DM at 1/27/2024 1453                         Point: Precautions (In Progress)       Learning Progress Summary             Patient Acceptance, E,TB, NR by  at 1/30/2024 1343    Acceptance, E,D, NR by DM at 1/27/2024 1453   Family Acceptance, E,D, NR by DM at 1/27/2024 1453                                         User Key       Initials Effective Dates Name Provider Type Discipline     02/03/23 -  Gabby Lee PT Physical Therapist PT     09/22/22 -  Yamileth Hernandez PT Physical Therapist PT                  PT Recommendation and Plan     Plan of Care Reviewed With: patient  Progress: no change  Outcome Evaluation: Pt continued to present below baseline  function with decreased independence with transfer Mod A x2 with BUE support with cues for sequencing. Pt gave good effort with BLE therex however limited d/t R hip pain. Pt presented below baseline function warranting IPPT POC. d/c rec SNF.     Time Calculation:         PT Charges       Row Name 24 1343             Time Calculation    Start Time 1050  -HM      PT Received On 24  -HM         Timed Charges    95343 - PT Therapeutic Exercise Minutes 10  -HM      46048 - PT Therapeutic Activity Minutes 13  -HM         Total Minutes    Timed Charges Total Minutes 23  -HM       Total Minutes 23  -HM                User Key  (r) = Recorded By, (t) = Taken By, (c) = Cosigned By      Initials Name Provider Type     Yamileth Hernandez, RJ Physical Therapist                  Therapy Charges for Today       Code Description Service Date Service Provider Modifiers Qty    27327618087 HC PT THER PROC EA 15 MIN 2024 Yamileth Hernandez, PT GP 1    13863061888 HC PT THERAPEUTIC ACT EA 15 MIN 2024 Yamileth Hernandez PT GP 1    90049222924 HC PT THER SUPP EA 15 MIN 2024 Yamileth Hernandez PT GP 2            PT G-Codes  Outcome Measure Options: AM-PAC 6 Clicks Basic Mobility (PT)  AM-PAC 6 Clicks Score (PT): 12  AM-PAC 6 Clicks Score (OT): 17  PT Discharge Summary  Anticipated Discharge Disposition (PT): skilled nursing facility    Yamileth Hernandez PT  2024      Electronically signed by Yamileht Hernandez PT at 24 1344          Occupational Therapy Notes (most recent note)        Sheri Mccoy, OT at 24 1115          Patient Name: Jeaneth Keita  : 1960    MRN: [id number]                              Today's Date: 2024       Admit Date: 2024    Visit Dx:     ICD-10-CM ICD-9-CM   1. Altered mental status, unspecified altered mental status type  R41.82 780.97   2. RSV bronchitis  J20.5 466.0     079.6   3. Stage 5 chronic kidney disease on chronic dialysis  N18.6 585.6    Z99.2  V45.11     Patient Active Problem List   Diagnosis    ICH (intracerebral hemorrhage)    Hepatocellular carcinoma metastatic to bone s/p RXT     Hemochromatosis    Cirrhosis of liver    Chronic Hep C     ESRD (end stage renal disease)    Chronic ulcer of right foot    S/P left BKA    GERD (gastroesophageal reflux disease)    Chronic pain on Methadone    Essential hypertension    Type 2 diabetes mellitus    Right lower lobe pneumonia    Colitis    Normocytic anemia    Hypokalemia    CVA (cerebral vascular accident)    Stroke-like symptoms    History of hypoglycemia    Hypoglycemia    Chest pain    End stage renal disease on dialysis    Disorientation    Altered mental status    End-stage renal disease on hemodialysis    Elevated brain natriuretic peptide (BNP) level    Acute encephalopathy    RSV (respiratory syncytial virus infection)     Past Medical History:   Diagnosis Date    Anxiety     Cancer     liver cell carcinoma    DDD (degenerative disc disease), lumbar     Depression     Diabetes mellitus     Fibromyalgia     Hemochromatosis     Hep B w/o coma, chronic, w/o delta     Hep C w/o coma, chronic     Hypertension     Stroke     Vertigo      Past Surgical History:   Procedure Laterality Date    ARTERIOVENOUS FISTULA/SHUNT SURGERY Left 10/16/2021    Procedure: UPPER EXTREMITY ARTERIOVENOUS FISTULA FORMATION LEFT;  Surgeon: Johnny Rousseau MD;  Location: [facility];  Service: Vascular;  Laterality: Left;    BACK SURGERY      BELOW KNEE LEG AMPUTATION       SECTION      FOOT SURGERY      KNEE SURGERY      ROTATOR CUFF REPAIR      SPINAL CORD STIMULATOR IMPLANT        General Information       Row Name 24 1115          OT Time and Intention    Document Type evaluation  -AC     Mode of Treatment occupational therapy  -AC       Row Name 24 1115          General Information    Patient Profile Reviewed yes  -AC     Prior Level of Function independent:;transfer;w/c or scooter;ADL's  unsure of  accuracy - pt with confusion  -     Existing Precautions/Restrictions --  L BKA, confused  -AC     Barriers to Rehab medically complex;previous functional deficit;cognitive status  -       Row Name 01/28/24 1115          Living Environment    People in Home facility resident  Joan  -       Row Name 01/28/24 1115          Home Main Entrance    Number of Stairs, Main Entrance none  -       Row Name 01/28/24 1115          Cognition    Orientation Status (Cognition) oriented to;person;verbal cues/prompts needed for orientation;place;disoriented to;time  -       Row Name 01/28/24 1115          Safety Issues, Functional Mobility    Safety Issues Affecting Function (Mobility) awareness of need for assistance;insight into deficits/self-awareness;safety precaution awareness;sequencing abilities  -     Impairments Affecting Function (Mobility) balance;cognition;endurance/activity tolerance;pain;postural/trunk control;strength  -     Cognitive Impairments, Mobility Safety/Performance awareness, need for assistance;insight into deficits/self-awareness;safety precaution awareness;sequencing abilities  -               User Key  (r) = Recorded By, (t) = Taken By, (c) = Cosigned By      Initials Name Provider Type    AC Sheri Mccoy OT Occupational Therapist                     Mobility/ADL's       Row Name 01/28/24 1155          Bed Mobility    Bed Mobility supine-sit  -     Supine-Sit [place] (Bed Mobility) verbal cues;minimum assist (75% patient effort)  -     Bed Mobility, Safety Issues decreased use of legs for bridging/pushing  -     Assistive Device (Bed Mobility) bed rails;head of bed elevated  -       Row Name 01/28/24 1155          Transfers    Transfers sit-stand transfer  -       Row Name 01/28/24 1155          Bed-Chair Transfer    Bed-Chair [place] (Transfers) verbal cues;moderate assist (50% patient effort);2 person assist  2nd person to guide hips  -     Assistive  Device (Bed-Chair Transfers) other (see comments)  BUE support  -AC       Row Name 01/28/24 1155          Sit-Stand Transfer    Sit-Stand [place] (Transfers) verbal cues;nonverbal cues (demo/gesture);moderate assist (50% patient effort);1 person assist  -AC       Row Name 01/28/24 1155          Activities of Daily Living    BADL Assessment/Intervention lower body dressing;grooming  -AC       Row Name 01/28/24 1155          Lower Body Dressing Assessment/Training    [place] Level (Lower Body Dressing) don;socks;minimum assist (75% patient effort)  R sock  -AC     Position (Lower Body Dressing) edge of bed sitting  -AC       Row Name 01/28/24 1155          Grooming Assessment/Training    [place] Level (Grooming) wash face, hands;set up  -AC     Position (Grooming) supported sitting  -AC               User Key  (r) = Recorded By, (t) = Taken By, (c) = Cosigned By      Initials Name Provider Type    Sheri Fernández, OT Occupational Therapist                   Obj/Interventions       Row Name 01/28/24 1157          Sensory Assessment (Somatosensory)    Sensory Assessment (Somatosensory) UE sensation intact  -       Row Name 01/28/24 1157          Range of Motion Comprehensive    General Range of Motion upper extremity range of motion deficits identified  -       Row Name 01/28/24 1157          Strength Comprehensive (MMT)    Comment, General Manual Muscle Testing (MMT) Assessment BUE grossly 4/5  -       Row Name 01/28/24 1157          Balance    Balance Assessment sitting static balance;sitting dynamic balance;standing static balance;standing dynamic balance  -AC     Static Sitting Balance contact guard  -AC     Dynamic Sitting Balance minimal assist  -AC     Position, Sitting Balance unsupported;sitting edge of bed  -AC     Static Standing Balance verbal cues;moderate assist  -AC     Dynamic Standing Balance verbal cues;moderate assist;2-person assist  -AC     Position/Device Used, Standing  Balance supported  -AC               User Key  (r) = Recorded By, (t) = Taken By, (c) = Cosigned By      Initials Name Provider Type    AC Sheri Mccoy, OT Occupational Therapist                   Goals/Plan       Row Name 01/28/24 1201          Bed Mobility Goal 1 (OT)    Activity/Assistive Device (Bed Mobility Goal 1, OT) sit to supine;supine to sit  -AC     [place] Level/Cues Needed (Bed Mobility Goal 1, OT) contact guard required  -AC     Time Frame (Bed Mobility Goal 1, OT) by discharge  -AC     Progress/Outcomes (Bed Mobility Goal 1, OT) new goal  -       Row Name 01/28/24 1201          Transfer Goal 1 (OT)    Activity/Assistive Device (Transfer Goal 1, OT) bed-to-chair/chair-to-bed;toilet;walker, rolling  -AC     [place] Level/Cues Needed (Transfer Goal 1, OT) minimum assist (75% or more patient effort)  -AC     Time Frame (Transfer Goal 1, OT) by discharge  -AC     Progress/Outcome (Transfer Goal 1, OT) new goal;goal ongoing  -       Row Name 01/28/24 1201          Dressing Goal 1 (OT)    Activity/Device (Dressing Goal 1, OT) lower body dressing  -AC     [place]/Cues Needed (Dressing Goal 1, OT) contact guard required  -AC     Time Frame (Dressing Goal 1, OT) by discharge  -AC     Strategies/Barriers (Dressing Goal 1, OT) don/doff R sock  -AC     Progress/Outcome (Dressing Goal 1, OT) new goal;goal ongoing  -       Row Name 01/28/24 1201          Toileting Goal 1 (OT)    Activity/Device (Toileting Goal 1, OT) adjust/manage clothing;perform perineal hygiene  -AC     [place] Level/Cues Needed (Toileting Goal 1, OT) moderate assist (50-74% patient effort)  -AC     Time Frame (Toileting Goal 1, OT) by discharge  -AC     Progress/Outcome (Toileting Goal 1, OT) new goal;goal ongoing  -       Row Name 01/28/24 1201          Therapy Assessment/Plan (OT)    Planned Therapy Interventions (OT) activity tolerance training;BADL retraining;functional balance retraining;occupation/activity  based interventions;patient/caregiver education/training;transfer/mobility retraining;strengthening exercise  -               User Key  (r) = Recorded By, (t) = Taken By, (c) = Cosigned By      Initials Name Provider Type     Sheri Mccoy, OT Occupational Therapist                   Clinical Impression       Row Name 01/28/24 1158          Pain Assessment    Pretreatment Pain Rating 6/10  -     Posttreatment Pain Rating 6/10  -     Pain Location - abdomen  -AC     Pain Intervention(s) Repositioned  -       Row Name 01/28/24 1158          Plan of Care Review    Plan of Care Reviewed With patient;family  -     Outcome Evaluation Pt presents with confusion, weakness, decr balance and activity tolerance.  Pt min A LBD, setup to wash face, mod A x 2 bed to chair given UE support.  OT will follow to advance pt toward PLOF.  Recommend SNF upon d/c.  -       Row Name 01/28/24 1158          Therapy Assessment/Plan (OT)    Rehab Potential (OT) good, to achieve stated therapy goals  -     Criteria for Skilled Therapeutic Interventions Met (OT) yes;skilled treatment is necessary  -     Therapy Frequency (OT) daily  -       Row Name 01/28/24 1158          Therapy Plan Review/Discharge Plan (OT)    Anticipated Discharge Disposition (OT) skilled nursing facility  -       Row Name 01/28/24 1158          Vital Signs    Pre Systolic BP Rehab 124  -AC     Pre Treatment Diastolic BP 92  -AC     Posttreatment Heart Rate (beats/min) 89  -AC     Post SpO2 (%) 97  -AC     O2 Delivery Post Treatment room air  -AC     Pre Patient Position Supine  -AC     Post Patient Position Sitting  -       Row Name 01/28/24 1158          Positioning and Restraints    Pre-Treatment Position in bed  -AC     Post Treatment Position chair  -AC     In Chair notified nsg;reclined;call light within reach;encouraged to call for assist;exit alarm on;waffle cushion  -               User Key  (r) = Recorded By, (t) = Taken By, (c) =  Cosigned By      Initials Name Provider Type    Sheri Fernández, OT Occupational Therapist                   Outcome Measures       Row Name 01/28/24 1202          How much help from another is currently needed...    Putting on and taking off regular lower body clothing? 3  -AC     Bathing (including washing, rinsing, and drying) 2  -AC     Toileting (which includes using toilet bed pan or urinal) 2  -AC     Putting on and taking off regular upper body clothing 3  -AC     Taking care of personal grooming (such as brushing teeth) 3  -AC     Eating meals 4  -AC     AM-PAC 6 Clicks Score (OT) 17  -AC       Row Name 01/28/24 0818          How much help from another person do you currently need...    Turning from your back to your side while in flat bed without using bedrails? 4  -KS     Moving from lying on back to sitting on the side of a flat bed without bedrails? 3  -KS     Moving to and from a bed to a chair (including a wheelchair)? 3  -KS     Standing up from a chair using your arms (e.g., wheelchair, bedside chair)? 2  -KS     Climbing 3-5 steps with a railing? 1  -KS     To walk in hospital room? 1  -KS     AM-PAC 6 Clicks Score (PT) 14  -KS     Highest Level of Mobility Goal 4 --> Transfer to chair/commode  -KS       Row Name 01/28/24 1202          Functional Assessment    Outcome Measure Options AM-PAC 6 Clicks Daily Activity (OT)  -AC               User Key  (r) = Recorded By, (t) = Taken By, (c) = Cosigned By      Initials Name Provider Type    Sheri Fernández, OT Occupational Therapist    Sridevi Gibson RN Registered Nurse                    Occupational Therapy Education       Title: PT OT SLP Therapies (In Progress)       Topic: Occupational Therapy (In Progress)       Point: ADL training (In Progress)       Description:   Instruct learner(s) on proper safety adaptation and remediation techniques during self care or transfers.   Instruct in proper use of assistive devices.                  Learning  Progress Summary             Patient Acceptance, E, NR by  at 1/28/2024 1890                         Point: Home exercise program (Not Started)       Description:   Instruct learner(s) on appropriate technique for monitoring, assisting and/or progressing therapeutic exercises/activities.                  Learner Progress:  Not documented in this visit.              Point: Precautions (Not Started)       Description:   Instruct learner(s) on prescribed precautions during self-care and functional transfers.                  Learner Progress:  Not documented in this visit.              Point: Body mechanics (Not Started)       Description:   Instruct learner(s) on proper positioning and spine alignment during self-care, functional mobility activities and/or exercises.                  Learner Progress:  Not documented in this visit.                              User Key       Initials Effective Dates Name Provider Type Discipline     02/03/23 -  Sheri Mccoy, OT Occupational Therapist OT                  OT Recommendation and Plan  Planned Therapy Interventions (OT): activity tolerance training, BADL retraining, functional balance retraining, occupation/activity based interventions, patient/caregiver education/training, transfer/mobility retraining, strengthening exercise  Therapy Frequency (OT): daily  Plan of Care Review  Plan of Care Reviewed With: patient, family  Outcome Evaluation: Pt presents with confusion, weakness, decr balance and activity tolerance.  Pt min A LBD, setup to wash face, mod A x 2 bed to chair given UE support.  OT will follow to advance pt toward PLOF.  Recommend SNF upon d/c.     Time Calculation:   Evaluation Complexity (OT)  Review Occupational Profile/Medical/Therapy History Complexity: expanded/moderate complexity  Assessment, Occupational Performance/Identification of Deficit Complexity: 3-5 performance deficits  Clinical Decision Making Complexity (OT): detailed  assessment/moderate complexity  Overall Complexity of Evaluation (OT): moderate complexity     Time Calculation- OT       Row Name 01/28/24 1115             Time Calculation- OT    OT Start Time 1115  -AC      OT Received On 01/28/24  -AC      OT Goal Re-Cert Due Date 02/07/24  -AC         Untimed Charges    OT Eval/Re-eval Minutes 50  -AC         Total Minutes    Untimed Charges Total Minutes 50  -AC       Total Minutes 50  -AC                User Key  (r) = Recorded By, (t) = Taken By, (c) = Cosigned By      Initials Name Provider Type    AC Sheri Mccoy, OT Occupational Therapist                  Therapy Charges for Today       Code Description Service Date Service Provider Modifiers Qty    [id number] HC OT EVAL MOD COMPLEXITY 4 1/28/2024 Sheri Mccoy, OT GO 1                 Sheri Mccoy OT  1/28/2024    Electronically signed by Sheri Mccoy OT at 01/28/24 1204

## 2024-01-30 NOTE — PROGRESS NOTES
"Palliative Care Daily Progress Note     C/C: Patient complaining of nausea and right hip pain.     S: Medical record reviewed. Follow up visit for GOC in the context of complex medical decision making. Events noted. Patient reports nauseated after breakfast. She does report right hip pain at site of known lesion. Received Tramadol 50mg PO x1 dose that was ineffective for pain.      ROS: +pain, abdominal, intermittent. +pain, right hip. +nausea, increased since eating breakfast. Denies vomiting. ROS limited by AMS.     O: Code Status:   Code Status and Medical Interventions:   Ordered at: 01/27/24 1058     Medical Intervention Limits:    NO intubation (DNI)     Level Of Support Discussed With:    Health Care Surrogate     Code Status (Patient has no pulse and is not breathing):    No CPR (Do Not Attempt to Resuscitate)     Medical Interventions (Patient has pulse or is breathing):    Limited Support      Advanced Directives: Advance Directive Status: Patient has advance directive, copy in chart   Goals of Care: Ongoing.   Palliative Performance Scale Score: 30% 30%    /65 (BP Location: Right arm, Patient Position: Lying)   Pulse 92   Temp 98.1 °F (36.7 °C) (Oral)   Resp 18   Ht 172.7 cm (68\")   Wt 78.7 kg (173 lb 8 oz)   SpO2 94%   BMI 26.38 kg/m²   No intake or output data in the 24 hours ending 01/30/24 1155      PE:  General Appearance:    Patient laying in bed, awake, alert, A/C ill appearing,  cooperative, NAD   HEENT:    NC/AT, EOMI, anicteric, MMM, face relaxed   Neck:   supple, trachea midline, no JVD   Lungs:     CTA bilat, diminished in bases; respirations regular, even and unlabored; RR 16-18 on exam, on RA    Heart:    RRR, normal S1 and S2, no M/R/G, HR 86 on monitor   Abdomen:     Normal bowel sounds, soft, nontender, distended   G/U:   Deferred   MSK/Extremities:   no edema, Left BKA,    Pulses:   Pulses palpable and equal bilaterally   Skin:   Warm, dry   Neurologic:   A/Ox1, cooperative, "    Psych:   Calm, appropriate     Meds: Reviewed and changes noted    Labs:   Results from last 7 days   Lab Units 01/29/24  0643   WBC 10*3/mm3 6.15   HEMOGLOBIN g/dL 8.6*   HEMATOCRIT % 24.8*   PLATELETS 10*3/mm3 178     Results from last 7 days   Lab Units 01/29/24  0643   SODIUM mmol/L 133*   POTASSIUM mmol/L 4.9   CHLORIDE mmol/L 96*   CO2 mmol/L 23.0   BUN mg/dL 59*   CREATININE mg/dL 6.23*   GLUCOSE mg/dL 131*   CALCIUM mg/dL 8.6     Results from last 7 days   Lab Units 01/29/24  0643 01/28/24  0527 01/27/24  0515   SODIUM mmol/L 133*   < > 134*   POTASSIUM mmol/L 4.9   < > 4.9   CHLORIDE mmol/L 96*   < > 96*   CO2 mmol/L 23.0   < > 23.0   BUN mg/dL 59*   < > 23   CREATININE mg/dL 6.23*   < > 3.80*   CALCIUM mg/dL 8.6   < > 8.8   BILIRUBIN mg/dL  --   --  1.0   ALK PHOS U/L  --   --  100   ALT (SGPT) U/L  --   --  38*   AST (SGOT) U/L  --   --  23   GLUCOSE mg/dL 131*   < > 228*    < > = values in this interval not displayed.     Imaging Results (Last 72 Hours)       ** No results found for the last 72 hours. **              Lab 01/27/24  0515   HEMOGLOBIN A1C 6.30*         Diagnostics: Reviewed    A:   Acute encephalopathy    Hepatocellular carcinoma metastatic to bone s/p RXT     Cirrhosis of liver    Chronic Hep C     ESRD (end stage renal disease)    S/P left BKA    GERD (gastroesophageal reflux disease)    Essential hypertension    Type 2 diabetes mellitus    RSV (respiratory syncytial virus infection)     63 y.o. female with ESRD, T2DM, HCC, RSV.   S/S:   Pain -chronic abdominal pain, right hip MSK/bone lesion  -continue Tylenol 650mg PO q 4 hours prn mild pain  -started Oxycodone 5mg PO q 12 hours  -started Oxycodone 5mg PO q 8 hours prn breakthrough pain     2. AMS -at baseline oriented to self, confirmed with family  -continue Lactulose/Rifaxin     3. Debility -wheelchair bound at baseline  -PT/OT following    4. Nausea -continue Zofran prn     5. GOC -DNR/DNI -confirmed with MIKEY Jj  -called and  spoke to Glendy Keita (sister-in-law) who assists in patient's care at [place], patient oriented to self only at baseline    P: Follow up visit for symptom management. Patient with continued right hip pain at known site of bone lesion and abdominal pain. Scheduled Oxycodone 5mg BID and prn as working with PT/OT which increases pain. Ongoing symptom management. All questions and concerns addressed.   Palliative Care Team will continue to follow patient. Please do not hesitate to contact us regarding further sx mgmt or GOC needs.  Kelsy Pena, APRN  1/30/2024  Time spent: 20 minutes

## 2024-01-30 NOTE — PLAN OF CARE
Goal Outcome Evaluation:  Plan of Care Reviewed With: patient        Progress: no change  Outcome Evaluation: Pt. lying in bed endorsing abdominal pain that she rates a 8/10.  She also endorses mild nausea.  Pt. had just finished breakfast and consumed approx 50% of tray.  Asked RN to administer prns for symptom management. Pt. was bladder scanned yesterday and PVR was documented as 192ml.  Plan for pt to discharge back to [place] and continue HD.  Pallaitive care to continue to follow along for support, POC and ongoing GOC.

## 2024-01-30 NOTE — PROGRESS NOTES
[facility] Medicine Services  PROGRESS NOTE    Patient Name: Jeaneth Keita  : 1960  MRN: [id number]    Date of Admission: 2024  Primary Care Physician: Lulu Amos MD    Subjective     CC: f/u AMS     HPI:  Sitting in chair, family member at bedside. Notes pain in legs and abdomen today. Mild nausea.   Per RN, a speech therapist from [facility] called and informed her an outpatient MBS was planned. RN reports no observed signs/symptoms of aspiration     Objective     Vital Signs:   Temp:  [98.1 °F (36.7 °C)-98.7 °F (37.1 °C)] 98.1 °F (36.7 °C)  Heart Rate:  [87-98] 98  Resp:  [18] 18  BP: (111-143)/(65-77) 143/74     Physical Exam:  Constitutional: No acute distress, awake, alert and conversational. Chronically ill appearing   HENT: NCAT, mucous membranes moist  Respiratory: Clear to auscultation bilaterally, respiratory effort normal on room air   Cardiovascular: RRR, no murmurs, rubs, or gallops  Gastrointestinal: Positive bowel sounds, soft, nontender, nondistended  Musculoskeletal: No R ankle edema, L AKA stump intact   Psychiatric: Appropriate affect, cooperative  Neurologic: I did not ask orientation questions today, moves all extremities spontaneously without focal deficits, speech clear  Skin: No rashes to exposed surfces     Results Reviewed:  LAB RESULTS:      Lab 24  0643 24  0515 24  1212 24  0127   WBC 6.15 5.10 4.36 5.30   HEMOGLOBIN 8.6* 8.9* 9.5* 8.4*   HEMATOCRIT 24.8* 26.5* 27.5* 24.9*   PLATELETS 178 171 167 160   NEUTROS ABS 3.61 3.24 2.96 3.40   IMMATURE GRANS (ABS) 0.03 0.01 0.01 0.01   LYMPHS ABS 1.65 1.21 0.83 1.13   MONOS ABS 0.44 0.46 0.38 0.47   EOS ABS 0.38 0.13 0.16 0.26   MCV 96.5 99.3* 97.5* 98.0*   LACTATE  --   --  1.1 1.8         Lab 01/29/24  0643 24  0527 24  0515 24  1212 24  0127   SODIUM 133* 130* 134* 134* 134*   POTASSIUM 4.9 4.6 4.9 4.9 4.9   CHLORIDE 96* 94* 96* 93* 95*    CO2 23.0 22.0 23.0 30.0* 31.0*   ANION GAP 14.0 14.0 15.0 11.0 8.0   BUN 59* 42* 23 40* 32*   CREATININE 6.23* 5.19* 3.80* 5.46* 4.61*   EGFR 7.0* 8.7* 12.6* 8.2* 10.0*   GLUCOSE 131* 270* 228* 350* 339*   CALCIUM 8.6 8.7 8.8 9.1 8.4*   MAGNESIUM  --   --  1.9  --   --    PHOSPHORUS  --   --  3.6  --   --    HEMOGLOBIN A1C  --   --  6.30*  --   --    TSH  --   --  1.930  --   --          Lab 01/27/24  0515 01/26/24  1212 01/24/24  0127   TOTAL PROTEIN 6.3 6.8 6.3   ALBUMIN 3.7 3.7 3.6   GLOBULIN 2.6 3.1 2.7   ALT (SGPT) 38* 39* 38*   AST (SGOT) 23 23 23   BILIRUBIN 1.0 0.7 0.6   ALK PHOS 100 145* 137*   LIPASE  --   --  53         Lab 01/26/24  1420 01/26/24  1212 01/24/24  0127   HSTROP T 39* 40* 33*         Lab 01/27/24  0515   IRON 119   IRON SATURATION (TSAT) 56*   TIBC 212*   TRANSFERRIN 142*   FERRITIN 1,393.00*   FOLATE 8.78   VITAMIN B 12 326         Lab 01/26/24  1253   PH, ARTERIAL 7.552*   PCO2, ARTERIAL 34.0*   PO2 .0*   FIO2 21   HCO3 ART 29.9*   BASE EXCESS ART 7.2*   CARBOXYHEMOGLOBIN 1.4     Brief Urine Lab Results  (Last result in the past 365 days)        Color   Clarity   Blood   Leuk Est   Nitrite   Protein   CREAT   Urine HCG        01/26/24 1233 Yellow   Clear   Negative   Trace   Negative   >=300 mg/dL (3+)                 Microbiology Results Abnormal       Procedure Component Value - Date/Time    Urine Culture - Urine, Straight Cath [[id number]]  (Normal) Collected: 01/26/24 1233    Lab Status: Final result Specimen: Urine from Straight Cath Updated: 01/27/24 1502     Urine Culture No growth          No radiology results from the last 24 hrs    Results for orders placed during the hospital encounter of 12/18/23    Adult Transthoracic Echo Complete W/ Cont if Necessary Per Protocol    Interpretation Summary    Left ventricular systolic function is normal. Calculated left ventricular EF of 63%    Left ventricular diastolic function was normal.    Normal right ventricular size and  systolic function    No significant valvular abnormality    No pericardial effusion    Compared to prior echocardiogram from 10/6/2021, there is no significant change    Current medications:  Scheduled Meds:atorvastatin, 80 mg, Oral, Nightly  b complex-vitamin c-folic acid, 1 tablet, Oral, Daily  calcium acetate, 667 mg, Oral, TID With Meals  carvedilol, 25 mg, Oral, BID With Meals  dextromethorphan polistirex ER, 30 mg, Oral, Q12H  guaiFENesin, 600 mg, Oral, Q12H  insulin lispro, 2-9 Units, Subcutaneous, 4x Daily AC & at Bedtime  lactulose, 30 g, Oral, BID  oxyCODONE, 5 mg, Oral, Q12H  pantoprazole, 40 mg, Oral, Daily  QUEtiapine, 25 mg, Oral, Nightly  riFAXIMin, 550 mg, Oral, BID  sodium chloride, 10 mL, Intravenous, Q12H  venlafaxine XR, 37.5 mg, Oral, Daily      Continuous Infusions:   PRN Meds:.  acetaminophen **OR** acetaminophen **OR** acetaminophen    benzonatate    senna-docusate sodium **AND** polyethylene glycol **AND** bisacodyl **AND** bisacodyl    Calcium Replacement - Follow Nurse / BPA Driven Protocol    dextrose    dextrose    glucagon (human recombinant)    ipratropium-albuterol    Magnesium Low Dose Replacement - Follow Nurse / BPA Driven Protocol    nitroglycerin    ondansetron ODT **OR** ondansetron    oxyCODONE    Phosphorus Replacement - Follow Nurse / BPA Driven Protocol    Potassium Replacement - Follow Nurse / BPA Driven Protocol    [COMPLETED] Insert Peripheral IV **AND** sodium chloride    sodium chloride    sodium chloride    Assessment & Plan     Active Hospital Problems    Diagnosis  POA    **Acute encephalopathy [G93.40]  Yes    RSV (respiratory syncytial virus infection) [B33.8]  Yes    Cirrhosis of liver [K74.60]  Yes    Hepatocellular carcinoma metastatic to bone s/p RXT  [C79.51, C22.0]  Yes    Chronic Hep C  [B18.2]  Yes    ESRD (end stage renal disease) [N18.6]  Yes    GERD (gastroesophageal reflux disease) [K21.9]  Yes    S/P left BKA [Z89.512]  Not Applicable    Essential  hypertension [I10]  Yes    Type 2 diabetes mellitus [E11.9]  Yes      Resolved Hospital Problems   No resolved problems to display.     Brief Hospital Course to date:  Jeaneth Keita is a 63 y.o. female with PMH significant for HTN, HLD, DMII, ESRD on HD, liver cirrhosis, hepatocellular carcinoma with lung/bone metastasis, chronic hepatitis C and prior L BKA. Multiple hospital admissions for AMS over the preceding 2 years. Last admitted to [facility] 12/18-12/21/23 for chest pain and AMS. Chest pain work up was unremarkable. She was started on Lactulose / Rifaxamin for hepatic encephalopathy with improvement in mental status. She was brought to [facility] ED from her HD clinic on 1/26/24 for altered mental status.     Acute metabolic encephalopathy  - CXR, CT head and and CT abdomen/pelvis with no acute findings  - Ammonia level 20 at time of presentation  - UA unremarkable   - Cause of encephalopathy not clear. Tapering Effexor dose - has been on this medication since at least 2/2023, however so unclear how much it is contributing   - Family confirmed that at baseline, patient is oriented to self only     Acute RSV infection   - (+) RSV on admission. Asymptomatic. Unclear if contributing to above issues  - Droplet precautions     ESRD on HD  Anemia of chronic disease   - Nephrology following. Continue MWF HD   - Continue phos binders   - Hgb stable     Liver cirrhosis  Chronic hepatitis C  Hepatocellular carcinoma with lung/bone metastasis  - Ammonia 20 on admission. Continue Lactulose / Rifaxamin     DMII  - Hgb A1c 6.3%  - Good control on SSI - continue     Hypertension, continue Carvedilol. Home Amlodipine on hold   Hyperlipidemia, continue statin.    Mood disorder, tapering Effexor due to concerns it may be contributing to encephalopathy. Continue Seroquel 25mg QHS     Chronic pain / goals of care  - Appreciate palliative care assistance     Debility  - PT/OT following. Patient must be assist x 1 to  return to [facility]. Currently assist x 2 so CM looking into SNF options     Expected Discharge Location and Transportation: SNF  Expected Discharge Expected Discharge Date: 1/31/2024; Expected Discharge Time:      DVT prophylaxis: Mechanical DVT prophylaxis orders are present.    AM-PAC 6 Clicks Score (PT): 12 (01/30/24 1342)    CODE STATUS:   Code Status and Medical Interventions:   Ordered at: 01/27/24 1058     Medical Intervention Limits:    NO intubation (DNI)     Level Of Support Discussed With:    Health Care Surrogate     Code Status (Patient has no pulse and is not breathing):    No CPR (Do Not Attempt to Resuscitate)     Medical Interventions (Patient has pulse or is breathing):    Limited Support     Elisha Kim PA-C  01/30/24

## 2024-01-30 NOTE — PROGRESS NOTES
"   LOS: 2 days    Patient Care Team:  Lulu Amos MD as PCP - General (Hospice and Palliative Medicine)    Subjective     Stable overnight.    Objective     Vital Signs:  Blood pressure 111/65, pulse 92, temperature 98.1 °F (36.7 °C), temperature source Oral, resp. rate 18, height 172.7 cm (68\"), weight 78.7 kg (173 lb 8 oz), SpO2 94%.      Intake/Output Summary (Last 24 hours) at 1/30/2024 1047  Last data filed at 1/29/2024 1130  Gross per 24 hour   Intake --   Output 1980 ml   Net -1980 ml        01/29 0701 - 01/30 0700  In: -   Out: 1980     Physical Exam:        GENERAL: WD WF NAD  NEURO: Sleeping but awakens  PSYCHIATRIC: Drowsy, cooperative with PE  CV: No edema  LUNGS:  Quiet,  Nonlabored resp.    ABDOMEN: Nondistended  : No Sanchez  SKIN: Warm and dry without rash  L UE AVF    Labs:  Results from last 7 days   Lab Units 01/29/24  0643 01/27/24  0515 01/26/24  1212   WBC 10*3/mm3 6.15 5.10 4.36   HEMOGLOBIN g/dL 8.6* 8.9* 9.5*   PLATELETS 10*3/mm3 178 171 167     Results from last 7 days   Lab Units 01/29/24  0643 01/28/24  0527 01/27/24  0515 01/26/24  1212 01/24/24  0127   SODIUM mmol/L 133* 130* 134* 134* 134*   POTASSIUM mmol/L 4.9 4.6 4.9 4.9 4.9   CHLORIDE mmol/L 96* 94* 96* 93* 95*   CO2 mmol/L 23.0 22.0 23.0 30.0* 31.0*   BUN mg/dL 59* 42* 23 40* 32*   CREATININE mg/dL 6.23* 5.19* 3.80* 5.46* 4.61*   CALCIUM mg/dL 8.6 8.7 8.8 9.1 8.4*   PHOSPHORUS mg/dL  --   --  3.6  --   --    MAGNESIUM mg/dL  --   --  1.9  --   --    ALBUMIN g/dL  --   --  3.7 3.7 3.6     Results from last 7 days   Lab Units 01/27/24  0515   ALK PHOS U/L 100   BILIRUBIN mg/dL 1.0   ALT (SGPT) U/L 38*   AST (SGOT) U/L 23     Results from last 7 days   Lab Units 01/26/24  1253   PH, ARTERIAL pH units 7.552*   PO2 ART mm Hg 142.0*   PCO2, ARTERIAL mm Hg 34.0*   HCO3 ART mmol/L 29.9*               Estimated Creatinine Clearance: 9.9 mL/min (A) (by C-G formula based on SCr of 6.23 mg/dL (H)).         A/P:    ESRD: On HD [facility] " [place] with NAL..  Continue current outpatient schedule.    HTN: Blood pressure stable.    Hyponatremia: Sodium levels low.  Restrict free water intake.  Adjust with HD.    Metabolic/respiratory alkalosis: Initially bicarb elevated with low pCO2..  Improved with lower bicarb dialysate.  Adjust with HD.    Anemia: Hemoglobin below goal.  No ADRIEN with liver mass.  Transfuse as needed for Hgb <7.    Volume: Stable.  UF as tolerated with HD.    Hyperphosphatemia: Phosphorus stable 3.6.  Patient on binders.  Poor p.o. intake.  Monitor for now.    Bone mineralization: Phosphorus stable.  Calcium stable.  Patient is not on any bone modulating medications.  Monitor for now.    RSV: Respiratory status appears stable.  Getting supportive care only for now.    Mental status changes: Improved      Bob Mae MD  01/30/24  10:47 EST

## 2024-01-30 NOTE — THERAPY TREATMENT NOTE
Patient Name: Jeaneth Keita  : 1960    MRN: [id number]                              Today's Date: 2024       Admit Date: 2024    Visit Dx:     ICD-10-CM ICD-9-CM   1. Altered mental status, unspecified altered mental status type  R41.82 780.97   2. RSV bronchitis  J20.5 466.0     079.6   3. Stage 5 chronic kidney disease on chronic dialysis  N18.6 585.6    Z99.2 V45.11     Patient Active Problem List   Diagnosis    ICH (intracerebral hemorrhage)    Hepatocellular carcinoma metastatic to bone s/p RXT     Hemochromatosis    Cirrhosis of liver    Chronic Hep C     ESRD (end stage renal disease)    Chronic ulcer of right foot    S/P left BKA    GERD (gastroesophageal reflux disease)    Chronic pain on Methadone    Essential hypertension    Type 2 diabetes mellitus    Right lower lobe pneumonia    Colitis    Normocytic anemia    Hypokalemia    CVA (cerebral vascular accident)    Stroke-like symptoms    History of hypoglycemia    Hypoglycemia    Chest pain    End stage renal disease on dialysis    Disorientation    Altered mental status    End-stage renal disease on hemodialysis    Elevated brain natriuretic peptide (BNP) level    Acute encephalopathy    RSV (respiratory syncytial virus infection)     Past Medical History:   Diagnosis Date    Anxiety     Cancer     liver cell carcinoma    DDD (degenerative disc disease), lumbar     Depression     Diabetes mellitus     Fibromyalgia     Hemochromatosis     Hep B w/o coma, chronic, w/o delta     Hep C w/o coma, chronic     Hypertension     Stroke     Vertigo      Past Surgical History:   Procedure Laterality Date    ARTERIOVENOUS FISTULA/SHUNT SURGERY Left 10/16/2021    Procedure: UPPER EXTREMITY ARTERIOVENOUS FISTULA FORMATION LEFT;  Surgeon: Johnny Rousseau MD;  Location: [facility];  Service: Vascular;  Laterality: Left;    BACK SURGERY      BELOW KNEE LEG AMPUTATION       SECTION      FOOT SURGERY      KNEE SURGERY      ROTATOR CUFF  REPAIR      SPINAL CORD STIMULATOR IMPLANT        General Information       Row Name 01/30/24 1338          Physical Therapy Time and Intention    Document Type therapy note (daily note)  -     Mode of Treatment physical therapy  -       Row Name 01/30/24 1338          General Information    Patient Profile Reviewed yes  -     Existing Precautions/Restrictions fall;other (see comments)  L BKA, confused  -     Barriers to Rehab medically complex;previous functional deficit;cognitive status  -       Row Name 01/30/24 1332          Cognition    Orientation Status (Cognition) oriented to;person;disoriented to;place;situation;time  -       Row Name 01/30/24 1333          Safety Issues, Functional Mobility    Safety Issues Affecting Function (Mobility) awareness of need for assistance;insight into deficits/self-awareness;safety precaution awareness;safety precautions follow-through/compliance;sequencing abilities  -     Impairments Affecting Function (Mobility) balance;cognition;endurance/activity tolerance;pain;postural/trunk control;strength  -     Cognitive Impairments, Mobility Safety/Performance awareness, need for assistance;safety precaution awareness;insight into deficits/self-awareness;safety precaution follow-through;sequencing abilities  -               User Key  (r) = Recorded By, (t) = Taken By, (c) = Cosigned By      Initials Name Provider Type     Yamileth Hernandez PT Physical Therapist                   Mobility       Row Name 01/30/24 6823          Bed Mobility    Bed Mobility supine-sit  -     Supine-Sit Parker (Bed Mobility) verbal cues;minimum assist (75% patient effort)  -     Assistive Device (Bed Mobility) bed rails;head of bed elevated  -     Comment, (Bed Mobility) Min A for bed mobility to assist to upright trunk, cues for sequencing  -       Row Name 01/30/24 9446          Bed-Chair Transfer    Bed-Chair Parker (Transfers) verbal cues;moderate assist (50%  patient effort);2 person assist  -HM     Assistive Device (Bed-Chair Transfers) other (see comments)  BUE support  -     Comment, (Bed-Chair Transfer) cues for sequencing, transferred towards R side, increased difficulty with pivoting with RLE. Once [facility] pt stated dizziness with BP stable, RN aware  -       Row Name 01/30/24 9214          Sit-Stand Transfer    Sit-Stand Nash (Transfers) verbal cues;nonverbal cues (demo/gesture);moderate assist (50% patient effort);1 person assist  -HM     Assistive Device (Sit-Stand Transfers) other (see comments)  BUE support  -     Comment, (Sit-Stand Transfer) cues for sequencing, pt completed partial stand for pericare in chair with cues to maintain contact with R foot on ground as pt was noted to have decreased WB on forefoot  -               User Key  (r) = Recorded By, (t) = Taken By, (c) = Cosigned By      Initials Name Provider Type     Yamileth Hernandez PT Physical Therapist                   Obj/Interventions       Row Name 01/30/24 1488          Motor Skills    Therapeutic Exercise other (see comments)  BLE LAQ, seated marches (deferred on R d/t pain), SLR on L and quad sets on RLE, hip ab/ad, APs on R and knee flex/ext x 10 reps, intermittent assist on RLE d/t pain  -HM       Row Name 01/30/24 8487          Balance    Balance Assessment sitting static balance;sitting dynamic balance;sit to stand dynamic balance;standing static balance;standing dynamic balance  -     Static Sitting Balance contact guard  -     Dynamic Sitting Balance minimal assist  -     Position, Sitting Balance sitting edge of bed;unsupported  -     Sit to Stand Dynamic Balance moderate assist;2-person assist  -HM     Static Standing Balance moderate assist  -HM     Dynamic Standing Balance moderate assist;2-person assist;verbal cues  -     Position/Device Used, Standing Balance supported  -     Balance Interventions standing;dynamic;occupation based/functional task  -                User Key  (r) = Recorded By, (t) = Taken By, (c) = Cosigned By      Initials Name Provider Type     Yamileth Hernandez PT Physical Therapist                   Goals/Plan    No documentation.                  Clinical Impression       Row Name 01/30/24 1338          Pain    Additional Documentation Pain Scale: FACES Pre/Post-Treatment (Group)  -       Row Name 01/30/24 1338          Pain Scale: FACES Pre/Post-Treatment    Pain: FACES Scale, Pretreatment 2-->hurts little bit  -     Posttreatment Pain Rating 2-->hurts little bit  -HM     Pain Location - Side/Orientation Right  -     Pain Location - hip  -       Row Name 01/30/24 1338          Plan of Care Review    Plan of Care Reviewed With patient  -     Progress no change  -     Outcome Evaluation Pt continued to present below baseline function with decreased independence with transfer Mod A x2 with BUE support with cues for sequencing. Pt gave good effort with BLE therex however limited d/t R hip pain. Pt presented below baseline function warranting IPPT POC. d/c rec SNF.  -       Row Name 01/30/24 133          Vital Signs    Post Systolic BP Rehab 135  -HM     Post Treatment Diastolic BP 76  -HM     Pre SpO2 (%) 92  -HM     O2 Delivery Pre Treatment room air  -     O2 Delivery Intra Treatment room air  -     O2 Delivery Post Treatment room air  -     Pre Patient Position Supine  -     Intra Patient Position Standing  -     Post Patient Position Sitting  -       Row Name 01/30/24 1337          Positioning and Restraints    Pre-Treatment Position in bed  -     Post Treatment Position chair  -     In Chair notified nsg;reclined;sitting;call light within reach;encouraged to call for assist;exit alarm on;legs elevated;waffle cushion  -               User Key  (r) = Recorded By, (t) = Taken By, (c) = Cosigned By      Initials Name Provider Type     Yamileth Hernandez PT Physical Therapist                   Outcome Measures        Row Name 01/30/24 1342 01/30/24 0848       How much help from another person do you currently need...    Turning from your back to your side while in flat bed without using bedrails? 3  - 3  -DF    Moving from lying on back to sitting on the side of a flat bed without bedrails? 3  - 2  -DF    Moving to and from a bed to a chair (including a wheelchair)? 2  - 2  -DF    Standing up from a chair using your arms (e.g., wheelchair, bedside chair)? 2  - 2  -DF    Climbing 3-5 steps with a railing? 1  - 1  -DF    To walk in hospital room? 1  - 1  -DF    AM-PAC 6 Clicks Score (PT) 12  - 11  -DF    Highest Level of Mobility Goal 4 --> Transfer to chair/commode  - 4 --> Transfer to chair/commode  -DF      Row Name 01/30/24 1342          Functional Assessment    Outcome Measure Options AM-PAC 6 Clicks Basic Mobility (PT)  -               User Key  (r) = Recorded By, (t) = Taken By, (c) = Cosigned By      Initials Name Provider Type    DF Jackie Naidu, RN Registered Nurse     Yamileth Hernandez, PT Physical Therapist                                 Physical Therapy Education       Title: PT OT SLP Therapies (In Progress)       Topic: Physical Therapy (In Progress)       Point: Mobility training (In Progress)       Learning Progress Summary             Patient Acceptance, E,TB, NR by  at 1/30/2024 1343    Acceptance, E,D, NR by DM at 1/27/2024 1453   Family Acceptance, E,D, NR by DM at 1/27/2024 1453                         Point: Home exercise program (In Progress)       Learning Progress Summary             Patient Acceptance, E,TB, NR by  at 1/30/2024 1343    Acceptance, E,D, NR by DM at 1/27/2024 1453   Family Acceptance, E,D, NR by DM at 1/27/2024 1453                         Point: Body mechanics (In Progress)       Learning Progress Summary             Patient Acceptance, E,TB, NR by  at 1/30/2024 1343    Acceptance, E,D, NR by DM at 1/27/2024 1453   Family Acceptance, E,D, NR by DM at  1/27/2024 1453                         Point: Precautions (In Progress)       Learning Progress Summary             Patient Acceptance, E,TB, NR by  at 1/30/2024 1343    Acceptance, E,D, NR by DM at 1/27/2024 1453   Family Acceptance, E,D, NR by DM at 1/27/2024 1453                                         User Key       Initials Effective Dates Name Provider Type Discipline    DM 02/03/23 -  Gabby Lee, PT Physical Therapist PT     09/22/22 -  Yamileth Hernandez PT Physical Therapist PT                  PT Recommendation and Plan     Plan of Care Reviewed With: patient  Progress: no change  Outcome Evaluation: Pt continued to present below baseline function with decreased independence with transfer Mod A x2 with BUE support with cues for sequencing. Pt gave good effort with BLE therex however limited d/t R hip pain. Pt presented below baseline function warranting IPPT POC. d/c rec SNF.     Time Calculation:         PT Charges       Row Name 01/30/24 1343             Time Calculation    Start Time 1050  -HM      PT Received On 01/30/24  -         Timed Charges    89321 - PT Therapeutic Exercise Minutes 10  -HM      00989 - PT Therapeutic Activity Minutes 13  -HM         Total Minutes    Timed Charges Total Minutes 23  -HM       Total Minutes 23  -HM                User Key  (r) = Recorded By, (t) = Taken By, (c) = Cosigned By      Initials Name Provider Type     Yamileth Hernandez PT Physical Therapist                  Therapy Charges for Today       Code Description Service Date Service Provider Modifiers Qty    61939353901 HC PT THER PROC EA 15 MIN 1/30/2024 Yamileth Hernandez, PT GP 1    65229003693 HC PT THERAPEUTIC ACT EA 15 MIN 1/30/2024 Yamileth Hernandez, PT GP 1    60312097765 HC PT THER SUPP EA 15 MIN 1/30/2024 Yamileth Hernandez, PT GP 2            PT G-Codes  Outcome Measure Options: AM-PAC 6 Clicks Basic Mobility (PT)  AM-PAC 6 Clicks Score (PT): 12  AM-PAC 6 Clicks Score (OT): 17  PT Discharge  Summary  Anticipated Discharge Disposition (PT): skilled nursing facility    Yamileth Hernandez, PT  1/30/2024

## 2024-01-31 ENCOUNTER — APPOINTMENT (OUTPATIENT)
Dept: NEPHROLOGY | Facility: HOSPITAL | Age: 64
End: 2024-01-31
Payer: MEDICARE

## 2024-01-31 ENCOUNTER — READMISSION MANAGEMENT (OUTPATIENT)
Dept: CALL CENTER | Facility: HOSPITAL | Age: 64
End: 2024-01-31
Payer: COMMERCIAL

## 2024-01-31 VITALS
SYSTOLIC BLOOD PRESSURE: 114 MMHG | HEART RATE: 92 BPM | HEIGHT: 68 IN | BODY MASS INDEX: 26.3 KG/M2 | OXYGEN SATURATION: 92 % | RESPIRATION RATE: 22 BRPM | WEIGHT: 173.5 LBS | DIASTOLIC BLOOD PRESSURE: 61 MMHG | TEMPERATURE: 98.1 F

## 2024-01-31 PROBLEM — G93.40 ACUTE ENCEPHALOPATHY: Status: RESOLVED | Noted: 2024-01-26 | Resolved: 2024-01-31

## 2024-01-31 LAB — GLUCOSE BLDC GLUCOMTR-MCNC: 145 MG/DL (ref 70–130)

## 2024-01-31 PROCEDURE — 99239 HOSP IP/OBS DSCHRG MGMT >30: CPT | Performed by: NURSE PRACTITIONER

## 2024-01-31 PROCEDURE — 82948 REAGENT STRIP/BLOOD GLUCOSE: CPT

## 2024-01-31 RX ORDER — OXYCODONE HYDROCHLORIDE 5 MG/1
5 TABLET ORAL EVERY 12 HOURS
Qty: 6 TABLET | Refills: 0 | Status: SHIPPED | OUTPATIENT
Start: 2024-01-31 | End: 2024-02-03

## 2024-01-31 RX ORDER — FOLIC ACID/VIT B COMPLEX AND C 0.8 MG
1 TABLET ORAL DAILY
Qty: 30 TABLET | Refills: 0 | Status: SHIPPED | OUTPATIENT
Start: 2024-01-31

## 2024-01-31 RX ORDER — NALOXONE HYDROCHLORIDE 4 MG/.1ML
SPRAY NASAL
Qty: 2 EACH | Refills: 0 | Status: SHIPPED | OUTPATIENT
Start: 2024-01-31

## 2024-01-31 RX ORDER — VENLAFAXINE HYDROCHLORIDE 37.5 MG/1
37.5 CAPSULE, EXTENDED RELEASE ORAL DAILY
Qty: 30 CAPSULE | Refills: 0 | Status: SHIPPED | OUTPATIENT
Start: 2024-01-31 | End: 2024-03-01

## 2024-01-31 RX ORDER — OXYCODONE HYDROCHLORIDE 5 MG/1
5 TABLET ORAL EVERY 8 HOURS PRN
Qty: 9 TABLET | Refills: 0 | Status: SHIPPED | OUTPATIENT
Start: 2024-01-31 | End: 2024-02-03

## 2024-01-31 RX ORDER — LACTULOSE 10 G/15ML
30 SOLUTION ORAL 2 TIMES DAILY
Start: 2024-01-31

## 2024-01-31 RX ORDER — BENZONATATE 200 MG/1
200 CAPSULE ORAL 3 TIMES DAILY PRN
Qty: 30 CAPSULE | Refills: 0 | Status: SHIPPED | OUTPATIENT
Start: 2024-01-31

## 2024-01-31 RX ADMIN — PANTOPRAZOLE SODIUM 40 MG: 40 TABLET, DELAYED RELEASE ORAL at 05:36

## 2024-01-31 RX ADMIN — OXYCODONE HYDROCHLORIDE 5 MG: 5 TABLET ORAL at 05:36

## 2024-01-31 NOTE — PLAN OF CARE
Goal Outcome Evaluation:            Pt resting off and on throughout the night, turned q2h. Heel boot in place to prevent PI on heel. Pleasantly confused. No other changes at this time.

## 2024-01-31 NOTE — DISCHARGE SUMMARY
[facility] Medicine Services  DISCHARGE SUMMARY    Patient Name: Jeaneth Keita  : 1960  MRN: [id number]    Date of Admission: 2024 12:01 PM  Date of Discharge:  2024  Primary Care Physician: Lulu Amos MD    Consults       Date and Time Order Name Status Description    2024  9:39 PM Inpatient Palliative Care MD Consult Completed             Hospital Course     Presenting Problem: confusion    Active Hospital Problems    Diagnosis  POA   • RSV (respiratory syncytial virus infection) [B33.8]  Yes   • Cirrhosis of liver [K74.60]  Yes   • Hepatocellular carcinoma metastatic to bone s/p RXT  [C79.51, C22.0]  Yes   • Chronic Hep C  [B18.2]  Yes   • ESRD (end stage renal disease) [N18.6]  Yes   • GERD (gastroesophageal reflux disease) [K21.9]  Yes   • S/P left BKA [Z89.512]  Not Applicable   • Essential hypertension [I10]  Yes   • Type 2 diabetes mellitus [E11.9]  Yes      Resolved Hospital Problems    Diagnosis Date Resolved POA   • **Acute encephalopathy [G93.40] 2024 Yes          Hospital Course:  Jeaneth Keita is a 63 y.o. female  with PMH significant for HTN, HLD, DMII, ESRD on HD, liver cirrhosis, hepatocellular carcinoma with lung/bone metastasis, chronic hepatitis C and prior L BKA. Multiple hospital admissions for AMS over the preceding 2 years. Last admitted to [facility] -23 for chest pain and AMS. Chest pain work up was unremarkable. She was continued on Lactulose / Rifaxamin for hepatic encephalopathy with improvement in mental status. She was brought to [facility] ED from her HD clinic on 24 for altered mental status.      Acute metabolic encephalopathy  - CXR, CT head and and CT abdomen/pelvis with no acute findings  - Ammonia level 20 at time of presentation  - UA unremarkable   - Cause of encephalopathy not clear. Tapering Effexor dose - has been on this medication since at least 2023,  so unclear how much it  is contributing - reduced to 37.5mg daily- continue through 2/11 then start 37.5mg every other day- further tapering/ management deferred to PCP  - Family confirmed that at baseline, patient is oriented to self only      Acute RSV infection   - (+) RSV on admission. Asymptomatic. Unclear if contributing to above issues  - tessalon prn. Can continue OTC mucinex/ delsym as desired     ESRD on HD  Anemia of chronic disease   - Nephrology following. Continue MWF HD   - Continue phos binders   - Hgb stable      Liver cirrhosis  Chronic hepatitis C  Hepatocellular carcinoma with lung/bone metastasis  - Ammonia 20 on admission. Continue Lactulose changed to 30mg bid / Rifaxamin bid     DMII  - Hgb A1c 6.3%  - Good control on no home meds     Hypertension, continue Carvedilol. Home Amlodipine on hold   Hyperlipidemia, continue statin.    Mood disorder, tapering Effexor due to concerns it may be contributing to encephalopathy- see above. Continue Seroquel 25mg QHS      Chronic pain / goals of care  - Appreciate palliative care assistance   -continue oxycodone at [facility]- recommend Palliative medicine to follow at [facility]     Debility  - PT/OT following. Patient must be assist x 1 to return to [facility]. Currently assist x 2 so CM looking into SNF options       Discharge Follow Up Recommendations for outpatient labs/diagnostics:   Pcp follow up one week- further management of Effexor taper per PCP   PT/OT at [facility]    Day of Discharge     HPI:   Up in bed eating breakfast in HD. States she is feeling much better. No overnight issues. Wants to go home    Review of Systems  Gen- No fevers, chills  CV- No chest pain, palpitations  Resp- No cough, dyspnea  GI- No N/V/D, abd pain      Vital Signs:   Temp:  [97.6 °F (36.4 °C)-98.6 °F (37 °C)] 97.6 °F (36.4 °C)  Heart Rate:  [75-92] 82  Resp:  [14-22] 22  BP: ()/(54-70) 114/55      Physical Exam:  Constitutional: No acute distress, awake, alert  HENT: NCAT, mucous membranes  moist  Respiratory: Clear to auscultation bilaterally, respiratory effort normal   Cardiovascular: RRR, no murmurs, rubs, or gallops  Gastrointestinal: Positive bowel sounds, soft, nontender, nondistended  Musculoskeletal: No edema  Psychiatric: Appropriate affect, cooperative  Neurologic: Oriented x 3, strength symmetric in all extremities, Cranial Nerves grossly intact to confrontation, speech clear  Skin: No rashes      Pertinent  and/or Most Recent Results     LAB RESULTS:      Lab 01/29/24  0643 01/27/24  0515 01/26/24  1212   WBC 6.15 5.10 4.36   HEMOGLOBIN 8.6* 8.9* 9.5*   HEMATOCRIT 24.8* 26.5* 27.5*   PLATELETS 178 171 167   NEUTROS ABS 3.61 3.24 2.96   IMMATURE GRANS (ABS) 0.03 0.01 0.01   LYMPHS ABS 1.65 1.21 0.83   MONOS ABS 0.44 0.46 0.38   EOS ABS 0.38 0.13 0.16   MCV 96.5 99.3* 97.5*   LACTATE  --   --  1.1         Lab 01/29/24  0643 01/28/24  0527 01/27/24  0515 01/26/24  1212   SODIUM 133* 130* 134* 134*   POTASSIUM 4.9 4.6 4.9 4.9   CHLORIDE 96* 94* 96* 93*   CO2 23.0 22.0 23.0 30.0*   ANION GAP 14.0 14.0 15.0 11.0   BUN 59* 42* 23 40*   CREATININE 6.23* 5.19* 3.80* 5.46*   EGFR 7.0* 8.7* 12.6* 8.2*   GLUCOSE 131* 270* 228* 350*   CALCIUM 8.6 8.7 8.8 9.1   MAGNESIUM  --   --  1.9  --    PHOSPHORUS  --   --  3.6  --    HEMOGLOBIN A1C  --   --  6.30*  --    TSH  --   --  1.930  --          Lab 01/27/24  0515 01/26/24  1212   TOTAL PROTEIN 6.3 6.8   ALBUMIN 3.7 3.7   GLOBULIN 2.6 3.1   ALT (SGPT) 38* 39*   AST (SGOT) 23 23   BILIRUBIN 1.0 0.7   ALK PHOS 100 145*         Lab 01/26/24  1420 01/26/24  1212   HSTROP T 39* 40*             Lab 01/27/24  0515   IRON 119   IRON SATURATION (TSAT) 56*   TIBC 212*   TRANSFERRIN 142*   FERRITIN 1,393.00*   FOLATE 8.78   VITAMIN B 12 326         Lab 01/26/24  1253   PH, ARTERIAL 7.552*   PCO2, ARTERIAL 34.0*   PO2 .0*   FIO2 21   HCO3 ART 29.9*   BASE EXCESS ART 7.2*   CARBOXYHEMOGLOBIN 1.4     Brief Urine Lab Results  (Last result in the past 365 days)         Color   Clarity   Blood   Leuk Est   Nitrite   Protein   CREAT   Urine HCG        01/26/24 1233 Yellow   Clear   Negative   Trace   Negative   >=300 mg/dL (3+)                 Microbiology Results (last 10 days)       Procedure Component Value - Date/Time    Urine Culture - Urine, Straight Cath [[id number]]  (Normal) Collected: 01/26/24 1233    Lab Status: Final result Specimen: Urine from Straight Cath Updated: 01/27/24 1502     Urine Culture No growth    COVID PRE-OP / PRE-PROCEDURE SCREENING ORDER (NO ISOLATION) - Swab, Nasopharynx [[id number]]  (Abnormal) Collected: 01/26/24 1213    Lab Status: Final result Specimen: Swab from Nasopharynx Updated: 01/26/24 1328    Narrative:      The following orders were created for panel order COVID PRE-OP / PRE-PROCEDURE SCREENING ORDER (NO ISOLATION) - Swab, Nasopharynx.  Procedure                               Abnormality         Status                     ---------                               -----------         ------                     Respiratory Panel PCR w/...[[id number]]  Abnormal            Final result                 Please view results for these tests on the individual orders.    Respiratory Panel PCR w/COVID-19(SARS-CoV-2) RUBINA/MANDY/MARK/PAD/COR/HORACE In-House, NP Swab in UTM/VTM, 2 HR TAT - Swab, Nasopharynx [[id number]]  (Abnormal) Collected: 01/26/24 1213    Lab Status: Final result Specimen: Swab from Nasopharynx Updated: 01/26/24 1328     ADENOVIRUS, PCR Not Detected     Coronavirus 229E Not Detected     Coronavirus HKU1 Not Detected     Coronavirus NL63 Not Detected     Coronavirus OC43 Not Detected     COVID19 Not Detected     Human Metapneumovirus Not Detected     Human Rhinovirus/Enterovirus Not Detected     Influenza A PCR Not Detected     Influenza B PCR Not Detected     Parainfluenza Virus 1 Not Detected     Parainfluenza Virus 2 Not Detected     Parainfluenza Virus 3 Not Detected     Parainfluenza Virus 4 Not Detected     RSV, PCR Detected      Bordetella pertussis pcr Not Detected     Bordetella parapertussis PCR Not Detected     Chlamydophila pneumoniae PCR Not Detected     Mycoplasma pneumo by PCR Not Detected    Narrative:      In the setting of a positive respiratory panel with a viral infection PLUS a negative procalcitonin without other underlying concern for bacterial infection, consider observing off antibiotics or discontinuation of antibiotics and continue supportive care. If the respiratory panel is positive for atypical bacterial infection (Bordetella pertussis, Chlamydophila pneumoniae, or Mycoplasma pneumoniae), consider antibiotic de-escalation to target atypical bacterial infection.            CT Head Without Contrast    Result Date: 1/26/2024  CT HEAD WO CONTRAST Date of Exam: 1/26/2024 12:56 PM EST Indication: confusion. Comparison: 1/19/2024 Technique: Axial CT images were obtained of the head without contrast administration.  Automated exposure control and iterative construction methods were used. Findings: The calvarium appears intact. Included paranasal sinuses and mastoids clear except for mild ethmoid sinus disease. As on prior study, there is moderately advanced generalized cerebral atrophy and there are chronic appearing central white matter changes. There is no evidence of new edema/infarct, intracranial hemorrhage, mass or mass effect, hydrocephalus, or abnormal extra-axial collection.     Impression: Chronic appearing changes of the aging brain. No new intracranial disease is seen. Electronically Signed: Washington Valladares MD  1/26/2024 1:40 PM EST  Workstation ID: [id number]    CT Abdomen Pelvis Without Contrast    Result Date: 1/26/2024  CT ABDOMEN PELVIS WO CONTRAST Date of Exam: 1/26/2024 12:56 PM EST Indication: abdominal pain. Comparison: CT abdomen and pelvis without contrast 1/24/2024 Technique: Axial CT images were obtained of the abdomen and pelvis without the administration of contrast. Reconstructed coronal and sagittal  images were also obtained. Automated exposure control and iterative construction methods were used. Findings: LUNG BASES: Stable bandlike scarring in the posteromedial right lower lobe. Small hiatal hernia. Coronary artery and mitral annular calcifications are present. LIVER: Approximately 4 cm peripherally calcified hypodense mass in the medial aspect of the liver is again noted. Subtle round hypodense mass within the left hepatic lobe is unchanged. Liver contour is lobulated. BILIARY/GALLBLADDER: Hyperdensity within the gallbladder is stable. SPLEEN:  Unremarkable PANCREAS:  Unremarkable ADRENAL:  Unremarkable KIDNEYS:  Unremarkable parenchyma with no solid mass identified. No obstruction.  The kidneys are mildly atrophic. Punctate calcifications are seen in both kidneys. There is no hydronephrosis. GASTROINTESTINAL/MESENTERY: Small hiatal hernia. Small bowel loops normal in caliber without mucosal thickening or evidence for obstruction. Normal appendix. Retained contrast and stool in the colon without inflammatory change. AORTA/IVC:  Normal caliber. RETROPERITONEUM/LYMPH NODES:  Unremarkable REPRODUCTIVE: The uterus is unremarkable. There is a 2.6 cm right adnexal cystic structure with calcification, similar to 6/27/2022. BLADDER:  Unremarkable OSSEUS STRUCTURES: Degenerative changes of the sacroiliac joints. Erosion of the right posteromedial seventh rib. Posterior and interbody fusion from L4-S1. Large destructive mass of the right iliac wing extending into the gluteal musculature lateral to the ileum in the iliac is muscle medial to the ileum which is similar to the most recent comparison study.     Impression: 1. No acute findings, and no adverse change since 1/24/2024. 2. Stable appearance of a 3 cm hypodense mass in the left hepatic lobe. Calcified hypodense mass in the medial liver is unchanged. 3. Large destructive lesion centered in the right iliac wing as previously described. 4.  Cholelithiasis/gallbladder sludge. Electronically Signed: Freda Agee MD  1/26/2024 1:21 PM EST  Workstation ID: [id number]    XR Chest 1 View    Result Date: 1/26/2024  XR CHEST 1 VW COMPARISON: 1/19/2024 HISTORY: altered mental status. Difficulty processing  ams. FINDINGS: Technical adequacy: Adequate Central airways: Unremarkable Heart: Cardiomegaly, likely accentuated by the technique Great vessels: Unremarkable Mediastinum: Unremarkable Lungs: Unremarkable Pulmonary trupti:Unremarkable Pleura: Unremarkable Skeletal structures: Degenerative changes Extra thoracic soft tissues:Unremarkable Additional comments: Left upper arm likely stent seen, unchanged.     Impression: As above. No acute cardiopulmonary disease. Electronically Signed: Ba Hallman MD  1/26/2024 1:05 PM EST  Workstation ID: [id number]    CT Abdomen Pelvis Without Contrast    Result Date: 1/24/2024  CT ABDOMEN PELVIS WO CONTRAST Date of Exam: 1/24/2024 1:46 AM EST Indication: Epigastric abdominal pain. Comparison: 6/27/2022. Technique: Axial CT images were obtained of the abdomen and pelvis without the administration of contrast. Reconstructed coronal and sagittal images were also obtained. Automated exposure control and iterative construction methods were used. Findings: There is stable bandlike scarring in the right lower lobe. The left lung base is clear. There is a small hiatal hernia. Heart size is normal. There are coronary artery and mitral annular calcifications. No acute findings in the superficial soft tissues. No acute osseous abnormalities. There is a posterior/interbody fusion construct extending from L4 down to S1. There is a large destructive mass at the right iliac wing extending into the gluteal musculature lateral to the ileum and the iliacus muscle medial to the ileum. The mass measures approximately 8.1 x 6.9 cm and completely erodes the ileum over a 6 cm diameter. This has significantly progressed since 2022. Erosive changes  are again seen at the posterior right seventh rib. There is moderate thoracolumbar spondylosis. There is a stable rim calcified hypodense mass in the medial liver measuring approximately 4.4 cm diameter, which could relate to patient's known history of liver carcinoma. There is a rounded hypodense mass in the left hepatic lobe measuring 29 mm, which represents a change from prior studies. There is extensive cholelithiasis without evidence of acute cholecystitis. The liver appears lobulated consistent with cirrhosis. There is a normal size spleen. The mid and distal stomach and adrenal glands appear unremarkable. The kidneys are mildly atrophic. There is no urolithiasis or hydronephrosis. Urinary bladder is nondistended. The uterus and ovaries appear unremarkable. The appendix is normal. The colon is unremarkable. No small bowel distention. No ascites, pneumoperitoneum or lymphadenopathy. The aorta is normal diameter.     Impression: 1.No acute abdominal or pelvic abnormality. 2.29 mm hypodense mass in the left hepatic lobe could reflect for metastatic disease. 3.A large destructive mass at the right iliac wing extending into the gluteal musculature lateral to the ileum and medial to the ileum. This has significantly progressed since 2022 and is consistent with metastasis. 4.A stable rim calcified hypodense mass in the medial liver measuring 4.4 cm diameter, which could relate to patient's known history of liver carcinoma. 5.Cholelithiasis without evidence of acute cholecystitis. 6.Small hiatal hernia. 7.Coronary artery disease. 8.Posterior/interbody fusion construct extending from L4 down to S1. Electronically Signed: Jesus Washington MD  1/24/2024 2:06 AM EST  Workstation ID: [id number]     Results for orders placed during the hospital encounter of 10/05/21    Duplex Initial Vein Mapping Hemodialysis Access Unilateral Left or Right CAR    Interpretation Summary  · The left cephalic vein is patent and of adequate size  in the upper arm.  · The left cephalic vein is patent and of adequate size in the forearm.      Results for orders placed during the hospital encounter of 10/05/21    Duplex Initial Vein Mapping Hemodialysis Access Unilateral Left or Right CAR    Interpretation Summary  · The left cephalic vein is patent and of adequate size in the upper arm.  · The left cephalic vein is patent and of adequate size in the forearm.      Results for orders placed during the hospital encounter of 12/18/23    Adult Transthoracic Echo Complete W/ Cont if Necessary Per Protocol    Interpretation Summary  •  Left ventricular systolic function is normal. Calculated left ventricular EF of 63%  •  Left ventricular diastolic function was normal.  •  Normal right ventricular size and systolic function  •  No significant valvular abnormality  •  No pericardial effusion  •  Compared to prior echocardiogram from 10/6/2021, there is no significant change      Plan for Follow-up of Pending Labs/Results:     Discharge Details        Discharge Medications        New Medications        Instructions Start Date   b complex-vitamin c-folic acid 0.8 MG tablet tablet   1 tablet, Oral, Daily      benzonatate 200 MG capsule  Commonly known as: TESSALON   200 mg, Oral, 3 Times Daily PRN      naloxone 4 MG/0.1ML nasal spray  Commonly known as: NARCAN   Call 911. Don't prime. [place] in 1 nostril for overdose. Repeat in 2-3 minutes in other nostril if no or minimal breathing/responsiveness.      oxyCODONE 5 MG immediate release tablet  Commonly known as: ROXICODONE   5 mg, Oral, Every 12 Hours      oxyCODONE 5 MG immediate release tablet  Commonly known as: ROXICODONE   5 mg, Oral, Every 8 Hours PRN             Changes to Medications        Instructions Start Date   calcium acetate 667 MG capsule capsule  Commonly known as: PHOS BINDER)  What changed: when to take this   667 mg, Oral, 3 Times Daily With Meals      lactulose 10 GM/15ML solution  Commonly known as:  CHRONULAC  What changed: when to take this   30 g, Oral, 2 Times Daily      venlafaxine XR 37.5 MG 24 hr capsule  Commonly known as: EFFEXOR-XR  What changed:   medication strength  how much to take  additional instructions   37.5 mg, Oral, Daily, Take 37.5mg daily through 2/11/24, then start 37.5mg every other day             Continue These Medications        Instructions Start Date   acetaminophen 325 MG tablet  Commonly known as: TYLENOL   650 mg, Oral, Every 6 Hours PRN      atorvastatin 80 MG tablet  Commonly known as: LIPITOR   80 mg, Oral, Nightly      carvedilol 25 MG tablet  Commonly known as: COREG   25 mg, Oral, 2 Times Daily With Meals      pantoprazole 40 MG EC tablet  Commonly known as: PROTONIX   40 mg, Oral, Daily      QUEtiapine 25 MG tablet  Commonly known as: SEROquel   25 mg, Oral, Nightly      riFAXIMin 550 MG tablet  Commonly known as: XIFAXAN   550 mg, Oral, 2 Times Daily      vitamin D 1.25 MG (99252 UT) capsule capsule  Commonly known as: ERGOCALCIFEROL   50,000 Units, Oral, Weekly, Sunday               Allergies   Allergen Reactions   • Sulfa Antibiotics          Discharge Disposition:  Home or Self Care    Diet:  Hospital:  Diet Order   Procedures   • Diet: Cardiac Diets, Diabetic Diets; Healthy Heart (2-3 Na+); Consistent Carbohydrate; Texture: Regular Texture (IDDSI 7); Fluid Consistency: Thin (IDDSI 0)       Diet Instructions       Diet: Cardiac Diets, Diabetic Diets; Healthy Heart (2-3 Na+); Regular Texture (IDDSI 7); Thin (IDDSI 0); Consistent Carbohydrate      Discharge Diet:  Cardiac Diets  Diabetic Diets       Cardiac Diet: Healthy Heart (2-3 Na+)    Texture: Regular Texture (IDDSI 7)    Fluid Consistency: Thin (IDDSI 0)    Diabetic Diet: Consistent Carbohydrate             Activity:  Activity Instructions       Activity as Tolerated              Restrictions or Other Recommendations:         CODE STATUS:    Code Status and Medical Interventions:   Ordered at: 01/27/24 1051      Medical Intervention Limits:    NO intubation (DNI)     Level Of Support Discussed With:    Health Care Surrogate     Code Status (Patient has no pulse and is not breathing):    No CPR (Do Not Attempt to Resuscitate)     Medical Interventions (Patient has pulse or is breathing):    Limited Support       Future Appointments   Date Time Provider Department Center   2/1/2024  1:00 PM Sandra Archer APRN [facility]       Additional Instructions for the Follow-ups that You Need to Schedule       Ambulatory Referral to Home Health   As directed      Face to Face Visit Date: 1/31/2024   Follow-up provider for Plan of Care?: I treated the patient in an acute care facility and will not continue treatment after discharge.   Follow-up provider: SAFIA AMOS [[id number]]   Reason/Clinical Findings: Encphalopathy; RSV   Describe mobility limitations that make leaving home difficult: Impaired functional mobility, gait, balance endurance   Nursing/Therapeutic Services Requested: Skilled Nursing Physical Therapy Occupational Therapy   Skilled nursing orders: Cardiopulmonary assessments   PT orders: Therapeutic exercise Gait Training Transfer training Strengthening   Weight Bearing Status: As Tolerated   Occupational orders: Activities of daily living Energy conservation Strengthening Cognition Fine motor   Frequency: 1 Week 1        Discharge Follow-up with PCP   As directed       Currently Documented PCP:    Safia Amos MD    PCP Phone Number:    [phone]     Follow Up Details: 1 week                      MAYELIN Chun  01/31/24      Time Spent on Discharge:  I spent  38  minutes on this discharge activity which included: face-to-face encounter with the patient, reviewing the data in the system, coordination of the care with the nursing staff as well as consultants, documentation, and entering orders.    Electronically signed by MAYELIN Chun, 01/31/24, 8:03 AM EST.

## 2024-01-31 NOTE — CASE MANAGEMENT/SOCIAL WORK
Case Management Discharge Note      Final Note: Plan to return to [facility] today. RN to call report to [phone] and fax DC summary to [phone]. Notified April at [facility] of pt's DC today. [employer] van to transport and will pickup in pt's room today at noon. MIKEY Jj, updated via phone.  Also updated pt's brother, Bob, via phone.           Selected Continued Care - Admitted Since 1/26/2024       Destination    No services have been selected for the patient.                Durable Medical Equipment    No services have been selected for the patient.                Dialysis/Infusion       Service Provider Selected Services Address Phone Fax Patient Preferred    [facility] [place] [facility]od[facility] [address], [place] [id number] [phone] --              Home Medical Care       Service Provider Selected Services Address Phone Fax Patient Preferred    [facility] ,  Home Rehabilitation [address] [place] [address], [address] [address] [phone] --              Therapy    No services have been selected for the patient.                Community Resources    No services have been selected for the patient.                Community & DME    No services have been selected for the patient.                    Selected Continued Care - Prior Encounters Includes continued care and service providers with selected services from prior encounters from 10/28/2023 to 1/31/2024      Discharged on 12/21/2023 Admission date: 12/18/2023 - Discharge disposition: Home or Self Care      Home Medical Care       Service Provider Selected Services Address Phone Fax Patient Preferred    [facility] ,  Home Rehabilitation [address] [place] [address], [address] [id number] [phone] --       Internal Comment last updated by Fernanda Padron RN 12/21/2023 1427    Accepted per April at [phone] @1423                                           Final Discharge Disposition Code: 06 - home with home health care

## 2024-01-31 NOTE — PROGRESS NOTES
"   LOS: 3 days    Patient Care Team:  Lulu Amos MD as PCP - General (Hospice and Palliative Medicine)    Subjective     Stable overnight.    Objective     Vital Signs:  Blood pressure 98/65, pulse 89, temperature 97.6 °F (36.4 °C), temperature source Oral, resp. rate 22, height 172.7 cm (68\"), weight 78.7 kg (173 lb 8 oz), SpO2 100%.      Intake/Output Summary (Last 24 hours) at 1/31/2024 0919  Last data filed at 1/30/2024 1300  Gross per 24 hour   Intake 240 ml   Output --   Net 240 ml        01/30 0701 - 01/31 0700  In: 240 [P.O.:240]  Out: -     Physical Exam:        GENERAL: WD WF NAD  NEURO: Sleeping but awakens  PSYCHIATRIC: Drowsy, cooperative with PE  CV: No edema  LUNGS:  Quiet,  Nonlabored resp.    ABDOMEN: Nondistended  : No Sanchez  SKIN: Warm and dry without rash  L UE AVF    Labs:  Results from last 7 days   Lab Units 01/29/24  0643 01/27/24  0515 01/26/24  1212   WBC 10*3/mm3 6.15 5.10 4.36   HEMOGLOBIN g/dL 8.6* 8.9* 9.5*   PLATELETS 10*3/mm3 178 171 167     Results from last 7 days   Lab Units 01/29/24  0643 01/28/24  0527 01/27/24  0515 01/26/24  1212   SODIUM mmol/L 133* 130* 134* 134*   POTASSIUM mmol/L 4.9 4.6 4.9 4.9   CHLORIDE mmol/L 96* 94* 96* 93*   CO2 mmol/L 23.0 22.0 23.0 30.0*   BUN mg/dL 59* 42* 23 40*   CREATININE mg/dL 6.23* 5.19* 3.80* 5.46*   CALCIUM mg/dL 8.6 8.7 8.8 9.1   PHOSPHORUS mg/dL  --   --  3.6  --    MAGNESIUM mg/dL  --   --  1.9  --    ALBUMIN g/dL  --   --  3.7 3.7     Results from last 7 days   Lab Units 01/27/24  0515   ALK PHOS U/L 100   BILIRUBIN mg/dL 1.0   ALT (SGPT) U/L 38*   AST (SGOT) U/L 23     Results from last 7 days   Lab Units 01/26/24  1253   PH, ARTERIAL pH units 7.552*   PO2 ART mm Hg 142.0*   PCO2, ARTERIAL mm Hg 34.0*   HCO3 ART mmol/L 29.9*               Estimated Creatinine Clearance: 9.9 mL/min (A) (by C-G formula based on SCr of 6.23 mg/dL (H)).         A/P:    ESRD: On HD [facility] with NAL..  Continue current outpatient " schedule.    HTN: Blood pressure low side but stable.  Give albumin as needed for blood pressure support on HD.    Hyponatremia: Sodium levels low.  Restrict free water intake.  Adjust with HD.    Metabolic/respiratory alkalosis: Initially bicarb elevated with low pCO2..  Improved with lower bicarb dialysate.  Adjust with HD.    Anemia: Hemoglobin below goal.  No ADRIEN with liver mass.  Transfuse as needed for Hgb <7.    Volume: Stable.  UF as tolerated with HD.    Hyperphosphatemia: Phosphorus stable 3.6.  Patient on binders.  Poor p.o. intake.  Monitor for now.    Bone mineralization: Phosphorus stable.  Calcium stable.  Patient is not on any bone modulating medications.  Monitor for now.    RSV: Respiratory status appears stable.  Getting supportive care only for now.    Mental status changes: Improved      Bob Mae MD  01/31/24  09:19 EST

## 2024-02-01 NOTE — OUTREACH NOTE
Prep Survey      Flowsheet Row Responses   [facility] facility patient discharged from? [facility]   Is LACE score < 7 ? No   Eligibility Readm Mgmt   Discharge diagnosis Acute metabolic encephalopathy, Acute RSV infection, ESRD on HD   Does the patient have one of the following disease processes/diagnoses(primary or secondary)? Other   Does the patient have Home health ordered? Yes   What is the Home health agency?  [facility]   Is there a DME ordered? No   General alerts for this patient James GUAMAN   Prep survey completed? Yes            Ann Marie CHILDERS - Registered Nurse

## 2024-02-04 LAB
QT INTERVAL: 354 MS
QTC INTERVAL: 442 MS

## 2024-02-07 ENCOUNTER — READMISSION MANAGEMENT (OUTPATIENT)
Dept: CALL CENTER | Facility: HOSPITAL | Age: 64
End: 2024-02-07
Payer: COMMERCIAL

## 2024-02-07 NOTE — OUTREACH NOTE
Medical Week 1 Survey      Flowsheet Row Responses   [facility] patient discharged from? [facility]   Does the patient have one of the following disease processes/diagnoses(primary or secondary)? Other   Week 1 attempt successful? No   Unsuccessful attempts Attempt 1  [MIKEY Jj requested to call Glendy, for updated info on pt, UTR Glendy]            Geeta CHILDERS - Registered Nurse

## 2024-02-12 ENCOUNTER — READMISSION MANAGEMENT (OUTPATIENT)
Dept: CALL CENTER | Facility: HOSPITAL | Age: 64
End: 2024-02-12
Payer: COMMERCIAL

## 2024-02-16 ENCOUNTER — HOSPITAL ENCOUNTER (EMERGENCY)
Facility: HOSPITAL | Age: 64
Discharge: HOME | End: 2024-02-16
Attending: EMERGENCY MEDICINE
Payer: MEDICARE

## 2024-02-16 ENCOUNTER — APPOINTMENT (OUTPATIENT)
Dept: CT IMAGING | Facility: HOSPITAL | Age: 64
End: 2024-02-16
Payer: MEDICARE

## 2024-02-16 VITALS
SYSTOLIC BLOOD PRESSURE: 122 MMHG | BODY MASS INDEX: 26.22 KG/M2 | WEIGHT: 173 LBS | HEART RATE: 96 BPM | OXYGEN SATURATION: 98 % | DIASTOLIC BLOOD PRESSURE: 70 MMHG | TEMPERATURE: 98.3 F | HEIGHT: 68 IN | RESPIRATION RATE: 18 BRPM

## 2024-02-16 DIAGNOSIS — C22.0 HEPATOCELLULAR CARCINOMA: ICD-10-CM

## 2024-02-16 DIAGNOSIS — C79.51 METASTASIS TO BONE: ICD-10-CM

## 2024-02-16 DIAGNOSIS — R10.9 ACUTE ABDOMINAL PAIN: Primary | ICD-10-CM

## 2024-02-16 LAB
ALBUMIN SERPL-MCNC: 3.4 G/DL (ref 3.5–5.2)
ALBUMIN/GLOB SERPL: 1.2 G/DL
ALP SERPL-CCNC: 130 U/L (ref 39–117)
ALT SERPL W P-5'-P-CCNC: 13 U/L (ref 1–33)
ANION GAP SERPL CALCULATED.3IONS-SCNC: 12 MMOL/L (ref 5–15)
AST SERPL-CCNC: 24 U/L (ref 1–32)
BASOPHILS # BLD AUTO: 0.02 10*3/MM3 (ref 0–0.2)
BASOPHILS NFR BLD AUTO: 0.5 % (ref 0–1.5)
BILIRUB SERPL-MCNC: 0.5 MG/DL (ref 0–1.2)
BUN SERPL-MCNC: 26 MG/DL (ref 8–23)
BUN/CREAT SERPL: 6 (ref 7–25)
CALCIUM SPEC-SCNC: 8.5 MG/DL (ref 8.6–10.5)
CHLORIDE SERPL-SCNC: 97 MMOL/L (ref 98–107)
CO2 SERPL-SCNC: 30 MMOL/L (ref 22–29)
CREAT SERPL-MCNC: 4.35 MG/DL (ref 0.57–1)
D-LACTATE SERPL-SCNC: 1.4 MMOL/L (ref 0.5–2)
DEPRECATED RDW RBC AUTO: 41.1 FL (ref 37–54)
EGFRCR SERPLBLD CKD-EPI 2021: 10.7 ML/MIN/1.73
EOSINOPHIL # BLD AUTO: 0.21 10*3/MM3 (ref 0–0.4)
EOSINOPHIL NFR BLD AUTO: 5.1 % (ref 0.3–6.2)
ERYTHROCYTE [DISTWIDTH] IN BLOOD BY AUTOMATED COUNT: 11.9 % (ref 12.3–15.4)
GLOBULIN UR ELPH-MCNC: 2.9 GM/DL
GLUCOSE SERPL-MCNC: 266 MG/DL (ref 65–99)
HCT VFR BLD AUTO: 23.3 % (ref 34–46.6)
HGB BLD-MCNC: 7.9 G/DL (ref 12–15.9)
HOLD SPECIMEN: NORMAL
IMM GRANULOCYTES # BLD AUTO: 0.02 10*3/MM3 (ref 0–0.05)
IMM GRANULOCYTES NFR BLD AUTO: 0.5 % (ref 0–0.5)
LIPASE SERPL-CCNC: 39 U/L (ref 13–60)
LYMPHOCYTES # BLD AUTO: 0.95 10*3/MM3 (ref 0.7–3.1)
LYMPHOCYTES NFR BLD AUTO: 23.2 % (ref 19.6–45.3)
MCH RBC QN AUTO: 32.6 PG (ref 26.6–33)
MCHC RBC AUTO-ENTMCNC: 33.9 G/DL (ref 31.5–35.7)
MCV RBC AUTO: 96.3 FL (ref 79–97)
MONOCYTES # BLD AUTO: 0.28 10*3/MM3 (ref 0.1–0.9)
MONOCYTES NFR BLD AUTO: 6.8 % (ref 5–12)
NEUTROPHILS NFR BLD AUTO: 2.61 10*3/MM3 (ref 1.7–7)
NEUTROPHILS NFR BLD AUTO: 63.9 % (ref 42.7–76)
NRBC BLD AUTO-RTO: 0 /100 WBC (ref 0–0.2)
PLATELET # BLD AUTO: 184 10*3/MM3 (ref 140–450)
PMV BLD AUTO: 9.4 FL (ref 6–12)
POTASSIUM SERPL-SCNC: 3.8 MMOL/L (ref 3.5–5.2)
PROT SERPL-MCNC: 6.3 G/DL (ref 6–8.5)
RBC # BLD AUTO: 2.42 10*6/MM3 (ref 3.77–5.28)
SODIUM SERPL-SCNC: 139 MMOL/L (ref 136–145)
WBC NRBC COR # BLD AUTO: 4.09 10*3/MM3 (ref 3.4–10.8)
WHOLE BLOOD HOLD COAG: NORMAL
WHOLE BLOOD HOLD SPECIMEN: NORMAL

## 2024-02-16 PROCEDURE — 96376 TX/PRO/DX INJ SAME DRUG ADON: CPT

## 2024-02-16 PROCEDURE — 85025 COMPLETE CBC W/AUTO DIFF WBC: CPT | Performed by: EMERGENCY MEDICINE

## 2024-02-16 PROCEDURE — 74176 CT ABD & PELVIS W/O CONTRAST: CPT

## 2024-02-16 PROCEDURE — 36415 COLL VENOUS BLD VENIPUNCTURE: CPT

## 2024-02-16 PROCEDURE — 25010000002 MORPHINE PER 10 MG: Performed by: EMERGENCY MEDICINE

## 2024-02-16 PROCEDURE — 83605 ASSAY OF LACTIC ACID: CPT | Performed by: EMERGENCY MEDICINE

## 2024-02-16 PROCEDURE — 83690 ASSAY OF LIPASE: CPT | Performed by: EMERGENCY MEDICINE

## 2024-02-16 PROCEDURE — 99284 EMERGENCY DEPT VISIT MOD MDM: CPT

## 2024-02-16 PROCEDURE — 80053 COMPREHEN METABOLIC PANEL: CPT | Performed by: EMERGENCY MEDICINE

## 2024-02-16 PROCEDURE — 96374 THER/PROPH/DIAG INJ IV PUSH: CPT

## 2024-02-16 RX ORDER — MORPHINE SULFATE 2 MG/ML
2 INJECTION, SOLUTION INTRAMUSCULAR; INTRAVENOUS ONCE
Status: COMPLETED | OUTPATIENT
Start: 2024-02-16 | End: 2024-02-16

## 2024-02-16 RX ORDER — MORPHINE SULFATE 4 MG/ML
4 INJECTION, SOLUTION INTRAMUSCULAR; INTRAVENOUS ONCE
Status: COMPLETED | OUTPATIENT
Start: 2024-02-16 | End: 2024-02-16

## 2024-02-16 RX ORDER — OXYCODONE HYDROCHLORIDE AND ACETAMINOPHEN 5; 325 MG/1; MG/1
1 TABLET ORAL EVERY 4 HOURS PRN
Qty: 12 TABLET | Refills: 0 | Status: SHIPPED | OUTPATIENT
Start: 2024-02-16

## 2024-02-16 RX ORDER — SODIUM CHLORIDE 9 MG/ML
10 INJECTION, SOLUTION INTRAMUSCULAR; INTRAVENOUS; SUBCUTANEOUS AS NEEDED
Status: DISCONTINUED | OUTPATIENT
Start: 2024-02-16 | End: 2024-02-16 | Stop reason: HOSPADM

## 2024-02-16 RX ADMIN — MORPHINE SULFATE 4 MG: 4 INJECTION, SOLUTION INTRAMUSCULAR; INTRAVENOUS at 14:54

## 2024-02-16 RX ADMIN — MORPHINE SULFATE 2 MG: 2 INJECTION, SOLUTION INTRAMUSCULAR; INTRAVENOUS at 13:28

## 2024-02-16 NOTE — ED PROVIDER NOTES
Subjective   History of Present Illness  Mrs Keita presents by ambulance from dialysis with abdominal pain.  She reports pain in the mid low abdomen starting about the time she started dialysis.  It became intolerable and dialysis was stopped after about 15 minutes.  She was given Zofran.  She denies any vomiting, diarrhea, constipation, fever.  She has found no aggravating or alleviating factors.       Review of her records indicates she was seen here on the  of last month with similar pain.  At that point CAT scan showed progression of her metastatic liver cancer.  She had bone mets eroding into the muscle in her pelvis.  She has chronic hep C.  She was admitted on the  through the  of last month with confusion.  Was positive for RSV.  Was seen by palliative care.      Review of Systems    Past Medical History:   Diagnosis Date    Anxiety     Cancer     liver cell carcinoma    DDD (degenerative disc disease), lumbar     Depression     Diabetes mellitus     Fibromyalgia     Hemochromatosis     Hep B w/o coma, chronic, w/o delta     Hep C w/o coma, chronic     Hypertension     Stroke     Vertigo        Allergies   Allergen Reactions    Sulfa Antibiotics        Past Surgical History:   Procedure Laterality Date    ARTERIOVENOUS FISTULA/SHUNT SURGERY Left 10/16/2021    Procedure: UPPER EXTREMITY ARTERIOVENOUS FISTULA FORMATION LEFT;  Surgeon: Johnny Rousseau MD;  Location: [facility];  Service: Vascular;  Laterality: Left;    BACK SURGERY      BELOW KNEE LEG AMPUTATION       SECTION      FOOT SURGERY      KNEE SURGERY      ROTATOR CUFF REPAIR      SPINAL CORD STIMULATOR IMPLANT         Family History   Problem Relation Age of Onset    Heart disease Father     Heart attack Father        Social History     Socioeconomic History    Marital status:    Tobacco Use    Smoking status: Never    Smokeless tobacco: Never   Vaping Use    Vaping Use: Never used   Substance and Sexual Activity     Alcohol use: No    Drug use: No    Sexual activity: Defer           Objective   Physical Exam  Vitals and nursing note reviewed.   Constitutional:       General: She is not in acute distress.     Appearance: Normal appearance.      Comments: She is awake and alert and able to provide history.  She is in no distress.   HENT:      Head: Normocephalic and atraumatic.      Nose: Nose normal. No congestion or rhinorrhea.   Eyes:      General: No scleral icterus.     Conjunctiva/sclera: Conjunctivae normal.   Neck:      Comments: No JVD   Cardiovascular:      Rate and Rhythm: Normal rate and regular rhythm.      Heart sounds: No murmur heard.     No friction rub.   Pulmonary:      Effort: Pulmonary effort is normal.      Breath sounds: Normal breath sounds. No wheezing or rales.   Abdominal:      General: Bowel sounds are normal.      Palpations: Abdomen is soft.      Tenderness: There is generalized abdominal tenderness. There is no guarding or rebound.      Comments: She indicates the pain is over her low abdomen.  She is diffusely tender in all areas.  Bowel sounds are active with a soft abdomen.  Rebound tenderness is absent   Musculoskeletal:         General: No tenderness.      Cervical back: Normal range of motion and neck supple.      Right lower leg: No edema.      Left lower leg: No edema.   Skin:     General: Skin is warm and dry.      Coloration: Skin is not pale.      Findings: No erythema.   Neurological:      General: No focal deficit present.      Mental Status: She is alert.      Motor: No weakness.      Coordination: Coordination normal.   Psychiatric:         Mood and Affect: Mood normal.         Behavior: Behavior normal.         Thought Content: Thought content normal.         Procedures           ED Course  ED Course as of 02/16/24 1447   Fri Feb 16, 2024   1303 She advises me would like pain medication.  Have reviewed her chart and she has advanced liver cancer with mets to the bone and lung.   Will give morphine.  Has already had Zofran [DT]   1438 Labs unremarkable.  She does have anemia but that is chronic due to her renal failure.  That is followed by her nephrologist at hemodialysis.  Transfusion not indicated at this time.  I reviewed her CT scan and the destructive lesion in her right pelvis is large.  Not changed over the last few weeks but progressed since 2022.  Do not see any acute problem.  Palliative care has seen her in the hospital.  I will offer pain medication. [DT]   1439 On repeat exam I spoke with her about her liver cancer.  She tells me it has been several years since she was diagnosed with that.  She thinks that she was treated in [place] for that.  She is aware of her cancer and aware of the fact that it is slowly progressing.  Will give additional pain medication. [DT]      ED Course User Index  [DT] Servando Seals MD                                             Medical Decision Making  Please see course notes.  I reviewed and interpreted prior records including local records and CT scans.  I ordered and interpreted labs.  I ordered and interpreted results of CT scan and discussed that with her.  Gave multiple doses of IV pain medication    Problems Addressed:  Acute abdominal pain: complicated acute illness or injury  Hepatocellular carcinoma: chronic illness or injury with exacerbation, progression, or side effects of treatment  Metastasis to bone: chronic illness or injury with exacerbation, progression, or side effects of treatment    Amount and/or Complexity of Data Reviewed  Labs: ordered. Decision-making details documented in ED Course.  Radiology: ordered. Decision-making details documented in ED Course.    Risk  Prescription drug management.        Final diagnoses:   Acute abdominal pain   Hepatocellular carcinoma   Metastasis to bone       ED Disposition  ED Disposition       ED Disposition   Discharge    Condition   Stable    Comment   --               Bette  Lulu PENA MD  [address] [facility] [id number] [phone]               Medication List        New Prescriptions      oxyCODONE-acetaminophen 5-325 MG per tablet  Commonly known as: PERCOCET  Take 1 tablet by mouth Every 4 (Four) Hours As Needed (Pain).            Changed      calcium acetate 667 MG capsule capsule  Commonly known as: PHOS BINDER)  Take 1 capsule by mouth 3 (Three) Times a Day With Meals.  What changed: when to take this               Where to Get Your Medications        These medications were sent to [id number] - [address] AT [facility] - [phone]  - [phone] [address] [address]      Phone: [phone]   oxyCODONE-acetaminophen 5-325 MG per tablet            Servando Seals MD  02/16/24 1606

## 2024-02-16 NOTE — DISCHARGE INSTRUCTIONS
I would recommend taking stool softeners twice a day while you take the pain medicine.  Call Dr. Amos to arrange close follow-up and for further pain management.  Return anytime if you have any concerns.

## 2024-03-21 ENCOUNTER — APPOINTMENT (OUTPATIENT)
Dept: GENERAL RADIOLOGY | Facility: HOSPITAL | Age: 64
End: 2024-03-21
Payer: MEDICARE

## 2024-03-21 ENCOUNTER — APPOINTMENT (OUTPATIENT)
Dept: CT IMAGING | Facility: HOSPITAL | Age: 64
End: 2024-03-21
Payer: MEDICARE

## 2024-03-21 ENCOUNTER — HOSPITAL ENCOUNTER (EMERGENCY)
Facility: HOSPITAL | Age: 64
Discharge: HOME | End: 2024-03-21
Attending: EMERGENCY MEDICINE
Payer: MEDICARE

## 2024-03-21 VITALS
SYSTOLIC BLOOD PRESSURE: 167 MMHG | BODY MASS INDEX: 25.76 KG/M2 | RESPIRATION RATE: 18 BRPM | HEIGHT: 68 IN | DIASTOLIC BLOOD PRESSURE: 72 MMHG | OXYGEN SATURATION: 100 % | HEART RATE: 90 BPM | TEMPERATURE: 98.3 F | WEIGHT: 170 LBS

## 2024-03-21 DIAGNOSIS — N18.5 CHRONIC RENAL FAILURE, STAGE 5: Primary | ICD-10-CM

## 2024-03-21 LAB
ALBUMIN SERPL-MCNC: 3.8 G/DL (ref 3.5–5.2)
ALBUMIN/GLOB SERPL: 1.3 G/DL
ALP SERPL-CCNC: 134 U/L (ref 39–117)
ALT SERPL W P-5'-P-CCNC: 84 U/L (ref 1–33)
ANION GAP SERPL CALCULATED.3IONS-SCNC: 10 MMOL/L (ref 5–15)
AST SERPL-CCNC: 28 U/L (ref 1–32)
BACTERIA UR QL AUTO: ABNORMAL /HPF
BASOPHILS # BLD AUTO: 0.02 10*3/MM3 (ref 0–0.2)
BASOPHILS NFR BLD AUTO: 0.4 % (ref 0–1.5)
BILIRUB SERPL-MCNC: 0.5 MG/DL (ref 0–1.2)
BILIRUB UR QL STRIP: NEGATIVE
BUN SERPL-MCNC: 32 MG/DL (ref 8–23)
BUN/CREAT SERPL: 7 (ref 7–25)
CALCIUM SPEC-SCNC: 9.3 MG/DL (ref 8.6–10.5)
CHLORIDE SERPL-SCNC: 92 MMOL/L (ref 98–107)
CLARITY UR: CLEAR
CO2 SERPL-SCNC: 31 MMOL/L (ref 22–29)
COLOR UR: YELLOW
CREAT SERPL-MCNC: 4.58 MG/DL (ref 0.57–1)
D-LACTATE SERPL-SCNC: 1.1 MMOL/L (ref 0.5–2)
DEPRECATED RDW RBC AUTO: 44.1 FL (ref 37–54)
EGFRCR SERPLBLD CKD-EPI 2021: 10.1 ML/MIN/1.73
EOSINOPHIL # BLD AUTO: 0.19 10*3/MM3 (ref 0–0.4)
EOSINOPHIL NFR BLD AUTO: 3.7 % (ref 0.3–6.2)
ERYTHROCYTE [DISTWIDTH] IN BLOOD BY AUTOMATED COUNT: 12.1 % (ref 12.3–15.4)
GLOBULIN UR ELPH-MCNC: 2.9 GM/DL
GLUCOSE BLDC GLUCOMTR-MCNC: 229 MG/DL (ref 70–130)
GLUCOSE SERPL-MCNC: 220 MG/DL (ref 65–99)
GLUCOSE UR STRIP-MCNC: ABNORMAL MG/DL
HCT VFR BLD AUTO: 27.1 % (ref 34–46.6)
HGB BLD-MCNC: 9.2 G/DL (ref 12–15.9)
HGB UR QL STRIP.AUTO: NEGATIVE
HYALINE CASTS UR QL AUTO: ABNORMAL /LPF
IMM GRANULOCYTES # BLD AUTO: 0.03 10*3/MM3 (ref 0–0.05)
IMM GRANULOCYTES NFR BLD AUTO: 0.6 % (ref 0–0.5)
KETONES UR QL STRIP: NEGATIVE
LEUKOCYTE ESTERASE UR QL STRIP.AUTO: NEGATIVE
LYMPHOCYTES # BLD AUTO: 1.1 10*3/MM3 (ref 0.7–3.1)
LYMPHOCYTES NFR BLD AUTO: 21.4 % (ref 19.6–45.3)
MCH RBC QN AUTO: 33.9 PG (ref 26.6–33)
MCHC RBC AUTO-ENTMCNC: 33.9 G/DL (ref 31.5–35.7)
MCV RBC AUTO: 100 FL (ref 79–97)
MONOCYTES # BLD AUTO: 0.35 10*3/MM3 (ref 0.1–0.9)
MONOCYTES NFR BLD AUTO: 6.8 % (ref 5–12)
NEUTROPHILS NFR BLD AUTO: 3.44 10*3/MM3 (ref 1.7–7)
NEUTROPHILS NFR BLD AUTO: 67.1 % (ref 42.7–76)
NITRITE UR QL STRIP: NEGATIVE
NRBC BLD AUTO-RTO: 0 /100 WBC (ref 0–0.2)
NT-PROBNP SERPL-MCNC: 9079 PG/ML (ref 0–900)
PH UR STRIP.AUTO: 8.5 [PH] (ref 5–8)
PLATELET # BLD AUTO: 164 10*3/MM3 (ref 140–450)
PMV BLD AUTO: 10.1 FL (ref 6–12)
POTASSIUM SERPL-SCNC: 4.2 MMOL/L (ref 3.5–5.2)
PROT SERPL-MCNC: 6.7 G/DL (ref 6–8.5)
PROT UR QL STRIP: ABNORMAL
RBC # BLD AUTO: 2.71 10*6/MM3 (ref 3.77–5.28)
RBC # UR STRIP: ABNORMAL /HPF
REF LAB TEST METHOD: ABNORMAL
SODIUM SERPL-SCNC: 133 MMOL/L (ref 136–145)
SP GR UR STRIP: 1.01 (ref 1–1.03)
SQUAMOUS #/AREA URNS HPF: ABNORMAL /HPF
TROPONIN T SERPL HS-MCNC: 40 NG/L
UROBILINOGEN UR QL STRIP: ABNORMAL
WBC # UR STRIP: ABNORMAL /HPF
WBC NRBC COR # BLD AUTO: 5.13 10*3/MM3 (ref 3.4–10.8)

## 2024-03-21 PROCEDURE — 81001 URINALYSIS AUTO W/SCOPE: CPT

## 2024-03-21 PROCEDURE — 82948 REAGENT STRIP/BLOOD GLUCOSE: CPT

## 2024-03-21 PROCEDURE — 36415 COLL VENOUS BLD VENIPUNCTURE: CPT

## 2024-03-21 PROCEDURE — 93005 ELECTROCARDIOGRAM TRACING: CPT

## 2024-03-21 PROCEDURE — 84484 ASSAY OF TROPONIN QUANT: CPT

## 2024-03-21 PROCEDURE — 85025 COMPLETE CBC W/AUTO DIFF WBC: CPT

## 2024-03-21 PROCEDURE — 70450 CT HEAD/BRAIN W/O DYE: CPT

## 2024-03-21 PROCEDURE — 87040 BLOOD CULTURE FOR BACTERIA: CPT

## 2024-03-21 PROCEDURE — 71045 X-RAY EXAM CHEST 1 VIEW: CPT

## 2024-03-21 PROCEDURE — 83880 ASSAY OF NATRIURETIC PEPTIDE: CPT

## 2024-03-21 PROCEDURE — 83605 ASSAY OF LACTIC ACID: CPT

## 2024-03-21 PROCEDURE — 99284 EMERGENCY DEPT VISIT MOD MDM: CPT

## 2024-03-21 PROCEDURE — 80053 COMPREHEN METABOLIC PANEL: CPT

## 2024-03-21 NOTE — ED PROVIDER NOTES
Subjective   History of Present Illness patient is a 63-year-old female who presents emergency department by EMS, for what she reports as acute onset of right jaw pain approximate 1 hour prior to arrival.  Patient also reports she is unsure how she got here, she is unsure when her last dialysis occurred.  Patient appears pale and ill.    Review of Systems   HENT:  Positive for dental problem. Negative for facial swelling, sore throat and trouble swallowing.    Respiratory: Negative.     Cardiovascular: Negative.    Gastrointestinal: Negative.    Genitourinary: Negative.    Musculoskeletal: Negative.    Skin: Negative.    Neurological:  Positive for weakness.   Psychiatric/Behavioral:  Positive for confusion.        Past Medical History:   Diagnosis Date    Anxiety     Cancer     liver cell carcinoma    DDD (degenerative disc disease), lumbar     Depression     Diabetes mellitus     Fibromyalgia     Hemochromatosis     Hep B w/o coma, chronic, w/o delta     Hep C w/o coma, chronic     Hypertension     Stroke     Vertigo        Allergies   Allergen Reactions    Sulfa Antibiotics        Past Surgical History:   Procedure Laterality Date    ARTERIOVENOUS FISTULA/SHUNT SURGERY Left 10/16/2021    Procedure: UPPER EXTREMITY ARTERIOVENOUS FISTULA FORMATION LEFT;  Surgeon: Johnny Rousseau MD;  Location: [facility];  Service: Vascular;  Laterality: Left;    BACK SURGERY      BELOW KNEE LEG AMPUTATION       SECTION      FOOT SURGERY      KNEE SURGERY      ROTATOR CUFF REPAIR      SPINAL CORD STIMULATOR IMPLANT         Family History   Problem Relation Age of Onset    Heart disease Father     Heart attack Father        Social History     Socioeconomic History    Marital status:    Tobacco Use    Smoking status: Never    Smokeless tobacco: Never   Vaping Use    Vaping status: Never Used   Substance and Sexual Activity    Alcohol use: No    Drug use: No    Sexual activity: Defer           Objective    Physical Exam  Constitutional:       Appearance: Normal appearance. She is ill-appearing.   HENT:      Head: Normocephalic and atraumatic.      Right Ear: Tympanic membrane, ear canal and external ear normal. There is no impacted cerumen.      Left Ear: Tympanic membrane, ear canal and external ear normal. There is no impacted cerumen.      Nose: Nose normal. Rhinorrhea present.      Mouth/Throat:      Mouth: Mucous membranes are moist. Mucous membranes are dry.      Pharynx: Oropharynx is clear. No oropharyngeal exudate or posterior oropharyngeal erythema.   Eyes:      Extraocular Movements: Extraocular movements intact.      Conjunctiva/sclera: Conjunctivae normal.      Pupils: Pupils are equal, round, and reactive to light.   Cardiovascular:      Rate and Rhythm: Normal rate.   Pulmonary:      Effort: Pulmonary effort is normal.      Breath sounds: Normal breath sounds.   Abdominal:      General: Abdomen is flat.      Palpations: Abdomen is soft.   Musculoskeletal:         General: Normal range of motion.      Cervical back: Normal range of motion.   Lymphadenopathy:      Cervical: Cervical adenopathy present.   Skin:     General: Skin is warm and dry.      Capillary Refill: Capillary refill takes less than 2 seconds.   Neurological:      General: No focal deficit present.      Mental Status: She is alert. She is disoriented.      Motor: Weakness present.         Procedures           ED Course  ED Course as of 03/21/24 2149   Thu Mar 21, 2024   1916 Patient initially evaluated.  After a brief focused exam, discussion with nursing staff the patient was not split flow appropriate but nonetheless will have care initiated. [JH]   2056 Patient's contact Glendy called back. Explained that patient goes to Dialysis at [facility], via [employer] , on Monday, Wednesday, and Friday. She lives at [facility], [address], in [place]. She receives primary care at her residential facility through [facility]  [employer]. []   2114 Noted patient's urinalysis awaiting results CT head imaging study. []   2143 Noted CT Head result without acute intracranial abnormality. []   2146 Results communicated to the patient, who is and remains alert, and verbalizes understanding to the plan to discharge back to her assisted living facility, with emphasis that she needs to complete her dialysis as scheduled tomorrow morning at 10 AM. []      ED Course User Index  [] Altaf Gallardo APRN                                             Medical Decision Making  Given the patient's presentation,, pertinent history, the differential is broad, cannot exclude acute coronary syndrome, rhinosinusitis, otitis media, pharyngitis, acute intracranial abnormality, pneumonia, pleural effusion, electrolyte derangement, hypoglycemia, anemia, urinary tract infection.  Patient was serum screening labs, urinalysis, plain film imaging of the chest, CT imaging of the head.  Twelve-lead ECG will also be performed.  Results to be communicated patient disposition considered.    Amount and/or Complexity of Data Reviewed  Labs: ordered.  Radiology: ordered.  ECG/medicine tests: ordered.        Final diagnoses:   Chronic renal failure, stage 5       ED Disposition  ED Disposition       ED Disposition   Discharge    Condition   Stable    Comment   --               Lulu Amos MD  [address]  [place] [id number]  [phone]          Bob Mae MD  [address]  [address]  [place] [id number]  [phone]               Medication List        Changed      calcium acetate 667 MG capsule capsule  Commonly known as: PHOS BINDER)  Take 1 capsule by mouth 3 (Three) Times a Day With Meals.  What changed: when to take this                 Altaf Gallardo APRN  03/23/24 1953

## 2024-03-22 LAB
QT INTERVAL: 376 MS
QTC INTERVAL: 462 MS

## 2024-03-26 LAB
BACTERIA SPEC AEROBE CULT: NORMAL
BACTERIA SPEC AEROBE CULT: NORMAL

## 2024-05-30 ENCOUNTER — NURSING HOME (OUTPATIENT)
Dept: INTERNAL MEDICINE | Facility: CLINIC | Age: 64
End: 2024-05-30
Payer: MEDICARE

## 2024-05-30 ENCOUNTER — DOCUMENTATION (OUTPATIENT)
Dept: INTERNAL MEDICINE | Facility: CLINIC | Age: 64
End: 2024-05-30

## 2024-05-30 VITALS
TEMPERATURE: 97.2 F | OXYGEN SATURATION: 96 % | DIASTOLIC BLOOD PRESSURE: 64 MMHG | RESPIRATION RATE: 18 BRPM | HEART RATE: 86 BPM | SYSTOLIC BLOOD PRESSURE: 127 MMHG

## 2024-05-30 DIAGNOSIS — C22.0 HEPATOCELLULAR CARCINOMA METASTATIC TO BONE: ICD-10-CM

## 2024-05-30 DIAGNOSIS — E11.51 TYPE 2 DIABETES MELLITUS WITH DIABETIC PERIPHERAL ANGIOPATHY WITHOUT GANGRENE, WITH LONG-TERM CURRENT USE OF INSULIN: ICD-10-CM

## 2024-05-30 DIAGNOSIS — C79.51 HEPATOCELLULAR CARCINOMA METASTATIC TO BONE: ICD-10-CM

## 2024-05-30 DIAGNOSIS — K21.9 GASTROESOPHAGEAL REFLUX DISEASE WITHOUT ESOPHAGITIS: Chronic | ICD-10-CM

## 2024-05-30 DIAGNOSIS — I10 ESSENTIAL HYPERTENSION: Primary | ICD-10-CM

## 2024-05-30 DIAGNOSIS — N18.6 ESRD (END STAGE RENAL DISEASE): Chronic | ICD-10-CM

## 2024-05-30 DIAGNOSIS — I61.0 NONTRAUMATIC SUBCORTICAL HEMORRHAGE OF LEFT CEREBRAL HEMISPHERE: ICD-10-CM

## 2024-05-30 DIAGNOSIS — Z79.4 TYPE 2 DIABETES MELLITUS WITH DIABETIC PERIPHERAL ANGIOPATHY WITHOUT GANGRENE, WITH LONG-TERM CURRENT USE OF INSULIN: ICD-10-CM

## 2024-05-30 DIAGNOSIS — Z89.512 S/P BKA (BELOW KNEE AMPUTATION), LEFT: ICD-10-CM

## 2024-05-30 DIAGNOSIS — K74.69 OTHER CIRRHOSIS OF LIVER: Chronic | ICD-10-CM

## 2024-05-30 PROCEDURE — 99305 1ST NF CARE MODERATE MDM 35: CPT | Performed by: INTERNAL MEDICINE

## 2024-05-30 RX ORDER — POLYETHYLENE GLYCOL 3350 17 G/17G
17 POWDER, FOR SOLUTION ORAL DAILY
COMMUNITY

## 2024-05-30 RX ORDER — OMEPRAZOLE 20 MG/1
20 CAPSULE, DELAYED RELEASE ORAL DAILY
COMMUNITY

## 2024-05-30 RX ORDER — NIFEDIPINE 30 MG/1
60 TABLET, EXTENDED RELEASE ORAL DAILY
COMMUNITY

## 2024-05-30 NOTE — PROGRESS NOTES
Nursing Home History and Physical       Jonatan EllingtonharDO  [address] [place] [address] Phone: [phone]  Fax: [phone]     PATIENT NAME: Jeaneth Keita                                                                          YOB: 1960           DATE OF SERVICE: 05/30/2024  FACILITY:  [facility]    CHIEF COMPLAINT:  Nursing facility admission        History of Present Illness  The patient is a 63-year-old female with a history of hyperlipidemia, hypertension, liver cirrhosis, and chronic pain, recently hospitalized at [facility] [place] for concerns of right lower extremity hematoma. The patient was evaluated by surgical services and no acute interventions were considered necessary. The patient was continued on hemodialysis for her Au renal disease and her cancer care was reviewed and recommendations have been for palliative care. Due to debility and weakness, the patient has transferred to this facility for further care and rehab.    The patient experienced a fall resulting in a hematoma in her right lower extremity. The hematoma appears to be decreasing in size, however, it exhibits warmth and is causing significant discomfort.       PAST MEDICAL & SURGICAL HISTORY:   Past Medical History:   Diagnosis Date    Anxiety     Cancer     liver cell carcinoma    DDD (degenerative disc disease), lumbar     Depression     Diabetes mellitus     Diabetic nephropathy     Fibromyalgia     Hemochromatosis     Hep B w/o coma, chronic, w/o delta     Hep C w/o coma, chronic     History of COVID-19     History of herpes simplex infection     History of liver cancer     History of sleep apnea     Hypertension     Inflammatory liver disease     Kidney stone     Malignant neoplasm of liver     Non-pressure chronic ulcer oth prt left foot w unsp severity     Stroke     Vertigo       Past Surgical History:   Procedure Laterality Date    ARTERIOVENOUS FISTULA/SHUNT SURGERY Left 10/16/2021     Procedure: UPPER EXTREMITY ARTERIOVENOUS FISTULA FORMATION LEFT;  Surgeon: Johnny Rousseau MD;  Location: [facility];  Service: Vascular;  Laterality: Left;    BACK SURGERY      BELOW KNEE LEG AMPUTATION      CATARACT EXTRACTION       SECTION      FOOT SURGERY      KIDNEY STONE SURGERY      KNEE SURGERY      MOUTH SURGERY      ROTATOR CUFF REPAIR      SPINAL CORD STIMULATOR IMPLANT           MEDICATIONS:  I have reviewed and reconciled the patients medication list in the patients chart at the skilled nursing facility on 2024.      ALLERGIES:  Allergies   Allergen Reactions    Sulfa Antibiotics Anaphylaxis and Swelling    Oxybutynin Chloride Swelling         SOCIAL HISTORY:  Social History     Socioeconomic History    Marital status:    Tobacco Use    Smoking status: Never    Smokeless tobacco: Never   Vaping Use    Vaping status: Never Used   Substance and Sexual Activity    Alcohol use: No    Drug use: No    Sexual activity: Defer       FAMILY HISTORY:  Family History   Problem Relation Age of Onset    Heart disease Father     Heart attack Father         REVIEW OF SYSTEMS:  Review of Systems   Constitutional:  Negative for chills, fatigue and fever.   HENT:  Negative for congestion, ear pain, rhinorrhea, sinus pressure and sore throat.    Eyes:  Negative for visual disturbance.   Respiratory:  Negative for cough, chest tightness, shortness of breath and wheezing.    Cardiovascular:  Negative for chest pain, palpitations and leg swelling.   Gastrointestinal:  Negative for abdominal pain, blood in stool, constipation, diarrhea, nausea and vomiting.   Endocrine: Negative for polydipsia and polyuria.   Genitourinary:  Negative for dysuria and hematuria.   Musculoskeletal:  Negative for arthralgias and back pain.   Skin:  Negative for rash.   Neurological:  Negative for dizziness, light-headedness, numbness and headaches.   Psychiatric/Behavioral:  Negative for dysphoric mood and sleep  disturbance. The patient is not nervous/anxious.          PHYSICAL EXAMINATION:   VITAL SIGNS: /64   Pulse 86   Temp 97.2 °F (36.2 °C)   Resp 18   SpO2 96%     Physical Exam  Vitals and nursing note reviewed.   Constitutional:       General: She is not in acute distress.     Appearance: Normal appearance. She is well-developed.   HENT:      Head: Normocephalic and atraumatic.      Nose: Nose normal.      Mouth/Throat:      Mouth: Mucous membranes are moist.      Pharynx: No oropharyngeal exudate.   Eyes:      General: No scleral icterus.     Conjunctiva/sclera: Conjunctivae normal.      Pupils: Pupils are equal, round, and reactive to light.   Neck:      Thyroid: No thyromegaly.   Cardiovascular:      Rate and Rhythm: Normal rate and regular rhythm.      Heart sounds: Normal heart sounds. No murmur heard.     No friction rub. No gallop.   Pulmonary:      Effort: Pulmonary effort is normal. No respiratory distress.      Breath sounds: Normal breath sounds. No wheezing.   Abdominal:      General: Bowel sounds are normal. There is no distension.      Palpations: Abdomen is soft.      Tenderness: There is no abdominal tenderness.   Musculoskeletal:         General: No deformity or signs of injury.      Cervical back: Normal range of motion and neck supple.   Lymphadenopathy:      Cervical: No cervical adenopathy.   Skin:     General: Skin is warm and dry.      Findings: No rash.   Neurological:      Mental Status: She is alert and oriented to person, place, and time.   Psychiatric:         Mood and Affect: Mood normal.         Behavior: Behavior normal.         RECORDS REVIEW:     ASSESSMENT   Diagnoses and all orders for this visit:    1. Essential hypertension (Primary)    2. Type 2 diabetes mellitus with diabetic peripheral angiopathy without gangrene, with long-term current use of insulin    3. Other cirrhosis of liver    4. ESRD (end stage renal disease)    5. Gastroesophageal reflux disease without  esophagitis    6. S/P left BKA    7. 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 Nontraumatic subcortical hemorrhage of left cerebral hemisphere        PLAN  Assessment & Plan  1. Right lower extremity hematoma.  Continuation of conservative management and pain control as needed, pt reassured.     2. ESRD on dialysis   Continued hemodialysis on Monday, Wednesday, and Fridays.  The patient is under the care of nephrology.    3. Hepatocellular carcinoma and pancreatic head carcinoma.  Ongoing palliative care is recommended.    4. Cognitive impairment / Dementia  Continuation of supportive care, PT and OT in rehab facility.     5. Hypertension.  The patient's hypertension is stable with the current blood pressure medications.    6. Type 2 diabetes mellitus.  Continued sliding scale insulin regimen    7. GERD.  The patient's GERD is stable with PPI.       [x]  Discussed Patient in detail with nursing/staff, addressed all needs today.     [x]  Plan of Care Reviewed   [x]  PT/OT Reviewed   []  Order Changes  []  Discharge Plans Reviewed  [x]  Advance Directive on file with Nursing Home.   [x]  POA on file with Nursing Home.    [x]  Code Status listed and reviewed.     Jonatan Varma DO.  6/3/2024      **Part of this note may be an electronic transcription/translation of spoken language to printed text using the Dragon Dictation System.**    Patient or patient representative verbalized consent for the use of Ambient Listening during the visit with  Jonatan Varma DO for chart documentation. 6/3/2024  09:27 EDT

## 2024-05-30 NOTE — LETTER
Nursing Home History and Physical       Jonatan EllingtonharDO  [address] [place] [address] Phone: [phone]  Fax: [phone]     PATIENT NAME: Jeaneth Keita                                                                          YOB: 1960           DATE OF SERVICE: 05/30/2024  FACILITY:  [facility]    CHIEF COMPLAINT:  Nursing facility admission        History of Present Illness  The patient is a 63-year-old female with a history of hyperlipidemia, hypertension, liver cirrhosis, and chronic pain, recently hospitalized at [facility] for concerns of right lower extremity hematoma. The patient was evaluated by surgical services and no acute interventions were considered necessary. The patient was continued on hemodialysis for her Au renal disease and her cancer care was reviewed and recommendations have been for palliative care. Due to debility and weakness, the patient has transferred to this facility for further care and rehab.    The patient experienced a fall resulting in a hematoma in her right lower extremity. The hematoma appears to be decreasing in size, however, it exhibits warmth and is causing significant discomfort.       PAST MEDICAL & SURGICAL HISTORY:   Past Medical History:   Diagnosis Date    Anxiety     Cancer     liver cell carcinoma    DDD (degenerative disc disease), lumbar     Depression     Diabetes mellitus     Diabetic nephropathy     Fibromyalgia     Hemochromatosis     Hep B w/o coma, chronic, w/o delta     Hep C w/o coma, chronic     History of COVID-19     History of herpes simplex infection     History of liver cancer     History of sleep apnea     Hypertension     Inflammatory liver disease     Kidney stone     Malignant neoplasm of liver     Non-pressure chronic ulcer oth prt left foot w unsp severity     Stroke     Vertigo       Past Surgical History:   Procedure Laterality Date    ARTERIOVENOUS FISTULA/SHUNT SURGERY Left 10/16/2021     Procedure: UPPER EXTREMITY ARTERIOVENOUS FISTULA FORMATION LEFT;  Surgeon: Johnny Rousseau MD;  Location: [facility];  Service: Vascular;  Laterality: Left;    BACK SURGERY      BELOW KNEE LEG AMPUTATION      CATARACT EXTRACTION       SECTION      FOOT SURGERY      KIDNEY STONE SURGERY      KNEE SURGERY      MOUTH SURGERY      ROTATOR CUFF REPAIR      SPINAL CORD STIMULATOR IMPLANT           MEDICATIONS:  I have reviewed and reconciled the patients medication list in the patients chart at the skilled nursing facility on 2024.      ALLERGIES:  Allergies   Allergen Reactions    Sulfa Antibiotics Anaphylaxis and Swelling    Oxybutynin Chloride Swelling         SOCIAL HISTORY:  Social History     Socioeconomic History    Marital status:    Tobacco Use    Smoking status: Never    Smokeless tobacco: Never   Vaping Use    Vaping status: Never Used   Substance and Sexual Activity    Alcohol use: No    Drug use: No    Sexual activity: Defer       FAMILY HISTORY:  Family History   Problem Relation Age of Onset    Heart disease Father     Heart attack Father         REVIEW OF SYSTEMS:  Review of Systems   Constitutional:  Negative for chills, fatigue and fever.   HENT:  Negative for congestion, ear pain, rhinorrhea, sinus pressure and sore throat.    Eyes:  Negative for visual disturbance.   Respiratory:  Negative for cough, chest tightness, shortness of breath and wheezing.    Cardiovascular:  Negative for chest pain, palpitations and leg swelling.   Gastrointestinal:  Negative for abdominal pain, blood in stool, constipation, diarrhea, nausea and vomiting.   Endocrine: Negative for polydipsia and polyuria.   Genitourinary:  Negative for dysuria and hematuria.   Musculoskeletal:  Negative for arthralgias and back pain.   Skin:  Negative for rash.   Neurological:  Negative for dizziness, light-headedness, numbness and headaches.   Psychiatric/Behavioral:  Negative for dysphoric mood and sleep  disturbance. The patient is not nervous/anxious.          PHYSICAL EXAMINATION:   VITAL SIGNS: /64   Pulse 86   Temp 97.2 °F (36.2 °C)   Resp 18   SpO2 96%     Physical Exam  Vitals and nursing note reviewed.   Constitutional:       General: She is not in acute distress.     Appearance: Normal appearance. She is well-developed.   HENT:      Head: Normocephalic and atraumatic.      Nose: Nose normal.      Mouth/Throat:      Mouth: Mucous membranes are moist.      Pharynx: No oropharyngeal exudate.   Eyes:      General: No scleral icterus.     Conjunctiva/sclera: Conjunctivae normal.      Pupils: Pupils are equal, round, and reactive to light.   Neck:      Thyroid: No thyromegaly.   Cardiovascular:      Rate and Rhythm: Normal rate and regular rhythm.      Heart sounds: Normal heart sounds. No murmur heard.     No friction rub. No gallop.   Pulmonary:      Effort: Pulmonary effort is normal. No respiratory distress.      Breath sounds: Normal breath sounds. No wheezing.   Abdominal:      General: Bowel sounds are normal. There is no distension.      Palpations: Abdomen is soft.      Tenderness: There is no abdominal tenderness.   Musculoskeletal:         General: No deformity or signs of injury.      Cervical back: Normal range of motion and neck supple.   Lymphadenopathy:      Cervical: No cervical adenopathy.   Skin:     General: Skin is warm and dry.      Findings: No rash.   Neurological:      Mental Status: She is alert and oriented to person, place, and time.   Psychiatric:         Mood and Affect: Mood normal.         Behavior: Behavior normal.         RECORDS REVIEW:     ASSESSMENT   Diagnoses and all orders for this visit:    1. Essential hypertension (Primary)    2. Type 2 diabetes mellitus with diabetic peripheral angiopathy without gangrene, with long-term current use of insulin    3. Other cirrhosis of liver    4. ESRD (end stage renal disease)    5. Gastroesophageal reflux disease without  esophagitis    6. S/P left BKA    7. 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 Nontraumatic subcortical hemorrhage of left cerebral hemisphere        PLAN  Assessment & Plan  1. Right lower extremity hematoma.  Continuation of conservative management and pain control as needed, pt reassured.     2. ESRD on dialysis   Continued hemodialysis on Monday, Wednesday, and Fridays.  The patient is under the care of nephrology.    3. Hepatocellular carcinoma and pancreatic head carcinoma.  Ongoing palliative care is recommended.    4. Cognitive impairment / Dementia  Continuation of supportive care, PT and OT in rehab facility.     5. Hypertension.  The patient's hypertension is stable with the current blood pressure medications.    6. Type 2 diabetes mellitus.  Continued sliding scale insulin regimen    7. GERD.  The patient's GERD is stable with PPI.       [x]  Discussed Patient in detail with nursing/staff, addressed all needs today.     [x]  Plan of Care Reviewed   [x]  PT/OT Reviewed   []  Order Changes  []  Discharge Plans Reviewed  [x]  Advance Directive on file with Nursing Home.   [x]  POA on file with Nursing Home.    [x]  Code Status listed and reviewed.     Jonatan Varma DO.  6/3/2024      **Part of this note may be an electronic transcription/translation of spoken language to printed text using the Dragon Dictation System.**    Patient or patient representative verbalized consent for the use of Ambient Listening during the visit with  Jonatan Varma DO for chart documentation. 6/3/2024  09:27 EDT

## 2024-05-31 DIAGNOSIS — C22.0 HEPATOCELLULAR CARCINOMA: ICD-10-CM

## 2024-05-31 DIAGNOSIS — C79.51 METASTASIS TO BONE: ICD-10-CM

## 2024-05-31 RX ORDER — OXYCODONE HYDROCHLORIDE AND ACETAMINOPHEN 5; 325 MG/1; MG/1
1 TABLET ORAL EVERY 4 HOURS PRN
Qty: 180 TABLET | Refills: 0 | Status: SHIPPED | OUTPATIENT
Start: 2024-05-31 | End: 2024-06-30

## 2024-05-31 NOTE — TELEPHONE ENCOUNTER
[facility] AND REHAB REQUESTING REFILL ON OXYCODONE 5MG-325MG.    SIG- TAKE ONE TABLET ORALLY EVERY 4 HOURS AS NEEDED.

## 2024-12-04 NOTE — PLAN OF CARE
Goal Outcome Evaluation:Scheduled HD completed. Pt tolerated well. Goal reached. Blood returned/re infused to pt. Pt is pleasantly  confused.       Problem: Device-Related Complication Risk (Hemodialysis)  Goal: Safe, Effective Therapy Delivery  Outcome: Ongoing, Progressing  Intervention: Optimize Device Care and Function  Recent Flowsheet Documentation  Taken 1/31/2024 0820 by Willa Bell RN  Medication Review/Management: medications reviewed     Problem: Hemodynamic Instability (Hemodialysis)  Goal: Effective Tissue Perfusion  Outcome: Ongoing, Progressing                                                  No risk alerts present no

## 2025-05-14 NOTE — CONSULTS
Learning About Lung Cancer Screening  What is screening for lung cancer?     Lung cancer screening is a way to find some lung cancers early, before a person has any symptoms of the cancer.  Lung cancer screening may help those who have the highest risk for lung cancer--people age 50 and older who are or were heavy smokers. For most people, who aren't at increased risk, screening for lung cancer probably isn't helpful.  Screening won't prevent cancer. And it may not find all lung cancers. Lung cancer screening may lower the risk of dying from lung cancer in a small number of people.  How is it done?  Lung cancer screening is done with a low-dose CT (computed tomography) scan. A CT scan uses X-rays, or radiation, to make detailed pictures of your body. Experts recommend that screening be done in medical centers that focus on finding and treating lung cancer.  Who is screening recommended for?  Lung cancer screening is recommended for people age 50 and older who are or were heavy smokers. That means people with a smoking history of at least 20 pack years. A pack year is a way to measure how heavy a smoker you are or were.  To figure out your pack years, multiply how many packs a day on average (assuming 20 cigarettes per pack) you have smoked by how many years you have smoked. For example:  If you smoked 1 pack a day for 20 years, that's 1 times 20. So you have a smoking history of 20 pack years.  If you smoked 2 packs a day for 10 years, that's 2 times 10. So you have a smoking history of 20 pack years.  Experts agree that screening is for people who have a high risk of lung cancer. But experts don't agree on what high risk means. Some say people age 50 or older with at least a 20-pack-year smoking history are high risk. Others say it's people age 55 or older with a 30-pack-year history.  To see if you could benefit from screening, first find out if you are at high risk for lung cancer. Your doctor can help you    Patient Care Team:  Provider, No Known as PCP - General    Chief complaint: ESRD       History of Present Illness: Jeaneth Keita is a 61 y.o. female with h/o cirrhosis, T2DM, ESRD, HTN, previous CVA who presents from [facility] due to confusion. Patient reports to me that she does not recall why she is here or events leading up to her presentation. She is unable to mention about her dialysis christie. Doesn't recall if she missed any HD treatment. She has non functioning AV access in left arm and TDC cath on right side. It seems like patient had similar presentation few weeks ago with AMS. Per review of old records patient goes to [facility]. She has hx of ESRD on MWF christie.    Review of Systems   Unable to perform ROS: Acuity of condition        Past Medical History:   Diagnosis Date   • Anxiety    • Cancer (HCC)     liver cell carcinoma   • DDD (degenerative disc disease), lumbar    • Depression    • Diabetes mellitus (HCC)    • Fibromyalgia    • Hemochromatosis    • Hep B w/o coma, chronic, w/o delta (HCC)    • Hep C w/o coma, chronic (HCC)    • Hypertension    • Stroke (HCC)    • Vertigo    ,   Past Surgical History:   Procedure Laterality Date   • ARTERIOVENOUS FISTULA/SHUNT SURGERY Left 10/16/2021    Procedure: UPPER EXTREMITY ARTERIOVENOUS FISTULA FORMATION LEFT;  Surgeon: Johnny Rousseau MD;  [facility];  Service: Vascular;  Laterality: Left;   • BACK SURGERY     • BELOW KNEE LEG AMPUTATION     •  SECTION     • FOOT SURGERY     • KNEE SURGERY     • ROTATOR CUFF REPAIR     • SPINAL CORD STIMULATOR IMPLANT     ,   Family History   Problem Relation Age of Onset   • Heart disease Father    • Heart attack Father    ,   Social History     Socioeconomic History   • Marital status:    Tobacco Use   • Smoking status: Never Smoker   • Smokeless tobacco: Never Used   Vaping Use   • Vaping Use: Never used   Substance and Sexual Activity   • Alcohol use: No   • Drug use: No   • Sexual  activity: Defer     E-cigarette/Vaping   • E-cigarette/Vaping Use Never User    • Passive Exposure No    • Counseling Given No      E-cigarette/Vaping Substances   • Nicotine No    • THC No    • CBD No    • Flavoring No      E-cigarette/Vaping Devices   • Disposable No    • Pre-filled or Refillable Cartridge No    • Refillable Tank No    • Pre-filled Pod No        ,   Medications Prior to Admission   Medication Sig Dispense Refill Last Dose   • acetaminophen (TYLENOL) 325 MG tablet Take 650 mg by mouth Every 6 (Six) Hours As Needed for Mild Pain .      • amLODIPine (NORVASC) 10 MG tablet Take 1 tablet by mouth Daily.      • atorvastatin (LIPITOR) 80 MG tablet Take 80 mg by mouth Every Night.      • B Complex-C (B Complex-Vitamin C) capsule Take 1 capsule by mouth Daily.      • calcium acetate (PHOS BINDER,) 667 MG capsule capsule Take 1 capsule by mouth 3 (Three) Times a Day With Meals. (Patient taking differently: Take 667 mg by mouth 3 (Three) Times a Day Before Meals.) 180 capsule     • carvedilol (COREG) 25 MG tablet Take 25 mg by mouth 2 (two) times a day with meals.      • epoetin allie-epbx (RETACRIT) 24150 UNIT/ML injection Inject 1 mL under the skin into the appropriate area as directed 3 (Three) Times a Week. Indications: ESRD on Dialysis 6.6 mL     • hydrALAZINE (APRESOLINE) 50 MG tablet Take 100 mg by mouth 3 (Three) Times a Day.      • ibuprofen (ADVIL,MOTRIN) 600 MG tablet Take 1 tablet by mouth Every 6 (Six) Hours As Needed for Mild Pain . 20 tablet 0    • Insulin Lispro (humaLOG) 100 UNIT/ML injection Inject 0-7 Units under the skin into the appropriate area as directed 3 (Three) Times a Day Before Meals for 30 days. 630 Units 0    • Insulin NPH Isophane & Regular (NovoLIN 70/30 FlexPen) (70-30) 100 UNIT/ML suspension pen-injector Inject 1 Units under the skin into the appropriate area as directed 2 (Two) Times a Day Before Meals for 30 days. 24 units before breakfast and 22 units before dinner 0.6 mL  0    • loperamide (IMODIUM) 2 MG capsule Take 2 mg by mouth 4 (Four) Times a Day As Needed for Diarrhea. Take 2 capsules by mouth daily times one, then 1 capsule by mouth after each loose stool as needed.  Max 4 capsules in 24 hours.      • ondansetron (ZOFRAN) 4 MG tablet Take 4 mg by mouth Every 6 (Six) Hours As Needed for Nausea or Vomiting.      • pantoprazole (PROTONIX) 40 MG EC tablet Take 1 tablet by mouth Every Morning. 30 tablet 1    • QUEtiapine (SEROquel) 25 MG tablet Take 1 tablet by mouth Every Night. 30 tablet 1    • sennosides-docusate (senna-docusate sodium) 8.6-50 MG per tablet Take 1 tablet by mouth Daily.      • venlafaxine XR (EFFEXOR-XR) 75 MG 24 hr capsule Take 75 mg by mouth Daily.      • vitamin D (ERGOCALCIFEROL) 1.25 MG (21151 UT) capsule capsule Take 50,000 Units by mouth 1 (One) Time Per Week. Sunday       and Scheduled Meds:  amLODIPine, 10 mg, Oral, Q24H  atorvastatin, 80 mg, Oral, Nightly  [START ON 8/23/2022] calcium acetate, 667 mg, Oral, TID With Meals  carvedilol, 25 mg, Oral, BID With Meals  epoetin allie-epbx, 10,000 Units, Subcutaneous, Once per day on Mon Wed Fri  heparin (porcine), 5,000 Units, Subcutaneous, Q8H  hydrALAZINE, 100 mg, Oral, TID  insulin lispro, 0-7 Units, Subcutaneous, TID AC  insulin lispro protamine-insulin lispro, 20 Units, Subcutaneous, BID With Meals  [START ON 8/23/2022] pantoprazole, 40 mg, Oral, QAM  senna-docusate sodium, 2 tablet, Oral, BID  sodium chloride, 10 mL, Intravenous, Q12H  venlafaxine XR, 75 mg, Oral, Daily        Objective     Vital Signs  Temp:  [97.6 °F (36.4 °C)-98.1 °F (36.7 °C)] 97.6 °F (36.4 °C)  Heart Rate:  [86-94] 94  Resp:  [16-18] 18  BP: (129-149)/(73-91) 142/73    No intake/output data recorded.  I/O last 3 completed shifts:  In: -   Out: 2010 [Other:2010]    Physical Exam  Constitutional:       Appearance: Normal appearance.   HENT:      Head: Normocephalic and atraumatic.      Nose: Nose normal.      Mouth/Throat:      Mouth:  Mucous membranes are moist.   Cardiovascular:      Rate and Rhythm: Normal rate and regular rhythm.      Pulses: Normal pulses.      Heart sounds: Normal heart sounds.   Pulmonary:      Effort: Pulmonary effort is normal.      Breath sounds: Normal breath sounds.   Abdominal:      General: Abdomen is flat.   Musculoskeletal:      Right lower leg: No edema.   Skin:     General: Skin is warm.   Neurological:      General: No focal deficit present.      Mental Status: She is alert.         Results Review:    I reviewed the patient's new clinical results.    WBC WBC   Date Value Ref Range Status   08/22/2022 4.71 3.40 - 10.80 10*3/mm3 Final      HGB Hemoglobin   Date Value Ref Range Status   08/22/2022 10.0 (L) 12.0 - 15.9 g/dL Final   08/22/2022 9.5 (L) 12.0 - 17.0 g/dL Final     Comment:     Serial Number: [id number]:  [id number]      HCT Hematocrit   Date Value Ref Range Status   08/22/2022 27.7 (L) 34.0 - 46.6 % Final   08/22/2022 28 (L) 38 - 51 % Final      Platlets No results found for: LABPLAT   MCV MCV   Date Value Ref Range Status   08/22/2022 95.5 79.0 - 97.0 fL Final          Sodium Sodium   Date Value Ref Range Status   08/22/2022 135 (L) 136 - 145 mmol/L Final   08/22/2022 136 136 - 145 mmol/L Final      Potassium Potassium   Date Value Ref Range Status   08/22/2022 3.3 (L) 3.5 - 5.2 mmol/L Final   08/22/2022 5.5 (H) 3.5 - 5.2 mmol/L Final      Chloride Chloride   Date Value Ref Range Status   08/22/2022 98 98 - 107 mmol/L Final   08/22/2022 102 98 - 107 mmol/L Final      CO2 CO2   Date Value Ref Range Status   08/22/2022 23.0 22.0 - 29.0 mmol/L Final   08/22/2022 20.0 (L) 22.0 - 29.0 mmol/L Final      BUN BUN   Date Value Ref Range Status   08/22/2022 22 8 - 23 mg/dL Final   08/22/2022 63 (H) 8 - 23 mg/dL Final      Creatinine Creatinine   Date Value Ref Range Status   08/22/2022 2.83 (H) 0.57 - 1.00 mg/dL Final   08/22/2022 6.95 (H) 0.57 - 1.00 mg/dL Final   08/22/2022 8.30 (H) 0.60 - 1.30 mg/dL Final       Calcium Calcium   Date Value Ref Range Status   08/22/2022 8.8 8.6 - 10.5 mg/dL Final   08/22/2022 9.5 8.6 - 10.5 mg/dL Final      PO4 No results found for: CAPO4   Albumin Albumin   Date Value Ref Range Status   08/22/2022 4.10 3.50 - 5.20 g/dL Final      Magnesium Magnesium   Date Value Ref Range Status   08/22/2022 2.1 1.6 - 2.4 mg/dL Final      Uric Acid No results found for: URICACID         Assessment & Plan       Cirrhosis of liver (HCC)    Chronic Hep C     ESRD (end stage renal disease)    S/P left BKA    Essential hypertension    Type 2 diabetes mellitus (HCC)    Altered mental status      Assessment & Plan    [place] [facility] [facility]     Anemia: Hb at target. Hold ADRIEN due to hx of metastatic liver ca    BMD: check phos and pTH    Acid base and Elytes: Mild hyperkalemia on admission.    AMS: Etiology unspecified.     Volume status:  Stable.       Recs  HD per [facility] 2.5 hr treatment today. Will consider another treatment tomorrow depending upon labs.  ADRIEN on HD   Renal Diet   Resume home meds       I discussed the patients findings and my recommendations with patient    Kin Pierson MD  08/22/22  21:23 EDT

## (undated) DEVICE — PENCL ROCKRSWCH MEGADYNE W/HOLSTR 10FT SS

## (undated) DEVICE — SUT PROLN 6/0 BV1 D/A 30IN 8709H

## (undated) DEVICE — DRAPE,HAND,STERILE: Brand: MEDLINE

## (undated) DEVICE — 450 ML BOTTLE OF 0.05% CHLORHEXIDINE GLUCONATE IN 99.95% STERILE WATER FOR IRRIGATION, USP AND APPLICATOR.: Brand: IRRISEPT ANTIMICROBIAL WOUND LAVAGE

## (undated) DEVICE — DECANTER BAG 9": Brand: MEDLINE INDUSTRIES, INC.

## (undated) DEVICE — SUT SILK 2/0 TIES 18IN A185H

## (undated) DEVICE — GLV SURG PREMIERPRO MIC LTX PF SZ7.5 BRN

## (undated) DEVICE — APPL CHLORAPREP TINTED 26ML TEAL

## (undated) DEVICE — PROXIMATE RH ROTATING HEAD SKIN STAPLERS (35 WIDE) CONTAINS 35 STAINLESS STEEL STAPLES: Brand: PROXIMATE

## (undated) DEVICE — SUT SILK 4/0 TIES 18IN A183H

## (undated) DEVICE — ADHS SKIN PREMIERPRO EXOFIN TOPICAL HI/VISC .5ML

## (undated) DEVICE — PK VASC 10

## (undated) DEVICE — SUT PROLN 7/0 BV1 D/A 24IN 8702H

## (undated) DEVICE — STCKNT STRL 4X48 IN

## (undated) DEVICE — ANTIBACTERIAL UNDYED BRAIDED (POLYGLACTIN 910), SYNTHETIC ABSORBABLE SUTURE: Brand: COATED VICRYL

## (undated) DEVICE — DRSNG SURESITE WNDW 4X4.5

## (undated) DEVICE — ELECTRD BLD EZ CLN MOD XLNG 2.75IN

## (undated) DEVICE — SUT SILK 2/0 SH 30IN K833H

## (undated) DEVICE — SUT SILK 3/0 TIES 18IN A184H

## (undated) DEVICE — DRP SURG UNIV BASIC BILAMINATE 53X77IN DISP

## (undated) DEVICE — 3M™ IOBAN™ 2 ANTIMICROBIAL INCISE DRAPE 6650EZ: Brand: IOBAN™ 2

## (undated) DEVICE — INTENDED TO BE USED TO OCCLUDE, RETRACT AND IDENTIFY ARTERIES, VEINS, TENDONS AND NERVES IN SURGICAL PROCEDURES: Brand: STERION®  VESSEL LOOP

## (undated) DEVICE — SUT MNCRYL PLS ANTIB UD 4/0 PC3 18IN

## (undated) DEVICE — PATIENT RETURN ELECTRODE, SINGLE-USE, CONTACT QUALITY MONITORING, ADULT, WITH 9FT CORD, FOR PATIENTS WEIGING OVER 33LBS. (15KG): Brand: MEGADYNE

## (undated) DEVICE — GLV SURG PREMIERPRO MIC LTX PF SZ8 BRN

## (undated) DEVICE — ANTIBACTERIAL UNDYED BRAIDED (POLYGLACTIN 910), SYNTHETIC ABSORBABLE SURGICAL SUTURE: Brand: COATED VICRYL